# Patient Record
Sex: FEMALE | Race: WHITE | Employment: OTHER | URBAN - METROPOLITAN AREA
[De-identification: names, ages, dates, MRNs, and addresses within clinical notes are randomized per-mention and may not be internally consistent; named-entity substitution may affect disease eponyms.]

---

## 2017-05-25 PROBLEM — F12.90 MARIJUANA USE: Status: ACTIVE | Noted: 2017-05-25

## 2017-08-15 ENCOUNTER — HOSPITAL ENCOUNTER (OUTPATIENT)
Dept: SURGERY | Age: 57
Discharge: HOME | End: 2017-08-15
Attending: INTERNAL MEDICINE | Admitting: INTERNAL MEDICINE

## 2017-08-15 VITALS
HEIGHT: 63 IN | TEMPERATURE: 98 F | SYSTOLIC BLOOD PRESSURE: 107 MMHG | RESPIRATION RATE: 16 BRPM | OXYGEN SATURATION: 94 % | DIASTOLIC BLOOD PRESSURE: 81 MMHG | HEART RATE: 82 BPM | WEIGHT: 127 LBS | BODY MASS INDEX: 22.5 KG/M2

## 2017-08-15 DIAGNOSIS — R10.31 RIGHT LOWER QUADRANT PAIN: ICD-10-CM

## 2017-08-15 RX ORDER — NICOTINE 14MG/24HR
PATCH, TRANSDERMAL 24 HOURS TRANSDERMAL DAILY
COMMUNITY
End: 2018-02-14

## 2017-08-15 RX ORDER — LIDOCAINE HYDROCHLORIDE 10 MG/ML
1 INJECTION, SOLUTION EPIDURAL; INFILTRATION; INTRACAUDAL; PERINEURAL ONCE
Status: DISCONTINUED | OUTPATIENT
Start: 2017-08-15 | End: 2017-08-16 | Stop reason: HOSPADM

## 2017-08-15 RX ORDER — DICYCLOMINE HCL 20 MG
20 TABLET ORAL 4 TIMES DAILY
COMMUNITY
End: 2018-04-02 | Stop reason: ALTCHOICE

## 2017-08-15 RX ORDER — SODIUM CHLORIDE, SODIUM LACTATE, POTASSIUM CHLORIDE, CALCIUM CHLORIDE 600; 310; 30; 20 MG/100ML; MG/100ML; MG/100ML; MG/100ML
1000 INJECTION, SOLUTION INTRAVENOUS ONCE
Status: COMPLETED | OUTPATIENT
Start: 2017-08-15 | End: 2017-08-15

## 2017-08-15 RX ORDER — DIPHENHYDRAMINE HCL 50 MG
50 CAPSULE ORAL EVERY 6 HOURS PRN
COMMUNITY
End: 2018-05-14 | Stop reason: ALTCHOICE

## 2017-08-15 RX ORDER — ACETAMINOPHEN 500 MG
1000 TABLET ORAL EVERY 6 HOURS PRN
COMMUNITY
End: 2018-04-18 | Stop reason: ALTCHOICE

## 2017-08-15 RX ADMIN — SODIUM CHLORIDE, SODIUM LACTATE, POTASSIUM CHLORIDE, CALCIUM CHLORIDE 1000 ML: 600; 310; 30; 20 INJECTION, SOLUTION INTRAVENOUS at 10:40

## 2017-08-15 ASSESSMENT — PAIN - FUNCTIONAL ASSESSMENT: PAIN_FUNCTIONAL_ASSESSMENT: 0-10

## 2017-09-11 ENCOUNTER — HOSPITAL ENCOUNTER (OUTPATIENT)
Dept: OTHER | Age: 57
Discharge: HOME | End: 2017-09-11
Attending: NURSE PRACTITIONER | Admitting: NURSE PRACTITIONER

## 2017-09-11 DIAGNOSIS — M79.601 PAIN, ARM, RIGHT: ICD-10-CM

## 2017-09-12 PROBLEM — D72.829 LEUKOCYTOSIS: Status: ACTIVE | Noted: 2017-09-12

## 2017-09-12 PROBLEM — E83.52 HYPERCALCEMIA: Status: ACTIVE | Noted: 2017-09-12

## 2017-10-01 PROBLEM — R11.2 INTRACTABLE NAUSEA AND VOMITING: Status: ACTIVE | Noted: 2017-10-01

## 2017-11-17 PROBLEM — R65.10 SIRS (SYSTEMIC INFLAMMATORY RESPONSE SYNDROME) (HCC): Status: ACTIVE | Noted: 2017-11-17

## 2017-11-19 PROBLEM — F12.90 CANNABINOID HYPEREMESIS SYNDROME: Status: ACTIVE | Noted: 2017-11-19

## 2017-11-19 PROBLEM — R11.2 CANNABINOID HYPEREMESIS SYNDROME: Status: ACTIVE | Noted: 2017-11-19

## 2017-12-11 PROBLEM — R11.15 CYCLICAL VOMITING, INTRACTABLE: Status: ACTIVE | Noted: 2017-12-11

## 2018-01-17 PROBLEM — R00.0 TACHYCARDIA: Status: ACTIVE | Noted: 2018-01-17

## 2018-01-17 PROBLEM — I25.10 CAD (CORONARY ARTERY DISEASE): Status: ACTIVE | Noted: 2018-01-17

## 2018-01-17 PROBLEM — I50.9 CHF (CONGESTIVE HEART FAILURE) (HCC): Status: ACTIVE | Noted: 2018-01-17

## 2018-01-17 PROBLEM — E87.6 HYPOKALEMIA: Status: ACTIVE | Noted: 2018-01-17

## 2018-01-17 PROBLEM — R10.9 ABDOMINAL PAIN: Status: ACTIVE | Noted: 2018-01-17

## 2018-01-17 PROBLEM — F19.10 POLYSUBSTANCE ABUSE (HCC): Status: ACTIVE | Noted: 2018-01-17

## 2018-01-17 PROBLEM — R11.15 CYCLICAL VOMITING WITH NAUSEA: Status: ACTIVE | Noted: 2018-01-17

## 2018-02-14 PROBLEM — D72.829 LEUKOCYTOSIS: Status: RESOLVED | Noted: 2017-09-12 | Resolved: 2018-02-14

## 2018-02-14 PROBLEM — E87.6 HYPOKALEMIA: Status: RESOLVED | Noted: 2018-01-17 | Resolved: 2018-02-14

## 2018-02-14 PROBLEM — F19.10 POLYSUBSTANCE ABUSE (HCC): Chronic | Status: ACTIVE | Noted: 2018-01-17

## 2018-02-14 PROBLEM — R00.0 TACHYCARDIA: Status: RESOLVED | Noted: 2018-01-17 | Resolved: 2018-02-14

## 2018-02-14 PROBLEM — E44.0 MODERATE MALNUTRITION (HCC): Chronic | Status: ACTIVE | Noted: 2018-02-14

## 2018-02-14 PROBLEM — R11.2 INTRACTABLE NAUSEA AND VOMITING: Status: RESOLVED | Noted: 2017-10-01 | Resolved: 2018-02-14

## 2018-02-14 PROBLEM — R11.15 CYCLICAL VOMITING WITH NAUSEA: Chronic | Status: ACTIVE | Noted: 2018-01-17

## 2018-02-14 PROBLEM — R10.9 ABDOMINAL PAIN: Status: RESOLVED | Noted: 2018-01-17 | Resolved: 2018-02-14

## 2018-02-14 PROBLEM — R11.15 CYCLICAL VOMITING, INTRACTABLE: Status: RESOLVED | Noted: 2017-12-11 | Resolved: 2018-02-14

## 2018-02-14 PROBLEM — E83.52 HYPERCALCEMIA: Status: RESOLVED | Noted: 2017-09-12 | Resolved: 2018-02-14

## 2018-02-14 PROBLEM — I50.32 CHRONIC DIASTOLIC CHF (CONGESTIVE HEART FAILURE) (HCC): Chronic | Status: ACTIVE | Noted: 2018-01-17

## 2018-02-14 PROBLEM — R94.31 QT PROLONGATION: Status: ACTIVE | Noted: 2018-02-14

## 2018-02-14 PROBLEM — I25.10 CAD (CORONARY ARTERY DISEASE): Chronic | Status: ACTIVE | Noted: 2018-01-17

## 2018-02-14 PROBLEM — F12.90 CANNABINOID HYPEREMESIS SYNDROME: Chronic | Status: ACTIVE | Noted: 2017-11-19

## 2018-02-14 PROBLEM — F12.90 MARIJUANA USE: Chronic | Status: ACTIVE | Noted: 2017-05-25

## 2018-02-14 PROBLEM — R11.2 CANNABINOID HYPEREMESIS SYNDROME: Chronic | Status: ACTIVE | Noted: 2017-11-19

## 2018-04-18 PROBLEM — R07.9 CHEST PAIN: Status: ACTIVE | Noted: 2018-04-18

## 2018-04-20 PROBLEM — J96.00 ACUTE RESPIRATORY FAILURE (HCC): Status: ACTIVE | Noted: 2018-04-20

## 2018-04-25 ENCOUNTER — TELEPHONE (OUTPATIENT)
Dept: PULMONOLOGY | Age: 58
End: 2018-04-25

## 2018-05-07 ENCOUNTER — OFFICE VISIT (OUTPATIENT)
Dept: PULMONOLOGY | Age: 58
End: 2018-05-07

## 2018-05-07 VITALS
SYSTOLIC BLOOD PRESSURE: 76 MMHG | HEART RATE: 85 BPM | DIASTOLIC BLOOD PRESSURE: 48 MMHG | BODY MASS INDEX: 21.62 KG/M2 | HEIGHT: 63 IN | WEIGHT: 122 LBS | OXYGEN SATURATION: 97 % | RESPIRATION RATE: 16 BRPM | TEMPERATURE: 98.3 F

## 2018-05-07 DIAGNOSIS — F17.200 CURRENT SMOKER: ICD-10-CM

## 2018-05-07 DIAGNOSIS — J30.9 CHRONIC ALLERGIC RHINITIS, UNSPECIFIED SEASONALITY, UNSPECIFIED TRIGGER: ICD-10-CM

## 2018-05-07 DIAGNOSIS — F12.90 MARIJUANA USE: Chronic | ICD-10-CM

## 2018-05-07 DIAGNOSIS — J42 CHRONIC BRONCHITIS, UNSPECIFIED CHRONIC BRONCHITIS TYPE (HCC): Primary | Chronic | ICD-10-CM

## 2018-05-07 DIAGNOSIS — Z23 NEED FOR PNEUMOCOCCAL VACCINATION: ICD-10-CM

## 2018-05-07 PROCEDURE — G0009 ADMIN PNEUMOCOCCAL VACCINE: HCPCS | Performed by: INTERNAL MEDICINE

## 2018-05-07 PROCEDURE — 99204 OFFICE O/P NEW MOD 45 MIN: CPT | Performed by: INTERNAL MEDICINE

## 2018-05-07 PROCEDURE — 90670 PCV13 VACCINE IM: CPT | Performed by: INTERNAL MEDICINE

## 2018-05-07 RX ORDER — FLUTICASONE PROPIONATE 50 MCG
2 SPRAY, SUSPENSION (ML) NASAL DAILY
Qty: 1 BOTTLE | Refills: 5 | Status: SHIPPED | OUTPATIENT
Start: 2018-05-07 | End: 2018-07-18 | Stop reason: ALTCHOICE

## 2018-05-14 PROBLEM — J44.1 COPD EXACERBATION (HCC): Status: ACTIVE | Noted: 2018-05-14

## 2018-05-14 PROBLEM — J98.4 PNEUMONITIS: Status: ACTIVE | Noted: 2018-05-14

## 2018-05-14 PROBLEM — J18.9 PNEUMONITIS: Status: ACTIVE | Noted: 2018-05-14

## 2018-05-14 PROBLEM — I95.9 HYPOTENSION: Status: ACTIVE | Noted: 2018-05-14

## 2018-05-14 PROBLEM — Z85.118 H/O: LUNG CANCER: Status: ACTIVE | Noted: 2018-05-14

## 2018-07-18 ENCOUNTER — APPOINTMENT (OUTPATIENT)
Dept: GENERAL RADIOLOGY | Age: 58
End: 2018-07-18
Payer: COMMERCIAL

## 2018-07-18 ENCOUNTER — HOSPITAL ENCOUNTER (EMERGENCY)
Age: 58
Discharge: HOME | End: 2018-07-18
Attending: EMERGENCY MEDICINE
Payer: COMMERCIAL

## 2018-07-18 VITALS
DIASTOLIC BLOOD PRESSURE: 84 MMHG | RESPIRATION RATE: 16 BRPM | WEIGHT: 115 LBS | HEART RATE: 100 BPM | HEIGHT: 63 IN | SYSTOLIC BLOOD PRESSURE: 131 MMHG | TEMPERATURE: 97.7 F | BODY MASS INDEX: 20.38 KG/M2 | OXYGEN SATURATION: 95 %

## 2018-07-18 DIAGNOSIS — R11.2 INTRACTABLE VOMITING WITH NAUSEA, UNSPECIFIED VOMITING TYPE: Primary | ICD-10-CM

## 2018-07-18 LAB
A/G RATIO: 1.4 (ref 1.1–2.2)
ALBUMIN SERPL-MCNC: 5 G/DL (ref 3.4–5)
ALP BLD-CCNC: 99 U/L (ref 40–129)
ALT SERPL-CCNC: 13 U/L (ref 10–40)
ANION GAP SERPL CALCULATED.3IONS-SCNC: 16 MMOL/L (ref 3–16)
AST SERPL-CCNC: 18 U/L (ref 15–37)
BASOPHILS ABSOLUTE: 0.1 K/UL (ref 0–0.2)
BASOPHILS RELATIVE PERCENT: 0.7 %
BILIRUB SERPL-MCNC: 0.3 MG/DL (ref 0–1)
BUN BLDV-MCNC: 15 MG/DL (ref 7–20)
CALCIUM SERPL-MCNC: 10.9 MG/DL (ref 8.3–10.6)
CHLORIDE BLD-SCNC: 101 MMOL/L (ref 99–110)
CO2: 23 MMOL/L (ref 21–32)
CREAT SERPL-MCNC: 0.9 MG/DL (ref 0.6–1.1)
EOSINOPHILS ABSOLUTE: 0 K/UL (ref 0–0.6)
EOSINOPHILS RELATIVE PERCENT: 0.2 %
GFR AFRICAN AMERICAN: >60
GFR NON-AFRICAN AMERICAN: >60
GLOBULIN: 3.7 G/DL
GLUCOSE BLD-MCNC: 151 MG/DL (ref 70–99)
HCT VFR BLD CALC: 50.7 % (ref 36–48)
HEMOGLOBIN: 17.1 G/DL (ref 12–16)
LIPASE: 87 U/L (ref 13–60)
LYMPHOCYTES ABSOLUTE: 1.9 K/UL (ref 1–5.1)
LYMPHOCYTES RELATIVE PERCENT: 19.6 %
MCH RBC QN AUTO: 30.1 PG (ref 26–34)
MCHC RBC AUTO-ENTMCNC: 33.7 G/DL (ref 31–36)
MCV RBC AUTO: 89.4 FL (ref 80–100)
MONOCYTES ABSOLUTE: 0.3 K/UL (ref 0–1.3)
MONOCYTES RELATIVE PERCENT: 3.1 %
NEUTROPHILS ABSOLUTE: 7.4 K/UL (ref 1.7–7.7)
NEUTROPHILS RELATIVE PERCENT: 76.4 %
PDW BLD-RTO: 16.4 % (ref 12.4–15.4)
PLATELET # BLD: 289 K/UL (ref 135–450)
PMV BLD AUTO: 9.3 FL (ref 5–10.5)
POTASSIUM SERPL-SCNC: 3.9 MMOL/L (ref 3.5–5.1)
RBC # BLD: 5.67 M/UL (ref 4–5.2)
SODIUM BLD-SCNC: 140 MMOL/L (ref 136–145)
TOTAL PROTEIN: 8.7 G/DL (ref 6.4–8.2)
WBC # BLD: 9.7 K/UL (ref 4–11)

## 2018-07-18 PROCEDURE — 96361 HYDRATE IV INFUSION ADD-ON: CPT

## 2018-07-18 PROCEDURE — 96372 THER/PROPH/DIAG INJ SC/IM: CPT

## 2018-07-18 PROCEDURE — 80053 COMPREHEN METABOLIC PANEL: CPT

## 2018-07-18 PROCEDURE — 96375 TX/PRO/DX INJ NEW DRUG ADDON: CPT

## 2018-07-18 PROCEDURE — 99284 EMERGENCY DEPT VISIT MOD MDM: CPT

## 2018-07-18 PROCEDURE — 83690 ASSAY OF LIPASE: CPT

## 2018-07-18 PROCEDURE — 6360000002 HC RX W HCPCS: Performed by: EMERGENCY MEDICINE

## 2018-07-18 PROCEDURE — 96374 THER/PROPH/DIAG INJ IV PUSH: CPT

## 2018-07-18 PROCEDURE — 2580000003 HC RX 258: Performed by: EMERGENCY MEDICINE

## 2018-07-18 PROCEDURE — 85025 COMPLETE CBC W/AUTO DIFF WBC: CPT

## 2018-07-18 PROCEDURE — 93005 ELECTROCARDIOGRAM TRACING: CPT | Performed by: EMERGENCY MEDICINE

## 2018-07-18 RX ORDER — DIPHENHYDRAMINE HYDROCHLORIDE 50 MG/ML
25 INJECTION INTRAMUSCULAR; INTRAVENOUS ONCE
Status: COMPLETED | OUTPATIENT
Start: 2018-07-18 | End: 2018-07-18

## 2018-07-18 RX ORDER — HALOPERIDOL 5 MG/ML
5 INJECTION INTRAMUSCULAR ONCE
Status: COMPLETED | OUTPATIENT
Start: 2018-07-18 | End: 2018-07-18

## 2018-07-18 RX ORDER — ONDANSETRON 2 MG/ML
4 INJECTION INTRAMUSCULAR; INTRAVENOUS ONCE
Status: COMPLETED | OUTPATIENT
Start: 2018-07-18 | End: 2018-07-18

## 2018-07-18 RX ORDER — MORPHINE SULFATE 4 MG/ML
4 INJECTION, SOLUTION INTRAMUSCULAR; INTRAVENOUS ONCE
Status: COMPLETED | OUTPATIENT
Start: 2018-07-18 | End: 2018-07-18

## 2018-07-18 RX ORDER — 0.9 % SODIUM CHLORIDE 0.9 %
1000 INTRAVENOUS SOLUTION INTRAVENOUS ONCE
Status: COMPLETED | OUTPATIENT
Start: 2018-07-18 | End: 2018-07-18

## 2018-07-18 RX ORDER — METOCLOPRAMIDE 10 MG/1
10 TABLET ORAL EVERY 8 HOURS PRN
Qty: 30 TABLET | Refills: 0 | Status: SHIPPED | OUTPATIENT
Start: 2018-07-18 | End: 2020-07-08

## 2018-07-18 RX ADMIN — SODIUM CHLORIDE 1000 ML: 9 INJECTION, SOLUTION INTRAVENOUS at 11:47

## 2018-07-18 RX ADMIN — MORPHINE SULFATE 4 MG: 4 INJECTION, SOLUTION INTRAMUSCULAR; INTRAVENOUS at 11:48

## 2018-07-18 RX ADMIN — ONDANSETRON 4 MG: 2 INJECTION INTRAMUSCULAR; INTRAVENOUS at 11:48

## 2018-07-18 RX ADMIN — DIPHENHYDRAMINE HYDROCHLORIDE 25 MG: 50 INJECTION, SOLUTION INTRAMUSCULAR; INTRAVENOUS at 11:47

## 2018-07-18 RX ADMIN — HALOPERIDOL LACTATE 5 MG: 5 INJECTION, SOLUTION INTRAMUSCULAR at 11:48

## 2018-07-18 ASSESSMENT — PAIN DESCRIPTION - LOCATION: LOCATION: ABDOMEN

## 2018-07-18 ASSESSMENT — PAIN DESCRIPTION - PAIN TYPE: TYPE: ACUTE PAIN;CHRONIC PAIN

## 2018-07-18 ASSESSMENT — PAIN SCALES - GENERAL
PAINLEVEL_OUTOF10: 7
PAINLEVEL_OUTOF10: 9

## 2018-07-18 NOTE — ED PROVIDER NOTES
CHIEF COMPLAINT  Abdominal Pain (c/o mid/left abd pain and n/v. pt states \"it's pain first and then vomiting. they keep telling me it's from my cannabis use but it's not. \" ) and Emesis      HISTORY OF PRESENT ILLNESS  Sudhakar Gregorio is a 61 y.o. female presents to the ED with vomiting. The patient has a history of cyclic vomiting, states she started vomiting again yesterday and associated with left upper quadrant pain. The patient has had no fevers, no diarrhea, no constipation, no melena. She has had no cough or chest pain. She states that she doesn't believe it's related to the marijuana although she still is continuing to use routinely. She has had a cholecystectomy remotely. No other complaints, modifying factors or associated symptoms. I have reviewed the following from the nursing documentation.     Past Medical History:   Diagnosis Date    Anxiety     Asthma     Bipolar affective ([address]     Cancer ([address] 1984    Uterine    Cancer of lung ([address] 2014    CHF (congestive heart failure) ([address]     COPD (chronic obstructive pulmonary disease) (HCC)     Cyclic vomiting syndrome     with known cannabis abuse    Cysts of both kidneys     Depression     Fatty liver 09/06/12    by CT at [facility]    Gastritis 06/29/12    EGD at [facility] MI (myocardial infarction) 2010    Panic attacks     Pneumonia     Pre-diabetes 04/13/13    A1c5.8%    Tricuspid regurgitation     06/26/2012 JUANJO at [facility] Structurally Normal Tricuspid Valve     Past Surgical History:   Procedure Laterality Date    CHOLECYSTECTOMY  2009    COLONOSCOPY  2001    COLONOSCOPY  2013    tubular adenoma    COLONOSCOPY  08/15/2017    ENDOSCOPY, COLON, DIAGNOSTIC      HERNIA REPAIR      HYSTERECTOMY  1985    With Bladder Mesh    [facility]CONT[facility]GERY Left     2009 in [facility]    LUNG CANCER SURGERY      cancerous nodule removed left side    TONSILLECTOMY      UPPER GASTROINTESTINAL ENDOSCOPY  2013    gastritis    UPPER GASTROINTESTINAL ENDOSCOPY Anxiety        Allergies   Allergen Reactions    Bactrim [Sulfamethoxazole-Trimethoprim]      N/V    Codeine Itching and Nausea And Vomiting     GI upset & itching       REVIEW OF SYSTEMS  10 systems reviewed, pertinent positives per HPI otherwise noted to be negative. PHYSICAL EXAM  /85   Pulse 93   Temp 97.7 °F (36.5 °C) (Oral)   Resp 16   Ht 5' 3\" (1.6 m)   Wt 115 lb (52.2 kg)   SpO2 98%   BMI 20.37 kg/m²   GENERAL APPEARANCE: Awake and alert. Cooperative. Moderate distress initially  HEAD: Normocephalic. Atraumatic. EYES: PERRL. EOM's grossly intact. ENT: Mucous membranes are dry. NECK: Supple. HEART: RRR. No murmurs  LUNGS: Respirations unlabored. Lungs are clear bilaterally. ABDOMEN: Soft. Non-distended. Tender left upper quadrant. No guarding or rebound. Normal bowel sounds. EXTREMITIES: No peripheral edema. Moves all extremities equally. All extremities neurovascularly intact. SKIN: Warm and dry. No acute rashes. NEUROLOGICAL: Alert and oriented. No gross facial drooping. PSYCHIATRIC: Normal mood and affect. LABS  I have reviewed all labs for this visit.    Results for orders placed or performed during the hospital encounter of 07/18/18   CBC Auto Differential   Result Value Ref Range    WBC 9.7 4.0 - 11.0 K/uL    RBC 5.67 (H) 4.00 - 5.20 M/uL    Hemoglobin 17.1 (H) 12.0 - 16.0 g/dL    Hematocrit 50.7 (H) 36.0 - 48.0 %    MCV 89.4 80.0 - 100.0 fL    MCH 30.1 26.0 - 34.0 pg    MCHC 33.7 31.0 - 36.0 g/dL    RDW 16.4 (H) 12.4 - 15.4 %    Platelets 621 331 - 711 K/uL    MPV 9.3 5.0 - 10.5 fL    Neutrophils % 76.4 %    Lymphocytes % 19.6 %    Monocytes % 3.1 %    Eosinophils % 0.2 %    Basophils % 0.7 %    Neutrophils # 7.4 1.7 - 7.7 K/uL    Lymphocytes # 1.9 1.0 - 5.1 K/uL    Monocytes # 0.3 0.0 - 1.3 K/uL    Eosinophils # 0.0 0.0 - 0.6 K/uL    Basophils # 0.1 0.0 - 0.2 K/uL   Comprehensive Metabolic Panel   Result Value Ref Range    Sodium 140 136 - 145 mmol/L    Potassium 3.9 3.5 - 5.1 mmol/L    Chloride 101 99 - 110 mmol/L    CO2 23 21 - 32 mmol/L    Anion Gap 16 3 - 16    Glucose 151 (H) 70 - 99 mg/dL    BUN 15 7 - 20 mg/dL    CREATININE 0.9 0.6 - 1.1 mg/dL    GFR Non-African American >60 >60    GFR African American >60 >60    Calcium 10.9 (H) 8.3 - 10.6 mg/dL    Total Protein 8.7 (H) 6.4 - 8.2 g/dL    Alb 5.0 3.4 - 5.0 g/dL    Albumin/Globulin Ratio 1.4 1.1 - 2.2    Total Bilirubin 0.3 0.0 - 1.0 mg/dL    Alkaline Phosphatase 99 40 - 129 U/L    ALT 13 10 - 40 U/L    AST 18 15 - 37 U/L    Globulin 3.7 g/dL   Lipase   Result Value Ref Range    Lipase 87.0 (H) 13.0 - 60.0 U/L       EKG  The Ekg interpreted by me shows  normal sinus rhythm with a rate of 75  Axis is   Normal  QTc is  469  Intervals and Durations are unremarkable. ST Segments: normal  No significant change from prior EKG dated May 14, 2018        RADIOLOGY  No results found. PROCEDURES    ED COURSE/MDM  Patient seen and evaluated. Old records reviewed. Labs and imaging reviewed and results discussed with patient. I considered ACS, EKG was normal, obstruction initially, the patient had belly series ordered but the patient is refusing stating that this is the chronic vomiting that she has. She was given Reglan, Pepcid, Benadryl and IV fluids along with pain medication and is feeling better and has stopped vomiting. She does not want the x-rays and at this time will be discharged home on Reglan and asked to follow up again with her primary care provider. Patient was given scripts for the following medications. I counseled patient how to take these medications. Current Discharge Medication List          CLINICAL IMPRESSION  1. Intractable vomiting with nausea, unspecified vomiting type        Blood pressure 108/85, pulse 93, temperature 97.7 °F (36.5 °C), temperature source Oral, resp. rate 16, height 5' 3\" (1.6 m), weight 115 lb (52.2 kg), SpO2 98 %, not currently breastfeeding.     DISPOSITION  [facility]

## 2018-07-18 NOTE — ED NOTES
Bed: 20  Expected date:   Expected time:   Means of arrival:   Comments:  56yo abdominal pain      Gaurang Bray RN  07/18/18 4166

## 2018-07-18 NOTE — ED NOTES
--Patient provided with discharge instructions and prescription. --Instructions, prescription, and follow-up appointments reviewed with patient. No further questions or needs at this time. --Vital signs and patient stable upon discharge. --Patient escorted to lobby via wheelchair. [employer] voucher provided to kody Dyer RN  07/18/18 8646

## 2018-07-19 LAB
EKG ATRIAL RATE: 75 BPM
EKG DIAGNOSIS: NORMAL
EKG P AXIS: 70 DEGREES
EKG P-R INTERVAL: 130 MS
EKG Q-T INTERVAL: 420 MS
EKG QRS DURATION: 76 MS
EKG QTC CALCULATION (BAZETT): 469 MS
EKG R AXIS: 80 DEGREES
EKG T AXIS: 67 DEGREES
EKG VENTRICULAR RATE: 75 BPM

## 2018-07-19 PROCEDURE — 93010 ELECTROCARDIOGRAM REPORT: CPT | Performed by: INTERNAL MEDICINE

## 2018-07-20 ENCOUNTER — HOSPITAL ENCOUNTER (EMERGENCY)
Age: 58
Discharge: HOME | End: 2018-07-20
Attending: EMERGENCY MEDICINE
Payer: COMMERCIAL

## 2018-07-20 ENCOUNTER — APPOINTMENT (OUTPATIENT)
Dept: ULTRASOUND IMAGING | Age: 58
End: 2018-07-20
Payer: COMMERCIAL

## 2018-07-20 ENCOUNTER — APPOINTMENT (OUTPATIENT)
Dept: GENERAL RADIOLOGY | Age: 58
End: 2018-07-20
Payer: COMMERCIAL

## 2018-07-20 VITALS
SYSTOLIC BLOOD PRESSURE: 123 MMHG | RESPIRATION RATE: 16 BRPM | HEART RATE: 101 BPM | OXYGEN SATURATION: 96 % | DIASTOLIC BLOOD PRESSURE: 76 MMHG | TEMPERATURE: 97.2 F

## 2018-07-20 DIAGNOSIS — E86.0 DEHYDRATION: ICD-10-CM

## 2018-07-20 DIAGNOSIS — R11.11 NON-INTRACTABLE VOMITING WITHOUT NAUSEA, UNSPECIFIED VOMITING TYPE: ICD-10-CM

## 2018-07-20 DIAGNOSIS — R10.12 LEFT UPPER QUADRANT PAIN: ICD-10-CM

## 2018-07-20 DIAGNOSIS — R31.29 MICROSCOPIC HEMATURIA: ICD-10-CM

## 2018-07-20 DIAGNOSIS — N17.9 ACUTE KIDNEY INJURY (HCC): Primary | ICD-10-CM

## 2018-07-20 LAB
A/G RATIO: 1.2 (ref 1.1–2.2)
ALBUMIN SERPL-MCNC: 4.7 G/DL (ref 3.4–5)
ALP BLD-CCNC: 97 U/L (ref 40–129)
ALT SERPL-CCNC: 12 U/L (ref 10–40)
ANION GAP SERPL CALCULATED.3IONS-SCNC: 11 MMOL/L (ref 3–16)
ANION GAP SERPL CALCULATED.3IONS-SCNC: 15 MMOL/L (ref 3–16)
ANION GAP SERPL CALCULATED.3IONS-SCNC: 20 MMOL/L (ref 3–16)
AST SERPL-CCNC: 18 U/L (ref 15–37)
BACTERIA: ABNORMAL /HPF
BASOPHILS ABSOLUTE: 0.1 K/UL (ref 0–0.2)
BASOPHILS RELATIVE PERCENT: 0.7 %
BILIRUB SERPL-MCNC: 0.4 MG/DL (ref 0–1)
BILIRUBIN URINE: ABNORMAL
BLOOD, URINE: ABNORMAL
BUN BLDV-MCNC: 30 MG/DL (ref 7–20)
BUN BLDV-MCNC: 37 MG/DL (ref 7–20)
BUN BLDV-MCNC: 40 MG/DL (ref 7–20)
CALCIUM SERPL-MCNC: 10.4 MG/DL (ref 8.3–10.6)
CALCIUM SERPL-MCNC: 9.2 MG/DL (ref 8.3–10.6)
CALCIUM SERPL-MCNC: 9.7 MG/DL (ref 8.3–10.6)
CASTS: ABNORMAL /LPF
CHLORIDE BLD-SCNC: 90 MMOL/L (ref 99–110)
CHLORIDE BLD-SCNC: 93 MMOL/L (ref 99–110)
CHLORIDE BLD-SCNC: 95 MMOL/L (ref 99–110)
CLARITY: CLEAR
CO2: 22 MMOL/L (ref 21–32)
CO2: 26 MMOL/L (ref 21–32)
CO2: 30 MMOL/L (ref 21–32)
COLOR: YELLOW
CREAT SERPL-MCNC: 1.2 MG/DL (ref 0.6–1.1)
CREAT SERPL-MCNC: 1.5 MG/DL (ref 0.6–1.1)
CREAT SERPL-MCNC: 1.6 MG/DL (ref 0.6–1.1)
EKG ATRIAL RATE: 123 BPM
EKG DIAGNOSIS: NORMAL
EKG P AXIS: 78 DEGREES
EKG P-R INTERVAL: 130 MS
EKG Q-T INTERVAL: 340 MS
EKG QRS DURATION: 74 MS
EKG QTC CALCULATION (BAZETT): 486 MS
EKG R AXIS: 82 DEGREES
EKG T AXIS: 68 DEGREES
EKG VENTRICULAR RATE: 123 BPM
EOSINOPHILS ABSOLUTE: 0 K/UL (ref 0–0.6)
EOSINOPHILS RELATIVE PERCENT: 0 %
EPITHELIAL CELLS, UA: ABNORMAL /HPF
GFR AFRICAN AMERICAN: 40
GFR AFRICAN AMERICAN: 43
GFR AFRICAN AMERICAN: 56
GFR NON-AFRICAN AMERICAN: 33
GFR NON-AFRICAN AMERICAN: 36
GFR NON-AFRICAN AMERICAN: 46
GLOBULIN: 3.9 G/DL
GLUCOSE BLD-MCNC: 101 MG/DL (ref 70–99)
GLUCOSE BLD-MCNC: 141 MG/DL (ref 70–99)
GLUCOSE BLD-MCNC: 89 MG/DL (ref 70–99)
GLUCOSE URINE: NEGATIVE MG/DL
HCT VFR BLD CALC: 51.2 % (ref 36–48)
HEMOGLOBIN: 17.3 G/DL (ref 12–16)
KETONES, URINE: ABNORMAL MG/DL
LACTIC ACID: 1.3 MMOL/L (ref 0.4–2)
LEUKOCYTE ESTERASE, URINE: NEGATIVE
LIPASE: 17 U/L (ref 13–60)
LYMPHOCYTES ABSOLUTE: 2.6 K/UL (ref 1–5.1)
LYMPHOCYTES RELATIVE PERCENT: 16.4 %
MAGNESIUM: 2.1 MG/DL (ref 1.8–2.4)
MCH RBC QN AUTO: 29.8 PG (ref 26–34)
MCHC RBC AUTO-ENTMCNC: 33.9 G/DL (ref 31–36)
MCV RBC AUTO: 87.9 FL (ref 80–100)
MICROSCOPIC EXAMINATION: YES
MONOCYTES ABSOLUTE: 0.9 K/UL (ref 0–1.3)
MONOCYTES RELATIVE PERCENT: 5.5 %
NEUTROPHILS ABSOLUTE: 12.5 K/UL (ref 1.7–7.7)
NEUTROPHILS RELATIVE PERCENT: 77.4 %
NITRITE, URINE: NEGATIVE
PDW BLD-RTO: 16.5 % (ref 12.4–15.4)
PH UA: 6
PLATELET # BLD: 342 K/UL (ref 135–450)
PMV BLD AUTO: 10.1 FL (ref 5–10.5)
POTASSIUM SERPL-SCNC: 3.7 MMOL/L (ref 3.5–5.1)
POTASSIUM SERPL-SCNC: 3.7 MMOL/L (ref 3.5–5.1)
POTASSIUM SERPL-SCNC: 3.8 MMOL/L (ref 3.5–5.1)
PROTEIN UA: >=300 MG/DL
RBC # BLD: 5.82 M/UL (ref 4–5.2)
RBC UA: ABNORMAL /HPF (ref 0–2)
REASON FOR REJECTION: NORMAL
REJECTED TEST: NORMAL
SODIUM BLD-SCNC: 132 MMOL/L (ref 136–145)
SODIUM BLD-SCNC: 134 MMOL/L (ref 136–145)
SODIUM BLD-SCNC: 136 MMOL/L (ref 136–145)
SPECIFIC GRAVITY UA: >=1.03
TOTAL PROTEIN: 8.6 G/DL (ref 6.4–8.2)
TROPONIN: <0.01 NG/ML
URINE TYPE: ABNORMAL
UROBILINOGEN, URINE: 0.2 E.U./DL
WBC # BLD: 16.1 K/UL (ref 4–11)
WBC UA: ABNORMAL /HPF (ref 0–5)

## 2018-07-20 PROCEDURE — 83690 ASSAY OF LIPASE: CPT

## 2018-07-20 PROCEDURE — 93010 ELECTROCARDIOGRAM REPORT: CPT | Performed by: INTERNAL MEDICINE

## 2018-07-20 PROCEDURE — 6360000002 HC RX W HCPCS: Performed by: EMERGENCY MEDICINE

## 2018-07-20 PROCEDURE — 84484 ASSAY OF TROPONIN QUANT: CPT

## 2018-07-20 PROCEDURE — 6370000000 HC RX 637 (ALT 250 FOR IP): Performed by: EMERGENCY MEDICINE

## 2018-07-20 PROCEDURE — 96372 THER/PROPH/DIAG INJ SC/IM: CPT

## 2018-07-20 PROCEDURE — 96375 TX/PRO/DX INJ NEW DRUG ADDON: CPT

## 2018-07-20 PROCEDURE — 2580000003 HC RX 258: Performed by: EMERGENCY MEDICINE

## 2018-07-20 PROCEDURE — 76770 US EXAM ABDO BACK WALL COMP: CPT

## 2018-07-20 PROCEDURE — 96361 HYDRATE IV INFUSION ADD-ON: CPT

## 2018-07-20 PROCEDURE — 93005 ELECTROCARDIOGRAM TRACING: CPT | Performed by: EMERGENCY MEDICINE

## 2018-07-20 PROCEDURE — 2500000003 HC RX 250 WO HCPCS: Performed by: EMERGENCY MEDICINE

## 2018-07-20 PROCEDURE — 99285 EMERGENCY DEPT VISIT HI MDM: CPT

## 2018-07-20 PROCEDURE — 81001 URINALYSIS AUTO W/SCOPE: CPT

## 2018-07-20 PROCEDURE — 83735 ASSAY OF MAGNESIUM: CPT

## 2018-07-20 PROCEDURE — 96374 THER/PROPH/DIAG INJ IV PUSH: CPT

## 2018-07-20 PROCEDURE — 80053 COMPREHEN METABOLIC PANEL: CPT

## 2018-07-20 PROCEDURE — S0028 INJECTION, FAMOTIDINE, 20 MG: HCPCS | Performed by: EMERGENCY MEDICINE

## 2018-07-20 PROCEDURE — 96376 TX/PRO/DX INJ SAME DRUG ADON: CPT

## 2018-07-20 PROCEDURE — 36415 COLL VENOUS BLD VENIPUNCTURE: CPT

## 2018-07-20 PROCEDURE — 85025 COMPLETE CBC W/AUTO DIFF WBC: CPT

## 2018-07-20 PROCEDURE — 74022 RADEX COMPL AQT ABD SERIES: CPT

## 2018-07-20 PROCEDURE — 83605 ASSAY OF LACTIC ACID: CPT

## 2018-07-20 RX ORDER — FAMOTIDINE 20 MG/1
20 TABLET, FILM COATED ORAL 2 TIMES DAILY
Qty: 14 TABLET | Refills: 0 | Status: ON HOLD | OUTPATIENT
Start: 2018-07-20 | End: 2019-01-27 | Stop reason: HOSPADM

## 2018-07-20 RX ORDER — SODIUM CHLORIDE, SODIUM LACTATE, POTASSIUM CHLORIDE, AND CALCIUM CHLORIDE .6; .31; .03; .02 G/100ML; G/100ML; G/100ML; G/100ML
1000 INJECTION, SOLUTION INTRAVENOUS ONCE
Status: COMPLETED | OUTPATIENT
Start: 2018-07-20 | End: 2018-07-20

## 2018-07-20 RX ORDER — HYDROCODONE BITARTRATE AND ACETAMINOPHEN 7.5; 325 MG/1; MG/1
1 TABLET ORAL ONCE
Status: COMPLETED | OUTPATIENT
Start: 2018-07-20 | End: 2018-07-20

## 2018-07-20 RX ORDER — ONDANSETRON 2 MG/ML
4 INJECTION INTRAMUSCULAR; INTRAVENOUS ONCE
Status: COMPLETED | OUTPATIENT
Start: 2018-07-20 | End: 2018-07-20

## 2018-07-20 RX ORDER — 0.9 % SODIUM CHLORIDE 0.9 %
1000 INTRAVENOUS SOLUTION INTRAVENOUS ONCE
Status: COMPLETED | OUTPATIENT
Start: 2018-07-20 | End: 2018-07-20

## 2018-07-20 RX ORDER — HALOPERIDOL 5 MG/ML
2 INJECTION INTRAMUSCULAR ONCE
Status: COMPLETED | OUTPATIENT
Start: 2018-07-20 | End: 2018-07-20

## 2018-07-20 RX ORDER — MORPHINE SULFATE 2 MG/ML
2 INJECTION, SOLUTION INTRAMUSCULAR; INTRAVENOUS ONCE
Status: COMPLETED | OUTPATIENT
Start: 2018-07-20 | End: 2018-07-20

## 2018-07-20 RX ADMIN — MORPHINE SULFATE 2 MG: 2 INJECTION, SOLUTION INTRAMUSCULAR; INTRAVENOUS at 07:55

## 2018-07-20 RX ADMIN — FAMOTIDINE 20 MG: 10 INJECTION, SOLUTION INTRAVENOUS at 07:55

## 2018-07-20 RX ADMIN — HYDROCODONE BITARTRATE AND ACETAMINOPHEN 1 TABLET: 7.5; 325 TABLET ORAL at 13:31

## 2018-07-20 RX ADMIN — ONDANSETRON 4 MG: 2 INJECTION INTRAMUSCULAR; INTRAVENOUS at 13:31

## 2018-07-20 RX ADMIN — ONDANSETRON 4 MG: 2 INJECTION INTRAMUSCULAR; INTRAVENOUS at 07:55

## 2018-07-20 RX ADMIN — SODIUM CHLORIDE 1000 ML: 9 INJECTION, SOLUTION INTRAVENOUS at 13:31

## 2018-07-20 RX ADMIN — SODIUM CHLORIDE, POTASSIUM CHLORIDE, SODIUM LACTATE AND CALCIUM CHLORIDE 1000 ML: 600; 310; 30; 20 INJECTION, SOLUTION INTRAVENOUS at 07:54

## 2018-07-20 RX ADMIN — HALOPERIDOL LACTATE 2 MG: 5 INJECTION, SOLUTION INTRAMUSCULAR at 07:55

## 2018-07-20 RX ADMIN — SODIUM CHLORIDE, POTASSIUM CHLORIDE, SODIUM LACTATE AND CALCIUM CHLORIDE 1000 ML: 600; 310; 30; 20 INJECTION, SOLUTION INTRAVENOUS at 10:56

## 2018-07-20 ASSESSMENT — PAIN DESCRIPTION - LOCATION
LOCATION: FLANK
LOCATION: ABDOMEN
LOCATION: ABDOMEN

## 2018-07-20 ASSESSMENT — ENCOUNTER SYMPTOMS
BLOOD IN STOOL: 0
SHORTNESS OF BREATH: 0
VOMITING: 1
COLOR CHANGE: 0
DIARRHEA: 0
BACK PAIN: 1
CONSTIPATION: 0
ABDOMINAL PAIN: 1
ANAL BLEEDING: 0
NAUSEA: 1

## 2018-07-20 ASSESSMENT — PAIN SCALES - GENERAL
PAINLEVEL_OUTOF10: 3
PAINLEVEL_OUTOF10: 6
PAINLEVEL_OUTOF10: 6
PAINLEVEL_OUTOF10: 9

## 2018-07-20 ASSESSMENT — PAIN DESCRIPTION - PAIN TYPE
TYPE: ACUTE PAIN
TYPE: ACUTE PAIN

## 2018-07-20 ASSESSMENT — PAIN DESCRIPTION - ORIENTATION: ORIENTATION: LEFT

## 2018-07-20 NOTE — ED NOTES
Pt remedicated per order. Pt in bed resting. 3rd Liter of fluids infusing. Pt denies further needs at this time.       Sri Chaves RN  07/20/18 7771

## 2018-07-20 NOTE — ED PROVIDER NOTES
[facility]  ED  eMERGENCY dEPARTMENT eNCOUnter        Pt Name: Scotty Mcrae  MRN: [id number]  [place] 1960  Date of evaluation: 7/20/2018  Provider: Kita Mcmahon MD  PCP: MACEY [address] [facility]       Chief Complaint   Patient presents with    Emesis     seen two days ago for same. has been taking reglan, zofran, phenergan [place]       HISTORY OF PRESENT ILLNESS   (Location/Symptom, Timing/Onset, Context/Setting, Quality, Duration, Modifying Factors, Severity)  Note limiting factors. Scotty Mcrae is a 61 y.o. female with a history of intractable vomiting presenting today with upper abdominal pain that radiates to her back with multiple episodes of vomiting for the last 2 days. She was seen in the emergency department 2 days ago for similar symptoms and states she did not feel much better at time of discharge. This is a chronic problem. She does smoke marijuana daily. She denies any chest pain or shortness of breath. No lower abdominal pain. No urinary symptoms. No fever. She is attempting to take Reglan, Zofran, Phenergan at home but she is still severely nauseated and nothing is helping with her symptoms. No blood in the vomit. She had a normal bowel movement yesterday. REVIEW OF SYSTEMS    (2-9 systems for level 4, 10 or more for level 5)     Review of Systems   Constitutional: Positive for fatigue. Negative for chills, diaphoresis and fever. HENT: Negative for congestion. Respiratory: Negative for shortness of breath. Cardiovascular: Negative for chest pain. Gastrointestinal: Positive for abdominal pain, nausea and vomiting. Negative for anal bleeding, blood in stool, constipation and diarrhea. Genitourinary: Positive for decreased urine volume. Negative for dysuria and flank pain. Musculoskeletal: Positive for back pain. Negative for neck pain. Skin: Negative for color change.    Neurological: Positive for weakness appearance. She does not have a sickly appearance. She does not appear ill. She appears distressed (mild). HENT:   Head: Normocephalic and atraumatic. Nose: Nose normal.   Mouth/Throat: Mucous membranes are dry. No oropharyngeal exudate. Eyes: Right eye exhibits no discharge. Left eye exhibits no discharge. Neck: Normal range of motion and full passive range of motion without pain. Neck supple. No neck rigidity. No tracheal deviation, no edema, no erythema and normal range of motion present. Cardiovascular: Regular rhythm and intact distal pulses. Tachycardia present. Pulmonary/Chest: Effort normal and breath sounds normal. No accessory muscle usage or stridor. No respiratory distress. She has no decreased breath sounds. She has no wheezes. She has no rhonchi. She has no rales. She exhibits no tenderness. Abdominal: Soft. Bowel sounds are normal. She exhibits no distension. There is tenderness (mild) in the epigastric area and left upper quadrant. There is no rigidity, no rebound, no guarding, no CVA tenderness, no tenderness at McBurney's point and negative Sin's sign. Musculoskeletal: She exhibits no edema, tenderness or deformity. Neurological: She is alert and oriented to person, place, and time. She has normal strength. She displays no atrophy and no tremor. No sensory deficit. She exhibits normal muscle tone. She displays no seizure activity. GCS eye subscore is 4. GCS verbal subscore is 5. GCS motor subscore is 6. Skin: Skin is warm, dry and intact. No rash noted. She is not diaphoretic. No erythema. No pallor. Psychiatric: She has a normal mood and affect. Nursing note and vitals reviewed.           DIAGNOSTIC RESULTS   LABS:    Labs Reviewed   CBC WITH AUTO DIFFERENTIAL - Abnormal; Notable for the following:        Result Value    WBC 16.1 (*)     RBC 5.82 (*)     Hemoglobin 17.3 (*)     Hematocrit 51.2 (*)     RDW 16.5 (*)     Neutrophils # 12.5 (*)     All other components limits    Narrative:     Performed at:  [address], [facility]   Phone [phone]   LACTIC ACID, PLASMA    Narrative:     Performed at:  [address], [facility]   Phone [phone]   TROPONIN    Narrative:     Performed at:  [address], [address] [facility]   Phone [phone]   LIPASE    Narrative:     Performed at:  [address], [facility]   Phone [phone]    Narrative:     Telly Ross tel. [phone],  Rejected Test Name/Called to: Mirella Ewing rn, 07/20/2018 07:48, by JUAN PABLO  Performed at:  [address], [facility]   Phone [phone]   MAGNESIUM    Narrative:     Performed at:  [address], [place] [facility]   Phone [phone]   LACTIC ACID, PLASMA       All other labs were within normal range or not returned as of this dictation. EKG: All EKG's are interpreted by the Emergency Department Physician who either signs or Co-signs this chart in the absence of a cardiologist.    The Ekg interpreted by me shows  sinus tachycardia, [id number]   Axis is   Normal  QTc is  within an acceptable range  Intervals and Durations are unremarkable.       ST Segments: no acute change and nonspecific changes  No significant change from prior EKG dated - 7/18/18  No STEMI           RADIOLOGY:   Non-plain film images such as CT, Ultrasound and MRI are read by the radiologist. Plain radiographic images are visualized and preliminarily interpreted by the  ED Provider with the below findings:        Interpretation per the Radiologist below, if available at the time of this note:    US RENAL COMPLETE   Final Result   There are 2 simple appearing cysts states her abdominal pain is worse and normal, we will also obtain an acute abdominal series to assess for possible obstruction but again she is having normal bowel movements and this is less likely. Otherwise, I will assess electrolytes and hydrate her accordingly. Following 3 L of hydration and repeat BMP, she had significant improvement with her acute kidney injury. She was tolerating p.o. No sign of kidney stone causing obstruction on renal ultrasound. No sign of obstruction of the bowel on x-ray. I reevaluated her following pain medication and antiemetics and she was feeling much better and appeared very comfortable in the room. At this time, she does feel comfortable trying to go home and I do believe this is appropriate at this time. She understands if she has any worsening abdominal pain, vomiting, fever, development of chest pain or shortness of breath, then return to the emergency department, but otherwise follow-up with her primary doctor in the next 2-4 days for repeat evaluation and with GI in regards to her chronic vomiting and upper abdominal pain that is chronic. She was well-appearing and in no acute distress at time of discharge. I did consider acute coronary syndrome, gastritis, appendicitis, diverticulitis, bowel obstruction, gastroparesis, amongst other pathology. She is aware of the need to follow-up with repeat lab work and urinalysis related to hematuria and acute kidney injury that is improving,b but return to the emergency department for any worsening symptoms. I also believe this could be related to marijuana use and I did discuss with her that she should try to stop. The patient tolerated their visit well. The patient and / or the family were informed of the results of any tests, a time was given to answer questions. FINAL IMPRESSION      1. Acute kidney injury ([address]    2. Dehydration    3. Non-intractable vomiting without nausea, unspecified vomiting type    4. Left upper quadrant pain    5.  Microscopic hematuria          DISPOSITION/PLAN   DISPOSITION  - discharged in improved, stable condition      PATIENT REFERRED TO:  [facility]  ED  [address] [facility] [address]  Go to   If symptoms worsen    Dali Collins MD  [address] [place] [address]  [phone]    Schedule an appointment as soon as possible for a visit in 3 days  For further evaluation related to your chronic vomiting and abdominal pain    MACEY Easley - CNP    Schedule an appointment as soon as possible for a visit in 3 days  For further evaluation related to your vomiting and to have your urine and kidney values rechecked by labs      DISCHARGE MEDICATIONS:  Discharge Medication List as of 7/20/2018  1:53 PM      START taking these medications    Details   famotidine (PEPCID) 20 MG tablet Take 1 tablet by mouth 2 times daily for 7 days, Disp-14 tablet, R-0Print             DISCONTINUED MEDICATIONS:  Discharge Medication List as of 7/20/2018  1:53 PM                 (Please note that portions of this note were completed with a voice recognition program.  Efforts were made to edit the dictations but occasionally words are mis-transcribed.)    Tammy Driscoll MD (electronically signed)            Tammy Driscoll MD  07/20/18 0510 Coleman Rivera MD  07/20/18 0222

## 2018-07-20 NOTE — ED NOTES
IV placed per Dr. Triana Delay via ultrasound. LR infusing with gravity. Pt in bed resting. Pt denies further needs at this time.       Calvin Amaro RN  07/20/18 0057

## 2018-08-15 ENCOUNTER — APPOINTMENT (OUTPATIENT)
Dept: GENERAL RADIOLOGY | Age: 58
End: 2018-08-15
Payer: COMMERCIAL

## 2018-08-15 ENCOUNTER — HOSPITAL ENCOUNTER (EMERGENCY)
Age: 58
Discharge: HOME | End: 2018-08-15
Attending: EMERGENCY MEDICINE
Payer: COMMERCIAL

## 2018-08-15 VITALS
WEIGHT: 115 LBS | HEART RATE: 88 BPM | SYSTOLIC BLOOD PRESSURE: 128 MMHG | DIASTOLIC BLOOD PRESSURE: 74 MMHG | BODY MASS INDEX: 20.37 KG/M2 | RESPIRATION RATE: 14 BRPM | OXYGEN SATURATION: 98 % | TEMPERATURE: 98 F

## 2018-08-15 DIAGNOSIS — R11.2 NAUSEA VOMITING AND DIARRHEA: Primary | ICD-10-CM

## 2018-08-15 DIAGNOSIS — R19.7 NAUSEA VOMITING AND DIARRHEA: Primary | ICD-10-CM

## 2018-08-15 DIAGNOSIS — E86.0 DEHYDRATION: ICD-10-CM

## 2018-08-15 DIAGNOSIS — R10.13 ABDOMINAL PAIN, EPIGASTRIC: ICD-10-CM

## 2018-08-15 LAB
A/G RATIO: 1.5 (ref 1.1–2.2)
ALBUMIN SERPL-MCNC: 5.2 G/DL (ref 3.4–5)
ALP BLD-CCNC: 98 U/L (ref 40–129)
ALT SERPL-CCNC: 13 U/L (ref 10–40)
ANION GAP SERPL CALCULATED.3IONS-SCNC: 15 MMOL/L (ref 3–16)
AST SERPL-CCNC: 18 U/L (ref 15–37)
BACTERIA: ABNORMAL /HPF
BASOPHILS ABSOLUTE: 0.1 K/UL (ref 0–0.2)
BASOPHILS RELATIVE PERCENT: 0.6 %
BILIRUB SERPL-MCNC: <0.2 MG/DL (ref 0–1)
BILIRUBIN URINE: ABNORMAL
BLOOD, URINE: ABNORMAL
BUN BLDV-MCNC: 13 MG/DL (ref 7–20)
CALCIUM SERPL-MCNC: 10.3 MG/DL (ref 8.3–10.6)
CHLORIDE BLD-SCNC: 101 MMOL/L (ref 99–110)
CLARITY: CLEAR
CO2: 26 MMOL/L (ref 21–32)
COLOR: YELLOW
CREAT SERPL-MCNC: 0.7 MG/DL (ref 0.6–1.1)
EOSINOPHILS ABSOLUTE: 0 K/UL (ref 0–0.6)
EOSINOPHILS RELATIVE PERCENT: 0.1 %
EPITHELIAL CELLS, UA: ABNORMAL /HPF
GFR AFRICAN AMERICAN: >60
GFR NON-AFRICAN AMERICAN: >60
GLOBULIN: 3.5 G/DL
GLUCOSE BLD-MCNC: 120 MG/DL (ref 70–99)
GLUCOSE URINE: NEGATIVE MG/DL
HCT VFR BLD CALC: 48.8 % (ref 36–48)
HEMOGLOBIN: 15.9 G/DL (ref 12–16)
KETONES, URINE: 40 MG/DL
LACTIC ACID: 1.8 MMOL/L (ref 0.4–2)
LEUKOCYTE ESTERASE, URINE: NEGATIVE
LIPASE: 85 U/L (ref 13–60)
LYMPHOCYTES ABSOLUTE: 2.3 K/UL (ref 1–5.1)
LYMPHOCYTES RELATIVE PERCENT: 20 %
MAGNESIUM: 1.9 MG/DL (ref 1.8–2.4)
MCH RBC QN AUTO: 29.5 PG (ref 26–34)
MCHC RBC AUTO-ENTMCNC: 32.6 G/DL (ref 31–36)
MCV RBC AUTO: 90.5 FL (ref 80–100)
MICROSCOPIC EXAMINATION: YES
MONOCYTES ABSOLUTE: 0.3 K/UL (ref 0–1.3)
MONOCYTES RELATIVE PERCENT: 3.1 %
MUCUS: ABNORMAL /LPF
NEUTROPHILS ABSOLUTE: 8.7 K/UL (ref 1.7–7.7)
NEUTROPHILS RELATIVE PERCENT: 76.2 %
NITRITE, URINE: NEGATIVE
PDW BLD-RTO: 17.4 % (ref 12.4–15.4)
PH UA: 5.5
PLATELET # BLD: 317 K/UL (ref 135–450)
PMV BLD AUTO: 9.4 FL (ref 5–10.5)
POTASSIUM SERPL-SCNC: 3.9 MMOL/L (ref 3.5–5.1)
PROTEIN UA: 100 MG/DL
RBC # BLD: 5.39 M/UL (ref 4–5.2)
RBC UA: ABNORMAL /HPF (ref 0–2)
SODIUM BLD-SCNC: 142 MMOL/L (ref 136–145)
SPECIFIC GRAVITY UA: >=1.03
TOTAL PROTEIN: 8.7 G/DL (ref 6.4–8.2)
TROPONIN: <0.01 NG/ML
URINE TYPE: ABNORMAL
UROBILINOGEN, URINE: 0.2 E.U./DL
WBC # BLD: 11.4 K/UL (ref 4–11)
WBC UA: ABNORMAL /HPF (ref 0–5)

## 2018-08-15 PROCEDURE — 84484 ASSAY OF TROPONIN QUANT: CPT

## 2018-08-15 PROCEDURE — 81001 URINALYSIS AUTO W/SCOPE: CPT

## 2018-08-15 PROCEDURE — 83735 ASSAY OF MAGNESIUM: CPT

## 2018-08-15 PROCEDURE — 2580000003 HC RX 258: Performed by: EMERGENCY MEDICINE

## 2018-08-15 PROCEDURE — 96372 THER/PROPH/DIAG INJ SC/IM: CPT

## 2018-08-15 PROCEDURE — 99284 EMERGENCY DEPT VISIT MOD MDM: CPT

## 2018-08-15 PROCEDURE — 74022 RADEX COMPL AQT ABD SERIES: CPT

## 2018-08-15 PROCEDURE — 6370000000 HC RX 637 (ALT 250 FOR IP): Performed by: EMERGENCY MEDICINE

## 2018-08-15 PROCEDURE — 80053 COMPREHEN METABOLIC PANEL: CPT

## 2018-08-15 PROCEDURE — 96374 THER/PROPH/DIAG INJ IV PUSH: CPT

## 2018-08-15 PROCEDURE — 6360000002 HC RX W HCPCS: Performed by: EMERGENCY MEDICINE

## 2018-08-15 PROCEDURE — 96376 TX/PRO/DX INJ SAME DRUG ADON: CPT

## 2018-08-15 PROCEDURE — 85025 COMPLETE CBC W/AUTO DIFF WBC: CPT

## 2018-08-15 PROCEDURE — 83605 ASSAY OF LACTIC ACID: CPT

## 2018-08-15 PROCEDURE — 93010 ELECTROCARDIOGRAM REPORT: CPT | Performed by: INTERNAL MEDICINE

## 2018-08-15 PROCEDURE — 83690 ASSAY OF LIPASE: CPT

## 2018-08-15 PROCEDURE — 93005 ELECTROCARDIOGRAM TRACING: CPT | Performed by: EMERGENCY MEDICINE

## 2018-08-15 PROCEDURE — 96361 HYDRATE IV INFUSION ADD-ON: CPT

## 2018-08-15 RX ORDER — DICYCLOMINE HCL 20 MG
20 TABLET ORAL ONCE
Status: COMPLETED | OUTPATIENT
Start: 2018-08-15 | End: 2018-08-15

## 2018-08-15 RX ORDER — DEXTROSE AND SODIUM CHLORIDE 5; .9 G/100ML; G/100ML
1000 INJECTION, SOLUTION INTRAVENOUS ONCE
Status: COMPLETED | OUTPATIENT
Start: 2018-08-15 | End: 2018-08-15

## 2018-08-15 RX ORDER — PROMETHAZINE HYDROCHLORIDE 25 MG/ML
12.5 INJECTION, SOLUTION INTRAMUSCULAR; INTRAVENOUS ONCE
Status: COMPLETED | OUTPATIENT
Start: 2018-08-15 | End: 2018-08-15

## 2018-08-15 RX ORDER — HALOPERIDOL 5 MG/ML
2 INJECTION INTRAMUSCULAR ONCE
Status: COMPLETED | OUTPATIENT
Start: 2018-08-15 | End: 2018-08-15

## 2018-08-15 RX ADMIN — DICYCLOMINE HYDROCHLORIDE 20 MG: 20 TABLET ORAL at 10:09

## 2018-08-15 RX ADMIN — PROMETHAZINE HYDROCHLORIDE 12.5 MG: 25 INJECTION INTRAMUSCULAR; INTRAVENOUS at 08:32

## 2018-08-15 RX ADMIN — DEXTROSE AND SODIUM CHLORIDE 1000 ML: 5; 900 INJECTION, SOLUTION INTRAVENOUS at 07:41

## 2018-08-15 RX ADMIN — HALOPERIDOL LACTATE 2 MG: 5 INJECTION, SOLUTION INTRAMUSCULAR at 07:41

## 2018-08-15 RX ADMIN — HYDROMORPHONE HYDROCHLORIDE 0.5 MG: 1 INJECTION, SOLUTION INTRAMUSCULAR; INTRAVENOUS; SUBCUTANEOUS at 08:32

## 2018-08-15 RX ADMIN — HYDROMORPHONE HYDROCHLORIDE 0.5 MG: 1 INJECTION, SOLUTION INTRAMUSCULAR; INTRAVENOUS; SUBCUTANEOUS at 07:41

## 2018-08-15 ASSESSMENT — PAIN SCALES - GENERAL
PAINLEVEL_OUTOF10: 8
PAINLEVEL_OUTOF10: 6
PAINLEVEL_OUTOF10: 8

## 2018-08-15 ASSESSMENT — ENCOUNTER SYMPTOMS
SHORTNESS OF BREATH: 1
NAUSEA: 1
COLOR CHANGE: 0
BACK PAIN: 0
DIARRHEA: 1
ABDOMINAL PAIN: 1
VOMITING: 1

## 2018-08-15 ASSESSMENT — PAIN DESCRIPTION - LOCATION
LOCATION: ABDOMEN
LOCATION: ABDOMEN

## 2018-08-15 NOTE — ED PROVIDER NOTES
Medical History:   Diagnosis Date    Anxiety     Asthma     Bipolar [facility] ([facility]     Cancer ([facility] 1984    Uterine    Cancer of lung ([facility] 2014    CHF (congestive heart failure) ([facility]     COPD (chronic obstructive pulmonary disease) (HCC)     Cyclic vomiting syndrome     with known cannabis abuse    Cysts of both kidneys     Depression     Fatty liver 09/06/12    [facility]    Gastritis 06/29/12    EGD at [place] [id number] MI (myocardial infarction) ([facility] 2010    Panic attacks     Pneumonia     Pre-diabetes 04/13/13    A1c5.8%    Tricuspid regurgitation     06/26/2012 JUANJO at [facility] Structurally Normal Tricuspid Valve         SURGICAL HISTORY       Past Surgical History:   Procedure Laterality Date    CHOLECYSTECTOMY  2009    COLONOSCOPY  2001    COLONOSCOPY  2013    tubular adenoma    COLONOSCOPY  08/15/2017    ENDOSCOPY, COLON, DIAGNOSTIC      HERNIA REPAIR      HYSTERECTOMY  1985    With Bladder Mesh    INCONTINENCE SURGERY Left     2009 in [facility]      cancerous nodule removed left side    TONSILLECTOMY      UPPER GASTROINTESTINAL ENDOSCOPY  2013    gastritis    UPPER GASTROINTESTINAL ENDOSCOPY  10/03/2017    bx distal esophagus          CURRENT MEDICATIONS       Discharge Medication List as of 8/15/2018 10:05 AM      CONTINUE these medications which have NOT CHANGED    Details   famotidine (PEPCID) 20 MG tablet Take 1 tablet by mouth 2 times daily for 7 days, Disp-14 tablet, R-0Print      metoclopramide (REGLAN) 10 MG tablet Take 1 tablet by mouth every 8 hours as needed (vomiting), Disp-30 tablet, R-0Print      umeclidinium-vilanterol (ANORO ELLIPTA) 62.5-25 MCG/INH AEPB inhaler Inhale 1 puff into the lungs daily, Disp-60 each, R-5Normal      nicotine (NICODERM CQ) 14 MG/24HR Place 1 patch onto the skin daily, Disp-30 patch, R-3Normal      albuterol sulfate HFA (PROAIR HFA) 108 (90 Base) MCG/ACT inhaler Inhale 2 puffs into the lungs every 6 hours as needed for Wheezing, Disp-1 cooperative. Non-toxic appearance. She does not have a sickly appearance. She does not appear ill. She appears distressed (mild). Occasional dry heaving while I am in the room initially    HENT:   Head: Normocephalic and atraumatic. Mouth/Throat: Mucous membranes are dry. Neck: Neck supple. No tracheal deviation present. Cardiovascular: Regular rhythm and intact distal pulses. Tachycardia present. Pulmonary/Chest: Effort normal and breath sounds normal. No stridor. No respiratory distress. She has no wheezes. She has no rales. She exhibits no tenderness. Abdominal: Soft. Bowel sounds are normal. There is tenderness (mild) in the epigastric area. There is no rigidity, no rebound, no guarding, no CVA tenderness, no tenderness at McBurney's point and negative Sin's sign. Musculoskeletal: She exhibits no edema or deformity. Neurological: She is alert and oriented to person, place, and time. Skin: Skin is warm and dry. She is not diaphoretic. Psychiatric: Her mood appears anxious. Nursing note and vitals reviewed.           DIAGNOSTIC RESULTS   LABS:    Labs Reviewed   CBC WITH AUTO DIFFERENTIAL - Abnormal; Notable for the following:        Result Value    WBC 11.4 (*)     RBC 5.39 (*)     Hematocrit 48.8 (*)     RDW 17.4 (*)     Neutrophils # 8.7 (*)     All other components within normal limits    Narrative:     Performed at:  [place] [address] [facility]   Phone [phone]   COMPREHENSIVE METABOLIC PANEL - Abnormal; Notable for the following:     Glucose 120 (*)     Total Protein 8.7 (*)     Alb 5.2 (*)     All other components within normal limits    Narrative:     Performed at:  [facility]) [place] [address] [employer]   Phone [phone]   LIPASE - Abnormal; Notable for the following:     Lipase 85.0 (*)     All other components within normal limits    Narrative:     Performed below findings:        Interpretation per the Radiologist below, if available at the time of this note:    XR Acute Abd Series Chest 1 VW   Preliminary Result   1. COPD, without acute cardiopulmonary disease. 2. Nonobstructive bowel gas pattern, without evidence of free air. PROCEDURES   Unless otherwise noted below, none       PROCEDURE:   Peripheral venous access using ultrasound guidance    I personally placed an 18-gauge long angiocatheter under ultrasound guidance into the right brachial vein on first attempt with good return of blood and it flushed without difficulty. Patient tolerated the procedure well. This was obtained for IV access due to poor IV access elsewhere when nurses tried. Procedures    CRITICAL CARE TIME   N/A    CONSULTS:  None    EMERGENCY DEPARTMENT COURSE and DIFFERENTIAL DIAGNOSIS/MDM:   Vitals:    Vitals:    08/15/18 0650 08/15/18 0817 08/15/18 0909 08/15/18 1009   BP: (!) 136/94 132/78  128/74   Pulse: 105 94 90 88   Resp: 14 14 14 14   Temp: 98.2 °F (36.8 °C) 98 °F (36.7 °C)     SpO2: 98% 99% 96% 98%   Weight:           Patient was given the following medications:  Medications   dextrose 5 % and 0.9 % sodium chloride infusion (0 mLs Intravenous Stopped 8/15/18 0833)   haloperidol lactate (HALDOL) injection 2 mg (2 mg Intramuscular Given 8/15/18 0741)   HYDROmorphone (DILAUDID) injection 0.5 mg (0.5 mg Intravenous Given 8/15/18 0741)   promethazine (PHENERGAN) injection 12.5 mg (12.5 mg Intramuscular Given 8/15/18 0832)   HYDROmorphone (DILAUDID) injection 0.5 mg (0.5 mg Intravenous Given 8/15/18 0832)   dicyclomine (BENTYL) tablet 20 mg (20 mg Oral Given 8/15/18 1009)     Patient was evaluated for worsening abdominal pain and vomiting similar to prior cyclical vomiting episodes but not improving with home medications. She had some abdominal tenderness but similar to prior events although slightly worse.   Since it was upper abdominal discomfort and no distention,

## 2018-08-16 LAB
EKG ATRIAL RATE: 81 BPM
EKG DIAGNOSIS: NORMAL
EKG P AXIS: 88 DEGREES
EKG P-R INTERVAL: 120 MS
EKG Q-T INTERVAL: 376 MS
EKG QRS DURATION: 72 MS
EKG QTC CALCULATION (BAZETT): 436 MS
EKG R AXIS: 87 DEGREES
EKG T AXIS: 79 DEGREES
EKG VENTRICULAR RATE: 81 BPM

## 2018-10-04 ENCOUNTER — CARE COORDINATION (OUTPATIENT)
Dept: INTERNAL MEDICINE CLINIC | Age: 58
End: 2018-10-04

## 2019-01-25 ENCOUNTER — APPOINTMENT (OUTPATIENT)
Dept: CT IMAGING | Age: 59
DRG: 872 | End: 2019-01-25
Payer: COMMERCIAL

## 2019-01-25 ENCOUNTER — HOSPITAL ENCOUNTER (INPATIENT)
Age: 59
LOS: 2 days | Discharge: HOME HEALTH | DRG: 872 | End: 2019-01-27
Attending: EMERGENCY MEDICINE | Admitting: INTERNAL MEDICINE
Payer: COMMERCIAL

## 2019-01-25 DIAGNOSIS — R65.20 SEVERE SEPSIS (HCC): Primary | ICD-10-CM

## 2019-01-25 DIAGNOSIS — A41.9 SEVERE SEPSIS (HCC): Primary | ICD-10-CM

## 2019-01-25 PROBLEM — N30.01 ACUTE CYSTITIS WITH HEMATURIA: Status: ACTIVE | Noted: 2019-01-25

## 2019-01-25 LAB
A/G RATIO: 1.3 (ref 1.1–2.2)
ALBUMIN SERPL-MCNC: 5.8 G/DL (ref 3.4–5)
ALP BLD-CCNC: 149 U/L (ref 40–129)
ALT SERPL-CCNC: 21 U/L (ref 10–40)
AMORPHOUS: ABNORMAL /HPF
AMPHETAMINE SCREEN, URINE: ABNORMAL
ANION GAP SERPL CALCULATED.3IONS-SCNC: 33 MMOL/L (ref 3–16)
AST SERPL-CCNC: 30 U/L (ref 15–37)
BACTERIA: ABNORMAL /HPF
BARBITURATE SCREEN URINE: ABNORMAL
BASOPHILS ABSOLUTE: 0.1 K/UL (ref 0–0.2)
BASOPHILS RELATIVE PERCENT: 0.4 %
BENZODIAZEPINE SCREEN, URINE: POSITIVE
BILIRUB SERPL-MCNC: 0.5 MG/DL (ref 0–1)
BILIRUBIN URINE: ABNORMAL
BLOOD, URINE: ABNORMAL
BUN BLDV-MCNC: 51 MG/DL (ref 7–20)
CALCIUM SERPL-MCNC: 11.9 MG/DL (ref 8.3–10.6)
CANNABINOID SCREEN URINE: POSITIVE
CASTS: ABNORMAL /LPF
CHLORIDE BLD-SCNC: 72 MMOL/L (ref 99–110)
CLARITY: CLEAR
CO2: 31 MMOL/L (ref 21–32)
COCAINE METABOLITE SCREEN URINE: ABNORMAL
COLOR: YELLOW
CREAT SERPL-MCNC: 4.6 MG/DL (ref 0.6–1.1)
EOSINOPHILS ABSOLUTE: 0 K/UL (ref 0–0.6)
EOSINOPHILS RELATIVE PERCENT: 0 %
EPITHELIAL CELLS, UA: ABNORMAL /HPF
GFR AFRICAN AMERICAN: 12
GFR NON-AFRICAN AMERICAN: 10
GLOBULIN: 4.4 G/DL
GLUCOSE BLD-MCNC: 151 MG/DL (ref 70–99)
GLUCOSE URINE: NEGATIVE MG/DL
HCT VFR BLD CALC: 59.5 % (ref 36–48)
HEMOGLOBIN: 19.9 G/DL (ref 12–16)
KETONES, URINE: ABNORMAL MG/DL
LACTIC ACID: 1.9 MMOL/L (ref 0.4–2)
LACTIC ACID: 6.1 MMOL/L (ref 0.4–2)
LEUKOCYTE ESTERASE, URINE: NEGATIVE
LIPASE: 14 U/L (ref 13–60)
LYMPHOCYTES ABSOLUTE: 2.5 K/UL (ref 1–5.1)
LYMPHOCYTES RELATIVE PERCENT: 10.5 %
Lab: ABNORMAL
MAGNESIUM: 2.5 MG/DL (ref 1.8–2.4)
MCH RBC QN AUTO: 31 PG (ref 26–34)
MCHC RBC AUTO-ENTMCNC: 33.4 G/DL (ref 31–36)
MCV RBC AUTO: 92.6 FL (ref 80–100)
METHADONE SCREEN, URINE: ABNORMAL
MICROSCOPIC EXAMINATION: YES
MONOCYTES ABSOLUTE: 1.2 K/UL (ref 0–1.3)
MONOCYTES RELATIVE PERCENT: 5.1 %
NEUTROPHILS ABSOLUTE: 20 K/UL (ref 1.7–7.7)
NEUTROPHILS RELATIVE PERCENT: 84 %
NITRITE, URINE: NEGATIVE
OPIATE SCREEN URINE: ABNORMAL
OXYCODONE URINE: ABNORMAL
PDW BLD-RTO: 14.6 % (ref 12.4–15.4)
PH UA: 6
PH UA: 6
PHENCYCLIDINE SCREEN URINE: ABNORMAL
PLATELET # BLD: 311 K/UL (ref 135–450)
PLATELET SLIDE REVIEW: ABNORMAL
PMV BLD AUTO: 10.6 FL (ref 5–10.5)
POTASSIUM SERPL-SCNC: 4.1 MMOL/L (ref 3.5–5.1)
PROPOXYPHENE SCREEN: ABNORMAL
PROTEIN UA: 100 MG/DL
RBC # BLD: 6.42 M/UL (ref 4–5.2)
RBC UA: ABNORMAL /HPF (ref 0–2)
SODIUM BLD-SCNC: 136 MMOL/L (ref 136–145)
SPECIFIC GRAVITY UA: 1.02
TOTAL PROTEIN: 10.2 G/DL (ref 6.4–8.2)
URINE TYPE: ABNORMAL
UROBILINOGEN, URINE: 0.2 E.U./DL
WBC # BLD: 23.8 K/UL (ref 4–11)
WBC UA: ABNORMAL /HPF (ref 0–5)

## 2019-01-25 PROCEDURE — 6360000002 HC RX W HCPCS

## 2019-01-25 PROCEDURE — 80307 DRUG TEST PRSMV CHEM ANLYZR: CPT

## 2019-01-25 PROCEDURE — 87040 BLOOD CULTURE FOR BACTERIA: CPT

## 2019-01-25 PROCEDURE — 1200000000 HC SEMI PRIVATE

## 2019-01-25 PROCEDURE — 83690 ASSAY OF LIPASE: CPT

## 2019-01-25 PROCEDURE — 96361 HYDRATE IV INFUSION ADD-ON: CPT

## 2019-01-25 PROCEDURE — 96375 TX/PRO/DX INJ NEW DRUG ADDON: CPT

## 2019-01-25 PROCEDURE — 36415 COLL VENOUS BLD VENIPUNCTURE: CPT

## 2019-01-25 PROCEDURE — 74176 CT ABD & PELVIS W/O CONTRAST: CPT

## 2019-01-25 PROCEDURE — 85025 COMPLETE CBC W/AUTO DIFF WBC: CPT

## 2019-01-25 PROCEDURE — 6360000002 HC RX W HCPCS: Performed by: NURSE PRACTITIONER

## 2019-01-25 PROCEDURE — 83605 ASSAY OF LACTIC ACID: CPT

## 2019-01-25 PROCEDURE — 2580000003 HC RX 258: Performed by: EMERGENCY MEDICINE

## 2019-01-25 PROCEDURE — 96365 THER/PROPH/DIAG IV INF INIT: CPT

## 2019-01-25 PROCEDURE — 99285 EMERGENCY DEPT VISIT HI MDM: CPT

## 2019-01-25 PROCEDURE — 96372 THER/PROPH/DIAG INJ SC/IM: CPT

## 2019-01-25 PROCEDURE — 6360000002 HC RX W HCPCS: Performed by: EMERGENCY MEDICINE

## 2019-01-25 PROCEDURE — 93010 ELECTROCARDIOGRAM REPORT: CPT | Performed by: INTERNAL MEDICINE

## 2019-01-25 PROCEDURE — 6370000000 HC RX 637 (ALT 250 FOR IP): Performed by: NURSE PRACTITIONER

## 2019-01-25 PROCEDURE — 2580000003 HC RX 258: Performed by: NURSE PRACTITIONER

## 2019-01-25 PROCEDURE — 93005 ELECTROCARDIOGRAM TRACING: CPT | Performed by: NURSE PRACTITIONER

## 2019-01-25 PROCEDURE — 81001 URINALYSIS AUTO W/SCOPE: CPT

## 2019-01-25 PROCEDURE — 83735 ASSAY OF MAGNESIUM: CPT

## 2019-01-25 PROCEDURE — 80053 COMPREHEN METABOLIC PANEL: CPT

## 2019-01-25 RX ORDER — SODIUM CHLORIDE 0.9 % (FLUSH) 0.9 %
10 SYRINGE (ML) INJECTION EVERY 12 HOURS SCHEDULED
Status: DISCONTINUED | OUTPATIENT
Start: 2019-01-25 | End: 2019-01-27 | Stop reason: HOSPADM

## 2019-01-25 RX ORDER — SODIUM CHLORIDE 0.9 % (FLUSH) 0.9 %
10 SYRINGE (ML) INJECTION PRN
Status: DISCONTINUED | OUTPATIENT
Start: 2019-01-25 | End: 2019-01-27 | Stop reason: HOSPADM

## 2019-01-25 RX ORDER — PROMETHAZINE HYDROCHLORIDE 25 MG/ML
INJECTION, SOLUTION INTRAMUSCULAR; INTRAVENOUS
Status: COMPLETED
Start: 2019-01-25 | End: 2019-01-25

## 2019-01-25 RX ORDER — PROMETHAZINE HYDROCHLORIDE 25 MG/ML
25 INJECTION, SOLUTION INTRAMUSCULAR; INTRAVENOUS ONCE
Status: COMPLETED | OUTPATIENT
Start: 2019-01-25 | End: 2019-01-25

## 2019-01-25 RX ORDER — SODIUM CHLORIDE 9 MG/ML
INJECTION, SOLUTION INTRAVENOUS CONTINUOUS
Status: DISCONTINUED | OUTPATIENT
Start: 2019-01-25 | End: 2019-01-27

## 2019-01-25 RX ORDER — NICOTINE 21 MG/24HR
1 PATCH, TRANSDERMAL 24 HOURS TRANSDERMAL DAILY
Status: DISCONTINUED | OUTPATIENT
Start: 2019-01-26 | End: 2019-01-27 | Stop reason: HOSPADM

## 2019-01-25 RX ORDER — LIDOCAINE HYDROCHLORIDE 10 MG/ML
5 INJECTION, SOLUTION INFILTRATION; PERINEURAL ONCE
Status: DISCONTINUED | OUTPATIENT
Start: 2019-01-25 | End: 2019-01-27 | Stop reason: HOSPADM

## 2019-01-25 RX ORDER — GUAIFENESIN 600 MG/1
600 TABLET, EXTENDED RELEASE ORAL 2 TIMES DAILY
Status: DISCONTINUED | OUTPATIENT
Start: 2019-01-25 | End: 2019-01-27 | Stop reason: HOSPADM

## 2019-01-25 RX ORDER — ONDANSETRON 2 MG/ML
4 INJECTION INTRAMUSCULAR; INTRAVENOUS EVERY 6 HOURS PRN
Status: DISCONTINUED | OUTPATIENT
Start: 2019-01-25 | End: 2019-01-26

## 2019-01-25 RX ORDER — HEPARIN SODIUM 5000 [USP'U]/ML
5000 INJECTION, SOLUTION INTRAVENOUS; SUBCUTANEOUS EVERY 8 HOURS SCHEDULED
Status: DISCONTINUED | OUTPATIENT
Start: 2019-01-26 | End: 2019-01-27 | Stop reason: HOSPADM

## 2019-01-25 RX ORDER — DIAZEPAM 5 MG/1
5 TABLET ORAL EVERY 8 HOURS PRN
Status: DISCONTINUED | OUTPATIENT
Start: 2019-01-25 | End: 2019-01-26

## 2019-01-25 RX ORDER — ONDANSETRON 2 MG/ML
4 INJECTION INTRAMUSCULAR; INTRAVENOUS ONCE
Status: COMPLETED | OUTPATIENT
Start: 2019-01-25 | End: 2019-01-25

## 2019-01-25 RX ORDER — ALBUTEROL SULFATE 90 UG/1
2 AEROSOL, METERED RESPIRATORY (INHALATION) EVERY 6 HOURS PRN
Status: DISCONTINUED | OUTPATIENT
Start: 2019-01-25 | End: 2019-01-27 | Stop reason: HOSPADM

## 2019-01-25 RX ORDER — 0.9 % SODIUM CHLORIDE 0.9 %
1000 INTRAVENOUS SOLUTION INTRAVENOUS ONCE
Status: COMPLETED | OUTPATIENT
Start: 2019-01-25 | End: 2019-01-25

## 2019-01-25 RX ORDER — 0.9 % SODIUM CHLORIDE 0.9 %
30 INTRAVENOUS SOLUTION INTRAVENOUS ONCE
Status: COMPLETED | OUTPATIENT
Start: 2019-01-25 | End: 2019-01-25

## 2019-01-25 RX ORDER — IPRATROPIUM BROMIDE AND ALBUTEROL SULFATE 2.5; .5 MG/3ML; MG/3ML
3 SOLUTION RESPIRATORY (INHALATION) EVERY 6 HOURS PRN
Status: DISCONTINUED | OUTPATIENT
Start: 2019-01-25 | End: 2019-01-27 | Stop reason: HOSPADM

## 2019-01-25 RX ORDER — METOCLOPRAMIDE 10 MG/1
10 TABLET ORAL EVERY 8 HOURS PRN
Status: DISCONTINUED | OUTPATIENT
Start: 2019-01-25 | End: 2019-01-27 | Stop reason: HOSPADM

## 2019-01-25 RX ADMIN — PROCHLORPERAZINE EDISYLATE 10 MG: 5 INJECTION INTRAMUSCULAR; INTRAVENOUS at 21:59

## 2019-01-25 RX ADMIN — PROMETHAZINE HYDROCHLORIDE 25 MG: 25 INJECTION, SOLUTION INTRAMUSCULAR; INTRAVENOUS at 19:11

## 2019-01-25 RX ADMIN — ONDANSETRON 4 MG: 2 INJECTION INTRAMUSCULAR; INTRAVENOUS at 17:33

## 2019-01-25 RX ADMIN — SODIUM CHLORIDE, PRESERVATIVE FREE 10 ML: 5 INJECTION INTRAVENOUS at 23:22

## 2019-01-25 RX ADMIN — SODIUM CHLORIDE: 9 INJECTION, SOLUTION INTRAVENOUS at 23:22

## 2019-01-25 RX ADMIN — CEFEPIME HYDROCHLORIDE 1 G: 1 INJECTION, POWDER, FOR SOLUTION INTRAMUSCULAR; INTRAVENOUS at 23:22

## 2019-01-25 RX ADMIN — PROMETHAZINE HYDROCHLORIDE 25 MG: 25 INJECTION INTRAMUSCULAR; INTRAVENOUS at 19:11

## 2019-01-25 RX ADMIN — CEFTRIAXONE SODIUM 1 G: 1 INJECTION, POWDER, FOR SOLUTION INTRAMUSCULAR; INTRAVENOUS at 21:00

## 2019-01-25 RX ADMIN — SODIUM CHLORIDE 1000 ML: 9 INJECTION, SOLUTION INTRAVENOUS at 17:33

## 2019-01-25 RX ADMIN — SODIUM CHLORIDE 1482 ML: 9 INJECTION, SOLUTION INTRAVENOUS at 18:10

## 2019-01-25 ASSESSMENT — PAIN DESCRIPTION - ORIENTATION: ORIENTATION: LEFT;UPPER

## 2019-01-25 ASSESSMENT — PAIN SCALES - GENERAL
PAINLEVEL_OUTOF10: 9
PAINLEVEL_OUTOF10: 9

## 2019-01-25 ASSESSMENT — PAIN DESCRIPTION - PAIN TYPE: TYPE: ACUTE PAIN

## 2019-01-25 ASSESSMENT — PAIN DESCRIPTION - LOCATION
LOCATION: ABDOMEN
LOCATION: ABDOMEN

## 2019-01-26 PROBLEM — E44.0 MODERATE MALNUTRITION (HCC): Chronic | Status: ACTIVE | Noted: 2019-01-26

## 2019-01-26 LAB
ANION GAP SERPL CALCULATED.3IONS-SCNC: 16 MMOL/L (ref 3–16)
BUN BLDV-MCNC: 46 MG/DL (ref 7–20)
CALCIUM SERPL-MCNC: 8 MG/DL (ref 8.3–10.6)
CHLORIDE BLD-SCNC: 101 MMOL/L (ref 99–110)
CO2: 23 MMOL/L (ref 21–32)
CREAT SERPL-MCNC: 2.2 MG/DL (ref 0.6–1.1)
GFR AFRICAN AMERICAN: 28
GFR NON-AFRICAN AMERICAN: 23
GLUCOSE BLD-MCNC: 111 MG/DL (ref 70–99)
HCT VFR BLD CALC: 45 % (ref 36–48)
HEMOGLOBIN: 14.8 G/DL (ref 12–16)
LACTIC ACID, SEPSIS: 0.8 MMOL/L (ref 0.4–1.9)
LACTIC ACID, SEPSIS: 1.4 MMOL/L (ref 0.4–1.9)
MAGNESIUM: 1.7 MG/DL (ref 1.8–2.4)
MCH RBC QN AUTO: 31.1 PG (ref 26–34)
MCHC RBC AUTO-ENTMCNC: 32.8 G/DL (ref 31–36)
MCV RBC AUTO: 94.8 FL (ref 80–100)
PDW BLD-RTO: 14.7 % (ref 12.4–15.4)
PLATELET # BLD: 158 K/UL (ref 135–450)
PMV BLD AUTO: 9.9 FL (ref 5–10.5)
POTASSIUM REFLEX MAGNESIUM: 3.1 MMOL/L (ref 3.5–5.1)
RBC # BLD: 4.75 M/UL (ref 4–5.2)
SODIUM BLD-SCNC: 140 MMOL/L (ref 136–145)
WBC # BLD: 17.9 K/UL (ref 4–11)

## 2019-01-26 PROCEDURE — 83605 ASSAY OF LACTIC ACID: CPT

## 2019-01-26 PROCEDURE — 6360000002 HC RX W HCPCS: Performed by: INTERNAL MEDICINE

## 2019-01-26 PROCEDURE — 97530 THERAPEUTIC ACTIVITIES: CPT

## 2019-01-26 PROCEDURE — 6370000000 HC RX 637 (ALT 250 FOR IP): Performed by: NURSE PRACTITIONER

## 2019-01-26 PROCEDURE — 6360000002 HC RX W HCPCS: Performed by: NURSE PRACTITIONER

## 2019-01-26 PROCEDURE — 85027 COMPLETE CBC AUTOMATED: CPT

## 2019-01-26 PROCEDURE — 80048 BASIC METABOLIC PNL TOTAL CA: CPT

## 2019-01-26 PROCEDURE — 83735 ASSAY OF MAGNESIUM: CPT

## 2019-01-26 PROCEDURE — C9113 INJ PANTOPRAZOLE SODIUM, VIA: HCPCS | Performed by: NURSE PRACTITIONER

## 2019-01-26 PROCEDURE — 97166 OT EVAL MOD COMPLEX 45 MIN: CPT

## 2019-01-26 PROCEDURE — 1200000000 HC SEMI PRIVATE

## 2019-01-26 PROCEDURE — 2700000000 HC OXYGEN THERAPY PER DAY

## 2019-01-26 PROCEDURE — 2580000003 HC RX 258: Performed by: NURSE PRACTITIONER

## 2019-01-26 PROCEDURE — 94761 N-INVAS EAR/PLS OXIMETRY MLT: CPT

## 2019-01-26 PROCEDURE — 36415 COLL VENOUS BLD VENIPUNCTURE: CPT

## 2019-01-26 RX ORDER — POTASSIUM CHLORIDE 7.45 MG/ML
10 INJECTION INTRAVENOUS
Status: DISPENSED | OUTPATIENT
Start: 2019-01-26 | End: 2019-01-26

## 2019-01-26 RX ORDER — PANTOPRAZOLE SODIUM 40 MG/10ML
40 INJECTION, POWDER, LYOPHILIZED, FOR SOLUTION INTRAVENOUS
Status: DISCONTINUED | OUTPATIENT
Start: 2019-01-26 | End: 2019-01-27

## 2019-01-26 RX ORDER — SUCRALFATE 1 G/1
1 TABLET ORAL EVERY 6 HOURS SCHEDULED
Status: DISCONTINUED | OUTPATIENT
Start: 2019-01-26 | End: 2019-01-27 | Stop reason: HOSPADM

## 2019-01-26 RX ORDER — DIAZEPAM 2 MG/1
2 TABLET ORAL EVERY 8 HOURS PRN
Status: DISCONTINUED | OUTPATIENT
Start: 2019-01-26 | End: 2019-01-27 | Stop reason: HOSPADM

## 2019-01-26 RX ADMIN — SODIUM CHLORIDE: 9 INJECTION, SOLUTION INTRAVENOUS at 10:11

## 2019-01-26 RX ADMIN — POTASSIUM CHLORIDE 10 MEQ: 7.46 INJECTION, SOLUTION INTRAVENOUS at 16:04

## 2019-01-26 RX ADMIN — PROCHLORPERAZINE EDISYLATE 10 MG: 5 INJECTION INTRAMUSCULAR; INTRAVENOUS at 06:24

## 2019-01-26 RX ADMIN — PROCHLORPERAZINE EDISYLATE 10 MG: 5 INJECTION INTRAMUSCULAR; INTRAVENOUS at 17:33

## 2019-01-26 RX ADMIN — GUAIFENESIN 600 MG: 600 TABLET, EXTENDED RELEASE ORAL at 10:11

## 2019-01-26 RX ADMIN — DIAZEPAM 2 MG: 2 TABLET ORAL at 10:11

## 2019-01-26 RX ADMIN — PANTOPRAZOLE SODIUM 40 MG: 40 INJECTION, POWDER, FOR SOLUTION INTRAVENOUS at 06:25

## 2019-01-26 RX ADMIN — GUAIFENESIN 600 MG: 600 TABLET, EXTENDED RELEASE ORAL at 20:45

## 2019-01-26 RX ADMIN — PROCHLORPERAZINE EDISYLATE 10 MG: 5 INJECTION INTRAMUSCULAR; INTRAVENOUS at 12:47

## 2019-01-26 RX ADMIN — POTASSIUM CHLORIDE 10 MEQ: 7.46 INJECTION, SOLUTION INTRAVENOUS at 18:58

## 2019-01-26 ASSESSMENT — PAIN DESCRIPTION - PAIN TYPE
TYPE: ACUTE PAIN

## 2019-01-26 ASSESSMENT — PAIN SCALES - GENERAL
PAINLEVEL_OUTOF10: 0
PAINLEVEL_OUTOF10: 4
PAINLEVEL_OUTOF10: 4
PAINLEVEL_OUTOF10: 8

## 2019-01-26 ASSESSMENT — PAIN DESCRIPTION - LOCATION
LOCATION: ABDOMEN
LOCATION: BACK
LOCATION: ABDOMEN
LOCATION: ABDOMEN

## 2019-01-26 ASSESSMENT — PAIN SCALES - WONG BAKER: WONGBAKER_NUMERICALRESPONSE: 2

## 2019-01-27 VITALS
SYSTOLIC BLOOD PRESSURE: 115 MMHG | RESPIRATION RATE: 16 BRPM | HEART RATE: 78 BPM | HEIGHT: 63 IN | TEMPERATURE: 97.8 F | DIASTOLIC BLOOD PRESSURE: 75 MMHG | BODY MASS INDEX: 17.96 KG/M2 | WEIGHT: 101.4 LBS | OXYGEN SATURATION: 92 %

## 2019-01-27 LAB
ALBUMIN SERPL-MCNC: 3.6 G/DL (ref 3.4–5)
ALP BLD-CCNC: 76 U/L (ref 40–129)
ALT SERPL-CCNC: 21 U/L (ref 10–40)
ANION GAP SERPL CALCULATED.3IONS-SCNC: 11 MMOL/L (ref 3–16)
AST SERPL-CCNC: 41 U/L (ref 15–37)
BILIRUB SERPL-MCNC: 0.5 MG/DL (ref 0–1)
BILIRUBIN DIRECT: <0.2 MG/DL (ref 0–0.3)
BILIRUBIN, INDIRECT: ABNORMAL MG/DL (ref 0–1)
BUN BLDV-MCNC: 23 MG/DL (ref 7–20)
CALCIUM SERPL-MCNC: 8.4 MG/DL (ref 8.3–10.6)
CHLORIDE BLD-SCNC: 102 MMOL/L (ref 99–110)
CO2: 24 MMOL/L (ref 21–32)
CREAT SERPL-MCNC: 0.8 MG/DL (ref 0.6–1.1)
GFR AFRICAN AMERICAN: >60
GFR NON-AFRICAN AMERICAN: >60
GLUCOSE BLD-MCNC: 87 MG/DL (ref 70–99)
MAGNESIUM: 1.9 MG/DL (ref 1.8–2.4)
PHOSPHORUS: 1.8 MG/DL (ref 2.5–4.9)
POTASSIUM SERPL-SCNC: 4.5 MMOL/L (ref 3.5–5.1)
SODIUM BLD-SCNC: 137 MMOL/L (ref 136–145)
TOTAL PROTEIN: 6.3 G/DL (ref 6.4–8.2)

## 2019-01-27 PROCEDURE — 80076 HEPATIC FUNCTION PANEL: CPT

## 2019-01-27 PROCEDURE — 97162 PT EVAL MOD COMPLEX 30 MIN: CPT

## 2019-01-27 PROCEDURE — 97116 GAIT TRAINING THERAPY: CPT

## 2019-01-27 PROCEDURE — 84100 ASSAY OF PHOSPHORUS: CPT

## 2019-01-27 PROCEDURE — 80048 BASIC METABOLIC PNL TOTAL CA: CPT

## 2019-01-27 PROCEDURE — 6370000000 HC RX 637 (ALT 250 FOR IP): Performed by: NURSE PRACTITIONER

## 2019-01-27 PROCEDURE — 83735 ASSAY OF MAGNESIUM: CPT

## 2019-01-27 PROCEDURE — 2580000003 HC RX 258: Performed by: NURSE PRACTITIONER

## 2019-01-27 PROCEDURE — 36415 COLL VENOUS BLD VENIPUNCTURE: CPT

## 2019-01-27 PROCEDURE — 6360000002 HC RX W HCPCS: Performed by: NURSE PRACTITIONER

## 2019-01-27 RX ORDER — PANTOPRAZOLE SODIUM 40 MG/1
40 TABLET, DELAYED RELEASE ORAL DAILY
Qty: 30 TABLET | Refills: 1 | Status: ON HOLD | OUTPATIENT
Start: 2019-01-27 | End: 2020-12-27 | Stop reason: SDUPTHER

## 2019-01-27 RX ORDER — SUCRALFATE 1 G/1
1 TABLET ORAL 4 TIMES DAILY
Qty: 120 TABLET | Refills: 0 | Status: ON HOLD | OUTPATIENT
Start: 2019-01-27 | End: 2020-09-09 | Stop reason: ALTCHOICE

## 2019-01-27 RX ORDER — SODIUM CHLORIDE 0.9 % (FLUSH) 0.9 %
10 SYRINGE (ML) INJECTION PRN
Status: DISCONTINUED | OUTPATIENT
Start: 2019-01-27 | End: 2019-01-27 | Stop reason: HOSPADM

## 2019-01-27 RX ORDER — SODIUM CHLORIDE 0.9 % (FLUSH) 0.9 %
10 SYRINGE (ML) INJECTION EVERY 12 HOURS SCHEDULED
Status: DISCONTINUED | OUTPATIENT
Start: 2019-01-27 | End: 2019-01-27 | Stop reason: HOSPADM

## 2019-01-27 RX ORDER — LIDOCAINE HYDROCHLORIDE 10 MG/ML
5 INJECTION, SOLUTION INFILTRATION; PERINEURAL ONCE
Status: DISCONTINUED | OUTPATIENT
Start: 2019-01-27 | End: 2019-01-27 | Stop reason: HOSPADM

## 2019-01-27 RX ORDER — CEFUROXIME AXETIL 500 MG/1
500 TABLET ORAL 2 TIMES DAILY
Qty: 10 TABLET | Refills: 0 | Status: SHIPPED | OUTPATIENT
Start: 2019-01-27 | End: 2019-02-01

## 2019-01-27 RX ADMIN — GUAIFENESIN 600 MG: 600 TABLET, EXTENDED RELEASE ORAL at 08:30

## 2019-01-27 RX ADMIN — CEFEPIME HYDROCHLORIDE 1 G: 1 INJECTION, POWDER, FOR SOLUTION INTRAMUSCULAR; INTRAVENOUS at 00:07

## 2019-01-27 ASSESSMENT — PAIN SCALES - GENERAL
PAINLEVEL_OUTOF10: 0
PAINLEVEL_OUTOF10: 0

## 2019-01-29 LAB
EKG ATRIAL RATE: 125 BPM
EKG DIAGNOSIS: NORMAL
EKG P AXIS: 73 DEGREES
EKG P-R INTERVAL: 124 MS
EKG Q-T INTERVAL: 350 MS
EKG QRS DURATION: 78 MS
EKG QTC CALCULATION (BAZETT): 505 MS
EKG R AXIS: 89 DEGREES
EKG T AXIS: 71 DEGREES
EKG VENTRICULAR RATE: 125 BPM

## 2019-01-30 LAB
BLOOD CULTURE, ROUTINE: NORMAL
CULTURE, BLOOD 2: NORMAL

## 2019-02-26 ENCOUNTER — HOSPITAL ENCOUNTER (OUTPATIENT)
Age: 59
Setting detail: OBSERVATION
Discharge: HOME | End: 2019-02-28
Attending: EMERGENCY MEDICINE | Admitting: INTERNAL MEDICINE
Payer: COMMERCIAL

## 2019-02-26 ENCOUNTER — APPOINTMENT (OUTPATIENT)
Dept: GENERAL RADIOLOGY | Age: 59
End: 2019-02-26
Payer: COMMERCIAL

## 2019-02-26 DIAGNOSIS — R11.2 INTRACTABLE VOMITING WITH NAUSEA, UNSPECIFIED VOMITING TYPE: ICD-10-CM

## 2019-02-26 DIAGNOSIS — E83.51 HYPOCALCEMIA: ICD-10-CM

## 2019-02-26 DIAGNOSIS — D72.829 LEUKOCYTOSIS, UNSPECIFIED TYPE: ICD-10-CM

## 2019-02-26 DIAGNOSIS — R10.9 INTRACTABLE ABDOMINAL PAIN: Primary | ICD-10-CM

## 2019-02-26 LAB
A/G RATIO: 1.1 (ref 1.1–2.2)
ALBUMIN SERPL-MCNC: 3.1 G/DL (ref 3.4–5)
ALP BLD-CCNC: 62 U/L (ref 40–129)
ALT SERPL-CCNC: 14 U/L (ref 10–40)
ANION GAP SERPL CALCULATED.3IONS-SCNC: 16 MMOL/L (ref 3–16)
AST SERPL-CCNC: 23 U/L (ref 15–37)
BACTERIA: ABNORMAL /HPF
BASOPHILS ABSOLUTE: 0.1 K/UL (ref 0–0.2)
BASOPHILS RELATIVE PERCENT: 0.4 %
BILIRUB SERPL-MCNC: 0.3 MG/DL (ref 0–1)
BILIRUBIN URINE: ABNORMAL
BLOOD, URINE: ABNORMAL
BUN BLDV-MCNC: 22 MG/DL (ref 7–20)
CALCIUM SERPL-MCNC: 6.9 MG/DL (ref 8.3–10.6)
CHLORIDE BLD-SCNC: 102 MMOL/L (ref 99–110)
CLARITY: CLEAR
CO2: 23 MMOL/L (ref 21–32)
COLOR: YELLOW
CREAT SERPL-MCNC: 0.7 MG/DL (ref 0.6–1.1)
EOSINOPHILS ABSOLUTE: 0 K/UL (ref 0–0.6)
EOSINOPHILS RELATIVE PERCENT: 0 %
GFR AFRICAN AMERICAN: >60
GFR NON-AFRICAN AMERICAN: >60
GLOBULIN: 2.7 G/DL
GLUCOSE BLD-MCNC: 87 MG/DL (ref 70–99)
GLUCOSE URINE: NEGATIVE MG/DL
HCT VFR BLD CALC: 52.2 % (ref 36–48)
HEMOGLOBIN: 17.5 G/DL (ref 12–16)
KETONES, URINE: 40 MG/DL
LACTIC ACID: 1.7 MMOL/L (ref 0.4–2)
LEUKOCYTE ESTERASE, URINE: NEGATIVE
LIPASE: 35 U/L (ref 13–60)
LYMPHOCYTES ABSOLUTE: 1.5 K/UL (ref 1–5.1)
LYMPHOCYTES RELATIVE PERCENT: 7.4 %
MCH RBC QN AUTO: 30.7 PG (ref 26–34)
MCHC RBC AUTO-ENTMCNC: 33.5 G/DL (ref 31–36)
MCV RBC AUTO: 91.6 FL (ref 80–100)
MICROSCOPIC EXAMINATION: YES
MONOCYTES ABSOLUTE: 0.8 K/UL (ref 0–1.3)
MONOCYTES RELATIVE PERCENT: 4.1 %
MUCUS: ABNORMAL /LPF
NEUTROPHILS ABSOLUTE: 17.7 K/UL (ref 1.7–7.7)
NEUTROPHILS RELATIVE PERCENT: 88.1 %
NITRITE, URINE: NEGATIVE
PDW BLD-RTO: 14.1 % (ref 12.4–15.4)
PH UA: 7.5
PLATELET # BLD: 283 K/UL (ref 135–450)
PMV BLD AUTO: 10.3 FL (ref 5–10.5)
POTASSIUM SERPL-SCNC: 3.6 MMOL/L (ref 3.5–5.1)
PROTEIN UA: 100 MG/DL
RBC # BLD: 5.69 M/UL (ref 4–5.2)
RBC UA: ABNORMAL /HPF (ref 0–2)
REASON FOR REJECTION: NORMAL
REJECTED TEST: NORMAL
SODIUM BLD-SCNC: 141 MMOL/L (ref 136–145)
SPECIFIC GRAVITY UA: 1.02
TOTAL PROTEIN: 5.8 G/DL (ref 6.4–8.2)
URINE REFLEX TO CULTURE: YES
URINE TYPE: ABNORMAL
UROBILINOGEN, URINE: 0.2 E.U./DL
WBC # BLD: 20 K/UL (ref 4–11)
WBC UA: ABNORMAL /HPF (ref 0–5)

## 2019-02-26 PROCEDURE — 99285 EMERGENCY DEPT VISIT HI MDM: CPT

## 2019-02-26 PROCEDURE — 96375 TX/PRO/DX INJ NEW DRUG ADDON: CPT

## 2019-02-26 PROCEDURE — 83690 ASSAY OF LIPASE: CPT

## 2019-02-26 PROCEDURE — 83605 ASSAY OF LACTIC ACID: CPT

## 2019-02-26 PROCEDURE — 87086 URINE CULTURE/COLONY COUNT: CPT

## 2019-02-26 PROCEDURE — G0378 HOSPITAL OBSERVATION PER HR: HCPCS

## 2019-02-26 PROCEDURE — 36415 COLL VENOUS BLD VENIPUNCTURE: CPT

## 2019-02-26 PROCEDURE — 93005 ELECTROCARDIOGRAM TRACING: CPT | Performed by: NURSE PRACTITIONER

## 2019-02-26 PROCEDURE — 96372 THER/PROPH/DIAG INJ SC/IM: CPT

## 2019-02-26 PROCEDURE — 6360000002 HC RX W HCPCS: Performed by: NURSE PRACTITIONER

## 2019-02-26 PROCEDURE — 2580000003 HC RX 258: Performed by: NURSE PRACTITIONER

## 2019-02-26 PROCEDURE — 51701 INSERT BLADDER CATHETER: CPT

## 2019-02-26 PROCEDURE — 80053 COMPREHEN METABOLIC PANEL: CPT

## 2019-02-26 PROCEDURE — 85025 COMPLETE CBC W/AUTO DIFF WBC: CPT

## 2019-02-26 PROCEDURE — 96361 HYDRATE IV INFUSION ADD-ON: CPT

## 2019-02-26 PROCEDURE — 96374 THER/PROPH/DIAG INJ IV PUSH: CPT

## 2019-02-26 PROCEDURE — 74018 RADEX ABDOMEN 1 VIEW: CPT

## 2019-02-26 PROCEDURE — 6370000000 HC RX 637 (ALT 250 FOR IP): Performed by: NURSE PRACTITIONER

## 2019-02-26 PROCEDURE — 81001 URINALYSIS AUTO W/SCOPE: CPT

## 2019-02-26 RX ORDER — SODIUM CHLORIDE 9 MG/ML
INJECTION, SOLUTION INTRAVENOUS CONTINUOUS
Status: DISCONTINUED | OUTPATIENT
Start: 2019-02-27 | End: 2019-02-27

## 2019-02-26 RX ORDER — DIAZEPAM 5 MG/1
5 TABLET ORAL EVERY 8 HOURS PRN
Status: DISCONTINUED | OUTPATIENT
Start: 2019-02-26 | End: 2019-02-28 | Stop reason: HOSPADM

## 2019-02-26 RX ORDER — METOCLOPRAMIDE HYDROCHLORIDE 5 MG/ML
10 INJECTION INTRAMUSCULAR; INTRAVENOUS EVERY 6 HOURS
Status: DISCONTINUED | OUTPATIENT
Start: 2019-02-27 | End: 2019-02-28 | Stop reason: HOSPADM

## 2019-02-26 RX ORDER — SODIUM CHLORIDE 0.9 % (FLUSH) 0.9 %
10 SYRINGE (ML) INJECTION EVERY 12 HOURS SCHEDULED
Status: DISCONTINUED | OUTPATIENT
Start: 2019-02-27 | End: 2019-02-28 | Stop reason: HOSPADM

## 2019-02-26 RX ORDER — SUCRALFATE 1 G/1
1 TABLET ORAL 4 TIMES DAILY
Status: DISCONTINUED | OUTPATIENT
Start: 2019-02-27 | End: 2019-02-28 | Stop reason: HOSPADM

## 2019-02-26 RX ORDER — GUAIFENESIN 600 MG/1
600 TABLET, EXTENDED RELEASE ORAL 2 TIMES DAILY
Status: DISCONTINUED | OUTPATIENT
Start: 2019-02-27 | End: 2019-02-28 | Stop reason: HOSPADM

## 2019-02-26 RX ORDER — PANTOPRAZOLE SODIUM 40 MG/1
40 TABLET, DELAYED RELEASE ORAL DAILY
Status: DISCONTINUED | OUTPATIENT
Start: 2019-02-27 | End: 2019-02-28 | Stop reason: HOSPADM

## 2019-02-26 RX ORDER — IPRATROPIUM BROMIDE AND ALBUTEROL SULFATE 2.5; .5 MG/3ML; MG/3ML
3 SOLUTION RESPIRATORY (INHALATION) EVERY 6 HOURS PRN
Status: DISCONTINUED | OUTPATIENT
Start: 2019-02-26 | End: 2019-02-28 | Stop reason: HOSPADM

## 2019-02-26 RX ORDER — ALBUTEROL SULFATE 90 UG/1
2 AEROSOL, METERED RESPIRATORY (INHALATION) EVERY 6 HOURS PRN
Status: DISCONTINUED | OUTPATIENT
Start: 2019-02-26 | End: 2019-02-28 | Stop reason: HOSPADM

## 2019-02-26 RX ORDER — ONDANSETRON 2 MG/ML
4 INJECTION INTRAMUSCULAR; INTRAVENOUS ONCE
Status: COMPLETED | OUTPATIENT
Start: 2019-02-26 | End: 2019-02-26

## 2019-02-26 RX ORDER — NICOTINE 21 MG/24HR
1 PATCH, TRANSDERMAL 24 HOURS TRANSDERMAL DAILY
Status: DISCONTINUED | OUTPATIENT
Start: 2019-02-27 | End: 2019-02-28 | Stop reason: HOSPADM

## 2019-02-26 RX ORDER — ONDANSETRON 2 MG/ML
4 INJECTION INTRAMUSCULAR; INTRAVENOUS EVERY 6 HOURS PRN
Status: DISCONTINUED | OUTPATIENT
Start: 2019-02-26 | End: 2019-02-28 | Stop reason: HOSPADM

## 2019-02-26 RX ORDER — HALOPERIDOL 5 MG/ML
2 INJECTION INTRAMUSCULAR ONCE
Status: COMPLETED | OUTPATIENT
Start: 2019-02-26 | End: 2019-02-26

## 2019-02-26 RX ORDER — 0.9 % SODIUM CHLORIDE 0.9 %
1000 INTRAVENOUS SOLUTION INTRAVENOUS ONCE
Status: COMPLETED | OUTPATIENT
Start: 2019-02-26 | End: 2019-02-26

## 2019-02-26 RX ORDER — SODIUM CHLORIDE 0.9 % (FLUSH) 0.9 %
10 SYRINGE (ML) INJECTION PRN
Status: DISCONTINUED | OUTPATIENT
Start: 2019-02-26 | End: 2019-02-28 | Stop reason: HOSPADM

## 2019-02-26 RX ORDER — KETOROLAC TROMETHAMINE 30 MG/ML
30 INJECTION, SOLUTION INTRAMUSCULAR; INTRAVENOUS ONCE
Status: COMPLETED | OUTPATIENT
Start: 2019-02-26 | End: 2019-02-26

## 2019-02-26 RX ADMIN — HALOPERIDOL LACTATE 2 MG: 5 INJECTION INTRAMUSCULAR at 18:38

## 2019-02-26 RX ADMIN — KETOROLAC TROMETHAMINE 30 MG: 30 INJECTION, SOLUTION INTRAMUSCULAR; INTRAVENOUS at 18:39

## 2019-02-26 RX ADMIN — SODIUM CHLORIDE 1000 ML: 9 INJECTION, SOLUTION INTRAVENOUS at 18:38

## 2019-02-26 RX ADMIN — ONDANSETRON 4 MG: 2 INJECTION INTRAMUSCULAR; INTRAVENOUS at 18:38

## 2019-02-26 ASSESSMENT — ENCOUNTER SYMPTOMS
COUGH: 0
BACK PAIN: 0
NAUSEA: 1
COLOR CHANGE: 0
ABDOMINAL PAIN: 1
VOMITING: 1
WHEEZING: 0
DIARRHEA: 0
SHORTNESS OF BREATH: 0

## 2019-02-26 ASSESSMENT — PAIN SCALES - GENERAL
PAINLEVEL_OUTOF10: 8
PAINLEVEL_OUTOF10: 6

## 2019-02-26 ASSESSMENT — PAIN DESCRIPTION - PAIN TYPE
TYPE: ACUTE PAIN
TYPE: ACUTE PAIN

## 2019-02-26 ASSESSMENT — PAIN DESCRIPTION - FREQUENCY
FREQUENCY: CONTINUOUS
FREQUENCY: CONTINUOUS

## 2019-02-26 ASSESSMENT — PAIN DESCRIPTION - DESCRIPTORS
DESCRIPTORS: ACHING
DESCRIPTORS: ACHING

## 2019-02-26 ASSESSMENT — PAIN DESCRIPTION - LOCATION
LOCATION: ABDOMEN
LOCATION: ABDOMEN

## 2019-02-26 ASSESSMENT — PAIN DESCRIPTION - PROGRESSION: CLINICAL_PROGRESSION: NOT CHANGED

## 2019-02-27 PROBLEM — E44.0 MODERATE MALNUTRITION (HCC): Chronic | Status: ACTIVE | Noted: 2019-02-27

## 2019-02-27 LAB
ANION GAP SERPL CALCULATED.3IONS-SCNC: 16 MMOL/L (ref 3–16)
BASOPHILS ABSOLUTE: 0.1 K/UL (ref 0–0.2)
BASOPHILS RELATIVE PERCENT: 0.6 %
BUN BLDV-MCNC: 31 MG/DL (ref 7–20)
CALCIUM SERPL-MCNC: 8.6 MG/DL (ref 8.3–10.6)
CHLORIDE BLD-SCNC: 100 MMOL/L (ref 99–110)
CO2: 26 MMOL/L (ref 21–32)
CREAT SERPL-MCNC: 0.8 MG/DL (ref 0.6–1.1)
EKG ATRIAL RATE: 110 BPM
EKG DIAGNOSIS: NORMAL
EKG P AXIS: 80 DEGREES
EKG P-R INTERVAL: 116 MS
EKG Q-T INTERVAL: 384 MS
EKG QRS DURATION: 78 MS
EKG QTC CALCULATION (BAZETT): 519 MS
EKG R AXIS: 85 DEGREES
EKG T AXIS: 79 DEGREES
EKG VENTRICULAR RATE: 110 BPM
EOSINOPHILS ABSOLUTE: 0 K/UL (ref 0–0.6)
EOSINOPHILS RELATIVE PERCENT: 0.2 %
GFR AFRICAN AMERICAN: >60
GFR NON-AFRICAN AMERICAN: >60
GLUCOSE BLD-MCNC: 86 MG/DL (ref 70–99)
HCT VFR BLD CALC: 44.2 % (ref 36–48)
HEMOGLOBIN: 14.4 G/DL (ref 12–16)
LYMPHOCYTES ABSOLUTE: 2.4 K/UL (ref 1–5.1)
LYMPHOCYTES RELATIVE PERCENT: 19.8 %
MAGNESIUM: 1.6 MG/DL (ref 1.8–2.4)
MCH RBC QN AUTO: 30.6 PG (ref 26–34)
MCHC RBC AUTO-ENTMCNC: 32.5 G/DL (ref 31–36)
MCV RBC AUTO: 94.2 FL (ref 80–100)
MONOCYTES ABSOLUTE: 0.7 K/UL (ref 0–1.3)
MONOCYTES RELATIVE PERCENT: 5.5 %
NEUTROPHILS ABSOLUTE: 8.8 K/UL (ref 1.7–7.7)
NEUTROPHILS RELATIVE PERCENT: 73.9 %
PDW BLD-RTO: 14.2 % (ref 12.4–15.4)
PLATELET # BLD: 202 K/UL (ref 135–450)
PMV BLD AUTO: 10 FL (ref 5–10.5)
POTASSIUM REFLEX MAGNESIUM: 3.2 MMOL/L (ref 3.5–5.1)
RBC # BLD: 4.69 M/UL (ref 4–5.2)
SODIUM BLD-SCNC: 142 MMOL/L (ref 136–145)
WBC # BLD: 11.9 K/UL (ref 4–11)

## 2019-02-27 PROCEDURE — 2580000003 HC RX 258: Performed by: NURSE PRACTITIONER

## 2019-02-27 PROCEDURE — 6360000002 HC RX W HCPCS: Performed by: NURSE PRACTITIONER

## 2019-02-27 PROCEDURE — G0378 HOSPITAL OBSERVATION PER HR: HCPCS

## 2019-02-27 PROCEDURE — 85025 COMPLETE CBC W/AUTO DIFF WBC: CPT

## 2019-02-27 PROCEDURE — 96372 THER/PROPH/DIAG INJ SC/IM: CPT

## 2019-02-27 PROCEDURE — 80048 BASIC METABOLIC PNL TOTAL CA: CPT

## 2019-02-27 PROCEDURE — 83735 ASSAY OF MAGNESIUM: CPT

## 2019-02-27 PROCEDURE — 93010 ELECTROCARDIOGRAM REPORT: CPT | Performed by: INTERNAL MEDICINE

## 2019-02-27 PROCEDURE — 99406 BEHAV CHNG SMOKING 3-10 MIN: CPT

## 2019-02-27 PROCEDURE — 96376 TX/PRO/DX INJ SAME DRUG ADON: CPT

## 2019-02-27 PROCEDURE — 36415 COLL VENOUS BLD VENIPUNCTURE: CPT

## 2019-02-27 PROCEDURE — 6370000000 HC RX 637 (ALT 250 FOR IP): Performed by: NURSE PRACTITIONER

## 2019-02-27 PROCEDURE — 6370000000 HC RX 637 (ALT 250 FOR IP): Performed by: INTERNAL MEDICINE

## 2019-02-27 PROCEDURE — 96375 TX/PRO/DX INJ NEW DRUG ADDON: CPT

## 2019-02-27 PROCEDURE — 96361 HYDRATE IV INFUSION ADD-ON: CPT

## 2019-02-27 RX ORDER — POTASSIUM CHLORIDE 20 MEQ/1
40 TABLET, EXTENDED RELEASE ORAL ONCE
Status: COMPLETED | OUTPATIENT
Start: 2019-02-27 | End: 2019-02-27

## 2019-02-27 RX ADMIN — SODIUM CHLORIDE, PRESERVATIVE FREE 10 ML: 5 INJECTION INTRAVENOUS at 20:02

## 2019-02-27 RX ADMIN — SUCRALFATE 1 G: 1 TABLET ORAL at 20:01

## 2019-02-27 RX ADMIN — SODIUM CHLORIDE: 9 INJECTION, SOLUTION INTRAVENOUS at 00:21

## 2019-02-27 RX ADMIN — PANTOPRAZOLE SODIUM 40 MG: 40 TABLET, DELAYED RELEASE ORAL at 09:01

## 2019-02-27 RX ADMIN — METOCLOPRAMIDE 10 MG: 5 INJECTION, SOLUTION INTRAMUSCULAR; INTRAVENOUS at 17:20

## 2019-02-27 RX ADMIN — SODIUM CHLORIDE, PRESERVATIVE FREE 10 ML: 5 INJECTION INTRAVENOUS at 00:21

## 2019-02-27 RX ADMIN — METOCLOPRAMIDE 10 MG: 5 INJECTION, SOLUTION INTRAMUSCULAR; INTRAVENOUS at 00:21

## 2019-02-27 RX ADMIN — METOCLOPRAMIDE 10 MG: 5 INJECTION, SOLUTION INTRAMUSCULAR; INTRAVENOUS at 13:06

## 2019-02-27 RX ADMIN — POTASSIUM CHLORIDE 40 MEQ: 20 TABLET, EXTENDED RELEASE ORAL at 13:08

## 2019-02-27 RX ADMIN — METOCLOPRAMIDE 10 MG: 5 INJECTION, SOLUTION INTRAMUSCULAR; INTRAVENOUS at 05:43

## 2019-02-27 RX ADMIN — ENOXAPARIN SODIUM 30 MG: 30 INJECTION SUBCUTANEOUS at 09:01

## 2019-02-27 RX ADMIN — SODIUM CHLORIDE, PRESERVATIVE FREE 10 ML: 5 INJECTION INTRAVENOUS at 09:01

## 2019-02-27 RX ADMIN — GUAIFENESIN 600 MG: 600 TABLET, EXTENDED RELEASE ORAL at 20:01

## 2019-02-27 RX ADMIN — SUCRALFATE 1 G: 1 TABLET ORAL at 09:07

## 2019-02-27 RX ADMIN — SUCRALFATE 1 G: 1 TABLET ORAL at 16:52

## 2019-02-27 RX ADMIN — SUCRALFATE 1 G: 1 TABLET ORAL at 13:08

## 2019-02-27 RX ADMIN — GUAIFENESIN 600 MG: 600 TABLET, EXTENDED RELEASE ORAL at 09:01

## 2019-02-27 ASSESSMENT — PAIN SCALES - GENERAL
PAINLEVEL_OUTOF10: 0

## 2019-02-28 VITALS
RESPIRATION RATE: 16 BRPM | TEMPERATURE: 98.2 F | WEIGHT: 100.2 LBS | DIASTOLIC BLOOD PRESSURE: 73 MMHG | HEART RATE: 84 BPM | BODY MASS INDEX: 17.75 KG/M2 | SYSTOLIC BLOOD PRESSURE: 113 MMHG | OXYGEN SATURATION: 95 % | HEIGHT: 63 IN

## 2019-02-28 LAB
ANION GAP SERPL CALCULATED.3IONS-SCNC: 11 MMOL/L (ref 3–16)
BUN BLDV-MCNC: 18 MG/DL (ref 7–20)
CALCIUM SERPL-MCNC: 8.9 MG/DL (ref 8.3–10.6)
CHLORIDE BLD-SCNC: 103 MMOL/L (ref 99–110)
CO2: 22 MMOL/L (ref 21–32)
CREAT SERPL-MCNC: <0.5 MG/DL (ref 0.6–1.1)
GFR AFRICAN AMERICAN: >60
GFR NON-AFRICAN AMERICAN: >60
GLUCOSE BLD-MCNC: 91 MG/DL (ref 70–99)
POTASSIUM SERPL-SCNC: 4.1 MMOL/L (ref 3.5–5.1)
SODIUM BLD-SCNC: 136 MMOL/L (ref 136–145)
URINE CULTURE, ROUTINE: NORMAL

## 2019-02-28 PROCEDURE — 6360000002 HC RX W HCPCS: Performed by: INTERNAL MEDICINE

## 2019-02-28 PROCEDURE — 6360000002 HC RX W HCPCS: Performed by: NURSE PRACTITIONER

## 2019-02-28 PROCEDURE — 96376 TX/PRO/DX INJ SAME DRUG ADON: CPT

## 2019-02-28 PROCEDURE — G0378 HOSPITAL OBSERVATION PER HR: HCPCS

## 2019-02-28 PROCEDURE — 96365 THER/PROPH/DIAG IV INF INIT: CPT

## 2019-02-28 PROCEDURE — 2580000003 HC RX 258: Performed by: NURSE PRACTITIONER

## 2019-02-28 PROCEDURE — 2580000003 HC RX 258

## 2019-02-28 PROCEDURE — 80048 BASIC METABOLIC PNL TOTAL CA: CPT

## 2019-02-28 PROCEDURE — 96372 THER/PROPH/DIAG INJ SC/IM: CPT

## 2019-02-28 PROCEDURE — 36415 COLL VENOUS BLD VENIPUNCTURE: CPT

## 2019-02-28 PROCEDURE — 6370000000 HC RX 637 (ALT 250 FOR IP): Performed by: NURSE PRACTITIONER

## 2019-02-28 RX ORDER — SODIUM CHLORIDE 9 MG/ML
INJECTION, SOLUTION INTRAVENOUS
Status: COMPLETED
Start: 2019-02-28 | End: 2019-02-28

## 2019-02-28 RX ORDER — MAGNESIUM SULFATE 1 G/100ML
1 INJECTION INTRAVENOUS ONCE
Status: COMPLETED | OUTPATIENT
Start: 2019-02-28 | End: 2019-02-28

## 2019-02-28 RX ADMIN — PANTOPRAZOLE SODIUM 40 MG: 40 TABLET, DELAYED RELEASE ORAL at 09:18

## 2019-02-28 RX ADMIN — SODIUM CHLORIDE: 900 INJECTION, SOLUTION INTRAVENOUS at 09:45

## 2019-02-28 RX ADMIN — METOCLOPRAMIDE 10 MG: 5 INJECTION, SOLUTION INTRAMUSCULAR; INTRAVENOUS at 06:05

## 2019-02-28 RX ADMIN — MAGNESIUM SULFATE HEPTAHYDRATE 1 G: 1 INJECTION, SOLUTION INTRAVENOUS at 09:22

## 2019-02-28 RX ADMIN — METOCLOPRAMIDE 10 MG: 5 INJECTION, SOLUTION INTRAMUSCULAR; INTRAVENOUS at 12:46

## 2019-02-28 RX ADMIN — METOCLOPRAMIDE 10 MG: 5 INJECTION, SOLUTION INTRAMUSCULAR; INTRAVENOUS at 00:29

## 2019-02-28 RX ADMIN — SUCRALFATE 1 G: 1 TABLET ORAL at 12:46

## 2019-02-28 RX ADMIN — SODIUM CHLORIDE, PRESERVATIVE FREE 10 ML: 5 INJECTION INTRAVENOUS at 09:19

## 2019-02-28 RX ADMIN — SUCRALFATE 1 G: 1 TABLET ORAL at 09:18

## 2019-02-28 RX ADMIN — GUAIFENESIN 600 MG: 600 TABLET, EXTENDED RELEASE ORAL at 09:18

## 2019-02-28 RX ADMIN — ENOXAPARIN SODIUM 30 MG: 30 INJECTION SUBCUTANEOUS at 09:18

## 2019-02-28 ASSESSMENT — PAIN SCALES - GENERAL
PAINLEVEL_OUTOF10: 0
PAINLEVEL_OUTOF10: 0

## 2019-04-09 ENCOUNTER — HOSPITAL ENCOUNTER (INPATIENT)
Age: 59
LOS: 3 days | Discharge: HOME | DRG: 683 | End: 2019-04-12
Attending: EMERGENCY MEDICINE | Admitting: HOSPITALIST
Payer: COMMERCIAL

## 2019-04-09 ENCOUNTER — APPOINTMENT (OUTPATIENT)
Dept: CT IMAGING | Age: 59
DRG: 683 | End: 2019-04-09
Payer: COMMERCIAL

## 2019-04-09 ENCOUNTER — APPOINTMENT (OUTPATIENT)
Dept: GENERAL RADIOLOGY | Age: 59
DRG: 683 | End: 2019-04-09
Payer: COMMERCIAL

## 2019-04-09 DIAGNOSIS — R11.2 NAUSEA VOMITING AND DIARRHEA: Primary | ICD-10-CM

## 2019-04-09 DIAGNOSIS — N17.9 ACUTE KIDNEY INJURY (HCC): ICD-10-CM

## 2019-04-09 DIAGNOSIS — E86.0 DEHYDRATION: ICD-10-CM

## 2019-04-09 DIAGNOSIS — R19.7 NAUSEA VOMITING AND DIARRHEA: Primary | ICD-10-CM

## 2019-04-09 PROBLEM — R65.10 SIRS (SYSTEMIC INFLAMMATORY RESPONSE SYNDROME) (HCC): Status: ACTIVE | Noted: 2019-04-09

## 2019-04-09 PROBLEM — E83.52 HYPERCALCEMIA: Status: ACTIVE | Noted: 2019-04-09

## 2019-04-09 PROBLEM — E87.20 LACTIC ACID ACIDOSIS: Status: ACTIVE | Noted: 2019-04-09

## 2019-04-09 LAB
A/G RATIO: 1.4 (ref 1.1–2.2)
ALBUMIN SERPL-MCNC: 5.2 G/DL (ref 3.4–5)
ALP BLD-CCNC: 111 U/L (ref 40–129)
ALT SERPL-CCNC: 14 U/L (ref 10–40)
AMPHETAMINE SCREEN, URINE: ABNORMAL
ANION GAP SERPL CALCULATED.3IONS-SCNC: 22 MMOL/L (ref 3–16)
AST SERPL-CCNC: 17 U/L (ref 15–37)
BACTERIA: ABNORMAL /HPF
BARBITURATE SCREEN URINE: ABNORMAL
BENZODIAZEPINE SCREEN, URINE: POSITIVE
BILIRUB SERPL-MCNC: 0.3 MG/DL (ref 0–1)
BILIRUBIN URINE: ABNORMAL
BLOOD, URINE: ABNORMAL
BUN BLDV-MCNC: 25 MG/DL (ref 7–20)
CALCIUM SERPL-MCNC: 11.4 MG/DL (ref 8.3–10.6)
CANNABINOID SCREEN URINE: POSITIVE
CASTS 2: ABNORMAL /LPF
CASTS: ABNORMAL /LPF
CHLORIDE BLD-SCNC: 94 MMOL/L (ref 99–110)
CLARITY: CLEAR
CO2: 23 MMOL/L (ref 21–32)
COCAINE METABOLITE SCREEN URINE: ABNORMAL
COLOR: ABNORMAL
CREAT SERPL-MCNC: 1.5 MG/DL (ref 0.6–1.1)
EKG ATRIAL RATE: 106 BPM
EKG DIAGNOSIS: NORMAL
EKG P AXIS: 79 DEGREES
EKG P-R INTERVAL: 118 MS
EKG Q-T INTERVAL: 352 MS
EKG QRS DURATION: 76 MS
EKG QTC CALCULATION (BAZETT): 467 MS
EKG R AXIS: 90 DEGREES
EKG T AXIS: 80 DEGREES
EKG VENTRICULAR RATE: 106 BPM
GFR AFRICAN AMERICAN: 43
GFR NON-AFRICAN AMERICAN: 35
GLOBULIN: 3.8 G/DL
GLUCOSE BLD-MCNC: 165 MG/DL (ref 70–99)
GLUCOSE URINE: NEGATIVE MG/DL
HCT VFR BLD CALC: 50.4 % (ref 36–48)
HEMOGLOBIN: 17.3 G/DL (ref 12–16)
KETONES, URINE: 15 MG/DL
LACTIC ACID: 0.6 MMOL/L (ref 0.4–2)
LACTIC ACID: 2.5 MMOL/L (ref 0.4–2)
LEUKOCYTE ESTERASE, URINE: NEGATIVE
Lab: ABNORMAL
MAGNESIUM: 1.8 MG/DL (ref 1.8–2.4)
MCH RBC QN AUTO: 31.5 PG (ref 26–34)
MCHC RBC AUTO-ENTMCNC: 34.4 G/DL (ref 31–36)
MCV RBC AUTO: 91.5 FL (ref 80–100)
METHADONE SCREEN, URINE: ABNORMAL
MICROSCOPIC EXAMINATION: YES
MUCUS: ABNORMAL /LPF
NITRITE, URINE: NEGATIVE
OPIATE SCREEN URINE: ABNORMAL
OXYCODONE URINE: ABNORMAL
PDW BLD-RTO: 14.7 % (ref 12.4–15.4)
PH UA: 6
PH UA: 6 (ref 5–8)
PHENCYCLIDINE SCREEN URINE: ABNORMAL
PLATELET # BLD: 348 K/UL (ref 135–450)
PMV BLD AUTO: 9.6 FL (ref 5–10.5)
POTASSIUM SERPL-SCNC: 4 MMOL/L (ref 3.5–5.1)
PROPOXYPHENE SCREEN: ABNORMAL
PROTEIN UA: >=300 MG/DL
RBC # BLD: 5.5 M/UL (ref 4–5.2)
RBC UA: ABNORMAL /HPF (ref 0–2)
REASON FOR REJECTION: NORMAL
REJECTED TEST: NORMAL
SODIUM BLD-SCNC: 139 MMOL/L (ref 136–145)
SPECIFIC GRAVITY UA: >=1.03 (ref 1–1.03)
TOTAL PROTEIN: 9 G/DL (ref 6.4–8.2)
URINE REFLEX TO CULTURE: ABNORMAL
URINE TYPE: ABNORMAL
UROBILINOGEN, URINE: 0.2 E.U./DL
WBC # BLD: 16 K/UL (ref 4–11)
WBC UA: ABNORMAL /HPF (ref 0–5)

## 2019-04-09 PROCEDURE — 6360000002 HC RX W HCPCS: Performed by: EMERGENCY MEDICINE

## 2019-04-09 PROCEDURE — 99285 EMERGENCY DEPT VISIT HI MDM: CPT

## 2019-04-09 PROCEDURE — G0378 HOSPITAL OBSERVATION PER HR: HCPCS

## 2019-04-09 PROCEDURE — 85027 COMPLETE CBC AUTOMATED: CPT

## 2019-04-09 PROCEDURE — 96375 TX/PRO/DX INJ NEW DRUG ADDON: CPT

## 2019-04-09 PROCEDURE — 1200000000 HC SEMI PRIVATE

## 2019-04-09 PROCEDURE — 6360000002 HC RX W HCPCS: Performed by: NURSE PRACTITIONER

## 2019-04-09 PROCEDURE — 6360000002 HC RX W HCPCS

## 2019-04-09 PROCEDURE — 96376 TX/PRO/DX INJ SAME DRUG ADON: CPT

## 2019-04-09 PROCEDURE — 96361 HYDRATE IV INFUSION ADD-ON: CPT

## 2019-04-09 PROCEDURE — 93010 ELECTROCARDIOGRAM REPORT: CPT | Performed by: INTERNAL MEDICINE

## 2019-04-09 PROCEDURE — 6360000002 HC RX W HCPCS: Performed by: HOSPITALIST

## 2019-04-09 PROCEDURE — 83735 ASSAY OF MAGNESIUM: CPT

## 2019-04-09 PROCEDURE — 2580000003 HC RX 258: Performed by: HOSPITALIST

## 2019-04-09 PROCEDURE — 80307 DRUG TEST PRSMV CHEM ANLYZR: CPT

## 2019-04-09 PROCEDURE — 51702 INSERT TEMP BLADDER CATH: CPT

## 2019-04-09 PROCEDURE — 96374 THER/PROPH/DIAG INJ IV PUSH: CPT

## 2019-04-09 PROCEDURE — 6370000000 HC RX 637 (ALT 250 FOR IP): Performed by: HOSPITALIST

## 2019-04-09 PROCEDURE — 6370000000 HC RX 637 (ALT 250 FOR IP): Performed by: EMERGENCY MEDICINE

## 2019-04-09 PROCEDURE — 71045 X-RAY EXAM CHEST 1 VIEW: CPT

## 2019-04-09 PROCEDURE — 81001 URINALYSIS AUTO W/SCOPE: CPT

## 2019-04-09 PROCEDURE — 94150 VITAL CAPACITY TEST: CPT

## 2019-04-09 PROCEDURE — 2580000003 HC RX 258: Performed by: EMERGENCY MEDICINE

## 2019-04-09 PROCEDURE — 80053 COMPREHEN METABOLIC PANEL: CPT

## 2019-04-09 PROCEDURE — 94640 AIRWAY INHALATION TREATMENT: CPT

## 2019-04-09 PROCEDURE — 4500000025 HC ED LEVEL 5 PROCEDURE

## 2019-04-09 PROCEDURE — 83605 ASSAY OF LACTIC ACID: CPT

## 2019-04-09 PROCEDURE — 93005 ELECTROCARDIOGRAM TRACING: CPT | Performed by: EMERGENCY MEDICINE

## 2019-04-09 PROCEDURE — 36415 COLL VENOUS BLD VENIPUNCTURE: CPT

## 2019-04-09 PROCEDURE — 74176 CT ABD & PELVIS W/O CONTRAST: CPT

## 2019-04-09 RX ORDER — NICOTINE 21 MG/24HR
1 PATCH, TRANSDERMAL 24 HOURS TRANSDERMAL DAILY
Status: DISCONTINUED | OUTPATIENT
Start: 2019-04-09 | End: 2019-04-12 | Stop reason: HOSPADM

## 2019-04-09 RX ORDER — ONDANSETRON 2 MG/ML
4 INJECTION INTRAMUSCULAR; INTRAVENOUS EVERY 6 HOURS PRN
Status: DISCONTINUED | OUTPATIENT
Start: 2019-04-09 | End: 2019-04-12 | Stop reason: HOSPADM

## 2019-04-09 RX ORDER — ONDANSETRON 2 MG/ML
INJECTION INTRAMUSCULAR; INTRAVENOUS
Status: COMPLETED
Start: 2019-04-09 | End: 2019-04-09

## 2019-04-09 RX ORDER — DIAZEPAM 5 MG/1
5 TABLET ORAL EVERY 8 HOURS PRN
Status: DISCONTINUED | OUTPATIENT
Start: 2019-04-09 | End: 2019-04-12 | Stop reason: HOSPADM

## 2019-04-09 RX ORDER — SODIUM CHLORIDE 0.9 % (FLUSH) 0.9 %
10 SYRINGE (ML) INJECTION EVERY 12 HOURS SCHEDULED
Status: DISCONTINUED | OUTPATIENT
Start: 2019-04-09 | End: 2019-04-12 | Stop reason: HOSPADM

## 2019-04-09 RX ORDER — 0.9 % SODIUM CHLORIDE 0.9 %
1000 INTRAVENOUS SOLUTION INTRAVENOUS ONCE
Status: COMPLETED | OUTPATIENT
Start: 2019-04-09 | End: 2019-04-09

## 2019-04-09 RX ORDER — IPRATROPIUM BROMIDE AND ALBUTEROL SULFATE 2.5; .5 MG/3ML; MG/3ML
3 SOLUTION RESPIRATORY (INHALATION) EVERY 6 HOURS PRN
Status: DISCONTINUED | OUTPATIENT
Start: 2019-04-09 | End: 2019-04-12 | Stop reason: HOSPADM

## 2019-04-09 RX ORDER — ONDANSETRON 2 MG/ML
4 INJECTION INTRAMUSCULAR; INTRAVENOUS ONCE
Status: COMPLETED | OUTPATIENT
Start: 2019-04-09 | End: 2019-04-09

## 2019-04-09 RX ORDER — GUAIFENESIN 600 MG/1
600 TABLET, EXTENDED RELEASE ORAL 2 TIMES DAILY
Status: DISCONTINUED | OUTPATIENT
Start: 2019-04-09 | End: 2019-04-12 | Stop reason: HOSPADM

## 2019-04-09 RX ORDER — MORPHINE SULFATE 2 MG/ML
1 INJECTION, SOLUTION INTRAMUSCULAR; INTRAVENOUS ONCE
Status: COMPLETED | OUTPATIENT
Start: 2019-04-09 | End: 2019-04-09

## 2019-04-09 RX ORDER — SUCRALFATE 1 G/1
1 TABLET ORAL 4 TIMES DAILY
Status: DISCONTINUED | OUTPATIENT
Start: 2019-04-09 | End: 2019-04-12 | Stop reason: HOSPADM

## 2019-04-09 RX ORDER — ALBUTEROL SULFATE 90 UG/1
2 AEROSOL, METERED RESPIRATORY (INHALATION) EVERY 6 HOURS PRN
Status: DISCONTINUED | OUTPATIENT
Start: 2019-04-09 | End: 2019-04-12 | Stop reason: HOSPADM

## 2019-04-09 RX ORDER — SODIUM CHLORIDE 0.9 % (FLUSH) 0.9 %
10 SYRINGE (ML) INJECTION PRN
Status: DISCONTINUED | OUTPATIENT
Start: 2019-04-09 | End: 2019-04-12 | Stop reason: HOSPADM

## 2019-04-09 RX ORDER — SODIUM CHLORIDE, SODIUM LACTATE, POTASSIUM CHLORIDE, CALCIUM CHLORIDE 600; 310; 30; 20 MG/100ML; MG/100ML; MG/100ML; MG/100ML
INJECTION, SOLUTION INTRAVENOUS CONTINUOUS
Status: DISCONTINUED | OUTPATIENT
Start: 2019-04-09 | End: 2019-04-11

## 2019-04-09 RX ORDER — ONDANSETRON 4 MG/1
4 TABLET, ORALLY DISINTEGRATING ORAL ONCE
Status: COMPLETED | OUTPATIENT
Start: 2019-04-09 | End: 2019-04-09

## 2019-04-09 RX ORDER — PANTOPRAZOLE SODIUM 40 MG/1
40 TABLET, DELAYED RELEASE ORAL DAILY
Status: DISCONTINUED | OUTPATIENT
Start: 2019-04-09 | End: 2019-04-12 | Stop reason: HOSPADM

## 2019-04-09 RX ORDER — METOCLOPRAMIDE HYDROCHLORIDE 5 MG/ML
10 INJECTION INTRAMUSCULAR; INTRAVENOUS ONCE
Status: COMPLETED | OUTPATIENT
Start: 2019-04-09 | End: 2019-04-09

## 2019-04-09 RX ORDER — METOCLOPRAMIDE HYDROCHLORIDE 5 MG/ML
5 INJECTION INTRAMUSCULAR; INTRAVENOUS EVERY 6 HOURS
Status: DISCONTINUED | OUTPATIENT
Start: 2019-04-09 | End: 2019-04-10

## 2019-04-09 RX ADMIN — SODIUM CHLORIDE 1000 ML: 9 INJECTION, SOLUTION INTRAVENOUS at 08:15

## 2019-04-09 RX ADMIN — METOCLOPRAMIDE 10 MG: 5 INJECTION, SOLUTION INTRAMUSCULAR; INTRAVENOUS at 09:48

## 2019-04-09 RX ADMIN — ONDANSETRON 4 MG: 2 INJECTION INTRAMUSCULAR; INTRAVENOUS at 07:37

## 2019-04-09 RX ADMIN — ONDANSETRON 4 MG: 2 INJECTION INTRAMUSCULAR; INTRAVENOUS at 18:17

## 2019-04-09 RX ADMIN — SUCRALFATE 1 G: 1 TABLET ORAL at 18:18

## 2019-04-09 RX ADMIN — SODIUM CHLORIDE 1000 ML: 9 INJECTION, SOLUTION INTRAVENOUS at 09:48

## 2019-04-09 RX ADMIN — SODIUM CHLORIDE, PRESERVATIVE FREE 10 ML: 5 INJECTION INTRAVENOUS at 18:18

## 2019-04-09 RX ADMIN — GUAIFENESIN 600 MG: 600 TABLET, EXTENDED RELEASE ORAL at 21:31

## 2019-04-09 RX ADMIN — HYDROMORPHONE HYDROCHLORIDE 0.5 MG: 1 INJECTION, SOLUTION INTRAMUSCULAR; INTRAVENOUS; SUBCUTANEOUS at 08:15

## 2019-04-09 RX ADMIN — ONDANSETRON 4 MG: 4 TABLET, ORALLY DISINTEGRATING ORAL at 08:24

## 2019-04-09 RX ADMIN — PANTOPRAZOLE SODIUM 40 MG: 40 TABLET, DELAYED RELEASE ORAL at 18:17

## 2019-04-09 RX ADMIN — SODIUM CHLORIDE, POTASSIUM CHLORIDE, SODIUM LACTATE AND CALCIUM CHLORIDE: 600; 310; 30; 20 INJECTION, SOLUTION INTRAVENOUS at 22:29

## 2019-04-09 RX ADMIN — METOCLOPRAMIDE HYDROCHLORIDE 5 MG: 5 INJECTION INTRAMUSCULAR; INTRAVENOUS at 18:17

## 2019-04-09 RX ADMIN — SODIUM CHLORIDE, POTASSIUM CHLORIDE, SODIUM LACTATE AND CALCIUM CHLORIDE: 600; 310; 30; 20 INJECTION, SOLUTION INTRAVENOUS at 14:48

## 2019-04-09 RX ADMIN — MORPHINE SULFATE 1 MG: 2 INJECTION, SOLUTION INTRAMUSCULAR; INTRAVENOUS at 22:27

## 2019-04-09 ASSESSMENT — PAIN SCALES - GENERAL
PAINLEVEL_OUTOF10: 7
PAINLEVEL_OUTOF10: 7
PAINLEVEL_OUTOF10: 6
PAINLEVEL_OUTOF10: 2
PAINLEVEL_OUTOF10: 10

## 2019-04-09 ASSESSMENT — PAIN DESCRIPTION - LOCATION: LOCATION: ABDOMEN

## 2019-04-09 ASSESSMENT — PAIN DESCRIPTION - PROGRESSION: CLINICAL_PROGRESSION: GRADUALLY IMPROVING

## 2019-04-09 NOTE — ED PROVIDER NOTES
[facility] Emergency Department    CHIEF COMPLAINT  Emesis (abd pain)      HISTORY OF PRESENT ILLNESS  Joceline Bourne is a 60 y.o. female  who presents to the ED with sudden onset nausea vomiting and diarrhea starting yesterday morning. Symptoms have progressively worsened and she is unable to tolerate anything by mouth. She is also having watery diarrhea. She has had symptoms similar to this in the past associated with cyclic vomiting syndrome. She is having abdominal cramping. Nothing seems to make the symptoms better or worse. No other complaints, modifying factors or associated symptoms. I have reviewed the following from the nursing documentation.     Past Medical History:   Diagnosis Date    Anxiety     Asthma     Bipolar affective ([address]     Cancer [facility] [address] 1984    Uterine    Cancer of lung ([facility] [address] 2014    CHF (congestive heart failure) (HCC)     COPD (chronic obstructive pulmonary disease) (HCC)     Cyclic vomiting syndrome     with known cannabis abuse    Cysts of both kidneys     Depression     Fatty liver 09/06/12    by CT at [facility]    Gastritis 06/29/12    EGD at [facility] MI (myocardial infarction) ([facility] [address] 2010    Panic attacks     Pneumonia     Pre-diabetes 04/13/13    A1c5.8%    Tricuspid regurgitation     06/26/2012 JUANJO at [facility] Structurally Normal Tricuspid Valve     Past Surgical History:   Procedure Laterality Date    CHOLECYSTECTOMY  2009    COLONOSCOPY  2001    COLONOSCOPY  2013    tubular adenoma    COLONOSCOPY  08/15/2017    ENDOSCOPY, COLON, DIAGNOSTIC      HERNIA REPAIR      HYSTERECTOMY  1985    With Bladder Mesh    [facility]CONTIN[facility] SURGERY Left     2009 [facility]    LUNG CANCER SURGERY      cancerous nodule removed left side    TONSILLECTOMY      UPPER GASTROINTESTINAL ENDOSCOPY  2013    gastritis    UPPER GASTROINTESTINAL ENDOSCOPY  10/03/2017    bx distal esophagus      Family History   Problem Relation Age of Onset    Heart Disease Father     Hypertension Father     High Blood Pressure Father     Heart Disease Mother     Diabetes Maternal Grandmother     Cancer Son         NHL    High Blood Pressure Son      Social History     Socioeconomic History    Marital status: Single     Spouse name: Not on file    Number of children: 2    Years of education: Not on file    Highest education level: Not on file   Occupational History    Occupation: unemployed   Social Needs    Financial resource strain: Not on file    Food insecurity:     Worry: Not on file     Inability: Not on file   [employer] needs:     Medical: Not on file     Non-medical: Not on file   Tobacco Use    Smoking status: Current Every Day Smoker     Packs/day: 0.50     Years: 45.00     Pack years: 22.50     Types: Cigarettes    Smokeless tobacco: Never Used   Substance and Sexual Activity    Alcohol use: No     Alcohol/week: 0.0 oz    Drug use: Yes     Types: Marijuana    Sexual activity: Never   Lifestyle    Physical activity:     Days per week: Not on file     Minutes per session: Not on file    Stress: Not on file   Relationships    Social connections:     Talks on phone: Not on file     Gets together: Not on file     Attends Bahai service: Not on file     Active member of club or organization: Not on file     Attends meetings of clubs or organizations: Not on file     Relationship status: Not on file    Intimate partner violence:     Fear of current or ex partner: Not on file     Emotionally abused: Not on file     Physically abused: Not on file     Forced sexual activity: Not on file   Other Topics Concern    Not on file   Social History Narrative    Not on file     No current facility-administered medications for this encounter.       Current Outpatient Medications   Medication Sig Dispense Refill    sucralfate (CARAFATE) 1 GM tablet Take 1 tablet by mouth 4 times daily 120 tablet 0    pantoprazole (PROTONIX) 40 MG tablet Take 1 tablet by mouth daily 30 tablet 1    metoclopramide (REGLAN) 10 MG tablet Take 1 tablet by mouth every 8 hours as needed (vomiting) 30 tablet 0    umeclidinium-vilanterol (ANORO ELLIPTA) 62.5-25 MCG/INH AEPB inhaler Inhale 1 puff into the lungs daily 60 each 5    nicotine (NICODERM CQ) 14 MG/24HR Place 1 patch onto the skin daily 30 patch 3    albuterol sulfate HFA (PROAIR HFA) 108 (90 Base) MCG/ACT inhaler Inhale 2 puffs into the lungs every 6 hours as needed for Wheezing 1 Inhaler 3    guaiFENesin (MUCINEX) 600 MG extended release tablet Take 1 tablet by mouth 2 times daily 60 tablet 0    ipratropium-albuterol (DUONEB) 0.5-2.5 (3) MG/3ML SOLN nebulizer solution Inhale 3 mLs into the lungs every 6 hours as needed for Shortness of Breath 360 mL 0    ondansetron (ZOFRAN ODT) 4 MG disintegrating tablet Take 1 tablet by mouth every 8 hours as needed for Nausea 20 tablet 0    diazepam (VALIUM) 5 MG tablet Take 5 mg by mouth every 8 hours as needed for Anxiety        Allergies   Allergen Reactions    Bactrim [Sulfamethoxazole-Trimethoprim]      N/V    Codeine Itching and Nausea And Vomiting     GI upset & itching       REVIEW OF SYSTEMS  10 systems reviewed, pertinent positives per HPI otherwise noted to be negative. PHYSICAL EXAM  /78   Pulse 98   Temp 97.5 °F (36.4 °C) (Oral)   Resp 17   Ht 5' 3\" (1.6 m)   Wt 108 lb (49 kg)   SpO2 92%   Breastfeeding? No   BMI 19.13 kg/m²   GENERAL APPEARANCE: Awake and alert. Cooperative  HEAD: Normocephalic. Atraumatic. EYES: PERRL. EOM's grossly intact. ENT: Mucous membranes are dry  NECK: Supple. Non-tender  HEART: Tachycardia  LUNGS: Respirations unlabored. CTAB. Good air exchange  ABDOMEN: Soft. Non-distended. Mild diffuse abdominal tenderness to palpation. No masses. No organomegaly. No guarding or rebound. BACK:  No midline Tenderness. EXTREMITIES: No peripheral edema. Moves all extremities equally. All extremities neurovascularly intact. SKIN: Warm and dry. No acute rashes. NEUROLOGICAL: Alert and oriented. CN's 2-12 intact. No gross facial drooping. Strength 5/5, sensation intact. 2 plus DTR's in lower extremity bilaterally. PSYCHIATRIC: Normal mood and affect. LABS  I have reviewed all labs for this visit.    Results for orders placed or performed during the hospital encounter of 04/09/19   CBC   Result Value Ref Range    WBC 16.0 (H) 4.0 - 11.0 K/uL    RBC 5.50 (H) 4.00 - 5.20 M/uL    Hemoglobin 17.3 (H) 12.0 - 16.0 g/dL    Hematocrit 50.4 (H) 36.0 - 48.0 %    MCV 91.5 80.0 - 100.0 fL    MCH 31.5 26.0 - 34.0 pg    MCHC 34.4 31.0 - 36.0 g/dL    RDW 14.7 12.4 - 15.4 %    Platelets 390 366 - 760 K/uL    MPV 9.6 5.0 - 10.5 fL   Urine, Reflex to Culture   Result Value Ref Range    Color, UA DARK YELLOW (A) Straw/Yellow    Clarity, UA Clear Clear    Glucose, Ur Negative Negative mg/dL    Bilirubin Urine SMALL (A) Negative    Ketones, Urine 15 (A) Negative mg/dL    Specific Gravity, UA >=1.030 1.005 - 1.030    Blood, Urine TRACE-LYSED (A) Negative    pH, UA 6.0 5.0 - 8.0    Protein, UA >=300 (A) Negative mg/dL    Urobilinogen, Urine 0.2 <2.0 E.U./dL    Nitrite, Urine Negative Negative    Leukocyte Esterase, Urine Negative Negative    Microscopic Examination YES     Urine Reflex to Culture Not Indicated     Urine Type Not Specified    SPECIMEN REJECTION   Result Value Ref Range    Rejected Test cmp     Reason for Rejection see below    Comprehensive Metabolic Panel   Result Value Ref Range    Sodium 139 136 - 145 mmol/L    Potassium 4.0 3.5 - 5.1 mmol/L    Chloride 94 (L) 99 - 110 mmol/L    CO2 23 21 - 32 mmol/L    Anion Gap 22 (H) 3 - 16    Glucose 165 (H) 70 - 99 mg/dL    BUN 25 (H) 7 - 20 mg/dL    CREATININE 1.5 (H) 0.6 - 1.1 mg/dL    GFR Non-African American 35 (A) >60    GFR  43 (A) >60    Calcium 11.4 (H) 8.3 - 10.6 mg/dL    Total Protein 9.0 (H) 6.4 - 8.2 g/dL    Alb 5.2 (H) 3.4 - 5.0 g/dL    Albumin/Globulin Ratio 1.4 1.1 - 2.2    Total Bilirubin 0.3 0.0 - 1.0 mg/dL    Alkaline Phosphatase 111 40 - 129 U/L    ALT 14 10 - 40 U/L    AST 17 15 - 37 U/L    Globulin 3.8 g/dL   Lactic Acid, Plasma   Result Value Ref Range    Lactic Acid 2.5 (H) 0.4 - 2.0 mmol/L   Urine Drug Screen   Result Value Ref Range    Amphetamine Screen, Urine Neg Negative <1000ng/mL    Barbiturate Screen, Ur Neg Negative <200 ng/mL    Benzodiazepine Screen, Urine POSITIVE (A) Negative <200 ng/mL    Cannabinoid Scrn, Ur POSITIVE (A) Negative <50 ng/mL    Cocaine Metabolite Screen, Urine Neg Negative <300 ng/mL    Opiate Scrn, Ur Neg Negative <300 ng/mL    PCP Screen, Urine Neg Negative <25 ng/mL    Methadone Screen, Urine Neg Negative <300 ng/mL    Propoxyphene Scrn, Ur Neg Negative <300 ng/mL    pH, UA 6.0     Drug Screen Comment: see below     Oxycodone Urine Neg Negative <100 ng/ml   Magnesium   Result Value Ref Range    Magnesium 1.80 1.80 - 2.40 mg/dL   Microscopic Urinalysis   Result Value Ref Range    Casts 20-40 Hyaline (A) /LPF    CASTS 2 1-3 Waxy (A) /LPF    Mucus, UA 2+ (A) /LPF    WBC, UA 3-5 0 - 5 /HPF    RBC, UA 3-5 (A) 0 - 2 /HPF    Bacteria, UA 2+ (A) /HPF   EKG 12 Lead   Result Value Ref Range    Ventricular Rate 106 BPM    Atrial Rate 106 BPM    P-R Interval 118 ms    QRS Duration 76 ms    Q-T Interval 352 ms    QTc Calculation (Bazett) 467 ms    P Axis 79 degrees    R Axis 90 degrees    T Axis 80 degrees    Diagnosis       Sinus tachycardiaRightward axisBorderline ECGWhen compared with ECG of 26-FEB-2019 23:04,No significant change was found       The Ekg interpreted by me shows  sinus tachycardia, kfdx=079 with a rate of 106  Axis is   Right axis deviation  QTc is  normal  Intervals and Durations are unremarkable.       ST Segments: no acute change      RADIOLOGY    Ct Abdomen Pelvis Wo Contrast    Result Date: 4/9/2019  EXAMINATION: CT OF THE ABDOMEN AND PELVIS WITHOUT CONTRAST 4/9/2019 8:18 am TECHNIQUE: CT of the abdomen and pelvis was performed without the administration of intravenous contrast. Multiplanar reformatted images are provided for review. Dose modulation, iterative reconstruction, and/or weight based adjustment of the mA/kV was utilized to reduce the radiation dose to as low as reasonably achievable. COMPARISON: 01/25/2019 HISTORY: ORDERING SYSTEM PROVIDED HISTORY: ABDOMINAL PAIN TECHNOLOGIST PROVIDED HISTORY: Ordering Physician Provided Reason for Exam: vomiting back pain Acuity: Acute Type of Exam: Initial FINDINGS: Lower Chest: Inferior lung bases are clear. The heart size is normal. Organs: The liver is normal in appearance without worrisome focal lesion. The gallbladder is surgically absent. Small stable cyst again seen laterally off the lower pole of the left kidney. Kidneys and other abdominal organs otherwise appear normal. GI/Bowel: Moderate to large amount of ingested food and liquid in the stomach. Normal appendix containing some high-density material.  Normal terminal ileum. No bowel obstruction. Normal amount of stool. No evident diverticular disease. Pelvis: Uterus is surgically absent. Urinary bladder appears normal.  No free fluid or adenopathy. Peritoneum/Retroperitoneum: No mass, adenopathy, or ascites. Normal size of the aorta. Bones/Soft Tissues: No acute fracture or soft tissue abnormality. No destructive bony lesion. No acute intra-abdominal abnormality identified. Normal appendix. No bowel obstruction. No findings suggestive enteritis or colitis. Moderate amount of liquid in the stomach. Clinical gastritis could be present and inapparent radiographically. Xr Chest Portable    Result Date: 4/9/2019  EXAMINATION: SINGLE XRAY VIEW OF THE CHEST 4/9/2019 9:20 am COMPARISON: 05/14/2018 HISTORY: ORDERING SYSTEM PROVIDED HISTORY: pain TECHNOLOGIST PROVIDED HISTORY: Reason for exam:->pain Ordering Physician Provided Reason for Exam: pain Acuity: Acute Type of Exam: Initial FINDINGS: Status post left upper lobe wedge resection.   There is condition.          Emerson Hernández, DO  04/09/19 1347 [facility], DO  04/09/19 1123

## 2019-04-09 NOTE — H&P
Hospital Medicine History & Physical      PCP: No primary care provider on file. Date of Admission: 4/9/2019    Date of Service: Pt seen/examined on 4/9 and Admitted to Inpatient with expected LOS greater than two midnights due to medical therapy    Chief Complaint:  N/V, loose stools    History Of Present Illness:    60 y.o. female who presented to [facility] with one day hx of severe N/V, inability to eat, loose stools. Hx of similar in past, felt related to cyclic vomiting syndrome. On chronic Reglan. Feels very tired, worn out. Denies abd pain currently. Denies recent sick contacts. Workup in ED revealed signs severe vol depletion. Unable to obtain IV req femoral CVC placement.     Past Medical History:          Diagnosis Date    Anxiety     Asthma     Bipolar affective ([address]     Cancer ([facility] [address] 1984    Uterine    Cancer of lung ([facility] [address] 2014    CHF (congestive heart failure) ([address]     COPD (chronic obstructive pulmonary disease) (HCC)     Cyclic vomiting syndrome     with known cannabis abuse    Cysts of both kidneys     Depression     Fatty liver 09/06/12    by [facility] at [facility]    Gastritis 06/29/12    EGD at [place] [id number] MI (myocardial infarction) ([facility] [address] 2010    Panic attacks     Pneumonia     Pre-diabetes 04/13/13    A1c5.8%    Tricuspid regurgitation     06/26/2012 JUANJO at [facility] Structurally Normal Tricuspid Valve       Past Surgical History:          Procedure Laterality Date    CHOLECYSTECTOMY  2009    COLONOSCOPY  2001    COLONOSCOPY  2013    tubular adenoma    COLONOSCOPY  08/15/2017    ENDOSCOPY, COLON, DIAGNOSTIC      HERNIA REPAIR      HYSTERECTOMY  1985    With Bladder Mesh    INCONTINENCE SURGERY Left     2009 in [facility]      cancerous nodule removed left side    TONSILLECTOMY      UPPER GASTROINTESTINAL ENDOSCOPY  2013    gastritis    UPPER GASTROINTESTINAL ENDOSCOPY  10/03/2017    bx distal esophagus        Medications Prior to Admission:      Prior to Admission medications    Medication Sig Start Date End Date Taking? Authorizing Provider   sucralfate (CARAFATE) 1 GM tablet Take 1 tablet by mouth 4 times daily 1/27/19   Nicky Patel MD   pantoprazole (PROTONIX) 40 MG tablet Take 1 tablet by mouth daily 1/27/19   Nicky Patel MD   metoclopramide (REGLAN) 10 MG tablet Take 1 tablet by mouth every 8 hours as needed (vomiting) 7/18/18   Alcon Reyna MD   umeclidinium-vilanterol [facility] ELLIPTA) 62.5-25 MCG/INH AEPB inhaler Inhale 1 puff into the lungs daily 5/22/18   Travis Gipson MD   nicotine (NICODERM CQ) 14 MG/24HR Place 1 patch onto the skin daily 5/18/18   Kaveh Stock MD   albuterol sulfate HFA (PROAIR HFA) 108 (90 Base) MCG/ACT inhaler Inhale 2 puffs into the lungs every 6 hours as needed for Wheezing 4/21/18   MACEY Parkinson CNP   guaiFENesin (MUCINEX) 600 MG extended release tablet Take 1 tablet by mouth 2 times daily 4/21/18   MACEY Parkinson CNP   ipratropium-albuterol (DUONEB) 0.5-2.5 (3) MG/3ML SOLN nebulizer solution Inhale 3 mLs into the lungs every 6 hours as needed for Shortness of Breath 4/21/18   MACEY Parkinson CNP   ondansetron (ZOFRAN ODT) 4 MG disintegrating tablet Take 1 tablet by mouth every 8 hours as needed for Nausea 4/2/18   Norris Alvarez DO   diazepam (VALIUM) 5 MG tablet Take 5 mg by mouth every 8 hours as needed for Anxiety     Historical Provider, MD       Allergies:  Bactrim [sulfamethoxazole-trimethoprim] and Codeine    Social History:      The patient currently lives at home    TOBACCO:   reports that she has been smoking cigarettes. She has a 22.50 pack-year smoking history. She has never used smokeless tobacco.  ETOH:   reports that she does not drink alcohol.     Family History:      Reviewed in detail and Positive as follows:        Problem Relation Age of Onset    Heart Disease Father     Hypertension Father     High Blood Pressure Father     Heart Disease Mother 2+ 04/09/2019    RBCUA 3-5 04/09/2019    BLOODU TRACE-LYSED 04/09/2019    SPECGRAV >=1.030 04/09/2019    GLUCOSEU Negative 04/09/2019       Radiology:     XR CHEST PORTABLE   Final Result   1. No acute abnormality. CT ABDOMEN PELVIS WO CONTRAST   Final Result   No acute intra-abdominal abnormality identified. Normal appendix. No bowel   obstruction. No findings suggestive enteritis or colitis. Moderate amount   of liquid in the stomach. Clinical gastritis could be present and inapparent   radiographically. ASSESSMENT:    Active Hospital Problems    Diagnosis     Intractable vomiting with nausea [R11.2]     Hypercalcemia [E83.52]     Lactic acid acidosis [E87.2]     SIRS [facility]ic inflammatory response syndrome) ([facility]) [R65.10]     Hypotension [I95.9]     HANK (acute kidney injury) ([facility] [N17.9]      PLAN:    Obtain to med surg unit  AM labs    Check C diff toxin  CT A/P with liquid in stomach, no other acute process    Add sched IV Reglan, cont prn Zofran. S/P 2 L NS bolus, cont MIVF with LR at 125 ml/hr    May need GI eval    DVT Prophylaxis: Lovenox  Diet: DIET CLEAR LIQUID;  Code Status: Prior    PT/OT Eval Status: Hold for now    [address] [place] [address] in 2-3 days    Discussed with pt, RN       Pratik Bowens MD    Thank you No primary care provider on file. for the opportunity to be involved in this patient's care. If you have any questions or concerns please feel free to contact me at [phone].

## 2019-04-09 NOTE — PROGRESS NOTES
Here from ER. Bedside report received in ER. Oriented pt. to call light and bed alarm placed for safety. Vitals stable. Able to walk from stretcher to bed with standby assist. Call light and bedside table Iin place.

## 2019-04-09 NOTE — ED NOTES
Bed: 17  Expected date:   Expected time:   Means of arrival:   Comments:  armin Mckeon RN  04/09/19 [id number]

## 2019-04-09 NOTE — ED NOTES
Dr. India Rico at bedside placing a central line.      Emma Gouge, [place]  04/09/19 [id number]

## 2019-04-09 NOTE — ED NOTES
IV fluid not infusing due to problems with the IV hurting the patient.      [facility]  04/09/19 5857

## 2019-04-10 LAB
ANION GAP SERPL CALCULATED.3IONS-SCNC: 13 MMOL/L (ref 3–16)
BASOPHILS ABSOLUTE: 0.2 K/UL (ref 0–0.2)
BASOPHILS RELATIVE PERCENT: 1.4 %
BUN BLDV-MCNC: 12 MG/DL (ref 7–20)
C DIFFICILE TOXIN, EIA: NORMAL
CALCIUM SERPL-MCNC: 9.3 MG/DL (ref 8.3–10.6)
CHLORIDE BLD-SCNC: 102 MMOL/L (ref 99–110)
CLOSTRIDIUM DIFFICILE DNA AMPLIFICATION: NORMAL
CO2: 26 MMOL/L (ref 21–32)
CREAT SERPL-MCNC: 0.7 MG/DL (ref 0.6–1.1)
EOSINOPHILS ABSOLUTE: 0 K/UL (ref 0–0.6)
EOSINOPHILS RELATIVE PERCENT: 0.1 %
GFR AFRICAN AMERICAN: >60
GFR NON-AFRICAN AMERICAN: >60
GLUCOSE BLD-MCNC: 98 MG/DL (ref 70–99)
HCT VFR BLD CALC: 38.7 % (ref 36–48)
HEMOGLOBIN: 13.2 G/DL (ref 12–16)
LYMPHOCYTES ABSOLUTE: 3.4 K/UL (ref 1–5.1)
LYMPHOCYTES RELATIVE PERCENT: 25.8 %
MAGNESIUM: 1.5 MG/DL (ref 1.8–2.4)
MCH RBC QN AUTO: 31.6 PG (ref 26–34)
MCHC RBC AUTO-ENTMCNC: 34.1 G/DL (ref 31–36)
MCV RBC AUTO: 92.9 FL (ref 80–100)
MONOCYTES ABSOLUTE: 0.9 K/UL (ref 0–1.3)
MONOCYTES RELATIVE PERCENT: 6.6 %
NEUTROPHILS ABSOLUTE: 8.7 K/UL (ref 1.7–7.7)
NEUTROPHILS RELATIVE PERCENT: 66.1 %
PDW BLD-RTO: 15 % (ref 12.4–15.4)
PLATELET # BLD: 216 K/UL (ref 135–450)
PMV BLD AUTO: 9.3 FL (ref 5–10.5)
POTASSIUM REFLEX MAGNESIUM: 3.5 MMOL/L (ref 3.5–5.1)
RBC # BLD: 4.17 M/UL (ref 4–5.2)
SODIUM BLD-SCNC: 141 MMOL/L (ref 136–145)
WBC # BLD: 13.1 K/UL (ref 4–11)

## 2019-04-10 PROCEDURE — 96375 TX/PRO/DX INJ NEW DRUG ADDON: CPT

## 2019-04-10 PROCEDURE — 83735 ASSAY OF MAGNESIUM: CPT

## 2019-04-10 PROCEDURE — 96365 THER/PROPH/DIAG IV INF INIT: CPT

## 2019-04-10 PROCEDURE — 87449 NOS EACH ORGANISM AG IA: CPT

## 2019-04-10 PROCEDURE — 1200000000 HC SEMI PRIVATE

## 2019-04-10 PROCEDURE — 80048 BASIC METABOLIC PNL TOTAL CA: CPT

## 2019-04-10 PROCEDURE — 96361 HYDRATE IV INFUSION ADD-ON: CPT

## 2019-04-10 PROCEDURE — 96366 THER/PROPH/DIAG IV INF ADDON: CPT

## 2019-04-10 PROCEDURE — 6370000000 HC RX 637 (ALT 250 FOR IP): Performed by: HOSPITALIST

## 2019-04-10 PROCEDURE — 36592 COLLECT BLOOD FROM PICC: CPT

## 2019-04-10 PROCEDURE — 2580000003 HC RX 258: Performed by: HOSPITALIST

## 2019-04-10 PROCEDURE — 96367 TX/PROPH/DG ADDL SEQ IV INF: CPT

## 2019-04-10 PROCEDURE — 85025 COMPLETE CBC W/AUTO DIFF WBC: CPT

## 2019-04-10 PROCEDURE — 96376 TX/PRO/DX INJ SAME DRUG ADON: CPT

## 2019-04-10 PROCEDURE — 87324 CLOSTRIDIUM AG IA: CPT

## 2019-04-10 PROCEDURE — 2500000003 HC RX 250 WO HCPCS: Performed by: HOSPITALIST

## 2019-04-10 PROCEDURE — G0378 HOSPITAL OBSERVATION PER HR: HCPCS

## 2019-04-10 PROCEDURE — 6360000002 HC RX W HCPCS: Performed by: HOSPITALIST

## 2019-04-10 PROCEDURE — 2580000003 HC RX 258

## 2019-04-10 PROCEDURE — 94640 AIRWAY INHALATION TREATMENT: CPT

## 2019-04-10 PROCEDURE — 87493 C DIFF AMPLIFIED PROBE: CPT

## 2019-04-10 RX ORDER — POTASSIUM CHLORIDE 29.8 MG/ML
20 INJECTION INTRAVENOUS ONCE
Status: COMPLETED | OUTPATIENT
Start: 2019-04-10 | End: 2019-04-10

## 2019-04-10 RX ORDER — PROMETHAZINE HYDROCHLORIDE 25 MG/ML
25 INJECTION, SOLUTION INTRAMUSCULAR; INTRAVENOUS EVERY 4 HOURS PRN
Status: DISCONTINUED | OUTPATIENT
Start: 2019-04-10 | End: 2019-04-12 | Stop reason: HOSPADM

## 2019-04-10 RX ORDER — MAGNESIUM SULFATE IN WATER 40 MG/ML
2 INJECTION, SOLUTION INTRAVENOUS ONCE
Status: COMPLETED | OUTPATIENT
Start: 2019-04-10 | End: 2019-04-10

## 2019-04-10 RX ORDER — METOCLOPRAMIDE HYDROCHLORIDE 5 MG/ML
10 INJECTION INTRAMUSCULAR; INTRAVENOUS EVERY 6 HOURS
Status: DISCONTINUED | OUTPATIENT
Start: 2019-04-10 | End: 2019-04-12 | Stop reason: HOSPADM

## 2019-04-10 RX ORDER — SODIUM CHLORIDE 9 MG/ML
INJECTION, SOLUTION INTRAVENOUS
Status: COMPLETED
Start: 2019-04-10 | End: 2019-04-10

## 2019-04-10 RX ADMIN — METOCLOPRAMIDE HYDROCHLORIDE 5 MG: 5 INJECTION INTRAMUSCULAR; INTRAVENOUS at 11:24

## 2019-04-10 RX ADMIN — POTASSIUM CHLORIDE 20 MEQ: 29.8 INJECTION, SOLUTION INTRAVENOUS at 12:18

## 2019-04-10 RX ADMIN — GLYCOPYRROLATE AND FORMOTEROL FUMARATE 2 PUFF: 9; 4.8 AEROSOL, METERED RESPIRATORY (INHALATION) at 07:41

## 2019-04-10 RX ADMIN — ONDANSETRON 4 MG: 2 INJECTION INTRAMUSCULAR; INTRAVENOUS at 08:47

## 2019-04-10 RX ADMIN — ONDANSETRON 4 MG: 2 INJECTION INTRAMUSCULAR; INTRAVENOUS at 15:36

## 2019-04-10 RX ADMIN — Medication 10 ML: at 20:25

## 2019-04-10 RX ADMIN — SODIUM CHLORIDE, POTASSIUM CHLORIDE, SODIUM LACTATE AND CALCIUM CHLORIDE: 600; 310; 30; 20 INJECTION, SOLUTION INTRAVENOUS at 20:10

## 2019-04-10 RX ADMIN — PROMETHAZINE HYDROCHLORIDE 25 MG: 25 INJECTION INTRAMUSCULAR; INTRAVENOUS at 12:17

## 2019-04-10 RX ADMIN — MAGNESIUM SULFATE HEPTAHYDRATE 2 G: 40 INJECTION, SOLUTION INTRAVENOUS at 10:32

## 2019-04-10 RX ADMIN — SODIUM CHLORIDE, POTASSIUM CHLORIDE, SODIUM LACTATE AND CALCIUM CHLORIDE: 600; 310; 30; 20 INJECTION, SOLUTION INTRAVENOUS at 06:19

## 2019-04-10 RX ADMIN — METOCLOPRAMIDE 10 MG: 5 INJECTION, SOLUTION INTRAMUSCULAR; INTRAVENOUS at 23:21

## 2019-04-10 RX ADMIN — GLYCOPYRROLATE AND FORMOTEROL FUMARATE 2 PUFF: 9; 4.8 AEROSOL, METERED RESPIRATORY (INHALATION) at 19:21

## 2019-04-10 RX ADMIN — METRONIDAZOLE 500 MG: 500 INJECTION, SOLUTION INTRAVENOUS at 20:10

## 2019-04-10 RX ADMIN — SODIUM CHLORIDE: 900 INJECTION, SOLUTION INTRAVENOUS at 20:25

## 2019-04-10 RX ADMIN — METOCLOPRAMIDE 10 MG: 5 INJECTION, SOLUTION INTRAMUSCULAR; INTRAVENOUS at 17:28

## 2019-04-10 RX ADMIN — METRONIDAZOLE 500 MG: 500 INJECTION, SOLUTION INTRAVENOUS at 12:17

## 2019-04-10 RX ADMIN — Medication 10 ML: at 08:51

## 2019-04-10 RX ADMIN — PROMETHAZINE HYDROCHLORIDE 25 MG: 25 INJECTION INTRAMUSCULAR; INTRAVENOUS at 18:51

## 2019-04-10 RX ADMIN — METOCLOPRAMIDE HYDROCHLORIDE 5 MG: 5 INJECTION INTRAMUSCULAR; INTRAVENOUS at 06:19

## 2019-04-10 RX ADMIN — METOCLOPRAMIDE HYDROCHLORIDE 5 MG: 5 INJECTION INTRAMUSCULAR; INTRAVENOUS at 00:29

## 2019-04-10 NOTE — PROGRESS NOTES
Shift assessment completed. Pt A&O, VSS. Pt reporting nausea, PRN Zofran given per MAR. Pt dry heaving, offered crackers and a lemon lime soda. Bed alarm active for safety. Denies any needs at this time. Bed locked and in lowest position. Call light within reach. Will continue to monitor.

## 2019-04-10 NOTE — PROGRESS NOTES
Pt is alert and oriented. VSS. RA. Pt c/o 7/10 abdominal pain. Pt given one time scheduled does of IV pain medication. Pt denies nausea at this time. Femoral line intact, CDI. Mcgowan cath draining clear yellow urine. Shift assessment completed and documented. Call light within reach. Bed side table within reach. Wheels locked. Bed in lowest position. Bed check in place. Pt instructed to call out for assistance. Pt expressesed understanding & calls out appropritately. All care per orders. Will continue to monitor.  Electronically signed by Hailey Ramirez RN on 4/10/2019 at 12:57 AM

## 2019-04-10 NOTE — PROGRESS NOTES
Perfect serve sent to cross cover NP \"Pt is complaining of 7/10 abdominal pain. Pt does not have any pain medication ordered. Is it possible to get pain medication ordered for her? Thanks! \"     9692: New order placed for one time does of 1mg IV morphine.

## 2019-04-10 NOTE — PLAN OF CARE
Problem: Falls - Risk of:  Goal: Will remain free from falls  Description  Will remain free from falls  Outcome: Ongoing  Note:   Pt remains free from falls. Non skid socks on. Bed locked and in lowest position with alarm active for safety. Call light and bedside table within reach. Will continue to monitor.

## 2019-04-10 NOTE — CARE COORDINATION
CASE MANAGEMENT INITIAL ASSESSMENT      Reviewed chart and met with patient today, re: potential needs at discharge - triggered by multiple admissions    Explained Case Management role/services. Family present: no  Primary contact information: daughter, Fredy [address] date/status: inpatient 4/9/19  Diagnosis: intractable n/v    Insurance: [facility] [employer] required for SNF - Y        3 night stay required - N    Living arrangements, Adls, care needs, prior to admission: lives alone. Does not drive d/t not having a vehicle. indep in adls. Has \"MD to you\" for PCP (visits every 6-8 weeks per pt report)    Transportation: family    Durable Medical Equipment at home: cane prn. Previously documented that pt used oxygen. Pt denies using oxygen at this time. Services in the home and/or outpatient, prior to admission: MD to you for PCP/home visits    PT/OT recs: not ordered    Hospital Exemption Notification (HEN): not started, as pt is likely to discharge home    Barriers to discharge: none identified    Plan/comments: pt intends to discharge home without needs from case mgmt. Please consult case mgmt if needs arise. ** of note: provider documented \"no PCP\", as that is what is listed in patient's header in chart. Pt DOES have PCP - could not specify name of physician. States \"different ones every 6-8 weeks\". Per registration staff, Karlee Tom, there is no way to list a visiting physician company in pt header.      Alejandro Rivers RN DCP

## 2019-04-10 NOTE — CARE COORDINATION
Chart reviewed by  for potential needs at discharge - triggered by frequent admissions and pt not having a PCP. Multiple admissions for cyclic vomiting syndrome.

## 2019-04-10 NOTE — PLAN OF CARE
Pt will remain free from falls. Pt is a medium fall risk. Call light within reach. Bed side table within reach. Wheels locked. Bed in lowest position. Bed check in place. Pt instructed to call out for assistance. Pt expressesed understanding & calls out appropritately. All care per orders. Will continue to monitor.

## 2019-04-10 NOTE — PROGRESS NOTES
Hospitalist Progress Note      PCP: No primary care provider on file. Date of Admission: 4/9/2019    Subjective: Ongoing nausea, some abd pain. Appetite poor. Less diarrhea. Medications:  Reviewed    Infusion Medications    lactated ringers 125 mL/hr at 04/10/19 1901     Scheduled Medications    [START ON 4/11/2019] enoxaparin  40 mg Subcutaneous Daily    metroNIDAZOLE  500 mg Intravenous Q8H    metoclopramide  10 mg Intravenous Q6H    guaiFENesin  600 mg Oral BID    nicotine  1 patch Transdermal Daily    pantoprazole  40 mg Oral Daily    sucralfate  1 g Oral 4x Daily    glycopyrrolate-formoterol  2 puff Inhalation BID    sodium chloride flush  10 mL Intravenous 2 times per day     PRN Meds: promethazine, albuterol sulfate HFA, diazepam, ipratropium-albuterol, sodium chloride flush, magnesium hydroxide, ondansetron      Intake/Output Summary (Last 24 hours) at 4/10/2019 1325  Last data filed at 4/10/2019 0625  Gross per 24 hour   Intake 2118 ml   Output 1200 ml   Net 918 ml       Physical Exam Performed:    BP (!) 157/90   Pulse 101   Temp 98.2 °F (36.8 °C) (Oral)   Resp 18   Ht 5' 3\" (1.6 m)   Wt 108 lb (49 kg)   SpO2 95%   Breastfeeding? No   BMI 19.13 kg/m²     General appearance: No apparent distress, appears stated age and cooperative. HEENT: Pupils equal, round, and reactive to light. Conjunctivae/corneas clear. Neck: Supple, with full range of motion. No jugular venous distention. Trachea midline. Respiratory:  Normal respiratory effort. Clear to auscultation, bilaterally without Rales/Wheezes/Rhonchi. Cardiovascular: Regular rate and rhythm with normal S1/S2 without murmurs, rubs or gallops. Abdomen: Soft, non-tender, non-distended with normal bowel sounds. Musculoskeletal: No clubbing, cyanosis or edema bilaterally. Full range of motion without deformity. Skin: Skin color, texture, turgor normal.  No rashes or lesions.   Neurologic:  Neurovascularly intact without any ml/hr    Adv diet as tolerated    Ok to keep femoral CVC for today, plan to dc and place PIV in am    DVT Prophylaxis: Lovenox  Diet: DIET CLEAR LIQUID;  Code Status: Full Code    PT/OT Eval Status: Hold for now    [address] [place] [address] in 2-3 days    Discussed with pt, RN    J Carlos Edmonds MD

## 2019-04-11 LAB
ANION GAP SERPL CALCULATED.3IONS-SCNC: 13 MMOL/L (ref 3–16)
BUN BLDV-MCNC: 7 MG/DL (ref 7–20)
CALCIUM SERPL-MCNC: 9.4 MG/DL (ref 8.3–10.6)
CHLORIDE BLD-SCNC: 98 MMOL/L (ref 99–110)
CO2: 27 MMOL/L (ref 21–32)
CREAT SERPL-MCNC: 0.7 MG/DL (ref 0.6–1.1)
GFR AFRICAN AMERICAN: >60
GFR NON-AFRICAN AMERICAN: >60
GLUCOSE BLD-MCNC: 119 MG/DL (ref 70–99)
MAGNESIUM: 1.7 MG/DL (ref 1.8–2.4)
POTASSIUM SERPL-SCNC: 3.7 MMOL/L (ref 3.5–5.1)
SODIUM BLD-SCNC: 138 MMOL/L (ref 136–145)

## 2019-04-11 PROCEDURE — 96376 TX/PRO/DX INJ SAME DRUG ADON: CPT

## 2019-04-11 PROCEDURE — G0378 HOSPITAL OBSERVATION PER HR: HCPCS

## 2019-04-11 PROCEDURE — 80048 BASIC METABOLIC PNL TOTAL CA: CPT

## 2019-04-11 PROCEDURE — 96366 THER/PROPH/DIAG IV INF ADDON: CPT

## 2019-04-11 PROCEDURE — 1200000000 HC SEMI PRIVATE

## 2019-04-11 PROCEDURE — 36592 COLLECT BLOOD FROM PICC: CPT

## 2019-04-11 PROCEDURE — 2580000003 HC RX 258: Performed by: HOSPITALIST

## 2019-04-11 PROCEDURE — 6370000000 HC RX 637 (ALT 250 FOR IP): Performed by: INTERNAL MEDICINE

## 2019-04-11 PROCEDURE — 6370000000 HC RX 637 (ALT 250 FOR IP): Performed by: HOSPITALIST

## 2019-04-11 PROCEDURE — 96361 HYDRATE IV INFUSION ADD-ON: CPT

## 2019-04-11 PROCEDURE — 2500000003 HC RX 250 WO HCPCS: Performed by: HOSPITALIST

## 2019-04-11 PROCEDURE — 94640 AIRWAY INHALATION TREATMENT: CPT

## 2019-04-11 PROCEDURE — 6360000002 HC RX W HCPCS: Performed by: HOSPITALIST

## 2019-04-11 PROCEDURE — 83735 ASSAY OF MAGNESIUM: CPT

## 2019-04-11 RX ORDER — CYCLOBENZAPRINE HCL 10 MG
10 TABLET ORAL 3 TIMES DAILY PRN
Status: DISCONTINUED | OUTPATIENT
Start: 2019-04-11 | End: 2019-04-12 | Stop reason: HOSPADM

## 2019-04-11 RX ORDER — ACETAMINOPHEN 325 MG/1
650 TABLET ORAL EVERY 6 HOURS PRN
Status: DISCONTINUED | OUTPATIENT
Start: 2019-04-11 | End: 2019-04-12 | Stop reason: HOSPADM

## 2019-04-11 RX ORDER — MAGNESIUM SULFATE IN WATER 40 MG/ML
2 INJECTION, SOLUTION INTRAVENOUS ONCE
Status: COMPLETED | OUTPATIENT
Start: 2019-04-11 | End: 2019-04-11

## 2019-04-11 RX ADMIN — Medication 10 ML: at 20:05

## 2019-04-11 RX ADMIN — MAGNESIUM SULFATE HEPTAHYDRATE 2 G: 40 INJECTION, SOLUTION INTRAVENOUS at 09:34

## 2019-04-11 RX ADMIN — METOCLOPRAMIDE 10 MG: 5 INJECTION, SOLUTION INTRAMUSCULAR; INTRAVENOUS at 12:20

## 2019-04-11 RX ADMIN — GLYCOPYRROLATE AND FORMOTEROL FUMARATE 2 PUFF: 9; 4.8 AEROSOL, METERED RESPIRATORY (INHALATION) at 08:27

## 2019-04-11 RX ADMIN — METRONIDAZOLE 500 MG: 500 INJECTION, SOLUTION INTRAVENOUS at 04:25

## 2019-04-11 RX ADMIN — GLYCOPYRROLATE AND FORMOTEROL FUMARATE 2 PUFF: 9; 4.8 AEROSOL, METERED RESPIRATORY (INHALATION) at 19:33

## 2019-04-11 RX ADMIN — ACETAMINOPHEN 650 MG: 325 TABLET ORAL at 05:13

## 2019-04-11 RX ADMIN — GUAIFENESIN 600 MG: 600 TABLET, EXTENDED RELEASE ORAL at 20:03

## 2019-04-11 RX ADMIN — METOCLOPRAMIDE 10 MG: 5 INJECTION, SOLUTION INTRAMUSCULAR; INTRAVENOUS at 06:18

## 2019-04-11 RX ADMIN — METOCLOPRAMIDE 10 MG: 5 INJECTION, SOLUTION INTRAMUSCULAR; INTRAVENOUS at 23:03

## 2019-04-11 RX ADMIN — PROMETHAZINE HYDROCHLORIDE 25 MG: 25 INJECTION INTRAMUSCULAR; INTRAVENOUS at 04:25

## 2019-04-11 RX ADMIN — METOCLOPRAMIDE 10 MG: 5 INJECTION, SOLUTION INTRAMUSCULAR; INTRAVENOUS at 17:59

## 2019-04-11 ASSESSMENT — PAIN SCALES - GENERAL: PAINLEVEL_OUTOF10: 8

## 2019-04-11 NOTE — PLAN OF CARE
Problem: Falls - Risk of:  Goal: Will remain free from falls  Description  Will remain free from falls  4/11/2019 1010 by Kelly Ruiz RN  Outcome: Ongoing  Note:   Pt remains free from falls. Non skid socks on. Bed locked and in lowest position. Bed alarm active for safety. Call light and bedside table within reach. Will continue to monitor.

## 2019-04-11 NOTE — PROGRESS NOTES
Hospitalist Progress Note      PCP: No primary care provider on file. Date of Admission: 4/9/2019    Subjective: Tolerating liquids better. + nausea though better than prior days. Some low back pains. Medications:  Reviewed    Infusion Medications     Scheduled Medications    magnesium sulfate  2 g Intravenous Once    enoxaparin  40 mg Subcutaneous Daily    metoclopramide  10 mg Intravenous Q6H    guaiFENesin  600 mg Oral BID    nicotine  1 patch Transdermal Daily    pantoprazole  40 mg Oral Daily    sucralfate  1 g Oral 4x Daily    glycopyrrolate-formoterol  2 puff Inhalation BID    sodium chloride flush  10 mL Intravenous 2 times per day     PRN Meds: acetaminophen, promethazine, albuterol sulfate HFA, diazepam, ipratropium-albuterol, sodium chloride flush, magnesium hydroxide, ondansetron      Intake/Output Summary (Last 24 hours) at 4/11/2019 1039  Last data filed at 4/11/2019 0418  Gross per 24 hour   Intake 1847 ml   Output 2550 ml   Net -703 ml       Physical Exam Performed:    /68   Pulse 96   Temp 98 °F (36.7 °C) (Oral)   Resp 18   Ht 5' 3\" (1.6 m)   Wt 108 lb (49 kg)   SpO2 92%   Breastfeeding? No   BMI 19.13 kg/m²     General appearance: No apparent distress, appears stated age and cooperative. HEENT: Pupils equal, round, and reactive to light. Conjunctivae/corneas clear. Neck: Supple, with full range of motion. No jugular venous distention. Trachea midline. Respiratory:  Normal respiratory effort. Clear to auscultation, bilaterally without Rales/Wheezes/Rhonchi. Cardiovascular: Regular rate and rhythm with normal S1/S2 without murmurs, rubs or gallops. Abdomen: Soft, non-tender, non-distended with normal bowel sounds. Musculoskeletal: No clubbing, cyanosis or edema bilaterally. Full range of motion without deformity. Skin: Skin color, texture, turgor normal.  No rashes or lesions.   Neurologic:  Neurovascularly intact without any focal sensory/motor deficits. Psychiatric: Alert and oriented, thought content appropriate, normal insight  Capillary Refill: Brisk,< 3 seconds   Peripheral Pulses: +2 palpable, equal bilaterally       Labs:   Recent Labs     04/09/19  0748 04/10/19  0625   WBC 16.0* 13.1*   HGB 17.3* 13.2   HCT 50.4* 38.7    216     Recent Labs     04/09/19  0828 04/10/19  0625 04/11/19  0624    141 138   K 4.0 3.5 3.7   CL 94* 102 98*   CO2 23 26 27   BUN 25* 12 7   CREATININE 1.5* 0.7 0.7   CALCIUM 11.4* 9.3 9.4     Recent Labs     04/09/19  0828   AST 17   ALT 14   BILITOT 0.3   ALKPHOS 111     No results for input(s): INR in the last 72 hours. No results for input(s): Sonia Jubilee in the last 72 hours. Urinalysis:      Lab Results   Component Value Date    NITRU Negative 04/09/2019    WBCUA 3-5 04/09/2019    BACTERIA 2+ 04/09/2019    RBCUA 3-5 04/09/2019    BLOODU TRACE-LYSED 04/09/2019    SPECGRAV >=1.030 04/09/2019    GLUCOSEU Negative 04/09/2019       Radiology:  XR CHEST PORTABLE   Final Result   1. No acute abnormality. CT ABDOMEN PELVIS WO CONTRAST   Final Result   No acute intra-abdominal abnormality identified. Normal appendix. No bowel   obstruction. No findings suggestive enteritis or colitis. Moderate amount   of liquid in the stomach. Clinical gastritis could be present and inapparent   radiographically. Assessment/Plan:    Active Hospital Problems    Diagnosis     Intractable vomiting with nausea [R11.2]     Hypercalcemia [E83.52]     Lactic acid acidosis [E87.2]     SIRS (systemic inflammatory response syndrome) (HCC) [R65.10], signs of organ dysfunction though not related to SIRS     Hypotension [I95.9]     HANK (acute kidney injury) ([facility] [address] [N17.9]      Cx improving slowly. Hx of cyclic vomiting, pt notes increasing stress prior to this admit. CT A/P neg for acute process, though noted liquid in stomach.     C diff toxin indeterm, PCR returned neg, dc IV Flagyl     Replete Mg++    Cont IV Reglan 10 mg q6 hrs sched, along with prn IV Phenergan    DC MIVF today    Adv diet as tolerated, see how she does with gen diet today    Ok to keep femoral CVC for today. If pt needs hospitalization longer than today, will see about PICC line. Keep ang with the fem line.   Suspect dc home tomorrow and can dc both fem CVC and ang at that time    DVT Prophylaxis: Lovenox  Diet: DIET GENERAL;  Code Status: Full Code    PT/OT Eval Status: No need    Dispo - Home in 1-2 days    Discussed with pt, RN    Krish Velasquez MD

## 2019-04-11 NOTE — PROGRESS NOTES
Mcgowan catheter removed per MD order. Hat placed in toilet for recording. Will continue to monitor.

## 2019-04-11 NOTE — PROGRESS NOTES
Pt is alert and oriented. VSS. RA. Pt denies pain and discomfort at this time. Pt c/o nausea at this time. BS active x4. Shift assessment completed and documented. Call light within reach. Bed side table within reach. Wheels locked. Bed in lowest position. Bed check in place. Pt instructed to call out for assistance. Pt expressesed understanding & calls out appropritately. All care per orders. Will continue to monitor.  Electronically signed by Юлия Han RN on 4/10/2019 at 10:31 PM

## 2019-04-11 NOTE — PROGRESS NOTES
Perfect serve sent to cross cover MD \" Pt is complaining of a headache. Pt does not have any Tylenol ordered. Is it possible to get an order for Tylenol?  Thanks!\"     6797: New order placed for \"650 mg PRN every six hours\"

## 2019-04-12 VITALS
BODY MASS INDEX: 19.14 KG/M2 | OXYGEN SATURATION: 96 % | WEIGHT: 108 LBS | TEMPERATURE: 97.4 F | SYSTOLIC BLOOD PRESSURE: 97 MMHG | RESPIRATION RATE: 16 BRPM | HEART RATE: 93 BPM | HEIGHT: 63 IN | DIASTOLIC BLOOD PRESSURE: 60 MMHG

## 2019-04-12 PROCEDURE — 6370000000 HC RX 637 (ALT 250 FOR IP): Performed by: INTERNAL MEDICINE

## 2019-04-12 PROCEDURE — 6370000000 HC RX 637 (ALT 250 FOR IP): Performed by: HOSPITALIST

## 2019-04-12 PROCEDURE — 96376 TX/PRO/DX INJ SAME DRUG ADON: CPT

## 2019-04-12 PROCEDURE — 2580000003 HC RX 258: Performed by: HOSPITALIST

## 2019-04-12 PROCEDURE — 6360000002 HC RX W HCPCS: Performed by: HOSPITALIST

## 2019-04-12 PROCEDURE — G0378 HOSPITAL OBSERVATION PER HR: HCPCS

## 2019-04-12 RX ADMIN — Medication 10 ML: at 09:08

## 2019-04-12 RX ADMIN — METOCLOPRAMIDE 10 MG: 5 INJECTION, SOLUTION INTRAMUSCULAR; INTRAVENOUS at 06:22

## 2019-04-12 RX ADMIN — GLYCOPYRROLATE AND FORMOTEROL FUMARATE 2 PUFF: 9; 4.8 AEROSOL, METERED RESPIRATORY (INHALATION) at 08:01

## 2019-04-12 RX ADMIN — PANTOPRAZOLE SODIUM 40 MG: 40 TABLET, DELAYED RELEASE ORAL at 09:07

## 2019-04-12 RX ADMIN — METOCLOPRAMIDE 10 MG: 5 INJECTION, SOLUTION INTRAMUSCULAR; INTRAVENOUS at 14:01

## 2019-04-12 RX ADMIN — ACETAMINOPHEN 650 MG: 325 TABLET ORAL at 01:01

## 2019-04-12 RX ADMIN — GUAIFENESIN 600 MG: 600 TABLET, EXTENDED RELEASE ORAL at 09:07

## 2019-04-12 ASSESSMENT — PAIN DESCRIPTION - PROGRESSION
CLINICAL_PROGRESSION: GRADUALLY IMPROVING

## 2019-04-12 ASSESSMENT — PAIN SCALES - GENERAL
PAINLEVEL_OUTOF10: 7
PAINLEVEL_OUTOF10: 0

## 2019-04-12 NOTE — PROGRESS NOTES
Shift assessment completed. Pt A&O, VSS. Denies any needs at this time. Pt denies nausea. She states she is just [employer]. \" Pt's appetite is good. Bed locked and in lowest position, call light within reach, side rails up 2/4. Will continue to monitor.

## 2019-04-12 NOTE — DISCHARGE SUMMARY
Hospital Discharge Summary    Patient's PCP: No primary care provider on file. Admit Date: 4/9/2019   Discharge Date: 4/12/2019    Admitting Physician: Dr. Rohan Armas MD  Discharge Physician: Dr. Mone Urbina   Consults: none    HPI: 61 yo F admitted with intractable N/V, severe vol depletion. Brief hospital course:     Admitted with above, hx of similar. Improved slowly with IVF, sched IV Reglan, antiemetics. Hx of cyclic vomiting, pt notes increasing stress prior to this admit. CT A/P neg for acute process, though noted liquid in stomach. Plan to dc to home today on usual regimen. Discharge Diagnoses:   Patient Active Problem List   Diagnosis Code    COPD (chronic obstructive pulmonary disease) ([facility] [address] J44.9    Anxiety & depression F41.9    Intractable Nausea and vomiting R11.2    AGMA (anion gap metabolic acidosis) T91.5    HANK (acute kidney injury) ([facility] [address] N17.9    Leukocytosis D72.829    Erythrocytosis D75.1    Abdominal pain R10.9    Marijuana use F12.90    SIRS (systemic inflammatory response syndrome) (HCC) R65.10    Cannabinoid hyperemesis syndrome (HCC) F12.988    Chronic diastolic CHF (congestive heart failure) (HCC) I50.32    CAD (coronary artery disease) I25.10    Polysubstance abuse (HCC) R85.44    Cyclical vomiting with nausea G43. A0    Hx of uterine cancer C80.1    Bipolar affective (HCC) F31.9    Fatty liver K76.0    QTc-prolongation R94.31    Moderate malnutrition (HCC) E44.0    Chest pain R07.9    Acute respiratory failure (HCC) J96.00    Current smoker F17.200    Chronic allergic rhinitis J30.9    COPD exacerbation (HCC) J44.1    Pneumonitis J18.9    Hypotension I95.9    H/O: lung cancer Z85. 118    Sepsis ([facility] [address] A41.9    Acute cystitis with hematuria N30.01    Intractable vomiting with nausea R11.2    Hypercalcemia E83.52    Lactic acid acidosis E87.2    SIRS (systemic inflammatory response syndrome) (HCC) R65.10       Physical Exam: BP (!) 79/49   Pulse 82   Temp 97.8 °F (36.6 °C) (Oral)   Resp 16   Ht 5' 3\" (1.6 m)   Wt 108 lb (49 kg)   SpO2 94%   Breastfeeding? No   BMI 19.13 kg/m²     No results for input(s): POCGLU in the last 72 hours. General appearance: No apparent distress, appears stated age and cooperative. HEENT: Pupils equal, round, and reactive to light. Conjunctivae/corneas clear. Neck: Supple, with full range of motion. No jugular venous distention. Trachea midline. Respiratory:  Normal respiratory effort. Clear to auscultation, bilaterally without Rales/Wheezes/Rhonchi. Cardiovascular: Regular rate and rhythm with normal S1/S2 without murmurs, rubs or gallops. Abdomen: Soft, non-tender, non-distended with normal bowel sounds. Musculoskeletal: No clubbing, cyanosis or edema bilaterally. Full range of motion without deformity. Skin: Skin color, texture, turgor normal.  No rashes or lesions. Neurologic:  Neurovascularly intact without any focal sensory/motor deficits. Psychiatric: Alert and oriented, thought content appropriate, normal insight  Capillary Refill: Brisk,< 3 seconds   Peripheral Pulses: +2 palpable, equal bilaterally     LABS:  Recent Labs     04/10/19  0625   WBC 13.1*   HGB 13.2   HCT 38.7                                                                     Recent Labs     04/10/19  0625 04/11/19  0624    138   K 3.5 3.7    98*   CO2 26 27   BUN 12 7   CREATININE 0.7 0.7   GLUCOSE 98 119*     No results for input(s): INR in the last 72 hours.     Significant Diagnostic Studies: as above    Treatments: IV hydration    Discharge Medications:   Carlos Eduardo Arthur   Home Medication Instructions POO:[id number]    Printed on:04/12/19 1342   Medication Information                      albuterol sulfate HFA (PROAIR HFA) 108 (90 Base) MCG/ACT inhaler  Inhale 2 puffs into the lungs every 6 hours as needed for Wheezing             diazepam (VALIUM) 5 MG tablet  Take 5 mg by mouth every 8 hours as needed for Anxiety              guaiFENesin (MUCINEX) 600 MG extended release tablet  Take 1 tablet by mouth 2 times daily             ipratropium-albuterol (DUONEB) 0.5-2.5 (3) MG/3ML SOLN nebulizer solution  Inhale 3 mLs into the lungs every 6 hours as needed for Shortness of Breath             metoclopramide (REGLAN) 10 MG tablet  Take 1 tablet by mouth every 8 hours as needed (vomiting)             nicotine (NICODERM CQ) 14 MG/24HR  Place 1 patch onto the skin daily             ondansetron (ZOFRAN ODT) 4 MG disintegrating tablet  Take 1 tablet by mouth every 8 hours as needed for Nausea             pantoprazole (PROTONIX) 40 MG tablet  Take 1 tablet by mouth daily             sucralfate (CARAFATE) 1 GM tablet  Take 1 tablet by mouth 4 times daily             umeclidinium-vilanterol (ANORO ELLIPTA) 62.5-25 MCG/INH AEPB inhaler  Inhale 1 puff into the lungs daily                Activity: activity as tolerated  Diet: regular diet  Wound Care: none needed    Disposition: home  Discharged Condition: Stable  Follow Up: Primary Care Physician in one month    Total time spent on discharge, finalizing medications, referrals and arranging follow up was greater than 30 minutes. Thank you Dr. Thompson primary care provider on file. for the opportunity to be involved in this patients care. If you have any questions or concerns please feel free to contact me at [phone].      Dr Francisco Alvarez

## 2019-04-12 NOTE — PROGRESS NOTES
Nutrition Assessment    Type and Reason for Visit: Initial, Positive Nutrition Screen(MST 3, wt loss, poor po intake )    Nutrition Recommendations:   1. Continue general diet   2. Add ensure clear- apple flavor per pt preference   3. Monitor nutrition adequacy, pertinent labs, bowel habits, wt changes, and clinical progress    Nutrition Assessment: Nutritionally compromised AEB N/V on admission. Hx of cyclic vomitting syndrome. Eating well today at lunch meal, but otherwise intake varies based on whether pt feeling nauseated. She tolerated ensure clear ONS. Willing to have ordered. States RDs in the past have tried to get supplements delivered, but insurance has denied. Encouraged high protein, low fat foods. Malnutrition Assessment:  · Malnutrition Status: Meets the criteria for moderate malnutrition  · Context: Acute illness or injury  · Findings of the 6 clinical characteristics of malnutrition (Minimum of 2 out of 6 clinical characteristics is required to make the diagnosis of moderate or severe Protein Calorie Malnutrition based on AND/ASPEN Guidelines):  1. Energy Intake-Less than or equal to 75% of estimated energy requirement, Greater than or equal to 1 month    2. Weight Loss-5% loss or greater, in 1 month  3. Fat Loss-Mild subcutaneous fat loss, Triceps  4. Muscle Loss-Mild muscle mass loss, Temples (temporalis muscle)  5. Fluid Accumulation-Unable to assess,    6.  Strength-Not measured    Nutrition Risk Level:  Moderate    Nutrient Needs:  · Estimated Daily Total Kcal: 8040-2991  · Estimated Daily Protein (g): 49-59  · Estimated Daily Total Fluid (ml/day): 1 mL/kcal     Nutrition Diagnosis:   · Problem: Inadequate oral intake  · Etiology: related to Insufficient energy/nutrient consumption     Signs and symptoms:  as evidenced by Nausea, Vomiting    Objective Information:  · Nutrition-Focused Physical Findings: See malnutrition assessment   · Wound Type: None  · Current Nutrition Therapies:  · Oral Diet Orders: General   · Oral Diet intake: %(PO improving today )  · Oral Nutrition Supplement (ONS) Orders: None  · Anthropometric Measures:  · Ht: 5' 3\" (160 cm)   · Current Body Wt: 108 lb (49 kg)  · Usual Body Wt: 115 lb (52.2 kg)  · % Weight Change:   -7 lbs/ 6% wt loss x past month per pt report  · Ideal Body Wt: 115 lb (52.2 kg)  · BMI Classification: BMI 18.5 - 24.9 Normal Weight    Nutrition Interventions:   Continue current diet, Start ONS  Continued Inpatient Monitoring    Nutrition Evaluation:   · Evaluation: Goals set   · Goals: Tolerate diet and consume greater than 50% of meals and ONS this admission    · Monitoring: Nutrition Progression, Meal Intake, Supplement Intake, Diet Tolerance, Weight, Pertinent Labs      Electronically signed by Darrell Bowers.  Jennifer Ramírez RD, LD on 4/12/19 at 1:39 PM    Contact Number: [phone]

## 2019-04-12 NOTE — PLAN OF CARE
Pt ambulates without difficulty. Room is free from clutter. Bed locked in lowest position. Nonskid socks on. Call light within reach.

## 2020-01-31 ENCOUNTER — HOSPITAL ENCOUNTER (EMERGENCY)
Age: 60
Discharge: HOME | End: 2020-01-31
Attending: EMERGENCY MEDICINE
Payer: COMMERCIAL

## 2020-01-31 VITALS
TEMPERATURE: 96.9 F | OXYGEN SATURATION: 100 % | HEART RATE: 88 BPM | WEIGHT: 108 LBS | RESPIRATION RATE: 20 BRPM | DIASTOLIC BLOOD PRESSURE: 78 MMHG | BODY MASS INDEX: 19.13 KG/M2 | SYSTOLIC BLOOD PRESSURE: 120 MMHG

## 2020-01-31 LAB
A/G RATIO: 1.4 (ref 1.1–2.2)
ALBUMIN SERPL-MCNC: 5 G/DL (ref 3.4–5)
ALP BLD-CCNC: 114 U/L (ref 40–129)
ALT SERPL-CCNC: 17 U/L (ref 10–40)
ANION GAP SERPL CALCULATED.3IONS-SCNC: 16 MMOL/L (ref 3–16)
AST SERPL-CCNC: 21 U/L (ref 15–37)
BASOPHILS ABSOLUTE: 0 K/UL (ref 0–0.2)
BASOPHILS RELATIVE PERCENT: 0.4 %
BILIRUB SERPL-MCNC: <0.2 MG/DL (ref 0–1)
BUN BLDV-MCNC: 17 MG/DL (ref 7–20)
CALCIUM SERPL-MCNC: 10.7 MG/DL (ref 8.3–10.6)
CHLORIDE BLD-SCNC: 96 MMOL/L (ref 99–110)
CO2: 26 MMOL/L (ref 21–32)
CREAT SERPL-MCNC: 0.9 MG/DL (ref 0.6–1.2)
EOSINOPHILS ABSOLUTE: 0 K/UL (ref 0–0.6)
EOSINOPHILS RELATIVE PERCENT: 0.2 %
GFR AFRICAN AMERICAN: >60
GFR NON-AFRICAN AMERICAN: >60
GLOBULIN: 3.6 G/DL
GLUCOSE BLD-MCNC: 179 MG/DL (ref 70–99)
HCT VFR BLD CALC: 49.6 % (ref 36–48)
HEMOGLOBIN: 16.3 G/DL (ref 12–16)
LIPASE: 135 U/L (ref 13–60)
LYMPHOCYTES ABSOLUTE: 2 K/UL (ref 1–5.1)
LYMPHOCYTES RELATIVE PERCENT: 17.5 %
MCH RBC QN AUTO: 31.3 PG (ref 26–34)
MCHC RBC AUTO-ENTMCNC: 32.9 G/DL (ref 31–36)
MCV RBC AUTO: 95.1 FL (ref 80–100)
MONOCYTES ABSOLUTE: 0.2 K/UL (ref 0–1.3)
MONOCYTES RELATIVE PERCENT: 2.1 %
NEUTROPHILS ABSOLUTE: 9 K/UL (ref 1.7–7.7)
NEUTROPHILS RELATIVE PERCENT: 79.8 %
PDW BLD-RTO: 14.5 % (ref 12.4–15.4)
PLATELET # BLD: 297 K/UL (ref 135–450)
PMV BLD AUTO: 9.6 FL (ref 5–10.5)
POTASSIUM REFLEX MAGNESIUM: 4.5 MMOL/L (ref 3.5–5.1)
RBC # BLD: 5.22 M/UL (ref 4–5.2)
SODIUM BLD-SCNC: 138 MMOL/L (ref 136–145)
TOTAL PROTEIN: 8.6 G/DL (ref 6.4–8.2)
WBC # BLD: 11.3 K/UL (ref 4–11)

## 2020-01-31 PROCEDURE — 96375 TX/PRO/DX INJ NEW DRUG ADDON: CPT

## 2020-01-31 PROCEDURE — 2500000003 HC RX 250 WO HCPCS: Performed by: EMERGENCY MEDICINE

## 2020-01-31 PROCEDURE — 96374 THER/PROPH/DIAG INJ IV PUSH: CPT

## 2020-01-31 PROCEDURE — 93005 ELECTROCARDIOGRAM TRACING: CPT | Performed by: EMERGENCY MEDICINE

## 2020-01-31 PROCEDURE — 2580000003 HC RX 258: Performed by: EMERGENCY MEDICINE

## 2020-01-31 PROCEDURE — 80053 COMPREHEN METABOLIC PANEL: CPT

## 2020-01-31 PROCEDURE — 85025 COMPLETE CBC W/AUTO DIFF WBC: CPT

## 2020-01-31 PROCEDURE — 83690 ASSAY OF LIPASE: CPT

## 2020-01-31 PROCEDURE — 96372 THER/PROPH/DIAG INJ SC/IM: CPT

## 2020-01-31 PROCEDURE — 99284 EMERGENCY DEPT VISIT MOD MDM: CPT

## 2020-01-31 PROCEDURE — 6360000002 HC RX W HCPCS: Performed by: EMERGENCY MEDICINE

## 2020-01-31 RX ORDER — 0.9 % SODIUM CHLORIDE 0.9 %
1000 INTRAVENOUS SOLUTION INTRAVENOUS ONCE
Status: COMPLETED | OUTPATIENT
Start: 2020-01-31 | End: 2020-01-31

## 2020-01-31 RX ORDER — LORAZEPAM 2 MG/ML
1 INJECTION INTRAMUSCULAR ONCE
Status: COMPLETED | OUTPATIENT
Start: 2020-01-31 | End: 2020-01-31

## 2020-01-31 RX ORDER — HALOPERIDOL 5 MG/ML
5 INJECTION INTRAMUSCULAR ONCE
Status: COMPLETED | OUTPATIENT
Start: 2020-01-31 | End: 2020-01-31

## 2020-01-31 RX ADMIN — HALOPERIDOL LACTATE 5 MG: 5 INJECTION INTRAMUSCULAR at 20:10

## 2020-01-31 RX ADMIN — LORAZEPAM 1 MG: 2 INJECTION, SOLUTION INTRAMUSCULAR; INTRAVENOUS at 21:18

## 2020-01-31 RX ADMIN — SODIUM CHLORIDE 1000 ML: 9 INJECTION, SOLUTION INTRAVENOUS at 19:49

## 2020-01-31 RX ADMIN — FAMOTIDINE 20 MG: 10 INJECTION, SOLUTION INTRAVENOUS at 21:18

## 2020-01-31 ASSESSMENT — PAIN DESCRIPTION - LOCATION: LOCATION: ABDOMEN

## 2020-01-31 ASSESSMENT — PAIN SCALES - GENERAL: PAINLEVEL_OUTOF10: 8

## 2020-01-31 ASSESSMENT — PAIN DESCRIPTION - PAIN TYPE: TYPE: ACUTE PAIN

## 2020-02-01 LAB
EKG ATRIAL RATE: 65 BPM
EKG DIAGNOSIS: NORMAL
EKG P AXIS: 82 DEGREES
EKG P-R INTERVAL: 124 MS
EKG Q-T INTERVAL: 462 MS
EKG QRS DURATION: 74 MS
EKG QTC CALCULATION (BAZETT): 480 MS
EKG R AXIS: 88 DEGREES
EKG T AXIS: 79 DEGREES
EKG VENTRICULAR RATE: 65 BPM

## 2020-02-01 PROCEDURE — 93010 ELECTROCARDIOGRAM REPORT: CPT | Performed by: INTERNAL MEDICINE

## 2020-02-01 NOTE — ED PROVIDER NOTES
[address] [facility] [place]  ED  EMERGENCY DEPARTMENT ENCOUNTER      Pt Name: Joao Conley  MRN: [id number]  Armssusangfmadeleine 1960  Date of evaluation: 1/31/2020  Provider: Taz Guerrero MD    CHIEF COMPLAINT       Chief Complaint   Patient presents with    Nausea     throwing up since 0500, with abd pain, 4 mg zofran IM    Abdominal Pain         HISTORY OF PRESENT ILLNESS   (Location/Symptom, Timing/Onset, Context/Setting, Quality, Duration, Modifying Factors, Severity)  Note limiting factors. Joao Conley is a 60 y.o. female with past medical history of COPD, CHF, bipolar disorder, polysubstance abuse and history of cannabinoid hyperemesis syndrome with cyclic vomiting requiring multiple admissions here today for nausea and vomiting. Patient states for the last 24 to 48 hours she has had generalized cramping abdominal pain associated with nausea and emesis. States she cannot keep anything down. Has tried using oral Zofran, Phenergan, and even Phenergan suppositories but states this is not helping. Pain is moderate to severe. No alleviating factors. Notes similar symptoms in the past.  States she continues to smoke marijuana but has not had an episode of cyclic vomiting in almost a year    HPI    Nursing Notes were reviewed. REVIEW OF SYSTEMS    (2-9 systems for level 4, 10 or more for level 5)     Review of Systems    Please see HPI for pertinent positive and negative review of system findings. A full 10 system ROS was performed and otherwise negative.         PAST MEDICAL HISTORY     Past Medical History:   Diagnosis Date    Anxiety     Asthma     Bipolar affective ([address]     Cancer ([address] 1984    Uterine    Cancer of lung ([address] 2014    CHF (congestive heart failure) ([address]     COPD (chronic obstructive pulmonary disease) (HCC)     Cyclic vomiting syndrome     with known cannabis abuse    Cysts of both kidneys     Depression     Fatty liver 09/06/12    by [facility] at [facility]    Gastritis 06/29/12    EGD

## 2020-02-01 NOTE — ED NOTES
Pt has not had any emesis since she has been here; she states that she wants to be admitted because there is not anyone at her house.       Shari Hu RN  01/31/20 1290

## 2020-02-28 ENCOUNTER — APPOINTMENT (OUTPATIENT)
Dept: GENERAL RADIOLOGY | Age: 60
DRG: 683 | End: 2020-02-28
Payer: COMMERCIAL

## 2020-02-28 ENCOUNTER — HOSPITAL ENCOUNTER (INPATIENT)
Age: 60
LOS: 1 days | Discharge: HOME | DRG: 683 | End: 2020-02-29
Attending: EMERGENCY MEDICINE | Admitting: INTERNAL MEDICINE
Payer: COMMERCIAL

## 2020-02-28 LAB
A/G RATIO: 1.3 (ref 1.1–2.2)
ALBUMIN SERPL-MCNC: 5.5 G/DL (ref 3.4–5)
ALP BLD-CCNC: 131 U/L (ref 40–129)
ALT SERPL-CCNC: 23 U/L (ref 10–40)
ANION GAP SERPL CALCULATED.3IONS-SCNC: 26 MMOL/L (ref 3–16)
AST SERPL-CCNC: 23 U/L (ref 15–37)
BASOPHILS ABSOLUTE: 0.1 K/UL (ref 0–0.2)
BASOPHILS RELATIVE PERCENT: 0.7 %
BILIRUB SERPL-MCNC: 0.4 MG/DL (ref 0–1)
BUN BLDV-MCNC: 32 MG/DL (ref 7–20)
CALCIUM SERPL-MCNC: 11.9 MG/DL (ref 8.3–10.6)
CHLORIDE BLD-SCNC: 89 MMOL/L (ref 99–110)
CO2: 21 MMOL/L (ref 21–32)
CREAT SERPL-MCNC: 2.4 MG/DL (ref 0.6–1.2)
EOSINOPHILS ABSOLUTE: 0 K/UL (ref 0–0.6)
EOSINOPHILS RELATIVE PERCENT: 0.1 %
GFR AFRICAN AMERICAN: 25
GFR NON-AFRICAN AMERICAN: 21
GLOBULIN: 4.4 G/DL
GLUCOSE BLD-MCNC: 196 MG/DL (ref 70–99)
HCT VFR BLD CALC: 56.4 % (ref 36–48)
HEMOGLOBIN: 18.8 G/DL (ref 12–16)
LACTIC ACID: 5.3 MMOL/L (ref 0.4–2)
LIPASE: 16 U/L (ref 13–60)
LYMPHOCYTES ABSOLUTE: 1.6 K/UL (ref 1–5.1)
LYMPHOCYTES RELATIVE PERCENT: 9.3 %
MAGNESIUM: 2 MG/DL (ref 1.8–2.4)
MCH RBC QN AUTO: 31.1 PG (ref 26–34)
MCHC RBC AUTO-ENTMCNC: 33.3 G/DL (ref 31–36)
MCV RBC AUTO: 93.5 FL (ref 80–100)
MONOCYTES ABSOLUTE: 0.4 K/UL (ref 0–1.3)
MONOCYTES RELATIVE PERCENT: 2.1 %
NEUTROPHILS ABSOLUTE: 15.5 K/UL (ref 1.7–7.7)
NEUTROPHILS RELATIVE PERCENT: 87.8 %
PDW BLD-RTO: 14.7 % (ref 12.4–15.4)
PLATELET # BLD: 406 K/UL (ref 135–450)
PMV BLD AUTO: 10.2 FL (ref 5–10.5)
POTASSIUM SERPL-SCNC: 4.2 MMOL/L (ref 3.5–5.1)
RBC # BLD: 6.04 M/UL (ref 4–5.2)
SODIUM BLD-SCNC: 136 MMOL/L (ref 136–145)
TOTAL PROTEIN: 9.9 G/DL (ref 6.4–8.2)
TROPONIN: <0.01 NG/ML
WBC # BLD: 17.7 K/UL (ref 4–11)

## 2020-02-28 PROCEDURE — 83735 ASSAY OF MAGNESIUM: CPT

## 2020-02-28 PROCEDURE — 96372 THER/PROPH/DIAG INJ SC/IM: CPT

## 2020-02-28 PROCEDURE — 36415 COLL VENOUS BLD VENIPUNCTURE: CPT

## 2020-02-28 PROCEDURE — 85025 COMPLETE CBC W/AUTO DIFF WBC: CPT

## 2020-02-28 PROCEDURE — G0480 DRUG TEST DEF 1-7 CLASSES: HCPCS

## 2020-02-28 PROCEDURE — 83690 ASSAY OF LIPASE: CPT

## 2020-02-28 PROCEDURE — 74018 RADEX ABDOMEN 1 VIEW: CPT

## 2020-02-28 PROCEDURE — 96374 THER/PROPH/DIAG INJ IV PUSH: CPT

## 2020-02-28 PROCEDURE — 96361 HYDRATE IV INFUSION ADD-ON: CPT

## 2020-02-28 PROCEDURE — 99291 CRITICAL CARE FIRST HOUR: CPT

## 2020-02-28 PROCEDURE — 84484 ASSAY OF TROPONIN QUANT: CPT

## 2020-02-28 PROCEDURE — 80053 COMPREHEN METABOLIC PANEL: CPT

## 2020-02-28 PROCEDURE — 6360000002 HC RX W HCPCS: Performed by: EMERGENCY MEDICINE

## 2020-02-28 PROCEDURE — 2580000003 HC RX 258: Performed by: EMERGENCY MEDICINE

## 2020-02-28 PROCEDURE — 71045 X-RAY EXAM CHEST 1 VIEW: CPT

## 2020-02-28 PROCEDURE — 96375 TX/PRO/DX INJ NEW DRUG ADDON: CPT

## 2020-02-28 PROCEDURE — 93005 ELECTROCARDIOGRAM TRACING: CPT | Performed by: EMERGENCY MEDICINE

## 2020-02-28 PROCEDURE — 2500000003 HC RX 250 WO HCPCS: Performed by: EMERGENCY MEDICINE

## 2020-02-28 PROCEDURE — 83605 ASSAY OF LACTIC ACID: CPT

## 2020-02-28 RX ORDER — SODIUM CHLORIDE, SODIUM LACTATE, POTASSIUM CHLORIDE, AND CALCIUM CHLORIDE .6; .31; .03; .02 G/100ML; G/100ML; G/100ML; G/100ML
1000 INJECTION, SOLUTION INTRAVENOUS ONCE
Status: COMPLETED | OUTPATIENT
Start: 2020-02-28 | End: 2020-02-29

## 2020-02-28 RX ORDER — MORPHINE SULFATE 2 MG/ML
2 INJECTION, SOLUTION INTRAMUSCULAR; INTRAVENOUS ONCE
Status: COMPLETED | OUTPATIENT
Start: 2020-02-28 | End: 2020-02-28

## 2020-02-28 RX ORDER — PROMETHAZINE HYDROCHLORIDE 25 MG/ML
12.5 INJECTION, SOLUTION INTRAMUSCULAR; INTRAVENOUS ONCE
Status: COMPLETED | OUTPATIENT
Start: 2020-02-28 | End: 2020-02-28

## 2020-02-28 RX ADMIN — PROMETHAZINE HYDROCHLORIDE 12.5 MG: 25 INJECTION INTRAMUSCULAR; INTRAVENOUS at 23:29

## 2020-02-28 RX ADMIN — SODIUM CHLORIDE, POTASSIUM CHLORIDE, SODIUM LACTATE AND CALCIUM CHLORIDE 1000 ML: 600; 310; 30; 20 INJECTION, SOLUTION INTRAVENOUS at 23:29

## 2020-02-28 RX ADMIN — FAMOTIDINE 20 MG: 10 INJECTION, SOLUTION INTRAVENOUS at 23:30

## 2020-02-28 RX ADMIN — MORPHINE SULFATE 2 MG: 2 INJECTION, SOLUTION INTRAMUSCULAR; INTRAVENOUS at 23:30

## 2020-02-28 ASSESSMENT — PAIN DESCRIPTION - ORIENTATION: ORIENTATION: UPPER

## 2020-02-28 ASSESSMENT — PAIN SCALES - GENERAL
PAINLEVEL_OUTOF10: 8
PAINLEVEL_OUTOF10: 8

## 2020-02-28 ASSESSMENT — PAIN DESCRIPTION - LOCATION: LOCATION: ABDOMEN

## 2020-02-28 ASSESSMENT — PAIN DESCRIPTION - PAIN TYPE: TYPE: ACUTE PAIN

## 2020-02-29 VITALS
HEART RATE: 105 BPM | HEIGHT: 63 IN | RESPIRATION RATE: 15 BRPM | DIASTOLIC BLOOD PRESSURE: 66 MMHG | TEMPERATURE: 98.1 F | WEIGHT: 107 LBS | OXYGEN SATURATION: 92 % | SYSTOLIC BLOOD PRESSURE: 108 MMHG | BODY MASS INDEX: 18.96 KG/M2

## 2020-02-29 LAB
AMPHETAMINE SCREEN, URINE: ABNORMAL
ANION GAP SERPL CALCULATED.3IONS-SCNC: 14 MMOL/L (ref 3–16)
BARBITURATE SCREEN URINE: ABNORMAL
BENZODIAZEPINE SCREEN, URINE: POSITIVE
BILIRUBIN URINE: ABNORMAL
BLOOD, URINE: ABNORMAL
BUN BLDV-MCNC: 28 MG/DL (ref 7–20)
CALCIUM SERPL-MCNC: 9.9 MG/DL (ref 8.3–10.6)
CANNABINOID SCREEN URINE: POSITIVE
CHLORIDE BLD-SCNC: 96 MMOL/L (ref 99–110)
CLARITY: CLEAR
CO2: 27 MMOL/L (ref 21–32)
COCAINE METABOLITE SCREEN URINE: ABNORMAL
COLOR: YELLOW
CREAT SERPL-MCNC: 1.2 MG/DL (ref 0.6–1.2)
EKG ATRIAL RATE: 136 BPM
EKG DIAGNOSIS: NORMAL
EKG P AXIS: 78 DEGREES
EKG P-R INTERVAL: 124 MS
EKG Q-T INTERVAL: 304 MS
EKG QRS DURATION: 72 MS
EKG QTC CALCULATION (BAZETT): 457 MS
EKG R AXIS: 86 DEGREES
EKG T AXIS: 77 DEGREES
EKG VENTRICULAR RATE: 136 BPM
ETHANOL: NORMAL MG/DL (ref 0–0.08)
GFR AFRICAN AMERICAN: 55
GFR NON-AFRICAN AMERICAN: 46
GLUCOSE BLD-MCNC: 94 MG/DL (ref 70–99)
GLUCOSE URINE: NEGATIVE MG/DL
KETONES, URINE: ABNORMAL MG/DL
LACTIC ACID: 1.1 MMOL/L (ref 0.4–2)
LACTIC ACID: 2.2 MMOL/L (ref 0.4–2)
LEUKOCYTE ESTERASE, URINE: NEGATIVE
Lab: ABNORMAL
METHADONE SCREEN, URINE: ABNORMAL
MICROSCOPIC EXAMINATION: YES
NITRITE, URINE: NEGATIVE
OPIATE SCREEN URINE: POSITIVE
OXYCODONE URINE: ABNORMAL
PH UA: 6
PH UA: 6 (ref 5–8)
PHENCYCLIDINE SCREEN URINE: ABNORMAL
POTASSIUM SERPL-SCNC: 4.1 MMOL/L (ref 3.5–5.1)
PRO-BNP: 1723 PG/ML (ref 0–124)
PROPOXYPHENE SCREEN: ABNORMAL
PROTEIN UA: 30 MG/DL
RBC UA: NORMAL /HPF (ref 0–4)
SODIUM BLD-SCNC: 137 MMOL/L (ref 136–145)
SPECIFIC GRAVITY UA: 1.01 (ref 1–1.03)
URINE TYPE: ABNORMAL
UROBILINOGEN, URINE: 0.2 E.U./DL
WBC UA: NORMAL /HPF (ref 0–5)

## 2020-02-29 PROCEDURE — 83605 ASSAY OF LACTIC ACID: CPT

## 2020-02-29 PROCEDURE — 36415 COLL VENOUS BLD VENIPUNCTURE: CPT

## 2020-02-29 PROCEDURE — 2580000003 HC RX 258: Performed by: INTERNAL MEDICINE

## 2020-02-29 PROCEDURE — 6370000000 HC RX 637 (ALT 250 FOR IP): Performed by: INTERNAL MEDICINE

## 2020-02-29 PROCEDURE — 1200000000 HC SEMI PRIVATE

## 2020-02-29 PROCEDURE — G0378 HOSPITAL OBSERVATION PER HR: HCPCS

## 2020-02-29 PROCEDURE — 2580000003 HC RX 258: Performed by: EMERGENCY MEDICINE

## 2020-02-29 PROCEDURE — 83880 ASSAY OF NATRIURETIC PEPTIDE: CPT

## 2020-02-29 PROCEDURE — 80307 DRUG TEST PRSMV CHEM ANLYZR: CPT

## 2020-02-29 PROCEDURE — 6360000002 HC RX W HCPCS: Performed by: INTERNAL MEDICINE

## 2020-02-29 PROCEDURE — 81001 URINALYSIS AUTO W/SCOPE: CPT

## 2020-02-29 PROCEDURE — 96361 HYDRATE IV INFUSION ADD-ON: CPT

## 2020-02-29 PROCEDURE — 93010 ELECTROCARDIOGRAM REPORT: CPT | Performed by: INTERNAL MEDICINE

## 2020-02-29 PROCEDURE — 94640 AIRWAY INHALATION TREATMENT: CPT

## 2020-02-29 PROCEDURE — 96376 TX/PRO/DX INJ SAME DRUG ADON: CPT

## 2020-02-29 PROCEDURE — 80048 BASIC METABOLIC PNL TOTAL CA: CPT

## 2020-02-29 RX ORDER — SODIUM CHLORIDE 0.9 % (FLUSH) 0.9 %
10 SYRINGE (ML) INJECTION EVERY 12 HOURS SCHEDULED
Status: DISCONTINUED | OUTPATIENT
Start: 2020-02-29 | End: 2020-02-29 | Stop reason: HOSPADM

## 2020-02-29 RX ORDER — MAGNESIUM SULFATE IN WATER 40 MG/ML
2 INJECTION, SOLUTION INTRAVENOUS PRN
Status: DISCONTINUED | OUTPATIENT
Start: 2020-02-29 | End: 2020-02-29 | Stop reason: HOSPADM

## 2020-02-29 RX ORDER — ACETAMINOPHEN 325 MG/1
650 TABLET ORAL EVERY 6 HOURS PRN
Status: DISCONTINUED | OUTPATIENT
Start: 2020-02-29 | End: 2020-02-29 | Stop reason: HOSPADM

## 2020-02-29 RX ORDER — ONDANSETRON 2 MG/ML
4 INJECTION INTRAMUSCULAR; INTRAVENOUS EVERY 6 HOURS PRN
Status: DISCONTINUED | OUTPATIENT
Start: 2020-02-29 | End: 2020-02-29 | Stop reason: HOSPADM

## 2020-02-29 RX ORDER — POTASSIUM CHLORIDE 7.45 MG/ML
10 INJECTION INTRAVENOUS PRN
Status: DISCONTINUED | OUTPATIENT
Start: 2020-02-29 | End: 2020-02-29 | Stop reason: HOSPADM

## 2020-02-29 RX ORDER — POTASSIUM CHLORIDE 20 MEQ/1
40 TABLET, EXTENDED RELEASE ORAL PRN
Status: DISCONTINUED | OUTPATIENT
Start: 2020-02-29 | End: 2020-02-29 | Stop reason: HOSPADM

## 2020-02-29 RX ORDER — PROMETHAZINE HYDROCHLORIDE 25 MG/1
12.5 TABLET ORAL EVERY 6 HOURS PRN
Status: DISCONTINUED | OUTPATIENT
Start: 2020-02-29 | End: 2020-02-29 | Stop reason: HOSPADM

## 2020-02-29 RX ORDER — DIAZEPAM 5 MG/1
5 TABLET ORAL EVERY 8 HOURS PRN
Status: DISCONTINUED | OUTPATIENT
Start: 2020-02-29 | End: 2020-02-29 | Stop reason: HOSPADM

## 2020-02-29 RX ORDER — METOCLOPRAMIDE 10 MG/1
10 TABLET ORAL EVERY 8 HOURS PRN
Status: DISCONTINUED | OUTPATIENT
Start: 2020-02-29 | End: 2020-02-29 | Stop reason: HOSPADM

## 2020-02-29 RX ORDER — POLYETHYLENE GLYCOL 3350 17 G/17G
17 POWDER, FOR SOLUTION ORAL DAILY PRN
Status: DISCONTINUED | OUTPATIENT
Start: 2020-02-29 | End: 2020-02-29 | Stop reason: HOSPADM

## 2020-02-29 RX ORDER — MORPHINE SULFATE 2 MG/ML
2 INJECTION, SOLUTION INTRAMUSCULAR; INTRAVENOUS ONCE
Status: COMPLETED | OUTPATIENT
Start: 2020-02-29 | End: 2020-02-29

## 2020-02-29 RX ORDER — MORPHINE SULFATE 2 MG/ML
INJECTION, SOLUTION INTRAMUSCULAR; INTRAVENOUS
Status: DISCONTINUED
Start: 2020-02-29 | End: 2020-02-29 | Stop reason: HOSPADM

## 2020-02-29 RX ORDER — PANTOPRAZOLE SODIUM 40 MG/1
40 TABLET, DELAYED RELEASE ORAL DAILY
Status: DISCONTINUED | OUTPATIENT
Start: 2020-02-29 | End: 2020-02-29 | Stop reason: HOSPADM

## 2020-02-29 RX ORDER — ACETAMINOPHEN 650 MG/1
650 SUPPOSITORY RECTAL EVERY 6 HOURS PRN
Status: DISCONTINUED | OUTPATIENT
Start: 2020-02-29 | End: 2020-02-29 | Stop reason: HOSPADM

## 2020-02-29 RX ORDER — ALBUTEROL SULFATE 90 UG/1
2 AEROSOL, METERED RESPIRATORY (INHALATION) EVERY 6 HOURS PRN
Status: DISCONTINUED | OUTPATIENT
Start: 2020-02-29 | End: 2020-02-29 | Stop reason: HOSPADM

## 2020-02-29 RX ORDER — NICOTINE 21 MG/24HR
1 PATCH, TRANSDERMAL 24 HOURS TRANSDERMAL DAILY
Status: DISCONTINUED | OUTPATIENT
Start: 2020-02-29 | End: 2020-02-29 | Stop reason: HOSPADM

## 2020-02-29 RX ORDER — SODIUM CHLORIDE, SODIUM LACTATE, POTASSIUM CHLORIDE, AND CALCIUM CHLORIDE .6; .31; .03; .02 G/100ML; G/100ML; G/100ML; G/100ML
1000 INJECTION, SOLUTION INTRAVENOUS ONCE
Status: COMPLETED | OUTPATIENT
Start: 2020-02-29 | End: 2020-02-29

## 2020-02-29 RX ORDER — SODIUM CHLORIDE 0.9 % (FLUSH) 0.9 %
10 SYRINGE (ML) INJECTION PRN
Status: DISCONTINUED | OUTPATIENT
Start: 2020-02-29 | End: 2020-02-29 | Stop reason: HOSPADM

## 2020-02-29 RX ORDER — IPRATROPIUM BROMIDE AND ALBUTEROL SULFATE 2.5; .5 MG/3ML; MG/3ML
3 SOLUTION RESPIRATORY (INHALATION) EVERY 6 HOURS PRN
Status: DISCONTINUED | OUTPATIENT
Start: 2020-02-29 | End: 2020-02-29 | Stop reason: HOSPADM

## 2020-02-29 RX ORDER — SODIUM CHLORIDE 9 MG/ML
INJECTION, SOLUTION INTRAVENOUS CONTINUOUS
Status: DISCONTINUED | OUTPATIENT
Start: 2020-02-29 | End: 2020-02-29 | Stop reason: HOSPADM

## 2020-02-29 RX ORDER — SUCRALFATE 1 G/1
1 TABLET ORAL 4 TIMES DAILY
Status: DISCONTINUED | OUTPATIENT
Start: 2020-02-29 | End: 2020-02-29 | Stop reason: HOSPADM

## 2020-02-29 RX ADMIN — MORPHINE SULFATE 2 MG: 2 INJECTION, SOLUTION INTRAMUSCULAR; INTRAVENOUS at 05:22

## 2020-02-29 RX ADMIN — GLYCOPYRROLATE AND FORMOTEROL FUMARATE 2 PUFF: 9; 4.8 AEROSOL, METERED RESPIRATORY (INHALATION) at 12:14

## 2020-02-29 RX ADMIN — PANTOPRAZOLE SODIUM 40 MG: 40 TABLET, DELAYED RELEASE ORAL at 09:37

## 2020-02-29 RX ADMIN — Medication 10 ML: at 06:23

## 2020-02-29 RX ADMIN — SODIUM CHLORIDE: 9 INJECTION, SOLUTION INTRAVENOUS at 06:23

## 2020-02-29 RX ADMIN — SODIUM CHLORIDE, POTASSIUM CHLORIDE, SODIUM LACTATE AND CALCIUM CHLORIDE 1000 ML: 600; 310; 30; 20 INJECTION, SOLUTION INTRAVENOUS at 00:23

## 2020-02-29 ASSESSMENT — PAIN SCALES - GENERAL
PAINLEVEL_OUTOF10: 7
PAINLEVEL_OUTOF10: 8
PAINLEVEL_OUTOF10: 7

## 2020-02-29 ASSESSMENT — ENCOUNTER SYMPTOMS
ANAL BLEEDING: 0
DIARRHEA: 1
SORE THROAT: 1
BACK PAIN: 0
BLOOD IN STOOL: 0
SHORTNESS OF BREATH: 0
ABDOMINAL PAIN: 1
NAUSEA: 1
VOMITING: 1

## 2020-02-29 ASSESSMENT — PAIN DESCRIPTION - LOCATION: LOCATION: ABDOMEN

## 2020-02-29 ASSESSMENT — PAIN DESCRIPTION - PAIN TYPE: TYPE: ACUTE PAIN

## 2020-02-29 NOTE — DISCHARGE SUMMARY
Hospital Medicine Discharge Summary    Patient ID: Lola Salinas      Patient's PCP: Clara Syed RN    Admit Date: 2/28/2020     Discharge Date: 2/29/2020      Admitting Physician: Harrison Moore MD     Discharge Physician: Janis Salinas MD     Discharge Diagnoses: Active Hospital Problems    Diagnosis    Intractable Nausea and vomiting [R11.2]     Priority: High    Lactic acid acidosis [E87.2]    Bipolar affective ([facility] [F53.0]    Cyclical vomiting with nausea [R11.15]    Marijuana use [F12.90]    Leukocytosis [D72.829]    HANK (acute kidney injury) ([facility] [N17.9]    Anxiety & depression [F41.9]    COPD (chronic obstructive pulmonary disease) ([facility] [J44.9]       The patient was seen and examined on day of discharge and this discharge summary is in conjunction with any daily progress note from day of discharge. HPI :   60 y.o. female who presented to [facility] with nausea and vomiting  Patient presents to the ED with complaints of intractable nausea and vomiting x2 days, getting worse today. Brought into the ED via EMS. Also reports vague abdominal pain that is generalized. Similar episode back in April of last year. She continues to smoke marijuana and smokes cigarettes. Denies subjective fevers chills or rigors, denies diarrhea. Denies any chest pain or shortness of breath. Denies dysuria, urgency or frequency. She has required 3 admissions for similar complaints in 2019. Hospital Course:     Patient was admitted for intractable nausea vomiting which was likely due to cyclic vomiting syndrome due to marijuana use. UDS + cannbis, opiate and benzo. Improved with supportive care and was tolerating diet. Patient advised to quit marijuana use. She had  mild HANK and lactic acidosis on labs which is likely due to volume depletion which resolved with volume expansion with IVF. Small bilateral pleural effusions on admission CXR.  Pt  was asymptomatic with no shortness of breath cough or symptoms suggestive of pneumonia or pulm edema. Pro - BNP was mildly elevated. Patient remained stable and asymptomatic on room air. Patient reported that she has a visiting physician group that comes to her home and was advised that she should follow-up with them and will need an outpatient echo which she stated her physician will order. She was discharged home in stable condition. Physical Exam Performed:     /66   Pulse 105   Temp 98.1 °F (36.7 °C) (Oral)   Resp 15   Ht 5' 3\" (1.6 m)   Wt 107 lb (48.5 kg)   SpO2 92%   BMI 18.95 kg/m²       General appearance:  No apparent distress, appears stated age and cooperative. HEENT:  Normal cephalic, atraumatic without obvious deformity. Pupils equal, round, and reactive to light. Extra ocular muscles intact. Conjunctivae/corneas clear. Neck: Supple, with full range of motion. No jugular venous distention. Trachea midline. Respiratory:  Normal respiratory effort. Clear to auscultation, bilaterally without Rales/Wheezes/Rhonchi. Cardiovascular:  Regular rate and rhythm with normal S1/S2 without murmurs, rubs or gallops. Abdomen: Soft, non-tender, non-distended with normal bowel sounds. Musculoskeletal:  No clubbing, cyanosis or edema bilaterally. Skin: Skin color, texture, turgor normal.  No rashes or lesions. Neurologic:  Neurovascularly intact without any focal sensory/motor deficits. Cranial nerves: II-XII intact, grossly non-focal.  Psychiatric:  Alert and oriented, thought content appropriate, normal insight  Capillary Refill: Brisk,< 3 seconds   Peripheral Pulses: +2 palpable, equal bilaterally       Labs:  For convenience and continuity at follow-up the following most recent labs are provided:      CBC:    Lab Results   Component Value Date    WBC 17.7 02/28/2020    HGB 18.8 02/28/2020    HCT 56.4 02/28/2020     02/28/2020       Renal:    Lab Results   Component Value Date     02/29/2020    K 4.1 02/29/2020    K 4.5 01/31/2020    CL 96 02/29/2020    CO2 27 02/29/2020    BUN 28 02/29/2020    CREATININE 1.2 02/29/2020    CALCIUM 9.9 02/29/2020    PHOS 1.8 01/27/2019         Significant Diagnostic Studies    Radiology:   XR CHEST PORTABLE   Final Result   Bilateral effusions with associated airspace disease right greater than left. XR ABDOMEN (KUB) (SINGLE AP VIEW)   Preliminary Result   No acute intra-abdominal process.                 Consults:     IP CONSULT TO HOSPITALIST    Disposition: Home    Condition at Discharge: Stable    Discharge Instructions/Follow-up:      -  F/u with visiting physician for outpatient echo     Code Status:  Full Code     Activity: activity as tolerated    Diet: regular diet      Discharge Medications:     Discharge Medication List as of 2/29/2020  1:51 PM           Details   sucralfate (CARAFATE) 1 GM tablet Take 1 tablet by mouth 4 times daily, Disp-120 tablet, R-0Normal      pantoprazole (PROTONIX) 40 MG tablet Take 1 tablet by mouth daily, Disp-30 tablet, R-1Normal      metoclopramide (REGLAN) 10 MG tablet Take 1 tablet by mouth every 8 hours as needed (vomiting), Disp-30 tablet, R-0Print      umeclidinium-vilanterol (ANORO ELLIPTA) 62.5-25 MCG/INH AEPB inhaler Inhale 1 puff into the lungs daily, Disp-60 each, R-5Normal      nicotine (NICODERM CQ) 14 MG/24HR Place 1 patch onto the skin daily, Disp-30 patch, R-3Normal      albuterol sulfate HFA (PROAIR HFA) 108 (90 Base) MCG/ACT inhaler Inhale 2 puffs into the lungs every 6 hours as needed for Wheezing, Disp-1 Inhaler, R-3Normal      guaiFENesin (MUCINEX) 600 MG extended release tablet Take 1 tablet by mouth 2 times daily, Disp-60 tablet, R-0Normal      ipratropium-albuterol (DUONEB) 0.5-2.5 (3) MG/3ML SOLN nebulizer solution Inhale 3 mLs into the lungs every 6 hours as needed for Shortness of Breath, Disp-360 mL, R-0Normal      ondansetron (ZOFRAN ODT) 4 MG disintegrating tablet Take 1 tablet by mouth every 8 hours as

## 2020-02-29 NOTE — ED NOTES
Fluid bolus complete. Second lactic acid drawn and sent to lab.       Girma Carney RN  02/29/20 [id number]

## 2020-02-29 NOTE — ED NOTES
IV fluids continue to infuse. Pt in bed resting. No emesis noted at this time. Pt denies further needs at this time. Pt aware that staff needs a urine sample.       Julianna Nicole RN  02/29/20 2018

## 2020-02-29 NOTE — H&P
Hospital Medicine History & Physical      PCP: Orly Cortez RN    Date of Admission: 2/28/2020    Date of Service: Pt seen/examined on 2/29/2020 and Admitted to Inpatient with expected LOS greater than two midnights due to medical therapy. .    Chief Complaint: Nausea and vomiting      History Of Present Illness:     60 y.o. female who presented to [facility] with nausea and vomiting  Patient presents to the ED with complaints of intractable nausea and vomiting x2 days, getting worse today. Brought into the ED via EMS. Also reports vague abdominal pain that is generalized. Similar episode back in April of last year. She continues to smoke marijuana and smokes cigarettes. Denies subjective fevers chills or rigors, denies diarrhea. Denies any chest pain or shortness of breath. Denies dysuria, urgency or frequency. She has required 3 admissions for similar complaints in 2019.     Past Medical History:          Diagnosis Date    Anxiety     Asthma     Bipolar affective ([address]     Cancer ([address] 1984    Uterine    Cancer of lung ([address] 2014    CHF (congestive heart failure) ([address]     COPD (chronic obstructive pulmonary disease) (HCC)     Cyclic vomiting syndrome     with known cannabis abuse    Cysts of both kidneys     Depression     Fatty liver 09/06/12    by [facility] at [facility]    Gastritis 06/29/12    EGD at [place] [address] MI (myocardial infarction) ([address] 2010    Panic attacks     Pneumonia     Pre-diabetes 04/13/13    A1c5.8%    Tricuspid regurgitation     06/26/2012 JUANJO at [facility] Structurally Normal Tricuspid Valve       Past Surgical History:          Procedure Laterality Date    CHOLECYSTECTOMY  2009    COLONOSCOPY  2001    COLONOSCOPY  2013    tubular adenoma    COLONOSCOPY  08/15/2017    ENDOSCOPY, COLON, DIAGNOSTIC      HERNIA REPAIR      HYSTERECTOMY  1985    With Bladder Mesh    INCONTINENCE SURGERY Left     2009 [facility]    LUNG CANCER SURGERY      cancerous nodule removed left side    Status: Full code  PT/OT Eval Status: Ambulatory    Dispo -IP stay       Shikha Julien MD    Thank you Kenisha Chaudhari RN for the opportunity to be involved in this patient's care. If you have any questions or concerns please feel free to contact me at [phone].

## 2020-02-29 NOTE — ED NOTES
Patient states the morphine wore off and is requesting pain medication. MD paged for pain medication patient states tylenol will not help.       Lyndle Siemens, RN  02/29/20 8277

## 2020-02-29 NOTE — ED PROVIDER NOTES
[address] [place] [address]        Pt Name: Veronica Lebron  MRN: [id number]  [place] 1960  Date of evaluation: 2/28/2020  Provider: Montana Wasserman MD  PCP: Carly Eng RN      CHIEF COMPLAINT       Chief Complaint   Patient presents with    Emesis     Hx of cyclical vomiting. Pt began with N/V yesterday, worse today. HISTORY OFPRESENT ILLNESS   (Location/Symptom, Timing/Onset, Context/Setting, Quality, Duration, Modifying Factors,Severity)  Note limiting factors. Veronica Lebron is a 60 y.o. female presenting today due to concern for persistent vomiting since yesterday along with diarrhea and upper abdominal pain. No blood in the stool or vomit. She does state that they have abdominal pain in the upper abdomen does radiate into her chest.  She denies any shortness of breath. No fever. She has a moderate headache similar to prior headaches. Since nothing was helping at home with the vomiting, she came to the emergency department for further evaluation. She denies any syncope or falls although she does feel very lightheaded. No numbness or weakness on the right versus the left but she does have generalized weakness. No urinary complaints. REVIEW OF SYSTEMS    (2-9 systems for level 4, 10 or more for level 5)     Review of Systems   Constitutional: Positive for fatigue. Negative for chills and fever. HENT: Positive for sore throat. Negative for congestion. Respiratory: Negative for shortness of breath. Cardiovascular: Positive for chest pain. Gastrointestinal: Positive for abdominal pain, diarrhea, nausea and vomiting. Negative for anal bleeding and blood in stool. Genitourinary: Negative for flank pain. Musculoskeletal: Positive for gait problem. Negative for back pain and neck pain. Skin: Negative for wound. Neurological: Positive for weakness (generalized), light-headedness and headaches (moderate, not worst of life).  Negative for syncope. Psychiatric/Behavioral: Negative for confusion. Positives and Pertinent negatives as per HPI.       PASTMEDICAL HISTORY     Past Medical History:   Diagnosis Date    Anxiety     Asthma     [facility]     Cancer ([facility] 1984    Uterine    Cancer of lung ([facility] 2014    CHF (congestive heart failure) (HCC)     COPD (chronic obstructive pulmonary disease) (HCC)     Cyclic vomiting syndrome     with known cannabis abuse    Cysts of both kidneys     Depression     [facility] liver 09/06/12    by [facility]    Gastritis 06/29/12    EGD at [place] [address] MI (myocardial infarction) ([facility] 2010    Panic attacks     Pneumonia     Pre-diabetes 04/13/13    A1c5.8%    Tricuspid regurgitation     06/26/2012 [facility]ANJO at [facility] Structurally Normal Tricuspid Valve         SURGICAL HISTORY       Past Surgical History:   Procedure Laterality Date    CHOLECYSTECTOMY  2009    COLONOSCOPY  2001    COLONOSCOPY  2013    tubular adenoma    COLONOSCOPY  08/15/2017    ENDOSCOPY, COLON, DIAGNOSTIC      HERNIA REPAIR      HYSTERECTOMY  1985    With Bladder Mesh    INCONTINENCE SURGERY Left     2009 [facility]      cancerous nodule removed left side    TONSILLECTOMY      UPPER GASTROINTESTINAL ENDOSCOPY  2013    gastritis    UPPER GASTROINTESTINAL ENDOSCOPY  10/03/2017    bx distal esophagus          CURRENT MEDICATIONS       Discharge Medication List as of 2/29/2020  1:51 PM      CONTINUE these medications which have NOT CHANGED    Details   sucralfate (CARAFATE) 1 GM tablet Take 1 tablet by mouth 4 times daily, Disp-120 tablet, R-0Normal      pantoprazole (PROTONIX) 40 MG tablet Take 1 tablet by mouth daily, Disp-30 tablet, R-1Normal      metoclopramide (REGLAN) 10 MG tablet Take 1 tablet by mouth every 8 hours as needed (vomiting), Disp-30 tablet, R-0Print      umeclidinium-vilanterol (ANORO ELLIPTA) 62.5-25 MCG/INH AEPB inhaler Inhale 1 puff into the lungs daily, Disp-60 each, R-5Normal nicotine (NICODERM CQ) 14 MG/24HR Place 1 patch onto the skin daily, Disp-30 patch, R-3Normal      albuterol sulfate HFA (PROAIR HFA) 108 (90 Base) MCG/ACT inhaler Inhale 2 puffs into the lungs every 6 hours as needed for Wheezing, Disp-1 Inhaler, R-3Normal      guaiFENesin (MUCINEX) 600 MG extended release tablet Take 1 tablet by mouth 2 times daily, Disp-60 tablet, R-0Normal      ipratropium-albuterol (DUONEB) 0.5-2.5 (3) MG/3ML SOLN nebulizer solution Inhale 3 mLs into the lungs every 6 hours as needed for Shortness of Breath, Disp-360 mL, R-0Normal      ondansetron (ZOFRAN ODT) 4 MG disintegrating tablet Take 1 tablet by mouth every 8 hours as needed for Nausea, Disp-20 tablet, R-0Print      diazepam (VALIUM) 5 MG tablet Take 5 mg by mouth every 8 hours as needed for Anxiety              ALLERGIES     Bactrim [sulfamethoxazole-trimethoprim] and Codeine    FAMILY HISTORY       Family History   Problem Relation Age of Onset    Heart Disease Father     Hypertension Father     High Blood Pressure Father     Heart Disease Mother     Diabetes Maternal Grandmother     Cancer Son         NHL    High Blood Pressure Son           SOCIAL HISTORY       Social History     Socioeconomic History    Marital status: Single     Spouse name: None    Number of children: 2    Years of education: None    Highest education level: None   Occupational History    Occupation: unemployed   Social Needs    Financial resource strain: None    Food insecurity:     Worry: None     Inability: None    Transportation needs:     Medical: None     Non-medical: None   Tobacco Use    Smoking status: Current Every Day Smoker     Packs/day: 0.25     Years: 45.00     Pack years: 11.25     Types: Cigarettes    Smokeless tobacco: Never Used   Substance and Sexual Activity    Alcohol use: No     Alcohol/week: 0.0 standard drinks    Drug use: Yes     Types: Marijuana     Comment: last used 2/21/2020    Sexual activity: Never   Lifestyle  Physical activity:     Days per week: None     Minutes per session: None    Stress: None   Relationships    Social connections:     Talks on phone: None     Gets together: None     Attends Baptism service: None     Active member of club or organization: None     Attends meetings of clubs or organizations: None     Relationship status: None    Intimate partner violence:     Fear of current or ex partner: None     Emotionally abused: None     Physically abused: None     Forced sexual activity: None   Other Topics Concern    None   Social History Narrative    None       SCREENINGS    Gonzalo Coma Scale  Eye Opening: Spontaneous  Best Verbal Response: Oriented  Best Motor Response: Obeys commands  Gonzalo Coma Scale Score: 15           PHYSICAL EXAM    (up to 7 for level 4, 8 or more for level 5)     ED Triage Vitals   BP Temp Temp src Pulse Resp SpO2 Height Weight   -- -- -- -- -- -- -- --       Physical Exam  Vitals signs and nursing note reviewed. Constitutional:       General: She is in acute distress (severe). Appearance: She is well-developed. She is ill-appearing. She is not toxic-appearing or diaphoretic. HENT:      Head: Normocephalic and atraumatic. Right Ear: External ear normal.      Left Ear: External ear normal.      Nose: Nose normal.      Mouth/Throat:      Mouth: Mucous membranes are dry. Pharynx: Posterior oropharyngeal erythema present. No oropharyngeal exudate. Eyes:      General:         Right eye: No discharge. Left eye: No discharge. Pupils: Pupils are equal, round, and reactive to light. Neck:      Musculoskeletal: Full passive range of motion without pain, normal range of motion and neck supple. Normal range of motion. No edema, erythema or neck rigidity. Trachea: No tracheal deviation. Cardiovascular:      Rate and Rhythm: Regular rhythm. Tachycardia present. Pulses: Normal pulses.    Pulmonary:      Effort: Pulmonary effort is normal. No accessory muscle usage or respiratory distress. Breath sounds: Normal breath sounds. No stridor. No decreased breath sounds, wheezing, rhonchi or rales. Chest:      Chest wall: No tenderness. Abdominal:      General: Abdomen is flat. Bowel sounds are normal. There is no distension. Palpations: Abdomen is soft. Abdomen is not rigid. Tenderness: There is abdominal tenderness (moderate) in the epigastric area. There is no guarding or rebound. Negative signs include Sin's sign and McBurney's sign. Musculoskeletal: Normal range of motion. General: No swelling, tenderness, deformity or signs of injury. Skin:     General: Skin is warm and dry. Coloration: Skin is not pale. Findings: No erythema or rash. Neurological:      General: No focal deficit present. Mental Status: She is alert and oriented to person, place, and time. Mental status is at baseline. GCS: GCS eye subscore is 4. GCS verbal subscore is 5. GCS motor subscore is 6. Sensory: No sensory deficit. Motor: No weakness, tremor, atrophy, abnormal muscle tone or seizure activity. Psychiatric:         Mood and Affect: Mood is anxious. Behavior: Behavior is cooperative.              DIAGNOSTIC RESULTS   :    Labs Reviewed   CBC WITH AUTO DIFFERENTIAL - Abnormal; Notable for the following components:       Result Value    WBC 17.7 (*)     RBC 6.04 (*)     Hemoglobin 18.8 (*)     Hematocrit 56.4 (*)     Neutrophils Absolute 15.5 (*)     All other components within normal limits    Narrative:     Performed at:  [address]   Phone [phone]   COMPREHENSIVE METABOLIC PANEL - Abnormal; Notable for the following components:    Chloride 89 (*)     Anion Gap 26 (*)     Glucose 196 (*)     BUN 32 (*)     CREATININE 2.4 (*)     GFR Non- 21 (*)     GFR  25 (*)     Calcium 11.9 (*)     Total radiologist. Angle Rowan images are visualized and preliminarily interpreted by the  ED Provider with the belowfindings:        Interpretation per the Radiologist below, if available at the time of this note:    XR CHEST PORTABLE   Final Result   Bilateral effusions with associated airspace disease right greater than left. XR ABDOMEN (KUB) (SINGLE AP VIEW)   Preliminary Result   No acute intra-abdominal process. PROCEDURES   Unless otherwise noted below, none     Procedures    CRITICAL CARE TIME   Time: at least 40 minutes   Includes examinations, lab interpretation, charting, IV hydration due to concern for severe dehydration and lactic acidosis with acute kidney injury    Excludes separate billable procedures. Patient at risk for serious decompensation if not treated for this life-threatening condition.           CONSULTS:  IP CONSULT TO HOSPITALIST    EMERGENCY DEPARTMENT COURSE and DIFFERENTIAL DIAGNOSIS/MDM:   Vitals:    Vitals:    02/29/20 0934 02/29/20 0944 02/29/20 1336 02/29/20 1338   BP: 108/72  108/66    Pulse:  104 105    Resp: 18  15    Temp: 98.3 °F (36.8 °C)  98.1 °F (36.7 °C)    TempSrc: Oral  Oral    SpO2:  91% 93% 92%   Weight:       Height:           Patient was given the following medications:  Medications   albuterol sulfate  (90 Base) MCG/ACT inhaler 2 puff (has no administration in time range)   diazePAM (VALIUM) tablet 5 mg (has no administration in time range)   ipratropium-albuterol (DUONEB) nebulizer solution 3 mL (has no administration in time range)   metoclopramide (REGLAN) tablet 10 mg (has no administration in time range)   nicotine (NICODERM CQ) 14 MG/24HR 1 patch (1 patch Transdermal Not Given 2/29/20 0937)   pantoprazole (PROTONIX) tablet 40 mg (40 mg Oral Given 2/29/20 0937)   sucralfate (CARAFATE) tablet 1 g (1 g Oral Not Given 2/29/20 0937)   glycopyrrolate-formoterol (BEVESPI) 9-4.8 MCG/ACT inhaler 2 puff (2 puffs Inhalation Given 2/29/20 fluids. She had significant lactic acidosis but at this time I do not believe it is related to severe sepsis but more likely from severe dehydration and no concern for infection at this time. Abdominal exam other than some upper abdominal discomfort was overall benign. No reported blood in the vomit. She will need further evaluation in the hospital with IV hydration. At this time, I do not feel that a CT of the abdomen and pelvis is necessary although if pain persist, she may need a CT in the hospital.  Story not suggestive of bowel obstruction. She was stable for the floor with telemetry and lactic acid did improved after initial IV fluids were given. Story is not suggestive of ischemic bowel and she denied any blood in the stool. The patient tolerated their visit well. The patient and / or the family were informed of the results of any tests, a time was given to answer questions. FINAL IMPRESSION      1. Acute kidney injury ([address]    2. Lactic acidosis    3. Nausea vomiting and diarrhea    4. Chest discomfort    5.  Dehydration          DISPOSITION/PLAN   DISPOSITION Admitted 02/29/2020 12:48:47 AM      PATIENT REFERRED TO:  Dolly Chawla RN            DISCHARGEMEDICATIONS:  Discharge Medication List as of 2/29/2020  1:51 PM          DISCONTINUED MEDICATIONS:  Discharge Medication List as of 2/29/2020  1:51 PM                 (Please note that portions of this note were completed with a voicerecognition program.  Efforts were made to edit the dictations but occasionally words are mis-transcribed.)    Valerie Soriano MD (electronically signed)            Valerie Soriano MD  02/29/20 0675

## 2020-06-06 ENCOUNTER — APPOINTMENT (OUTPATIENT)
Dept: GENERAL RADIOLOGY | Age: 60
DRG: 682 | End: 2020-06-06
Payer: COMMERCIAL

## 2020-06-06 ENCOUNTER — HOSPITAL ENCOUNTER (INPATIENT)
Age: 60
LOS: 3 days | Discharge: HOME HEALTH | DRG: 682 | End: 2020-06-09
Attending: EMERGENCY MEDICINE | Admitting: PEDIATRICS
Payer: COMMERCIAL

## 2020-06-06 LAB
A/G RATIO: 1.2 (ref 1.1–2.2)
ALBUMIN SERPL-MCNC: 5 G/DL (ref 3.4–5)
ALP BLD-CCNC: 121 U/L (ref 40–129)
ALT SERPL-CCNC: 16 U/L (ref 10–40)
ANION GAP SERPL CALCULATED.3IONS-SCNC: 19 MMOL/L (ref 3–16)
AST SERPL-CCNC: 25 U/L (ref 15–37)
BASOPHILS ABSOLUTE: 0.1 K/UL (ref 0–0.2)
BASOPHILS RELATIVE PERCENT: 0.5 %
BILIRUB SERPL-MCNC: 1 MG/DL (ref 0–1)
BUN BLDV-MCNC: 105 MG/DL (ref 7–20)
CALCIUM SERPL-MCNC: 10.7 MG/DL (ref 8.3–10.6)
CHLORIDE BLD-SCNC: 83 MMOL/L (ref 99–110)
CO2: 27 MMOL/L (ref 21–32)
CREAT SERPL-MCNC: 3.2 MG/DL (ref 0.6–1.2)
EOSINOPHILS ABSOLUTE: 0 K/UL (ref 0–0.6)
EOSINOPHILS RELATIVE PERCENT: 0.1 %
GFR AFRICAN AMERICAN: 18
GFR NON-AFRICAN AMERICAN: 15
GLOBULIN: 4.1 G/DL
GLUCOSE BLD-MCNC: 120 MG/DL (ref 70–99)
HCT VFR BLD CALC: 60 % (ref 36–48)
HEMATOLOGY PATH CONSULT: YES
HEMOGLOBIN: 20.4 G/DL (ref 12–16)
LACTIC ACID, SEPSIS: 2.1 MMOL/L (ref 0.4–1.9)
LIPASE: 23 U/L (ref 13–60)
LYMPHOCYTES ABSOLUTE: 2.9 K/UL (ref 1–5.1)
LYMPHOCYTES RELATIVE PERCENT: 25.5 %
MCH RBC QN AUTO: 31.6 PG (ref 26–34)
MCHC RBC AUTO-ENTMCNC: 34 G/DL (ref 31–36)
MCV RBC AUTO: 92.9 FL (ref 80–100)
MONOCYTES ABSOLUTE: 0.8 K/UL (ref 0–1.3)
MONOCYTES RELATIVE PERCENT: 6.9 %
NEUTROPHILS ABSOLUTE: 7.7 K/UL (ref 1.7–7.7)
NEUTROPHILS RELATIVE PERCENT: 67 %
PDW BLD-RTO: 14 % (ref 12.4–15.4)
PLATELET # BLD: 281 K/UL (ref 135–450)
PMV BLD AUTO: 10 FL (ref 5–10.5)
POTASSIUM SERPL-SCNC: 3.5 MMOL/L (ref 3.5–5.1)
RBC # BLD: 6.46 M/UL (ref 4–5.2)
SLIDE REVIEW: ABNORMAL
SODIUM BLD-SCNC: 129 MMOL/L (ref 136–145)
TOTAL PROTEIN: 9.1 G/DL (ref 6.4–8.2)
WBC # BLD: 11.5 K/UL (ref 4–11)

## 2020-06-06 PROCEDURE — 2580000003 HC RX 258: Performed by: EMERGENCY MEDICINE

## 2020-06-06 PROCEDURE — 71045 X-RAY EXAM CHEST 1 VIEW: CPT

## 2020-06-06 PROCEDURE — 83690 ASSAY OF LIPASE: CPT

## 2020-06-06 PROCEDURE — C9113 INJ PANTOPRAZOLE SODIUM, VIA: HCPCS | Performed by: EMERGENCY MEDICINE

## 2020-06-06 PROCEDURE — 2500000003 HC RX 250 WO HCPCS: Performed by: EMERGENCY MEDICINE

## 2020-06-06 PROCEDURE — 93005 ELECTROCARDIOGRAM TRACING: CPT | Performed by: EMERGENCY MEDICINE

## 2020-06-06 PROCEDURE — C9113 INJ PANTOPRAZOLE SODIUM, VIA: HCPCS

## 2020-06-06 PROCEDURE — 83605 ASSAY OF LACTIC ACID: CPT

## 2020-06-06 PROCEDURE — 6360000002 HC RX W HCPCS

## 2020-06-06 PROCEDURE — 99285 EMERGENCY DEPT VISIT HI MDM: CPT

## 2020-06-06 PROCEDURE — 96375 TX/PRO/DX INJ NEW DRUG ADDON: CPT

## 2020-06-06 PROCEDURE — 96374 THER/PROPH/DIAG INJ IV PUSH: CPT

## 2020-06-06 PROCEDURE — 81001 URINALYSIS AUTO W/SCOPE: CPT

## 2020-06-06 PROCEDURE — 1200000000 HC SEMI PRIVATE

## 2020-06-06 PROCEDURE — 6360000002 HC RX W HCPCS: Performed by: EMERGENCY MEDICINE

## 2020-06-06 PROCEDURE — 85025 COMPLETE CBC W/AUTO DIFF WBC: CPT

## 2020-06-06 PROCEDURE — 80053 COMPREHEN METABOLIC PANEL: CPT

## 2020-06-06 PROCEDURE — 80307 DRUG TEST PRSMV CHEM ANLYZR: CPT

## 2020-06-06 RX ORDER — SODIUM CHLORIDE, SODIUM LACTATE, POTASSIUM CHLORIDE, CALCIUM CHLORIDE 600; 310; 30; 20 MG/100ML; MG/100ML; MG/100ML; MG/100ML
1000 INJECTION, SOLUTION INTRAVENOUS CONTINUOUS
Status: DISCONTINUED | OUTPATIENT
Start: 2020-06-06 | End: 2020-06-06

## 2020-06-06 RX ORDER — PANTOPRAZOLE SODIUM 40 MG/10ML
40 INJECTION, POWDER, LYOPHILIZED, FOR SOLUTION INTRAVENOUS ONCE
Status: DISCONTINUED | OUTPATIENT
Start: 2020-06-06 | End: 2020-06-06

## 2020-06-06 RX ORDER — PANTOPRAZOLE SODIUM 40 MG/10ML
INJECTION, POWDER, LYOPHILIZED, FOR SOLUTION INTRAVENOUS
Status: COMPLETED
Start: 2020-06-06 | End: 2020-06-06

## 2020-06-06 RX ORDER — 0.9 % SODIUM CHLORIDE 0.9 %
1000 INTRAVENOUS SOLUTION INTRAVENOUS ONCE
Status: COMPLETED | OUTPATIENT
Start: 2020-06-06 | End: 2020-06-07

## 2020-06-06 RX ORDER — PROMETHAZINE HYDROCHLORIDE 25 MG/ML
25 INJECTION, SOLUTION INTRAMUSCULAR; INTRAVENOUS ONCE
Status: COMPLETED | OUTPATIENT
Start: 2020-06-06 | End: 2020-06-06

## 2020-06-06 RX ORDER — SODIUM CHLORIDE, SODIUM LACTATE, POTASSIUM CHLORIDE, CALCIUM CHLORIDE 600; 310; 30; 20 MG/100ML; MG/100ML; MG/100ML; MG/100ML
1000 INJECTION, SOLUTION INTRAVENOUS CONTINUOUS
Status: DISCONTINUED | OUTPATIENT
Start: 2020-06-06 | End: 2020-06-08

## 2020-06-06 RX ORDER — 0.9 % SODIUM CHLORIDE 0.9 %
1000 INTRAVENOUS SOLUTION INTRAVENOUS ONCE
Status: DISCONTINUED | OUTPATIENT
Start: 2020-06-06 | End: 2020-06-06

## 2020-06-06 RX ADMIN — SODIUM CHLORIDE 1000 ML: 9 INJECTION, SOLUTION INTRAVENOUS at 21:42

## 2020-06-06 RX ADMIN — PANTOPRAZOLE SODIUM 40 MG: 40 INJECTION, POWDER, FOR SOLUTION INTRAVENOUS at 23:21

## 2020-06-06 RX ADMIN — FAMOTIDINE 20 MG: 10 INJECTION, SOLUTION INTRAVENOUS at 21:42

## 2020-06-06 RX ADMIN — PROMETHAZINE HYDROCHLORIDE 25 MG: 25 INJECTION INTRAMUSCULAR; INTRAVENOUS at 21:42

## 2020-06-06 RX ADMIN — CEFTRIAXONE SODIUM 1 G: 1 INJECTION, POWDER, FOR SOLUTION INTRAMUSCULAR; INTRAVENOUS at 23:27

## 2020-06-06 RX ADMIN — PANTOPRAZOLE SODIUM 40 MG: 40 INJECTION, POWDER, FOR SOLUTION INTRAVENOUS at 23:18

## 2020-06-06 RX ADMIN — SODIUM CHLORIDE, POTASSIUM CHLORIDE, SODIUM LACTATE AND CALCIUM CHLORIDE 1000 ML: 600; 310; 30; 20 INJECTION, SOLUTION INTRAVENOUS at 23:25

## 2020-06-06 ASSESSMENT — PAIN DESCRIPTION - ORIENTATION: ORIENTATION: MID

## 2020-06-06 ASSESSMENT — PAIN DESCRIPTION - LOCATION: LOCATION: ABDOMEN

## 2020-06-06 ASSESSMENT — PAIN DESCRIPTION - FREQUENCY: FREQUENCY: CONTINUOUS

## 2020-06-06 ASSESSMENT — PAIN DESCRIPTION - PAIN TYPE: TYPE: ACUTE PAIN

## 2020-06-06 ASSESSMENT — PAIN DESCRIPTION - ONSET: ONSET: ON-GOING

## 2020-06-06 ASSESSMENT — PAIN SCALES - GENERAL: PAINLEVEL_OUTOF10: 8

## 2020-06-06 ASSESSMENT — PAIN DESCRIPTION - DESCRIPTORS: DESCRIPTORS: CONSTANT

## 2020-06-06 ASSESSMENT — PAIN DESCRIPTION - PROGRESSION: CLINICAL_PROGRESSION: GRADUALLY WORSENING

## 2020-06-07 ENCOUNTER — APPOINTMENT (OUTPATIENT)
Dept: INTERVENTIONAL RADIOLOGY/VASCULAR | Age: 60
DRG: 682 | End: 2020-06-07
Payer: COMMERCIAL

## 2020-06-07 LAB
ABO/RH: NORMAL
AMPHETAMINE SCREEN, URINE: ABNORMAL
ANION GAP SERPL CALCULATED.3IONS-SCNC: 10 MMOL/L (ref 3–16)
ANION GAP SERPL CALCULATED.3IONS-SCNC: 18 MMOL/L (ref 3–16)
ANTIBODY SCREEN: NORMAL
BACTERIA: ABNORMAL /HPF
BARBITURATE SCREEN URINE: ABNORMAL
BENZODIAZEPINE SCREEN, URINE: POSITIVE
BILIRUBIN URINE: NEGATIVE
BLOOD, URINE: ABNORMAL
BUN BLDV-MCNC: 62 MG/DL (ref 7–20)
BUN BLDV-MCNC: 86 MG/DL (ref 7–20)
CALCIUM SERPL-MCNC: 8.9 MG/DL (ref 8.3–10.6)
CALCIUM SERPL-MCNC: 9.6 MG/DL (ref 8.3–10.6)
CANNABINOID SCREEN URINE: POSITIVE
CHLORIDE BLD-SCNC: 93 MMOL/L (ref 99–110)
CHLORIDE BLD-SCNC: 96 MMOL/L (ref 99–110)
CLARITY: CLEAR
CO2: 21 MMOL/L (ref 21–32)
CO2: 28 MMOL/L (ref 21–32)
COCAINE METABOLITE SCREEN URINE: ABNORMAL
COLOR: YELLOW
CREAT SERPL-MCNC: 1.6 MG/DL (ref 0.6–1.2)
CREAT SERPL-MCNC: 2.3 MG/DL (ref 0.6–1.2)
EKG ATRIAL RATE: 107 BPM
EKG DIAGNOSIS: NORMAL
EKG P AXIS: 72 DEGREES
EKG P-R INTERVAL: 128 MS
EKG Q-T INTERVAL: 386 MS
EKG QRS DURATION: 76 MS
EKG QTC CALCULATION (BAZETT): 515 MS
EKG R AXIS: 84 DEGREES
EKG T AXIS: 79 DEGREES
EKG VENTRICULAR RATE: 107 BPM
EPITHELIAL CELLS, UA: ABNORMAL /HPF (ref 0–5)
GFR AFRICAN AMERICAN: 26
GFR AFRICAN AMERICAN: 40
GFR NON-AFRICAN AMERICAN: 22
GFR NON-AFRICAN AMERICAN: 33
GLUCOSE BLD-MCNC: 147 MG/DL (ref 70–99)
GLUCOSE BLD-MCNC: 80 MG/DL (ref 70–99)
GLUCOSE URINE: NEGATIVE MG/DL
HCT VFR BLD CALC: 55.6 % (ref 36–48)
HEMOGLOBIN: 18.3 G/DL (ref 12–16)
HYALINE CASTS: >40 /LPF (ref 0–2)
KETONES, URINE: NEGATIVE MG/DL
LACTIC ACID, SEPSIS: 1.9 MMOL/L (ref 0.4–1.9)
LACTIC ACID, SEPSIS: 2.4 MMOL/L (ref 0.4–1.9)
LEUKOCYTE ESTERASE, URINE: NEGATIVE
Lab: ABNORMAL
MCH RBC QN AUTO: 31 PG (ref 26–34)
MCHC RBC AUTO-ENTMCNC: 32.8 G/DL (ref 31–36)
MCV RBC AUTO: 94.3 FL (ref 80–100)
METHADONE SCREEN, URINE: ABNORMAL
MICROSCOPIC EXAMINATION: YES
NITRITE, URINE: NEGATIVE
OPIATE SCREEN URINE: ABNORMAL
OXYCODONE URINE: ABNORMAL
PDW BLD-RTO: 14.1 % (ref 12.4–15.4)
PH UA: 5.5
PH UA: 5.5 (ref 5–8)
PHENCYCLIDINE SCREEN URINE: ABNORMAL
PLATELET # BLD: 200 K/UL (ref 135–450)
PMV BLD AUTO: 10.6 FL (ref 5–10.5)
POTASSIUM REFLEX MAGNESIUM: 3.8 MMOL/L (ref 3.5–5.1)
POTASSIUM REFLEX MAGNESIUM: 4.2 MMOL/L (ref 3.5–5.1)
PROPOXYPHENE SCREEN: ABNORMAL
PROTEIN UA: ABNORMAL MG/DL
RBC # BLD: 5.89 M/UL (ref 4–5.2)
RBC UA: ABNORMAL /HPF (ref 0–4)
SODIUM BLD-SCNC: 132 MMOL/L (ref 136–145)
SODIUM BLD-SCNC: 134 MMOL/L (ref 136–145)
SPECIFIC GRAVITY UA: 1.02 (ref 1–1.03)
URINE REFLEX TO CULTURE: ABNORMAL
URINE TYPE: ABNORMAL
UROBILINOGEN, URINE: 0.2 E.U./DL
WBC # BLD: 10.4 K/UL (ref 4–11)
WBC UA: ABNORMAL /HPF (ref 0–5)

## 2020-06-07 PROCEDURE — 51702 INSERT TEMP BLADDER CATH: CPT

## 2020-06-07 PROCEDURE — 6360000002 HC RX W HCPCS: Performed by: PEDIATRICS

## 2020-06-07 PROCEDURE — 86850 RBC ANTIBODY SCREEN: CPT

## 2020-06-07 PROCEDURE — 93010 ELECTROCARDIOGRAM REPORT: CPT | Performed by: INTERNAL MEDICINE

## 2020-06-07 PROCEDURE — 36573 INSJ PICC RS&I 5 YR+: CPT

## 2020-06-07 PROCEDURE — 2580000003 HC RX 258: Performed by: PEDIATRICS

## 2020-06-07 PROCEDURE — 6370000000 HC RX 637 (ALT 250 FOR IP): Performed by: PEDIATRICS

## 2020-06-07 PROCEDURE — 85027 COMPLETE CBC AUTOMATED: CPT

## 2020-06-07 PROCEDURE — 83605 ASSAY OF LACTIC ACID: CPT

## 2020-06-07 PROCEDURE — 1200000000 HC SEMI PRIVATE

## 2020-06-07 PROCEDURE — 86900 BLOOD TYPING SEROLOGIC ABO: CPT

## 2020-06-07 PROCEDURE — 94640 AIRWAY INHALATION TREATMENT: CPT

## 2020-06-07 PROCEDURE — 86901 BLOOD TYPING SEROLOGIC RH(D): CPT

## 2020-06-07 PROCEDURE — 36415 COLL VENOUS BLD VENIPUNCTURE: CPT

## 2020-06-07 PROCEDURE — 6370000000 HC RX 637 (ALT 250 FOR IP): Performed by: INTERNAL MEDICINE

## 2020-06-07 PROCEDURE — 02HV33Z INSERTION OF INFUSION DEVICE INTO SUPERIOR VENA CAVA, PERCUTANEOUS APPROACH: ICD-10-PCS | Performed by: INTERNAL MEDICINE

## 2020-06-07 PROCEDURE — 80048 BASIC METABOLIC PNL TOTAL CA: CPT

## 2020-06-07 PROCEDURE — 2700000000 HC OXYGEN THERAPY PER DAY

## 2020-06-07 PROCEDURE — 2500000003 HC RX 250 WO HCPCS: Performed by: INTERNAL MEDICINE

## 2020-06-07 PROCEDURE — 94761 N-INVAS EAR/PLS OXIMETRY MLT: CPT

## 2020-06-07 PROCEDURE — C1751 CATH, INF, PER/CENT/MIDLINE: HCPCS

## 2020-06-07 RX ORDER — SODIUM CHLORIDE 0.9 % (FLUSH) 0.9 %
10 SYRINGE (ML) INJECTION EVERY 12 HOURS SCHEDULED
Status: DISCONTINUED | OUTPATIENT
Start: 2020-06-07 | End: 2020-06-09 | Stop reason: HOSPADM

## 2020-06-07 RX ORDER — SODIUM CHLORIDE 0.9 % (FLUSH) 0.9 %
10 SYRINGE (ML) INJECTION EVERY 12 HOURS SCHEDULED
Status: DISCONTINUED | OUTPATIENT
Start: 2020-06-07 | End: 2020-06-07 | Stop reason: SDUPTHER

## 2020-06-07 RX ORDER — SODIUM CHLORIDE, SODIUM LACTATE, POTASSIUM CHLORIDE, CALCIUM CHLORIDE 600; 310; 30; 20 MG/100ML; MG/100ML; MG/100ML; MG/100ML
INJECTION, SOLUTION INTRAVENOUS CONTINUOUS
Status: DISCONTINUED | OUTPATIENT
Start: 2020-06-07 | End: 2020-06-08

## 2020-06-07 RX ORDER — HEPARIN SODIUM 5000 [USP'U]/ML
5000 INJECTION, SOLUTION INTRAVENOUS; SUBCUTANEOUS EVERY 8 HOURS SCHEDULED
Status: DISCONTINUED | OUTPATIENT
Start: 2020-06-07 | End: 2020-06-09 | Stop reason: HOSPADM

## 2020-06-07 RX ORDER — SENNA AND DOCUSATE SODIUM 50; 8.6 MG/1; MG/1
1 TABLET, FILM COATED ORAL 2 TIMES DAILY
Status: DISCONTINUED | OUTPATIENT
Start: 2020-06-07 | End: 2020-06-09 | Stop reason: HOSPADM

## 2020-06-07 RX ORDER — SODIUM CHLORIDE 0.9 % (FLUSH) 0.9 %
10 SYRINGE (ML) INJECTION PRN
Status: DISCONTINUED | OUTPATIENT
Start: 2020-06-07 | End: 2020-06-07 | Stop reason: SDUPTHER

## 2020-06-07 RX ORDER — SUCRALFATE 1 G/1
1 TABLET ORAL 4 TIMES DAILY
Status: DISCONTINUED | OUTPATIENT
Start: 2020-06-07 | End: 2020-06-09 | Stop reason: HOSPADM

## 2020-06-07 RX ORDER — ASPIRIN 81 MG/1
81 TABLET, CHEWABLE ORAL DAILY
Status: DISCONTINUED | OUTPATIENT
Start: 2020-06-07 | End: 2020-06-09 | Stop reason: HOSPADM

## 2020-06-07 RX ORDER — LIDOCAINE HYDROCHLORIDE 10 MG/ML
5 INJECTION, SOLUTION INFILTRATION; PERINEURAL ONCE
Status: COMPLETED | OUTPATIENT
Start: 2020-06-07 | End: 2020-06-07

## 2020-06-07 RX ORDER — ALBUTEROL SULFATE 90 UG/1
2 AEROSOL, METERED RESPIRATORY (INHALATION) EVERY 6 HOURS PRN
Status: DISCONTINUED | OUTPATIENT
Start: 2020-06-07 | End: 2020-06-09 | Stop reason: HOSPADM

## 2020-06-07 RX ORDER — GUAIFENESIN 600 MG/1
600 TABLET, EXTENDED RELEASE ORAL 2 TIMES DAILY
Status: DISCONTINUED | OUTPATIENT
Start: 2020-06-07 | End: 2020-06-09 | Stop reason: HOSPADM

## 2020-06-07 RX ORDER — PANTOPRAZOLE SODIUM 40 MG/1
40 TABLET, DELAYED RELEASE ORAL DAILY
Status: DISCONTINUED | OUTPATIENT
Start: 2020-06-07 | End: 2020-06-09 | Stop reason: HOSPADM

## 2020-06-07 RX ORDER — ONDANSETRON 4 MG/1
4 TABLET, ORALLY DISINTEGRATING ORAL EVERY 8 HOURS PRN
Status: DISCONTINUED | OUTPATIENT
Start: 2020-06-07 | End: 2020-06-09 | Stop reason: HOSPADM

## 2020-06-07 RX ORDER — METOCLOPRAMIDE 10 MG/1
10 TABLET ORAL EVERY 8 HOURS PRN
Status: DISCONTINUED | OUTPATIENT
Start: 2020-06-07 | End: 2020-06-09 | Stop reason: HOSPADM

## 2020-06-07 RX ORDER — POLYETHYLENE GLYCOL 3350 17 G/17G
17 POWDER, FOR SOLUTION ORAL DAILY
Status: DISCONTINUED | OUTPATIENT
Start: 2020-06-07 | End: 2020-06-09 | Stop reason: HOSPADM

## 2020-06-07 RX ORDER — SODIUM CHLORIDE 0.9 % (FLUSH) 0.9 %
10 SYRINGE (ML) INJECTION PRN
Status: DISCONTINUED | OUTPATIENT
Start: 2020-06-07 | End: 2020-06-09 | Stop reason: HOSPADM

## 2020-06-07 RX ORDER — DIAZEPAM 5 MG/1
5 TABLET ORAL EVERY 8 HOURS PRN
Status: DISCONTINUED | OUTPATIENT
Start: 2020-06-07 | End: 2020-06-09 | Stop reason: HOSPADM

## 2020-06-07 RX ADMIN — SENNOSIDES AND DOCUSATE SODIUM 1 TABLET: 8.6; 5 TABLET ORAL at 13:21

## 2020-06-07 RX ADMIN — ASPIRIN 81 MG 81 MG: 81 TABLET ORAL at 02:11

## 2020-06-07 RX ADMIN — PANTOPRAZOLE SODIUM 40 MG: 40 TABLET, DELAYED RELEASE ORAL at 09:30

## 2020-06-07 RX ADMIN — HEPARIN SODIUM 5000 UNITS: 5000 INJECTION INTRAVENOUS; SUBCUTANEOUS at 06:36

## 2020-06-07 RX ADMIN — ASPIRIN 81 MG 81 MG: 81 TABLET ORAL at 13:21

## 2020-06-07 RX ADMIN — SODIUM CHLORIDE, POTASSIUM CHLORIDE, SODIUM LACTATE AND CALCIUM CHLORIDE: 600; 310; 30; 20 INJECTION, SOLUTION INTRAVENOUS at 09:30

## 2020-06-07 RX ADMIN — HEPARIN SODIUM 5000 UNITS: 5000 INJECTION INTRAVENOUS; SUBCUTANEOUS at 13:14

## 2020-06-07 RX ADMIN — Medication 2 PUFF: at 08:09

## 2020-06-07 RX ADMIN — GUAIFENESIN 600 MG: 600 TABLET, EXTENDED RELEASE ORAL at 09:30

## 2020-06-07 RX ADMIN — Medication 10 ML: at 21:54

## 2020-06-07 RX ADMIN — SENNOSIDES AND DOCUSATE SODIUM 1 TABLET: 8.6; 5 TABLET ORAL at 21:45

## 2020-06-07 RX ADMIN — SUCRALFATE 1 G: 1 TABLET ORAL at 21:45

## 2020-06-07 RX ADMIN — GLYCOPYRROLATE AND FORMOTEROL FUMARATE 2 PUFF: 9; 4.8 AEROSOL, METERED RESPIRATORY (INHALATION) at 08:09

## 2020-06-07 RX ADMIN — POLYETHYLENE GLYCOL 3350 17 G: 17 POWDER, FOR SOLUTION ORAL at 15:55

## 2020-06-07 RX ADMIN — GUAIFENESIN 600 MG: 600 TABLET, EXTENDED RELEASE ORAL at 21:45

## 2020-06-07 RX ADMIN — SUCRALFATE 1 G: 1 TABLET ORAL at 13:14

## 2020-06-07 RX ADMIN — SUCRALFATE 1 G: 1 TABLET ORAL at 09:30

## 2020-06-07 RX ADMIN — SODIUM CHLORIDE, POTASSIUM CHLORIDE, SODIUM LACTATE AND CALCIUM CHLORIDE: 600; 310; 30; 20 INJECTION, SOLUTION INTRAVENOUS at 02:15

## 2020-06-07 RX ADMIN — LIDOCAINE HYDROCHLORIDE 2 ML: 10 INJECTION, SOLUTION EPIDURAL; INFILTRATION; INTRACAUDAL; PERINEURAL at 15:23

## 2020-06-07 RX ADMIN — HEPARIN SODIUM 5000 UNITS: 5000 INJECTION INTRAVENOUS; SUBCUTANEOUS at 21:45

## 2020-06-07 RX ADMIN — GLYCOPYRROLATE AND FORMOTEROL FUMARATE 2 PUFF: 9; 4.8 AEROSOL, METERED RESPIRATORY (INHALATION) at 19:48

## 2020-06-07 NOTE — PROGRESS NOTES
be administered with a bronchodilator. 2. Discontinue if patient experiences worsened bronchospasm, or secretions have lessened to the point that the patient is able to clear them with a cough. Anti-inflammatory and Combination Medications:    1. If the patient lacks prior history of lung disease, is not using inhaled anti-inflammatory medication at home, and lacks wheezing by examination or by history for at least 24 hours, contact physician for possible discontinuation.

## 2020-06-07 NOTE — PROGRESS NOTES
Pt transported to 344 from ED. VSS, room air. Pt lethargic, responsive to voice. Mcgowan catheter in place. Admission and assessment to follow.

## 2020-06-07 NOTE — PROGRESS NOTES
Pt with eyes closed, awakens easily, oriented x4. VSS. Assessment complete as charted. Lungs diminished, occasional congested cough. Repositioned for comfort. Call light in reach. Denies needs.

## 2020-06-07 NOTE — ED NOTES
[email or web address] 54 yr old female with Hx significant for COPD on home O2 3L, ETOH abuse, breast Ca, remote brain aneurysm s/p clip who p/w increase nonexertional SOB x 3 days. Pt  also states that the home nurse was worry for DVT in RLE. Pt has been hv Misty Koo MD: 54 yr old female with Hx significant for COPD on home O2 3L, ETOH abuse, breast Ca, remote brain aneurysm s/p clip, DVT  who p/w increase nonexertional SOB x 3 days. Pt  also states that the home nurse was worry for DVT in RLE x 2 days. Pt  also states that the LLE has started swelling as well.  Pt's  states that he sees bright red blood in her stool in the toilet bowl x 2 days. Pt has been having prod cough with white sputum x 3 days. no fever, cp, abd, dysuria. Pt also drink EtOH 5 drinks/day. Last drink was today 3 hrs ago

## 2020-06-07 NOTE — CARE COORDINATION
CASE MANAGEMENT INITIAL ASSESSMENT      Reviewed chart and completed assessment with: patient   Explained Case Management role/services. Primary contact information: Cuauhtemoc Rose date/status: 6/6/20  Diagnosis: HANK  Is this a Readmission?: y    Insurance: [employer]   Precert required for SNF - Y        3 night stay required - N    Living arrangements, Adls, care needs, prior to admission: lives in apartment few step entry    Transportation: d    [address] [employer] [place] at home: Walker__Cane_x_RTS__ BSC__Shower Chair__  02__ HHN__ CPAP__  BiPap__  Hospital Bed__ W/C___ Other__________    Services in the home and/or outpatient, prior to admission: none current, active with [facility] in the past.     Dialysis Facility (if applicable) none  · Name:  · Address:  · Dialysis Schedule:  · Phone:  · Fax:    PT/OT recs: none    Hospital Exemption Notification (HEN): needed for SNF    Barriers to discharge: none    Plan/comments: Patient plans on returning home at discharge. Reported IPTA and has support of kids. Was active with [facility] ok to resume IF needed.  Polina Rodriguez RN      ECOC on chart for MD signature

## 2020-06-07 NOTE — DISCHARGE INSTR - COC
Continuity of Care Form    Patient Name: Hervey Cabot   :  1960  MRN:  [id number]    [facility] date:  2020  Discharge date:  2020    Code Status Order: Full Code   Advance Directives:   Advance Care Flowsheet Documentation     Date/Time Healthcare Directive Type of Healthcare Directive Copy in [address] Agent's Name Healthcare Agent's Phone Number    [phone]  No, patient does not have an advance directive for healthcare treatment -- -- -- -- --          Admitting Physician:  Gia Peck MD  PCP: Fanny Rodriguez RN    Discharging Nurse: Angelic Goyal Unit/Room#: 2595/6855-08  Discharging Unit Phone Number: [phone]    Emergency Contact:   Extended Emergency Contact Information  Primary Emergency Contact: [address] [place] [address] Phone: [phone]  Relation: Child  Secondary Emergency Contact: [facility]  Address: [address] [facility], [address] Phone: [phone]  Mobile Phone: [phone]  Relation: Child    Past Surgical History:  Past Surgical History:   Procedure Laterality Date    CHOLECYSTECTOMY  2009    COLONOSCOPY      COLONOSCOPY      tubular adenoma    COLONOSCOPY  08/15/2017    ENDOSCOPY, COLON, DIAGNOSTIC      HERNIA REPAIR      HYSTERECTOMY  1985    With Bladder Mesh    INCONTINENCE SURGERY Left      in [facility]      cancerous nodule removed left side    TONSILLECTOMY      UPPER GASTROINTESTINAL ENDOSCOPY      gastritis    UPPER GASTROINTESTINAL ENDOSCOPY  10/03/2017    bx distal esophagus        Immunization History:   Immunization History   Administered Date(s) Administered    Influenza Vaccine, unspecified formulation 2012, 2012, 10/06/2014    Influenza Virus Vaccine 2013    Influenza, MDCK Quadv, IM, PF (Flucelvax 4 yrs and older) 2017    Pneumococcal Conjugate 13-valent concentrate and follow conversation    IV Access:  - None    Nursing Mobility/ADLs:  Walking   Independent  Transfer  Independent  Bathing  Independent  Dressing  Independent  [address]  Independent  Med Delivery   whole    Wound Care Documentation and Therapy:        Elimination:  Continence:   · Bowel: Yes  · Bladder: Yes  Urinary Catheter: Removal Date 06/08/2020   Colostomy/Ileostomy/Ileal Conduit: No       Date of Last BM: 06/09/2020    Intake/Output Summary (Last 24 hours) at 6/7/2020 1115  Last data filed at 6/7/2020 0551  Gross per 24 hour   Intake 60 ml   Output 550 ml   Net -490 ml     I/O last 3 completed shifts: In: 61 [P.O.:60]  Out: 550 [Urine:550]    Safety Concerns: At Risk for Falls    Impairments/Disabilities:      None    Nutrition Therapy:  Current Nutrition Therapy:   - Oral Diet:  Renal    Routes of Feeding: Oral  Liquids: Thin Liquids  Daily Fluid Restriction: no  Last Modified Barium Swallow with Video (Video Swallowing Test): not done    Treatments at the Time of Hospital Discharge:   Respiratory Treatments: ***  Oxygen Therapy:  is not on home oxygen therapy.   Ventilator:    - No ventilator support    Rehab Therapies: Physical Therapy, Occupational Therapy and Nurse  Weight Bearing Status/Restrictions: No weight bearing restirctions  Other Medical Equipment (for information only, NOT a DME order):  cane  Other Treatments: HOME HEALTH CARE: LEVEL [address] [place] [address]  to establish plan of care for patient over 60 day period   Nursing  Initial home SN evaluation visit to occur within 24-48 hours for:  1)  medication management  2)  VS and clinical assessment  3)  S&S chronic disease exacerbation education + when to contact MD/NP  4)  care coordination  Medication Reconciliation during 1st SN visit  PT/OT/Speech   Evaluations in home within 24-48 hours of discharge to include DME and home safety   Frontload therapy 5 days, then 3x a

## 2020-06-07 NOTE — ED PROVIDER NOTES
ALLERGIES     Bactrim [sulfamethoxazole-trimethoprim] and Codeine    FAMILY HISTORY       Family History   Problem Relation Age of Onset    Heart Disease Father     Hypertension Father     High Blood Pressure Father     Heart Disease Mother     Diabetes Maternal Grandmother     Cancer Son         NHL    High Blood Pressure Son           SOCIAL HISTORY       Social History     Socioeconomic History    Marital status: Single     Spouse name: None    Number of children: 2    Years of education: None    Highest education level: None   Occupational History    Occupation: unemployed   Social Needs    Financial resource strain: None    Food insecurity     Worry: None     Inability: None    Transportation needs     Medical: None     Non-medical: None   Tobacco Use    Smoking status: Current Every Day Smoker     Packs/day: 0.25     Years: 45.00     Pack years: 11.25     Types: Cigarettes    Smokeless tobacco: Never Used   Substance and Sexual Activity    Alcohol use: No     Alcohol/week: 0.0 standard drinks    Drug use: Yes     Types: Marijuana     Comment: depends on how she is feeling     Sexual activity: Never   Lifestyle    Physical activity     Days per week: None     Minutes per session: None    Stress: None   Relationships    Social connections     Talks on phone: None     Gets together: None     Attends Moravian service: None     Active member of club or organization: None     Attends meetings of clubs or organizations: None     Relationship status: None    Intimate partner violence     Fear of current or ex partner: None     Emotionally abused: None     Physically abused: None     Forced sexual activity: None   Other Topics Concern    None   Social History Narrative    None       SCREENINGS               PHYSICAL EXAM    (up to 7 for level 4, 8 or more for level 5)     ED Triage Vitals [06/06/20 2044]   BP Temp Temp Source Pulse Resp SpO2 Height Weight   95/72 97.5 °F (36.4 °C) Oral 108 16 95 % 5' 3\" (1.6 m) 108 lb (49 kg)       Physical Exam    General appearance:  Cooperative. No acute distress. Skin:  Warm. Dry. Eye:  Extraocular movements intact. Ears, nose, mouth and throat:  Oral mucosa moist,  Neck:  Trachea midline. Heart: Tachycardic but regular  Perfusion:  intact  Respiratory:  Lungs clear to auscultation bilaterally. Respirations nonlabored. Abdominal:   Non distended. Nontender  Neurological:  Alert and oriented x 3. Moves all extremities spontaneously  Musculoskeletal:   Normal ROM, no deformities          Psychiatric:  Normal mood      DIAGNOSTIC RESULTS       Labs Reviewed   CBC WITH AUTO DIFFERENTIAL   COMPREHENSIVE METABOLIC PANEL   URINE RT REFLEX TO CULTURE   LIPASE   LACTATE, SEPSIS   LACTATE, SEPSIS   URINE DRUG SCREEN       Interpretation per the Radiologist below, if obtained/available at the time of this note:    XR CHEST PORTABLE   Final Result   No acute process. Findings suggesting COPD. All other labs/imaging were within normal range or not returned as of this dictation. EMERGENCY DEPARTMENT COURSE and DIFFERENTIAL DIAGNOSIS/MDM:   Vitals:    Vitals:    06/06/20 2044   BP: 95/72   Pulse: 108   Resp: 16   Temp: 97.5 °F (36.4 °C)   TempSrc: Oral   SpO2: 95%   Weight: 108 lb (49 kg)   Height: 5' 3\" (1.6 m)       EKG: Sinus tachycardia rate of 107 bpm.  No ST elevation. Compared to prior from 2/28/2020 no significant change other than a decrease in the prior rate    Patient presents emergency department today complaining of abdominal discomfort with nausea and vomiting as well as a cough. Chest x-ray clear. Slightly tachycardic with slight low blood pressure. History of cyclic vomiting syndrome. Abdomen overall soft with minimal tenderness. Laboratory studies are pending at this time.   She was given nausea medication pain medication and laboratory studies are all pending which will be sent out to the oncoming physician for review who ultimately disposition the patient. She is otherwise resting comfortably at present    MDM    CONSULTS     None    Critical Care:   None    REASSESSMENT          PROCEDURE     Unless otherwise noted below, none     Procedures      FINAL IMPRESSION      1. Abdominal pain, unspecified abdominal location    2. Nausea and vomiting, intractability of vomiting not specified, unspecified vomiting type              PATIENT REFERRED TO:  No follow-up provider specified. DISCHARGE MEDICATIONS:  New Prescriptions    No medications on file     Controlled Substances Monitoring:     RX Monitoring 11/18/2017   Attestation The Prescription Monitoring Report for this patient was reviewed today.        (Please note that portions of this note were completed with a voice recognition program.  Efforts were made to edit the dictations but occasionally words are mis-transcribed.)    Jose Holbrook MD (electronically signed)  Attending Emergency Physician            Jennifer Yee MD  06/06/20 7333

## 2020-06-07 NOTE — H&P
Hospital Medicine History & Physical      PCP: Olivia Kimball RN    Date of Admission: 2020    Date of Service: Pt seen/examined on 20 and Admitted to Inpatient with expected LOS greater than two midnights due to medical therapy. Chief Complaint:  Abdominal pain     History Of Present Illness:     60 y.o. female who presented to [facility] with abdominal pain, worse in the RUQ, that has been progressively worsening over the past 5 days. She characterizes it as a squeezing or cramping sensation that comes and goes. She has not had a bowel movement since Monday (today is ). Workup in the ED showed HANK (cr of 3.2, previously 1.2) with BUN of 105. She is being admitted for IVF. She presently knows she is at [facility] but her response time in conversation is slowed. She reports using marijuana (urine positive) - but reports last use was about a week ago. She denies alcohol or ibuprofen/naproxen use. Her presenting Hgb is 20.4.      She reports having anxiety managed with diazepam at home - 5 mg po TID.       ROS:   \"goo in my throat\"   \"dysuria x 3 days   + headache   Vision ok   Hearing ok   No chest pain   No dyspnea   + ongoing nausea   + constipation   + dysuria   She reports her urine looks red   She denies weight loss   Skin - no rash   Endo: no DM   mskel- denies pain other than abdominal     FamHx:   - Mom  -  from heart disease at age 61  - Dad -  from heart disease at age 47    SocHx:   - lives at home by herself   - has 3 children  -   - retired, previously worked \"all over the [employer]  - was a window washer for 5 years      Past Medical History:          Diagnosis Date    Anxiety     Asthma     Bipolar affective ([address]     Cancer ([address]     Uterine    Cancer of lung ([address]     CHF (congestive heart failure) ([address]     COPD (chronic obstructive pulmonary disease) ([address]     Cyclic vomiting syndrome     with known cannabis abuse    smoking, possible hematuria, elevated hemoglobin - could be evidence of renal malignancy - consider imaging if does not improve  - aspirin 81 mg po daily (at least until Hgb improves)     Abdominal pain, in context of Constipation and hypotension / borderline BP:   - likely with dehydration   - docusate-senna 1 tab po BID   - glycerin suppository as once prn   - monitor - consider CT scan vs ultrasound if she does not improve, or even GI consult for endoscopy if any evidence of gastritis / GI bleed     Metabolic encephalopathy:   - with azotemia   - continue to follow   - telemetry     Marijuana use:   - encouraged to stop smoking but quite sleepy so discussion was quite brief (it is 2:05 AM)   - patient declined nicotine patch     Anxiety:   - diazepam 5 mg po q8 hours prn      Probable COPD:   - not confirmed with PFT, but x-ray consistent, pt with prolonged hx of smoking   - guaifenesin 600 mg po BID   - albuterol q6 hours prn   - continue ellipta 1 puff inhaled daily     Nausea:   - reglan 10 mg po q8 hours prn - 2nd line   - ondansetron 4 mg po q8 hours prn - 1st line     GERD:   - pantoprazole 40 mg po daily   - continue sucralfate 1 gm po 4x / day     DVT Prophylaxis: enoxaparin   Diet: DIET RENAL;  Code Status: Full Code - FULL CODE     Alternative decision maker: Myrtle Card (son)     PT/OT Eval Status: not consulted     Manny Morales - pending improvement        Tarun Hogan MD    Thank you Isidra Nation RN for the opportunity to be involved in this patient's care.

## 2020-06-07 NOTE — PROGRESS NOTES
Physical Therapy  Orders received and chart reviewed. Attempted to see pt at this time for PT evaluation, pt currently off floor for PICC placement. Will re-attempt as schedule permits. Thanks.   Stacey López PT, DPT

## 2020-06-08 LAB
ANION GAP SERPL CALCULATED.3IONS-SCNC: 6 MMOL/L (ref 3–16)
ANION GAP SERPL CALCULATED.3IONS-SCNC: 6 MMOL/L (ref 3–16)
BUN BLDV-MCNC: 22 MG/DL (ref 7–20)
BUN BLDV-MCNC: 36 MG/DL (ref 7–20)
CALCIUM SERPL-MCNC: 8.5 MG/DL (ref 8.3–10.6)
CALCIUM SERPL-MCNC: 8.5 MG/DL (ref 8.3–10.6)
CHLORIDE BLD-SCNC: 101 MMOL/L (ref 99–110)
CHLORIDE BLD-SCNC: 101 MMOL/L (ref 99–110)
CO2: 30 MMOL/L (ref 21–32)
CO2: 32 MMOL/L (ref 21–32)
CREAT SERPL-MCNC: 1 MG/DL (ref 0.6–1.2)
CREAT SERPL-MCNC: 1.1 MG/DL (ref 0.6–1.2)
GFR AFRICAN AMERICAN: >60
GFR AFRICAN AMERICAN: >60
GFR NON-AFRICAN AMERICAN: 51
GFR NON-AFRICAN AMERICAN: 56
GLUCOSE BLD-MCNC: 92 MG/DL (ref 70–99)
GLUCOSE BLD-MCNC: 95 MG/DL (ref 70–99)
HCT VFR BLD CALC: 40.7 % (ref 36–48)
HEMATOLOGY PATH CONSULT: NORMAL
HEMOGLOBIN: 13.5 G/DL (ref 12–16)
MAGNESIUM: 2.1 MG/DL (ref 1.8–2.4)
MCH RBC QN AUTO: 31 PG (ref 26–34)
MCHC RBC AUTO-ENTMCNC: 33 G/DL (ref 31–36)
MCV RBC AUTO: 93.7 FL (ref 80–100)
PDW BLD-RTO: 14.1 % (ref 12.4–15.4)
PLATELET # BLD: 160 K/UL (ref 135–450)
PMV BLD AUTO: 10.6 FL (ref 5–10.5)
POTASSIUM REFLEX MAGNESIUM: 3.3 MMOL/L (ref 3.5–5.1)
POTASSIUM REFLEX MAGNESIUM: 3.6 MMOL/L (ref 3.5–5.1)
RBC # BLD: 4.34 M/UL (ref 4–5.2)
SODIUM BLD-SCNC: 137 MMOL/L (ref 136–145)
SODIUM BLD-SCNC: 139 MMOL/L (ref 136–145)
WBC # BLD: 9.8 K/UL (ref 4–11)

## 2020-06-08 PROCEDURE — 97166 OT EVAL MOD COMPLEX 45 MIN: CPT

## 2020-06-08 PROCEDURE — 97530 THERAPEUTIC ACTIVITIES: CPT

## 2020-06-08 PROCEDURE — 1200000000 HC SEMI PRIVATE

## 2020-06-08 PROCEDURE — 6370000000 HC RX 637 (ALT 250 FOR IP): Performed by: PEDIATRICS

## 2020-06-08 PROCEDURE — 80048 BASIC METABOLIC PNL TOTAL CA: CPT

## 2020-06-08 PROCEDURE — 6360000002 HC RX W HCPCS: Performed by: PEDIATRICS

## 2020-06-08 PROCEDURE — 94640 AIRWAY INHALATION TREATMENT: CPT

## 2020-06-08 PROCEDURE — 6370000000 HC RX 637 (ALT 250 FOR IP): Performed by: INTERNAL MEDICINE

## 2020-06-08 PROCEDURE — 97535 SELF CARE MNGMENT TRAINING: CPT

## 2020-06-08 PROCEDURE — 83735 ASSAY OF MAGNESIUM: CPT

## 2020-06-08 PROCEDURE — 2580000003 HC RX 258: Performed by: PEDIATRICS

## 2020-06-08 PROCEDURE — 2700000000 HC OXYGEN THERAPY PER DAY

## 2020-06-08 PROCEDURE — 85027 COMPLETE CBC AUTOMATED: CPT

## 2020-06-08 PROCEDURE — 36592 COLLECT BLOOD FROM PICC: CPT

## 2020-06-08 PROCEDURE — 94761 N-INVAS EAR/PLS OXIMETRY MLT: CPT

## 2020-06-08 PROCEDURE — 97162 PT EVAL MOD COMPLEX 30 MIN: CPT

## 2020-06-08 RX ORDER — POTASSIUM CHLORIDE 20 MEQ/1
40 TABLET, EXTENDED RELEASE ORAL ONCE
Status: COMPLETED | OUTPATIENT
Start: 2020-06-08 | End: 2020-06-08

## 2020-06-08 RX ORDER — MIDODRINE HYDROCHLORIDE 5 MG/1
5 TABLET ORAL
Status: DISCONTINUED | OUTPATIENT
Start: 2020-06-08 | End: 2020-06-09 | Stop reason: HOSPADM

## 2020-06-08 RX ADMIN — HEPARIN SODIUM 5000 UNITS: 5000 INJECTION INTRAVENOUS; SUBCUTANEOUS at 12:47

## 2020-06-08 RX ADMIN — SUCRALFATE 1 G: 1 TABLET ORAL at 21:37

## 2020-06-08 RX ADMIN — SENNOSIDES AND DOCUSATE SODIUM 1 TABLET: 8.6; 5 TABLET ORAL at 21:36

## 2020-06-08 RX ADMIN — SUCRALFATE 1 G: 1 TABLET ORAL at 12:47

## 2020-06-08 RX ADMIN — HEPARIN SODIUM 5000 UNITS: 5000 INJECTION INTRAVENOUS; SUBCUTANEOUS at 05:56

## 2020-06-08 RX ADMIN — GLYCOPYRROLATE AND FORMOTEROL FUMARATE 2 PUFF: 9; 4.8 AEROSOL, METERED RESPIRATORY (INHALATION) at 20:33

## 2020-06-08 RX ADMIN — SUCRALFATE 1 G: 1 TABLET ORAL at 16:02

## 2020-06-08 RX ADMIN — GLYCOPYRROLATE AND FORMOTEROL FUMARATE 2 PUFF: 9; 4.8 AEROSOL, METERED RESPIRATORY (INHALATION) at 08:04

## 2020-06-08 RX ADMIN — MIDODRINE HYDROCHLORIDE 5 MG: 5 TABLET ORAL at 16:02

## 2020-06-08 RX ADMIN — SUCRALFATE 1 G: 1 TABLET ORAL at 08:45

## 2020-06-08 RX ADMIN — Medication 10 ML: at 21:36

## 2020-06-08 RX ADMIN — GUAIFENESIN 600 MG: 600 TABLET, EXTENDED RELEASE ORAL at 21:37

## 2020-06-08 RX ADMIN — POTASSIUM CHLORIDE 40 MEQ: 1500 TABLET, EXTENDED RELEASE ORAL at 16:02

## 2020-06-08 RX ADMIN — PANTOPRAZOLE SODIUM 40 MG: 40 TABLET, DELAYED RELEASE ORAL at 08:45

## 2020-06-08 RX ADMIN — SENNOSIDES AND DOCUSATE SODIUM 1 TABLET: 8.6; 5 TABLET ORAL at 08:45

## 2020-06-08 RX ADMIN — MIDODRINE HYDROCHLORIDE 5 MG: 5 TABLET ORAL at 12:47

## 2020-06-08 RX ADMIN — ASPIRIN 81 MG 81 MG: 81 TABLET ORAL at 08:45

## 2020-06-08 RX ADMIN — GUAIFENESIN 600 MG: 600 TABLET, EXTENDED RELEASE ORAL at 08:45

## 2020-06-08 RX ADMIN — POLYETHYLENE GLYCOL 3350 17 G: 17 POWDER, FOR SOLUTION ORAL at 08:45

## 2020-06-08 RX ADMIN — HEPARIN SODIUM 5000 UNITS: 5000 INJECTION INTRAVENOUS; SUBCUTANEOUS at 21:36

## 2020-06-08 ASSESSMENT — PAIN DESCRIPTION - DESCRIPTORS: DESCRIPTORS: DISCOMFORT

## 2020-06-08 ASSESSMENT — PAIN DESCRIPTION - LOCATION
LOCATION: BACK;ABDOMEN
LOCATION: BACK;ABDOMEN

## 2020-06-08 ASSESSMENT — PAIN SCALES - GENERAL
PAINLEVEL_OUTOF10: 0
PAINLEVEL_OUTOF10: 6
PAINLEVEL_OUTOF10: 6

## 2020-06-08 ASSESSMENT — PAIN DESCRIPTION - PAIN TYPE
TYPE: ACUTE PAIN;CHRONIC PAIN
TYPE: ACUTE PAIN;CHRONIC PAIN

## 2020-06-08 NOTE — PROGRESS NOTES
Cancer ([facility] Cancer of lung ([facility] CHF (congestive heart failure) ([facility] [address] [facility] COPD (chronic obstructive pulmonary disease) ([address] Cyclic vomiting syndrome, Cysts of both kidneys, Depression, Fatty liver, Gastritis, MI (myocardial infarction) ([address] Panic attacks, Pneumonia, Pre-diabetes, and Tricuspid regurgitation. has a past surgical history that includes Cholecystectomy (2009); Hysterectomy (1985); hernia repair; Colonoscopy (2001); Colonoscopy (2013); Upper gastrointestinal endoscopy (2013); Tonsillectomy; Incontinence surgery (Left); Colonoscopy (08/15/2017); Upper gastrointestinal endoscopy (10/03/2017); Endoscopy, colon, diagnostic; and Lung cancer surgery. Restrictions  Restrictions/Precautions  Restrictions/Precautions: Up as Tolerated, Fall Risk  Position Activity Restriction  Other position/activity restrictions: IV lines, telemetry, [place]  Vision/Hearing  Vision: Impaired  Vision Exceptions: Wears glasses for reading  Hearing: Within functional limits     Subjective  General  Chart Reviewed: Yes  Patient assessed for rehabilitation services?: Yes  Response To Previous Treatment: Not applicable  Family / Caregiver Present: No  Referring Practitioner: Sarah Kimble MD  Referral Date : 06/08/20  Diagnosis: HANK, metabolic encephalopathy  General Comment  Comments: RN cleared pt for session  Subjective  Subjective: Pt resting in bed on approach, agreeable to PT evaluation with some encouragement for OOB activity  Pain Screening  Patient Currently in Pain: Yes  Pain Assessment  Pain Assessment: 0-10  Pain Level: 6  Pain Type: Acute pain;Chronic pain  Pain Location: Back; Abdomen  Non-Pharmaceutical Pain Intervention(s): Ambulation/Increased Activity;Repositioned; Therapeutic presence  Vital Signs  Patient Currently in Pain: Yes  Pre Treatment Pain Screening  Intervention List: Patient able to continue with treatment    Orientation  Orientation  Overall Orientation Status: Within Functional guard assistance;Minimal assistance  Quality of Gait: B decreased heel strike, increased lateral sway B. Pt moderately unsteady with multiple LOB with CGA to Kirk to correct. Gait Deviations: Slow Yina; Increased ANTHONY; Decreased step length;Decreased step height;Deviated path;Staggers  Distance: 40'  Stairs/Curb  Stairs?: No        Balance  Posture: Fair  Sitting - Static: Good  Sitting - Dynamic: Good  Standing - Static: Fair  Standing - Dynamic: Fair;-  Comments: S for unsupported seated balance, CGA to Kirk for static to dynamic standing balance without AD        Plan   Plan  Times per week: 3-5x/wk  Times per day: Daily  Specific instructions for Next Treatment: Progress mobility as tolerated  Current Treatment Recommendations: Strengthening, Gait Training, Patient/Caregiver Education & Training, Stair training, Balance Training, Neuromuscular Re-education, Functional Mobility Training, Endurance Training, Home Exercise Program, Transfer Training, Safety Education & Training  Safety Devices  Type of devices: All fall risk precautions in place, Left in chair, Call light within reach, Chair alarm in place, Nurse notified, Gait belt, Patient at risk for falls  Restraints  Initially in place: No      AM-PAC Score  AM-PAC Inpatient Mobility Raw Score : 20 (06/08/20 1414)  AM-PAC Inpatient T-Scale Score : 47.67 (06/08/20 1414)  Mobility Inpatient CMS 0-100% Score: 35.83 (06/08/20 1414)  Mobility Inpatient CMS G-Code Modifier : Lanette Inch (06/08/20 1414)          Goals  Short term goals  Time Frame for Short term goals: 1 week 6/15/20 (unless otherwise specified)  Short term goal 1: Pt will complete supine to/from sit with I  Short term goal 2: Pt will complete sit to/from stand with LRAD with MI  Short term goal 3: Pt will ambulate >80' with LRAD with MI without LOB  Short term goal 4: Pt will ascend/descend 12 steps with UHR with S without LOB  Short term goal 5: 6/11/20:  Pt will participate in 12-15 reps BLE exercises to

## 2020-06-08 NOTE — PLAN OF CARE
Problem: Infection:  Goal: Will remain free from infection  Description: Will remain free from infection  6/8/2020 0547 by Brian Pruitt RN  Outcome: Ongoing  6/7/2020 1609 by Cyndi Alexander RN  Outcome: Ongoing     Problem: Safety:  Goal: Free from accidental physical injury  Description: Free from accidental physical injury  6/8/2020 0547 by Brian Pruitt RN  Outcome: Ongoing  6/7/2020 1609 by Cyndi Alexander RN  Outcome: Ongoing  Goal: Free from intentional harm  Description: Free from intentional harm  6/8/2020 0547 by Brian Pruitt RN  Outcome: Ongoing  6/7/2020 1609 by Cyndi Alexander RN  Outcome: Ongoing     Problem: Daily Care:  Goal: Daily care needs are met  Description: Daily care needs are met  6/8/2020 0547 by Brian Pruitt RN  Outcome: Ongoing  6/7/2020 1609 by Cyndi Alexander RN  Outcome: Ongoing     Problem: Falls - Risk of:  Goal: Will remain free from falls  Description: Will remain free from falls  6/8/2020 0547 by Brian Pruitt RN  Outcome: Ongoing  6/7/2020 1609 by Cyndi Alexander RN  Outcome: Ongoing  Note: Instructed pt to use call light as needed with ambulation. Bed locked and in lowest position. Bed check in use. Non-skid socks in place. Call light in reach.    Goal: Absence of physical injury  Description: Absence of physical injury  6/8/2020 0547 by Brian Puritt RN  Outcome: Ongoing  6/7/2020 1609 by Cyndi Alexander RN  Outcome: Ongoing

## 2020-06-08 NOTE — PROGRESS NOTES
Pt is a/o x4 (intermittent lethargy). VSS. Assessment as charted. - Pt is on tele monitoring and has been running SR.  - Pt states he is constipated- scheduled colace was given per MD order but pt refused PRN suppository- present bowel sounds in all quadrants. - Pt has f/c in place w/ adequate yellow/clear urine output. Pt is currently resting in her bed that is locked and in its lowest position w/ her call light within reach, non-skid socks, and bed alarm on. Pt denies any other needs at this time. Will continue to monitor.

## 2020-06-08 NOTE — PROGRESS NOTES
Perfect Serve sent to MD: Pt's BP is now 76/51, asymptomatic, do you want to restart her fluids? Thanks! Awaiting response or orders. Addendum: Order for meclizine placed, given to pt per MD order. Will continue to monitor BP.

## 2020-06-08 NOTE — PROGRESS NOTES
Occupational Therapy   Occupational Therapy Initial Assessment/Treatment  Date: 2020   Patient Name: Wilner Chadwick  MRN: [id number]     : 1960    Date of Service: 2020    Discharge Recommendations:  24 hour supervision or assist, Home with Home health OT       Assessment   Performance deficits / Impairments: Decreased functional mobility ; Decreased ADL status; Decreased safe awareness;Decreased balance;Decreased endurance;Decreased cognition  After evaluation, pt found to be presenting with the above mentioned occupational performance deficits which are affecting participation in daily living skills. Pt would benefit from continued skilled occupational therapy to address ADLs, functional mobility, and safety while in acute care. Prognosis: Good  Decision Making: Medium Complexity  OT Education: OT Role;Plan of Care;Transfer Training;Orientation;Precautions  REQUIRES OT FOLLOW UP: Yes  Activity Tolerance  Activity Tolerance: Patient Tolerated treatment well  Safety Devices  Safety Devices in place: Yes  Type of devices: Nurse notified;Call light within reach; Left in chair;Gait belt; Chair alarm in place           Patient Diagnosis(es): The primary encounter diagnosis was HANK (acute kidney injury) ([address] Diagnoses of Abdominal pain, unspecified abdominal location, Nausea and vomiting, intractability of vomiting not specified, unspecified vomiting type, Gross hematuria, and Dehydration were also pertinent to this visit. has a past medical history of Anxiety, Asthma, Bipolar affective ([address] Cancer ([address] Cancer of lung ([address] CHF (congestive heart failure) ([address] COPD (chronic obstructive pulmonary disease) ([address] Cyclic vomiting syndrome, Cysts of both kidneys, Depression, Fatty liver, Gastritis, MI (myocardial infarction) ([address] Panic attacks, Pneumonia, Pre-diabetes, and Tricuspid regurgitation. has a past surgical history that includes Cholecystectomy ();  Hysterectomy (); hernia repair; Colonoscopy (2001); Colonoscopy (2013); Upper gastrointestinal endoscopy (2013); Tonsillectomy; Incontinence surgery (Left); Colonoscopy (08/15/2017); Upper gastrointestinal endoscopy (10/03/2017); Endoscopy, colon, diagnostic; and Lung cancer surgery. Restrictions  Restrictions/Precautions  Restrictions/Precautions: Up as Tolerated, Fall Risk  Position Activity Restriction  Other position/activity restrictions: IV lines, telemetry, [place]    Subjective   General  Chart Reviewed: Yes  Patient assessed for rehabilitation services?: Yes  Family / Caregiver Present: No  Referring Practitioner: Dakota Cash MD  Diagnosis: HANK  Subjective  Subjective: Pt resting in bed listening to music upon entry. Pain Screening  Patient Currently in Pain: Yes  Pain Assessment  Pain Assessment: 0-10  Pain Level: 6  Pain Type: Acute pain;Chronic pain  Pain Location: Back; Abdomen  Non-Pharmaceutical Pain Intervention(s): Ambulation/Increased Activity;Repositioned; Therapeutic presence  Vital Signs  Patient Currently in Pain: Yes  Pre Treatment Pain Screening  Intervention List: Patient able to continue with treatment  Social/Functional History  Social/Functional History  Lives With: Alone  Type of Home: Apartment  Home Layout: One level  Home Access: Stairs to enter with rails  Entrance Stairs - Number of Steps: 12 steps to enter; pt lives on 2nd floor   Entrance Stairs - Rails: Both  Bathroom Shower/Tub: Tub/Shower unit  Bathroom Toilet: Standard  Home Equipment: Cane  ADL Assistance: Independent  Homemaking Assistance: Independent  Homemaking Responsibilities: Yes  Meal Prep Responsibility: Primary  Laundry Responsibility: Primary  Cleaning Responsibility: Primary  Ambulation Assistance: Independent(with cane)  Transfer Assistance: Independent  Active : No  Mode of Transportation: Friends  Occupation: On disability       Objective   Vision: Impaired  Vision Exceptions: Wears glasses for reading  Hearing: Within

## 2020-06-09 VITALS
HEART RATE: 64 BPM | OXYGEN SATURATION: 97 % | HEIGHT: 63 IN | DIASTOLIC BLOOD PRESSURE: 57 MMHG | WEIGHT: 104.3 LBS | TEMPERATURE: 97.6 F | SYSTOLIC BLOOD PRESSURE: 88 MMHG | BODY MASS INDEX: 18.48 KG/M2 | RESPIRATION RATE: 16 BRPM

## 2020-06-09 LAB
ANION GAP SERPL CALCULATED.3IONS-SCNC: 4 MMOL/L (ref 3–16)
BUN BLDV-MCNC: 19 MG/DL (ref 7–20)
CALCIUM SERPL-MCNC: 8.5 MG/DL (ref 8.3–10.6)
CHLORIDE BLD-SCNC: 104 MMOL/L (ref 99–110)
CO2: 31 MMOL/L (ref 21–32)
CREAT SERPL-MCNC: 1 MG/DL (ref 0.6–1.2)
GFR AFRICAN AMERICAN: >60
GFR NON-AFRICAN AMERICAN: 56
GLUCOSE BLD-MCNC: 90 MG/DL (ref 70–99)
POTASSIUM REFLEX MAGNESIUM: 4.2 MMOL/L (ref 3.5–5.1)
SODIUM BLD-SCNC: 139 MMOL/L (ref 136–145)

## 2020-06-09 PROCEDURE — 94640 AIRWAY INHALATION TREATMENT: CPT

## 2020-06-09 PROCEDURE — 6370000000 HC RX 637 (ALT 250 FOR IP): Performed by: INTERNAL MEDICINE

## 2020-06-09 PROCEDURE — 6360000002 HC RX W HCPCS: Performed by: PEDIATRICS

## 2020-06-09 PROCEDURE — 97116 GAIT TRAINING THERAPY: CPT

## 2020-06-09 PROCEDURE — 6370000000 HC RX 637 (ALT 250 FOR IP): Performed by: PEDIATRICS

## 2020-06-09 PROCEDURE — 97110 THERAPEUTIC EXERCISES: CPT

## 2020-06-09 PROCEDURE — 80048 BASIC METABOLIC PNL TOTAL CA: CPT

## 2020-06-09 RX ORDER — MIDODRINE HYDROCHLORIDE 5 MG/1
5 TABLET ORAL
Qty: 45 TABLET | Refills: 0 | Status: SHIPPED | OUTPATIENT
Start: 2020-06-09

## 2020-06-09 RX ADMIN — MIDODRINE HYDROCHLORIDE 5 MG: 5 TABLET ORAL at 09:49

## 2020-06-09 RX ADMIN — SUCRALFATE 1 G: 1 TABLET ORAL at 09:50

## 2020-06-09 RX ADMIN — PANTOPRAZOLE SODIUM 40 MG: 40 TABLET, DELAYED RELEASE ORAL at 09:50

## 2020-06-09 RX ADMIN — SUCRALFATE 1 G: 1 TABLET ORAL at 11:57

## 2020-06-09 RX ADMIN — GLYCOPYRROLATE AND FORMOTEROL FUMARATE 2 PUFF: 9; 4.8 AEROSOL, METERED RESPIRATORY (INHALATION) at 08:23

## 2020-06-09 RX ADMIN — MIDODRINE HYDROCHLORIDE 5 MG: 5 TABLET ORAL at 11:57

## 2020-06-09 RX ADMIN — ONDANSETRON 4 MG: 4 TABLET, ORALLY DISINTEGRATING ORAL at 07:29

## 2020-06-09 RX ADMIN — HEPARIN SODIUM 5000 UNITS: 5000 INJECTION INTRAVENOUS; SUBCUTANEOUS at 06:07

## 2020-06-09 RX ADMIN — GUAIFENESIN 600 MG: 600 TABLET, EXTENDED RELEASE ORAL at 09:50

## 2020-06-09 ASSESSMENT — PAIN SCALES - GENERAL: PAINLEVEL_OUTOF10: 0

## 2020-06-09 NOTE — PROGRESS NOTES
Physical Therapy  Facility/Department: [facility] C3 TELE/MED SURG/ONC  Daily Treatment Note  NAME: Antonia Schlatter  : 1960  MRN: [id number]    Date of Service: 2020    Discharge Recommendations:  24 hour supervision or assist, Home with Home health PT   PT Equipment Recommendations  Equipment Needed: No    Assessment   Body structures, Functions, Activity limitations: Decreased functional mobility ; Decreased strength;Decreased balance; Increased pain  Assessment: Today pt able to perform 15 reps LE Ex, indep bed mob, indep sit to and from stand, amb 150 ft no device supervised, and negotiate 12 steps Supervised. AM PAC . Possible discharge home today. Pt will benefit from skilled PT to address current deficits  Treatment Diagnosis: Impaired balance and gait  Specific instructions for Next Treatment: Progress mobility as tolerated  Prognosis: Good  PT Education: Goals; Injury Prevention;PT Role;General Safety;Plan of Care;Gait Training;Home Exercise Program;Disease Specific Education;Precautions; Functional Mobility Training;Transfer Training  Patient Education: Pt verbalized understanding  Barriers to Learning: pt voices understanding  REQUIRES PT FOLLOW UP: Yes  Activity Tolerance  Activity Tolerance: Patient Tolerated treatment well  Activity Tolerance: /70 post amb and stair negotiation     Patient Diagnosis(es): The primary encounter diagnosis was HANK (acute kidney injury) ([facility] Diagnoses of Abdominal pain, unspecified abdominal location, Nausea and vomiting, intractability of vomiting not specified, unspecified vomiting type, Gross hematuria, and Dehydration were also pertinent to this visit.      has a past medical history of Anxiety, Asthma, Bipolar affective ([address] Cancer ([address] Cancer of lung ([address] CHF (congestive heart failure) ([address] COPD (chronic obstructive pulmonary disease) ([address] Cyclic vomiting syndrome, Cysts of both kidneys, Depression, Fatty liver, Gastritis, MI (myocardial to/from stand with LRAD with MI- MET 6/9 indep without device  Short term goal 3: Pt will ambulate >80' with LRAD with MI without LOB- Supervised 150 ft no device 6/9  Short term goal 4: Pt will ascend/descend 12 steps with UHR with S without LOB- MET 6/9  Short term goal 5: 6/11/20: Pt will participate in 12-15 reps BLE exercises to increase strength and increase I with functional mobility- MET 6/9  Patient Goals   Patient goals : \"to walk household distances (150 ft) and be able to do the stairs without help\"- MET    Plan    Plan  Times per week: 3-5x/wk  Times per day: Daily  Specific instructions for Next Treatment: Progress mobility as tolerated  Current Treatment Recommendations: Strengthening, Gait Training, Patient/Caregiver Education & Training, Stair training, Balance Training, Neuromuscular Re-education, Functional Mobility Training, Endurance Training, Home Exercise Program, Transfer Training, Safety Education & Training  Safety Devices  Type of devices: All fall risk precautions in place, Left in bed, Bed alarm in place, Call light within reach, Nurse notified, Gait belt, Patient at risk for falls  Restraints  Initially in place: No     Therapy Time   Individual Concurrent Group Co-treatment   Time In 1025         Time Out 1055         Minutes 30              If pt discharges prior to next PT session this note will serve as discharge summary.   Ludy Moreira, PT

## 2020-06-09 NOTE — DISCHARGE SUMMARY
noted on drug screen  - counseling given    Physical Exam Performed:     BP (!) 88/57   Pulse 64   Temp 97.6 °F (36.4 °C) (Oral)   Resp 16   Ht 5' 3\" (1.6 m)   Wt 104 lb 4.8 oz (47.3 kg)   SpO2 97%   BMI 18.48 kg/m²       General appearance: No apparent distress, appears stated age and cooperative. HEENT: Pupils equal, round, and reactive to light. Conjunctivae/corneas clear. Neck: Supple, with full range of motion. No jugular venous distention. Trachea midline. Respiratory:  Normal respiratory effort. Clear to auscultation, bilaterally without Rales/Wheezes/Rhonchi. Cardiovascular: Regular rate and rhythm with normal S1/S2 without murmurs, rubs or gallops. Abdomen: Soft, non-tender, non-distended with normal bowel sounds. Musculoskeletal: No clubbing, cyanosis or edema bilaterally. Full range of motion without deformity. Skin: Skin color, texture, turgor normal.  No rashes or lesions. Neurologic:  Neurovascularly intact without any focal sensory/motor deficits. Cranial nerves: II-XII intact, grossly non-focal.  Psychiatric: Alert and oriented, thought content appropriate, normal insight  Capillary Refill: Brisk,< 3 seconds   Peripheral Pulses: +2 palpable, equal bilaterally     Labs: For convenience and continuity at follow-up the following most recent labs are provided:      CBC:    Lab Results   Component Value Date    WBC 9.8 06/08/2020    HGB 13.5 06/08/2020    HCT 40.7 06/08/2020     06/08/2020       Renal:    Lab Results   Component Value Date     06/09/2020    K 4.2 06/09/2020     06/09/2020    CO2 31 06/09/2020    BUN 19 06/09/2020    CREATININE 1.0 06/09/2020    CALCIUM 8.5 06/09/2020    PHOS 1.8 01/27/2019         Significant Diagnostic Studies    Radiology:   IR PICC WO SQ PORT/PUMP > 5 YEARS   Final Result   Successful placement of PICC line. XR CHEST PORTABLE   Final Result   No acute process. Findings suggesting COPD.                 Consults:     CARLY

## 2020-06-09 NOTE — CARE COORDINATION
[facility]    Referral received from  to follow for home care services. I will follow for needs, and speak with patient to verify demos.     Vale Figueroa RN, BSN CTN  [facility] [phone]

## 2020-07-08 ENCOUNTER — APPOINTMENT (OUTPATIENT)
Dept: CT IMAGING | Age: 60
End: 2020-07-08
Payer: COMMERCIAL

## 2020-07-08 ENCOUNTER — HOSPITAL ENCOUNTER (EMERGENCY)
Age: 60
Discharge: HOME | End: 2020-07-08
Attending: EMERGENCY MEDICINE
Payer: COMMERCIAL

## 2020-07-08 VITALS
HEART RATE: 104 BPM | OXYGEN SATURATION: 96 % | RESPIRATION RATE: 20 BRPM | DIASTOLIC BLOOD PRESSURE: 87 MMHG | WEIGHT: 108 LBS | BODY MASS INDEX: 19.14 KG/M2 | SYSTOLIC BLOOD PRESSURE: 159 MMHG | TEMPERATURE: 98 F | HEIGHT: 63 IN

## 2020-07-08 LAB
A/G RATIO: 1.5 (ref 1.1–2.2)
ALBUMIN SERPL-MCNC: 5.5 G/DL (ref 3.4–5)
ALP BLD-CCNC: 123 U/L (ref 40–129)
ALT SERPL-CCNC: 13 U/L (ref 10–40)
ANION GAP SERPL CALCULATED.3IONS-SCNC: 19 MMOL/L (ref 3–16)
AST SERPL-CCNC: 22 U/L (ref 15–37)
BASOPHILS ABSOLUTE: 0.1 K/UL (ref 0–0.2)
BASOPHILS RELATIVE PERCENT: 0.5 %
BILIRUB SERPL-MCNC: 0.3 MG/DL (ref 0–1)
BUN BLDV-MCNC: 18 MG/DL (ref 7–20)
CALCIUM SERPL-MCNC: 11.5 MG/DL (ref 8.3–10.6)
CHLORIDE BLD-SCNC: 94 MMOL/L (ref 99–110)
CO2: 27 MMOL/L (ref 21–32)
CREAT SERPL-MCNC: 0.9 MG/DL (ref 0.6–1.2)
EOSINOPHILS ABSOLUTE: 0 K/UL (ref 0–0.6)
EOSINOPHILS RELATIVE PERCENT: 0 %
GFR AFRICAN AMERICAN: >60
GFR NON-AFRICAN AMERICAN: >60
GLOBULIN: 3.7 G/DL
GLUCOSE BLD-MCNC: 148 MG/DL (ref 70–99)
HCT VFR BLD CALC: 50.9 % (ref 36–48)
HEMOGLOBIN: 17.1 G/DL (ref 12–16)
LACTIC ACID: 1.8 MMOL/L (ref 0.4–2)
LIPASE: 15 U/L (ref 13–60)
LYMPHOCYTES ABSOLUTE: 1.5 K/UL (ref 1–5.1)
LYMPHOCYTES RELATIVE PERCENT: 9.9 %
MAGNESIUM: 1.9 MG/DL (ref 1.8–2.4)
MCH RBC QN AUTO: 31.3 PG (ref 26–34)
MCHC RBC AUTO-ENTMCNC: 33.7 G/DL (ref 31–36)
MCV RBC AUTO: 93.1 FL (ref 80–100)
MONOCYTES ABSOLUTE: 0.2 K/UL (ref 0–1.3)
MONOCYTES RELATIVE PERCENT: 1.6 %
NEUTROPHILS ABSOLUTE: 13.1 K/UL (ref 1.7–7.7)
NEUTROPHILS RELATIVE PERCENT: 88 %
PDW BLD-RTO: 15 % (ref 12.4–15.4)
PHOSPHORUS: 2 MG/DL (ref 2.5–4.9)
PLATELET # BLD: 355 K/UL (ref 135–450)
PMV BLD AUTO: 10.1 FL (ref 5–10.5)
POTASSIUM REFLEX MAGNESIUM: 4.5 MMOL/L (ref 3.5–5.1)
RBC # BLD: 5.47 M/UL (ref 4–5.2)
SODIUM BLD-SCNC: 140 MMOL/L (ref 136–145)
TOTAL PROTEIN: 9.2 G/DL (ref 6.4–8.2)
WBC # BLD: 14.9 K/UL (ref 4–11)

## 2020-07-08 PROCEDURE — 2580000003 HC RX 258: Performed by: EMERGENCY MEDICINE

## 2020-07-08 PROCEDURE — 83690 ASSAY OF LIPASE: CPT

## 2020-07-08 PROCEDURE — 74177 CT ABD & PELVIS W/CONTRAST: CPT

## 2020-07-08 PROCEDURE — 85025 COMPLETE CBC W/AUTO DIFF WBC: CPT

## 2020-07-08 PROCEDURE — 80053 COMPREHEN METABOLIC PANEL: CPT

## 2020-07-08 PROCEDURE — 83605 ASSAY OF LACTIC ACID: CPT

## 2020-07-08 PROCEDURE — 6360000004 HC RX CONTRAST MEDICATION: Performed by: EMERGENCY MEDICINE

## 2020-07-08 PROCEDURE — 96374 THER/PROPH/DIAG INJ IV PUSH: CPT

## 2020-07-08 PROCEDURE — 96375 TX/PRO/DX INJ NEW DRUG ADDON: CPT

## 2020-07-08 PROCEDURE — 83735 ASSAY OF MAGNESIUM: CPT

## 2020-07-08 PROCEDURE — 84100 ASSAY OF PHOSPHORUS: CPT

## 2020-07-08 PROCEDURE — 6360000002 HC RX W HCPCS: Performed by: EMERGENCY MEDICINE

## 2020-07-08 PROCEDURE — 99284 EMERGENCY DEPT VISIT MOD MDM: CPT

## 2020-07-08 RX ORDER — KETOROLAC TROMETHAMINE 30 MG/ML
30 INJECTION, SOLUTION INTRAMUSCULAR; INTRAVENOUS ONCE
Status: COMPLETED | OUTPATIENT
Start: 2020-07-08 | End: 2020-07-08

## 2020-07-08 RX ORDER — 0.9 % SODIUM CHLORIDE 0.9 %
1000 INTRAVENOUS SOLUTION INTRAVENOUS ONCE
Status: COMPLETED | OUTPATIENT
Start: 2020-07-08 | End: 2020-07-08

## 2020-07-08 RX ORDER — METOCLOPRAMIDE HYDROCHLORIDE 5 MG/ML
10 INJECTION INTRAMUSCULAR; INTRAVENOUS ONCE
Status: COMPLETED | OUTPATIENT
Start: 2020-07-08 | End: 2020-07-08

## 2020-07-08 RX ORDER — ONDANSETRON 2 MG/ML
4 INJECTION INTRAMUSCULAR; INTRAVENOUS ONCE
Status: COMPLETED | OUTPATIENT
Start: 2020-07-08 | End: 2020-07-08

## 2020-07-08 RX ORDER — METOCLOPRAMIDE 10 MG/1
10 TABLET ORAL 3 TIMES DAILY PRN
Qty: 20 TABLET | Refills: 0 | Status: ON HOLD | OUTPATIENT
Start: 2020-07-08 | End: 2020-09-09 | Stop reason: ALTCHOICE

## 2020-07-08 RX ORDER — DIPHENHYDRAMINE HYDROCHLORIDE 50 MG/ML
12.5 INJECTION INTRAMUSCULAR; INTRAVENOUS ONCE
Status: COMPLETED | OUTPATIENT
Start: 2020-07-08 | End: 2020-07-08

## 2020-07-08 RX ADMIN — ONDANSETRON 4 MG: 2 INJECTION INTRAMUSCULAR; INTRAVENOUS at 04:07

## 2020-07-08 RX ADMIN — SODIUM CHLORIDE 1000 ML: 9 INJECTION, SOLUTION INTRAVENOUS at 04:07

## 2020-07-08 RX ADMIN — DIPHENHYDRAMINE HYDROCHLORIDE 12.5 MG: 50 INJECTION, SOLUTION INTRAMUSCULAR; INTRAVENOUS at 04:45

## 2020-07-08 RX ADMIN — METOCLOPRAMIDE 10 MG: 5 INJECTION, SOLUTION INTRAMUSCULAR; INTRAVENOUS at 04:45

## 2020-07-08 RX ADMIN — IOPAMIDOL 75 ML: 755 INJECTION, SOLUTION INTRAVENOUS at 05:02

## 2020-07-08 RX ADMIN — KETOROLAC TROMETHAMINE 30 MG: 30 INJECTION, SOLUTION INTRAMUSCULAR at 04:45

## 2020-07-08 ASSESSMENT — PAIN DESCRIPTION - ORIENTATION
ORIENTATION: LEFT
ORIENTATION: LEFT

## 2020-07-08 ASSESSMENT — PAIN DESCRIPTION - PAIN TYPE
TYPE: ACUTE PAIN
TYPE: ACUTE PAIN

## 2020-07-08 ASSESSMENT — PAIN DESCRIPTION - LOCATION
LOCATION: ABDOMEN
LOCATION: ABDOMEN

## 2020-07-08 ASSESSMENT — PAIN SCALES - GENERAL
PAINLEVEL_OUTOF10: 3
PAINLEVEL_OUTOF10: 10

## 2020-07-08 NOTE — ED NOTES

## 2020-07-08 NOTE — ED NOTES
MD explained discharge information patient verbalized understanding. Pt requesting cab voucher, however was told we no longer give cab vouchers. Pt argued she was given one two weeks ago and her \"insurance\" pays for it. It was offered to contact her insurance company to set up a cab, and patient argued stating, \"Just give me a cab voucher and they submit it to my insurance company. \" Again patient was told we no longer give cab vouchers and patient responded, \"Fine I'll call my neighbor. \"       Eve Ahn RN  07/08/20 5037

## 2020-07-08 NOTE — ED NOTES
RN in to assess patient. Before starting triage, pt stated \"no I haven't been smoking weed in over a week\". When asked what prompted pt to offer that information, stated \"they always tell me that's why I keep vomiting\". Pt dry-heaving with small amounts of bile-like vomit during triage.      Ghada Garrett RN  07/08/20 7981

## 2020-07-09 ENCOUNTER — CARE COORDINATION (OUTPATIENT)
Dept: CASE MANAGEMENT | Age: 60
End: 2020-07-09

## 2020-07-09 NOTE — CARE COORDINATION
Spoke briefly with patient and introduced role of ACM; patient declines to verify  and patient abruptly ended call  No further outreach scheduled with this ACM; episode of care resolved

## 2020-09-09 ENCOUNTER — APPOINTMENT (OUTPATIENT)
Dept: GENERAL RADIOLOGY | Age: 60
End: 2020-09-09
Payer: COMMERCIAL

## 2020-09-09 ENCOUNTER — APPOINTMENT (OUTPATIENT)
Dept: CT IMAGING | Age: 60
End: 2020-09-09
Payer: COMMERCIAL

## 2020-09-09 ENCOUNTER — HOSPITAL ENCOUNTER (OUTPATIENT)
Age: 60
Setting detail: OBSERVATION
Discharge: HOME | End: 2020-09-10
Attending: EMERGENCY MEDICINE | Admitting: INTERNAL MEDICINE
Payer: COMMERCIAL

## 2020-09-09 PROBLEM — N17.9 ACUTE KIDNEY INJURY (HCC): Status: ACTIVE | Noted: 2020-09-09

## 2020-09-09 LAB
A/G RATIO: 1.8 (ref 1.1–2.2)
ALBUMIN SERPL-MCNC: 5.2 G/DL (ref 3.4–5)
ALP BLD-CCNC: 111 U/L (ref 40–129)
ALT SERPL-CCNC: 20 U/L (ref 10–40)
AMPHETAMINE SCREEN, URINE: ABNORMAL
ANION GAP SERPL CALCULATED.3IONS-SCNC: 23 MMOL/L (ref 3–16)
AST SERPL-CCNC: 30 U/L (ref 15–37)
BACTERIA: ABNORMAL /HPF
BARBITURATE SCREEN URINE: ABNORMAL
BASOPHILS ABSOLUTE: 0.1 K/UL (ref 0–0.2)
BASOPHILS RELATIVE PERCENT: 0.4 %
BENZODIAZEPINE SCREEN, URINE: POSITIVE
BILIRUB SERPL-MCNC: 0.5 MG/DL (ref 0–1)
BILIRUBIN URINE: ABNORMAL
BLOOD, URINE: ABNORMAL
BUN BLDV-MCNC: 34 MG/DL (ref 7–20)
CALCIUM SERPL-MCNC: 11.2 MG/DL (ref 8.3–10.6)
CANNABINOID SCREEN URINE: POSITIVE
CHLORIDE BLD-SCNC: 91 MMOL/L (ref 99–110)
CLARITY: ABNORMAL
CO2: 22 MMOL/L (ref 21–32)
COCAINE METABOLITE SCREEN URINE: ABNORMAL
COLOR: YELLOW
CREAT SERPL-MCNC: 1.6 MG/DL (ref 0.6–1.2)
EOSINOPHILS ABSOLUTE: 0 K/UL (ref 0–0.6)
EOSINOPHILS RELATIVE PERCENT: 0 %
EPITHELIAL CELLS, UA: ABNORMAL /HPF (ref 0–5)
GFR AFRICAN AMERICAN: 40
GFR NON-AFRICAN AMERICAN: 33
GLOBULIN: 2.9 G/DL
GLUCOSE BLD-MCNC: 136 MG/DL (ref 70–99)
GLUCOSE URINE: NEGATIVE MG/DL
HCT VFR BLD CALC: 49.1 % (ref 36–48)
HEMOGLOBIN: 16.6 G/DL (ref 12–16)
HYALINE CASTS: ABNORMAL /LPF (ref 0–2)
KETONES, URINE: 15 MG/DL
LACTIC ACID: 2.2 MMOL/L (ref 0.4–2)
LEUKOCYTE ESTERASE, URINE: ABNORMAL
LIPASE: 14 U/L (ref 13–60)
LYMPHOCYTES ABSOLUTE: 2.4 K/UL (ref 1–5.1)
LYMPHOCYTES RELATIVE PERCENT: 14 %
Lab: ABNORMAL
MCH RBC QN AUTO: 31.3 PG (ref 26–34)
MCHC RBC AUTO-ENTMCNC: 33.8 G/DL (ref 31–36)
MCV RBC AUTO: 92.6 FL (ref 80–100)
METHADONE SCREEN, URINE: ABNORMAL
MICROSCOPIC EXAMINATION: YES
MONOCYTES ABSOLUTE: 0.7 K/UL (ref 0–1.3)
MONOCYTES RELATIVE PERCENT: 4.2 %
MUCUS: ABNORMAL /LPF
NEUTROPHILS ABSOLUTE: 13.9 K/UL (ref 1.7–7.7)
NEUTROPHILS RELATIVE PERCENT: 81.4 %
NITRITE, URINE: NEGATIVE
OPIATE SCREEN URINE: POSITIVE
OXYCODONE URINE: ABNORMAL
PDW BLD-RTO: 14.1 % (ref 12.4–15.4)
PH UA: 5.5
PH UA: 5.5 (ref 5–8)
PHENCYCLIDINE SCREEN URINE: ABNORMAL
PLATELET # BLD: 274 K/UL (ref 135–450)
PMV BLD AUTO: 10.6 FL (ref 5–10.5)
POTASSIUM REFLEX MAGNESIUM: 4.9 MMOL/L (ref 3.5–5.1)
PROPOXYPHENE SCREEN: ABNORMAL
PROTEIN UA: >=300 MG/DL
RBC # BLD: 5.3 M/UL (ref 4–5.2)
RBC UA: ABNORMAL /HPF (ref 0–4)
SODIUM BLD-SCNC: 136 MMOL/L (ref 136–145)
SPECIFIC GRAVITY UA: >=1.03 (ref 1–1.03)
TOTAL PROTEIN: 8.1 G/DL (ref 6.4–8.2)
TROPONIN: <0.01 NG/ML
URINE REFLEX TO CULTURE: ABNORMAL
URINE TYPE: ABNORMAL
UROBILINOGEN, URINE: 0.2 E.U./DL
WBC # BLD: 17.1 K/UL (ref 4–11)
WBC UA: ABNORMAL /HPF (ref 0–5)

## 2020-09-09 PROCEDURE — 6370000000 HC RX 637 (ALT 250 FOR IP): Performed by: INTERNAL MEDICINE

## 2020-09-09 PROCEDURE — 99285 EMERGENCY DEPT VISIT HI MDM: CPT

## 2020-09-09 PROCEDURE — 81001 URINALYSIS AUTO W/SCOPE: CPT

## 2020-09-09 PROCEDURE — G0378 HOSPITAL OBSERVATION PER HR: HCPCS

## 2020-09-09 PROCEDURE — 94640 AIRWAY INHALATION TREATMENT: CPT

## 2020-09-09 PROCEDURE — 94761 N-INVAS EAR/PLS OXIMETRY MLT: CPT

## 2020-09-09 PROCEDURE — 2580000003 HC RX 258: Performed by: EMERGENCY MEDICINE

## 2020-09-09 PROCEDURE — 96361 HYDRATE IV INFUSION ADD-ON: CPT

## 2020-09-09 PROCEDURE — 83605 ASSAY OF LACTIC ACID: CPT

## 2020-09-09 PROCEDURE — 6370000000 HC RX 637 (ALT 250 FOR IP): Performed by: EMERGENCY MEDICINE

## 2020-09-09 PROCEDURE — 71045 X-RAY EXAM CHEST 1 VIEW: CPT

## 2020-09-09 PROCEDURE — 84484 ASSAY OF TROPONIN QUANT: CPT

## 2020-09-09 PROCEDURE — 74176 CT ABD & PELVIS W/O CONTRAST: CPT

## 2020-09-09 PROCEDURE — 96374 THER/PROPH/DIAG INJ IV PUSH: CPT

## 2020-09-09 PROCEDURE — 83690 ASSAY OF LIPASE: CPT

## 2020-09-09 PROCEDURE — 80053 COMPREHEN METABOLIC PANEL: CPT

## 2020-09-09 PROCEDURE — 96372 THER/PROPH/DIAG INJ SC/IM: CPT

## 2020-09-09 PROCEDURE — 2580000003 HC RX 258: Performed by: INTERNAL MEDICINE

## 2020-09-09 PROCEDURE — 80307 DRUG TEST PRSMV CHEM ANLYZR: CPT

## 2020-09-09 PROCEDURE — 96375 TX/PRO/DX INJ NEW DRUG ADDON: CPT

## 2020-09-09 PROCEDURE — C9113 INJ PANTOPRAZOLE SODIUM, VIA: HCPCS | Performed by: EMERGENCY MEDICINE

## 2020-09-09 PROCEDURE — 93005 ELECTROCARDIOGRAM TRACING: CPT | Performed by: EMERGENCY MEDICINE

## 2020-09-09 PROCEDURE — 6360000002 HC RX W HCPCS: Performed by: EMERGENCY MEDICINE

## 2020-09-09 PROCEDURE — 85025 COMPLETE CBC W/AUTO DIFF WBC: CPT

## 2020-09-09 RX ORDER — ACETAMINOPHEN 650 MG/1
650 SUPPOSITORY RECTAL EVERY 6 HOURS PRN
Status: DISCONTINUED | OUTPATIENT
Start: 2020-09-09 | End: 2020-09-10 | Stop reason: HOSPADM

## 2020-09-09 RX ORDER — IPRATROPIUM BROMIDE AND ALBUTEROL SULFATE 2.5; .5 MG/3ML; MG/3ML
1 SOLUTION RESPIRATORY (INHALATION)
Status: DISCONTINUED | OUTPATIENT
Start: 2020-09-10 | End: 2020-09-10

## 2020-09-09 RX ORDER — SODIUM CHLORIDE 0.9 % (FLUSH) 0.9 %
10 SYRINGE (ML) INJECTION EVERY 12 HOURS SCHEDULED
Status: DISCONTINUED | OUTPATIENT
Start: 2020-09-09 | End: 2020-09-10 | Stop reason: HOSPADM

## 2020-09-09 RX ORDER — ONDANSETRON 2 MG/ML
4 INJECTION INTRAMUSCULAR; INTRAVENOUS EVERY 6 HOURS PRN
Status: DISCONTINUED | OUTPATIENT
Start: 2020-09-09 | End: 2020-09-10 | Stop reason: HOSPADM

## 2020-09-09 RX ORDER — MORPHINE SULFATE 4 MG/ML
4 INJECTION, SOLUTION INTRAMUSCULAR; INTRAVENOUS ONCE
Status: COMPLETED | OUTPATIENT
Start: 2020-09-09 | End: 2020-09-09

## 2020-09-09 RX ORDER — METHYLPREDNISOLONE SODIUM SUCCINATE 125 MG/2ML
80 INJECTION, POWDER, LYOPHILIZED, FOR SOLUTION INTRAMUSCULAR; INTRAVENOUS DAILY
Status: DISCONTINUED | OUTPATIENT
Start: 2020-09-09 | End: 2020-09-10 | Stop reason: HOSPADM

## 2020-09-09 RX ORDER — GUAIFENESIN 600 MG/1
600 TABLET, EXTENDED RELEASE ORAL 2 TIMES DAILY
Status: DISCONTINUED | OUTPATIENT
Start: 2020-09-09 | End: 2020-09-10 | Stop reason: HOSPADM

## 2020-09-09 RX ORDER — SODIUM CHLORIDE 0.9 % (FLUSH) 0.9 %
10 SYRINGE (ML) INJECTION PRN
Status: DISCONTINUED | OUTPATIENT
Start: 2020-09-09 | End: 2020-09-10 | Stop reason: HOSPADM

## 2020-09-09 RX ORDER — OMEGA-3S/DHA/EPA/FISH OIL/D3 300MG-1000
400 CAPSULE ORAL DAILY
COMMUNITY

## 2020-09-09 RX ORDER — PANTOPRAZOLE SODIUM 40 MG/10ML
40 INJECTION, POWDER, LYOPHILIZED, FOR SOLUTION INTRAVENOUS ONCE
Status: COMPLETED | OUTPATIENT
Start: 2020-09-09 | End: 2020-09-09

## 2020-09-09 RX ORDER — PANTOPRAZOLE SODIUM 40 MG/1
40 TABLET, DELAYED RELEASE ORAL DAILY
Status: DISCONTINUED | OUTPATIENT
Start: 2020-09-10 | End: 2020-09-10 | Stop reason: HOSPADM

## 2020-09-09 RX ORDER — NICOTINE 21 MG/24HR
1 PATCH, TRANSDERMAL 24 HOURS TRANSDERMAL DAILY
Status: DISCONTINUED | OUTPATIENT
Start: 2020-09-10 | End: 2020-09-10 | Stop reason: HOSPADM

## 2020-09-09 RX ORDER — SODIUM CHLORIDE 9 MG/ML
INJECTION, SOLUTION INTRAVENOUS CONTINUOUS
Status: DISCONTINUED | OUTPATIENT
Start: 2020-09-09 | End: 2020-09-10 | Stop reason: HOSPADM

## 2020-09-09 RX ORDER — CYCLOBENZAPRINE HCL 10 MG
10 TABLET ORAL 3 TIMES DAILY PRN
COMMUNITY

## 2020-09-09 RX ORDER — 0.9 % SODIUM CHLORIDE 0.9 %
1000 INTRAVENOUS SOLUTION INTRAVENOUS ONCE
Status: COMPLETED | OUTPATIENT
Start: 2020-09-09 | End: 2020-09-09

## 2020-09-09 RX ORDER — IPRATROPIUM BROMIDE AND ALBUTEROL SULFATE 2.5; .5 MG/3ML; MG/3ML
1 SOLUTION RESPIRATORY (INHALATION) ONCE
Status: COMPLETED | OUTPATIENT
Start: 2020-09-09 | End: 2020-09-09

## 2020-09-09 RX ORDER — DIAZEPAM 5 MG/1
5 TABLET ORAL EVERY 8 HOURS PRN
Status: DISCONTINUED | OUTPATIENT
Start: 2020-09-09 | End: 2020-09-10 | Stop reason: HOSPADM

## 2020-09-09 RX ORDER — METOCLOPRAMIDE 10 MG/1
10 TABLET ORAL 3 TIMES DAILY PRN
Status: DISCONTINUED | OUTPATIENT
Start: 2020-09-09 | End: 2020-09-10 | Stop reason: HOSPADM

## 2020-09-09 RX ORDER — PROMETHAZINE HYDROCHLORIDE 25 MG/1
12.5 TABLET ORAL EVERY 6 HOURS PRN
Status: DISCONTINUED | OUTPATIENT
Start: 2020-09-09 | End: 2020-09-10 | Stop reason: HOSPADM

## 2020-09-09 RX ORDER — POLYETHYLENE GLYCOL 3350 17 G/17G
17 POWDER, FOR SOLUTION ORAL DAILY PRN
Status: DISCONTINUED | OUTPATIENT
Start: 2020-09-09 | End: 2020-09-10 | Stop reason: HOSPADM

## 2020-09-09 RX ORDER — ACETAMINOPHEN 325 MG/1
650 TABLET ORAL EVERY 6 HOURS PRN
Status: DISCONTINUED | OUTPATIENT
Start: 2020-09-09 | End: 2020-09-10 | Stop reason: HOSPADM

## 2020-09-09 RX ORDER — PROMETHAZINE HYDROCHLORIDE 25 MG/ML
12.5 INJECTION, SOLUTION INTRAMUSCULAR; INTRAVENOUS ONCE
Status: COMPLETED | OUTPATIENT
Start: 2020-09-09 | End: 2020-09-09

## 2020-09-09 RX ORDER — SUCRALFATE 1 G/1
1 TABLET ORAL 4 TIMES DAILY
Status: DISCONTINUED | OUTPATIENT
Start: 2020-09-09 | End: 2020-09-10 | Stop reason: HOSPADM

## 2020-09-09 RX ADMIN — IPRATROPIUM BROMIDE AND ALBUTEROL SULFATE 1 AMPULE: .5; 3 SOLUTION RESPIRATORY (INHALATION) at 20:03

## 2020-09-09 RX ADMIN — SODIUM CHLORIDE: 9 INJECTION, SOLUTION INTRAVENOUS at 22:00

## 2020-09-09 RX ADMIN — SODIUM CHLORIDE 1000 ML: 9 INJECTION, SOLUTION INTRAVENOUS at 20:03

## 2020-09-09 RX ADMIN — PROMETHAZINE HYDROCHLORIDE 12.5 MG: 25 INJECTION INTRAMUSCULAR; INTRAVENOUS at 17:18

## 2020-09-09 RX ADMIN — IPRATROPIUM BROMIDE AND ALBUTEROL SULFATE 1 AMPULE: .5; 3 SOLUTION RESPIRATORY (INHALATION) at 23:06

## 2020-09-09 RX ADMIN — LIDOCAINE HYDROCHLORIDE: 20 SOLUTION ORAL; TOPICAL at 20:03

## 2020-09-09 RX ADMIN — MORPHINE SULFATE 4 MG: 4 INJECTION INTRAVENOUS at 17:19

## 2020-09-09 RX ADMIN — PANTOPRAZOLE SODIUM 40 MG: 40 INJECTION, POWDER, FOR SOLUTION INTRAVENOUS at 20:03

## 2020-09-09 RX ADMIN — SUCRALFATE 1 G: 1 TABLET ORAL at 22:02

## 2020-09-09 RX ADMIN — GUAIFENESIN 600 MG: 600 TABLET, EXTENDED RELEASE ORAL at 22:02

## 2020-09-09 RX ADMIN — SODIUM CHLORIDE 1000 ML: 9 INJECTION, SOLUTION INTRAVENOUS at 17:29

## 2020-09-09 RX ADMIN — METHYLPREDNISOLONE SODIUM SUCCINATE 80 MG: 125 INJECTION, POWDER, FOR SOLUTION INTRAMUSCULAR; INTRAVENOUS at 20:03

## 2020-09-09 ASSESSMENT — PAIN DESCRIPTION - ORIENTATION: ORIENTATION: ANTERIOR;MID

## 2020-09-09 ASSESSMENT — PAIN DESCRIPTION - FREQUENCY: FREQUENCY: CONTINUOUS

## 2020-09-09 ASSESSMENT — PAIN DESCRIPTION - LOCATION: LOCATION: ABDOMEN

## 2020-09-09 ASSESSMENT — PAIN DESCRIPTION - PROGRESSION: CLINICAL_PROGRESSION: GRADUALLY WORSENING

## 2020-09-09 ASSESSMENT — PAIN SCALES - GENERAL
PAINLEVEL_OUTOF10: 8
PAINLEVEL_OUTOF10: 9

## 2020-09-09 ASSESSMENT — PAIN - FUNCTIONAL ASSESSMENT: PAIN_FUNCTIONAL_ASSESSMENT: PREVENTS OR INTERFERES SOME ACTIVE ACTIVITIES AND ADLS

## 2020-09-09 ASSESSMENT — PAIN DESCRIPTION - PAIN TYPE: TYPE: ACUTE PAIN

## 2020-09-09 ASSESSMENT — PAIN DESCRIPTION - DESCRIPTORS: DESCRIPTORS: ACHING;BURNING;CONSTANT;SHARP

## 2020-09-10 VITALS
BODY MASS INDEX: 18.61 KG/M2 | SYSTOLIC BLOOD PRESSURE: 88 MMHG | HEIGHT: 63 IN | DIASTOLIC BLOOD PRESSURE: 60 MMHG | WEIGHT: 105 LBS | OXYGEN SATURATION: 98 % | HEART RATE: 69 BPM | RESPIRATION RATE: 16 BRPM | TEMPERATURE: 97.1 F

## 2020-09-10 LAB
ANION GAP SERPL CALCULATED.3IONS-SCNC: 15 MMOL/L (ref 3–16)
BASOPHILS ABSOLUTE: 0 K/UL (ref 0–0.2)
BASOPHILS RELATIVE PERCENT: 0.2 %
BUN BLDV-MCNC: 23 MG/DL (ref 7–20)
CALCIUM SERPL-MCNC: 8.8 MG/DL (ref 8.3–10.6)
CHLORIDE BLD-SCNC: 104 MMOL/L (ref 99–110)
CO2: 19 MMOL/L (ref 21–32)
CREAT SERPL-MCNC: 1 MG/DL (ref 0.6–1.2)
EKG ATRIAL RATE: 128 BPM
EKG DIAGNOSIS: NORMAL
EKG P AXIS: 80 DEGREES
EKG P-R INTERVAL: 152 MS
EKG Q-T INTERVAL: 388 MS
EKG QRS DURATION: 72 MS
EKG QTC CALCULATION (BAZETT): 566 MS
EKG R AXIS: 84 DEGREES
EKG T AXIS: 67 DEGREES
EKG VENTRICULAR RATE: 128 BPM
EOSINOPHILS ABSOLUTE: 0 K/UL (ref 0–0.6)
EOSINOPHILS RELATIVE PERCENT: 0 %
GFR AFRICAN AMERICAN: >60
GFR NON-AFRICAN AMERICAN: 56
GLUCOSE BLD-MCNC: 169 MG/DL (ref 70–99)
HCT VFR BLD CALC: 39.4 % (ref 36–48)
HEMOGLOBIN: 13.1 G/DL (ref 12–16)
LYMPHOCYTES ABSOLUTE: 1.3 K/UL (ref 1–5.1)
LYMPHOCYTES RELATIVE PERCENT: 12.2 %
MCH RBC QN AUTO: 31.8 PG (ref 26–34)
MCHC RBC AUTO-ENTMCNC: 33.2 G/DL (ref 31–36)
MCV RBC AUTO: 95.5 FL (ref 80–100)
MONOCYTES ABSOLUTE: 0.3 K/UL (ref 0–1.3)
MONOCYTES RELATIVE PERCENT: 2.5 %
NEUTROPHILS ABSOLUTE: 8.9 K/UL (ref 1.7–7.7)
NEUTROPHILS RELATIVE PERCENT: 85.1 %
PDW BLD-RTO: 15 % (ref 12.4–15.4)
PLATELET # BLD: 167 K/UL (ref 135–450)
PMV BLD AUTO: 10.5 FL (ref 5–10.5)
POTASSIUM REFLEX MAGNESIUM: 4.2 MMOL/L (ref 3.5–5.1)
RBC # BLD: 4.13 M/UL (ref 4–5.2)
SODIUM BLD-SCNC: 138 MMOL/L (ref 136–145)
WBC # BLD: 10.4 K/UL (ref 4–11)

## 2020-09-10 PROCEDURE — 80048 BASIC METABOLIC PNL TOTAL CA: CPT

## 2020-09-10 PROCEDURE — 99238 HOSP IP/OBS DSCHRG MGMT 30/<: CPT | Performed by: INTERNAL MEDICINE

## 2020-09-10 PROCEDURE — 94640 AIRWAY INHALATION TREATMENT: CPT

## 2020-09-10 PROCEDURE — G0378 HOSPITAL OBSERVATION PER HR: HCPCS

## 2020-09-10 PROCEDURE — 6360000002 HC RX W HCPCS: Performed by: EMERGENCY MEDICINE

## 2020-09-10 PROCEDURE — 96376 TX/PRO/DX INJ SAME DRUG ADON: CPT

## 2020-09-10 PROCEDURE — 93010 ELECTROCARDIOGRAM REPORT: CPT | Performed by: INTERNAL MEDICINE

## 2020-09-10 PROCEDURE — 85025 COMPLETE CBC W/AUTO DIFF WBC: CPT

## 2020-09-10 PROCEDURE — 2700000000 HC OXYGEN THERAPY PER DAY

## 2020-09-10 PROCEDURE — 36415 COLL VENOUS BLD VENIPUNCTURE: CPT

## 2020-09-10 PROCEDURE — 6370000000 HC RX 637 (ALT 250 FOR IP): Performed by: INTERNAL MEDICINE

## 2020-09-10 PROCEDURE — 6360000002 HC RX W HCPCS: Performed by: INTERNAL MEDICINE

## 2020-09-10 PROCEDURE — 96372 THER/PROPH/DIAG INJ SC/IM: CPT

## 2020-09-10 PROCEDURE — 94761 N-INVAS EAR/PLS OXIMETRY MLT: CPT

## 2020-09-10 RX ORDER — FLUTICASONE PROPIONATE 50 MCG
1 SPRAY, SUSPENSION (ML) NASAL DAILY
COMMUNITY

## 2020-09-10 RX ORDER — MIDODRINE HYDROCHLORIDE 5 MG/1
5 TABLET ORAL ONCE
Status: COMPLETED | OUTPATIENT
Start: 2020-09-10 | End: 2020-09-10

## 2020-09-10 RX ORDER — IPRATROPIUM BROMIDE AND ALBUTEROL SULFATE 2.5; .5 MG/3ML; MG/3ML
1 SOLUTION RESPIRATORY (INHALATION) 2 TIMES DAILY
Status: DISCONTINUED | OUTPATIENT
Start: 2020-09-10 | End: 2020-09-10 | Stop reason: HOSPADM

## 2020-09-10 RX ORDER — DIPHENHYDRAMINE HCL 25 MG
50 CAPSULE ORAL EVERY 6 HOURS PRN
COMMUNITY

## 2020-09-10 RX ADMIN — PANTOPRAZOLE SODIUM 40 MG: 40 TABLET, DELAYED RELEASE ORAL at 08:15

## 2020-09-10 RX ADMIN — METHYLPREDNISOLONE SODIUM SUCCINATE 80 MG: 125 INJECTION, POWDER, FOR SOLUTION INTRAMUSCULAR; INTRAVENOUS at 08:15

## 2020-09-10 RX ADMIN — ENOXAPARIN SODIUM 30 MG: 30 INJECTION SUBCUTANEOUS at 08:15

## 2020-09-10 RX ADMIN — GUAIFENESIN 600 MG: 600 TABLET, EXTENDED RELEASE ORAL at 08:15

## 2020-09-10 RX ADMIN — MIDODRINE HYDROCHLORIDE 5 MG: 5 TABLET ORAL at 10:49

## 2020-09-10 RX ADMIN — IPRATROPIUM BROMIDE AND ALBUTEROL SULFATE 1 AMPULE: .5; 3 SOLUTION RESPIRATORY (INHALATION) at 08:16

## 2020-09-10 ASSESSMENT — PAIN SCALES - GENERAL: PAINLEVEL_OUTOF10: 0

## 2020-09-10 NOTE — DISCHARGE INSTR - COC
Continuity of Care Form    Patient Name: Dillan Gamboa   :  1960  MRN:  [id number]    Admit date:  2020  Discharge date:  9/10/2020    Code Status Order: Full Code   Advance Directives:   [facility] Documentation     Date/Time Healthcare Directive Type of Healthcare Directive Copy in [facility] [address] Agent's Name Healthcare Agent's Phone Number    20 [phone]  No, patient does not have an advance directive for healthcare treatment -- -- -- -- --          Admitting Physician:  Mary Farrar MD  PCP: Mallory Waterman RN    Discharging Nurse:  [address] Unit/Room#: 0229/0229-02  Discharging Unit Phone Number: [phone]    Emergency Contact:   Extended Emergency Contact Information  Primary Emergency Contact: [address] [place] [address] Phone: [phone]  Relation: Child  Secondary Emergency Contact: [facility]  Address: [address] Phone: [phone]  Mobile Phone: [phone]  Relation: Child    Past Surgical History:  Past Surgical History:   Procedure Laterality Date    CHOLECYSTECTOMY      COLONOSCOPY      COLONOSCOPY      tubular adenoma    COLONOSCOPY  08/15/2017    ENDOSCOPY, COLON, DIAGNOSTIC      HERNIA REPAIR      HYSTERECTOMY  1985    With Bladder Mesh    INCONTINENCE SURGERY Left      in [facility]      cancerous nodule removed left side    OVARY REMOVAL  2007    bilat    TONSILLECTOMY      UPPER GASTROINTESTINAL ENDOSCOPY      gastritis    UPPER GASTROINTESTINAL ENDOSCOPY  10/03/2017    bx distal esophagus        Immunization History:   Immunization History   Administered Date(s) Administered    Influenza Vaccine, unspecified formulation 2012, 2012, 10/06/2014    Influenza Virus Vaccine 2013    Influenza, MDCK Quadv, IM, PF (Flucelvax 4 yrs and older) 2017  Pneumococcal Conjugate 13-valent ([id number]) 05/07/2018    Pneumococcal Conjugate 7-valent (Prevnar7) 10/08/2012    Pneumococcal Polysaccharide ([id number]) 10/08/2012       Active Problems:  Patient Active Problem List   Diagnosis Code    COPD (chronic obstructive pulmonary disease) ([facility] J44.9    Anxiety & depression F41.9    Intractable Nausea and vomiting R11.2    Dehydration/volume depletion E86.0    AGMA (anion gap metabolic acidosis) V88.7    HANK (acute kidney injury) ([facility] N17.9    Leukocytosis D72.829    Erythrocytosis D75.1    Abdominal pain R10.9    Marijuana use F12.90    SIRS (systemic inflammatory response syndrome) ([facility]) R65.10    Cannabinoid hyperemesis syndrome ([facility]) F12.988    Chronic diastolic CHF (congestive heart failure) ([facility]) I50.32    CAD (coronary artery disease) I25.10    Polysubstance abuse ([facility]) K62.17    Cyclical vomiting with nausea R11.15    Hx of uterine cancer C80.1    Bipolar affective ([facility]) F31.9    Fatty liver K76.0    QTc-prolongation R94.31    Moderate malnutrition ([facility]) E44.0    Chest pain R07.9    Acute respiratory failure ([facility]) J96.00    Current smoker F17.200    Chronic allergic rhinitis J30.9    COPD exacerbation ([facility]) J44.1    Pneumonitis J18.9    Hypotension I95.9    H/O: lung cancer Z85. 118    Sepsis ([facility]) A41.9    Acute cystitis with hematuria N30.01    Intractable vomiting with nausea R11.2    Hypercalcemia E83.52    Lactic acid acidosis E87.2    SIRS (systemic inflammatory response syndrome) ([facility]) R65.10    Acute kidney injury ([facility] N17.9       Isolation/Infection:   Isolation          No Isolation        Patient Infection Status     None to display          Nurse Assessment:  Last Vital Signs: BP 88/60   Pulse 69   Temp 97.1 °F (36.2 °C) (Oral)   Resp 16   Ht 5' 3\" (1.6 m)   Wt 105 lb (47.6 kg)   SpO2 98%   BMI 18.60 kg/m²     Last documented pain score (0-10 scale): Pain Level: 0  Last Weight:   Wt Readings from Last

## 2020-09-10 NOTE — PLAN OF CARE
Problem: Safety:  Goal: Free from accidental physical injury  Description: Free from accidental physical injury  9/10/2020 1023 by Crystal Musa RN  Outcome: Ongoing  9/9/2020 2230 by Gaetano Chun RN  Outcome: Ongoing     Problem: Daily Care:  Goal: Daily care needs are met  Description: Daily care needs are met  9/10/2020 1023 by Crystal Musa RN  Outcome: Ongoing  9/9/2020 2230 by Gaetano Chun RN  Outcome: Ongoing     Problem: Nausea/Vomiting:  Goal: Absence of nausea/vomiting  Description: Absence of nausea/vomiting  9/10/2020 1023 by Crystal Musa RN  Outcome: Ongoing  9/9/2020 2230 by Gaetano Chun RN  Outcome: Ongoing     Problem: Nausea/Vomiting:  Goal: Able to eat  Description: Able to eat  9/10/2020 1023 by Crystal Musa RN  Outcome: Ongoing  9/9/2020 2230 by Gaetano Chun RN  Outcome: Ongoing

## 2020-09-10 NOTE — H&P
Hospital Medicine History & Physical      PCP: Josh Moon RN    Date of Admission: 9/9/2020    Date of Service: Pt seen/examined on 9/9/2020    Chief Complaint: Intractable nausea vomiting and abdominal pain    History Of Present Illness:    60 y.o. female who presented to the hospital with a chief complaint of intractable nausea vomiting and midepigastric abdominal pain. She has a history of gastritis and endorses excessive smoking and cannabinoid use. The patient describes the pain as a sharp mid epigastric pain, rates it a 7 out of 10. She says it is intermittent and comes and goes. Today she is not been able to keep anything down. Attempts to treat her symptoms in emergency department were unsuccessful. She was not able to tolerate p.o. She will be admitted for further evaluation and treatment.     Past Medical History:          Diagnosis Date    Anxiety     Asthma     Cancer [facility]) 1984    Uterine    Cancer of lung ([facility] [address] 2014    CHF (congestive heart failure) (HCC)     COPD (chronic obstructive pulmonary disease) (HCC)     Cyclic vomiting syndrome     with known cannabis abuse    Cysts of both kidneys     Depression     Fatty liver 09/06/12    by CT at [facility]    Gastritis 06/29/12    EGD at [facility] MI (myocardial infarction) ([facility] [address] 2010    Panic attacks     Pneumonia     Pre-diabetes 04/13/13    A1c5.8%    Tricuspid regurgitation     06/26/2012 JUANJO at [facility] Structurally Normal Tricuspid Valve       Past Surgical History:          Procedure Laterality Date    CHOLECYSTECTOMY  2009    COLONOSCOPY  2001    COLONOSCOPY  2013    tubular adenoma    COLONOSCOPY  08/15/2017    ENDOSCOPY, COLON, DIAGNOSTIC      HERNIA REPAIR      HYSTERECTOMY  1985    With Bladder Mesh    INCONTINENCE SURGERY Left     2009 in [address] LUNG CANCER SURGERY      cancerous nodule removed left side    OVARY REMOVAL  2007    bilat    TONSILLECTOMY      UPPER GASTROINTESTINAL ENDOSCOPY  2013    gastritis    UPPER GASTROINTESTINAL ENDOSCOPY  10/03/2017    bx distal esophagus        Medications Prior to Admission:      Prior to Admission medications    Medication Sig Start Date End Date Taking? Authorizing Provider   vitamin D3 (CHOLECALCIFEROL) 10 MCG (400 UNIT) TABS tablet Take 400 Units by mouth daily   Yes Historical Provider, MD   cyclobenzaprine (FLEXERIL) 10 MG tablet Take 10 mg by mouth 3 times daily as needed for Muscle spasms   Yes Historical Provider, MD   midodrine (PROAMATINE) 5 MG tablet Take 1 tablet by mouth 3 times daily (with meals) 6/9/20  Yes Polo Jauregui MD   pantoprazole (PROTONIX) 40 MG tablet Take 1 tablet by mouth daily 1/27/19  Yes Ibrahima Gerber MD   albuterol sulfate HFA (PROAIR HFA) 108 (90 Base) MCG/ACT inhaler Inhale 2 puffs into the lungs every 6 hours as needed for Wheezing 4/21/18  Yes MACEY Curtis CNP   ipratropium-albuterol (DUONEB) 0.5-2.5 (3) MG/3ML SOLN nebulizer solution Inhale 3 mLs into the lungs every 6 hours as needed for Shortness of Breath 4/21/18  Yes MACEY Curtis CNP   ondansetron (ZOFRAN ODT) 4 MG disintegrating tablet Take 1 tablet by mouth every 8 hours as needed for Nausea 4/2/18  Yes Ashia Medley DO   diazepam (VALIUM) 5 MG tablet Take 5 mg by mouth every 8 hours as needed for Anxiety    Yes Historical Provider, MD   umeclidinium-vilanterol (ANORO ELLIPTA) 62.5-25 MCG/INH AEPB inhaler Inhale 1 puff into the lungs daily 5/22/18   Marilyn Gupta MD   guaiFENesin (MUCINEX) 600 MG extended release tablet Take 1 tablet by mouth 2 times daily 4/21/18   MACEY Curtis CNP       Allergies:  Bactrim [sulfamethoxazole-trimethoprim] and Codeine    Social History:      TOBACCO:   reports that she has been smoking cigarettes. She has a 22.50 pack-year smoking history. She has never used smokeless tobacco.  ETOH:   reports no history of alcohol use.       Family History:          Problem Relation Age of Onset    Heart Disease Father    Jami Hypertension Father     High Blood Pressure Father     Heart Disease Mother     Diabetes Maternal Grandmother     Cancer Son         NHL    High Blood Pressure Son        REVIEW OF SYSTEMS:   Constitutional:  Negative for fever,chills or night sweats  ENT:  Negative for rhinorrhea, epistaxis, hoarseness, sore throat. Respiratory: Negative for SOB or wheezing   Cardiovascular:   Negative for  chest pain, palpitations   Gastrointestinal:   Positive for abdominal pain, nausea and vomiting  Genitourinary:  Negative for polyuria, dysuria   Hematologic/Lymphatic:  Negative for  bleeding tendency, easy bruising  Musculoskeletal:  Negative for myalgias and arthralgias  Neurologic:  Negative for  confusion,dysarthria. Skin:  Negative for itching,rash  Psychiatric:  Negative for depression,anxiety, agitation. Endocrine:  Negative for polydipsia,polyuria,heat /cold intolerance. PHYSICAL EXAM:    /75   Pulse 100   Temp 99.4 °F (37.4 °C) (Oral)   Resp 16   Ht 5' 3\" (1.6 m)   Wt 105 lb (47.6 kg)   SpO2 96%   BMI 18.60 kg/m²     General appearance:  No apparent distress, appears stated age and cooperative. HEENT:  Normal cephalic, atraumatic without obvious deformity. Pupils equal, round, and reactive to light. Extra ocular muscles intact. Conjunctivae/corneas clear. Neck: Supple, with full range of motion. No jugular venous distention. Trachea midline. Respiratory: Expiratory next Tory wheezing appreciated in all lung fields  Cardiovascular:  Regular rate and rhythm with normal S1/S2 without murmurs, rubs or gallops. Abdomen: Pain with palpation over midepigastric area  Musculoskeletal:  No clubbing, cyanosis or edema bilaterally. Full range of motion without deformity. Skin: Skin color, texture, turgor normal.  No rashes or lesions. Neurologic:  Neurovascularly intact without any focal sensory/motor deficits.  Cranial nerves: II-XII intact, grossly non-focal.  Psychiatric:  Alert and oriented, thought content appropriate, normal insight  Capillary Refill: Brisk,< 3 seconds   Peripheral Pulses: +2 palpable, equal bilaterally       Labs:   Recent Labs     09/09/20  1645   WBC 17.1*   HGB 16.6*   HCT 49.1*        Recent Labs     09/09/20  1645      K 4.9   CL 91*   CO2 22   BUN 34*   CREATININE 1.6*   CALCIUM 11.2*     Recent Labs     09/09/20  1645   AST 30   ALT 20   BILITOT 0.5   ALKPHOS 111     No results for input(s): INR in the last 72 hours. Recent Labs     09/09/20  1645   TROPONINI <0.01       Urinalysis:      Lab Results   Component Value Date    NITRU Negative 09/09/2020    WBCUA 3-5 09/09/2020    BACTERIA 3+ 09/09/2020    RBCUA 3-4 09/09/2020    BLOODU SMALL 09/09/2020    SPECGRAV >=1.030 09/09/2020    GLUCOSEU Negative 09/09/2020       Radiology:   CT ABDOMEN PELVIS WO CONTRAST Additional Contrast? None   Preliminary Result   1. Mild gastric distension with mild gastric wall thickening given the degree   of distention. Findings may be related to an underlying gastritis. 2. No other acute findings within the abdomen or pelvis. 3. Nonobstructive right nephrolithiasis. 4. Bilateral AVN of the hips, right greater than left. XR CHEST PORTABLE   Final Result   Small bilateral No acute infiltrate pleural effusions or. Evidence of overt   failure. COPD. ASSESSMENT:  Acute kidney injury  Intractable nausea vomiting  Abdominal pain  Cyclical vomiting  COPD, not in exacerbation  History of gastritis  Tobacco abuse  Marijuana abuse    PLAN:  The patient probably has cyclical vomiting syndrome from excessive marijuana abuse. She also has a history of gastritis which could also be contributing to her symptoms.   Her HANK is likely prerenal secondary to vomiting and decreased p.o. intake.  -Start IV fluids, hold nephrotoxic medications  -Pain control, start Carafate Protonix   -Antiemetics and supportive care  -Advance diet as tolerated, reevaluate in the a.m.  -DuoNeb therapy given a dose of steroids in the emergency department secondary to wheezing. DVT Prophylaxis: Lovenox  Diet: No diet orders on file  Code Status: Full Code    Dispo -admit for observation      Thank you for the opportunity to be involved in this patient's care.       (Please note that portions of this note were completed with a voice recognition program. Efforts were made to edit the dictations but occasionally words are mis-transcribed.)

## 2020-09-10 NOTE — PROGRESS NOTES
Admission assessment completed, see flow sheet. Pt is alert and oriented. Respirations are even & easy at rest, expiratory wheezes noted. Started full liquid diet as pt has not had any vomiting for several hours. Discussed with pt to start slowly. No complaints voiced. Pt denies needs at this time. SR up x 2, and bed in low position. Call light is within reach. Pt declines full skin assessment.
Message sent to MD regarding pt states she is on Flonase and Benadryl at home in the mornings.
Patient alert and oriented. Denies any needs at this time. Side rails up x 2, bed in lowest position, wheels locked, bed alarm on, call light and bed side table in reach. Patient refused Nicotine patch and sucralfate medication this morning despite education. Patient eating breakfast this morning and tolerating well. No vomiting noted at this time. Blood pressure 88/60, pulse 69, temperature 97.1 °F (36.2 °C), temperature source Oral, resp. rate 16, height 5' 3\" (1.6 m), weight 105 lb (47.6 kg), SpO2 98 %, not currently breastfeeding.       Electronically signed by David Reed RN on 9/10/2020 at 8:23 AM
Reviewed paper work with patient for discharge. Patient voices understanding on medications. IV removed at this time. Writer taking patient down stairs in wheelchair to meet Alberto Dorado for  at this time.
Mucolytic Agent:    1. Must always be administered with a bronchodilator. 2. Discontinue if patient experiences worsened bronchospasm, or secretions have lessened to the point that the patient is able to clear them with a cough. Anti-inflammatory and Combination Medications:    1. If the patient lacks prior history of lung disease, is not using inhaled anti-inflammatory medication at home, and lacks wheezing by examination or by history for at least 24 hours, contact physician for possible discontinuation.

## 2020-09-10 NOTE — ED PROVIDER NOTES
[address] [facility] [address] [place] MEDICAL-SURGICAL      CHIEF COMPLAINT  Emesis (since Friday)       HISTORY OF PRESENT ILLNESS  Jay Cortez is a 60 y.o. female with a past medical history of COPD, CHF, coronary artery disease who presents to the ED complaining of vomiting. The patient states that she has been vomiting since Friday. States she has not been able to eat or drink. She describes left upper quadrant abdominal pain. States the pain started before the vomiting. Denies hematemesis. Denies hematochezia or melena. She states she has had this problem for quite some time, and no one is able to diagnose this. She states she uses marijuana. She denies fever or chills. She denies chest pain. She does describe a cough, and some worsening shortness of breath than usual.  She denies diarrhea. States she is passing gas. She has had a cholecystectomy. No other complaints, modifying factors or associated symptoms. I have reviewed the following from the nursing documentation.     Past Medical History:   Diagnosis Date    Anxiety     Asthma     Cancer [facility]) 1984    Uterine    Cancer of lung ([facility] [address] 2014    CHF (congestive heart failure) (HCC)     COPD (chronic obstructive pulmonary disease) (HCC)     Cyclic vomiting syndrome     with known cannabis abuse    Cysts of both kidneys     Depression     Fatty liver 09/06/12    by CT [facility]    Gastritis 06/29/12    EGD at [place] [id number] MI (myocardial infarction) ([facility] [address] 2010    Panic attacks     Pneumonia     Pre-diabetes 04/13/13    A1c5.8%    Tricuspid regurgitation     06/26/2012 JUANJO at [facility] Structurally Normal Tricuspid Valve     Past Surgical History:   Procedure Laterality Date    CHOLECYSTECTOMY  2009    COLONOSCOPY  2001    COLONOSCOPY  2013    tubular adenoma    COLONOSCOPY  08/15/2017    ENDOSCOPY, COLON, DIAGNOSTIC      HERNIA REPAIR      HYSTERECTOMY  1985    With Bladder Mesh    INCONTINENCE SURGERY Left     2009 in [facility] N/V    Codeine Itching and Nausea And Vomiting     GI upset & itching       REVIEW OF SYSTEMS  10 systems reviewed, pertinent positives per HPI otherwise noted to be negative. PHYSICAL EXAM  /75   Pulse 100   Temp 99.4 °F (37.4 °C) (Oral)   Resp 16   Ht 5' 3\" (1.6 m)   Wt 105 lb (47.6 kg)   SpO2 96%   BMI 18.60 kg/m²    Physical exam:  General appearance: awake and cooperative. No distress. Ill appearing. Skin: Warm and dry. No rashes or lesions. HENT: Normocephalic. Atraumatic. Mucus membranes are dry. Neck: supple  Eyes: BRITTANY. EOM intact. Heart: Tachycardic rate. Regular rhythm. No murmurs. Lungs: Respirations unlabored. CTAB. Breath sounds diminished diffusely. Good air exchange  Abdomen: Tenderness palpation left upper quadrant. Soft. Non distended. No peritoneal signs. Musculoskeletal: No extremity edema. Compartments soft. No deformity. No tenderness in the extremities. All extremities neurovascularly intact. Radial, Dp, and PT pulses +2/4 bilaterally  Neurological: Alert and oriented. No focal deficits. No aphasia or dysarthria. Psychiatric: Normal mood and affect. LABS  I have reviewed all labs for this visit.    Results for orders placed or performed during the hospital encounter of 09/09/20   CBC Auto Differential   Result Value Ref Range    WBC 17.1 (H) 4.0 - 11.0 K/uL    RBC 5.30 (H) 4.00 - 5.20 M/uL    Hemoglobin 16.6 (H) 12.0 - 16.0 g/dL    Hematocrit 49.1 (H) 36.0 - 48.0 %    MCV 92.6 80.0 - 100.0 fL    MCH 31.3 26.0 - 34.0 pg    MCHC 33.8 31.0 - 36.0 g/dL    RDW 14.1 12.4 - 15.4 %    Platelets 421 663 - 553 K/uL    MPV 10.6 (H) 5.0 - 10.5 fL    Neutrophils % 81.4 %    Lymphocytes % 14.0 %    Monocytes % 4.2 %    Eosinophils % 0.0 %    Basophils % 0.4 %    Neutrophils Absolute 13.9 (H) 1.7 - 7.7 K/uL    Lymphocytes Absolute 2.4 1.0 - 5.1 K/uL    Monocytes Absolute 0.7 0.0 - 1.3 K/uL    Eosinophils Absolute 0.0 0.0 - 0.6 K/uL    Basophils Absolute 0.1 0.0 - 0.2 K/uL   Comprehensive Metabolic Panel w/ Reflex to MG   Result Value Ref Range    Sodium 136 136 - 145 mmol/L    Potassium reflex Magnesium 4.9 3.5 - 5.1 mmol/L    Chloride 91 (L) 99 - 110 mmol/L    CO2 22 21 - 32 mmol/L    Anion Gap 23 (H) 3 - 16    Glucose 136 (H) 70 - 99 mg/dL    BUN 34 (H) 7 - 20 mg/dL    CREATININE 1.6 (H) 0.6 - 1.2 mg/dL    GFR Non- 33 (A) >60    GFR  40 (A) >60    Calcium 11.2 (H) 8.3 - 10.6 mg/dL    Total Protein 8.1 6.4 - 8.2 g/dL    Alb 5.2 (H) 3.4 - 5.0 g/dL    Albumin/Globulin Ratio 1.8 1.1 - 2.2    Total Bilirubin 0.5 0.0 - 1.0 mg/dL    Alkaline Phosphatase 111 40 - 129 U/L    ALT 20 10 - 40 U/L    AST 30 15 - 37 U/L    Globulin 2.9 g/dL   Lipase   Result Value Ref Range    Lipase 14.0 13.0 - 60.0 U/L   Urinalysis Reflex to Culture    Specimen: Urine, clean catch   Result Value Ref Range    Color, UA Yellow Straw/Yellow    Clarity, UA SL CLOUDY (A) Clear    Glucose, Ur Negative Negative mg/dL    Bilirubin Urine MODERATE (A) Negative    Ketones, Urine 15 (A) Negative mg/dL    Specific Gravity, UA >=1.030 1.005 - 1.030    Blood, Urine SMALL (A) Negative    pH, UA 5.5 5.0 - 8.0    Protein, UA >=300 (A) Negative mg/dL    Urobilinogen, Urine 0.2 <2.0 E.U./dL    Nitrite, Urine Negative Negative    Leukocyte Esterase, Urine TRACE (A) Negative    Microscopic Examination YES     Urine Type NotGiven     Urine Reflex to Culture Not Indicated    Lactic Acid, Plasma   Result Value Ref Range    Lactic Acid 2.2 (H) 0.4 - 2.0 mmol/L   Troponin   Result Value Ref Range    Troponin <0.01 <0.01 ng/mL   Drug screen multi urine   Result Value Ref Range    Amphetamine Screen, Urine Neg Negative <1000ng/mL    Barbiturate Screen, Ur Neg Negative <200 ng/mL    Benzodiazepine Screen, Urine POSITIVE (A) Negative <200 ng/mL    Cannabinoid Scrn, Ur POSITIVE (A) Negative <50 ng/mL    Cocaine Metabolite Screen, Urine Neg Negative <300 ng/mL    Opiate Scrn, Ur POSITIVE (A) Negative <300 ng/mL    PCP Screen, Urine Neg Negative <25 ng/mL    Methadone Screen, Urine Neg Negative <300 ng/mL    Propoxyphene Scrn, Ur Neg Negative <300 ng/mL    Oxycodone Urine Neg Negative <100 ng/ml    pH, UA 5.5     Drug Screen Comment: see below    Microscopic Urinalysis   Result Value Ref Range    Hyaline Casts, UA 6-10 (A) 0 - 2 /LPF    Mucus, UA 2+ (A) None Seen /LPF    WBC, UA 3-5 0 - 5 /HPF    RBC, UA 3-4 0 - 4 /HPF    Epithelial Cells, UA 21-50 (A) 0 - 5 /HPF    Bacteria, UA 3+ (A) None Seen /HPF   EKG 12 Lead   Result Value Ref Range    Ventricular Rate 128 BPM    Atrial Rate 128 BPM    P-R Interval 152 ms    QRS Duration 72 ms    Q-T Interval 388 ms    QTc Calculation (Bazett) 566 ms    P Axis 80 degrees    R Axis 84 degrees    T Axis 67 degrees    Diagnosis       Sinus tachycardiaOtherwise normal ECGWhen compared with ECG of 06-JUN-2020 20:52,ST now depressed in Inferior leads       ECG  The Ekg interpreted by me shows  sinus tachycardia, nktt=250   Axis is   Normal  QTc is  566  Intervals and Durations are unremarkable. ST Segments: normal  No significant change from prior EKG dated 6/6/20      RADIOLOGY  Ct Abdomen Pelvis Wo Contrast Additional Contrast? None    Result Date: 9/9/2020  EXAMINATION: CT OF THE ABDOMEN AND PELVIS WITHOUT CONTRAST 9/9/2020 7:05 pm TECHNIQUE: CT of the abdomen and pelvis was performed without the administration of intravenous contrast. Multiplanar reformatted images are provided for review. Dose modulation, iterative reconstruction, and/or weight based adjustment of the mA/kV was utilized to reduce the radiation dose to as low as reasonably achievable. COMPARISON: 07/08/2020. HISTORY: ORDERING SYSTEM PROVIDED HISTORY: luq abd pain TECHNOLOGIST PROVIDED HISTORY: Reason for exam:->luq abd pain Additional Contrast?->None Reason for Exam: luq abd pain, Emesis (since Friday) Acuity: Acute Type of Exam: Initial FINDINGS: Lower Chest: No acute infiltrate at the lung bases.  Organs: Previous cholecystectomy. Prominence of the common bile duct, likely physiologic in the post cholecystectomy state. The unenhanced liver, spleen, pancreas and adrenal glands are unremarkable. No evidence of obstructive uropathy. Punctate right renal calculus. Bilateral exophytic renal lesions consistent with simple cysts. GI/Bowel: No pericolonic inflammatory changes. The appendix is unremarkable. No small bowel distension. The stomach is mildly distended with mild gastric wall thickening given the degree of distention. Findings are similar on the previous study. Pelvis: No pelvic mass or free pelvic fluid. The uterus is absent. Mild distention of the urinary bladder. Peritoneum/Retroperitoneum: The abdominal aorta is normal in caliber with mild-to-moderate atherosclerotic plaque. No retroperitoneal adenopathy. No upper abdominal ascites. Bones/Soft Tissues: No acute osseous or soft tissue abnormality. Degenerative disc disease at L2-3. There are changes of AVN involving the hips bilaterally with subchondral fracture of the right femoral head. 1. Mild gastric distension with mild gastric wall thickening given the degree of distention. Findings may be related to an underlying gastritis. 2. No other acute findings within the abdomen or pelvis. 3. Nonobstructive right nephrolithiasis. 4. Bilateral AVN of the hips, right greater than left. Xr Chest Portable    Result Date: 9/9/2020  EXAMINATION: ONE XRAY VIEW OF THE CHEST 9/9/2020 5:06 pm COMPARISON: 06/06/2020. HISTORY: ORDERING SYSTEM PROVIDED HISTORY: sob TECHNOLOGIST PROVIDED HISTORY: Reason for exam:->sob Reason for Exam: Emesis (since Friday), SOB FINDINGS: The cardiomediastinal silhouette is unremarkable. Aortic vascular calcification. Small bilateral pleural effusions. No acute infiltrate or evidence of overt failure. Emphysematous changes are noted with flattening of the hemidiaphragms.      Small bilateral No acute infiltrate pleural effusions or. Evidence of overt failure. COPD. ED COURSE/MDM  Patient seen and evaluated. Old records reviewed. Labs and imaging reviewed and results discussed with patient. Patient is a 50-year-old presenting with vomiting. She is initially tachycardic to 138. She does have a temperature of 100.6. She initially does meet SIRS criteria. Lactate is mildly elevated 2.2. Patient is given 2 L of fluid. It appears that she has cyclical vomiting syndrome from chronic marijuana use. However due to her tachycardia and fever, abdominal CT is ordered. This is negative for an acute process except some gastric distention. No acute infection noted. Patient was treated for her symptoms here. Her heart rate improved to 110. Her UA is clear. Chest x-ray is negative, I do not suspect pneumonia. Patient more likely has a COPD exacerbation and was treated for this. I did not order antibiotics because there is no source of infection to treat. The patient became defensive when I suggested her symptoms were from chronic marijuana use. She has an HANK and will require admission for fluid resuscitation. I spoke with Dr. Seretha Schirmer who accepts the patient.     During the patient's ED course, the patient was given:  Medications   methylPREDNISolone sodium (SOLU-MEDROL) injection 80 mg (80 mg Intravenous Given 9/9/20 2003)   diazePAM (VALIUM) tablet 5 mg (has no administration in time range)   guaiFENesin (MUCINEX) extended release tablet 600 mg (600 mg Oral Given 9/9/20 2202)   metoclopramide (REGLAN) tablet 10 mg (has no administration in time range)   nicotine (NICODERM CQ) 14 MG/24HR 1 patch (has no administration in time range)   pantoprazole (PROTONIX) tablet 40 mg (has no administration in time range)   sucralfate (CARAFATE) tablet 1 g (1 g Oral Given 9/9/20 2202)   sodium chloride flush 0.9 % injection 10 mL (10 mLs Intravenous Not Given 9/9/20 2220)   sodium chloride flush 0.9 % injection 10 mL (has no were made by me to ensure accuracy, however some errors may be present due to limitations of this technology and occasionally words are not transcribed correctly.        Agnes Lyon  09/10/20 [id number]

## 2020-09-10 NOTE — DISCHARGE SUMMARY
Name:  Sierra Delaney  Room:  0229/0229-02  MRN:    [id number]    Discharge Summary      This discharge summary is in conjunction with a complete physical exam done on the day of discharge. Discharging Physician: Dr. Mayur Mora: 9/9/2020  Discharge:   9/10/2020     HPI taken from admission H&P:      60 y.o. female who presented to the hospital with a chief complaint of intractable nausea vomiting and midepigastric abdominal pain. She has a history of gastritis and endorses excessive smoking and cannabinoid use. The patient describes the pain as a sharp mid epigastric pain, rates it a 7 out of 10. She says it is intermittent and comes and goes. Today she is not been able to keep anything down. Attempts to treat her symptoms in emergency department were unsuccessful. She was not able to tolerate p.o. She will be admitted for further evaluation and treatment. Diagnoses this Admission and Hospital Course     Acute kidney injury  - 2/2 fluid losses- crt baseline after IVF     Intractable nausea vomiting  Abdominal pain  Cyclical vomiting  - resolved, recommended THC cessation    COPD, not in exacerbation  - cont home inhalers    History of gastritis  - PPI    Tobacco abuse  Marijuana abuse  - recommended cessation     Procedures (Please Review Full Report for Details)  None     Consults    None       Physical Exam at Discharge:    BP 88/60   Pulse 69   Temp 97.1 °F (36.2 °C) (Oral)   Resp 16   Ht 5' 3\" (1.6 m)   Wt 105 lb (47.6 kg)   SpO2 98%   BMI 18.60 kg/m²       General appearance:  No apparent distress, appears stated age and cooperative. HEENT:  Normal cephalic, atraumatic without obvious deformity. Pupils equal, round, and reactive to light. Extra ocular muscles intact. Conjunctivae/corneas clear. Neck: Supple, with full range of motion. No jugular venous distention. Trachea midline.   Respiratory: Expiratory next Tory wheezing appreciated in all lung fields  Cardiovascular: Regular rate and rhythm with normal S1/S2 without murmurs, rubs or gallops. Abdomen: Pain with palpation over midepigastric area  Musculoskeletal:  No clubbing, cyanosis or edema bilaterally. Full range of motion without deformity. Skin: Skin color, texture, turgor normal.  No rashes or lesions. Neurologic:  Neurovascularly intact without any focal sensory/motor deficits. Cranial nerves: II-XII intact, grossly non-focal.  Psychiatric:  Alert and oriented, thought content appropriate, normal insight  Capillary Refill: Brisk,< 3 seconds   Peripheral Pulses: +2 palpable, equal bilaterally       CBC:   Recent Labs     09/09/20  1645 09/10/20  0606   WBC 17.1* 10.4   HGB 16.6* 13.1   HCT 49.1* 39.4   MCV 92.6 95.5    167     BMP:   Recent Labs     09/09/20  1645 09/10/20  0606    138   K 4.9 4.2   CL 91* 104   CO2 22 19*   BUN 34* 23*   CREATININE 1.6* 1.0     LIVER PROFILE:   Recent Labs     09/09/20  1645   AST 30   ALT 20   LIPASE 14.0   BILITOT 0.5   ALKPHOS 111     PT/INR: No results for input(s): PROTIME, INR in the last 72 hours. APTT: No results for input(s): APTT in the last 72 hours. UA:  Recent Labs     09/09/20  1935   COLORU Yellow   PHUR 5.5  5.5   WBCUA 3-5   RBCUA 3-4   MUCUS 2+*   BACTERIA 3+*   CLARITYU SL CLOUDY*   SPECGRAV >=1.030   LEUKOCYTESUR TRACE*   UROBILINOGEN 0.2   BILIRUBINUR MODERATE*   BLOODU SMALL*   GLUCOSEU Negative       RADIOLOGY  CT ABDOMEN PELVIS WO CONTRAST Additional Contrast? None   Final Result   1. Mild gastric distension with mild gastric wall thickening given the degree   of distention. Findings may be related to an underlying gastritis. 2. No other acute findings within the abdomen or pelvis. 3. Nonobstructive right nephrolithiasis. 4. Bilateral AVN of the hips, right greater than left. XR CHEST PORTABLE   Final Result   Small bilateral No acute infiltrate pleural effusions or. Evidence of overt   failure. COPD.                Discharge Medications     Medication List      CONTINUE taking these medications    albuterol sulfate  (90 Base) MCG/ACT inhaler  Commonly known as:  ProAir HFA  Inhale 2 puffs into the lungs every 6 hours as needed for Wheezing     cyclobenzaprine 10 MG tablet  Commonly known as:  FLEXERIL     diazePAM 5 MG tablet  Commonly known as:  VALIUM     diphenhydrAMINE 25 MG capsule  Commonly known as:  BENADRYL     fluticasone 50 MCG/ACT nasal spray  Commonly known as:  FLONASE     guaiFENesin 600 MG extended release tablet  Commonly known as:  MUCINEX  Take 1 tablet by mouth 2 times daily     ipratropium-albuterol 0.5-2.5 (3) MG/3ML Soln nebulizer solution  Commonly known as:  DUONEB  Inhale 3 mLs into the lungs every 6 hours as needed for Shortness of Breath     midodrine 5 MG tablet  Commonly known as:  PROAMATINE  Take 1 tablet by mouth 3 times daily (with meals)     ondansetron 4 MG disintegrating tablet  Commonly known as:  Zofran ODT  Take 1 tablet by mouth every 8 hours as needed for Nausea     pantoprazole 40 MG tablet  Commonly known as:  PROTONIX  Take 1 tablet by mouth daily     umeclidinium-vilanterol 62.5-25 MCG/INH Aepb inhaler  Commonly known as: Anoro Ellipta  Inhale 1 puff into the lungs daily     vitamin D3 10 MCG (400 UNIT) Tabs tablet  Commonly known as:  CHOLECALCIFEROL        STOP taking these medications    metoclopramide 10 MG tablet  Commonly known as:  Reglan     nicotine 14 MG/24HR  Commonly known as:  NICODERM CQ     sucralfate 1 GM tablet  Commonly known as:  [address]              Discharged in stable condition to Home     Follow Up:   Follow up with PCP in 1 week        Husam Jennings 9/10/2020 12:44 PM

## 2020-09-10 NOTE — CARE COORDINATION
Case Management Assessment  Initial Evaluation      Patient Name: Jay Cortez  YOB: 1960  Diagnosis: Acute kidney injury [facility]) [N17.9]  Date / Time: 9/9/2020  4:08 PM    Admission status/Date: OBS  Chart Reviewed: Yes      Patient Interviewed: Yes   Family Interviewed:  No      Hospitalization in the last 30 days:  No    Bowen García Maker:Yes    Met with: pt  Interview conducted  (bedside/phone): bedside    Current PCP:  Karla Flores RN    Financial  Commercial  Precert required for SNF : Y, N          3 night stay required - Y, [employer]  Support Systems/Care Needs: Family Members  Transportation: family    Meal Preparation: self    Housing  Living Arrangements: home alone  Steps: 10  Intent for return to present living arrangements: going home with sister  Identified Issues: [address] [place] [address] with [address] : has visiting NP from [address] [place] [address] Agency:(Services)  Type of [address] None  Passport/Waiver : No  :                      Phone Number:    Passport/Waiver Services: [address]   DME Provider: n/a  Equipment:   Walker___Cane_X__RTS___ BSC___Shower Chair___Hospital Bed___W/C____Other_walking stick______  02 at ____Liter(s)---wears(frequency)_______ HHN ___ CPAP___ BiPap___   N/A____      Home O2 Use :  No    If No for home O2---if presently on O2 during hospitalization:  No  if yes CM to follow for potential DC O2 need  Informed of need for care provider to bring portable home O2 tank on day of discharge for nursing to connect prior to leaving:   Not Indicated  Verbalized agreement/Understanding:   Not Indicated    Community Service Affiliation  Dialysis:  No    · Agency:  · Location:  · Dialysis Schedule:  · Phone:   · Fax:     Other Community Services: (ex:PT/OT,Mental Health,Wound Clinic, [address] [place] [address])    DISCHARGE PLAN: Explained Case Management role/services. Reviewed chart and met with pt. Pt reports to be IPTA and lives at home alone. Pt states she has a visiting nurse practitioner with [address] Pt plans to go home with sister at discharge. Denies needs. DISCHARGE ORDER  Date/Time 9/10/2020 10:41 AM  Completed by: Jayesh Mckeon, Case Management    Patient Name: Janki Moreno      : 1960  Admitting Diagnosis: Acute kidney injury ([address] [N17.9]      Admit order Date and Status:OBS  (verify MD's last order for status of admission)      Noted discharge order. If applicable PT/OT recommendation at Discharge: n/a  DME recommendation by PT/OT:n/a  Confirmed discharge plan  (yes): Yes  with whom_with pt______________  If pt confirmed DC plan does family need to be contacted by CM No   Discharge Plan: pt cont to plan to go home at discharge for a few days with her sister and resume care with visiting NP. Pt states that she is IPTA. Denies need for hhc or other services. Reviewed chart. Role of discharge planner explained and patient verbalized understanding. Discharge order is noted. Has Home O2 in place on admit:  No  Informed of need to bring portable home O2 tank on day of discharge for nursing to connect prior to leaving:   Not Indicated  Verbalized agreement/Understanding:   Not Indicated  Pt is being d/c'd to home today. Pt's O2 sats are 98% on roomair. Discharge timeout done with [facility] RN. All discharge needs and concerns addressed.

## 2020-09-10 NOTE — PLAN OF CARE
Problem: Infection:  Goal: Will remain free from infection  Description: Will remain free from infection  Outcome: Ongoing     Problem: Safety:  Goal: Free from accidental physical injury  Description: Free from accidental physical injury  Outcome: Ongoing  Goal: Free from intentional harm  Description: Free from intentional harm  Outcome: Ongoing     Problem: Daily Care:  Goal: Daily care needs are met  Description: Daily care needs are met  Outcome: Ongoing     Problem: Pain:  Goal: Patient's pain/discomfort is manageable  Description: Patient's pain/discomfort is manageable  Outcome: Ongoing     Problem: Skin Integrity:  Goal: Skin integrity will stabilize  Description: Skin integrity will stabilize  Outcome: Ongoing     Problem: Discharge Planning:  Goal: Patients continuum of care needs are met  Description: Patients continuum of care needs are met  Outcome: Ongoing     Problem: Nausea/Vomiting:  Goal: Absence of nausea/vomiting  Description: Absence of nausea/vomiting  Outcome: Ongoing  Goal: Able to drink  Description: Able to drink  Outcome: Ongoing  Goal: Able to eat  Description: Able to eat  Outcome: Ongoing  Goal: Ability to achieve adequate nutritional intake will improve  Description: Ability to achieve adequate nutritional intake will improve  Outcome: Ongoing

## 2020-10-16 ENCOUNTER — HOSPITAL ENCOUNTER (INPATIENT)
Age: 60
LOS: 1 days | Discharge: HOME | DRG: 392 | End: 2020-10-17
Attending: EMERGENCY MEDICINE | Admitting: HOSPITALIST
Payer: COMMERCIAL

## 2020-10-16 ENCOUNTER — APPOINTMENT (OUTPATIENT)
Dept: GENERAL RADIOLOGY | Age: 60
DRG: 392 | End: 2020-10-16
Payer: COMMERCIAL

## 2020-10-16 LAB
A/G RATIO: 1.4 (ref 1.1–2.2)
ALBUMIN SERPL-MCNC: 4.9 G/DL (ref 3.4–5)
ALP BLD-CCNC: 105 U/L (ref 40–129)
ALT SERPL-CCNC: 14 U/L (ref 10–40)
AMPHETAMINE SCREEN, URINE: ABNORMAL
ANION GAP SERPL CALCULATED.3IONS-SCNC: 20 MMOL/L (ref 3–16)
AST SERPL-CCNC: 20 U/L (ref 15–37)
BACTERIA: ABNORMAL /HPF
BARBITURATE SCREEN URINE: ABNORMAL
BASOPHILS ABSOLUTE: 0.1 K/UL (ref 0–0.2)
BASOPHILS RELATIVE PERCENT: 0.6 %
BENZODIAZEPINE SCREEN, URINE: POSITIVE
BILIRUB SERPL-MCNC: 0.5 MG/DL (ref 0–1)
BILIRUBIN URINE: ABNORMAL
BLOOD, URINE: ABNORMAL
BUN BLDV-MCNC: 62 MG/DL (ref 7–20)
CALCIUM SERPL-MCNC: 10.1 MG/DL (ref 8.3–10.6)
CANNABINOID SCREEN URINE: POSITIVE
CHLORIDE BLD-SCNC: 87 MMOL/L (ref 99–110)
CLARITY: CLEAR
CO2: 27 MMOL/L (ref 21–32)
COCAINE METABOLITE SCREEN URINE: ABNORMAL
COLOR: YELLOW
CREAT SERPL-MCNC: 2.9 MG/DL (ref 0.6–1.2)
CREATININE URINE: 146.9 MG/DL (ref 28–259)
EOSINOPHILS ABSOLUTE: 0 K/UL (ref 0–0.6)
EOSINOPHILS RELATIVE PERCENT: 0 %
GFR AFRICAN AMERICAN: 20
GFR NON-AFRICAN AMERICAN: 16
GLOBULIN: 3.4 G/DL
GLUCOSE BLD-MCNC: 123 MG/DL (ref 70–99)
GLUCOSE URINE: NEGATIVE MG/DL
HCT VFR BLD CALC: 54.7 % (ref 36–48)
HEMOGLOBIN: 18.4 G/DL (ref 12–16)
KETONES, URINE: ABNORMAL MG/DL
LEUKOCYTE ESTERASE, URINE: NEGATIVE
LIPASE: 14 U/L (ref 13–60)
LYMPHOCYTES ABSOLUTE: 2.3 K/UL (ref 1–5.1)
LYMPHOCYTES RELATIVE PERCENT: 13.9 %
Lab: ABNORMAL
MAGNESIUM: 2.2 MG/DL (ref 1.8–2.4)
MCH RBC QN AUTO: 30.9 PG (ref 26–34)
MCHC RBC AUTO-ENTMCNC: 33.7 G/DL (ref 31–36)
MCV RBC AUTO: 91.7 FL (ref 80–100)
METHADONE SCREEN, URINE: ABNORMAL
MICROSCOPIC EXAMINATION: YES
MONOCYTES ABSOLUTE: 1.1 K/UL (ref 0–1.3)
MONOCYTES RELATIVE PERCENT: 6.7 %
NEUTROPHILS ABSOLUTE: 13 K/UL (ref 1.7–7.7)
NEUTROPHILS RELATIVE PERCENT: 78.8 %
NITRITE, URINE: NEGATIVE
OPIATE SCREEN URINE: ABNORMAL
OXYCODONE URINE: ABNORMAL
PDW BLD-RTO: 14.9 % (ref 12.4–15.4)
PH UA: 6
PH UA: 6 (ref 5–8)
PHENCYCLIDINE SCREEN URINE: ABNORMAL
PLATELET # BLD: 279 K/UL (ref 135–450)
PMV BLD AUTO: 10 FL (ref 5–10.5)
POTASSIUM REFLEX MAGNESIUM: 4.3 MMOL/L (ref 3.5–5.1)
PROPOXYPHENE SCREEN: ABNORMAL
PROTEIN UA: 100 MG/DL
RBC # BLD: 5.97 M/UL (ref 4–5.2)
RBC UA: ABNORMAL /HPF (ref 0–4)
SODIUM BLD-SCNC: 134 MMOL/L (ref 136–145)
SODIUM URINE: 52 MMOL/L
SPECIFIC GRAVITY UA: 1.02 (ref 1–1.03)
TOTAL PROTEIN: 8.3 G/DL (ref 6.4–8.2)
UREA NITROGEN, UR: 602 MG/DL (ref 800–1666)
URINE REFLEX TO CULTURE: ABNORMAL
URINE TYPE: ABNORMAL
UROBILINOGEN, URINE: 0.2 E.U./DL
WBC # BLD: 16.4 K/UL (ref 4–11)
WBC UA: ABNORMAL /HPF (ref 0–5)

## 2020-10-16 PROCEDURE — 71045 X-RAY EXAM CHEST 1 VIEW: CPT

## 2020-10-16 PROCEDURE — 84540 ASSAY OF URINE/UREA-N: CPT

## 2020-10-16 PROCEDURE — 96375 TX/PRO/DX INJ NEW DRUG ADDON: CPT

## 2020-10-16 PROCEDURE — 84300 ASSAY OF URINE SODIUM: CPT

## 2020-10-16 PROCEDURE — 6360000002 HC RX W HCPCS: Performed by: HOSPITALIST

## 2020-10-16 PROCEDURE — 74018 RADEX ABDOMEN 1 VIEW: CPT

## 2020-10-16 PROCEDURE — 81001 URINALYSIS AUTO W/SCOPE: CPT

## 2020-10-16 PROCEDURE — 6370000000 HC RX 637 (ALT 250 FOR IP): Performed by: INTERNAL MEDICINE

## 2020-10-16 PROCEDURE — 94640 AIRWAY INHALATION TREATMENT: CPT

## 2020-10-16 PROCEDURE — 83735 ASSAY OF MAGNESIUM: CPT

## 2020-10-16 PROCEDURE — 2580000003 HC RX 258: Performed by: EMERGENCY MEDICINE

## 2020-10-16 PROCEDURE — 80307 DRUG TEST PRSMV CHEM ANLYZR: CPT

## 2020-10-16 PROCEDURE — 6360000002 HC RX W HCPCS: Performed by: EMERGENCY MEDICINE

## 2020-10-16 PROCEDURE — C9113 INJ PANTOPRAZOLE SODIUM, VIA: HCPCS | Performed by: EMERGENCY MEDICINE

## 2020-10-16 PROCEDURE — 82570 ASSAY OF URINE CREATININE: CPT

## 2020-10-16 PROCEDURE — C9113 INJ PANTOPRAZOLE SODIUM, VIA: HCPCS | Performed by: INTERNAL MEDICINE

## 2020-10-16 PROCEDURE — 83935 ASSAY OF URINE OSMOLALITY: CPT

## 2020-10-16 PROCEDURE — 02HV33Z INSERTION OF INFUSION DEVICE INTO SUPERIOR VENA CAVA, PERCUTANEOUS APPROACH: ICD-10-PCS | Performed by: EMERGENCY MEDICINE

## 2020-10-16 PROCEDURE — 2580000003 HC RX 258: Performed by: HOSPITALIST

## 2020-10-16 PROCEDURE — 83690 ASSAY OF LIPASE: CPT

## 2020-10-16 PROCEDURE — 96374 THER/PROPH/DIAG INJ IV PUSH: CPT

## 2020-10-16 PROCEDURE — 99285 EMERGENCY DEPT VISIT HI MDM: CPT

## 2020-10-16 PROCEDURE — 85025 COMPLETE CBC W/AUTO DIFF WBC: CPT

## 2020-10-16 PROCEDURE — 1200000000 HC SEMI PRIVATE

## 2020-10-16 PROCEDURE — 80053 COMPREHEN METABOLIC PANEL: CPT

## 2020-10-16 PROCEDURE — 6360000002 HC RX W HCPCS: Performed by: INTERNAL MEDICINE

## 2020-10-16 PROCEDURE — 2580000003 HC RX 258: Performed by: INTERNAL MEDICINE

## 2020-10-16 RX ORDER — ONDANSETRON 2 MG/ML
4 INJECTION INTRAMUSCULAR; INTRAVENOUS ONCE
Status: COMPLETED | OUTPATIENT
Start: 2020-10-16 | End: 2020-10-16

## 2020-10-16 RX ORDER — ONDANSETRON 2 MG/ML
4 INJECTION INTRAMUSCULAR; INTRAVENOUS EVERY 4 HOURS PRN
Status: DISCONTINUED | OUTPATIENT
Start: 2020-10-16 | End: 2020-10-16

## 2020-10-16 RX ORDER — SODIUM CHLORIDE, SODIUM LACTATE, POTASSIUM CHLORIDE, AND CALCIUM CHLORIDE .6; .31; .03; .02 G/100ML; G/100ML; G/100ML; G/100ML
1000 INJECTION, SOLUTION INTRAVENOUS ONCE
Status: COMPLETED | OUTPATIENT
Start: 2020-10-16 | End: 2020-10-16

## 2020-10-16 RX ORDER — ALBUTEROL SULFATE 2.5 MG/3ML
2.5 SOLUTION RESPIRATORY (INHALATION) EVERY 4 HOURS PRN
Status: DISCONTINUED | OUTPATIENT
Start: 2020-10-16 | End: 2020-10-17 | Stop reason: HOSPADM

## 2020-10-16 RX ORDER — ACETAMINOPHEN 650 MG/1
650 SUPPOSITORY RECTAL EVERY 6 HOURS PRN
Status: DISCONTINUED | OUTPATIENT
Start: 2020-10-16 | End: 2020-10-17 | Stop reason: HOSPADM

## 2020-10-16 RX ORDER — PANTOPRAZOLE SODIUM 40 MG/10ML
40 INJECTION, POWDER, LYOPHILIZED, FOR SOLUTION INTRAVENOUS DAILY
Status: DISCONTINUED | OUTPATIENT
Start: 2020-10-16 | End: 2020-10-17 | Stop reason: HOSPADM

## 2020-10-16 RX ORDER — HEPARIN SODIUM 5000 [USP'U]/ML
5000 INJECTION, SOLUTION INTRAVENOUS; SUBCUTANEOUS EVERY 8 HOURS SCHEDULED
Status: DISCONTINUED | OUTPATIENT
Start: 2020-10-16 | End: 2020-10-17 | Stop reason: HOSPADM

## 2020-10-16 RX ORDER — PROMETHAZINE HYDROCHLORIDE 25 MG/ML
25 INJECTION, SOLUTION INTRAMUSCULAR; INTRAVENOUS ONCE
Status: COMPLETED | OUTPATIENT
Start: 2020-10-16 | End: 2020-10-16

## 2020-10-16 RX ORDER — SODIUM CHLORIDE 9 MG/ML
INJECTION, SOLUTION INTRAVENOUS CONTINUOUS
Status: DISCONTINUED | OUTPATIENT
Start: 2020-10-16 | End: 2020-10-17 | Stop reason: HOSPADM

## 2020-10-16 RX ORDER — PANTOPRAZOLE SODIUM 40 MG/10ML
40 INJECTION, POWDER, LYOPHILIZED, FOR SOLUTION INTRAVENOUS ONCE
Status: COMPLETED | OUTPATIENT
Start: 2020-10-16 | End: 2020-10-16

## 2020-10-16 RX ORDER — PROMETHAZINE HYDROCHLORIDE 6.25 MG/5ML
12.5 SYRUP ORAL EVERY 4 HOURS PRN
Status: DISCONTINUED | OUTPATIENT
Start: 2020-10-16 | End: 2020-10-16 | Stop reason: SDUPTHER

## 2020-10-16 RX ORDER — ONDANSETRON 4 MG/1
4 TABLET, ORALLY DISINTEGRATING ORAL EVERY 4 HOURS PRN
Status: DISCONTINUED | OUTPATIENT
Start: 2020-10-16 | End: 2020-10-16

## 2020-10-16 RX ORDER — PROMETHAZINE HYDROCHLORIDE 25 MG/1
12.5 TABLET ORAL EVERY 4 HOURS PRN
Status: DISCONTINUED | OUTPATIENT
Start: 2020-10-16 | End: 2020-10-16

## 2020-10-16 RX ORDER — SODIUM CHLORIDE 0.9 % (FLUSH) 0.9 %
10 SYRINGE (ML) INJECTION PRN
Status: DISCONTINUED | OUTPATIENT
Start: 2020-10-16 | End: 2020-10-17 | Stop reason: HOSPADM

## 2020-10-16 RX ORDER — PROMETHAZINE HYDROCHLORIDE 25 MG/ML
12.5 INJECTION, SOLUTION INTRAMUSCULAR; INTRAVENOUS EVERY 4 HOURS PRN
Status: DISCONTINUED | OUTPATIENT
Start: 2020-10-16 | End: 2020-10-16

## 2020-10-16 RX ORDER — PROCHLORPERAZINE EDISYLATE 5 MG/ML
10 INJECTION INTRAMUSCULAR; INTRAVENOUS EVERY 6 HOURS PRN
Status: DISCONTINUED | OUTPATIENT
Start: 2020-10-16 | End: 2020-10-17 | Stop reason: HOSPADM

## 2020-10-16 RX ORDER — ACETAMINOPHEN 325 MG/1
650 TABLET ORAL EVERY 6 HOURS PRN
Status: DISCONTINUED | OUTPATIENT
Start: 2020-10-16 | End: 2020-10-17 | Stop reason: HOSPADM

## 2020-10-16 RX ADMIN — SODIUM CHLORIDE: 9 INJECTION, SOLUTION INTRAVENOUS at 11:10

## 2020-10-16 RX ADMIN — PROMETHAZINE HYDROCHLORIDE 25 MG: 25 INJECTION INTRAMUSCULAR; INTRAVENOUS at 01:04

## 2020-10-16 RX ADMIN — ONDANSETRON 4 MG: 2 INJECTION INTRAMUSCULAR; INTRAVENOUS at 05:05

## 2020-10-16 RX ADMIN — SODIUM CHLORIDE: 9 INJECTION, SOLUTION INTRAVENOUS at 19:51

## 2020-10-16 RX ADMIN — HEPARIN SODIUM 5000 UNITS: 5000 INJECTION INTRAVENOUS; SUBCUTANEOUS at 06:53

## 2020-10-16 RX ADMIN — SODIUM CHLORIDE, POTASSIUM CHLORIDE, SODIUM LACTATE AND CALCIUM CHLORIDE 1000 ML: 600; 310; 30; 20 INJECTION, SOLUTION INTRAVENOUS at 02:28

## 2020-10-16 RX ADMIN — SODIUM CHLORIDE, POTASSIUM CHLORIDE, SODIUM LACTATE AND CALCIUM CHLORIDE 1000 ML: 600; 310; 30; 20 INJECTION, SOLUTION INTRAVENOUS at 06:49

## 2020-10-16 RX ADMIN — PROCHLORPERAZINE EDISYLATE 10 MG: 5 INJECTION INTRAMUSCULAR; INTRAVENOUS at 14:47

## 2020-10-16 RX ADMIN — PANTOPRAZOLE SODIUM 40 MG: 40 INJECTION, POWDER, FOR SOLUTION INTRAVENOUS at 07:57

## 2020-10-16 RX ADMIN — GLYCOPYRROLATE AND FORMOTEROL FUMARATE 2 PUFF: 9; 4.8 AEROSOL, METERED RESPIRATORY (INHALATION) at 07:58

## 2020-10-16 RX ADMIN — SODIUM CHLORIDE, POTASSIUM CHLORIDE, SODIUM LACTATE AND CALCIUM CHLORIDE 1000 ML: 600; 310; 30; 20 INJECTION, SOLUTION INTRAVENOUS at 05:05

## 2020-10-16 RX ADMIN — GLYCOPYRROLATE AND FORMOTEROL FUMARATE 2 PUFF: 9; 4.8 AEROSOL, METERED RESPIRATORY (INHALATION) at 20:34

## 2020-10-16 RX ADMIN — PANTOPRAZOLE SODIUM 40 MG: 40 INJECTION, POWDER, FOR SOLUTION INTRAVENOUS at 02:28

## 2020-10-16 RX ADMIN — HEPARIN SODIUM 5000 UNITS: 5000 INJECTION INTRAVENOUS; SUBCUTANEOUS at 14:47

## 2020-10-16 ASSESSMENT — PAIN SCALES - GENERAL
PAINLEVEL_OUTOF10: 7
PAINLEVEL_OUTOF10: 6
PAINLEVEL_OUTOF10: 6

## 2020-10-16 ASSESSMENT — PAIN DESCRIPTION - DESCRIPTORS: DESCRIPTORS: ACHING;SQUEEZING

## 2020-10-16 ASSESSMENT — PAIN DESCRIPTION - PROGRESSION: CLINICAL_PROGRESSION: GRADUALLY WORSENING

## 2020-10-16 ASSESSMENT — PAIN DESCRIPTION - LOCATION
LOCATION: ABDOMEN

## 2020-10-16 ASSESSMENT — PAIN DESCRIPTION - ONSET: ONSET: PROGRESSIVE

## 2020-10-16 ASSESSMENT — PAIN DESCRIPTION - PAIN TYPE
TYPE: ACUTE PAIN

## 2020-10-16 ASSESSMENT — PAIN DESCRIPTION - FREQUENCY: FREQUENCY: CONTINUOUS

## 2020-10-16 NOTE — ED PROVIDER NOTES
CHIEF COMPLAINT  Abdominal Pain (patient reports whole abdominal pain for 3 days, not eating. Patient reports that vomiting when eats )      HISTORY OF PRESENT ILLNESS  Bola Caicedo is a 63 y.o. female who presents to the ED with generalized abdominal pain for 3 days and also difficulty keeping down p.o. She has been seen here multiple times in the past for this this may be an element of marijuana use although she says she has not used in a week. Vomiting when she eats and usually vomiting when she drinks water but sometimes can keep it down. Patient does have a history of colon cancer in the past.  Also CHF and COPD and asthma and fatty liver. She states nothing makes any the symptoms better. She has tried her Protonix and Zofran for the symptoms and it has not helped. Denies any headache or dizziness or any chest pain or shortness of breath or any back pain or any urinary or bowel issues. No other complaints, modifying factors or associated symptoms. I have reviewed the following from the nursing documentation.     Past Medical History:   Diagnosis Date    Anxiety     Asthma     Cancer [facility]) 1984    Uterine    Cancer of lung ([facility] [address] 2014    CHF (congestive heart failure) (HCC)     COPD (chronic obstructive pulmonary disease) (HCC)     Cyclic vomiting syndrome     with known cannabis abuse    Cysts of both kidneys     Depression     Fatty liver 09/06/12    by [facility] at [facility]    Gastritis 06/29/12    EGD at [place] [id number] MI (myocardial infarction) ([facility] [address] 2010    Panic attacks     Pneumonia     Pre-diabetes 04/13/13    A1c5.8%    Tricuspid regurgitation     06/26/2012 JUANJO at [facility] Structurally Normal Tricuspid Valve     Past Surgical History:   Procedure Laterality Date    CHOLECYSTECTOMY  2009    COLONOSCOPY  2001    COLONOSCOPY  2013    tubular adenoma    COLONOSCOPY  08/15/2017    ENDOSCOPY, COLON, DIAGNOSTIC      HERNIA REPAIR      HYSTERECTOMY  1985    With Bladder Mesh    INCONTINENCE SURGERY Left file     Forced sexual activity: Not on file   Other Topics Concern    Not on file   Social History Narrative    Not on file     No current facility-administered medications for this encounter. Current Outpatient Medications   Medication Sig Dispense Refill    diphenhydrAMINE (BENADRYL) 25 MG capsule Take 50 mg by mouth every 6 hours as needed for Itching      fluticasone (FLONASE) 50 MCG/ACT nasal spray 1 spray by Each Nostril route daily      vitamin D3 (CHOLECALCIFEROL) 10 MCG (400 UNIT) TABS tablet Take 400 Units by mouth daily      cyclobenzaprine (FLEXERIL) 10 MG tablet Take 10 mg by mouth 3 times daily as needed for Muscle spasms      midodrine (PROAMATINE) 5 MG tablet Take 1 tablet by mouth 3 times daily (with meals) 45 tablet 0    pantoprazole (PROTONIX) 40 MG tablet Take 1 tablet by mouth daily 30 tablet 1    umeclidinium-vilanterol (ANORO ELLIPTA) 62.5-25 MCG/INH AEPB inhaler Inhale 1 puff into the lungs daily 60 each 5    albuterol sulfate HFA (PROAIR HFA) 108 (90 Base) MCG/ACT inhaler Inhale 2 puffs into the lungs every 6 hours as needed for Wheezing 1 Inhaler 3    guaiFENesin (MUCINEX) 600 MG extended release tablet Take 1 tablet by mouth 2 times daily 60 tablet 0    ipratropium-albuterol (DUONEB) 0.5-2.5 (3) MG/3ML SOLN nebulizer solution Inhale 3 mLs into the lungs every 6 hours as needed for Shortness of Breath 360 mL 0    ondansetron (ZOFRAN ODT) 4 MG disintegrating tablet Take 1 tablet by mouth every 8 hours as needed for Nausea 20 tablet 0    diazepam (VALIUM) 5 MG tablet Take 5 mg by mouth every 8 hours as needed for Anxiety        Allergies   Allergen Reactions    Bactrim [Sulfamethoxazole-Trimethoprim]      N/V    Codeine Itching and Nausea And Vomiting     GI upset & itching       REVIEW OF SYSTEMS  10 systems reviewed, pertinent positives per HPI otherwise noted to be negative.     PHYSICAL EXAM  BP (!) 133/107   Pulse 122   Temp 97.6 °F (36.4 °C) (Oral)   Resp 18 Ht 5' 3\" (1.6 m)   Wt 110 lb (49.9 kg)   SpO2 97%   BMI 19.49 kg/m²   GENERAL APPEARANCE: Awake and alert. Cooperative. No acute distress. HEAD: Normocephalic. Atraumatic. EYES: PERRL. EOM's grossly intact. ENT: Mucous membranes are moist.   NECK: Supple. HEART: RRR. CHEST/LUNGS: Chest atraumatic, nontender, respirations unlabored. CTAB. Good air exchange. Speaking comfortably in full sentences. BACK: No midline spinal tenderness or step-off. ABDOMEN: Soft. Non-distended. Generalized tenderness. . No guarding or rebound. Normal bowel sounds. EXTREMITIES: No peripheral edema. Moves all extremities equally. All extremities neurovascularly intact. RECTAL/: Deferred  SKIN: Warm and dry. No acute rashes. NEUROLOGICAL: Alert and oriented. CN 2-12 intact, No gross facial drooping. Strength 5/5, sensation intact. Normal coordination. Gait normal.   PSYCHIATRIC: Normal mood and affect. LABS  I have reviewed all labs for this visit.    Results for orders placed or performed during the hospital encounter of 10/16/20   CBC Auto Differential   Result Value Ref Range    WBC 16.4 (H) 4.0 - 11.0 K/uL    RBC 5.97 (H) 4.00 - 5.20 M/uL    Hemoglobin 18.4 (H) 12.0 - 16.0 g/dL    Hematocrit 54.7 (H) 36.0 - 48.0 %    MCV 91.7 80.0 - 100.0 fL    MCH 30.9 26.0 - 34.0 pg    MCHC 33.7 31.0 - 36.0 g/dL    RDW 14.9 12.4 - 15.4 %    Platelets 701 354 - 147 K/uL    MPV 10.0 5.0 - 10.5 fL    Neutrophils % 78.8 %    Lymphocytes % 13.9 %    Monocytes % 6.7 %    Eosinophils % 0.0 %    Basophils % 0.6 %    Neutrophils Absolute 13.0 (H) 1.7 - 7.7 K/uL    Lymphocytes Absolute 2.3 1.0 - 5.1 K/uL    Monocytes Absolute 1.1 0.0 - 1.3 K/uL    Eosinophils Absolute 0.0 0.0 - 0.6 K/uL    Basophils Absolute 0.1 0.0 - 0.2 K/uL   Comprehensive Metabolic Panel w/ Reflex to MG   Result Value Ref Range    Sodium 134 (L) 136 - 145 mmol/L    Potassium reflex Magnesium 4.3 3.5 - 5.1 mmol/L    Chloride 87 (L) 99 - 110 mmol/L    CO2 27 21 - 32 radiologic plain film image(s). ALL OTHER NON-PLAIN FILM IMAGES SUCH AS CT, ULTRASOUND AND MRI HAVE BEEN READ BY THE RADIOLOGIST. XR CHEST PORTABLE   Final Result   No discernible pneumothorax. XR ABDOMEN (KUB) (SINGLE AP VIEW)   Final Result   Nonobstructive bowel gas pattern. PROCEDURE: Central line placement, LEFT IJ Vein  INDICATION: Need for fluids and medications, dehydration w/ very difficult PIV access  : Yoly Grissom, DO  Ultrasound Used:YES      PROCEDURE SUMMARY:   My hands were washed immediately prior to the procedure. I wore a surgical cap, mask with protective eyewear, sterile gown and sterile gloves throughout the procedure. The LEFT IJ region was prepped using chlorhexidine scrub and draped in sterile fashion using a three quarter sheet drape and sterile towels. . Anesthesia was achieved using 1% lidocaine, 5ml. Using US locating the Left IJ vein the introducer needle was inserted. Venous blood was withdrawn. The syringe was removed and a guidewire was advanced into the introducer needle. A small incision was made at the skin surface with a scalpel and the introducer needle was exchanged for a dilator over the guidewire. After appropriate dilation was obtained, the dilator was exchanged over the wire for a 20fr central venous catheter. The wire was removed and the catheter was sutured in place with nylon sutures. A Tegaderm dressing was placed over the catheter at the insertion site. The patient tolerated the procedure without any hemodynamic compromise. At time of procedure completion, all ports aspirated and flushed properly. Blood loss <5cc. **NOTE--THE ABOVE WAS PERFORMED AFTER x2 UNSUCCESSFUL PLACEMENT, USING SAME TECHNIQUE AND PREP AS ABOVE, IN THE RIGHT IJ VEIN.  IT WAS THEN DEEMED TO ATTEMPT THE LEFT IJ AS DESCRIBED ABOVE**        CRITICAL CARE TIME ATTESTATION (45 minutes):  Due to the high probability of imminent or life-threatening deterioration this patient required my direct attention, interventions and personal management. I personally provided 45 minutes of critical care time exclusive of time spent on separate billable procedures. Time includes review and interpretation of laboratory data, review and interpretation of radiology results, discussions with consultants as applicable while monitoring for potential decompensation. Interventions were otherwise performed as documented above. ED COURSE/MDM  Patient seen and evaluated. Patient is unable to keep down p.o. here even after p.o. challenge of water and crackers. We have tried multiple antiemetics for her as well and we have also given her fluids. I did have to place a central line as above as she is a very difficult stick and placement with an EJ we were able to get some blood but on 2 attempts of placing the EJ of the vein ended up blowing. LEFT IJ central line was placed as above. We will admit the patient here for fluids as well as further evaluation and management. Of note she also has HANK which is likely related to volume depletion. I did mention to the patient that it would be prudent for her to discuss with her physician or during this inpatient visit about her getting a port placed considering her frequent visits and difficult PIV access. She did verbalize understanding about this. All diagnostic tests reviewed and results discussed with patient. Plan of care discussed with patient. Patient in agreement with plan. CLINICAL IMPRESSION  1. Intractable vomiting with nausea, unspecified vomiting type    2. HANK (acute kidney injury) ([facility] [address]    3. Dehydration        Blood pressure (!) 91/54, pulse 95, temperature 97.7 °F (36.5 °C), temperature source Oral, resp. rate 14, height 5' 3\" (1.6 m), weight 110 lb (49.9 kg), SpO2 92 %, not currently breastfeeding. DISPOSITION  Cecilia Sosa was admitted in stable condition.      This chart was generated in part by using [employer] system and may contain errors related to that system including errors in grammar, punctuation, and spelling, as well as words and phrases that may be inappropriate. When dictating, effort is made to correct spelling/grammar errors. If there are any questions or concerns please feel free to contact the dictating provider for clarification.      Oval Enter, DO  ATTENDING, EMERGENCY MEDICINE        Alexander Anand DO  10/19/20 1542

## 2020-10-16 NOTE — ED NOTES
Bedside report given to Griselda Canton RN from C-5. [facility] confirmed tele box [phone].        Omer Lo RN  10/16/20 5423

## 2020-10-16 NOTE — PROGRESS NOTES
Collected pt urine. Pt stated \" I dont mean to sound mean but I NEED something for pain and tylenol just wont work for me. I think they gave me morphine this morning and it worked\" Informed pt that she has received no narcotics since admission and no narcotics were noted in the ER. asked pt where her pain is located and she motioned to her entire upper abdomen. Asked pt if anything helped or hurt her. She stated that a warm bath helped. Offered pt heat packs to place on abdomen. Pt appears comfortable at this time.

## 2020-10-16 NOTE — ED NOTES

## 2020-10-16 NOTE — ED NOTES
Bed: 12  Expected date: 10/16/20  Expected time:   Means of arrival:   Comments:  alfonso Rachel RN  10/16/20 [id number]

## 2020-10-16 NOTE — ED NOTES
Patient resting in bed at this time. Complaining of stomach pain asking for morphine at this time. MD aware patient given Protonix to help coat her stomach.       Hali Hatfield RN  10/16/20 9142

## 2020-10-16 NOTE — H&P
Hospital Medicine History & Physical      PCP: Colin Black RN    Date of Admission: 10/16/2020    Date of Service: Pt seen/examined on 10/16/20 and Admitted to Inpatient with expected LOS greater than two midnights due to medical therapy. Chief Complaint: Abdominal pain, nausea and vomiting      History Of Present Illness:      63 y.o. female with history of COPD, chronic THC abuse with documented history of cannabinoid hyperemesis syndrome presented emergency room with intractable nausea and vomiting for the past 2 to 3 days. . Vomiting was nonbilious, and pain was made in the upper abdomen. . No associated diarrhea, fevers or chills. . Pain was moderately severe with no aggravating relieving factors, it was sharp. .  Patient reports that she has been hospitalized several times in the past with similar symptoms. . Her last EGD was in 2017 at another facility. . She continues to marijuana frequently. .    Past Medical History:          Diagnosis Date    Anxiety     Asthma     Cancer ([address] 1984    Uterine    Cancer of lung ([facility] 2014    CHF (congestive heart failure) (HCC)     COPD (chronic obstructive pulmonary disease) (HCC)     Cyclic vomiting syndrome     with known cannabis abuse    Cysts of both kidneys     Depression     Fatty liver 09/06/12    by CT at [facility]    Gastritis 06/29/12    EGD at [facility] [address] MI (myocardial infarction) ([facility] 2010    Panic attacks     Pneumonia     Pre-diabetes 04/13/13    A1c5.8%    Tricuspid regurgitation     06/26/2012 JUANJO at [facility] Structurally Normal Tricuspid Valve       Past Surgical History:          Procedure Laterality Date    CHOLECYSTECTOMY  2009    COLONOSCOPY  2001    COLONOSCOPY  2013    tubular adenoma    COLONOSCOPY  08/15/2017    ENDOSCOPY, COLON, DIAGNOSTIC      HERNIA REPAIR      HYSTERECTOMY  1985    With Bladder Mesh    INCONTINENCE SURGERY Left     2009 in [place]    LUNG CANCER SURGERY      cancerous nodule removed left side    OVARY REMOVAL  2007 [employer]    Social History:      The patient currently lives     TOBACCO:   reports that she has been smoking cigarettes. She has a 22.50 pack-year smoking history. She has never used smokeless tobacco.  ETOH:   reports no history of alcohol use. Family History:      Reviewed in detail and negative for DM, CAD, Cancer, CVA. Positive as follows:        Problem Relation Age of Onset    Heart Disease Father     Hypertension Father     High Blood Pressure Father     Heart Disease Mother     Diabetes Maternal Grandmother     Cancer Son         NHL    High Blood Pressure Son        REVIEW OF SYSTEMS:   Pertinent positives as noted in the HPI. All other systems reviewed and negative. PHYSICAL EXAM:    BP (!) 151/92   Pulse 107   Temp 97.6 °F (36.4 °C) (Oral)   Resp 16   Ht 5' 3\" (1.6 m)   Wt 110 lb (49.9 kg)   SpO2 93%   BMI 19.49 kg/m²     General appearance:  No apparent distress, appears stated age and cooperative. HEENT:  Normal cephalic, atraumatic without obvious deformity. Pupils equal, round, and reactive to light. Extra ocular muscles intact. Conjunctivae/corneas clear. Neck: Supple, with full range of motion. No jugular venous distention. Trachea midline. Respiratory:  Normal respiratory effort. Clear to auscultation, bilaterally without Rales/Wheezes/Rhonchi. Cardiovascular:  Regular rate and rhythm with normal S1/S2 without murmurs, rubs or gallops. Abdomen: Soft, non-tender, non-distended with normal bowel sounds. Musculoskeletal:  No clubbing, cyanosis or edema bilaterally. Full range of motion without deformity. Skin: Skin color, texture, turgor normal.  No rashes or lesions. Neurologic:  Neurovascularly intact without any focal sensory/motor deficits.  Cranial nerves: II-XII intact, grossly non-focal.  Psychiatric:  Alert and oriented, thought content appropriate, normal insight  Capillary Refill: Brisk,< 3 seconds   Peripheral Pulses: +2 palpable, equal bilaterally       CXR: I have reviewed the CXR with the following interpretation:   EKG:  I have reviewed the EKG with the following interpretation:     Labs:     Recent Labs     10/16/20  0219   WBC 16.4*   HGB 18.4*   HCT 54.7*        Recent Labs     10/16/20  0219   *   K 4.3   CL 87*   CO2 27   BUN 62*   CREATININE 2.9*   CALCIUM 10.1     Recent Labs     10/16/20  0219   AST 20   ALT 14   BILITOT 0.5   ALKPHOS 105     No results for input(s): INR in the last 72 hours. No results for input(s): [place] Dec in the last 72 hours. Urinalysis:      Lab Results   Component Value Date    NITRU Negative 10/16/2020    WBCUA None seen 10/16/2020    BACTERIA 1+ 10/16/2020    RBCUA 0-2 10/16/2020    BLOODU SMALL 10/16/2020    SPECGRAV 1.025 10/16/2020    GLUCOSEU Negative 10/16/2020         ASSESSMENT:    -Intractable nausea, vomiting with abdominal pain. . Recurrent episode. . Most likely dt cannabinoid hyperemesis syndrome. .. This has been confirmed on prior admissions and consultation with GI. Pritesh Dominique Last EGD was in 2018. Pritesh Dominique Continues to use marijuana. . KUB and serum lipase are normal    -Chronic THC dependence    -Acute kidney injury. . Prerenal in the setting of nausea and vomiting with decreased oral intake    -Dehydration    -Reactive leukocytosis    -COPD-stable -on inhaled steroids and bronchodilators      PLAN:    Antiemetics  IV fluids  Monitor renal function avoid nephrotoxic medications  Continue PPI  Resume chronic medications  Advised patient to refrain from using THC    DVT Prophylaxis: Subcu heparin  Diet: Diet NPO Effective Now Exceptions are: Sips with Meds, Ice Chips  Code Status: Full Code         Cindy Welch MD    Thank you Roxana Medina RN for the opportunity to be involved in this patient's care. If you have any questions or concerns please feel free to contact me at [phone].

## 2020-10-16 NOTE — PROGRESS NOTES
ENDOSCOPY, COLON, DIAGNOSTIC      HERNIA REPAIR      HYSTERECTOMY  1985    With Bladder Mesh    INCONTINENCE SURGERY Left     2009 in [facility]      cancerous nodule removed left side    OVARY REMOVAL  2007    bilat    TONSILLECTOMY      UPPER GASTROINTESTINAL ENDOSCOPY  2013    gastritis    UPPER GASTROINTESTINAL ENDOSCOPY  10/03/2017    bx distal esophagus        Level of Consciousness: Alert, Oriented, and Cooperative = 0    Level of Activity: Walking with assistance = 1    Respiratory Pattern: Regular Pattern; RR 8-20 = 0    Breath Sounds: Diminshed bilaterally and/or crackles = 2    Sputum   ,  ,    Cough: Strong, spontaneous, non-productive = 0    Vital Signs   BP (!) 151/92   Pulse 107   Temp 97.6 °F (36.4 °C) (Oral)   Resp 16   Ht 5' 3\" (1.6 m)   Wt 110 lb (49.9 kg)   SpO2 93%   BMI 19.49 kg/m²   SPO2 (COPD values may differ): Greater than or equal to 92% on room air = 0    Peak Flow (asthma only): not applicable = 0    RSI: 0-4 = See once and convert to home regimen or discontinue        Plan       Goals: medication delivery, mobilize retained secretions, volume expansion and improve oxygenation    Patient/caregiver was educated on the proper method of use for Respiratory Care Devices:  Yes      Level of patient/caregiver understanding able to:   ? Verbalize understanding   ? Demonstrate understanding       ? Teach back        ? Needs reinforcement       ? No available caregiver               ? Other:     Response to education:  Good     Is patient being placed on Home Treatment Regimen? Yes     Does the patient have everything they need prior to discharge? NA     Comments: meds and chart reviewed    Plan of Care: Patient is on home treatment regimen. No further assessment needed unless patient condition changes. Electronically signed by Leann Villanueva RCP on 10/16/2020 at 8:28 AM    Respiratory Protocol Guidelines     1.  Assessment and treatment by 72  Hrs. Patients Ordered on a Mucolytic Agent:    1. Must always be administered with a bronchodilator. 2. Discontinue if patient experiences worsened bronchospasm, or secretions have lessened to the point that the patient is able to clear them with a cough. Anti-inflammatory and Combination Medications:    1. If the patient lacks prior history of lung disease, is not using inhaled anti-inflammatory medication at home, and lacks wheezing by examination or by history for at least 24 hours, contact physician for possible discontinuation.

## 2020-10-16 NOTE — PROGRESS NOTES
RESPIRATORY THERAPY ASSESSMENT    Name:  Erica Young  Medical Record Number:  [id number]  Age: 60 y.o. Gender: female  : 1960  Today's Date:  2019  Room:  [address] [place], [place] [address]-    Assessment     Is the patient being admitted for a COPD or Asthma exacerbation? No   (If yes the patient will be seen every 4 hours for the first 24 hours and then reassessed)    Patient Admission Diagnosis      Allergies  Allergies   Allergen Reactions    Bactrim [Sulfamethoxazole-Trimethoprim]      N/V    Codeine Itching and Nausea And Vomiting     GI upset & itching       Minimum Predicted Vital Capacity:     780          Actual Vital Capacity:      900              Pulmonary History:COPD, Lung CA, current smoker, CHF  Home Oxygen Therapy:  room air  Home Respiratory Therapy:Albuterol mdi prn  Current Respiratory Therapy:  Bevespi BID, Duoneb Q6 prn, Albuterol mdi prn  Treatment Type: MDI, IS  Medications: Formoterol Fumarate/Glycopyrrolate    Respiratory Severity Index(RSI)   Patients with orders for inhalation medications, oxygen, or any therapeutic treatment modality will be placed on Respiratory Protocol. They will be assessed with the first treatment and at least every 72 hours thereafter. The following severity scale will be used to determine frequency of treatment intervention.     Smoking History: Pulmonary Disease or Smoking History, Greater than 15 pack year = 2    Social History  Social History     Tobacco Use    Smoking status: Current Every Day Smoker     Packs/day: 0.50     Years: 45.00     Pack years: 22.50     Types: Cigarettes    Smokeless tobacco: Never Used   Substance Use Topics    Alcohol use: No     Alcohol/week: 0.0 oz    Drug use: Yes     Types: Marijuana       Recent Surgical History: None = 0  Past Surgical History  Past Surgical History:   Procedure Laterality Date    CHOLECYSTECTOMY      COLONOSCOPY      COLONOSCOPY      tubular adenoma    COLONOSCOPY  08/15/2017    ENDOSCOPY, regular COLON, DIAGNOSTIC      HERNIA REPAIR      HYSTERECTOMY  1985    With Bladder Mesh    INCONTINENCE SURGERY Left     2009 in [facility]      cancerous nodule removed left side    TONSILLECTOMY      UPPER GASTROINTESTINAL ENDOSCOPY  2013    gastritis    UPPER GASTROINTESTINAL ENDOSCOPY  10/03/2017    bx distal esophagus        Level of Consciousness: Alert, Oriented, and Cooperative = 0    Level of Activity: Walking unassisted = 0    Respiratory Pattern: Regular Pattern; RR 8-20 = 0    Breath Sounds: Diminshed bilaterally and/or crackles = 2    Sputum   ,  , Sputum How Obtained: Cough on request  Cough: Strong, spontaneous, non-productive = 0    Vital Signs   /81   Pulse 96   Temp 98.7 °F (37.1 °C) (Oral)   Resp 14   Ht 5' 3\" (1.6 m)   Wt 108 lb (49 kg)   SpO2 97%   Breastfeeding? No   BMI 19.13 kg/m²   SPO2 (COPD values may differ): Greater than or equal to 92% on room air = 0    Peak Flow (asthma only): not applicable = 0    RSI: 0-4 = See once and convert to home regimen or discontinue        Plan       Goals: mobilize retained secretions and improve oxygenation    Patient/caregiver was educated on the proper method of use for Respiratory Care Devices:  Yes      Level of patient/caregiver understanding able to:   ? Verbalize understanding   ? Demonstrate understanding       ? Teach back        ? Needs reinforcement       ? No available caregiver               ? Other:     Response to education:  Very Good     Is patient being placed on Home Treatment Regimen? Yes     Does the patient have everything they need prior to discharge? NA     Comments: patient assessed, medications/chart reviewed    Plan of Care: Bevespi BID, Albuterol mdi prn, Duoneb Q6 prn    Electronically signed by James Montanez RCP on 4/9/2019 at 9:32 PM    Respiratory Protocol Guidelines     1.  Assessment and treatment by Respiratory Therapy will be initiated for medication and therapeutic interventions upon initiation of aerosolized medication. 2. Physician will be contacted for respiratory rate (RR) greater than 35 breaths per minute. Therapy will be held for heart rate (HR) greater than 140 beats per minute, pending direction from physician. 3. Bronchodilators will be administered via Metered Dose Inhaler (MDI) with spacer when the following criteria are met:  a. Alert and cooperative     b. HR < 140 bpm  c. RR < 30 bpm                d. Can demonstrate a 2-3 second inspiratory hold  4. Bronchodilators will be administered via Hand Held Nebulizer MATEUSZ [facility]) to patients when ANY of the following criteria are met  a. Incognizant or uncooperative          b. Patients treated with HHN at Home        c. Unable to demonstrate proper use of MDI with spacer     d. RR > 30 bpm   5. Bronchodilators will be delivered via Metered Dose Inhaler (MDI), HHN, Aerogen to intubated patients on mechanical ventilation. 6. Inhalation medication orders will be delivered and/or substituted as outlined below. Aerosolized Medications Ordering and Administration Guidelines:    1. All Medications will be ordered by a physician, and their frequency and/or modality will be adjusted as defined by the patients Respiratory Severity Index (RSI) score. 2. If the patient does not have documented COPD, consider discontinuing anticholinergics when RSI is less than 9.  3. If the bronchospasm worsens (increased RSI), then the bronchodilator frequency can be increased to a maximum of every 4 hours. If greater than every 4 hours is required, the physician will be contacted. 4. If the bronchospasm improves, the frequency of the bronchodilator can be decreased, based on the patient's RSI, but not less than home treatment regimen frequency. 5. Bronchodilator(s) will be discontinued if patient has a RSI less than 9 and has received no scheduled or as needed treatment for 72  Hrs. Patients Ordered on a Mucolytic Agent:    1.  Must always be administered with a bronchodilator. 2. Discontinue if patient experiences worsened bronchospasm, or secretions have lessened to the point that the patient is able to clear them with a cough. Anti-inflammatory and Combination Medications:    1. If the patient lacks prior history of lung disease, is not using inhaled anti-inflammatory medication at home, and lacks wheezing by examination or by history for at least 24 hours, contact physician for possible discontinuation. [phone]

## 2020-10-17 VITALS
HEART RATE: 95 BPM | BODY MASS INDEX: 19.49 KG/M2 | SYSTOLIC BLOOD PRESSURE: 91 MMHG | DIASTOLIC BLOOD PRESSURE: 54 MMHG | RESPIRATION RATE: 14 BRPM | WEIGHT: 110 LBS | TEMPERATURE: 97.7 F | HEIGHT: 63 IN | OXYGEN SATURATION: 92 %

## 2020-10-17 LAB
ANION GAP SERPL CALCULATED.3IONS-SCNC: 7 MMOL/L (ref 3–16)
BASOPHILS ABSOLUTE: 0 K/UL (ref 0–0.2)
BASOPHILS RELATIVE PERCENT: 0.6 %
BUN BLDV-MCNC: 25 MG/DL (ref 7–20)
CALCIUM SERPL-MCNC: 7.8 MG/DL (ref 8.3–10.6)
CHLORIDE BLD-SCNC: 106 MMOL/L (ref 99–110)
CO2: 26 MMOL/L (ref 21–32)
CREAT SERPL-MCNC: 0.8 MG/DL (ref 0.6–1.2)
EOSINOPHILS ABSOLUTE: 0 K/UL (ref 0–0.6)
EOSINOPHILS RELATIVE PERCENT: 0.4 %
GFR AFRICAN AMERICAN: >60
GFR NON-AFRICAN AMERICAN: >60
GLUCOSE BLD-MCNC: 84 MG/DL (ref 70–99)
HCT VFR BLD CALC: 37.8 % (ref 36–48)
HEMOGLOBIN: 12.5 G/DL (ref 12–16)
LYMPHOCYTES ABSOLUTE: 2.5 K/UL (ref 1–5.1)
LYMPHOCYTES RELATIVE PERCENT: 35.9 %
MAGNESIUM: 1.8 MG/DL (ref 1.8–2.4)
MAGNESIUM: 1.8 MG/DL (ref 1.8–2.4)
MCH RBC QN AUTO: 31.2 PG (ref 26–34)
MCHC RBC AUTO-ENTMCNC: 33.1 G/DL (ref 31–36)
MCV RBC AUTO: 94.3 FL (ref 80–100)
MONOCYTES ABSOLUTE: 0.6 K/UL (ref 0–1.3)
MONOCYTES RELATIVE PERCENT: 8.7 %
NEUTROPHILS ABSOLUTE: 3.8 K/UL (ref 1.7–7.7)
NEUTROPHILS RELATIVE PERCENT: 54.4 %
OSMOLALITY URINE: 468 MOSM/KG (ref 390–1070)
PDW BLD-RTO: 15.3 % (ref 12.4–15.4)
PLATELET # BLD: 131 K/UL (ref 135–450)
PLATELET SLIDE REVIEW: ABNORMAL
PMV BLD AUTO: 9.4 FL (ref 5–10.5)
POTASSIUM SERPL-SCNC: 3.5 MMOL/L (ref 3.5–5.1)
RBC # BLD: 4 M/UL (ref 4–5.2)
REASON FOR REJECTION: NORMAL
REASON FOR REJECTION: NORMAL
REJECTED TEST: NORMAL
REJECTED TEST: NORMAL
SLIDE REVIEW: ABNORMAL
SODIUM BLD-SCNC: 139 MMOL/L (ref 136–145)
WBC # BLD: 7 K/UL (ref 4–11)

## 2020-10-17 PROCEDURE — 2580000003 HC RX 258: Performed by: NURSE PRACTITIONER

## 2020-10-17 PROCEDURE — 6360000002 HC RX W HCPCS: Performed by: INTERNAL MEDICINE

## 2020-10-17 PROCEDURE — C9113 INJ PANTOPRAZOLE SODIUM, VIA: HCPCS | Performed by: INTERNAL MEDICINE

## 2020-10-17 PROCEDURE — 83735 ASSAY OF MAGNESIUM: CPT

## 2020-10-17 PROCEDURE — 6370000000 HC RX 637 (ALT 250 FOR IP): Performed by: INTERNAL MEDICINE

## 2020-10-17 PROCEDURE — 80048 BASIC METABOLIC PNL TOTAL CA: CPT

## 2020-10-17 PROCEDURE — 85025 COMPLETE CBC W/AUTO DIFF WBC: CPT

## 2020-10-17 PROCEDURE — 36415 COLL VENOUS BLD VENIPUNCTURE: CPT

## 2020-10-17 PROCEDURE — 94640 AIRWAY INHALATION TREATMENT: CPT

## 2020-10-17 PROCEDURE — 2580000003 HC RX 258: Performed by: HOSPITALIST

## 2020-10-17 RX ORDER — 0.9 % SODIUM CHLORIDE 0.9 %
500 INTRAVENOUS SOLUTION INTRAVENOUS ONCE
Status: COMPLETED | OUTPATIENT
Start: 2020-10-17 | End: 2020-10-17

## 2020-10-17 RX ORDER — MIDODRINE HYDROCHLORIDE 5 MG/1
5 TABLET ORAL
Status: DISCONTINUED | OUTPATIENT
Start: 2020-10-17 | End: 2020-10-17 | Stop reason: HOSPADM

## 2020-10-17 RX ADMIN — SODIUM CHLORIDE: 9 INJECTION, SOLUTION INTRAVENOUS at 09:31

## 2020-10-17 RX ADMIN — HEPARIN SODIUM 5000 UNITS: 5000 INJECTION INTRAVENOUS; SUBCUTANEOUS at 06:37

## 2020-10-17 RX ADMIN — SODIUM CHLORIDE 500 ML: 9 INJECTION, SOLUTION INTRAVENOUS at 01:52

## 2020-10-17 RX ADMIN — MIDODRINE HYDROCHLORIDE 5 MG: 5 TABLET ORAL at 08:56

## 2020-10-17 RX ADMIN — MIDODRINE HYDROCHLORIDE 5 MG: 5 TABLET ORAL at 13:11

## 2020-10-17 RX ADMIN — PANTOPRAZOLE SODIUM 40 MG: 40 INJECTION, POWDER, FOR SOLUTION INTRAVENOUS at 08:57

## 2020-10-17 RX ADMIN — GLYCOPYRROLATE AND FORMOTEROL FUMARATE 2 PUFF: 9; 4.8 AEROSOL, METERED RESPIRATORY (INHALATION) at 08:02

## 2020-10-17 RX ADMIN — SODIUM CHLORIDE: 9 INJECTION, SOLUTION INTRAVENOUS at 04:20

## 2020-10-17 RX ADMIN — SODIUM CHLORIDE 500 ML: 9 INJECTION, SOLUTION INTRAVENOUS at 04:36

## 2020-10-17 ASSESSMENT — PAIN SCALES - GENERAL
PAINLEVEL_OUTOF10: 0
PAINLEVEL_OUTOF10: 0

## 2020-10-17 NOTE — DISCHARGE SUMMARY
inhaled steroids and bronchodilators             Physical Exam: BP 92/63   Pulse 70   Temp 97.9 °F (36.6 °C) (Oral)   Resp 14   Ht 5' 3\" (1.6 m)   Wt 110 lb (49.9 kg)   SpO2 91%   BMI 19.49 kg/m²   Gen/overall appearance: Not in acute distress. Alert. Head: Normocephalic, atraumatic  Eyes: EOMI, good acuity  ENT:- Oral mucosa moist  Neck: No JVD, thyromegaly  CVS: Nml S1S2, no MRG, RRR  Pulm: Clear bilaterally. No crackles/wheezes  Gastrointestinal: Soft, NT/ND, +BS  Musculoskeletal: No edema. Warm  Neuro: No focal deficit. Moves extremity spontaneously. Psychiatry: Appropriate affect. Not agitated. Skin: Warm, dry with normal turgor. No rash        Significant Diagnostic Studies:    KUB   Results: wnl        Treatments: As above. Discharge Medications:     Medication List      CONTINUE taking these medications    albuterol sulfate  (90 Base) MCG/ACT inhaler  Commonly known as:  ProAir HFA  Inhale 2 puffs into the lungs every 6 hours as needed for Wheezing     cyclobenzaprine 10 MG tablet  Commonly known as:  FLEXERIL     diazePAM 5 MG tablet  Commonly known as:  VALIUM     diphenhydrAMINE 25 MG capsule  Commonly known as:  BENADRYL     fluticasone 50 MCG/ACT nasal spray  Commonly known as:  FLONASE     guaiFENesin 600 MG extended release tablet  Commonly known as:  MUCINEX  Take 1 tablet by mouth 2 times daily     ipratropium-albuterol 0.5-2.5 (3) MG/3ML Soln nebulizer solution  Commonly known as:  DUONEB  Inhale 3 mLs into the lungs every 6 hours as needed for Shortness of Breath     midodrine 5 MG tablet  Commonly known as:  PROAMATINE  Take 1 tablet by mouth 3 times daily (with meals)     ondansetron 4 MG disintegrating tablet  Commonly known as:  Zofran ODT  Take 1 tablet by mouth every 8 hours as needed for Nausea     pantoprazole 40 MG tablet  Commonly known as:  PROTONIX  Take 1 tablet by mouth daily     umeclidinium-vilanterol 62.5-25 MCG/INH Aepb inhaler  Commonly known as:   Anoro Ellipta  Inhale 1 puff into the lungs daily     vitamin D3 10 MCG (400 UNIT) Tabs tablet  Commonly known as:  CHOLECALCIFEROL            Activity: activity as tolerated  Diet: DIET GENERAL;      Disposition: home  Discharged Condition: Stable  Follow Up:   No follow-up provider specified. Code status:  Full Code         Total time spent on discharge, finalizing medications, referrals and arranging outpatient follow up was more than 45 minutes      Thank you Dr. Franki Lane RN for the opportunity to be involved in this patients care.

## 2020-10-17 NOTE — PLAN OF CARE
Problem: Falls - Risk of:  Goal: Will remain free from falls  Description: Will remain free from falls  Outcome: Ongoing  Note: Bed in lowest position, wheels locked, 2/4 side rails up, nonskid footwear on. Bed/ chair check alarm in place, call light within reach. Pt instructed to call out when needing assistance. Pt stated understanding. Nurse will continue to monitor. Goal: Absence of physical injury  Description: Absence of physical injury  Outcome: Ongoing     Problem: Pain:  Goal: Control of acute pain  Description: Control of acute pain  Outcome: Ongoing  Note: Pt scoring pain on 0-10 scale. Pain medications given per MAR. Pt instructed to call out when pain level increasing. Call light within reach. Nurse will continue to reassess and monitor.

## 2020-10-17 NOTE — PROGRESS NOTES
Writer reviewed AVS and discharge summary with the patient. All questions answered. Writer will continue to monitor patient until escorted to taxi for transportation home.

## 2020-11-26 ENCOUNTER — APPOINTMENT (OUTPATIENT)
Dept: CT IMAGING | Age: 60
End: 2020-11-26
Payer: COMMERCIAL

## 2020-11-26 ENCOUNTER — HOSPITAL ENCOUNTER (EMERGENCY)
Age: 60
Discharge: HOME | End: 2020-11-26
Attending: EMERGENCY MEDICINE
Payer: COMMERCIAL

## 2020-11-26 VITALS
RESPIRATION RATE: 26 BRPM | BODY MASS INDEX: 19.49 KG/M2 | DIASTOLIC BLOOD PRESSURE: 92 MMHG | SYSTOLIC BLOOD PRESSURE: 167 MMHG | WEIGHT: 110 LBS | OXYGEN SATURATION: 96 % | HEART RATE: 99 BPM | TEMPERATURE: 98.6 F

## 2020-11-26 LAB
A/G RATIO: 1.2 (ref 1.1–2.2)
ALBUMIN SERPL-MCNC: 5.3 G/DL (ref 3.4–5)
ALP BLD-CCNC: 118 U/L (ref 40–129)
ALT SERPL-CCNC: 22 U/L (ref 10–40)
AMPHETAMINE SCREEN, URINE: ABNORMAL
ANION GAP SERPL CALCULATED.3IONS-SCNC: 13 MMOL/L (ref 3–16)
AST SERPL-CCNC: 36 U/L (ref 15–37)
BACTERIA: ABNORMAL /HPF
BARBITURATE SCREEN URINE: ABNORMAL
BASOPHILS ABSOLUTE: 0.1 K/UL (ref 0–0.2)
BASOPHILS RELATIVE PERCENT: 0.5 %
BENZODIAZEPINE SCREEN, URINE: POSITIVE
BILIRUB SERPL-MCNC: 0.3 MG/DL (ref 0–1)
BILIRUBIN URINE: ABNORMAL
BLOOD, URINE: ABNORMAL
BUN BLDV-MCNC: 17 MG/DL (ref 7–20)
CALCIUM SERPL-MCNC: 11.9 MG/DL (ref 8.3–10.6)
CANNABINOID SCREEN URINE: POSITIVE
CHLORIDE BLD-SCNC: 95 MMOL/L (ref 99–110)
CLARITY: CLEAR
CO2: 29 MMOL/L (ref 21–32)
COCAINE METABOLITE SCREEN URINE: ABNORMAL
COLOR: YELLOW
CREAT SERPL-MCNC: 1.2 MG/DL (ref 0.6–1.2)
EOSINOPHILS ABSOLUTE: 0 K/UL (ref 0–0.6)
EOSINOPHILS RELATIVE PERCENT: 0.1 %
EPITHELIAL CELLS, UA: ABNORMAL /HPF (ref 0–5)
GFR AFRICAN AMERICAN: 55
GFR NON-AFRICAN AMERICAN: 46
GLOBULIN: 4.3 G/DL
GLUCOSE BLD-MCNC: 174 MG/DL (ref 70–99)
GLUCOSE URINE: NEGATIVE MG/DL
HCT VFR BLD CALC: 54.1 % (ref 36–48)
HEMOGLOBIN: 17.8 G/DL (ref 12–16)
HYALINE CASTS: ABNORMAL /LPF (ref 0–2)
KETONES, URINE: ABNORMAL MG/DL
LEUKOCYTE ESTERASE, URINE: ABNORMAL
LIPASE: 20 U/L (ref 13–60)
LYMPHOCYTES ABSOLUTE: 1.7 K/UL (ref 1–5.1)
LYMPHOCYTES RELATIVE PERCENT: 11.8 %
Lab: ABNORMAL
MCH RBC QN AUTO: 30.9 PG (ref 26–34)
MCHC RBC AUTO-ENTMCNC: 32.9 G/DL (ref 31–36)
MCV RBC AUTO: 94.1 FL (ref 80–100)
METHADONE SCREEN, URINE: ABNORMAL
MICROSCOPIC EXAMINATION: YES
MONOCYTES ABSOLUTE: 0.4 K/UL (ref 0–1.3)
MONOCYTES RELATIVE PERCENT: 3 %
NEUTROPHILS ABSOLUTE: 12 K/UL (ref 1.7–7.7)
NEUTROPHILS RELATIVE PERCENT: 84.6 %
NITRITE, URINE: NEGATIVE
OPIATE SCREEN URINE: POSITIVE
OXYCODONE URINE: ABNORMAL
PDW BLD-RTO: 15.7 % (ref 12.4–15.4)
PH UA: 6
PH UA: 6 (ref 5–8)
PHENCYCLIDINE SCREEN URINE: ABNORMAL
PLATELET # BLD: 328 K/UL (ref 135–450)
PMV BLD AUTO: 10.5 FL (ref 5–10.5)
POTASSIUM REFLEX MAGNESIUM: 5.2 MMOL/L (ref 3.5–5.1)
PROPOXYPHENE SCREEN: ABNORMAL
PROTEIN UA: 100 MG/DL
RBC # BLD: 5.75 M/UL (ref 4–5.2)
RBC UA: ABNORMAL /HPF (ref 0–4)
SODIUM BLD-SCNC: 137 MMOL/L (ref 136–145)
SPECIFIC GRAVITY UA: 1.02 (ref 1–1.03)
TOTAL PROTEIN: 9.6 G/DL (ref 6.4–8.2)
TROPONIN: <0.01 NG/ML
URINE TYPE: ABNORMAL
UROBILINOGEN, URINE: 0.2 E.U./DL
WBC # BLD: 14.2 K/UL (ref 4–11)
WBC UA: ABNORMAL /HPF (ref 0–5)

## 2020-11-26 PROCEDURE — 96375 TX/PRO/DX INJ NEW DRUG ADDON: CPT

## 2020-11-26 PROCEDURE — 99285 EMERGENCY DEPT VISIT HI MDM: CPT

## 2020-11-26 PROCEDURE — 83690 ASSAY OF LIPASE: CPT

## 2020-11-26 PROCEDURE — 81001 URINALYSIS AUTO W/SCOPE: CPT

## 2020-11-26 PROCEDURE — 93005 ELECTROCARDIOGRAM TRACING: CPT | Performed by: PHYSICIAN ASSISTANT

## 2020-11-26 PROCEDURE — 80307 DRUG TEST PRSMV CHEM ANLYZR: CPT

## 2020-11-26 PROCEDURE — 84484 ASSAY OF TROPONIN QUANT: CPT

## 2020-11-26 PROCEDURE — 71260 CT THORAX DX C+: CPT

## 2020-11-26 PROCEDURE — 87086 URINE CULTURE/COLONY COUNT: CPT

## 2020-11-26 PROCEDURE — 2500000003 HC RX 250 WO HCPCS: Performed by: PHYSICIAN ASSISTANT

## 2020-11-26 PROCEDURE — 80053 COMPREHEN METABOLIC PANEL: CPT

## 2020-11-26 PROCEDURE — 96374 THER/PROPH/DIAG INJ IV PUSH: CPT

## 2020-11-26 PROCEDURE — 6360000004 HC RX CONTRAST MEDICATION: Performed by: PHYSICIAN ASSISTANT

## 2020-11-26 PROCEDURE — 96372 THER/PROPH/DIAG INJ SC/IM: CPT

## 2020-11-26 PROCEDURE — 6360000002 HC RX W HCPCS: Performed by: PHYSICIAN ASSISTANT

## 2020-11-26 PROCEDURE — 2580000003 HC RX 258: Performed by: PHYSICIAN ASSISTANT

## 2020-11-26 PROCEDURE — 85025 COMPLETE CBC W/AUTO DIFF WBC: CPT

## 2020-11-26 RX ORDER — 0.9 % SODIUM CHLORIDE 0.9 %
1000 INTRAVENOUS SOLUTION INTRAVENOUS ONCE
Status: COMPLETED | OUTPATIENT
Start: 2020-11-26 | End: 2020-11-26

## 2020-11-26 RX ORDER — METOCLOPRAMIDE HYDROCHLORIDE 5 MG/ML
10 INJECTION INTRAMUSCULAR; INTRAVENOUS ONCE
Status: COMPLETED | OUTPATIENT
Start: 2020-11-26 | End: 2020-11-26

## 2020-11-26 RX ORDER — PROMETHAZINE HYDROCHLORIDE 25 MG/ML
25 INJECTION, SOLUTION INTRAMUSCULAR; INTRAVENOUS ONCE
Status: COMPLETED | OUTPATIENT
Start: 2020-11-26 | End: 2020-11-26

## 2020-11-26 RX ORDER — PROMETHAZINE HYDROCHLORIDE 25 MG/1
25 SUPPOSITORY RECTAL EVERY 6 HOURS PRN
Qty: 6 SUPPOSITORY | Refills: 0 | Status: SHIPPED | OUTPATIENT
Start: 2020-11-26

## 2020-11-26 RX ORDER — METOCLOPRAMIDE 10 MG/1
10 TABLET ORAL 4 TIMES DAILY PRN
Qty: 20 TABLET | Refills: 3 | Status: ON HOLD | OUTPATIENT
Start: 2020-11-26 | End: 2020-12-27 | Stop reason: HOSPADM

## 2020-11-26 RX ORDER — DIPHENHYDRAMINE HYDROCHLORIDE 50 MG/ML
25 INJECTION INTRAMUSCULAR; INTRAVENOUS ONCE
Status: COMPLETED | OUTPATIENT
Start: 2020-11-26 | End: 2020-11-26

## 2020-11-26 RX ORDER — DROPERIDOL 2.5 MG/ML
0.62 INJECTION, SOLUTION INTRAMUSCULAR; INTRAVENOUS ONCE
Status: COMPLETED | OUTPATIENT
Start: 2020-11-26 | End: 2020-11-26

## 2020-11-26 RX ADMIN — METOCLOPRAMIDE 10 MG: 5 INJECTION, SOLUTION INTRAMUSCULAR; INTRAVENOUS at 14:35

## 2020-11-26 RX ADMIN — DIPHENHYDRAMINE HYDROCHLORIDE 25 MG: 50 INJECTION, SOLUTION INTRAMUSCULAR; INTRAVENOUS at 14:35

## 2020-11-26 RX ADMIN — IOPAMIDOL 75 ML: 755 INJECTION, SOLUTION INTRAVENOUS at 17:30

## 2020-11-26 RX ADMIN — FAMOTIDINE 20 MG: 10 INJECTION, SOLUTION INTRAVENOUS at 15:13

## 2020-11-26 RX ADMIN — SODIUM CHLORIDE 1000 ML: 9 INJECTION, SOLUTION INTRAVENOUS at 17:43

## 2020-11-26 RX ADMIN — DROPERIDOL 0.62 MG: 2.5 INJECTION, SOLUTION INTRAMUSCULAR; INTRAVENOUS at 15:13

## 2020-11-26 RX ADMIN — PROMETHAZINE HYDROCHLORIDE 25 MG: 25 INJECTION INTRAMUSCULAR; INTRAVENOUS at 17:44

## 2020-11-26 RX ADMIN — SODIUM CHLORIDE 1000 ML: 9 INJECTION, SOLUTION INTRAVENOUS at 14:34

## 2020-11-26 ASSESSMENT — ENCOUNTER SYMPTOMS
BACK PAIN: 0
VOMITING: 1
DIARRHEA: 0
COLOR CHANGE: 0
COUGH: 1
EYES NEGATIVE: 1
NAUSEA: 1
SHORTNESS OF BREATH: 0
ABDOMINAL PAIN: 1

## 2020-11-26 NOTE — ED PROVIDER NOTES
[facility]  ED  EMERGENCY DEPARTMENT ENCOUNTER        Pt Name: Cassia Lopez  MRN: [id number]  [place] 1960  Date of evaluation: 11/26/2020  Provider: Pankaj Loving PA-C  PCP: Wendie Church RN  ED Attending: Idris Garcia MD      This patient was seen by the attending provider     History provided by the patient    CHIEF COMPLAINT:     Chief Complaint   Patient presents with    Abdominal Pain     \"extensive\" abdominal Hx.  N&V alos states her kidneys hurt Sx started 18 hours ago zofran in squad       HISTORY OF PRESENT ILLNESS:      Cassia Lopez is a 63 y.o. female who arrives to the ED by [facility]. Patient reports 18hours persistent nausea and vomiting. Patient has a past medical history that includes but is not limited to cyclical vomiting syndrome (with known cannabis abuse), COPD, CAD status post MI, CHF, asthma, anxiety, depression. Patient states she takes PPIs at home and additionally has Zofran 8 mg tablets for her symptoms but has continued with persistent nausea and vomiting. She describes generalized abdominal pain. She denies diarrhea. She denies fevers or chills. There are no identifiable exacerbating or alleviating factors to symptoms. Nursing Notes were reviewed     REVIEW OF SYSTEMS:     Review of Systems   Constitutional: Positive for appetite change. Negative for chills and fever. HENT: Negative. Eyes: Negative. Respiratory: Positive for cough. Negative for shortness of breath. Cardiovascular: Negative for chest pain. Gastrointestinal: Positive for abdominal pain, nausea and vomiting. Negative for diarrhea. Genitourinary: Negative for dysuria. Musculoskeletal: Negative for back pain and neck pain. Skin: Negative for color change. Neurological: Negative for dizziness, weakness and headaches. All other systems reviewed and are negative. Except as noted above in the ROS, all other systems were reviewed and negative.          PAST MEDICAL HISTORY:     Past Medical History:   Diagnosis Date    Anxiety     Asthma     Cancer ([facility] [address] 1984    Uterine    Cancer of lung ([facility] [address] 2014    CHF (congestive heart failure) (HCC)     COPD (chronic obstructive pulmonary disease) (HCC)     Cyclic vomiting syndrome     with known cannabis abuse    Cysts of both kidneys     Depression     Fatty liver 09/06/12    by CT at [facility]    Gastritis 06/29/12    EGD at [place] [id number] MI (myocardial infarction) ([facility] [address] 2010    Panic attacks     Pneumonia     Pre-diabetes 04/13/13    A1c5.8%    Tricuspid regurgitation     06/26/2012 JUANJO at [facility] Structurally Normal Tricuspid Valve         SURGICAL HISTORY:      Past Surgical History:   Procedure Laterality Date    CHOLECYSTECTOMY  2009    COLONOSCOPY  2001    COLONOSCOPY  2013    tubular adenoma    COLONOSCOPY  08/15/2017    ENDOSCOPY, COLON, DIAGNOSTIC      HERNIA REPAIR      HYSTERECTOMY  1985    With Bladder Mesh    INCONTINENCE SURGERY Left     2009 in [facility]      cancerous nodule removed left side    OVARY REMOVAL  2007    bilat    TONSILLECTOMY      UPPER GASTROINTESTINAL ENDOSCOPY  2013    gastritis    UPPER GASTROINTESTINAL ENDOSCOPY  10/03/2017    bx distal esophagus          CURRENT MEDICATIONS:       Previous Medications    ALBUTEROL SULFATE HFA (PROAIR HFA) 108 (90 BASE) MCG/ACT INHALER    Inhale 2 puffs into the lungs every 6 hours as needed for Wheezing    CYCLOBENZAPRINE (FLEXERIL) 10 MG TABLET    Take 10 mg by mouth 3 times daily as needed for Muscle spasms    DIAZEPAM (VALIUM) 5 MG TABLET    Take 5 mg by mouth every 8 hours as needed for Anxiety     DIPHENHYDRAMINE (BENADRYL) 25 MG CAPSULE    Take 50 mg by mouth every 6 hours as needed for Itching    FLUTICASONE (FLONASE) 50 MCG/ACT NASAL SPRAY    1 spray by Each Nostril route daily    GUAIFENESIN (MUCINEX) 600 MG EXTENDED RELEASE TABLET    Take 1 tablet by mouth 2 times daily    IPRATROPIUM-ALBUTEROL (DUONEB) 0.5-2.5 (3) MG/3ML SOLN NEBULIZER SOLUTION    Inhale 3 mLs into the lungs every 6 hours as needed for Shortness of Breath    MIDODRINE (PROAMATINE) 5 MG TABLET    Take 1 tablet by mouth 3 times daily (with meals)    ONDANSETRON (ZOFRAN ODT) 4 MG DISINTEGRATING TABLET    Take 1 tablet by mouth every 8 hours as needed for Nausea    PANTOPRAZOLE (PROTONIX) 40 MG TABLET    Take 1 tablet by mouth daily    UMECLIDINIUM-VILANTEROL (ANORO ELLIPTA) 62.5-25 MCG/INH AEPB INHALER    Inhale 1 puff into the lungs daily    VITAMIN D3 (CHOLECALCIFEROL) 10 MCG (400 UNIT) TABS TABLET    Take 400 Units by mouth daily         ALLERGIES:    Bactrim [sulfamethoxazole-trimethoprim] and Codeine    FAMILY HISTORY:       Family History   Problem Relation Age of Onset    Heart Disease Father     Hypertension Father     High Blood Pressure Father     Heart Disease Mother     Diabetes Maternal Grandmother     Cancer Son         NHL    High Blood Pressure Son           SOCIAL HISTORY:       Social History     Socioeconomic History    Marital status:       Spouse name: Not on file    Number of children: 3    Years of education: Not on file    Highest education level: Not on file   Occupational History    Occupation: unemployed   Social Needs    Financial resource strain: Not on file    Food insecurity     Worry: Not on file     Inability: Not on file   [employer] needs     Medical: Not on file     Non-medical: Not on file   Tobacco Use    Smoking status: Current Every Day Smoker     Packs/day: 0.50     Years: 45.00     Pack years: 22.50     Types: Cigarettes    Smokeless tobacco: Never Used   Substance and Sexual Activity    Alcohol use: No     Alcohol/week: 0.0 standard drinks     Comment: occasional    Drug use: Yes     Types: Marijuana     Comment: depends on how she is feeling     Sexual activity: Not Currently   Lifestyle    Physical activity     Days per week: Not on file     Minutes per session: Not on file    Stress: Not on file   Relationships    Social connections     Talks on phone: Not on file     Gets together: Not on file     Attends Sabianism service: Not on file     Active member of club or organization: Not on file     Attends meetings of clubs or organizations: Not on file     Relationship status: Not on file    Intimate partner violence     Fear of current or ex partner: Not on file     Emotionally abused: Not on file     Physically abused: Not on file     Forced sexual activity: Not on file   Other Topics Concern    Not on file   Social History Narrative    Not on file       SCREENINGS:             PHYSICAL EXAM:       ED Triage Vitals [11/26/20 1359]   BP Temp Temp src Pulse Resp SpO2 Height Weight   (!) 157/140 -- -- 108 20 97 % -- 110 lb (49.9 kg)       Physical Exam    CONSTITUTIONAL: Awake and alert. Cooperative. Well-developed. Well-nourished. Non-toxic. No acute distress. HENT: Normocephalic. Atraumatic. External ears normal, without discharge. No nasal discharge. Oropharynx clear. Mucous membranes moist.  EYES: Conjunctiva non-injected. No scleral icterus. PERRL. EOM's grossly intact. NECK: Supple. Normal ROM. CARDIOVASCULAR: RRR. No Murmer. Intact distal pulses. PULMONARY/CHEST WALL: Effort normal. No tachypnea. Lungs clear to ausculation. ABDOMEN: Normal BS. Soft. Nondistended. No tenderness to palpate. No guarding. /ANORECTAL: Not assessed  MUSKULOSKELETAL: Normal ROM. No acute deformities. No edema. No tenderness to palpate. SKIN: Warm and dry. No rash. NEUROLOGICAL: Alert and oriented x 3. GCS 15. CN II-XII grossly intact. Strength is 5/5 in all extremities and sensation is intact. Normal gait.    PSYCHIATRIC: Normal affect        DIAGNOSTICRESULTS:     LABS:    Results for orders placed or performed during the hospital encounter of 11/26/20   CBC Auto Differential   Result Value Ref Range    WBC 14.2 (H) 4.0 - 11.0 K/uL    RBC 5.75 (H) 4.00 - 5.20 M/uL    Hemoglobin 17.8 (H) 12.0 - 16.0 g/dL Hematocrit 54.1 (H) 36.0 - 48.0 %    MCV 94.1 80.0 - 100.0 fL    MCH 30.9 26.0 - 34.0 pg    MCHC 32.9 31.0 - 36.0 g/dL    RDW 15.7 (H) 12.4 - 15.4 %    Platelets 267 381 - 261 K/uL    MPV 10.5 5.0 - 10.5 fL    Neutrophils % 84.6 %    Lymphocytes % 11.8 %    Monocytes % 3.0 %    Eosinophils % 0.1 %    Basophils % 0.5 %    Neutrophils Absolute 12.0 (H) 1.7 - 7.7 K/uL    Lymphocytes Absolute 1.7 1.0 - 5.1 K/uL    Monocytes Absolute 0.4 0.0 - 1.3 K/uL    Eosinophils Absolute 0.0 0.0 - 0.6 K/uL    Basophils Absolute 0.1 0.0 - 0.2 K/uL   Comprehensive Metabolic Panel w/ Reflex to MG   Result Value Ref Range    Sodium 137 136 - 145 mmol/L    Potassium reflex Magnesium 5.2 (H) 3.5 - 5.1 mmol/L    Chloride 95 (L) 99 - 110 mmol/L    CO2 29 21 - 32 mmol/L    Anion Gap 13 3 - 16    Glucose 174 (H) 70 - 99 mg/dL    BUN 17 7 - 20 mg/dL    CREATININE 1.2 0.6 - 1.2 mg/dL    GFR Non- 46 (A) >60    GFR  55 (A) >60    Calcium 11.9 (H) 8.3 - 10.6 mg/dL    Total Protein 9.6 (H) 6.4 - 8.2 g/dL    Alb 5.3 (H) 3.4 - 5.0 g/dL    Albumin/Globulin Ratio 1.2 1.1 - 2.2    Total Bilirubin 0.3 0.0 - 1.0 mg/dL    Alkaline Phosphatase 118 40 - 129 U/L    ALT 22 10 - 40 U/L    AST 36 15 - 37 U/L    Globulin 4.3 g/dL   Lipase   Result Value Ref Range    Lipase 20.0 13.0 - 60.0 U/L   Urinalysis, reflex to microscopic   Result Value Ref Range    Color, UA Yellow Straw/Yellow    Clarity, UA Clear Clear    Glucose, Ur Negative Negative mg/dL    Bilirubin Urine MODERATE (A) Negative    Ketones, Urine TRACE (A) Negative mg/dL    Specific Gravity, UA 1.025 1.005 - 1.030    Blood, Urine MODERATE (A) Negative    pH, UA 6.0 5.0 - 8.0    Protein,  (A) Negative mg/dL    Urobilinogen, Urine 0.2 <2.0 E.U./dL    Nitrite, Urine Negative Negative    Leukocyte Esterase, Urine TRACE (A) Negative    Microscopic Examination YES     Urine Type NotGiven    Urine Drug Screen   Result Value Ref Range    Amphetamine Screen, Urine Neg Negative <1000ng/mL    Barbiturate Screen, Ur Neg Negative <200 ng/mL    Benzodiazepine Screen, Urine POSITIVE (A) Negative <200 ng/mL    Cannabinoid Scrn, Ur POSITIVE (A) Negative <50 ng/mL    Cocaine Metabolite Screen, Urine Neg Negative <300 ng/mL    Opiate Scrn, Ur POSITIVE (A) Negative <300 ng/mL    PCP Screen, Urine Neg Negative <25 ng/mL    Methadone Screen, Urine Neg Negative <300 ng/mL    Propoxyphene Scrn, Ur Neg Negative <300 ng/mL    Oxycodone Urine Neg Negative <100 ng/ml    pH, UA 6.0     Drug Screen Comment: see below    Troponin   Result Value Ref Range    Troponin <0.01 <0.01 ng/mL   Microscopic Urinalysis   Result Value Ref Range    Hyaline Casts, UA 3-5 (A) 0 - 2 /LPF    WBC, UA 6-9 (A) 0 - 5 /HPF    RBC, UA 5-10 (A) 0 - 4 /HPF    Epithelial Cells, UA 2-5 0 - 5 /HPF    Bacteria, UA 2+ (A) None Seen /HPF   EKG 12 Lead   Result Value Ref Range    Ventricular Rate 91 BPM    Atrial Rate 91 BPM    P-R Interval 122 ms    QRS Duration 70 ms    Q-T Interval 366 ms    QTc Calculation (Bazett) 450 ms    P Axis 73 degrees    R Axis 91 degrees    T Axis 74 degrees    Diagnosis       Normal sinus rhythmBiatrial enlargementRightward axisST elevation, consider early repolarization, pericarditis, or injuryAbnormal ECGWhen compared with ECG of 09-SEP-2020 17:19,ST elevation has replaced ST depression in Inferior leads         RADIOLOGY:  All x-ray studies areviewed/reviewed by me. Formal interpretations per the radiologist are as follows:      Ct Chest Abdomen Pelvis W Contrast    Result Date: 11/26/2020  EXAMINATION: CT OF THE CHEST, ABDOMEN, AND PELVIS WITH CONTRAST 11/26/2020 2:11 pm TECHNIQUE: CT of the chest, abdomen and pelvis was performed with the administration of intravenous contrast. Multiplanar reformatted images are provided for review. Dose modulation, iterative reconstruction, and/or weight based adjustment of the mA/kV was utilized to reduce the radiation dose to as low as reasonably achievable. stool ball in the rectosigmoid spanning 5.6 cm transverse by 9 cm craniocaudal.  No bowel obstruction. The appendix is visualized and is unremarkable. No evidence of acute appendicitis. Pelvis: Status post hysterectomy. Bilateral adnexal regions grossly unremarkable, as is the urinary bladder. Peritoneum/Retroperitoneum: No free fluid, free air, or lymphadenopathy. Bones/Soft Tissues: Bilateral femoral head avascular necrosis with right greater than left subchondral fracture/bone collapse. Dextroconvex lumbar spinal curvature with asymmetric degenerative change at the compressive side of the curve. Soft tissue irregularity overlying the sacrum which may relate to skin fold or decubitus ulcer. Fluid distended stomach with mild wall thickening and mucosal hyperenhancement. Query any signs or symptoms of gastritis. Alternatively, a degree of partial gastric outlet obstruction is possible. Moderate amount of inspissated stool with a 5.6 x 9 cm stool ball in the rectosigmoid colon. Correlate for constipation or fecal impaction. No bowel obstruction. Avascular necrosis of the femoral heads with right-greater-than-left subchondral fracture/bone collapse. No acute findings in the chest on a background of stable sequelae from emphysema and bronchial wall thickening. Postprocedural changes from prior wedge resection of the left lung with a stable left lateral pulmonary herniation between the 8th and 9th ribs. EKG: The Ekg interpreted by me in the absence of a cardiologist shows. normal sinus rhythm with a rate of 91      See also interpretation by Dmitriy Veloz MD.      PROCEDURES:     PROCEDURE: Fecal disimpaction  Tootie Fonseca or their surrogate had an opportunity to ask questions, and the risks, benefits, and alternatives were discussed.   Patient laid on her right side with her knees pulled to her chest.  Using lube I was able to perform digital rectal exam feeling hard stool in the rectal vault.  I was able to digitally disimpact the equivalent of a moderate sized bowel movement. Patient felt like she had to further move her bowels beyond this and asked to sit on the bedside commode. She did so and further moved her bowels and passed a good amount of stool. CRITICAL CARE TIME:       None      CONSULTS:  None      EMERGENCY DEPARTMENT COURSE and DIFFERENTIAL DIAGNOSIS/MDM:   Vitals:    Vitals:    11/26/20 1603 11/26/20 1739 11/26/20 1841 11/26/20 1856   BP: (!) 159/97 (!) 144/84 (!) 167/92    Pulse: 95 94 86 99   Resp: 25 20 21 26   Temp:  98.6 °F (37 °C)     TempSrc:  Oral     SpO2: 93% 97% 96% 96%   Weight:           Patient was given the following medications:  Medications   0.9 % sodium chloride bolus (0 mLs Intravenous Stopped 11/26/20 1545)   metoclopramide (REGLAN) injection 10 mg (10 mg Intravenous Given 11/26/20 1435)   diphenhydrAMINE (BENADRYL) injection 25 mg (25 mg Intravenous Given 11/26/20 1435)   droperidol (INAPSINE) injection 0.625 mg (0.625 mg Intravenous Given 11/26/20 1513)   famotidine (PEPCID) injection 20 mg (20 mg Intravenous Given 11/26/20 1513)   iopamidol (ISOVUE-370) 76 % injection 75 mL (75 mLs Intravenous Given 11/26/20 1730)   promethazine (PHENERGAN) injection 25 mg (25 mg Intramuscular Given 11/26/20 1744)   0.9 % sodium chloride bolus (0 mLs Intravenous Stopped 11/26/20 1843)         Patient was evaluated by both myself and Dmitriy Veloz MD.   Old records were reviewed. Patient arrives by EMS describing 18 hours of nausea and vomiting with generalized abdominal pain. She has a history of cyclical vomiting syndrome. She has documented cannabis abuse. She has an emesis bag with watery, light yellow emesis on arrival.  She was given Zofran in route to the hospital by EMS. On arrival an IV is established, labs are ordered along with urine. She is given an initial 1 L bolus of normal saline with Reglan 10 mg IV and Benadryl 25 mg IV.   CBC reveals white

## 2020-11-26 NOTE — ED PROVIDER NOTES
I independently performed a history and physical on [facility]. All diagnostic, treatment, and disposition decisions were made by myself in conjunction with the advanced practice provider. Briefly, this is a 63 y.o. female here for nausea, vomiting, abdominal pain that is generalized. Has had this multiple times in the past.  Symptoms are moderate to severe intensity. Cannot tolerate oral intake. Worse when she tries to eat or drink. Has been ongoing for 18 hours also has occasional cough. No chest pain or shortness of breath. On exam,   General: Patient is in no acute distress  Skin: No cyanosis  HEENT: Moist mucous membranes  Heart: Regular rate, regular rhythm  Lung: No respiratory distress  Abdomen: Soft, generalized tenderness without rebound or guarding  Neuro: Moving all extremities, no facial droop, no slurred speech, answers questions appropriately        EKG  The Ekg interpreted by me in the absence of a cardiologist shows. Normal sinus rhythm  No acute ST changes or T wave abnormalities compared to sept 2020    Screenings            MDM  Patient is a 56-year-old woman with history of cyclical vomiting syndrome and marijuana use who presents with nausea, vomiting, generalized abdominal pain. Given multiple doses of antiemetics. May be due to her cyclical vomiting syndrome. CT shows possible gastric outlet obstruction, however patient is tolerating oral intake now. Feels better. Did show stool fecal ball which was disimpacted. Now moving her bowels as well. Patient wished to be discharged at this time. Given her pain improved, she is tolerating oral intake, and having bowel movements, I do not believe that she has an obstruction. Since she has history of multiple episodes of cyclical vomiting syndrome and has the ability to return to the emergency room, feel that she is safe for discharge at this time.   All questions answered and return precautions given    Patient Referrals:  No

## 2020-11-26 NOTE — ED NOTES
Bed: 12  Expected date:   Expected time:   Means of arrival:   Comments:  [facility] [address], RN  11/26/20 6945

## 2020-11-26 NOTE — ED NOTES
Patient identified as a positive fall risk on the ED triage fall screening. Patient placed in fall precautions which includes:  yellow fall risk bracelet on wrist, yellow socks on feet, \"Be Safe\" sign placed on patient's door, and bed alarm placed under patient/alarm turned on. Patient instructed on importance of not getting out of bed or ambulating without assistance for safety.           Hermann Chauhan, [address] [place] [address]  11/26/20 9415

## 2020-11-27 LAB
EKG ATRIAL RATE: 91 BPM
EKG DIAGNOSIS: NORMAL
EKG P AXIS: 73 DEGREES
EKG P-R INTERVAL: 122 MS
EKG Q-T INTERVAL: 366 MS
EKG QRS DURATION: 70 MS
EKG QTC CALCULATION (BAZETT): 450 MS
EKG R AXIS: 91 DEGREES
EKG T AXIS: 74 DEGREES
EKG VENTRICULAR RATE: 91 BPM
URINE CULTURE, ROUTINE: NORMAL

## 2020-11-27 PROCEDURE — 93010 ELECTROCARDIOGRAM REPORT: CPT | Performed by: INTERNAL MEDICINE

## 2020-11-27 NOTE — ED NOTES
Patient is tolerating water that was provided by Malaika Case. Over 30 minutes ago. Patient has not vomited during ED visit. Spoke with patient about transportation home. Patient immediately grabbed emesis bag and started belching into emesis bag.       [facility], [address] [place] [address]  11/26/20 8459

## 2020-11-27 NOTE — ED NOTES
Provided warm blanket and pillow to patient. Patient resting in bed at this time. No further needs and call light in reach.      [facility]  11/26/20 9715

## 2020-11-27 NOTE — ED NOTES
--Patient provided with discharge instructions. --Instructions, and follow-up appointments reviewed with patient/family. No further questions or needs at this time. --Vital signs and patient stable upon discharge.   --Patient to lobby via wheelchair         Yimi Wade RN  11/26/20 9804

## 2020-12-24 ENCOUNTER — HOSPITAL ENCOUNTER (OUTPATIENT)
Age: 60
Setting detail: OBSERVATION
Discharge: HOME | End: 2020-12-28
Attending: EMERGENCY MEDICINE | Admitting: INTERNAL MEDICINE
Payer: COMMERCIAL

## 2020-12-24 LAB
A/G RATIO: 1.2 (ref 1.1–2.2)
ALBUMIN SERPL-MCNC: 4.9 G/DL (ref 3.4–5)
ALP BLD-CCNC: 127 U/L (ref 40–129)
ALT SERPL-CCNC: 15 U/L (ref 10–40)
ANION GAP SERPL CALCULATED.3IONS-SCNC: 14 MMOL/L (ref 3–16)
AST SERPL-CCNC: 36 U/L (ref 15–37)
BASOPHILS ABSOLUTE: 0.1 K/UL (ref 0–0.2)
BASOPHILS RELATIVE PERCENT: 0.7 %
BILIRUB SERPL-MCNC: 0.3 MG/DL (ref 0–1)
BUN BLDV-MCNC: 17 MG/DL (ref 7–20)
CALCIUM SERPL-MCNC: 11.2 MG/DL (ref 8.3–10.6)
CHLORIDE BLD-SCNC: 95 MMOL/L (ref 99–110)
CO2: 28 MMOL/L (ref 21–32)
CREAT SERPL-MCNC: 0.7 MG/DL (ref 0.6–1.2)
EOSINOPHILS ABSOLUTE: 0 K/UL (ref 0–0.6)
EOSINOPHILS RELATIVE PERCENT: 0 %
GFR AFRICAN AMERICAN: >60
GFR NON-AFRICAN AMERICAN: >60
GLOBULIN: 4.1 G/DL
GLUCOSE BLD-MCNC: 173 MG/DL (ref 70–99)
HCT VFR BLD CALC: 52.2 % (ref 36–48)
HEMOGLOBIN: 17.2 G/DL (ref 12–16)
LIPASE: 19 U/L (ref 13–60)
LYMPHOCYTES ABSOLUTE: 1.9 K/UL (ref 1–5.1)
LYMPHOCYTES RELATIVE PERCENT: 14.7 %
MCH RBC QN AUTO: 31.1 PG (ref 26–34)
MCHC RBC AUTO-ENTMCNC: 32.9 G/DL (ref 31–36)
MCV RBC AUTO: 94.5 FL (ref 80–100)
MONOCYTES ABSOLUTE: 0.5 K/UL (ref 0–1.3)
MONOCYTES RELATIVE PERCENT: 3.6 %
NEUTROPHILS ABSOLUTE: 10.6 K/UL (ref 1.7–7.7)
NEUTROPHILS RELATIVE PERCENT: 81 %
PDW BLD-RTO: 15.2 % (ref 12.4–15.4)
PLATELET # BLD: 269 K/UL (ref 135–450)
PMV BLD AUTO: 10 FL (ref 5–10.5)
POTASSIUM SERPL-SCNC: 5.5 MMOL/L (ref 3.5–5.1)
RBC # BLD: 5.52 M/UL (ref 4–5.2)
REASON FOR REJECTION: NORMAL
REJECTED TEST: NORMAL
SODIUM BLD-SCNC: 137 MMOL/L (ref 136–145)
TOTAL PROTEIN: 9 G/DL (ref 6.4–8.2)
TROPONIN: <0.01 NG/ML
WBC # BLD: 13.1 K/UL (ref 4–11)

## 2020-12-24 PROCEDURE — 83690 ASSAY OF LIPASE: CPT

## 2020-12-24 PROCEDURE — 83036 HEMOGLOBIN GLYCOSYLATED A1C: CPT

## 2020-12-24 PROCEDURE — 80053 COMPREHEN METABOLIC PANEL: CPT

## 2020-12-24 PROCEDURE — 85652 RBC SED RATE AUTOMATED: CPT

## 2020-12-24 PROCEDURE — 96374 THER/PROPH/DIAG INJ IV PUSH: CPT

## 2020-12-24 PROCEDURE — 84484 ASSAY OF TROPONIN QUANT: CPT

## 2020-12-24 PROCEDURE — 2580000003 HC RX 258: Performed by: EMERGENCY MEDICINE

## 2020-12-24 PROCEDURE — 6360000002 HC RX W HCPCS: Performed by: EMERGENCY MEDICINE

## 2020-12-24 PROCEDURE — 93005 ELECTROCARDIOGRAM TRACING: CPT | Performed by: EMERGENCY MEDICINE

## 2020-12-24 PROCEDURE — 96361 HYDRATE IV INFUSION ADD-ON: CPT

## 2020-12-24 PROCEDURE — 96375 TX/PRO/DX INJ NEW DRUG ADDON: CPT

## 2020-12-24 PROCEDURE — 99284 EMERGENCY DEPT VISIT MOD MDM: CPT

## 2020-12-24 PROCEDURE — 96372 THER/PROPH/DIAG INJ SC/IM: CPT

## 2020-12-24 PROCEDURE — 36415 COLL VENOUS BLD VENIPUNCTURE: CPT

## 2020-12-24 PROCEDURE — 85025 COMPLETE CBC W/AUTO DIFF WBC: CPT

## 2020-12-24 RX ORDER — 0.9 % SODIUM CHLORIDE 0.9 %
1000 INTRAVENOUS SOLUTION INTRAVENOUS ONCE
Status: COMPLETED | OUTPATIENT
Start: 2020-12-24 | End: 2020-12-25

## 2020-12-24 RX ORDER — HALOPERIDOL 5 MG/ML
5 INJECTION INTRAMUSCULAR ONCE
Status: COMPLETED | OUTPATIENT
Start: 2020-12-24 | End: 2020-12-24

## 2020-12-24 RX ADMIN — HALOPERIDOL LACTATE 5 MG: 5 INJECTION, SOLUTION INTRAMUSCULAR at 22:43

## 2020-12-24 RX ADMIN — SODIUM CHLORIDE 1000 ML: 9 INJECTION, SOLUTION INTRAVENOUS at 22:47

## 2020-12-24 ASSESSMENT — PAIN SCALES - GENERAL: PAINLEVEL_OUTOF10: 10

## 2020-12-25 ENCOUNTER — APPOINTMENT (OUTPATIENT)
Dept: CT IMAGING | Age: 60
End: 2020-12-25
Payer: COMMERCIAL

## 2020-12-25 ENCOUNTER — APPOINTMENT (OUTPATIENT)
Dept: GENERAL RADIOLOGY | Age: 60
End: 2020-12-25
Payer: COMMERCIAL

## 2020-12-25 PROBLEM — K31.1 GASTRIC OUTLET OBSTRUCTION: Status: ACTIVE | Noted: 2020-12-25

## 2020-12-25 LAB
AMPHETAMINE SCREEN, URINE: ABNORMAL
BARBITURATE SCREEN URINE: ABNORMAL
BENZODIAZEPINE SCREEN, URINE: POSITIVE
BILIRUBIN URINE: ABNORMAL
BLOOD, URINE: ABNORMAL
CANNABINOID SCREEN URINE: POSITIVE
CLARITY: CLEAR
COCAINE METABOLITE SCREEN URINE: ABNORMAL
COLOR: YELLOW
EKG ATRIAL RATE: 108 BPM
EKG DIAGNOSIS: NORMAL
EKG P AXIS: 79 DEGREES
EKG P-R INTERVAL: 114 MS
EKG Q-T INTERVAL: 328 MS
EKG QRS DURATION: 70 MS
EKG QTC CALCULATION (BAZETT): 439 MS
EKG R AXIS: 90 DEGREES
EKG T AXIS: 77 DEGREES
EKG VENTRICULAR RATE: 108 BPM
EPITHELIAL CELLS, UA: ABNORMAL /HPF (ref 0–5)
GLUCOSE URINE: NEGATIVE MG/DL
HYALINE CASTS: ABNORMAL /LPF (ref 0–2)
KETONES, URINE: 40 MG/DL
LEUKOCYTE ESTERASE, URINE: NEGATIVE
Lab: ABNORMAL
METHADONE SCREEN, URINE: ABNORMAL
MICROSCOPIC EXAMINATION: YES
MUCUS: ABNORMAL /LPF
NITRITE, URINE: NEGATIVE
OPIATE SCREEN URINE: ABNORMAL
OXYCODONE URINE: POSITIVE
PH UA: 6
PH UA: 6.5 (ref 5–8)
PHENCYCLIDINE SCREEN URINE: ABNORMAL
POTASSIUM SERPL-SCNC: 3.9 MMOL/L (ref 3.5–5.1)
PROPOXYPHENE SCREEN: ABNORMAL
PROTEIN UA: >=300 MG/DL
RBC UA: ABNORMAL /HPF (ref 0–4)
SARS-COV-2, NAAT: NOT DETECTED
SEDIMENTATION RATE, ERYTHROCYTE: 23 MM/HR (ref 0–30)
SPECIFIC GRAVITY UA: >=1.03 (ref 1–1.03)
URINE REFLEX TO CULTURE: ABNORMAL
URINE TYPE: ABNORMAL
UROBILINOGEN, URINE: 0.2 E.U./DL
WBC UA: ABNORMAL /HPF (ref 0–5)

## 2020-12-25 PROCEDURE — 93010 ELECTROCARDIOGRAM REPORT: CPT | Performed by: INTERNAL MEDICINE

## 2020-12-25 PROCEDURE — 74018 RADEX ABDOMEN 1 VIEW: CPT

## 2020-12-25 PROCEDURE — 6360000002 HC RX W HCPCS: Performed by: EMERGENCY MEDICINE

## 2020-12-25 PROCEDURE — 2700000000 HC OXYGEN THERAPY PER DAY

## 2020-12-25 PROCEDURE — U0003 INFECTIOUS AGENT DETECTION BY NUCLEIC ACID (DNA OR RNA); SEVERE ACUTE RESPIRATORY SYNDROME CORONAVIRUS 2 (SARS-COV-2) (CORONAVIRUS DISEASE [COVID-19]), AMPLIFIED PROBE TECHNIQUE, MAKING USE OF HIGH THROUGHPUT TECHNOLOGIES AS DESCRIBED BY CMS-2020-01-R: HCPCS

## 2020-12-25 PROCEDURE — 6360000002 HC RX W HCPCS: Performed by: INTERNAL MEDICINE

## 2020-12-25 PROCEDURE — C9113 INJ PANTOPRAZOLE SODIUM, VIA: HCPCS | Performed by: INTERNAL MEDICINE

## 2020-12-25 PROCEDURE — 96375 TX/PRO/DX INJ NEW DRUG ADDON: CPT

## 2020-12-25 PROCEDURE — G0378 HOSPITAL OBSERVATION PER HR: HCPCS

## 2020-12-25 PROCEDURE — 74176 CT ABD & PELVIS W/O CONTRAST: CPT

## 2020-12-25 PROCEDURE — 80307 DRUG TEST PRSMV CHEM ANLYZR: CPT

## 2020-12-25 PROCEDURE — 2580000003 HC RX 258: Performed by: INTERNAL MEDICINE

## 2020-12-25 PROCEDURE — 6370000000 HC RX 637 (ALT 250 FOR IP): Performed by: INTERNAL MEDICINE

## 2020-12-25 PROCEDURE — 2580000003 HC RX 258

## 2020-12-25 PROCEDURE — 96376 TX/PRO/DX INJ SAME DRUG ADON: CPT

## 2020-12-25 PROCEDURE — U0002 COVID-19 LAB TEST NON-CDC: HCPCS

## 2020-12-25 PROCEDURE — 96361 HYDRATE IV INFUSION ADD-ON: CPT

## 2020-12-25 PROCEDURE — 6370000000 HC RX 637 (ALT 250 FOR IP): Performed by: EMERGENCY MEDICINE

## 2020-12-25 PROCEDURE — 94761 N-INVAS EAR/PLS OXIMETRY MLT: CPT

## 2020-12-25 PROCEDURE — 81001 URINALYSIS AUTO W/SCOPE: CPT

## 2020-12-25 PROCEDURE — 84132 ASSAY OF SERUM POTASSIUM: CPT

## 2020-12-25 RX ORDER — ONDANSETRON 2 MG/ML
4 INJECTION INTRAMUSCULAR; INTRAVENOUS EVERY 6 HOURS PRN
Status: DISCONTINUED | OUTPATIENT
Start: 2020-12-25 | End: 2020-12-28 | Stop reason: HOSPADM

## 2020-12-25 RX ORDER — SUCRALFATE 1 G/1
1 TABLET ORAL EVERY 6 HOURS SCHEDULED
Status: DISCONTINUED | OUTPATIENT
Start: 2020-12-25 | End: 2020-12-28 | Stop reason: HOSPADM

## 2020-12-25 RX ORDER — SODIUM CHLORIDE 0.9 % (FLUSH) 0.9 %
10 SYRINGE (ML) INJECTION EVERY 12 HOURS SCHEDULED
Status: DISCONTINUED | OUTPATIENT
Start: 2020-12-25 | End: 2020-12-28 | Stop reason: HOSPADM

## 2020-12-25 RX ORDER — MAGNESIUM SULFATE IN WATER 40 MG/ML
2 INJECTION, SOLUTION INTRAVENOUS PRN
Status: DISCONTINUED | OUTPATIENT
Start: 2020-12-25 | End: 2020-12-28 | Stop reason: HOSPADM

## 2020-12-25 RX ORDER — DICYCLOMINE HYDROCHLORIDE 10 MG/1
10 CAPSULE ORAL ONCE
Status: COMPLETED | OUTPATIENT
Start: 2020-12-25 | End: 2020-12-25

## 2020-12-25 RX ORDER — SODIUM POLYSTYRENE SULFONATE 15 G/60ML
15 SUSPENSION ORAL; RECTAL ONCE
Status: DISCONTINUED | OUTPATIENT
Start: 2020-12-25 | End: 2020-12-25

## 2020-12-25 RX ORDER — FAMOTIDINE 20 MG/1
20 TABLET, FILM COATED ORAL DAILY PRN
Status: DISCONTINUED | OUTPATIENT
Start: 2020-12-25 | End: 2020-12-28 | Stop reason: HOSPADM

## 2020-12-25 RX ORDER — SODIUM CHLORIDE 9 MG/ML
1000 INJECTION, SOLUTION INTRAVENOUS ONCE
Status: COMPLETED | OUTPATIENT
Start: 2020-12-25 | End: 2020-12-25

## 2020-12-25 RX ORDER — SODIUM CHLORIDE 9 MG/ML
INJECTION, SOLUTION INTRAVENOUS
Status: COMPLETED
Start: 2020-12-25 | End: 2020-12-25

## 2020-12-25 RX ORDER — PROMETHAZINE HYDROCHLORIDE 25 MG/1
12.5 TABLET ORAL EVERY 6 HOURS PRN
Status: DISCONTINUED | OUTPATIENT
Start: 2020-12-25 | End: 2020-12-28 | Stop reason: HOSPADM

## 2020-12-25 RX ORDER — NALOXONE HYDROCHLORIDE 0.4 MG/ML
INJECTION, SOLUTION INTRAMUSCULAR; INTRAVENOUS; SUBCUTANEOUS
Status: DISCONTINUED
Start: 2020-12-25 | End: 2020-12-25 | Stop reason: WASHOUT

## 2020-12-25 RX ORDER — NALOXONE HYDROCHLORIDE 0.4 MG/ML
0.4 INJECTION, SOLUTION INTRAMUSCULAR; INTRAVENOUS; SUBCUTANEOUS PRN
Status: DISCONTINUED | OUTPATIENT
Start: 2020-12-25 | End: 2020-12-28 | Stop reason: HOSPADM

## 2020-12-25 RX ORDER — PROMETHAZINE HYDROCHLORIDE 25 MG/ML
12.5 INJECTION, SOLUTION INTRAMUSCULAR; INTRAVENOUS ONCE
Status: COMPLETED | OUTPATIENT
Start: 2020-12-25 | End: 2020-12-25

## 2020-12-25 RX ORDER — ACETAMINOPHEN 650 MG/1
650 SUPPOSITORY RECTAL EVERY 6 HOURS PRN
Status: DISCONTINUED | OUTPATIENT
Start: 2020-12-25 | End: 2020-12-28 | Stop reason: HOSPADM

## 2020-12-25 RX ORDER — ACETAMINOPHEN 325 MG/1
650 TABLET ORAL EVERY 6 HOURS PRN
Status: DISCONTINUED | OUTPATIENT
Start: 2020-12-25 | End: 2020-12-28 | Stop reason: HOSPADM

## 2020-12-25 RX ORDER — POTASSIUM CHLORIDE 7.45 MG/ML
10 INJECTION INTRAVENOUS PRN
Status: DISCONTINUED | OUTPATIENT
Start: 2020-12-25 | End: 2020-12-28 | Stop reason: HOSPADM

## 2020-12-25 RX ORDER — PANTOPRAZOLE SODIUM 40 MG/10ML
40 INJECTION, POWDER, LYOPHILIZED, FOR SOLUTION INTRAVENOUS 2 TIMES DAILY
Status: DISCONTINUED | OUTPATIENT
Start: 2020-12-25 | End: 2020-12-28 | Stop reason: HOSPADM

## 2020-12-25 RX ORDER — SODIUM CHLORIDE 0.9 % (FLUSH) 0.9 %
10 SYRINGE (ML) INJECTION PRN
Status: DISCONTINUED | OUTPATIENT
Start: 2020-12-25 | End: 2020-12-28 | Stop reason: HOSPADM

## 2020-12-25 RX ORDER — KETOROLAC TROMETHAMINE 30 MG/ML
30 INJECTION, SOLUTION INTRAMUSCULAR; INTRAVENOUS ONCE
Status: COMPLETED | OUTPATIENT
Start: 2020-12-25 | End: 2020-12-25

## 2020-12-25 RX ADMIN — SODIUM CHLORIDE 50 ML: 900 INJECTION INTRAVENOUS at 00:28

## 2020-12-25 RX ADMIN — SODIUM CHLORIDE 1000 ML: 9 INJECTION, SOLUTION INTRAVENOUS at 07:19

## 2020-12-25 RX ADMIN — PANTOPRAZOLE SODIUM 40 MG: 40 INJECTION, POWDER, FOR SOLUTION INTRAVENOUS at 21:51

## 2020-12-25 RX ADMIN — SUCRALFATE 1 G: 1 TABLET ORAL at 14:31

## 2020-12-25 RX ADMIN — KETOROLAC TROMETHAMINE 30 MG: 30 INJECTION, SOLUTION INTRAMUSCULAR at 01:06

## 2020-12-25 RX ADMIN — SODIUM CHLORIDE, PRESERVATIVE FREE 10 ML: 5 INJECTION INTRAVENOUS at 21:51

## 2020-12-25 RX ADMIN — SUCRALFATE 1 G: 1 TABLET ORAL at 22:46

## 2020-12-25 RX ADMIN — SUCRALFATE 1 G: 1 TABLET ORAL at 17:27

## 2020-12-25 RX ADMIN — PANTOPRAZOLE SODIUM 40 MG: 40 INJECTION, POWDER, FOR SOLUTION INTRAVENOUS at 14:31

## 2020-12-25 RX ADMIN — DICYCLOMINE HYDROCHLORIDE 10 MG: 10 CAPSULE ORAL at 01:06

## 2020-12-25 RX ADMIN — PROMETHAZINE HYDROCHLORIDE 12.5 MG: 25 INJECTION INTRAMUSCULAR; INTRAVENOUS at 00:28

## 2020-12-25 ASSESSMENT — PAIN SCALES - GENERAL
PAINLEVEL_OUTOF10: 10
PAINLEVEL_OUTOF10: 8

## 2020-12-25 NOTE — PROGRESS NOTES
Seen by my associate earlier this AM.  Assessment and plan reviewed. Patient pulled out her NGT. GI consult pending. COVID-PCR pending.

## 2020-12-25 NOTE — ED NOTES

## 2020-12-25 NOTE — ED NOTES
Bed: 07  Expected date:   Expected time:   Means of arrival:   Comments:  [address] [facility], [facility]  12/24/20 2618

## 2020-12-25 NOTE — ED NOTES
PIV by ED MD infiltrated.  Verbal order for foot PIV okay per [facility] MD.     Jone Trammell, RN  12/25/20 1110

## 2020-12-25 NOTE — H&P
Hospital Medicine History & Physical      PCP: Zaria Ochoa RN    Date of Admission: 12/24/2020    Date of Service: Pt seen/examined on 12/25/2020  P    Pt seen/examined face to face on and admitted as observation with expected LOS less than two midnights but that can change depending on respose to medical therapy and clinical progress. Chief Complaint:    Chief Complaint   Patient presents with    Abdominal Pain     x 3 days with nausea and emesis. History Of Present Illness:      63 y.o. female who presented to [facility] with past medical history of COPD, depression, anxietyPresented to the ED for nausea, vomiting and diarrhea. Patient reported has been going on for 3 days with no known alleviating or exacerbating factor. Patient reported that she continues to smoke marijuana however is very adamant and clear that she is not abusing any she is just using it minimally. Patient reported that she keeps reporting that this is due to marijuana. Patient denied having any stools, reported diarrhea also started 3 days ago reported less than 5 times frequency per day continues to tolerate oral intake however today was not able to eat or drink anything and continues to vomit. In the ED was noted to have severe gastric distention thus NG tube was placed.       Past Medical History:          Diagnosis Date    Anxiety     Asthma     Cancer [facility]) 1984    Uterine    Cancer of lung ([facility] [address] 2014    CHF (congestive heart failure) (HCC)     COPD (chronic obstructive pulmonary disease) (HCC)     Cyclic vomiting syndrome     with known cannabis abuse    Cysts of both kidneys     Depression     Fatty liver 09/06/12    by CT at [facility]    Gastritis 06/29/12    EGD at [facility] MI (myocardial infarction) ([facility] [address] 2010    Panic attacks     Pneumonia     Pre-diabetes 04/13/13    A1c5.8%    Tricuspid regurgitation     06/26/2012 JUANJO at [facility] Structurally Normal Tricuspid Valve Past Surgical History:          Procedure Laterality Date    CHOLECYSTECTOMY  2009    COLONOSCOPY  2001    COLONOSCOPY  2013    tubular adenoma    COLONOSCOPY  08/15/2017    ENDOSCOPY, COLON, DIAGNOSTIC      HERNIA REPAIR      HYSTERECTOMY  1985    With Bladder Mesh    INCONTINENCE SURGERY Left     2009 in [facility]      cancerous nodule removed left side    OVARY REMOVAL  2007    bilat    TONSILLECTOMY      UPPER GASTROINTESTINAL ENDOSCOPY  2013    gastritis    UPPER GASTROINTESTINAL ENDOSCOPY  10/03/2017    bx distal esophagus        Medications Prior to Admission:      Prior to Admission medications    Medication Sig Start Date End Date Taking?  Authorizing Provider   metoclopramide (REGLAN) 10 MG tablet Take 1 tablet by mouth 4 times daily as needed (nausea, vomiting) 11/26/20 12/16/20  NOLVIA Littlejohn   promethazine (PROMETHEGAN) 25 MG suppository Place 1 suppository rectally every 6 hours as needed for Nausea 11/26/20   NOLVIA Littlejohn   diphenhydrAMINE (BENADRYL) 25 MG capsule Take 50 mg by mouth every 6 hours as needed for Itching    Historical Provider, MD   fluticasone (FLONASE) 50 MCG/ACT nasal spray 1 spray by Each Nostril route daily    Historical Provider, MD   vitamin D3 (CHOLECALCIFEROL) 10 MCG (400 UNIT) TABS tablet Take 400 Units by mouth daily    Historical Provider, MD   cyclobenzaprine (FLEXERIL) 10 MG tablet Take 10 mg by mouth 3 times daily as needed for Muscle spasms    Historical Provider, MD   midodrine (PROAMATINE) 5 MG tablet Take 1 tablet by mouth 3 times daily (with meals) 6/9/20   Art Melchor MD   pantoprazole (PROTONIX) 40 MG tablet Take 1 tablet by mouth daily 1/27/19   Rylie Pollard MD   umeclidinium-vilanterol [facility] ELLIPTA) 62.5-25 MCG/INH AEPB inhaler Inhale 1 puff into the lungs daily 5/22/18   Jet Nelson MD albuterol sulfate HFA (PROAIR HFA) 108 (90 Base) MCG/ACT inhaler Inhale 2 puffs into the lungs every 6 hours as needed for Wheezing 4/21/18   MACEY Fernando CNP   guaiFENesin (MUCINEX) 600 MG extended release tablet Take 1 tablet by mouth 2 times daily 4/21/18   MACEY Fernando CNP   ipratropium-albuterol (DUONEB) 0.5-2.5 (3) MG/3ML SOLN nebulizer solution Inhale 3 mLs into the lungs every 6 hours as needed for Shortness of Breath 4/21/18   MACEY Fernando CNP   ondansetron (ZOFRAN ODT) 4 MG disintegrating tablet Take 1 tablet by mouth every 8 hours as needed for Nausea 4/2/18   Abbey Litten, DO   diazepam (VALIUM) 5 MG tablet Take 5 mg by mouth every 8 hours as needed for Anxiety     Historical Provider, MD       Allergies:  Bactrim [sulfamethoxazole-trimethoprim], Bupropion, Sulfamethoxazole, Trimethoprim, and Codeine    Social History:          TOBACCO:   reports that she has been smoking cigarettes. She has a 22.50 pack-year smoking history. She has never used smokeless tobacco.  ETOH:   reports no history of alcohol use.   E-Cigarettes/Vaping Use     Questions Responses    E-Cigarette/Vaping Use Never User    Start Date     Passive Exposure     Quit Date     Counseling Given     Comments             Family History:      Reviewed in detail         Problem Relation Age of Onset    Heart Disease Father     Hypertension Father     High Blood Pressure Father     Heart Disease Mother     Diabetes Maternal Grandmother     Cancer Son         NHL    High Blood Pressure Son        REVIEW OF SYSTEMS:     Constitutional:  No Fever, No Chills, No Night Sweats  ENT/Mouth:  No Nasal Congestion,  No Hoarseness, No new mouth lesion  Eyes:  No Eye Pain, No Redness, No Discharge  Cardiovascular:  No Chest Pain, No Orthopnea, No Palpitations  Respiratory:  No Cough, No Sputum, No Dyspnea  Gastrointestinal:+ Vomiting, + Diarrhea, + abdominal pain Genitourinary: No Urinary Frequency, No Hematuria, No Urinary pain  Musculoskeletal:  No worsening Arthralgias, No worsening Myalgias  Skin:  No new Skin Lesions, No new skin rash  Neuro:  No new weakness, No new numbness. Psych:  No suicial ideation, No Violence ideation    PHYSICAL EXAM PERFORMED:    BP (!) 127/96   Pulse 105   Temp 99 °F (37.2 °C) (Oral)   Resp 20   Ht 5' 3\" (1.6 m)   Wt 90 lb 6.2 oz (41 kg)   SpO2 92%   BMI 16.01 kg/m²     General appearance:  mild acute distress, appears older than stated age, frail malnourished  HEENT:   atraumatic, sclera anicteric, Conjunctivae pale. Neck: Supple,Trachea midline, no goiter  Respiratory:minimal accessory muscle usage, Normal respiratory effort. Clear to auscultation, bilaterally without wheezing  Cardiovascular:  Regular rate and rhythm, capillary refill 2 seconds  Abdomen: Soft, minimal epigastric tenderness, non-distended with normal bowel sounds. Negative Sin sign, negative Rovsing sign negative McBurney tenderness  Musculoskeletal:  No clubbing, cyanosis. trace edema LE bilaterally. Skin: turgor normal.  No new rashes or lesions. Neurologic: Alert and oriented x4, no new focal sensory/motor deficits. Labs:     Recent Labs     12/24/20 2153   WBC 13.1*   HGB 17.2*   HCT 52.2*        Recent Labs     12/24/20 2153 12/25/20  0008     --    K 5.5* 3.9   CL 95*  --    CO2 28  --    BUN 17  --    CREATININE 0.7  --    CALCIUM 11.2*  --      Recent Labs     12/24/20 2153   AST 36   ALT 15   BILITOT 0.3   ALKPHOS 127     No results for input(s): INR in the last 72 hours.   Recent Labs     12/24/20 2153   TROPONINI <0.01       Urinalysis:      Lab Results   Component Value Date    NITRU Negative 12/25/2020    WBCUA 3-5 12/25/2020    BACTERIA 2+ 11/26/2020    RBCUA 5-10 12/25/2020    BLOODU SMALL 12/25/2020    SPECGRAV >=1.030 12/25/2020    GLUCOSEU Negative 12/25/2020       Radiology: CXR: I have reviewed the CXR with the following interpretation:     EKG:  I have reviewed the EKG with the following interpretation:       XR ABDOMEN (KUB) (SINGLE AP VIEW)   Final Result   Enteric tube remains within the esophagus. The tube should be advanced 14 cm. CT ABDOMEN PELVIS WO CONTRAST Additional Contrast? None   Final Result   Right renal calculus nonobstructive. No hydronephrosis or obstructive   uropathy. Moderate to severe gastric distention with prominent air-fluid level. Similar finding noted on prior exam.      No findings suggest bowel obstruction. unremarkable appearance appendix. Sequelae of AVN involving both hips greater than left. XR ABDOMEN (KUB) (SINGLE AP VIEW)    (Results Pending)       ASSESSMENT AND PLAN:    Active Hospital Problems    Diagnosis Date Noted    Gastric outlet obstruction [K31.1] 12/25/2020     1. Gastric outlet obstruction:  NG placed, will await imaging for confirmation and will start suction  GI consulted    2. Cyclical vomiting syndrome:  GI consulted    Hyperkalemia:  Kayexalate rectal    Hypercalcemia:  Noted also A/G ratio abnormal   Thus SPEP/ light chain ordered    Hyperglycemia:  A1c     Mal[place]:  Dietition    Right nephrolithiasis:  2mm nonobstructive- passable size  Continue to monitor    Diet: NPO except meds ordered    DVT Prophylaxis: lovenox    Dispo:   Expected LOS <than two [facility], DO

## 2020-12-25 NOTE — PROGRESS NOTES
Chart reviewed and patient has been previously seen by Meghana Thompson, Dr. Antonieta Masterson and Dr. Lilian Alex in 2019.   I have recommended the GI consult be made to [facility]

## 2020-12-25 NOTE — CONSULTS
With Bladder Mesh    INCONTINENCE SURGERY Left     2009 in [facility]      cancerous nodule removed left side    OVARY REMOVAL  2007    bilat    TONSILLECTOMY      UPPER GASTROINTESTINAL ENDOSCOPY  2013    gastritis    UPPER GASTROINTESTINAL ENDOSCOPY  10/03/2017    bx distal esophagus        Social:   Social History     Tobacco Use    Smoking status: Current Every Day Smoker     Packs/day: 0.50     Years: 45.00     Pack years: 22.50     Types: Cigarettes    Smokeless tobacco: Never Used   Substance Use Topics    Alcohol use: No     Alcohol/week: 0.0 standard drinks     Comment: occasional     Family:   Family History   Problem Relation Age of Onset    Heart Disease Father     Hypertension Father     High Blood Pressure Father     Heart Disease Mother     Diabetes Maternal Grandmother     Cancer Son         NHL    High Blood Pressure Son      No current facility-administered medications on file prior to encounter.       Current Outpatient Medications on File Prior to Encounter   Medication Sig Dispense Refill    metoclopramide (REGLAN) 10 MG tablet Take 1 tablet by mouth 4 times daily as needed (nausea, vomiting) 20 tablet 3    promethazine (PROMETHEGAN) 25 MG suppository Place 1 suppository rectally every 6 hours as needed for Nausea 6 suppository 0    diphenhydrAMINE (BENADRYL) 25 MG capsule Take 50 mg by mouth every 6 hours as needed for Itching      fluticasone (FLONASE) 50 MCG/ACT nasal spray 1 spray by Each Nostril route daily      vitamin D3 (CHOLECALCIFEROL) 10 MCG (400 UNIT) TABS tablet Take 400 Units by mouth daily      cyclobenzaprine (FLEXERIL) 10 MG tablet Take 10 mg by mouth 3 times daily as needed for Muscle spasms      midodrine (PROAMATINE) 5 MG tablet Take 1 tablet by mouth 3 times daily (with meals) 45 tablet 0    pantoprazole (PROTONIX) 40 MG tablet Take 1 tablet by mouth daily 30 tablet 1  umeclidinium-vilanterol (ANORO ELLIPTA) 62.5-25 MCG/INH AEPB inhaler Inhale 1 puff into the lungs daily 60 each 5    albuterol sulfate HFA (PROAIR HFA) 108 (90 Base) MCG/ACT inhaler Inhale 2 puffs into the lungs every 6 hours as needed for Wheezing 1 Inhaler 3    guaiFENesin (MUCINEX) 600 MG extended release tablet Take 1 tablet by mouth 2 times daily 60 tablet 0    ipratropium-albuterol (DUONEB) 0.5-2.5 (3) MG/3ML SOLN nebulizer solution Inhale 3 mLs into the lungs every 6 hours as needed for Shortness of Breath 360 mL 0    ondansetron (ZOFRAN ODT) 4 MG disintegrating tablet Take 1 tablet by mouth every 8 hours as needed for Nausea 20 tablet 0    diazepam (VALIUM) 5 MG tablet Take 5 mg by mouth every 8 hours as needed for Anxiety         Infusions:   PRN Medications: sodium chloride flush, potassium chloride, magnesium sulfate, promethazine **OR** ondansetron, famotidine, acetaminophen **OR** acetaminophen  Allergies:    Allergies   Allergen Reactions    Bactrim [Sulfamethoxazole-Trimethoprim]      N/V    Bupropion      Other reaction(s): Headaches    Sulfamethoxazole      Other reaction(s): GI problems    Trimethoprim      Other reaction(s): GI problems    Codeine Itching and Nausea And Vomiting     GI upset & itching  Other reaction(s): GI problems       ROS: Constitutional: negative for chills, fevers and sweats  Eyes: negative for cataracts, icterus and redness  Ears, nose, mouth, throat, and face: negative for epistaxis, hearing loss and sore throat  Respiratory: negative for cough, hemoptysis and sputum  Cardiovascular: negative for chest pain, dyspnea and lower extremity edema  Gastrointestinal: as per HPI  Genitourinary:negative for dysuria, frequency and hematuria  Neurological: negative for coordination problems, dizziness and gait problems  Behavioral/Psych: negative for anxiety, depression and mood swings    Physical Exam BP (!) 150/82   Pulse 124   Temp 98.3 °F (36.8 °C) (Oral)   Resp 20   Ht 5' 3\" (1.6 m)   Wt 90 lb 6.2 oz (41 kg)   SpO2 91%   BMI 16.01 kg/m²     Due to the current efforts to prevent transmission of COVID-19 and also the need to preserve PPE for other caregivers, a face-to-face encounter with the patient was not performed. That being said, all relevant records and diagnostic tests were reviewed, including laboratory results and imaging. Please reference any relevant documentation elsewhere. Care will be coordinated with the primary service. Lab and Imaging Review   Labs:  CBC:   Recent Labs     12/24/20 2153   WBC 13.1*   HGB 17.2*   HCT 52.2*   MCV 94.5        BMP:   Recent Labs     12/24/20 2153 12/25/20  0008     --    K 5.5* 3.9   CL 95*  --    CO2 28  --    BUN 17  --    CREATININE 0.7  --      LIVER PROFILE:   Recent Labs     12/24/20 2153   AST 36   ALT 15   LIPASE 19.0   PROT 9.0*   BILITOT 0.3   ALKPHOS 127     CT abd/pelvis:  Right renal calculus nonobstructive.  No hydronephrosis or obstructive   uropathy.       Moderate to severe gastric distention with prominent air-fluid level.       Similar finding noted on prior exam.       No findings suggest bowel obstruction.  unremarkable appearance appendix.       Sequelae of AVN involving both hips greater than left. Assessment:     63year old female with history of HTN, COPD, lung cancer s/p surgical resection, asthma, CHF, uterine cancer s/p hysterectomy, and hyperemesis cannabis admitted with cyclic vomiting syndrome    Plan:   1. Continue supportive care  2. Monitor and replenish electrolytes  3. Start PPI bid  4. Start carafate QID  5.  marijuana abstinence  6. Start clear liquid diet  7. Check urine drug screen  8. Ambulate TID  9.  Will follow      Zaira Shane MD  12:01 PM 12/25/2020

## 2020-12-25 NOTE — ED NOTES
[facility] @ [id number]   Re: INTRACTABLE N/V/ AB PAIN  Dr. Geovany Dumont @ [address] [place] [address]  12/25/20 [id number]

## 2020-12-25 NOTE — ED NOTES
MD at bedside with [address] for PIV access     Mary Ann Harmon, RN  12/25/20 0002

## 2020-12-25 NOTE — ED PROVIDER NOTES
[address] [address] - MED SURG/ORTHO  eMERGENCY dEPARTMENTeNCOUnter      Pt Name: Ta Villanueva  MRN: [id number]  Armstrongfmadeleine 1960  Date of evaluation: 12/24/2020  Provider: Arely Riley MD    CHIEF COMPLAINT       Chief Complaint   Patient presents with    Abdominal Pain     x 3 days with nausea and emesis. HISTORY OF PRESENT ILLNESS   (Location/Symptom, Timing/Onset,Context/Setting, Quality, Duration, Modifying Factors, Severity)  Note limiting factors. Ta Villanueva is a 63 y.o. female who presents to the emergency department for abdominal pain, nausea, vomiting and diarrhea. The patient states that she has this recurrently and they have been unable to tell her what is causing her symptoms. No chest pain no shortness of breath no fevers or chills she reports that she does have dysuria. The pain is generalized. No radiation. Symptoms have been going on for approximately 3 days. She has not been able to tolerate any p.o. intake. She was given Phenergan suppositories but she states with the diarrhea and has not been able to stay in. She denies hematemesis or blood in her stools. Nursing notes were reviewed. REVIEW OF SYSTEMS    (2-9 systems for level 4, 10 or more for level 5)     Review of Systems    Positive and pertinent negative as per HPI. Except as noted above in the ROS, all other systems were reviewed and were negative.     PAST MEDICAL HISTORY     Past Medical History:   Diagnosis Date    Anxiety     Asthma     Cancer [facility]) 1984    Uterine    [facility] ([facility] [address] 2014    CHF (congestive heart failure) ([facility] [address] [facility]     COPD (chronic obstructive pulmonary disease) (HCC)     Cyclic vomiting syndrome     with known cannabis abuse    Cysts of both kidneys     Depression     Fatty liver 09/06/12    by CT at [facility]    Gastritis 06/29/12    EGD at [facility] MI (myocardial infarction) ([facility] [address] 2010    Panic attacks     Pneumonia     Pre-diabetes 04/13/13    A1c5.8%    Tricuspid regurgitation 06/26/2012 [facility]JO at [facility] Structurally Normal Tricuspid Valve         SURGICALHISTORY       Past Surgical History:   Procedure Laterality Date    CHOLECYSTECTOMY  2009    COLONOSCOPY  2001    COLONOSCOPY  2013    tubular adenoma    COLONOSCOPY  08/15/2017    ENDOSCOPY, COLON, DIAGNOSTIC      HERNIA REPAIR      HYSTERECTOMY  1985    With Bladder Mesh    INCONTINENCE SURGERY Left     2009 in [facility]      cancerous nodule removed left side    OVARY REMOVAL  2007    bilat    TONSILLECTOMY      UPPER GASTROINTESTINAL ENDOSCOPY  2013    gastritis    UPPER GASTROINTESTINAL ENDOSCOPY  10/03/2017    bx distal esophagus          CURRENT MEDICATIONS       Current Discharge Medication List      CONTINUE these medications which have NOT CHANGED    Details   metoclopramide (REGLAN) 10 MG tablet Take 1 tablet by mouth 4 times daily as needed (nausea, vomiting)  Qty: 20 tablet, Refills: 3      promethazine (PROMETHEGAN) 25 MG suppository Place 1 suppository rectally every 6 hours as needed for Nausea  Qty: 6 suppository, Refills: 0      diphenhydrAMINE (BENADRYL) 25 MG capsule Take 50 mg by mouth every 6 hours as needed for Itching      fluticasone (FLONASE) 50 MCG/ACT nasal spray 1 spray by Each Nostril route daily      vitamin D3 (CHOLECALCIFEROL) 10 MCG (400 UNIT) TABS tablet Take 400 Units by mouth daily      cyclobenzaprine (FLEXERIL) 10 MG tablet Take 10 mg by mouth 3 times daily as needed for Muscle spasms      midodrine (PROAMATINE) 5 MG tablet Take 1 tablet by mouth 3 times daily (with meals)  Qty: 45 tablet, Refills: 0      pantoprazole (PROTONIX) 40 MG tablet Take 1 tablet by mouth daily  Qty: 30 tablet, Refills: 1      umeclidinium-vilanterol (ANORO ELLIPTA) 62.5-25 MCG/INH AEPB inhaler Inhale 1 puff into the lungs daily  Qty: 60 each, Refills: 5      albuterol sulfate HFA (PROAIR HFA) 108 (90 Base) MCG/ACT inhaler Inhale 2 puffs into the lungs every 6 hours as needed for Wheezing Qty: 1 Inhaler, Refills: 3      guaiFENesin (MUCINEX) 600 MG extended release tablet Take 1 tablet by mouth 2 times daily  Qty: 60 tablet, Refills: 0      ipratropium-albuterol (DUONEB) 0.5-2.5 (3) MG/3ML SOLN nebulizer solution Inhale 3 mLs into the lungs every 6 hours as needed for Shortness of Breath  Qty: 360 mL, Refills: 0      ondansetron (ZOFRAN ODT) 4 MG disintegrating tablet Take 1 tablet by mouth every 8 hours as needed for Nausea  Qty: 20 tablet, Refills: 0      diazepam (VALIUM) 5 MG tablet Take 5 mg by mouth every 8 hours as needed for Anxiety              ALLERGIES     Bactrim [sulfamethoxazole-trimethoprim], Bupropion, Sulfamethoxazole, Trimethoprim, and Codeine    FAMILY HISTORY       Family History   Problem Relation Age of Onset    Heart Disease Father     Hypertension Father     High Blood Pressure Father     Heart Disease Mother     Diabetes Maternal Grandmother     Cancer Son         NHL    High Blood Pressure Son           SOCIAL HISTORY       Social History     Socioeconomic History    Marital status:       Spouse name: None    Number of children: 3    Years of education: None    Highest education level: None   Occupational History    Occupation: unemployed   Social Needs    Financial resource strain: None    Food insecurity     Worry: None     Inability: None    Transportation needs     Medical: None     Non-medical: None   Tobacco Use    Smoking status: Current Every Day Smoker     Packs/day: 0.50     Years: 45.00     Pack years: 22.50     Types: Cigarettes    Smokeless tobacco: Never Used   Substance and Sexual Activity    Alcohol use: No     Alcohol/week: 0.0 standard drinks     Comment: occasional    Drug use: Yes     Types: Marijuana     Comment: depends on how she is feeling     Sexual activity: Not Currently   Lifestyle    Physical activity     Days per week: None     Minutes per session: None    Stress: None   Relationships    Social connections Talks on phone: None     Gets together: None     Attends Druze service: None     Active member of club or organization: None     Attends meetings of clubs or organizations: None     Relationship status: None    Intimate partner violence     Fear of current or ex partner: None     Emotionally abused: None     Physically abused: None     Forced sexual activity: None   Other Topics Concern    None   Social History Narrative    None       SCREENINGS             PHYSICAL EXAM    (up to 7 for level 4, 8 or more for level 5)     ED Triage Vitals [12/24/20 2155]   BP Temp Temp Source Pulse Resp SpO2 Height Weight   (!) 141/99 98 °F (36.7 °C) Oral 115 18 91 % 5' 3\" (1.6 m) 105 lb (47.6 kg)       Physical Exam  Vitals signs and nursing note reviewed. Constitutional:       Appearance: Normal appearance. Comments: Chronically ill-appearing adult female who appears uncomfortable, she is actively retching   HENT:      Head: Normocephalic and atraumatic. Right Ear: External ear normal.      Left Ear: External ear normal.      Nose: Nose normal.   Eyes:      General: No scleral icterus. Right eye: No discharge. Left eye: No discharge. Conjunctiva/sclera: Conjunctivae normal.   Neck:      Musculoskeletal: Neck supple. Cardiovascular:      Rate and Rhythm: Regular rhythm. Tachycardia present. Heart sounds: Normal heart sounds. Pulmonary:      Effort: Pulmonary effort is normal. No respiratory distress. Breath sounds: Normal breath sounds. No wheezing or rales. Abdominal:      General: Abdomen is flat. Bowel sounds are increased. Palpations: Abdomen is soft. There is no mass. Tenderness: There is generalized abdominal tenderness. There is no guarding or rebound. Hernia: No hernia is present. Comments: Lipoma on the abdominal wall and left lower abdomen   Skin:     Coloration: Skin is not pale. Neurological:      Mental Status: She is alert. Psychiatric:         Mood and Affect: Mood normal.         Behavior: Behavior normal.           DIAGNOSTIC RESULTS     EKG: All EKG's are interpreted by the Emergency Department Physician who either signs or Co-signs this chart in the absence of a cardiologist.    12 lead EKG shows sinus tachycardia at a rate of 108 bpm, AK interval QRS QTC normal.  Right axis deviation. No acute ischemic changes. No significant changes when compared to previous EKG November 26, 2020. RADIOLOGY:   Non-plain film images such as CT, Ultrasound and MRI are read by the radiologist. Plain radiographic images are visualized and preliminarily interpreted by the emergency physician with the below findings:    Interpretation per the Radiologist below, if available at the time of this note:    XR ABDOMEN (KUB) (SINGLE AP VIEW)   Final Result   Enteric tube remains within the esophagus. The tube should be advanced 14 cm. CT ABDOMEN PELVIS WO CONTRAST Additional Contrast? None   Final Result   Right renal calculus nonobstructive. No hydronephrosis or obstructive   uropathy. Moderate to severe gastric distention with prominent air-fluid level. Similar finding noted on prior exam.      No findings suggest bowel obstruction. unremarkable appearance appendix. Sequelae of AVN involving both hips greater than left.          XR ABDOMEN (KUB) (SINGLE AP VIEW)    (Results Pending)         ED BEDSIDE ULTRASOUND:   Performed by ED Physician - none    LABS:  Labs Reviewed   CBC WITH AUTO DIFFERENTIAL - Abnormal; Notable for the following components:       Result Value    WBC 13.1 (*)     RBC 5.52 (*)     Hemoglobin 17.2 (*)     Hematocrit 52.2 (*)     Neutrophils Absolute 10.6 (*)     All other components within normal limits    Narrative:     Performed at:  [address]   Phone [phone] COMPREHENSIVE METABOLIC PANEL - Abnormal; Notable for the following components:    Potassium 5.5 (*)     Chloride 95 (*)     Glucose 173 (*)     Calcium 11.2 (*)     Total Protein 9.0 (*)     All other components within normal limits    Narrative:     Performed at:  [address], [facility] [employer]   Phone [phone]   URINE RT REFLEX TO CULTURE - Abnormal; Notable for the following components:    Bilirubin Urine MODERATE (*)     Ketones, Urine 40 (*)     Blood, Urine SMALL (*)     Protein, UA >=300 (*)     All other components within normal limits    Narrative:     Performed at:  [place] [address] [employer]   Phone [phone]   MICROSCOPIC URINALYSIS - Abnormal; Notable for the following components:    Hyaline Casts, UA 6-10 (*)     Mucus, UA Rare (*)     RBC, UA 5-10 (*)     All other components within normal limits    Narrative:     Performed at:  [address], [facility] [employer]   Phone [phone]   LIPASE    Narrative:     Performed at:  [address], [facility] [employer]   Phone [phone]   TROPONIN    Narrative:     Performed at:  [address], [facility] [employer]   Phone [phone]    Narrative:     Yessenia 50,  Rejected Test Name/Called to: Germán Miller RN, 12/24/2020 23:27, by Katerina Ross CANCELLED 12/24/2020 23:26, Rejected: Hemolyzed.   Performed at:  [address], [facility] [employer]   Phone [phone]   POTASSIUM    Narrative:     Performed at:  [address], [facility] [employer]   Phone [phone]   SEDIMENTATION RATE    Narrative:     Performed at: [facility]) [address],  [facility], [address]   Phone [phone]    Narrative:     Performed at:  [facility]) [facility]  [address] [facility] [address],  [facility], [address]   Phone [phone]       All other labs were within normal range or not returned as of this dictation. EMERGENCY DEPARTMENT COURSE and DIFFERENTIAL DIAGNOSIS/MDM:   Vitals:    Vitals:    12/25/20 0416 12/25/20 0445 12/25/20 0500 12/25/20 0506   BP: (!) 159/93 (!) 127/96     Pulse: 122 105     Resp: 19 20     Temp:  99 °F (37.2 °C)     TempSrc:  Oral     SpO2: 91% (!) 89% 92%    Weight:    90 lb 6.2 oz (41 kg)   Height:           Adult female who comes in of abdominal pain vomiting and diarrhea. The patient's chart is reviewed and she has cyclic vomiting associated with THC. Patient states that she did smoke marijuana however it was approximately 4 days ago. Laboratory studies are ordered. The patient has had multiple imaging studies of her abdomen and I will not order any at this moment unless there are significant abnormalities in her lab work with the patient does not improve. Given the cyclic vomiting I start the patient with Haldol as an antiemetic. She is given IV fluids. Patient is reassessed she continues to vomit. She is given Phenergan and Toradol. Again the patient continues to dry heave. She still complains of pain. Patient is given additional IV fluids. Patient is given Bentyl. Patient is reassessed and she is still uncomfortable. CT scan is ordered. This shows gastric distention with air-fluid levels. The patient had a CT scan in November that showed similar findings. I am concerned for gastric outlet obstruction. An NG tube was placed the patient is uncomfortable. I informed her that she will need to be admitted to the hospital for further care to which she agrees. The hospitalist on duty has been notified. He has agreed to admit this patient to his service. CRITICAL CARE TIME   Total Critical Care time was 45 minutes, excluding separately reportable procedures. There was a high probability of clinically significant/life threatening deterioration in the patient's condition which required my urgent intervention. CONSULTS:  IP CONSULT TO HOSPITALIST  IP CONSULT TO GI    PROCEDURES:  Unless otherwise noted above, none     Procedures    FINAL IMPRESSION      1. Gastric outlet obstruction    2. Intractable nausea and vomiting    3. Intractable abdominal pain          DISPOSITION/PLAN   DISPOSITION Admitted 12/25/2020 03:07:30 AM      PATIENT REFERREDTO:  No follow-up provider specified.     DISCHARGEMEDICATIONS:  Current Discharge Medication List             (Please note that portions of this note were completed with a voice recognition program.  Efforts were made to edit the dictations but occasionally words are mis-transcribed.)    Jo Ann Collins MD (electronically signed)  Attending Emergency Physician        Jo Ann Collins MD  12/25/20 3720

## 2020-12-25 NOTE — PROGRESS NOTES
4 Eyes Skin Assessment     The patient is being assess for   Admission    I agree that 2 RN's have performed a thorough Head to Toe Skin Assessment on the patient. ALL assessment sites listed below have been assessed. Areas assessed by both nurses:   [x]   Head, Face, and Ears   [x]   Shoulders, Back, and Chest, Abdomen  [x]   Arms, Elbows, and Hands   [x]   Coccyx, Sacrum, and Ischium  [x]   Legs, Feet, and Heels            **SHARE this note so that the co-signing nurse is able to place an eSignature**    Co-signer eSignature: {Esignature:[id number]}    Does the Patient have Skin Breakdown?   No          Darien Prevention initiated:  No   Wound Care Orders initiated:  No      WOC nurse consulted for Pressure Injury (Stage 3,4, Unstageable, DTI, NWPT, Complex wounds)and New or Established Ostomies:  No      Primary Nurse eSignature: Electronically signed by Don Rubio RN on 12/25/20 at 5:24 AM EST

## 2020-12-25 NOTE — PROGRESS NOTES
On entering room noted ng was out of pt's nose. States she doesn't know what happened . Pt  Refused ng to be put back in.  Also refused the enema ordered hospitalists was informed

## 2020-12-26 LAB
A/G RATIO: 1.4 (ref 1.1–2.2)
ALBUMIN SERPL-MCNC: 4.1 G/DL (ref 3.4–5)
ALP BLD-CCNC: 98 U/L (ref 40–129)
ALT SERPL-CCNC: 16 U/L (ref 10–40)
ANION GAP SERPL CALCULATED.3IONS-SCNC: 11 MMOL/L (ref 3–16)
AST SERPL-CCNC: 26 U/L (ref 15–37)
BASOPHILS ABSOLUTE: 0.1 K/UL (ref 0–0.2)
BASOPHILS RELATIVE PERCENT: 1.3 %
BILIRUB SERPL-MCNC: 0.3 MG/DL (ref 0–1)
BUN BLDV-MCNC: 14 MG/DL (ref 7–20)
CALCIUM SERPL-MCNC: 9.7 MG/DL (ref 8.3–10.6)
CHLORIDE BLD-SCNC: 100 MMOL/L (ref 99–110)
CO2: 26 MMOL/L (ref 21–32)
CREAT SERPL-MCNC: <0.5 MG/DL (ref 0.6–1.2)
EOSINOPHILS ABSOLUTE: 0 K/UL (ref 0–0.6)
EOSINOPHILS RELATIVE PERCENT: 0.3 %
ESTIMATED AVERAGE GLUCOSE: 119.8 MG/DL
GFR AFRICAN AMERICAN: >60
GFR NON-AFRICAN AMERICAN: >60
GLOBULIN: 2.9 G/DL
GLUCOSE BLD-MCNC: 105 MG/DL (ref 70–99)
HBA1C MFR BLD: 5.8 %
HCT VFR BLD CALC: 46 % (ref 36–48)
HEMOGLOBIN: 15.1 G/DL (ref 12–16)
LACTIC ACID: 0.8 MMOL/L (ref 0.4–2)
LYMPHOCYTES ABSOLUTE: 1.9 K/UL (ref 1–5.1)
LYMPHOCYTES RELATIVE PERCENT: 17 %
MAGNESIUM: 1.6 MG/DL (ref 1.8–2.4)
MCH RBC QN AUTO: 30.7 PG (ref 26–34)
MCHC RBC AUTO-ENTMCNC: 32.8 G/DL (ref 31–36)
MCV RBC AUTO: 93.6 FL (ref 80–100)
MONOCYTES ABSOLUTE: 0.4 K/UL (ref 0–1.3)
MONOCYTES RELATIVE PERCENT: 3.4 %
NEUTROPHILS ABSOLUTE: 8.8 K/UL (ref 1.7–7.7)
NEUTROPHILS RELATIVE PERCENT: 78 %
PDW BLD-RTO: 15.2 % (ref 12.4–15.4)
PLATELET # BLD: 229 K/UL (ref 135–450)
PMV BLD AUTO: 10.3 FL (ref 5–10.5)
POTASSIUM REFLEX MAGNESIUM: 3.5 MMOL/L (ref 3.5–5.1)
RBC # BLD: 4.91 M/UL (ref 4–5.2)
SARS-COV-2: NOT DETECTED
SODIUM BLD-SCNC: 137 MMOL/L (ref 136–145)
TOTAL PROTEIN: 7 G/DL (ref 6.4–8.2)
WBC # BLD: 11.3 K/UL (ref 4–11)

## 2020-12-26 PROCEDURE — 85025 COMPLETE CBC W/AUTO DIFF WBC: CPT

## 2020-12-26 PROCEDURE — 96365 THER/PROPH/DIAG IV INF INIT: CPT

## 2020-12-26 PROCEDURE — 96375 TX/PRO/DX INJ NEW DRUG ADDON: CPT

## 2020-12-26 PROCEDURE — 96372 THER/PROPH/DIAG INJ SC/IM: CPT

## 2020-12-26 PROCEDURE — 96366 THER/PROPH/DIAG IV INF ADDON: CPT

## 2020-12-26 PROCEDURE — 2580000003 HC RX 258: Performed by: INTERNAL MEDICINE

## 2020-12-26 PROCEDURE — 36415 COLL VENOUS BLD VENIPUNCTURE: CPT

## 2020-12-26 PROCEDURE — 84155 ASSAY OF PROTEIN SERUM: CPT

## 2020-12-26 PROCEDURE — G0378 HOSPITAL OBSERVATION PER HR: HCPCS

## 2020-12-26 PROCEDURE — 6360000002 HC RX W HCPCS: Performed by: INTERNAL MEDICINE

## 2020-12-26 PROCEDURE — 84165 PROTEIN E-PHORESIS SERUM: CPT

## 2020-12-26 PROCEDURE — C9113 INJ PANTOPRAZOLE SODIUM, VIA: HCPCS | Performed by: INTERNAL MEDICINE

## 2020-12-26 PROCEDURE — 83883 ASSAY NEPHELOMETRY NOT SPEC: CPT

## 2020-12-26 PROCEDURE — 6360000002 HC RX W HCPCS: Performed by: NURSE PRACTITIONER

## 2020-12-26 PROCEDURE — 80053 COMPREHEN METABOLIC PANEL: CPT

## 2020-12-26 PROCEDURE — 83605 ASSAY OF LACTIC ACID: CPT

## 2020-12-26 PROCEDURE — 94761 N-INVAS EAR/PLS OXIMETRY MLT: CPT

## 2020-12-26 PROCEDURE — 2700000000 HC OXYGEN THERAPY PER DAY

## 2020-12-26 PROCEDURE — 6370000000 HC RX 637 (ALT 250 FOR IP): Performed by: INTERNAL MEDICINE

## 2020-12-26 PROCEDURE — 83735 ASSAY OF MAGNESIUM: CPT

## 2020-12-26 PROCEDURE — 96376 TX/PRO/DX INJ SAME DRUG ADON: CPT

## 2020-12-26 RX ORDER — METOCLOPRAMIDE HYDROCHLORIDE 5 MG/ML
10 INJECTION INTRAMUSCULAR; INTRAVENOUS EVERY 6 HOURS
Status: DISCONTINUED | OUTPATIENT
Start: 2020-12-26 | End: 2020-12-28 | Stop reason: HOSPADM

## 2020-12-26 RX ORDER — MAGNESIUM SULFATE IN WATER 40 MG/ML
2 INJECTION, SOLUTION INTRAVENOUS ONCE
Status: COMPLETED | OUTPATIENT
Start: 2020-12-26 | End: 2020-12-26

## 2020-12-26 RX ORDER — PROMETHAZINE HYDROCHLORIDE 25 MG/ML
12.5 INJECTION, SOLUTION INTRAMUSCULAR; INTRAVENOUS EVERY 6 HOURS PRN
Status: DISCONTINUED | OUTPATIENT
Start: 2020-12-26 | End: 2020-12-28 | Stop reason: HOSPADM

## 2020-12-26 RX ADMIN — PROMETHAZINE HYDROCHLORIDE 12.5 MG: 25 INJECTION INTRAMUSCULAR; INTRAVENOUS at 05:56

## 2020-12-26 RX ADMIN — METOCLOPRAMIDE HYDROCHLORIDE 10 MG: 5 INJECTION INTRAMUSCULAR; INTRAVENOUS at 20:50

## 2020-12-26 RX ADMIN — PANTOPRAZOLE SODIUM 40 MG: 40 INJECTION, POWDER, FOR SOLUTION INTRAVENOUS at 08:20

## 2020-12-26 RX ADMIN — SODIUM CHLORIDE, PRESERVATIVE FREE 10 ML: 5 INJECTION INTRAVENOUS at 20:56

## 2020-12-26 RX ADMIN — PANTOPRAZOLE SODIUM 40 MG: 40 INJECTION, POWDER, FOR SOLUTION INTRAVENOUS at 20:49

## 2020-12-26 RX ADMIN — SUCRALFATE 1 G: 1 TABLET ORAL at 18:38

## 2020-12-26 RX ADMIN — SUCRALFATE 1 G: 1 TABLET ORAL at 11:19

## 2020-12-26 RX ADMIN — ONDANSETRON 4 MG: 2 INJECTION INTRAMUSCULAR; INTRAVENOUS at 23:38

## 2020-12-26 RX ADMIN — SODIUM CHLORIDE, PRESERVATIVE FREE 10 ML: 5 INJECTION INTRAVENOUS at 08:21

## 2020-12-26 RX ADMIN — ONDANSETRON 4 MG: 2 INJECTION INTRAMUSCULAR; INTRAVENOUS at 08:20

## 2020-12-26 RX ADMIN — ONDANSETRON 4 MG: 2 INJECTION INTRAMUSCULAR; INTRAVENOUS at 03:41

## 2020-12-26 RX ADMIN — SUCRALFATE 1 G: 1 TABLET ORAL at 05:27

## 2020-12-26 RX ADMIN — MAGNESIUM SULFATE IN WATER 2 G: 40 INJECTION, SOLUTION INTRAVENOUS at 11:19

## 2020-12-26 RX ADMIN — PROMETHAZINE HYDROCHLORIDE 12.5 MG: 25 TABLET ORAL at 05:27

## 2020-12-26 RX ADMIN — ONDANSETRON 4 MG: 2 INJECTION INTRAMUSCULAR; INTRAVENOUS at 14:08

## 2020-12-26 ASSESSMENT — PAIN DESCRIPTION - FREQUENCY
FREQUENCY: CONTINUOUS

## 2020-12-26 ASSESSMENT — PAIN SCALES - GENERAL
PAINLEVEL_OUTOF10: 7
PAINLEVEL_OUTOF10: 8
PAINLEVEL_OUTOF10: 9
PAINLEVEL_OUTOF10: 9
PAINLEVEL_OUTOF10: 8

## 2020-12-26 ASSESSMENT — PAIN DESCRIPTION - PAIN TYPE
TYPE: ACUTE PAIN

## 2020-12-26 ASSESSMENT — PAIN DESCRIPTION - DESCRIPTORS
DESCRIPTORS: ACHING

## 2020-12-26 ASSESSMENT — PAIN DESCRIPTION - LOCATION
LOCATION: ABDOMEN

## 2020-12-26 NOTE — PROGRESS NOTES
Hospital Medicine Progress Notes    PCP: Letty Pierre RN    Date of Admission: 12/24/2020    Chief Complaint:    Chief Complaint   Patient presents with    Abdominal Pain     x 3 days with nausea and emesis. History Of Present Illness:      63 y.o. female who presented to [facility] with past medical history of COPD, depression, anxietyPresented to the ED for nausea, vomiting and diarrhea. Patient reported has been going on for 3 days with no known alleviating or exacerbating factor. Patient reported that she continues to smoke marijuana however is very adamant and clear that she is not abusing any she is just using it minimally. Patient reported that she keeps reporting that this is due to marijuana. Patient denied having any stools, reported diarrhea also started 3 days ago reported less than 5 times frequency per day continues to tolerate oral intake however today was not able to eat or drink anything and continues to vomit. In the ED was noted to have severe gastric distention thus NG tube was placed. Subjective:  C/o abd pain. Discussed urine tox-states she takes \"friends\"  meds at different times. Discussed COVID pending and dc plans.        Past Medical History:          Diagnosis Date    Anxiety     Asthma     Cancer [facility]) 1984    Uterine    Cancer of lung ([facility] [address] 2014    CHF (congestive heart failure) (HCC)     COPD (chronic obstructive pulmonary disease) (HCC)     Cyclic vomiting syndrome     with known cannabis abuse    Cysts of both kidneys     Depression     Fatty liver 09/06/12    [facility]    Gastritis 06/29/12    EGD at [facility] MI (myocardial infarction) ([facility] [address] 2010    Panic attacks     Pneumonia     Pre-diabetes 04/13/13    A1c5.8%    Tricuspid regurgitation     06/26/2012 J[facility]ANJO at [facility] Structurally Normal Tricuspid Valve       Past Surgical History:          Procedure Laterality Date    CHOLECYSTECTOMY  2009    COLONOSCOPY  2001  COLONOSCOPY  2013    tubular adenoma    COLONOSCOPY  08/15/2017    ENDOSCOPY, COLON, DIAGNOSTIC      HERNIA REPAIR      HYSTERECTOMY  1985    With Bladder Mesh    INCONTINENCE SURGERY Left     [facility] in [facility]      cancerous nodule removed left side    OVARY REMOVAL  2007    bilat    TONSILLECTOMY      UPPER GASTROINTESTINAL ENDOSCOPY  2013    gastritis    UPPER GASTROINTESTINAL ENDOSCOPY  10/03/2017    bx distal esophagus        Medications Prior to Admission:      Prior to Admission medications    Medication Sig Start Date End Date Taking?  Authorizing Provider   metoclopramide (REGLAN) 10 MG tablet Take 1 tablet by mouth 4 times daily as needed (nausea, vomiting) 11/26/20 12/16/20  NOLVIA Fabian   promethazine (PROMETHEGAN) 25 MG suppository Place 1 suppository rectally every 6 hours as needed for Nausea 11/26/20   NOLVIA Fabian   diphenhydrAMINE (BENADRYL) 25 MG capsule Take 50 mg by mouth every 6 hours as needed for Itching    Historical Provider, MD   fluticasone (FLONASE) 50 MCG/ACT nasal spray 1 spray by Each Nostril route daily    Historical Provider, MD   vitamin D3 (CHOLECALCIFEROL) 10 MCG (400 UNIT) TABS tablet Take 400 Units by mouth daily    Historical Provider, MD   cyclobenzaprine (FLEXERIL) 10 MG tablet Take 10 mg by mouth 3 times daily as needed for Muscle spasms    Historical Provider, MD   midodrine (PROAMATINE) 5 MG tablet Take 1 tablet by mouth 3 times daily (with meals) 6/9/20   Bertin Neville MD   pantoprazole (PROTONIX) 40 MG tablet Take 1 tablet by mouth daily 1/27/19   Vera Albarran MD   umeclidinium-vilanterol [facility] ELLIPTA) 62.5-25 MCG/INH AEPB inhaler Inhale 1 puff into the lungs daily 5/22/18   Salina Loving MD   albuterol sulfate HFA (PROAIR HFA) 108 (90 Base) MCG/ACT inhaler Inhale 2 puffs into the lungs every 6 hours as needed for Wheezing 4/21/18   MACEY Simental - CNP guaiFENesin (MUCINEX) 600 MG extended release tablet Take 1 tablet by mouth 2 times daily 4/21/18   MACEY Barragan CNP   ipratropium-albuterol (DUONEB) 0.5-2.5 (3) MG/3ML SOLN nebulizer solution Inhale 3 mLs into the lungs every 6 hours as needed for Shortness of Breath 4/21/18   MACEY Barragan CNP   ondansetron (ZOFRAN ODT) 4 MG disintegrating tablet Take 1 tablet by mouth every 8 hours as needed for Nausea 4/2/18   Ramón Garcia,    diazepam (VALIUM) 5 MG tablet Take 5 mg by mouth every 8 hours as needed for Anxiety     Historical Provider, MD       Allergies:  Bactrim [sulfamethoxazole-trimethoprim], Bupropion, Sulfamethoxazole, Trimethoprim, and Codeine    Social History:          TOBACCO:   reports that she has been smoking cigarettes. She has a 22.50 pack-year smoking history. She has never used smokeless tobacco.  ETOH:   reports no history of alcohol use.   E-Cigarettes/Vaping Use     Questions Responses    E-Cigarette/Vaping Use Never User    Start Date     Passive Exposure     Quit Date     Counseling Given     Comments             Family History:      Reviewed in detail         Problem Relation Age of Onset    Heart Disease Father     Hypertension Father     High Blood Pressure Father     Heart Disease Mother     Diabetes Maternal Grandmother     Cancer Son         NHL    High Blood Pressure Son        REVIEW OF SYSTEMS:     Constitutional:  No Fever, No Chills, No Night Sweats  ENT/Mouth:  No Nasal Congestion,  No Hoarseness, No new mouth lesion  Eyes:  No Eye Pain, No Redness, No Discharge  Cardiovascular:  No Chest Pain, No Orthopnea, No Palpitations  Respiratory:  No Cough, No Sputum, No Dyspnea  Gastrointestinal:+ Vomiting, + Diarrhea, + abdominal pain  Genitourinary: No Urinary Frequency, No Hematuria, No Urinary pain  Musculoskeletal:  No worsening Arthralgias, No worsening Myalgias  Skin:  No new Skin Lesions, No new skin rash Endotracheal tube tip and proximal side-port reside in the mid to distal   thoracic esophagus. Repositioning is recommended. XR ABDOMEN (KUB) (SINGLE AP VIEW)   Final Result   Enteric tube remains within the esophagus. The tube should be advanced 14 cm. CT ABDOMEN PELVIS WO CONTRAST Additional Contrast? None   Final Result   Right renal calculus nonobstructive. No hydronephrosis or obstructive   uropathy. Moderate to severe gastric distention with prominent air-fluid level. Similar finding noted on prior exam.      No findings suggest bowel obstruction. unremarkable appearance appendix. Sequelae of AVN involving both hips greater than left. ASSESSMENT AND PLAN:    Active Hospital Problems    Diagnosis Date Noted    Gastric outlet obstruction [K31.1] 12/25/2020     hyperemesis cannabis admitted with cyclic vomiting syndrome  MGT d/c'd  GI consulted-following  COVID pending   UDS reviewed. ?  Underlying opiate withdrawal.     Hyperkalemia:resolving  Kayexalate rectal    Hypercalcemia:  Noted also A/G ratio abnormal   Thus SPEP/ light chain ordered    Hyperglycemia:  A1c 5.8    Malnutriton:  Dietition    Right nephrolithiasis:  2mm nonobstructive- passable size  Continue to monitor    Diet: Clears    DVT Prophylaxis: lovenox    Dispo: poss later today or tomorrow         Eliot Davis, APRN - CNP

## 2020-12-26 NOTE — PROGRESS NOTES
Vinny Astudillo NP was informed of pt very sedate not waking up , informed pt had wanted her purse and of her drug screen today results

## 2020-12-26 NOTE — PROGRESS NOTES
PROGRESS NOTE  S:60 yrs Patient  admitted on 12/24/2020 with Gastric outlet obstruction [K31.1] . Today she complains of Nausea and Abdominal Pain    Exam:   Vitals:    12/26/20 0800   BP: (!) 145/89   Pulse: 109   Resp: 20   Temp: 98.3 °F (36.8 °C)   SpO2: 93%     Exam:  Due to the current efforts to prevent transmission of COVID-19 and also the need to preserve PPE for other caregivers, a face-to-face encounter with the patient was not performed. That being said, all relevant records and diagnostic tests were reviewed, including laboratory results and imaging. Please reference any relevant documentation elsewhere. Care will be coordinated with the primary service. Medications: Reviewed    Labs:  CBC:   Recent Labs     12/24/20 2153 12/26/20  0727   WBC 13.1* 11.3*   HGB 17.2* 15.1   HCT 52.2* 46.0   MCV 94.5 93.6    229     BMP:   Recent Labs     12/24/20 2153 12/25/20  0008 12/26/20  0727     --  137   K 5.5* 3.9 3.5   CL 95*  --  100   CO2 28  --  26   BUN 17  --  14   CREATININE 0.7  --  <0.5*     LIVER PROFILE:   Recent Labs     12/24/20 2153 12/26/20  0727   AST 36 26   ALT 15 16   LIPASE 19.0  --    PROT 9.0* 7.0   BILITOT 0.3 0.3   ALKPHOS 80 80         Impression:63year old female with history of HTN, COPD, lung cancer s/p surgical resection, asthma, CHF, uterine cancer s/p hysterectomy, and hyperemesis cannabis admitted with cyclic vomiting syndrome       Recommendation:  1. Continue supportive care  2. Continue PPI BID  3. Continue carafate qid  4. Add reglan 10mg qid  5. Advance diet as tolerated  6.  Outpatient follow up upon discharge      Gricel Fulton MD  9:48 AM 12/26/2020

## 2020-12-26 NOTE — PROGRESS NOTES
Pt asked for her purse. asked pt if she had any meds in her purse, explained we did not want her taking any meds and to give her meds states she is not and has none here

## 2020-12-26 NOTE — CONSULTS
Comprehensive Nutrition Assessment    Type and Reason for Visit:  Initial, Consult    Nutrition Recommendations/Plan:   1. Continue clear liquid diet - advance as medically feasible   2. Add Ensure Clears TID   3. Encourage PO intakes   4. Monitor nutrition adequacy, pertinent labs, bowel habits, wt changes, and clinical progress    Nutrition Assessment:  Pt admitted with gastric outlet obstruction. Hx of lung and uterine cancer, COPD, gastritis, cyclical vomiting and drug use. +urine drug screen. Consulted for poor intake/appetite 5 or more days. Pt nutritionally compromised AEB poor appetite and weight loss. Noted NG previously placed, pt removed. C/o nausea and abdominal pain , no vomiting or diarrhea since admission. Pt reports no appetite today, favorable to adding ONS for supplemental nutrition. Pt concerned about weight loss, discussed importance of nutrition. Germain continue to monitor. Malnutrition Assessment:  Malnutrition Status: At risk for malnutrition (Comment)      Estimated Daily Nutrient Needs:  Energy (kcal):  0655-6143 kcal; Weight Used for Energy Requirements:  Ideal(52 kg)     Protein (g):  52-63 g; Weight Used for Protein Requirements:  Ideal(1.0-1.2 g/kg)        Fluid (ml/day):   ; Method Used for Fluid Requirements:  1 ml/kcal      Nutrition Related Findings:  Hypoactive BS      Wounds:  None       Current Nutrition Therapies:    DIET CLEAR LIQUID;     Anthropometric Measures:  · Height: 5' 3\" (160 cm)  · Current Body Weight: 90 lb 6.2 oz (41 kg)   · Usual Body Weight: 104 lb (47.2 kg)(standing scale 6/6/20)     · Ideal Body Weight: 115 lbs; % Ideal Body Weight 78.6 %   · BMI: 16  · BMI Categories: Underweight (BMI less than 18.5)       Nutrition Diagnosis:   · Inadequate oral intake related to inadequate protein-energy intake as evidenced by poor intake prior to admission, weight loss, nausea, diarrhea      Nutrition Interventions: Food and/or Nutrient Delivery:  Continue Current Diet, Start Oral Nutrition Supplement  Nutrition Education/Counseling:  No recommendation at this time   Coordination of Nutrition Care:  Continue to monitor while inpatient    Goals:  Pt will consume greater than 50% of meals and ONS this admission w/o GI distress       Nutrition Monitoring and Evaluation:   Food/Nutrient Intake Outcomes:  Food and Nutrient Intake, Supplement Intake, Diet Advancement/Tolerance  Physical Signs/Symptoms Outcomes:  GI Status, Nausea or Vomiting, Nutrition Focused Physical Findings, Weight     Discharge Planning:     Too soon to determine     Electronically signed by Yazmin Fischer MS, RD, LD on 12/26/20 at 11:18 AM EST    Contact: [phone]

## 2020-12-26 NOTE — PROGRESS NOTES
Assessment completed, see doc flow sheet, in bed resting, call light within reach, without distress, vital signs stable, denies pain or needs at present time, will continue to monitor and treat. Sebastian Mir

## 2020-12-26 NOTE — PROGRESS NOTES
Pt waking up  When went in room sweating gown changed states she was just very tired agreeable to put  her purse in closet.

## 2020-12-26 NOTE — PROGRESS NOTES
Dr Moraima Loja aware pt  States she may vomit the po phenergan and wants im dose now states orders are there to give

## 2020-12-26 NOTE — PROGRESS NOTES
pt nauseated  Zofran did not help states she will vomit the Phenergan tab perfect served to hospitalists

## 2020-12-27 LAB
A/G RATIO: 1.2 (ref 1.1–2.2)
ALBUMIN SERPL-MCNC: 3.9 G/DL (ref 3.4–5)
ALP BLD-CCNC: 93 U/L (ref 40–129)
ALT SERPL-CCNC: 17 U/L (ref 10–40)
ANION GAP SERPL CALCULATED.3IONS-SCNC: 12 MMOL/L (ref 3–16)
AST SERPL-CCNC: 32 U/L (ref 15–37)
BASOPHILS ABSOLUTE: 0 K/UL (ref 0–0.2)
BASOPHILS RELATIVE PERCENT: 0.2 %
BILIRUB SERPL-MCNC: 0.4 MG/DL (ref 0–1)
BUN BLDV-MCNC: 10 MG/DL (ref 7–20)
CALCIUM SERPL-MCNC: 9.5 MG/DL (ref 8.3–10.6)
CHLORIDE BLD-SCNC: 100 MMOL/L (ref 99–110)
CO2: 21 MMOL/L (ref 21–32)
CREAT SERPL-MCNC: <0.5 MG/DL (ref 0.6–1.2)
EOSINOPHILS ABSOLUTE: 0.1 K/UL (ref 0–0.6)
EOSINOPHILS RELATIVE PERCENT: 0.6 %
GFR AFRICAN AMERICAN: >60
GFR NON-AFRICAN AMERICAN: >60
GLOBULIN: 3.2 G/DL
GLUCOSE BLD-MCNC: 141 MG/DL (ref 70–99)
HCT VFR BLD CALC: 45.8 % (ref 36–48)
HEMOGLOBIN: 14.9 G/DL (ref 12–16)
LYMPHOCYTES ABSOLUTE: 2.3 K/UL (ref 1–5.1)
LYMPHOCYTES RELATIVE PERCENT: 25.6 %
MAGNESIUM: 2 MG/DL (ref 1.8–2.4)
MCH RBC QN AUTO: 30.6 PG (ref 26–34)
MCHC RBC AUTO-ENTMCNC: 32.4 G/DL (ref 31–36)
MCV RBC AUTO: 94.3 FL (ref 80–100)
MONOCYTES ABSOLUTE: 0.6 K/UL (ref 0–1.3)
MONOCYTES RELATIVE PERCENT: 6.4 %
NEUTROPHILS ABSOLUTE: 6.1 K/UL (ref 1.7–7.7)
NEUTROPHILS RELATIVE PERCENT: 67.2 %
PDW BLD-RTO: 15 % (ref 12.4–15.4)
PLATELET # BLD: 224 K/UL (ref 135–450)
PMV BLD AUTO: 10.4 FL (ref 5–10.5)
POTASSIUM REFLEX MAGNESIUM: 3.1 MMOL/L (ref 3.5–5.1)
POTASSIUM SERPL-SCNC: 3.3 MMOL/L (ref 3.5–5.1)
RBC # BLD: 4.86 M/UL (ref 4–5.2)
SODIUM BLD-SCNC: 133 MMOL/L (ref 136–145)
TOTAL PROTEIN: 7.1 G/DL (ref 6.4–8.2)
WBC # BLD: 9 K/UL (ref 4–11)

## 2020-12-27 PROCEDURE — 6360000002 HC RX W HCPCS: Performed by: INTERNAL MEDICINE

## 2020-12-27 PROCEDURE — G0378 HOSPITAL OBSERVATION PER HR: HCPCS

## 2020-12-27 PROCEDURE — 36415 COLL VENOUS BLD VENIPUNCTURE: CPT

## 2020-12-27 PROCEDURE — 80053 COMPREHEN METABOLIC PANEL: CPT

## 2020-12-27 PROCEDURE — 6370000000 HC RX 637 (ALT 250 FOR IP): Performed by: INTERNAL MEDICINE

## 2020-12-27 PROCEDURE — 83735 ASSAY OF MAGNESIUM: CPT

## 2020-12-27 PROCEDURE — 96372 THER/PROPH/DIAG INJ SC/IM: CPT

## 2020-12-27 PROCEDURE — 84132 ASSAY OF SERUM POTASSIUM: CPT

## 2020-12-27 PROCEDURE — 6370000000 HC RX 637 (ALT 250 FOR IP): Performed by: NURSE PRACTITIONER

## 2020-12-27 PROCEDURE — 85025 COMPLETE CBC W/AUTO DIFF WBC: CPT

## 2020-12-27 PROCEDURE — C9113 INJ PANTOPRAZOLE SODIUM, VIA: HCPCS | Performed by: INTERNAL MEDICINE

## 2020-12-27 PROCEDURE — 2580000003 HC RX 258: Performed by: INTERNAL MEDICINE

## 2020-12-27 PROCEDURE — 96376 TX/PRO/DX INJ SAME DRUG ADON: CPT

## 2020-12-27 RX ORDER — POTASSIUM CHLORIDE 20 MEQ/1
40 TABLET, EXTENDED RELEASE ORAL 2 TIMES DAILY
Status: COMPLETED | OUTPATIENT
Start: 2020-12-27 | End: 2020-12-27

## 2020-12-27 RX ORDER — SUCRALFATE 1 G/1
1 TABLET ORAL 4 TIMES DAILY
Qty: 120 TABLET | Refills: 3 | Status: SHIPPED | OUTPATIENT
Start: 2020-12-27 | End: 2020-12-28

## 2020-12-27 RX ORDER — METOCLOPRAMIDE HYDROCHLORIDE 5 MG/ML
10 INJECTION INTRAMUSCULAR; INTRAVENOUS
Qty: 240 ML | Refills: 0 | Status: SHIPPED | OUTPATIENT
Start: 2020-12-27 | End: 2020-12-28 | Stop reason: HOSPADM

## 2020-12-27 RX ORDER — PANTOPRAZOLE SODIUM 40 MG/1
40 TABLET, DELAYED RELEASE ORAL 2 TIMES DAILY
Qty: 60 TABLET | Refills: 1 | Status: SHIPPED | OUTPATIENT
Start: 2020-12-27 | End: 2020-12-28 | Stop reason: SDUPTHER

## 2020-12-27 RX ADMIN — POTASSIUM CHLORIDE 40 MEQ: 20 TABLET, EXTENDED RELEASE ORAL at 20:48

## 2020-12-27 RX ADMIN — POTASSIUM CHLORIDE 40 MEQ: 20 TABLET, EXTENDED RELEASE ORAL at 11:44

## 2020-12-27 RX ADMIN — PANTOPRAZOLE SODIUM 40 MG: 40 INJECTION, POWDER, FOR SOLUTION INTRAVENOUS at 20:48

## 2020-12-27 RX ADMIN — SODIUM CHLORIDE, PRESERVATIVE FREE 10 ML: 5 INJECTION INTRAVENOUS at 09:26

## 2020-12-27 RX ADMIN — SUCRALFATE 1 G: 1 TABLET ORAL at 23:48

## 2020-12-27 RX ADMIN — PANTOPRAZOLE SODIUM 40 MG: 40 INJECTION, POWDER, FOR SOLUTION INTRAVENOUS at 09:00

## 2020-12-27 RX ADMIN — SODIUM CHLORIDE, PRESERVATIVE FREE 10 ML: 5 INJECTION INTRAVENOUS at 20:48

## 2020-12-27 RX ADMIN — METOCLOPRAMIDE HYDROCHLORIDE 10 MG: 5 INJECTION INTRAMUSCULAR; INTRAVENOUS at 20:48

## 2020-12-27 RX ADMIN — METOCLOPRAMIDE HYDROCHLORIDE 10 MG: 5 INJECTION INTRAMUSCULAR; INTRAVENOUS at 15:12

## 2020-12-27 RX ADMIN — SUCRALFATE 1 G: 1 TABLET ORAL at 06:16

## 2020-12-27 RX ADMIN — ONDANSETRON 4 MG: 2 INJECTION INTRAMUSCULAR; INTRAVENOUS at 07:35

## 2020-12-27 RX ADMIN — METOCLOPRAMIDE HYDROCHLORIDE 10 MG: 5 INJECTION INTRAMUSCULAR; INTRAVENOUS at 06:16

## 2020-12-27 RX ADMIN — ONDANSETRON 4 MG: 2 INJECTION INTRAMUSCULAR; INTRAVENOUS at 13:08

## 2020-12-27 RX ADMIN — PROMETHAZINE HYDROCHLORIDE 12.5 MG: 25 INJECTION INTRAMUSCULAR; INTRAVENOUS at 23:48

## 2020-12-27 RX ADMIN — METOCLOPRAMIDE HYDROCHLORIDE 10 MG: 5 INJECTION INTRAMUSCULAR; INTRAVENOUS at 00:47

## 2020-12-27 RX ADMIN — SUCRALFATE 1 G: 1 TABLET ORAL at 00:47

## 2020-12-27 RX ADMIN — SUCRALFATE 1 G: 1 TABLET ORAL at 11:44

## 2020-12-27 RX ADMIN — SUCRALFATE 1 G: 1 TABLET ORAL at 18:03

## 2020-12-27 ASSESSMENT — PAIN DESCRIPTION - FREQUENCY
FREQUENCY: CONTINUOUS

## 2020-12-27 ASSESSMENT — PAIN DESCRIPTION - DESCRIPTORS
DESCRIPTORS: ACHING

## 2020-12-27 ASSESSMENT — PAIN SCALES - GENERAL
PAINLEVEL_OUTOF10: 8
PAINLEVEL_OUTOF10: 7
PAINLEVEL_OUTOF10: 7
PAINLEVEL_OUTOF10: 8
PAINLEVEL_OUTOF10: 0
PAINLEVEL_OUTOF10: 8

## 2020-12-27 ASSESSMENT — PAIN DESCRIPTION - PAIN TYPE
TYPE: ACUTE PAIN

## 2020-12-27 ASSESSMENT — PAIN DESCRIPTION - LOCATION
LOCATION: ABDOMEN

## 2020-12-27 NOTE — CARE COORDINATION
D/c order noted. Spoke with patient. Stated has no DCP needs except cab ride home. Reported her insurance company will cover it. She get 35 a year and has 25 left. Rn updated. D/c is pending potassium level.  Kyaw Cummings RN

## 2020-12-27 NOTE — PROGRESS NOTES
Hospital Medicine Progress Notes    PCP: Belén Chiu RN    Date of Admission: 12/24/2020    Chief Complaint:    Chief Complaint   Patient presents with    Abdominal Pain     x 3 days with nausea and emesis. History Of Present Illness:      63 y.o. female who presented to [facility] with past medical history of COPD, depression, anxietyPresented to the ED for nausea, vomiting and diarrhea. Patient reported has been going on for 3 days with no known alleviating or exacerbating factor. Patient reported that she continues to smoke marijuana however is very adamant and clear that she is not abusing any she is just using it minimally. Patient reported that she keeps reporting that this is due to marijuana. Patient denied having any stools, reported diarrhea also started 3 days ago reported less than 5 times frequency per day continues to tolerate oral intake however today was not able to eat or drink anything and continues to vomit. In the ED was noted to have severe gastric distention thus NG tube was placed.     Subjective:  K low this am, repleted  She feels better      Past Medical History:          Diagnosis Date    Anxiety     Asthma     Cancer ([address] 1984    Uterine    Cancer of lung ([address] 2014    CHF (congestive heart failure) ([address]     COPD (chronic obstructive pulmonary disease) (HCC)     Cyclic vomiting syndrome     with known cannabis abuse    Cysts of both kidneys     Depression     Fatty liver 09/06/12    by CT at [facility]    Gastritis 06/29/12    EGD at [place] [address] MI (myocardial infarction) ([address] 2010    Panic attacks     Pneumonia     Pre-diabetes 04/13/13    A1c5.8%    Tricuspid regurgitation     06/26/2012 [facility] at [facility] Structurally Normal Tricuspid Valve       Past Surgical History:          Procedure Laterality Date    CHOLECYSTECTOMY  2009    COLONOSCOPY  2001    COLONOSCOPY  2013    tubular adenoma    COLONOSCOPY  08/15/2017    ENDOSCOPY, COLON, DIAGNOSTIC [address]  Bl    With Bladder Mesh    INCONTINENCE SURGERY Left     2009 in [facility]      cancerous nodule removed left side    OVARY REMOVAL  2007    bilat    TONSILLECTOMY      UPPER GASTROINTESTINAL ENDOSCOPY  2013    gastritis    UPPER GASTROINTESTINAL ENDOSCOPY  10/03/2017    bx distal esophagus        Medications Prior to Admission:      Prior to Admission medications    Medication Sig Start Date End Date Taking?  Authorizing Provider   metoclopramide (REGLAN) 10 MG tablet Take 1 tablet by mouth 4 times daily as needed (nausea, vomiting) 11/26/20 12/16/20  NOLVIA Belle   promethazine (PROMETHEGAN) 25 MG suppository Place 1 suppository rectally every 6 hours as needed for Nausea 11/26/20   NOLVIA Belle   diphenhydrAMINE (BENADRYL) 25 MG capsule Take 50 mg by mouth every 6 hours as needed for Itching    Historical Provider, MD   fluticasone (FLONASE) 50 MCG/ACT nasal spray 1 spray by Each Nostril route daily    Historical Provider, MD   vitamin D3 (CHOLECALCIFEROL) 10 MCG (400 UNIT) TABS tablet Take 400 Units by mouth daily    Historical Provider, MD   cyclobenzaprine (FLEXERIL) 10 MG tablet Take 10 mg by mouth 3 times daily as needed for Muscle spasms    Historical Provider, MD   midodrine (PROAMATINE) 5 MG tablet Take 1 tablet by mouth 3 times daily (with meals) 6/9/20   Kike Meier MD   pantoprazole (PROTONIX) 40 MG tablet Take 1 tablet by mouth daily 1/27/19   Rober Lin MD   umeclidinium-vilanterol (ANORO ELLIPTA) 62.5-25 MCG/INH AEPB inhaler Inhale 1 puff into the lungs daily 5/22/18   Modesto Kaiser MD   albuterol sulfate HFA (PROAIR HFA) 108 (90 Base) MCG/ACT inhaler Inhale 2 puffs into the lungs every 6 hours as needed for Wheezing 4/21/18   MACEY Resendiz CNP   guaiFENesin (MUCINEX) 600 MG extended release tablet Take 1 tablet by mouth 2 times daily 4/21/18   MACEY Resendiz CNP ipratropium-albuterol (DUONEB) 0.5-2.5 (3) MG/3ML SOLN nebulizer solution Inhale 3 mLs into the lungs every 6 hours as needed for Shortness of Breath 4/21/18   MACEY Farias CNP   ondansetron (ZOFRAN ODT) 4 MG disintegrating tablet Take 1 tablet by mouth every 8 hours as needed for Nausea 4/2/18   Jef Olivares DO   diazepam (VALIUM) 5 MG tablet Take 5 mg by mouth every 8 hours as needed for Anxiety     Historical Provider, MD       Allergies:  Bactrim [sulfamethoxazole-trimethoprim], Bupropion, Sulfamethoxazole, Trimethoprim, and Codeine    Social History:          TOBACCO:   reports that she has been smoking cigarettes. She has a 22.50 pack-year smoking history. She has never used smokeless tobacco.  ETOH:   reports no history of alcohol use. E-Cigarettes/Vaping Use     Questions Responses    E-Cigarette/Vaping Use Never User    Start Date     Passive Exposure     Quit Date     Counseling Given     Comments             Family History:      Reviewed in detail         Problem Relation Age of Onset    Heart Disease Father     Hypertension Father     High Blood Pressure Father     Heart Disease Mother     Diabetes Maternal Grandmother     Cancer Son         NHL    High Blood Pressure Son        REVIEW OF SYSTEMS:     Constitutional:  No Fever, No Chills, No Night Sweats  ENT/Mouth:  No Nasal Congestion,  No Hoarseness, No new mouth lesion  Eyes:  No Eye Pain, No Redness, No Discharge  Cardiovascular:  No Chest Pain, No Orthopnea, No Palpitations  Respiratory:  No Cough, No Sputum, No Dyspnea  Gastrointestinal:+ Vomiting, + Diarrhea, + abdominal pain  Genitourinary: No Urinary Frequency, No Hematuria, No Urinary pain  Musculoskeletal:  No worsening Arthralgias, No worsening Myalgias  Skin:  No new Skin Lesions, No new skin rash  Neuro:  No new weakness, No new numbness.   Psych:  No suicial ideation, No Violence ideation    PHYSICAL EXAM PERFORMED: /78   Pulse 110   Temp 99 °F (37.2 °C) (Oral)   Resp 18   Ht 5' 3\" (1.6 m)   Wt 94 lb 5.7 oz (42.8 kg)   SpO2 94%   BMI 16.71 kg/m²     General appearance:  mild acute distress, appears older than stated age, frail malnourished  HEENT:   atraumatic, sclera anicteric, Conjunctivae pale. Neck: Supple,Trachea midline, no goiter  Respiratory:minimal accessory muscle usage, Normal respiratory effort. Clear to auscultation, bilaterally without wheezing  Cardiovascular:  Regular rate and rhythm, capillary refill 2 seconds  Abdomen: Soft, minimal epigastric tenderness, non-distended with normal bowel sounds. Negative Sin sign, negative Rovsing sign negative McBurney tenderness  Musculoskeletal:  No clubbing, cyanosis. trace edema LE bilaterally. Skin: turgor normal.  No new rashes or lesions. Neurologic: Alert and oriented x4, no new focal sensory/motor deficits. Labs:     Recent Labs     12/24/20 2153 12/26/20 0727 12/27/20  0708   WBC 13.1* 11.3* 9.0   HGB 17.2* 15.1 14.9   HCT 52.2* 46.0 45.8    229 224     Recent Labs     12/24/20 2153 12/25/20  0008 12/26/20  0727 12/27/20  0645     --  137 133*   K 5.5* 3.9 3.5 3.1*   CL 95*  --  100 100   CO2 28  --  26 21   BUN 17  --  14 10   CREATININE 0.7  --  <0.5* <0.5*   CALCIUM 11.2*  --  9.7 9.5     Recent Labs     12/24/20 2153 12/26/20  0727 12/27/20  0645   AST 36 26 32   ALT 15 16 17   BILITOT 0.3 0.3 0.4   ALKPHOS 127 98 93     No results for input(s): INR in the last 72 hours.   Recent Labs     12/24/20 2153   TROPONINI <0.01       Urinalysis:      Lab Results   Component Value Date    NITRU Negative 12/25/2020    WBCUA 3-5 12/25/2020    BACTERIA 2+ 11/26/2020    RBCUA 5-10 12/25/2020    BLOODU SMALL 12/25/2020    SPECGRAV >=1.030 12/25/2020    GLUCOSEU Negative 12/25/2020       Radiology:     CXR: I have reviewed the CXR with the following interpretation:     EKG:  I have reviewed the EKG with the following interpretation: XR ABDOMEN (KUB) (SINGLE AP VIEW)   Final Result   Endotracheal tube tip and proximal side-port reside in the mid to distal   thoracic esophagus. Repositioning is recommended. XR ABDOMEN (KUB) (SINGLE AP VIEW)   Final Result   Enteric tube remains within the esophagus. The tube should be advanced 14 cm. CT ABDOMEN PELVIS WO CONTRAST Additional Contrast? None   Final Result   Right renal calculus nonobstructive. No hydronephrosis or obstructive   uropathy. Moderate to severe gastric distention with prominent air-fluid level. Similar finding noted on prior exam.      No findings suggest bowel obstruction. unremarkable appearance appendix. Sequelae of AVN involving both hips greater than left. ASSESSMENT AND PLAN:    Active Hospital Problems    Diagnosis Date Noted    Gastric outlet obstruction [K31.1] 12/25/2020     hyperemesis cannabis admitted with cyclic vomiting syndrome  MGT d/c'd  GI consulted-following  COVID r/o  UDS reviewed. ?  Underlying opiate withdrawal.     Hyperkalemia:now with refractory hypokalemia  Replete  Repeat K at 1500    Hypercalcemia:  Noted also A/G ratio abnormal   Thus SPEP/ light chain ordered    Hyperglycemia:  A1c 5.8    Malnutriton:  Dietition    Right nephrolithiasis:  2mm nonobstructive- passable size  Continue to monitor    Diet: Clears    DVT Prophylaxis: lovenox    Dispo: poss later today or tomorrow         Severiano Speak, APRN - CNP

## 2020-12-27 NOTE — CARE COORDINATION
CASE MANAGEMENT INITIAL ASSESSMENT      Reviewed chart and completed assessment via telephone with: pt   Explained Case Management role/services. Primary contact information: paco Evans Maker [phone] :     Paco Evans Maker [phone]    Can this person be reached and be able to respond quickly, such as within a few minutes or hours? Yes  Who would be your back-up decision maker?   Name dtr Batsheva Rosenberg   Phone Number: [phone] date/status: 12/24 IP  Diagnosis: Gastric outlet obstruction  Is this a Readmission?:  No      Insurance:[facility]   Precert required for SNF: No       3 night stay required: No    Living arrangements, Adls, care needs, prior to admission: pt lives alone in apt with [address], pt traverses with some difficulty due to SOB,  IPTA and can drive    Transportation: insurance provided, friends/family, cab    Durable Medical Equipment at home:  Walker__Cane_x_RTS__ BSC__Shower Chair_x_  02__ HHN_x_ CPAP__  BiPap__  Hospital Bed__ W/C___ Other__________    Services in the home and/or outpatient, prior to admission: Pt has NP Trish Patel through  [facility] for primary care and privately established caretaker who does light housekeeping, cooking shopping, staying with pt at least 1x per week  Dialysis Facility (if applicable) N/A    PT/OT recs: none available    Hospital Exemption Notification (HEN): not initiated    Barriers to discharge: none Plan/comments: Pt thinks she may need oxygen at home and specifies that she would like a small portable tank if so since in past large oxygen tanks were very hard for her to manage. Informed pt that she would need to qualify for home o2 again and that a small portable may not be possible at d/c from hospital but need could be reviewed by [facility]. Used [facility] previously. Pt also has used [employer] in past for [address], would use again if necessary. Pt states she no longer has meals on wheels. DCP following for needs.      ECOC on chart for MD signature

## 2020-12-28 VITALS
HEART RATE: 89 BPM | DIASTOLIC BLOOD PRESSURE: 69 MMHG | RESPIRATION RATE: 16 BRPM | BODY MASS INDEX: 16.02 KG/M2 | TEMPERATURE: 98.4 F | SYSTOLIC BLOOD PRESSURE: 102 MMHG | OXYGEN SATURATION: 92 % | WEIGHT: 90.4 LBS | HEIGHT: 63 IN

## 2020-12-28 LAB
ANION GAP SERPL CALCULATED.3IONS-SCNC: 8 MMOL/L (ref 3–16)
BUN BLDV-MCNC: 7 MG/DL (ref 7–20)
CALCIUM SERPL-MCNC: 9.5 MG/DL (ref 8.3–10.6)
CHLORIDE BLD-SCNC: 102 MMOL/L (ref 99–110)
CO2: 23 MMOL/L (ref 21–32)
CREAT SERPL-MCNC: <0.5 MG/DL (ref 0.6–1.2)
GFR AFRICAN AMERICAN: >60
GFR NON-AFRICAN AMERICAN: >60
GLUCOSE BLD-MCNC: 113 MG/DL (ref 70–99)
POTASSIUM SERPL-SCNC: 3.9 MMOL/L (ref 3.5–5.1)
SODIUM BLD-SCNC: 133 MMOL/L (ref 136–145)

## 2020-12-28 PROCEDURE — 6360000002 HC RX W HCPCS: Performed by: INTERNAL MEDICINE

## 2020-12-28 PROCEDURE — 36415 COLL VENOUS BLD VENIPUNCTURE: CPT

## 2020-12-28 PROCEDURE — 80048 BASIC METABOLIC PNL TOTAL CA: CPT

## 2020-12-28 PROCEDURE — G0378 HOSPITAL OBSERVATION PER HR: HCPCS

## 2020-12-28 PROCEDURE — 2580000003 HC RX 258: Performed by: INTERNAL MEDICINE

## 2020-12-28 PROCEDURE — C9113 INJ PANTOPRAZOLE SODIUM, VIA: HCPCS | Performed by: INTERNAL MEDICINE

## 2020-12-28 PROCEDURE — 96376 TX/PRO/DX INJ SAME DRUG ADON: CPT

## 2020-12-28 PROCEDURE — 6370000000 HC RX 637 (ALT 250 FOR IP): Performed by: INTERNAL MEDICINE

## 2020-12-28 RX ORDER — SUCRALFATE 1 G/1
1 TABLET ORAL 4 TIMES DAILY
Qty: 120 TABLET | Refills: 3 | Status: SHIPPED | OUTPATIENT
Start: 2020-12-28

## 2020-12-28 RX ORDER — METOCLOPRAMIDE 10 MG/1
10 TABLET ORAL 4 TIMES DAILY
Qty: 30 TABLET | Refills: 3 | Status: SHIPPED | OUTPATIENT
Start: 2020-12-28

## 2020-12-28 RX ORDER — PANTOPRAZOLE SODIUM 40 MG/1
40 TABLET, DELAYED RELEASE ORAL 2 TIMES DAILY
Qty: 60 TABLET | Refills: 1 | Status: SHIPPED | OUTPATIENT
Start: 2020-12-28

## 2020-12-28 RX ADMIN — METOCLOPRAMIDE HYDROCHLORIDE 10 MG: 5 INJECTION INTRAMUSCULAR; INTRAVENOUS at 02:06

## 2020-12-28 RX ADMIN — SODIUM CHLORIDE, PRESERVATIVE FREE 10 ML: 5 INJECTION INTRAVENOUS at 07:48

## 2020-12-28 RX ADMIN — METOCLOPRAMIDE HYDROCHLORIDE 10 MG: 5 INJECTION INTRAMUSCULAR; INTRAVENOUS at 06:36

## 2020-12-28 RX ADMIN — SUCRALFATE 1 G: 1 TABLET ORAL at 06:35

## 2020-12-28 RX ADMIN — PANTOPRAZOLE SODIUM 40 MG: 40 INJECTION, POWDER, FOR SOLUTION INTRAVENOUS at 07:48

## 2020-12-28 RX ADMIN — ONDANSETRON 4 MG: 2 INJECTION INTRAMUSCULAR; INTRAVENOUS at 07:48

## 2020-12-28 ASSESSMENT — PAIN DESCRIPTION - DESCRIPTORS: DESCRIPTORS: ACHING

## 2020-12-28 ASSESSMENT — PAIN DESCRIPTION - PAIN TYPE: TYPE: ACUTE PAIN

## 2020-12-28 ASSESSMENT — PAIN DESCRIPTION - FREQUENCY: FREQUENCY: INTERMITTENT

## 2020-12-28 ASSESSMENT — PAIN DESCRIPTION - LOCATION: LOCATION: ABDOMEN

## 2020-12-28 ASSESSMENT — PAIN SCALES - GENERAL: PAINLEVEL_OUTOF10: 6

## 2020-12-28 NOTE — PROGRESS NOTES
Assessment completed, see doc flow sheet, in bed resting, call light within reach, without distress, vital signs stable, denies needs at present time, will continue to monitor and treat. Michele Tsai

## 2020-12-29 LAB
KAPPA, FREE LIGHT CHAINS, SERUM: 12.37 MG/L (ref 3.3–19.4)
KAPPA/LAMBDA RATIO: 1.12 (ref 0.26–1.65)
KAPPA/LAMBDA TEST COMMENT: NORMAL
LAMBDA, FREE LIGHT CHAINS, SERUM: 11.05 MG/L (ref 5.71–26.3)

## 2020-12-31 LAB
ALBUMIN SERPL-MCNC: 3.4 G/DL (ref 3.1–4.9)
ALPHA-1-GLOBULIN: 0.4 G/DL (ref 0.2–0.4)
ALPHA-2-GLOBULIN: 1 G/DL (ref 0.4–1.1)
BETA GLOBULIN: 1.3 G/DL (ref 0.9–1.6)
GAMMA GLOBULIN: 1 G/DL (ref 0.6–1.8)
SPE/IFE INTERPRETATION: NORMAL

## 2021-01-15 NOTE — DISCHARGE SUMMARY
Hospital Medicine Discharge Summary    Patient ID: Bola Caicedo      Patient's PCP: Chauncey Joshi RN    Admit Date: 12/24/2020     Discharge Date: 12/28/2020      Admitting Physician: Paddy Peck DO     Discharge Physician: Blanche Moreno APRN - CNP     Discharge Diagnoses: Active Hospital Problems    Diagnosis    Gastric outlet obstruction [K31.1]       The patient was seen and examined on day of discharge and this discharge summary is in conjunction with any daily progress note from day of discharge. Hospital Course:     63 y.o. female who presented to [facility] with past medical history of COPD, depression, anxietyPresented to the ED for nausea, vomiting and diarrhea. Patient reported has been going on for 3 days with no known alleviating or exacerbating factor. Patient reported that she continues to smoke marijuana however is very adamant and clear that she is not abusing any she is just using it minimally. Patient reported that she keeps reporting that this is due to marijuana. Patient denied having any stools, reported diarrhea also started 3 days ago reported less than 5 times frequency per day continues to tolerate oral intake however today was not able to eat or drink anything and continues to vomit. In the ED was noted to have severe gastric distention thus NG tube was placed. Hyperemesis cannabis admitted with cyclic vomiting syndrome  GI consulted. UDS reviewed. ? Underlying opiate withdrawal.   GI started Reglan, carafate, Protonix.     Hyperkalemia: resolved with IVF. Hypercalcemia: resolved with IVF.     Hyperglycemia:  A1c 5.8     Malnutriton:  Dietition     Right nephrolithiasis:  2mm nonobstructive- passable size  Continue to monitor      Physical Exam Performed:     /69   Pulse 89   Temp 98.4 °F (36.9 °C) (Oral)   Resp 16   Ht 5' 3\" (1.6 m)   Wt 90 lb 6.4 oz (41 kg)   SpO2 92%   BMI 16.01 kg/m²

## 2023-04-20 ENCOUNTER — OFFICE VISIT (OUTPATIENT)
Dept: PULMONOLOGY | Age: 63
End: 2023-04-20
Payer: COMMERCIAL

## 2023-04-20 VITALS
DIASTOLIC BLOOD PRESSURE: 63 MMHG | WEIGHT: 125 LBS | SYSTOLIC BLOOD PRESSURE: 98 MMHG | HEART RATE: 78 BPM | OXYGEN SATURATION: 94 % | HEIGHT: 63 IN | TEMPERATURE: 97.4 F | BODY MASS INDEX: 22.15 KG/M2 | RESPIRATION RATE: 16 BRPM

## 2023-04-20 DIAGNOSIS — J44.1 COPD EXACERBATION (HCC): Primary | ICD-10-CM

## 2023-04-20 DIAGNOSIS — J96.11 CHRONIC RESPIRATORY FAILURE WITH HYPOXIA (HCC): ICD-10-CM

## 2023-04-20 DIAGNOSIS — K21.9 GASTROESOPHAGEAL REFLUX DISEASE, UNSPECIFIED WHETHER ESOPHAGITIS PRESENT: ICD-10-CM

## 2023-04-20 DIAGNOSIS — Z87.891 FORMER SMOKER: ICD-10-CM

## 2023-04-20 DIAGNOSIS — R06.02 SOB (SHORTNESS OF BREATH): ICD-10-CM

## 2023-04-20 DIAGNOSIS — I51.89 GRADE I DIASTOLIC DYSFUNCTION: ICD-10-CM

## 2023-04-20 DIAGNOSIS — J43.2 CENTRILOBULAR EMPHYSEMA (HCC): ICD-10-CM

## 2023-04-20 DIAGNOSIS — Z85.118 H/O: LUNG CANCER: ICD-10-CM

## 2023-04-20 PROCEDURE — 94640 AIRWAY INHALATION TREATMENT: CPT | Performed by: INTERNAL MEDICINE

## 2023-04-20 PROCEDURE — 99204 OFFICE O/P NEW MOD 45 MIN: CPT | Performed by: INTERNAL MEDICINE

## 2023-04-20 RX ORDER — METHYLPREDNISOLONE 4 MG/1
TABLET ORAL
Qty: 1 KIT | Refills: 0 | Status: SHIPPED | OUTPATIENT
Start: 2023-04-20 | End: 2023-04-26

## 2023-04-20 RX ORDER — IPRATROPIUM BROMIDE AND ALBUTEROL SULFATE 2.5; .5 MG/3ML; MG/3ML
1 SOLUTION RESPIRATORY (INHALATION) ONCE
Status: COMPLETED | OUTPATIENT
Start: 2023-04-20 | End: 2023-04-20

## 2023-04-20 RX ORDER — METOPROLOL SUCCINATE 25 MG/1
TABLET, EXTENDED RELEASE ORAL
COMMUNITY
Start: 2023-03-09

## 2023-04-20 RX ORDER — DOXYCYCLINE HYCLATE 100 MG
100 TABLET ORAL 2 TIMES DAILY
Qty: 14 TABLET | Refills: 0 | Status: SHIPPED | OUTPATIENT
Start: 2023-04-20 | End: 2023-04-27

## 2023-04-20 RX ORDER — FLUTICASONE FUROATE, UMECLIDINIUM BROMIDE AND VILANTEROL TRIFENATATE 100; 62.5; 25 UG/1; UG/1; UG/1
POWDER RESPIRATORY (INHALATION)
COMMUNITY
Start: 2023-03-29

## 2023-04-20 RX ORDER — BUSPIRONE HYDROCHLORIDE 10 MG/1
TABLET ORAL
COMMUNITY
Start: 2023-03-28

## 2023-04-20 RX ORDER — DIAZEPAM 5 MG/1
TABLET ORAL
COMMUNITY
Start: 2023-03-28

## 2023-04-20 RX ORDER — TRIAMCINOLONE ACETONIDE 40 MG/ML
80 INJECTION, SUSPENSION INTRA-ARTICULAR; INTRAMUSCULAR ONCE
Status: COMPLETED | OUTPATIENT
Start: 2023-04-20 | End: 2023-04-20

## 2023-04-20 RX ADMIN — TRIAMCINOLONE ACETONIDE 80 MG: 40 INJECTION, SUSPENSION INTRA-ARTICULAR; INTRAMUSCULAR at 12:13

## 2023-04-20 RX ADMIN — IPRATROPIUM BROMIDE AND ALBUTEROL SULFATE 1 AMPULE: 2.5; .5 SOLUTION RESPIRATORY (INHALATION) at 12:09

## 2023-04-20 NOTE — PROGRESS NOTES
Chief Complaint/Referring Provider:  Patient is being seen at the request of Petersburg Cooler -CNP for a consultation for COPD     Presenting HPI: Patient is a 28-year-old female who has come to the office for a pulm evaluation for lung issues  Patient states that she has been having some increased shortness of breath for at least last 1-1/2 years time, patient has been on constant oxygen for the same time, patient states that she does have cough with mucoid expectoration without any epistaxis or hemoptysis, patient does have some rhinorrhea, patient also has some pleuritic chest pain and back pain especially when she takes a deep breath, patient does not have any sore throat or difficulty in swallowing, no coughing or choking while eating, no odynophagia or dysphagia per se, patient does not have any significant changes, patient does not have any heartburns or reflux symptoms, patient has alternating constipation and diarrhea, patient states that she has cats as pets, patient does not have any leg swelling, patient does not have any mold exposure, patient is a former smoker who quit about 3 years back, patient states that her exercise tolerance is limited to 40 feet also and she starts having shortness of breath and wheezing and also sometimes feels that she is going to lose control of her bowels and bladder, patient does not have any significant hematochezia or melena, no dysuria no hematuria, no small joint pains, patient does states that she has been told that she has congestive heart failure and patient states that metoprolol is a new medication for her, 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

## 2023-05-23 ENCOUNTER — OFFICE VISIT (OUTPATIENT)
Dept: PULMONOLOGY | Age: 63
End: 2023-05-23
Payer: COMMERCIAL

## 2023-05-23 VITALS
TEMPERATURE: 98 F | SYSTOLIC BLOOD PRESSURE: 125 MMHG | HEIGHT: 63 IN | WEIGHT: 127 LBS | DIASTOLIC BLOOD PRESSURE: 74 MMHG | RESPIRATION RATE: 16 BRPM | BODY MASS INDEX: 22.5 KG/M2 | OXYGEN SATURATION: 97 % | HEART RATE: 93 BPM

## 2023-05-23 DIAGNOSIS — J96.11 CHRONIC RESPIRATORY FAILURE WITH HYPOXIA (HCC): ICD-10-CM

## 2023-05-23 DIAGNOSIS — R07.81 PLEURITIC CHEST PAIN: ICD-10-CM

## 2023-05-23 DIAGNOSIS — J44.1 COPD EXACERBATION (HCC): Primary | ICD-10-CM

## 2023-05-23 DIAGNOSIS — J43.2 CENTRILOBULAR EMPHYSEMA (HCC): ICD-10-CM

## 2023-05-23 PROCEDURE — 99214 OFFICE O/P EST MOD 30 MIN: CPT | Performed by: INTERNAL MEDICINE

## 2023-05-23 RX ORDER — PREDNISONE 10 MG/1
TABLET ORAL
Qty: 30 TABLET | Refills: 0 | Status: SHIPPED | OUTPATIENT
Start: 2023-05-23

## 2023-05-23 RX ORDER — PREDNISONE 10 MG/1
30 TABLET ORAL DAILY
Qty: 9 TABLET | Refills: 0 | COMMUNITY
Start: 2023-05-26 | End: 2023-05-29

## 2023-05-23 RX ORDER — LIDOCAINE 50 MG/G
1 PATCH TOPICAL EVERY 12 HOURS
Qty: 30 PATCH | Refills: 0 | Status: SHIPPED | OUTPATIENT
Start: 2023-05-23

## 2023-05-23 NOTE — PROGRESS NOTES
MA Communication:   The following orders are received by verbal communication from Lobito Ramos MD    Orders include:    1 month follow up w/ NP Kristen Medrano

## 2023-05-23 NOTE — PROGRESS NOTES
Chief Complaint/Referring Provider:  Patient has come to the office for an acute visit because of shortness of breath and also pleuritic chest pain    Patient was evaluated a few weeks back and was found to have COPD exacerbation at that time patient was given Kenalog and patient was given Medrol Dosepak along with doxycycline and patient states that her symptoms became worse and patient was having some shortness of breath which was persisting along with that patient was complaining of significant right-sided pleuritic chest pain and patient was hospitalized at the [facility] and patient was told that she has pleurisy and fluid and patient was given Tylenol and Toradol for the pain, patient was having some expectoration which is nasty green in color, patient was feeling strangled, patient could not take a deep breath because of pain, patient states that she was discharged after few days of evaluation and the patient was still symptomatic at that time, patient is not feeling any worse or any better as compared to when she was in she went to the [facility], patient continues to have wheezing, no fever no chills, patient has off-and-on rhinorrhea, patient did not have any significant nausea but patient states that she has a lot of muscle pain, patient also states that she was given 2 types of nebulizer treatments but patient states one of them is not covered by insurance, patient does not have any confusion lethargy, no other pertinent review of system of concern     Previous  HPI: Patient is a 59-year-old female who has come to the office for a pulm evaluation for lung issues  Patient states that she has been having some increased shortness of breath for at least last 1-1/2 years time, patient has been on constant oxygen for the same time, patient states that she does have cough with mucoid expectoration without any epistaxis or hemoptysis, patient does have some rhinorrhea, patient also has some pleuritic

## 2023-05-23 NOTE — PATIENT INSTRUCTIONS
Remember to bring a list of pulmonary medications and any CPAP or BiPAP machines to your next appointment with the office. Please keep all of your future appointments scheduled by [address] [facility] office. Out of respect for other patients and providers, you may be asked to reschedule your appointment if you arrive later than your scheduled appointment time. Appointments cancelled less than 24hrs in advance will be considered a no show. Patients with three missed appointments within 1 year or four missed appointments within 2 years can be dismissed from the practice. Please be aware that our physicians are required to work in the Intensive Care Unit at [facility].  Your appointment may need to be rescheduled if they are designated to work during your appointment time. You may receive a survey regarding the care you received during your visit. Your input is valuable to us. We encourage you to complete and return your survey. We hope you will choose us in the future for your healthcare needs. Pt instructed of all future appointment dates & times, including radiology, labs, procedures & referrals. If procedures were scheduled preparation instructions provided. Instructions on future appointments with [facility] were given.

## 2023-05-24 ENCOUNTER — TELEPHONE (OUTPATIENT)
Dept: PULMONOLOGY | Age: 63
End: 2023-05-24

## 2023-05-25 ENCOUNTER — TELEPHONE (OUTPATIENT)
Dept: PULMONOLOGY | Age: 63
End: 2023-05-25

## 2023-05-25 NOTE — TELEPHONE ENCOUNTER
Reviewed Dr. Quintero Speaker. Lidocaine patch should be worn for 12 hours then off for 12 hours. Please let pharmacy know.

## 2023-05-25 NOTE — TELEPHONE ENCOUNTER
Pharmacy called and is asking for clarification on Lidocaine patch directions. It states 1 patch in AM and 1 in PM.  But also says 12 hours on and 12 hrs off. This contradicts itself and they need clearer directions. Please advise.

## 2023-05-26 NOTE — TELEPHONE ENCOUNTER
PA was denied under medicare part D, call pt and make her aware,pt stated she also have Medicare part B,call pharmacy and they were to run again prescription.

## 2023-07-14 ENCOUNTER — TELEPHONE (OUTPATIENT)
Dept: PULMONOLOGY | Age: 63
End: 2023-07-14

## 2023-07-14 DIAGNOSIS — J44.1 COPD EXACERBATION (HCC): ICD-10-CM

## 2023-07-14 DIAGNOSIS — R07.81 PLEURITIC CHEST PAIN: ICD-10-CM

## 2023-07-14 RX ORDER — PREDNISONE 10 MG/1
TABLET ORAL
Qty: 30 TABLET | Refills: 0 | Status: SHIPPED | OUTPATIENT
Start: 2023-07-14

## 2023-07-14 NOTE — TELEPHONE ENCOUNTER
Pharmacy called asking for clarificaition on the prednisone   Quantity 30  Directions: 4 tabs x 5 days; then 3 tabs x 5 days; then 2 tabs x 5 days; then 1 tab x 5 days      Following the directions the dispense quantity would need to be 50. Gave verbal to change dispense quantity to 50.

## 2023-07-14 NOTE — TELEPHONE ENCOUNTER
Chart reviewed. Steroids sent to her pharmacy. I would also like her to use Use albuterol 2 puffs four times a day x 2 days then 2 puffs four times a day as needed. Call with worsening symptoms such as increased shortness of breath, productive cough, wheezing or symptoms not responding to treatment plan.

## 2023-07-14 NOTE — TELEPHONE ENCOUNTER
Patient had to cancel her last appointment on 06/22/2023  Due to transportation   Patient is rescheduled with our office on 07/27/2023  stating her son will bring her in a Wheelchair for this appointment     Patient has having SOB/Chest wall pain for 3 days now  Some wheezing  O2 level is 92 while on the phone and patient wears 3 Liter of O2   Some coughing no phlegm /no  fever  Patient states she can not come in for an appointment today because she does not have a ride   Informed patient our office will be in touch after the NP reviews and instructs

## 2023-07-20 ENCOUNTER — APPOINTMENT (OUTPATIENT)
Dept: GENERAL RADIOLOGY | Age: 63
DRG: 190 | End: 2023-07-20
Payer: COMMERCIAL

## 2023-07-20 ENCOUNTER — HOSPITAL ENCOUNTER (INPATIENT)
Age: 63
LOS: 2 days | Discharge: HOME HEALTH | DRG: 190 | End: 2023-07-23
Attending: STUDENT IN AN ORGANIZED HEALTH CARE EDUCATION/TRAINING PROGRAM | Admitting: INTERNAL MEDICINE
Payer: COMMERCIAL

## 2023-07-20 ENCOUNTER — APPOINTMENT (OUTPATIENT)
Dept: CT IMAGING | Age: 63
DRG: 190 | End: 2023-07-20
Payer: COMMERCIAL

## 2023-07-20 DIAGNOSIS — R07.9 CHEST PAIN, UNSPECIFIED TYPE: ICD-10-CM

## 2023-07-20 DIAGNOSIS — J44.1 COPD EXACERBATION (HCC): Primary | ICD-10-CM

## 2023-07-20 DIAGNOSIS — M54.6 ACUTE MIDLINE THORACIC BACK PAIN: ICD-10-CM

## 2023-07-20 PROBLEM — J96.21 ACUTE ON CHRONIC RESPIRATORY FAILURE WITH HYPOXEMIA (HCC): Status: ACTIVE | Noted: 2023-07-20

## 2023-07-20 LAB
ALBUMIN SERPL-MCNC: 4 G/DL (ref 3.4–5)
ALBUMIN/GLOB SERPL: 1.3 {RATIO} (ref 1.1–2.2)
ALP SERPL-CCNC: 96 U/L (ref 40–129)
ALT SERPL-CCNC: 11 U/L (ref 10–40)
ANION GAP SERPL CALCULATED.3IONS-SCNC: 8 MMOL/L (ref 3–16)
AST SERPL-CCNC: 20 U/L (ref 15–37)
BASE EXCESS BLDV CALC-SCNC: 2 MMOL/L (ref -3–3)
BASE EXCESS BLDV CALC-SCNC: 3.1 MMOL/L (ref -3–3)
BASOPHILS # BLD: 0 K/UL (ref 0–0.2)
BASOPHILS NFR BLD: 0 %
BILIRUB SERPL-MCNC: <0.2 MG/DL (ref 0–1)
BILIRUB UR QL STRIP.AUTO: NEGATIVE
BUN SERPL-MCNC: 24 MG/DL (ref 7–20)
CALCIUM SERPL-MCNC: 9.6 MG/DL (ref 8.3–10.6)
CHLORIDE SERPL-SCNC: 102 MMOL/L (ref 99–110)
CLARITY UR: CLEAR
CO2 BLDV-SCNC: 29 MMOL/L
CO2 BLDV-SCNC: 33 MMOL/L
CO2 SERPL-SCNC: 30 MMOL/L (ref 21–32)
COHGB MFR BLDV: 2.8 % (ref 0–1.5)
COHGB MFR BLDV: 3.2 % (ref 0–1.5)
COLOR UR: YELLOW
CREAT SERPL-MCNC: 0.7 MG/DL (ref 0.6–1.2)
CRYSTALS URNS MICRO: ABNORMAL /HPF
DEPRECATED RDW RBC AUTO: 17.2 % (ref 12.4–15.4)
EKG ATRIAL RATE: 89 BPM
EKG DIAGNOSIS: NORMAL
EKG P AXIS: 56 DEGREES
EKG P-R INTERVAL: 118 MS
EKG Q-T INTERVAL: 372 MS
EKG QRS DURATION: 74 MS
EKG QTC CALCULATION (BAZETT): 452 MS
EKG R AXIS: 73 DEGREES
EKG T AXIS: 69 DEGREES
EKG VENTRICULAR RATE: 89 BPM
EOSINOPHIL # BLD: 0.2 K/UL (ref 0–0.6)
EOSINOPHIL NFR BLD: 2 %
EPI CELLS #/AREA URNS HPF: ABNORMAL /HPF (ref 0–5)
FLUAV RNA RESP QL NAA+PROBE: NOT DETECTED
FLUBV RNA RESP QL NAA+PROBE: NOT DETECTED
GFR SERPLBLD CREATININE-BSD FMLA CKD-EPI: >60 ML/MIN/{1.73_M2}
GLUCOSE SERPL-MCNC: 100 MG/DL (ref 70–99)
GLUCOSE UR STRIP.AUTO-MCNC: NEGATIVE MG/DL
HCO3 BLDV-SCNC: 27.8 MMOL/L (ref 23–29)
HCO3 BLDV-SCNC: 31.3 MMOL/L (ref 23–29)
HCT VFR BLD AUTO: 37.8 % (ref 36–48)
HGB BLD-MCNC: 12.4 G/DL (ref 12–16)
HGB UR QL STRIP.AUTO: NEGATIVE
KETONES UR STRIP.AUTO-MCNC: NEGATIVE MG/DL
LEUKOCYTE ESTERASE UR QL STRIP.AUTO: NEGATIVE
LYMPHOCYTES # BLD: 3.1 K/UL (ref 1–5.1)
LYMPHOCYTES NFR BLD: 33 %
MCH RBC QN AUTO: 31.4 PG (ref 26–34)
MCHC RBC AUTO-ENTMCNC: 32.8 G/DL (ref 31–36)
MCV RBC AUTO: 95.8 FL (ref 80–100)
METHGB MFR BLDV: 0 %
METHGB MFR BLDV: 0.1 %
MONOCYTES # BLD: 0.4 K/UL (ref 0–1.3)
MONOCYTES NFR BLD: 4 %
MYELOCYTES NFR BLD MANUAL: 2 %
NEUTROPHILS # BLD: 5.8 K/UL (ref 1.7–7.7)
NEUTROPHILS NFR BLD: 55 %
NEUTS BAND NFR BLD MANUAL: 4 % (ref 0–7)
NITRITE UR QL STRIP.AUTO: NEGATIVE
O2 THERAPY: ABNORMAL
O2 THERAPY: ABNORMAL
PCO2 BLDV: 48.7 MMHG (ref 40–50)
PCO2 BLDV: 65.3 MMHG (ref 40–50)
PH BLDV: 7.3 [PH] (ref 7.35–7.45)
PH BLDV: 7.38 [PH] (ref 7.35–7.45)
PH UR STRIP.AUTO: 6.5 [PH] (ref 5–8)
PLATELET # BLD AUTO: 240 K/UL (ref 135–450)
PLATELET BLD QL SMEAR: ADEQUATE
PMV BLD AUTO: 9.6 FL (ref 5–10.5)
PO2 BLDV: 38.8 MMHG (ref 25–40)
PO2 BLDV: 62.2 MMHG (ref 25–40)
POTASSIUM SERPL-SCNC: 4.8 MMOL/L (ref 3.5–5.1)
PROT SERPL-MCNC: 7.2 G/DL (ref 6.4–8.2)
PROT UR STRIP.AUTO-MCNC: 30 MG/DL
RBC # BLD AUTO: 3.94 M/UL (ref 4–5.2)
RBC #/AREA URNS HPF: ABNORMAL /HPF (ref 0–4)
SAO2 % BLDV: 72 %
SAO2 % BLDV: 93 %
SARS-COV-2 RNA RESP QL NAA+PROBE: NOT DETECTED
SLIDE REVIEW: ABNORMAL
SODIUM SERPL-SCNC: 140 MMOL/L (ref 136–145)
SP GR UR STRIP.AUTO: 1.02 (ref 1–1.03)
TARGETS BLD QL SMEAR: ABNORMAL
TROPONIN, HIGH SENSITIVITY: 20 NG/L (ref 0–14)
TROPONIN, HIGH SENSITIVITY: 21 NG/L (ref 0–14)
UA COMPLETE W REFLEX CULTURE PNL UR: ABNORMAL
UA DIPSTICK W REFLEX MICRO PNL UR: YES
URN SPEC COLLECT METH UR: ABNORMAL
UROBILINOGEN UR STRIP-ACNC: 0.2 E.U./DL
WBC # BLD AUTO: 9.5 K/UL (ref 4–11)
WBC #/AREA URNS HPF: ABNORMAL /HPF (ref 0–5)

## 2023-07-20 PROCEDURE — 94761 N-INVAS EAR/PLS OXIMETRY MLT: CPT

## 2023-07-20 PROCEDURE — 71045 X-RAY EXAM CHEST 1 VIEW: CPT

## 2023-07-20 PROCEDURE — G0378 HOSPITAL OBSERVATION PER HR: HCPCS

## 2023-07-20 PROCEDURE — 6370000000 HC RX 637 (ALT 250 FOR IP)

## 2023-07-20 PROCEDURE — 93005 ELECTROCARDIOGRAM TRACING: CPT | Performed by: STUDENT IN AN ORGANIZED HEALTH CARE EDUCATION/TRAINING PROGRAM

## 2023-07-20 PROCEDURE — 81001 URINALYSIS AUTO W/SCOPE: CPT

## 2023-07-20 PROCEDURE — 2580000003 HC RX 258: Performed by: STUDENT IN AN ORGANIZED HEALTH CARE EDUCATION/TRAINING PROGRAM

## 2023-07-20 PROCEDURE — 82803 BLOOD GASES ANY COMBINATION: CPT

## 2023-07-20 PROCEDURE — 96375 TX/PRO/DX INJ NEW DRUG ADDON: CPT

## 2023-07-20 PROCEDURE — 99285 EMERGENCY DEPT VISIT HI MDM: CPT

## 2023-07-20 PROCEDURE — 93010 ELECTROCARDIOGRAM REPORT: CPT | Performed by: INTERNAL MEDICINE

## 2023-07-20 PROCEDURE — 6360000002 HC RX W HCPCS

## 2023-07-20 PROCEDURE — 84484 ASSAY OF TROPONIN QUANT: CPT

## 2023-07-20 PROCEDURE — 80053 COMPREHEN METABOLIC PANEL: CPT

## 2023-07-20 PROCEDURE — 96376 TX/PRO/DX INJ SAME DRUG ADON: CPT

## 2023-07-20 PROCEDURE — 87636 SARSCOV2 & INF A&B AMP PRB: CPT

## 2023-07-20 PROCEDURE — 96372 THER/PROPH/DIAG INJ SC/IM: CPT

## 2023-07-20 PROCEDURE — 71260 CT THORAX DX C+: CPT

## 2023-07-20 PROCEDURE — 94640 AIRWAY INHALATION TREATMENT: CPT

## 2023-07-20 PROCEDURE — 2580000003 HC RX 258

## 2023-07-20 PROCEDURE — 6360000004 HC RX CONTRAST MEDICATION: Performed by: STUDENT IN AN ORGANIZED HEALTH CARE EDUCATION/TRAINING PROGRAM

## 2023-07-20 PROCEDURE — 74177 CT ABD & PELVIS W/CONTRAST: CPT

## 2023-07-20 PROCEDURE — 6370000000 HC RX 637 (ALT 250 FOR IP): Performed by: STUDENT IN AN ORGANIZED HEALTH CARE EDUCATION/TRAINING PROGRAM

## 2023-07-20 PROCEDURE — 85025 COMPLETE CBC W/AUTO DIFF WBC: CPT

## 2023-07-20 PROCEDURE — 2700000000 HC OXYGEN THERAPY PER DAY

## 2023-07-20 PROCEDURE — 6360000002 HC RX W HCPCS: Performed by: STUDENT IN AN ORGANIZED HEALTH CARE EDUCATION/TRAINING PROGRAM

## 2023-07-20 PROCEDURE — 96361 HYDRATE IV INFUSION ADD-ON: CPT

## 2023-07-20 PROCEDURE — 96374 THER/PROPH/DIAG INJ IV PUSH: CPT

## 2023-07-20 RX ORDER — ONDANSETRON 2 MG/ML
4 INJECTION INTRAMUSCULAR; INTRAVENOUS EVERY 6 HOURS PRN
Status: DISCONTINUED | OUTPATIENT
Start: 2023-07-20 | End: 2023-07-23 | Stop reason: HOSPADM

## 2023-07-20 RX ORDER — METHOCARBAMOL 500 MG/1
500 TABLET, FILM COATED ORAL 4 TIMES DAILY
Status: DISCONTINUED | OUTPATIENT
Start: 2023-07-20 | End: 2023-07-23 | Stop reason: HOSPADM

## 2023-07-20 RX ORDER — TRAMADOL HYDROCHLORIDE 50 MG/1
50 TABLET ORAL EVERY 6 HOURS PRN
Status: DISCONTINUED | OUTPATIENT
Start: 2023-07-20 | End: 2023-07-23 | Stop reason: HOSPADM

## 2023-07-20 RX ORDER — SODIUM CHLORIDE 9 MG/ML
INJECTION, SOLUTION INTRAVENOUS PRN
Status: DISCONTINUED | OUTPATIENT
Start: 2023-07-20 | End: 2023-07-23 | Stop reason: HOSPADM

## 2023-07-20 RX ORDER — HYDROMORPHONE HYDROCHLORIDE 1 MG/ML
0.5 INJECTION, SOLUTION INTRAMUSCULAR; INTRAVENOUS; SUBCUTANEOUS
Status: COMPLETED | OUTPATIENT
Start: 2023-07-20 | End: 2023-07-20

## 2023-07-20 RX ORDER — KETOROLAC TROMETHAMINE 30 MG/ML
15 INJECTION, SOLUTION INTRAMUSCULAR; INTRAVENOUS ONCE
Status: COMPLETED | OUTPATIENT
Start: 2023-07-20 | End: 2023-07-20

## 2023-07-20 RX ORDER — PANTOPRAZOLE SODIUM 40 MG/1
40 TABLET, DELAYED RELEASE ORAL 2 TIMES DAILY
Status: DISCONTINUED | OUTPATIENT
Start: 2023-07-20 | End: 2023-07-23 | Stop reason: HOSPADM

## 2023-07-20 RX ORDER — PREDNISONE 20 MG/1
40 TABLET ORAL DAILY
Status: DISCONTINUED | OUTPATIENT
Start: 2023-07-22 | End: 2023-07-23 | Stop reason: HOSPADM

## 2023-07-20 RX ORDER — METHYLPREDNISOLONE SODIUM SUCCINATE 40 MG/ML
40 INJECTION, POWDER, LYOPHILIZED, FOR SOLUTION INTRAMUSCULAR; INTRAVENOUS EVERY 6 HOURS
Status: COMPLETED | OUTPATIENT
Start: 2023-07-20 | End: 2023-07-22

## 2023-07-20 RX ORDER — METOPROLOL SUCCINATE 25 MG/1
25 TABLET, EXTENDED RELEASE ORAL DAILY
Status: DISCONTINUED | OUTPATIENT
Start: 2023-07-20 | End: 2023-07-23 | Stop reason: HOSPADM

## 2023-07-20 RX ORDER — SODIUM CHLORIDE 0.9 % (FLUSH) 0.9 %
5-40 SYRINGE (ML) INJECTION PRN
Status: DISCONTINUED | OUTPATIENT
Start: 2023-07-20 | End: 2023-07-23 | Stop reason: HOSPADM

## 2023-07-20 RX ORDER — ENOXAPARIN SODIUM 100 MG/ML
40 INJECTION SUBCUTANEOUS DAILY
Status: DISCONTINUED | OUTPATIENT
Start: 2023-07-20 | End: 2023-07-23 | Stop reason: HOSPADM

## 2023-07-20 RX ORDER — IPRATROPIUM BROMIDE AND ALBUTEROL SULFATE 2.5; .5 MG/3ML; MG/3ML
1 SOLUTION RESPIRATORY (INHALATION)
Status: DISCONTINUED | OUTPATIENT
Start: 2023-07-20 | End: 2023-07-23 | Stop reason: HOSPADM

## 2023-07-20 RX ORDER — ONDANSETRON 4 MG/1
4 TABLET, ORALLY DISINTEGRATING ORAL EVERY 8 HOURS PRN
Status: DISCONTINUED | OUTPATIENT
Start: 2023-07-20 | End: 2023-07-23 | Stop reason: HOSPADM

## 2023-07-20 RX ORDER — METHOCARBAMOL 500 MG/1
500 TABLET, FILM COATED ORAL 4 TIMES DAILY
COMMUNITY

## 2023-07-20 RX ORDER — IPRATROPIUM BROMIDE AND ALBUTEROL SULFATE 2.5; .5 MG/3ML; MG/3ML
3 SOLUTION RESPIRATORY (INHALATION) ONCE
Status: COMPLETED | OUTPATIENT
Start: 2023-07-20 | End: 2023-07-20

## 2023-07-20 RX ORDER — BUSPIRONE HYDROCHLORIDE 5 MG/1
10 TABLET ORAL 2 TIMES DAILY
Status: DISCONTINUED | OUTPATIENT
Start: 2023-07-20 | End: 2023-07-23 | Stop reason: HOSPADM

## 2023-07-20 RX ORDER — POLYETHYLENE GLYCOL 3350 17 G/17G
17 POWDER, FOR SOLUTION ORAL DAILY PRN
Status: DISCONTINUED | OUTPATIENT
Start: 2023-07-20 | End: 2023-07-23 | Stop reason: HOSPADM

## 2023-07-20 RX ORDER — TRAMADOL HYDROCHLORIDE 50 MG/1
100 TABLET ORAL EVERY 6 HOURS PRN
Status: DISCONTINUED | OUTPATIENT
Start: 2023-07-20 | End: 2023-07-23 | Stop reason: HOSPADM

## 2023-07-20 RX ORDER — AZITHROMYCIN 250 MG/1
500 TABLET, FILM COATED ORAL ONCE
Status: COMPLETED | OUTPATIENT
Start: 2023-07-20 | End: 2023-07-20

## 2023-07-20 RX ORDER — SODIUM CHLORIDE 0.9 % (FLUSH) 0.9 %
5-40 SYRINGE (ML) INJECTION EVERY 12 HOURS SCHEDULED
Status: DISCONTINUED | OUTPATIENT
Start: 2023-07-20 | End: 2023-07-23 | Stop reason: HOSPADM

## 2023-07-20 RX ORDER — AZITHROMYCIN 250 MG/1
500 TABLET, FILM COATED ORAL DAILY
Status: COMPLETED | OUTPATIENT
Start: 2023-07-21 | End: 2023-07-22

## 2023-07-20 RX ORDER — ACETAMINOPHEN 650 MG/1
650 SUPPOSITORY RECTAL EVERY 6 HOURS PRN
Status: DISCONTINUED | OUTPATIENT
Start: 2023-07-20 | End: 2023-07-23 | Stop reason: HOSPADM

## 2023-07-20 RX ORDER — SODIUM CHLORIDE, SODIUM LACTATE, POTASSIUM CHLORIDE, AND CALCIUM CHLORIDE .6; .31; .03; .02 G/100ML; G/100ML; G/100ML; G/100ML
1000 INJECTION, SOLUTION INTRAVENOUS ONCE
Status: COMPLETED | OUTPATIENT
Start: 2023-07-20 | End: 2023-07-20

## 2023-07-20 RX ORDER — ACETAMINOPHEN 325 MG/1
650 TABLET ORAL EVERY 6 HOURS PRN
Status: DISCONTINUED | OUTPATIENT
Start: 2023-07-20 | End: 2023-07-23 | Stop reason: HOSPADM

## 2023-07-20 RX ADMIN — PANTOPRAZOLE SODIUM 40 MG: 40 TABLET, DELAYED RELEASE ORAL at 21:31

## 2023-07-20 RX ADMIN — TRAMADOL HYDROCHLORIDE 100 MG: 50 TABLET ORAL at 09:57

## 2023-07-20 RX ADMIN — TRAMADOL HYDROCHLORIDE 100 MG: 50 TABLET ORAL at 15:32

## 2023-07-20 RX ADMIN — METOPROLOL SUCCINATE 25 MG: 50 TABLET, EXTENDED RELEASE ORAL at 11:18

## 2023-07-20 RX ADMIN — METHOCARBAMOL 500 MG: 500 TABLET ORAL at 17:00

## 2023-07-20 RX ADMIN — ENOXAPARIN SODIUM 40 MG: 100 INJECTION SUBCUTANEOUS at 09:57

## 2023-07-20 RX ADMIN — IPRATROPIUM BROMIDE AND ALBUTEROL SULFATE 1 DOSE: 2.5; .5 SOLUTION RESPIRATORY (INHALATION) at 20:05

## 2023-07-20 RX ADMIN — BUSPIRONE HYDROCHLORIDE 10 MG: 5 TABLET ORAL at 21:31

## 2023-07-20 RX ADMIN — HYDROMORPHONE HYDROCHLORIDE 0.5 MG: 1 INJECTION, SOLUTION INTRAMUSCULAR; INTRAVENOUS; SUBCUTANEOUS at 06:06

## 2023-07-20 RX ADMIN — AZITHROMYCIN MONOHYDRATE 500 MG: 250 TABLET ORAL at 08:33

## 2023-07-20 RX ADMIN — IPRATROPIUM BROMIDE AND ALBUTEROL SULFATE 1 DOSE: 2.5; .5 SOLUTION RESPIRATORY (INHALATION) at 16:15

## 2023-07-20 RX ADMIN — BUSPIRONE HYDROCHLORIDE 10 MG: 5 TABLET ORAL at 12:55

## 2023-07-20 RX ADMIN — METHYLPREDNISOLONE SODIUM SUCCINATE 40 MG: 40 INJECTION INTRAMUSCULAR; INTRAVENOUS at 15:32

## 2023-07-20 RX ADMIN — IPRATROPIUM BROMIDE AND ALBUTEROL SULFATE 3 DOSE: 2.5; .5 SOLUTION RESPIRATORY (INHALATION) at 08:53

## 2023-07-20 RX ADMIN — ONDANSETRON 4 MG: 2 INJECTION INTRAMUSCULAR; INTRAVENOUS at 09:59

## 2023-07-20 RX ADMIN — METHYLPREDNISOLONE SODIUM SUCCINATE 40 MG: 40 INJECTION INTRAMUSCULAR; INTRAVENOUS at 21:35

## 2023-07-20 RX ADMIN — KETOROLAC TROMETHAMINE 15 MG: 30 INJECTION, SOLUTION INTRAMUSCULAR; INTRAVENOUS at 06:06

## 2023-07-20 RX ADMIN — ONDANSETRON 4 MG: 4 TABLET, ORALLY DISINTEGRATING ORAL at 16:59

## 2023-07-20 RX ADMIN — IPRATROPIUM BROMIDE AND ALBUTEROL SULFATE 1 DOSE: 2.5; .5 SOLUTION RESPIRATORY (INHALATION) at 11:27

## 2023-07-20 RX ADMIN — TRAMADOL HYDROCHLORIDE 100 MG: 50 TABLET ORAL at 23:23

## 2023-07-20 RX ADMIN — METHOCARBAMOL 500 MG: 500 TABLET ORAL at 21:31

## 2023-07-20 RX ADMIN — PANTOPRAZOLE SODIUM 40 MG: 40 TABLET, DELAYED RELEASE ORAL at 12:55

## 2023-07-20 RX ADMIN — SODIUM CHLORIDE, PRESERVATIVE FREE 10 ML: 5 INJECTION INTRAVENOUS at 12:56

## 2023-07-20 RX ADMIN — IOPAMIDOL 75 ML: 755 INJECTION, SOLUTION INTRAVENOUS at 07:14

## 2023-07-20 RX ADMIN — SODIUM CHLORIDE, POTASSIUM CHLORIDE, SODIUM LACTATE AND CALCIUM CHLORIDE 1000 ML: 600; 310; 30; 20 INJECTION, SOLUTION INTRAVENOUS at 06:07

## 2023-07-20 RX ADMIN — METHOCARBAMOL 500 MG: 500 TABLET ORAL at 12:55

## 2023-07-20 RX ADMIN — METHYLPREDNISOLONE SODIUM SUCCINATE 40 MG: 40 INJECTION INTRAMUSCULAR; INTRAVENOUS at 09:57

## 2023-07-20 RX ADMIN — SODIUM CHLORIDE, PRESERVATIVE FREE 10 ML: 5 INJECTION INTRAVENOUS at 21:35

## 2023-07-20 ASSESSMENT — PAIN DESCRIPTION - DESCRIPTORS
DESCRIPTORS: ACHING;DISCOMFORT
DESCRIPTORS: DISCOMFORT
DESCRIPTORS: ACHING;DISCOMFORT
DESCRIPTORS: ACHING;DISCOMFORT

## 2023-07-20 ASSESSMENT — PAIN DESCRIPTION - ORIENTATION
ORIENTATION: MID
ORIENTATION: UPPER
ORIENTATION: UPPER
ORIENTATION: MID

## 2023-07-20 ASSESSMENT — PAIN DESCRIPTION - LOCATION
LOCATION: BACK
LOCATION: BACK;SHOULDER

## 2023-07-20 ASSESSMENT — PAIN SCALES - GENERAL
PAINLEVEL_OUTOF10: 7
PAINLEVEL_OUTOF10: 10
PAINLEVEL_OUTOF10: 7
PAINLEVEL_OUTOF10: 8
PAINLEVEL_OUTOF10: 7
PAINLEVEL_OUTOF10: 4
PAINLEVEL_OUTOF10: 8

## 2023-07-20 ASSESSMENT — PAIN DESCRIPTION - PAIN TYPE: TYPE: ACUTE PAIN

## 2023-07-20 ASSESSMENT — PAIN DESCRIPTION - FREQUENCY: FREQUENCY: CONTINUOUS

## 2023-07-20 ASSESSMENT — PAIN - FUNCTIONAL ASSESSMENT: PAIN_FUNCTIONAL_ASSESSMENT: 0-10

## 2023-07-20 NOTE — ED PROVIDER NOTES
[address] [place] [address]  ED      CHIEF COMPLAINT  Shortness of Breath (Per EMS pt c/o SOB o2 in the 80s, given 1 duoneb enroute. Pt on day 4 of steroids for copd exacerbation. Per pt she is on 3L NC at baseline. Pt states she felt like she had to use the bathroom, was SOB and lost control of her bladder. Pt states has been having back and shoulder pain for approx 5 days, called PCP for steroids. Pt states pain worse tonight.)       HISTORY OF PRESENT ILLNESS  Mariana Lopez is a 60 y.o. female  who presents to the ED complaining of shortness of breath and back pain. Patient states that she has been having the symptoms for the last 4 to 5 days. Started steroids 4 days ago by her pulmonologist.  Chronically on 3 L nasal cannula and has been using her inhalers as prescribed, but not improving. She states primarily her back pain that she indicates is between her shoulder blades has been severely worsening over the last few days. She also endorses in any frequency, but only dribbling when she goes to the bathroom. Denies any fevers, chills, nausea, vomiting. Does have some pain to the center of the chest that she describes as a tightness/pressure. Denies any leg pain or swelling. No other complaints, modifying factors or associated symptoms. I have reviewed the following from the nursing documentation.     Past Medical History:   Diagnosis Date    Anxiety     Asthma     Cancer ([facility]) 1984    Uterine    Cancer of lung ([facility]) 2014    CHF (congestive heart failure) (HCC)     COPD (chronic obstructive pulmonary disease) (HCC)     Cyclic vomiting syndrome     with known cannabis abuse    Cysts of both kidneys     Depression     Fatty liver 09/06/12    by CT at [facility]    Gastritis 06/29/12    EGD at [facility]    MI (myocardial infarction) ([facility]) 2010    Panic attacks     Pneumonia     Pre-diabetes 04/13/13    A1c5.8%    Tricuspid regurgitation     06/26/2012 JUANJO at [facility] Structurally Normal Tricuspid Valve     Past Surgical
APPEARANCE: Awake and alert. Cooperative. no distress. Chronically ill-appearing  HENT: Normocephalic. Atraumatic. HEART/CHEST: RRR. No murmurs. Chest wall is not tender to palpation. LUNGS: Respirations unlabored. Still has some diminished breath sounds bilaterally. speaking comfortably in full sentences. ABDOMEN: No tenderness. Soft. Non-distended. No masses. No organomegaly. No guarding or rebound. CONSULTS:   IP CONSULT TO HOSPITALIST    Discussed presentation and work-up with hospitalist team who accepted the patient for admission. SCREENINGS:       [place] Coma Scale  Eye Opening: Spontaneous  Best Verbal Response: Oriented  Best Motor Response: Obeys commands  [place] Coma Scale Score: 15                CIWA Assessment  BP: 128/75  Pulse: 81           Is this patient to be included in the SEP-1 Core Measure due to severe sepsis or septic shock? No   Exclusion criteria - the patient is NOT to be included for SEP-1 Core Measure due to: Infection is not suspected      TREATMENT:  During the patient's ED course, the patient was given:  Medications   ipratropium 0.5 mg-albuterol 2.5 mg (DUONEB) nebulizer solution 3 Dose (has no administration in time range)   azithromycin (ZITHROMAX) tablet 500 mg (has no administration in time range)   ketorolac (TORADOL) injection 15 mg (15 mg IntraVENous Given 7/20/23 0606)   HYDROmorphone HCl PF (DILAUDID) injection 0.5 mg (0.5 mg IntraVENous Given 7/20/23 0606)   lactated ringers bolus (0 mLs IntraVENous Stopped 7/20/23 0734)   iopamidol (ISOVUE-370) 76 % injection 75 mL (75 mLs IntraVENous Given 7/20/23 0714)        REASSESSMENT:  On reassessment, patient reports some improvement of symptoms but not resolution. Work-up consistent with COPD exacerbation. Do believe that patient has failed outpatient treatment at this time given she has been on steroids and breathing treatments at home. Patient to be admitted for further management.   Patient agreeable

## 2023-07-20 NOTE — CARE COORDINATION
[facility]    Previous patient [facility]  Discharged 3/16/22  Will follow for home care services    Dwain Buenrostro RN, BSN CTN  [facility] [phone]

## 2023-07-20 NOTE — ED NOTES
Med list completed with patient. All information is believed to be true and accurate.         Maryjo Park  07/20/23 3439

## 2023-07-20 NOTE — CARE COORDINATION
Case Management Assessment  Initial Evaluation    Date/Time of Evaluation: 7/20/2023 10:22 AM  Assessment Completed by: COLEEN Hodgson    If patient is discharged prior to next notation, then this note serves as note for discharge by case management. Patient Name: Nadya Mensah                   YOB: 1960  Diagnosis: Acute on chronic respiratory failure with hypoxemia [facility]) [J96.21]                   Date / Time: 7/20/2023  5:02 AM    Patient Admission Status: Observation   Readmission Risk (Low < 19, Mod (19-27), High > 27): No data recorded  Current PCP: MACEY Mares NP  PCP verified by CM? Yes    Chart Reviewed: Yes      History Provided by: Patient  Patient Orientation: (P) Alert and Oriented    Patient Cognition: (P) Alert    Hospitalization in the last 30 days (Readmission):  No    If yes, Readmission Assessment in CM Navigator will be completed.     Advance Directives:      Code Status: Prior   Patient's Primary Decision Maker is: Legal Next of Kin    Primary Decision MakerEerick Rod Lai Child - [phone]    Primary Decision Maker: Duong Ralphnt  Child - [phone]    Discharge Planning:    Patient lives with: (P) Alone Type of Home: (P) Apartment  Primary Care Giver: Self  Patient Support Systems include: Children, Family Members   Current Financial resources: (P) None  Current community resources: (P) ECF/Home Care  Current services prior to admission: (P) Durable Medical Equipment, Home Care, Oxygen Therapy ([facility])            Current DME: (P) Cane            Type of Home Care services:  (P) PT, OT, Nursing Services, Aide Services    ADLS  Prior functional level: Assistance with the following:, Bathing  Current functional level:      PT AM-PAC:   /24  OT AM-PAC:   /24    Family can provide assistance at DC: (P) Yes  Would you like Case Management to discuss the discharge plan with any other family members/significant others, and if so, who? (P) No  Plans to Return

## 2023-07-20 NOTE — H&P
V2.0  History and Physical      Name:  Ne Joiner /Age/Sex: 1960  (60 y.o. female)   MRN & CSN:  [id number] & [id number] Encounter Date/Time: 2023 1:43 PM EDT   Location:  [facility]8784-44 PCP: MACEY Avila NP       Hospital Day: 1    Assessment and Plan:   Ne Joiner is a 60 y.o. female with a pmh of severe COPD, congestive heart failure, anxiety/depression who presents with Acute on chronic respiratory failure with hypoxemia ([address]). Patient states that for the last 4 to 5 days she has been having increasing shortness of breath at home. Patient is on 3 L O2 at home baseline. Patient states that this morning around 4 AM she was having shortness of breath which was much worse than normal.  She did state that she was recently started on steroids by her pulmonologist however does not note any improvement of her symptoms with that. ED course: Upon presentation to the ED, patient was resumed on 3 L oxygen and was noted to have acidotic VBG. Over time VBG did note significant improvement to normal pH. EKG on presentation was negative, COVID and influenza test negative, CT PE negative however did show right basilar atelectasis versus pneumonia, CT abdomen and pelvis negative. Troponins were mildly elevated, however were downtrending. CBC/CMP without significant abnormalities noted. Patient was admitted to [facility] for observation due to exacerbation of COPD. Hospital Problems             Last Modified POA    * (Principal) Acute on chronic respiratory failure with hypoxemia ([address]) 2023 Yes       Plan:  Acute on chronic respiratory failure with hypoxia. Severe COPD per patient history. Continue home oxygen at 3 L. DuoNeb treatments every 4 hours while awake. Solu-Medrol/prednisone taper, with goal of long taper with discharge. 3-day course of antibiotics Zithromax. Continue to monitor oxygenation. COPD. Exacerbation. Continue plan as above. CHF.   Per

## 2023-07-20 NOTE — ACP (ADVANCE CARE PLANNING)
Advance Care Planning     General Advance Care Planning (ACP) Conversation    Date of Conversation: 7/20/2023  Conducted with: Patient with Decision Making Capacity    Healthcare Decision Maker:    Primary Decision Maker: Reg Ann - [phone]    Primary Decision Maker: Bonnie Bowles - Child - [phone]  Click here to complete Healthcare Decision Makers including selection of the Healthcare Decision Maker Relationship (ie \"Primary\"). Today we documented Decision Maker(s) consistent with Legal Next of Kin hierarchy.     Content/Action Overview:  Has NO ACP documents/care preferences - information provided, considering goals and options  Reviewed DNR/DNI and patient elects Full Code (Attempt Resuscitation)  benefit/burden of treatment options  Patient wants to be a full code, but also does not want prolonged invasive treatment if poor prognosis    Length of Voluntary ACP Conversation in minutes:  <16 minutes (Non-Billable)    COLEEN Trivedi

## 2023-07-20 NOTE — ED NOTES
Patient incontinent of urine. She was cleaned and a pure wick placed. Pt adjusted in bed for comfort. Warm blankets applied. Call light in reach.       Ashley Swanton, [place]  07/20/23 8881

## 2023-07-21 ENCOUNTER — APPOINTMENT (OUTPATIENT)
Dept: GENERAL RADIOLOGY | Age: 63
DRG: 190 | End: 2023-07-21
Payer: COMMERCIAL

## 2023-07-21 LAB
ANION GAP SERPL CALCULATED.3IONS-SCNC: 10 MMOL/L (ref 3–16)
BASOPHILS # BLD: 0 K/UL (ref 0–0.2)
BASOPHILS NFR BLD: 0 %
BUN SERPL-MCNC: 18 MG/DL (ref 7–20)
CALCIUM SERPL-MCNC: 9.3 MG/DL (ref 8.3–10.6)
CHLORIDE SERPL-SCNC: 101 MMOL/L (ref 99–110)
CO2 SERPL-SCNC: 28 MMOL/L (ref 21–32)
CREAT SERPL-MCNC: <0.5 MG/DL (ref 0.6–1.2)
DEPRECATED RDW RBC AUTO: 16.8 % (ref 12.4–15.4)
EOSINOPHIL # BLD: 0 K/UL (ref 0–0.6)
EOSINOPHIL NFR BLD: 0 %
GFR SERPLBLD CREATININE-BSD FMLA CKD-EPI: >60 ML/MIN/{1.73_M2}
GLUCOSE SERPL-MCNC: 154 MG/DL (ref 70–99)
HCT VFR BLD AUTO: 33.6 % (ref 36–48)
HGB BLD-MCNC: 11.1 G/DL (ref 12–16)
LYMPHOCYTES # BLD: 1.4 K/UL (ref 1–5.1)
LYMPHOCYTES NFR BLD: 13 %
MCH RBC QN AUTO: 31.1 PG (ref 26–34)
MCHC RBC AUTO-ENTMCNC: 33 G/DL (ref 31–36)
MCV RBC AUTO: 94.3 FL (ref 80–100)
MONOCYTES # BLD: 0.3 K/UL (ref 0–1.3)
MONOCYTES NFR BLD: 3 %
NEUTROPHILS # BLD: 8.9 K/UL (ref 1.7–7.7)
NEUTROPHILS NFR BLD: 83 %
NEUTS BAND NFR BLD MANUAL: 1 % (ref 0–7)
PLATELET # BLD AUTO: 261 K/UL (ref 135–450)
PLATELET BLD QL SMEAR: ADEQUATE
PMV BLD AUTO: 9 FL (ref 5–10.5)
POTASSIUM SERPL-SCNC: 4.5 MMOL/L (ref 3.5–5.1)
RBC # BLD AUTO: 3.56 M/UL (ref 4–5.2)
SLIDE REVIEW: ABNORMAL
SODIUM SERPL-SCNC: 139 MMOL/L (ref 136–145)
WBC # BLD AUTO: 10.6 K/UL (ref 4–11)

## 2023-07-21 PROCEDURE — 94761 N-INVAS EAR/PLS OXIMETRY MLT: CPT

## 2023-07-21 PROCEDURE — 94640 AIRWAY INHALATION TREATMENT: CPT

## 2023-07-21 PROCEDURE — 71045 X-RAY EXAM CHEST 1 VIEW: CPT

## 2023-07-21 PROCEDURE — 80048 BASIC METABOLIC PNL TOTAL CA: CPT

## 2023-07-21 PROCEDURE — 6370000000 HC RX 637 (ALT 250 FOR IP)

## 2023-07-21 PROCEDURE — 2580000003 HC RX 258

## 2023-07-21 PROCEDURE — 93005 ELECTROCARDIOGRAM TRACING: CPT

## 2023-07-21 PROCEDURE — 97535 SELF CARE MNGMENT TRAINING: CPT

## 2023-07-21 PROCEDURE — 96372 THER/PROPH/DIAG INJ SC/IM: CPT

## 2023-07-21 PROCEDURE — G0378 HOSPITAL OBSERVATION PER HR: HCPCS

## 2023-07-21 PROCEDURE — 85025 COMPLETE CBC W/AUTO DIFF WBC: CPT

## 2023-07-21 PROCEDURE — 2500000003 HC RX 250 WO HCPCS

## 2023-07-21 PROCEDURE — 36415 COLL VENOUS BLD VENIPUNCTURE: CPT

## 2023-07-21 PROCEDURE — 97162 PT EVAL MOD COMPLEX 30 MIN: CPT

## 2023-07-21 PROCEDURE — 2700000000 HC OXYGEN THERAPY PER DAY

## 2023-07-21 PROCEDURE — 97166 OT EVAL MOD COMPLEX 45 MIN: CPT

## 2023-07-21 PROCEDURE — 96376 TX/PRO/DX INJ SAME DRUG ADON: CPT

## 2023-07-21 PROCEDURE — 97530 THERAPEUTIC ACTIVITIES: CPT

## 2023-07-21 PROCEDURE — 6360000002 HC RX W HCPCS

## 2023-07-21 PROCEDURE — 1200000000 HC SEMI PRIVATE

## 2023-07-21 RX ORDER — WATER 10 ML/10ML
INJECTION INTRAMUSCULAR; INTRAVENOUS; SUBCUTANEOUS
Status: COMPLETED
Start: 2023-07-21 | End: 2023-07-21

## 2023-07-21 RX ORDER — HYDROMORPHONE HYDROCHLORIDE 1 MG/ML
0.5 INJECTION, SOLUTION INTRAMUSCULAR; INTRAVENOUS; SUBCUTANEOUS ONCE
Status: COMPLETED | OUTPATIENT
Start: 2023-07-21 | End: 2023-07-21

## 2023-07-21 RX ORDER — DIAZEPAM 5 MG/1
5 TABLET ORAL EVERY 12 HOURS PRN
Status: DISCONTINUED | OUTPATIENT
Start: 2023-07-21 | End: 2023-07-23 | Stop reason: HOSPADM

## 2023-07-21 RX ADMIN — PANTOPRAZOLE SODIUM 40 MG: 40 TABLET, DELAYED RELEASE ORAL at 09:53

## 2023-07-21 RX ADMIN — DIAZEPAM 5 MG: 5 TABLET ORAL at 13:42

## 2023-07-21 RX ADMIN — IPRATROPIUM BROMIDE AND ALBUTEROL SULFATE 1 DOSE: 2.5; .5 SOLUTION RESPIRATORY (INHALATION) at 19:50

## 2023-07-21 RX ADMIN — IPRATROPIUM BROMIDE AND ALBUTEROL SULFATE 1 DOSE: 2.5; .5 SOLUTION RESPIRATORY (INHALATION) at 07:35

## 2023-07-21 RX ADMIN — METOPROLOL SUCCINATE 25 MG: 50 TABLET, EXTENDED RELEASE ORAL at 09:53

## 2023-07-21 RX ADMIN — BUSPIRONE HYDROCHLORIDE 10 MG: 5 TABLET ORAL at 21:02

## 2023-07-21 RX ADMIN — METHYLPREDNISOLONE SODIUM SUCCINATE 40 MG: 40 INJECTION INTRAMUSCULAR; INTRAVENOUS at 15:25

## 2023-07-21 RX ADMIN — METHOCARBAMOL 500 MG: 500 TABLET ORAL at 09:53

## 2023-07-21 RX ADMIN — PANTOPRAZOLE SODIUM 40 MG: 40 TABLET, DELAYED RELEASE ORAL at 21:02

## 2023-07-21 RX ADMIN — IPRATROPIUM BROMIDE AND ALBUTEROL SULFATE 1 DOSE: 2.5; .5 SOLUTION RESPIRATORY (INHALATION) at 16:03

## 2023-07-21 RX ADMIN — METHOCARBAMOL 500 MG: 500 TABLET ORAL at 18:10

## 2023-07-21 RX ADMIN — HYDROMORPHONE HYDROCHLORIDE 0.5 MG: 1 INJECTION, SOLUTION INTRAMUSCULAR; INTRAVENOUS; SUBCUTANEOUS at 13:43

## 2023-07-21 RX ADMIN — SODIUM CHLORIDE, PRESERVATIVE FREE 10 ML: 5 INJECTION INTRAVENOUS at 21:04

## 2023-07-21 RX ADMIN — METHOCARBAMOL 500 MG: 500 TABLET ORAL at 21:02

## 2023-07-21 RX ADMIN — ONDANSETRON 4 MG: 4 TABLET, ORALLY DISINTEGRATING ORAL at 10:09

## 2023-07-21 RX ADMIN — METHOCARBAMOL 500 MG: 500 TABLET ORAL at 15:08

## 2023-07-21 RX ADMIN — IPRATROPIUM BROMIDE AND ALBUTEROL SULFATE 1 DOSE: 2.5; .5 SOLUTION RESPIRATORY (INHALATION) at 13:10

## 2023-07-21 RX ADMIN — AZITHROMYCIN MONOHYDRATE 500 MG: 250 TABLET ORAL at 09:53

## 2023-07-21 RX ADMIN — TRAMADOL HYDROCHLORIDE 100 MG: 50 TABLET ORAL at 21:02

## 2023-07-21 RX ADMIN — METHYLPREDNISOLONE SODIUM SUCCINATE 40 MG: 40 INJECTION INTRAMUSCULAR; INTRAVENOUS at 03:30

## 2023-07-21 RX ADMIN — SODIUM CHLORIDE, PRESERVATIVE FREE 10 ML: 5 INJECTION INTRAVENOUS at 10:09

## 2023-07-21 RX ADMIN — ENOXAPARIN SODIUM 40 MG: 100 INJECTION SUBCUTANEOUS at 09:53

## 2023-07-21 RX ADMIN — BUSPIRONE HYDROCHLORIDE 10 MG: 5 TABLET ORAL at 09:57

## 2023-07-21 RX ADMIN — TRAMADOL HYDROCHLORIDE 100 MG: 50 TABLET ORAL at 07:21

## 2023-07-21 RX ADMIN — METHYLPREDNISOLONE SODIUM SUCCINATE 40 MG: 40 INJECTION INTRAMUSCULAR; INTRAVENOUS at 09:54

## 2023-07-21 RX ADMIN — METHYLPREDNISOLONE SODIUM SUCCINATE 40 MG: 40 INJECTION INTRAMUSCULAR; INTRAVENOUS at 21:11

## 2023-07-21 RX ADMIN — ACETAMINOPHEN 650 MG: 325 TABLET ORAL at 03:29

## 2023-07-21 RX ADMIN — WATER 1 ML: 1 INJECTION INTRAMUSCULAR; INTRAVENOUS; SUBCUTANEOUS at 09:59

## 2023-07-21 ASSESSMENT — PAIN DESCRIPTION - DESCRIPTORS
DESCRIPTORS: ACHING
DESCRIPTORS: ACHING
DESCRIPTORS: SHARP
DESCRIPTORS: ACHING
DESCRIPTORS: SHARP
DESCRIPTORS: SHARP;ACHING

## 2023-07-21 ASSESSMENT — PAIN DESCRIPTION - LOCATION
LOCATION: BACK
LOCATION: BACK;ABDOMEN
LOCATION: HEAD
LOCATION: BACK
LOCATION: BACK
LOCATION: SHOULDER

## 2023-07-21 ASSESSMENT — PAIN DESCRIPTION - ORIENTATION
ORIENTATION: RIGHT;LEFT
ORIENTATION: LEFT;RIGHT
ORIENTATION: RIGHT;LEFT
ORIENTATION: POSTERIOR
ORIENTATION: RIGHT;LEFT

## 2023-07-21 ASSESSMENT — PAIN - FUNCTIONAL ASSESSMENT
PAIN_FUNCTIONAL_ASSESSMENT: ACTIVITIES ARE NOT PREVENTED
PAIN_FUNCTIONAL_ASSESSMENT: PREVENTS OR INTERFERES SOME ACTIVE ACTIVITIES AND ADLS

## 2023-07-21 ASSESSMENT — PAIN SCALES - GENERAL
PAINLEVEL_OUTOF10: 7
PAINLEVEL_OUTOF10: 6
PAINLEVEL_OUTOF10: 10
PAINLEVEL_OUTOF10: 8
PAINLEVEL_OUTOF10: 3
PAINLEVEL_OUTOF10: 9
PAINLEVEL_OUTOF10: 9

## 2023-07-21 NOTE — CARE COORDINATION
Chart reviewed, changed to IP today, IMM provided. Patient evaluated per therapy. Will give recs for TTB and RW, notes pending. Spoke with Lucas Lazcano with [facility] if patient d/c's over weekend has in closet to provide. [facility] following for [facility] needs. Active with [facility].  Dakota Dhillon RN

## 2023-07-22 LAB
ANION GAP SERPL CALCULATED.3IONS-SCNC: 12 MMOL/L (ref 3–16)
BASOPHILS # BLD: 0 K/UL (ref 0–0.2)
BASOPHILS NFR BLD: 0.2 %
BUN SERPL-MCNC: 23 MG/DL (ref 7–20)
CALCIUM SERPL-MCNC: 9.3 MG/DL (ref 8.3–10.6)
CHLORIDE SERPL-SCNC: 100 MMOL/L (ref 99–110)
CO2 SERPL-SCNC: 26 MMOL/L (ref 21–32)
CREAT SERPL-MCNC: <0.5 MG/DL (ref 0.6–1.2)
DEPRECATED RDW RBC AUTO: 17.1 % (ref 12.4–15.4)
EKG ATRIAL RATE: 81 BPM
EKG DIAGNOSIS: NORMAL
EKG P AXIS: 59 DEGREES
EKG P-R INTERVAL: 124 MS
EKG Q-T INTERVAL: 376 MS
EKG QRS DURATION: 78 MS
EKG QTC CALCULATION (BAZETT): 436 MS
EKG R AXIS: 66 DEGREES
EKG T AXIS: 66 DEGREES
EKG VENTRICULAR RATE: 81 BPM
EOSINOPHIL # BLD: 0 K/UL (ref 0–0.6)
EOSINOPHIL NFR BLD: 0 %
GFR SERPLBLD CREATININE-BSD FMLA CKD-EPI: >60 ML/MIN/{1.73_M2}
GLUCOSE SERPL-MCNC: 124 MG/DL (ref 70–99)
HCT VFR BLD AUTO: 35 % (ref 36–48)
HGB BLD-MCNC: 11.3 G/DL (ref 12–16)
LYMPHOCYTES # BLD: 1.2 K/UL (ref 1–5.1)
LYMPHOCYTES NFR BLD: 8.4 %
MCH RBC QN AUTO: 30.6 PG (ref 26–34)
MCHC RBC AUTO-ENTMCNC: 32.1 G/DL (ref 31–36)
MCV RBC AUTO: 95.3 FL (ref 80–100)
MONOCYTES # BLD: 0.4 K/UL (ref 0–1.3)
MONOCYTES NFR BLD: 2.6 %
NEUTROPHILS # BLD: 12.4 K/UL (ref 1.7–7.7)
NEUTROPHILS NFR BLD: 88.8 %
PLATELET # BLD AUTO: 267 K/UL (ref 135–450)
PMV BLD AUTO: 9 FL (ref 5–10.5)
POTASSIUM SERPL-SCNC: 5.3 MMOL/L (ref 3.5–5.1)
RBC # BLD AUTO: 3.68 M/UL (ref 4–5.2)
SODIUM SERPL-SCNC: 138 MMOL/L (ref 136–145)
WBC # BLD AUTO: 13.9 K/UL (ref 4–11)

## 2023-07-22 PROCEDURE — 1200000000 HC SEMI PRIVATE

## 2023-07-22 PROCEDURE — 6370000000 HC RX 637 (ALT 250 FOR IP)

## 2023-07-22 PROCEDURE — 80048 BASIC METABOLIC PNL TOTAL CA: CPT

## 2023-07-22 PROCEDURE — 85025 COMPLETE CBC W/AUTO DIFF WBC: CPT

## 2023-07-22 PROCEDURE — 2700000000 HC OXYGEN THERAPY PER DAY

## 2023-07-22 PROCEDURE — 36415 COLL VENOUS BLD VENIPUNCTURE: CPT

## 2023-07-22 PROCEDURE — G0378 HOSPITAL OBSERVATION PER HR: HCPCS

## 2023-07-22 PROCEDURE — 2580000003 HC RX 258

## 2023-07-22 PROCEDURE — 6360000002 HC RX W HCPCS

## 2023-07-22 PROCEDURE — 94761 N-INVAS EAR/PLS OXIMETRY MLT: CPT

## 2023-07-22 PROCEDURE — 96372 THER/PROPH/DIAG INJ SC/IM: CPT

## 2023-07-22 PROCEDURE — 94640 AIRWAY INHALATION TREATMENT: CPT

## 2023-07-22 PROCEDURE — 93010 ELECTROCARDIOGRAM REPORT: CPT | Performed by: INTERNAL MEDICINE

## 2023-07-22 PROCEDURE — 96365 THER/PROPH/DIAG IV INF INIT: CPT

## 2023-07-22 PROCEDURE — 96376 TX/PRO/DX INJ SAME DRUG ADON: CPT

## 2023-07-22 RX ADMIN — PANTOPRAZOLE SODIUM 40 MG: 40 TABLET, DELAYED RELEASE ORAL at 09:24

## 2023-07-22 RX ADMIN — METHOCARBAMOL 500 MG: 500 TABLET ORAL at 17:44

## 2023-07-22 RX ADMIN — DIAZEPAM 5 MG: 5 TABLET ORAL at 02:13

## 2023-07-22 RX ADMIN — IPRATROPIUM BROMIDE AND ALBUTEROL SULFATE 1 DOSE: 2.5; .5 SOLUTION RESPIRATORY (INHALATION) at 21:02

## 2023-07-22 RX ADMIN — ACETAMINOPHEN 650 MG: 325 TABLET ORAL at 09:29

## 2023-07-22 RX ADMIN — METOPROLOL SUCCINATE 25 MG: 50 TABLET, EXTENDED RELEASE ORAL at 09:24

## 2023-07-22 RX ADMIN — METHOCARBAMOL 500 MG: 500 TABLET ORAL at 09:24

## 2023-07-22 RX ADMIN — SODIUM CHLORIDE, PRESERVATIVE FREE 10 ML: 5 INJECTION INTRAVENOUS at 20:24

## 2023-07-22 RX ADMIN — ACETAMINOPHEN 650 MG: 325 TABLET ORAL at 02:15

## 2023-07-22 RX ADMIN — SODIUM CHLORIDE, PRESERVATIVE FREE 10 ML: 5 INJECTION INTRAVENOUS at 09:24

## 2023-07-22 RX ADMIN — DIAZEPAM 5 MG: 5 TABLET ORAL at 22:19

## 2023-07-22 RX ADMIN — AZITHROMYCIN MONOHYDRATE 500 MG: 250 TABLET ORAL at 09:24

## 2023-07-22 RX ADMIN — IPRATROPIUM BROMIDE AND ALBUTEROL SULFATE 1 DOSE: 2.5; .5 SOLUTION RESPIRATORY (INHALATION) at 08:52

## 2023-07-22 RX ADMIN — METHOCARBAMOL 500 MG: 500 TABLET ORAL at 20:23

## 2023-07-22 RX ADMIN — TRAMADOL HYDROCHLORIDE 100 MG: 50 TABLET ORAL at 17:43

## 2023-07-22 RX ADMIN — TRAMADOL HYDROCHLORIDE 100 MG: 50 TABLET ORAL at 11:32

## 2023-07-22 RX ADMIN — CEFTRIAXONE SODIUM 1000 MG: 1 INJECTION, POWDER, FOR SOLUTION INTRAMUSCULAR; INTRAVENOUS at 11:43

## 2023-07-22 RX ADMIN — IPRATROPIUM BROMIDE AND ALBUTEROL SULFATE 1 DOSE: 2.5; .5 SOLUTION RESPIRATORY (INHALATION) at 16:07

## 2023-07-22 RX ADMIN — BUSPIRONE HYDROCHLORIDE 10 MG: 5 TABLET ORAL at 09:24

## 2023-07-22 RX ADMIN — METHOCARBAMOL 500 MG: 500 TABLET ORAL at 13:30

## 2023-07-22 RX ADMIN — TRAMADOL HYDROCHLORIDE 100 MG: 50 TABLET ORAL at 05:53

## 2023-07-22 RX ADMIN — PREDNISONE 40 MG: 20 TABLET ORAL at 09:33

## 2023-07-22 RX ADMIN — BUSPIRONE HYDROCHLORIDE 10 MG: 5 TABLET ORAL at 20:23

## 2023-07-22 RX ADMIN — METHYLPREDNISOLONE SODIUM SUCCINATE 40 MG: 40 INJECTION INTRAMUSCULAR; INTRAVENOUS at 03:53

## 2023-07-22 RX ADMIN — PANTOPRAZOLE SODIUM 40 MG: 40 TABLET, DELAYED RELEASE ORAL at 20:23

## 2023-07-22 RX ADMIN — ENOXAPARIN SODIUM 40 MG: 100 INJECTION SUBCUTANEOUS at 09:23

## 2023-07-22 ASSESSMENT — PAIN SCALES - GENERAL
PAINLEVEL_OUTOF10: 8
PAINLEVEL_OUTOF10: 8
PAINLEVEL_OUTOF10: 7
PAINLEVEL_OUTOF10: 8
PAINLEVEL_OUTOF10: 0
PAINLEVEL_OUTOF10: 9
PAINLEVEL_OUTOF10: 9

## 2023-07-22 ASSESSMENT — PAIN DESCRIPTION - LOCATION
LOCATION: CHEST;BACK;RIB CAGE
LOCATION: BACK

## 2023-07-22 ASSESSMENT — PAIN DESCRIPTION - ORIENTATION
ORIENTATION: UPPER
ORIENTATION: LEFT;RIGHT
ORIENTATION: UPPER

## 2023-07-22 ASSESSMENT — PAIN DESCRIPTION - DESCRIPTORS
DESCRIPTORS: ACHING

## 2023-07-22 NOTE — PLAN OF CARE
Problem: Pain  Goal: Verbalizes/displays adequate comfort level or baseline comfort level  7/21/2023 2244 by Amelia Villafana  Outcome: Progressing  Flowsheets (Taken 7/21/2023 2244)  Verbalizes/displays adequate comfort level or baseline comfort level:   Encourage patient to monitor pain and request assistance   Assess pain using appropriate pain scale   Administer analgesics based on type and severity of pain and evaluate response   Consider cultural and social influences on pain and pain management   Implement non-pharmacological measures as appropriate and evaluate response   Notify Licensed Independent Practitioner if interventions unsuccessful or patient reports new pain  7/21/2023 2043 by Melissa Schreiber RN  Outcome: Progressing     Problem: Safety - Adult  Goal: Free from fall injury  7/21/2023 2244 by Amelia Villafana  Outcome: Progressing  Flowsheets (Taken 7/21/2023 2244)  Free From Fall Injury:   Based on caregiver fall risk screen, instruct family/caregiver to ask for assistance with transferring infant if caregiver noted to have fall risk factors   Instruct family/caregiver on patient safety  7/21/2023 2043 by Melissa Schreiber, RN  Outcome: Progressing

## 2023-07-22 NOTE — PLAN OF CARE
Problem: Pain  Goal: Verbalizes/displays adequate comfort level or baseline comfort level  Outcome: Progressing  Flowsheets (Taken 7/22/2023 1934)  Verbalizes/displays adequate comfort level or baseline comfort level:   Encourage patient to monitor pain and request assistance   Assess pain using appropriate pain scale   Administer analgesics based on type and severity of pain and evaluate response   Implement non-pharmacological measures as appropriate and evaluate response   Consider cultural and social influences on pain and pain management   Notify Licensed Independent Practitioner if interventions unsuccessful or patient reports new pain

## 2023-07-23 VITALS
HEART RATE: 77 BPM | BODY MASS INDEX: 22.15 KG/M2 | HEIGHT: 63 IN | WEIGHT: 125 LBS | RESPIRATION RATE: 16 BRPM | TEMPERATURE: 97.9 F | DIASTOLIC BLOOD PRESSURE: 80 MMHG | OXYGEN SATURATION: 94 % | SYSTOLIC BLOOD PRESSURE: 118 MMHG

## 2023-07-23 LAB
ANION GAP SERPL CALCULATED.3IONS-SCNC: 9 MMOL/L (ref 3–16)
ANISOCYTOSIS BLD QL SMEAR: ABNORMAL
BASOPHILS # BLD: 0 K/UL (ref 0–0.2)
BASOPHILS NFR BLD: 0 %
BUN SERPL-MCNC: 25 MG/DL (ref 7–20)
CALCIUM SERPL-MCNC: 9.4 MG/DL (ref 8.3–10.6)
CHLORIDE SERPL-SCNC: 99 MMOL/L (ref 99–110)
CO2 SERPL-SCNC: 28 MMOL/L (ref 21–32)
CREAT SERPL-MCNC: <0.5 MG/DL (ref 0.6–1.2)
DEPRECATED RDW RBC AUTO: 17.6 % (ref 12.4–15.4)
EOSINOPHIL # BLD: 0 K/UL (ref 0–0.6)
EOSINOPHIL NFR BLD: 0 %
GFR SERPLBLD CREATININE-BSD FMLA CKD-EPI: >60 ML/MIN/{1.73_M2}
GLUCOSE SERPL-MCNC: 80 MG/DL (ref 70–99)
HCT VFR BLD AUTO: 36.7 % (ref 36–48)
HGB BLD-MCNC: 11.6 G/DL (ref 12–16)
HYPOCHROMIA BLD QL SMEAR: ABNORMAL
LYMPHOCYTES # BLD: 3.4 K/UL (ref 1–5.1)
LYMPHOCYTES NFR BLD: 29 %
MACROCYTES BLD QL SMEAR: ABNORMAL
MCH RBC QN AUTO: 30.7 PG (ref 26–34)
MCHC RBC AUTO-ENTMCNC: 31.7 G/DL (ref 31–36)
MCV RBC AUTO: 96.9 FL (ref 80–100)
MICROCYTES BLD QL SMEAR: ABNORMAL
MONOCYTES # BLD: 0.4 K/UL (ref 0–1.3)
MONOCYTES NFR BLD: 3 %
MYELOCYTES NFR BLD MANUAL: 1 %
NEUTROPHILS # BLD: 8 K/UL (ref 1.7–7.7)
NEUTROPHILS NFR BLD: 62 %
NEUTS BAND NFR BLD MANUAL: 5 % (ref 0–7)
PLATELET # BLD AUTO: 213 K/UL (ref 135–450)
PLATELET BLD QL SMEAR: ADEQUATE
PMV BLD AUTO: 9.2 FL (ref 5–10.5)
POTASSIUM SERPL-SCNC: 4.5 MMOL/L (ref 3.5–5.1)
RBC # BLD AUTO: 3.79 M/UL (ref 4–5.2)
SLIDE REVIEW: ABNORMAL
SODIUM SERPL-SCNC: 136 MMOL/L (ref 136–145)
WBC # BLD AUTO: 11.8 K/UL (ref 4–11)

## 2023-07-23 PROCEDURE — 6360000002 HC RX W HCPCS

## 2023-07-23 PROCEDURE — 96366 THER/PROPH/DIAG IV INF ADDON: CPT

## 2023-07-23 PROCEDURE — 2580000003 HC RX 258

## 2023-07-23 PROCEDURE — 85025 COMPLETE CBC W/AUTO DIFF WBC: CPT

## 2023-07-23 PROCEDURE — 36415 COLL VENOUS BLD VENIPUNCTURE: CPT

## 2023-07-23 PROCEDURE — G0378 HOSPITAL OBSERVATION PER HR: HCPCS

## 2023-07-23 PROCEDURE — 6370000000 HC RX 637 (ALT 250 FOR IP)

## 2023-07-23 PROCEDURE — 94761 N-INVAS EAR/PLS OXIMETRY MLT: CPT

## 2023-07-23 PROCEDURE — 94640 AIRWAY INHALATION TREATMENT: CPT

## 2023-07-23 PROCEDURE — 80048 BASIC METABOLIC PNL TOTAL CA: CPT

## 2023-07-23 PROCEDURE — 96372 THER/PROPH/DIAG INJ SC/IM: CPT

## 2023-07-23 PROCEDURE — 2700000000 HC OXYGEN THERAPY PER DAY

## 2023-07-23 RX ORDER — PREDNISONE 10 MG/1
TABLET ORAL
Qty: 45 TABLET | Refills: 0 | Status: SHIPPED | OUTPATIENT
Start: 2023-07-23 | End: 2023-08-07

## 2023-07-23 RX ORDER — CEFUROXIME AXETIL 500 MG/1
500 TABLET ORAL 2 TIMES DAILY
Qty: 6 TABLET | Refills: 0 | Status: SHIPPED | OUTPATIENT
Start: 2023-07-24 | End: 2023-07-27

## 2023-07-23 RX ADMIN — IPRATROPIUM BROMIDE AND ALBUTEROL SULFATE 1 DOSE: 2.5; .5 SOLUTION RESPIRATORY (INHALATION) at 12:00

## 2023-07-23 RX ADMIN — IPRATROPIUM BROMIDE AND ALBUTEROL SULFATE 1 DOSE: 2.5; .5 SOLUTION RESPIRATORY (INHALATION) at 08:53

## 2023-07-23 RX ADMIN — ENOXAPARIN SODIUM 40 MG: 100 INJECTION SUBCUTANEOUS at 09:17

## 2023-07-23 RX ADMIN — PANTOPRAZOLE SODIUM 40 MG: 40 TABLET, DELAYED RELEASE ORAL at 09:17

## 2023-07-23 RX ADMIN — TRAMADOL HYDROCHLORIDE 100 MG: 50 TABLET ORAL at 04:17

## 2023-07-23 RX ADMIN — IPRATROPIUM BROMIDE AND ALBUTEROL SULFATE 1 DOSE: 2.5; .5 SOLUTION RESPIRATORY (INHALATION) at 16:03

## 2023-07-23 RX ADMIN — ACETAMINOPHEN 650 MG: 325 TABLET ORAL at 09:17

## 2023-07-23 RX ADMIN — PREDNISONE 40 MG: 20 TABLET ORAL at 09:17

## 2023-07-23 RX ADMIN — METHOCARBAMOL 500 MG: 500 TABLET ORAL at 13:49

## 2023-07-23 RX ADMIN — METOPROLOL SUCCINATE 25 MG: 50 TABLET, EXTENDED RELEASE ORAL at 09:17

## 2023-07-23 RX ADMIN — BUSPIRONE HYDROCHLORIDE 10 MG: 5 TABLET ORAL at 09:17

## 2023-07-23 RX ADMIN — CEFTRIAXONE SODIUM 1000 MG: 1 INJECTION, POWDER, FOR SOLUTION INTRAMUSCULAR; INTRAVENOUS at 09:20

## 2023-07-23 RX ADMIN — TRAMADOL HYDROCHLORIDE 100 MG: 50 TABLET ORAL at 13:48

## 2023-07-23 RX ADMIN — METHOCARBAMOL 500 MG: 500 TABLET ORAL at 09:17

## 2023-07-23 RX ADMIN — SODIUM CHLORIDE, PRESERVATIVE FREE 10 ML: 5 INJECTION INTRAVENOUS at 09:17

## 2023-07-23 ASSESSMENT — PAIN DESCRIPTION - LOCATION
LOCATION: BACK;CHEST
LOCATION: BACK

## 2023-07-23 ASSESSMENT — PAIN DESCRIPTION - DESCRIPTORS: DESCRIPTORS: ACHING

## 2023-07-23 ASSESSMENT — PAIN SCALES - GENERAL
PAINLEVEL_OUTOF10: 8
PAINLEVEL_OUTOF10: 7
PAINLEVEL_OUTOF10: 9

## 2023-07-23 ASSESSMENT — PAIN DESCRIPTION - ORIENTATION: ORIENTATION: UPPER

## 2023-07-23 NOTE — PLAN OF CARE
Problem: Pain  Goal: Verbalizes/displays adequate comfort level or baseline comfort level  7/22/2023 2354 by Bessie Parker RN  Outcome: Progressing  Flowsheets (Taken 7/22/2023 2354)  Verbalizes/displays adequate comfort level or baseline comfort level:   Encourage patient to monitor pain and request assistance   Assess pain using appropriate pain scale   Administer analgesics based on type and severity of pain and evaluate response   Implement non-pharmacological measures as appropriate and evaluate response     Problem: Safety - Adult  Goal: Free from fall injury  Outcome: Progressing  Flowsheets (Taken 7/22/2023 2354)  Free From Fall Injury: Instruct family/caregiver on patient safety

## 2023-07-23 NOTE — PROGRESS NOTES
.4 Eyes Skin Assessment     NAME:  Mini Mcfarlane  YOB: 1960  MEDICAL RECORD NUMBER:  [id number]    The patient is being assessed for  Shift Handoff    I agree that at least one RN has performed a thorough Head to Toe Skin Assessment on the patient. ALL assessment sites listed below have been assessed. Areas assessed by both nurses:Ivett HUGHES and [facility] LPN    Head, Face, Ears, Shoulders, Back, Chest, Arms, Elbows, Hands, Sacrum. Buttock, Coccyx, Ischium, Legs. Feet and Heels, and Under Medical Devices         Does the Patient have a Wound?  No noted wound(s)       [place]ari[place] Prevention initiated by RN: Yes  Wound Care Orders initiated by RN: No    Pressure Injury (Stage 3,4, Unstageable, DTI, NWPT, and Complex wounds) if present, place Wound referral order by RN under : No    New Ostomies, if present place, Ostomy referral order under : No     Nurse 1 eSignature: Electronically signed by Katherine Joy RN on 7/21/23 at 8:40 PM EDT    **SHARE this note so that the co-signing nurse can place an eSignature**    Nurse 2 eSignature: Electronically signed by Troy Winters LPN on 9/35/22 at 2:36 PM EDT
4 Eyes Skin Assessment     The patient is being assess for  Admission    I agree that 2 RN's have performed a thorough Head to Toe Skin Assessment on the patient. ALL assessment sites listed below have been assessed. Areas assessed by both nurses: robert and milad     [x]   Head, Face, and Ears   [x]   Shoulders, Back, and Chest  [x]   Arms, Elbows, and Hands   [x]   Coccyx, Sacrum, and IschIum  [x]   Legs, Feet, and Heels        Does the Patient have Skin Breakdown?   No         Darien Prevention initiated:  No   Wound Care Orders initiated:  No      C nurse consulted for Pressure Injury (Stage 3,4, Unstageable, DTI, NWPT, and Complex wounds), New and Established Ostomies:  No      Nurse 1 eSignature: Electronically signed by Carmine Ramos RN on 7/20/23 at 1:12 PM EDT    **SHARE this note so that the co-signing nurse is able to place an eSignature**    Nurse 2 eSignature: Electronically signed by Sarahi Flores RN on 7/20/23 at 1:40 PM EDT
CMU called pt had 12 beats of PAT and heart rate went up to 160.  Stat EKG ordered
Complained of headache ,Tylenol given .
Discharge: Pt discharged to home as per order. IV removed. Prescriptions & instructions reviewed. Pt verbalized understanding. Denied questions. Taken down to family member vehicle via w\c in stable & ambulatory condition.
Occupational Therapy    OT referral received and appreciated. Upon entering room, pt declined therapy evaluation stating she didn't feel like the evaluation was a priority at this time due her breathing and back pain. Pt requesting to rest              \" because I haven't slept all day and I can't do anything. \" OT to follow up next date as appropriate.         Electronically signed by Cuong Valle, GINAR/L on 7/20/2023 at 1:56 PM
Occupational/Physical Therapy    OT/PT orders received, chart reviewed, RN approving therapy evaluation. Upon arrival, pt stated she did not feel well and declined therapy this AM, requesting therapy return after lunch. Educated pt on role of OT/PT and benefits of therapy. Pt again declined and requested therapy return later. Will follow up as pt condition and therapy schedule permit. Thank you.        Danica Carmichael, OTR/L  Henok Ware PT/DPT
Physical Therapy  Facility/Department: [facility] C3 TELE/MED SURG/ONC  Physical Therapy Initial Assessment/Treatment     Name: Misty Reyna  : 1960  MRN: [id number]  Date of Service: 2023    Discharge Recommendations:  24 hour supervision or assist, Home with Home health PT   PT Equipment Recommendations  Equipment Needed: Yes  Mobility Devices: Rolinda Border: Rolling      Patient Diagnosis(es): The primary encounter diagnosis was COPD exacerbation ([address]). Diagnoses of Acute midline thoracic back pain and Chest pain, unspecified type were also pertinent to this visit. Past Medical History:  has a past medical history of Anxiety, Asthma, Cancer ([address]), Cancer of lung ([address]), CHF (congestive heart failure) ([address]), COPD (chronic obstructive pulmonary disease) ([address]), Cyclic vomiting syndrome, Cysts of both kidneys, Depression, Fatty liver, Gastritis, MI (myocardial infarction) ([address]), Panic attacks, Pneumonia, Pre-diabetes, and Tricuspid regurgitation. Past Surgical History:  has a past surgical history that includes Cholecystectomy (); Hysterectomy (); hernia repair; Colonoscopy (); Colonoscopy (); Upper gastrointestinal endoscopy (); Tonsillectomy; Incontinence surgery (Left); Colonoscopy (08/15/2017); Upper gastrointestinal endoscopy (10/03/2017); Endoscopy, colon, diagnostic; Lung cancer surgery; and Ovary removal (). Assessment   Body Structures, Functions, Activity Limitations Requiring Skilled Therapeutic Intervention: Decreased functional mobility ; Decreased endurance;Decreased balance;Decreased posture;Decreased strength  Assessment: Pt to [facility] with diagnosis of acute on chronic respiratory failure with hypoxemia. PTA pt lives alone in 2nd story apartment with [address]. Per pt she was independent with transfers and ambulating in home with no AD. Pt currently limited by anxitey and self limiting. Agreeable to eval with max encouragment and education.  Able to complete bed mobility
Pt a/o. VSS. Shift assessment updated and documented. Patient in bed alert and oriented ,Vitals checked and stable , complained on back pian and was given her dose  of tramadol. Has no other  needs at the moment , patient calls out appropriately.  Bed exit alarm on and call light within reach
Pt admitted via stretcher. VSS. Pt a/ox4. Pt reports pain 7/10 in upper back. Pt educated on prn pain meds, meds were not due at this time. Pt given scheduled robaxin and reported that it usually helps with pain. 4 eye completed. Kat Lent in place, pt reports with SOB it has been difficult to get to bathroom in time. Pt educated to call out if needing assistance, bed check in place. Standard safety precautions in place. Pt oriented to room and call light, call light and bedside table within reach. Pt provided with water. Pt shown how to order lunch. Pt denied any further needs.
Pt resting in bed. Gogo Almanza RN
Pt. Resting in bed. Alert/oriented. Vitals and assessment stable as charted. O2 90% on 3L nc at rest.  Pt states \"I feel like I'm not getting enough air\". Still with mild expiratory wheezing. C\O \"back pain\" 8/10; administered tylenol PO as per prn order. Call light in reach. Will continue to monitor.
Pt. Resting in bed. Alert/oriented. Vitals and assessment stable as charted. Remains on her baseline O2 3L and is 93% at rest.  Pt reports dyspnea at rest and worse with exertion. Very mild expiratory wheezing noted. Call light in reach. Will continue to monitor. Reviewed incentive spirometry with patient. Education on reason for use. Instructed patient how to use incentive spirometer. Pt demonstrated understanding of use. Encouraged frequent use of I\S while patient is awake.
Received page from ER regarding possible admit. Forwarded to Dr. Bertrand Yee, admitting provider.
Received patient on bed, alert and oriented x4. Vitals are stable. Denies pain nor dizziness. IS at bedside. Call bell within reach. Maintained a calm and comfortable environment. Shift assessment updated and documented.
Still complaining of backache , pain level rated at 8 , Tramadol given.
TTB and RW are in discharge planning office.
V2.0    [facility] Progress Note      Name:  Cara Mitchell /Age/Sex: 1960  (60 y.o. female)   MRN & CSN:  [id number] & [id number] Encounter Date/Time: 2023 2:06 PM EDT   Location:  [facility] PCP: MACEY Miranda NP     Attending:uRpert Dickerson, [address] [address] Day: 3    Assessment and Recommendations   Cara Mitchell is a 60 y.o. female with pmh of severe COPD, congestive heart failure, anxiety/depression who presents with Acute on chronic respiratory failure with hypoxemia ([address] Patient states for the last 4 to 5 days she has been having increasing shortness of breath at home. Patient is on 3 L O2 at home baseline. Patient states that this morning around 4 AM she was having shortness of breath which was much worse than normal.  She did state that she was recently started on steroids by her pulmonologist however does not note any improvement of her symptoms with that. ED course: Upon presentation to the ED, patient was resumed on 3 L oxygen and was noted to have acidotic VBG. Over time VBG did note significant improvement to normal pH. EKG on presentation was negative, COVID and influenza test negative, CT PE negative however did show right basilar atelectasis versus pneumonia, CT abdomen and pelvis negative. Troponins were mildly elevated, however were downtrending. CBC/CMP without significant abnormalities noted. Patient was admitted to [facility] for observation due to exacerbation of COPD. Plan:   Acute on chronic respiratory failure with hypoxia. Severe COPD per patient history. Continue home oxygen at 3 L. DuoNeb treatments every 4 hours while awake. Solu-Medrol/prednisone taper with goal of long taper on discharge. Today is day 3 of azithromycin abx. COPD. Acute exacerbation. Continue plan as above. Abnormal Chest xray. Patient with Chest xray performed on  showing new right basilar opacity concerning for infection vs atelectasis.  With patient
falls in the past year or any fall with injury in the past year?: No  Receives Help From: Friend(s)  ADL Assistance: Needs assistance (daughter in law assists with dressing and bathing less than 1x/weeks. Pt states she stays in the same clothes until she gets cleaned up)  Toileting: Independent  Homemaking Assistance: Needs assistance (Friend assists with cooking, cleaning, and laundry)  Ambulation Assistance: Independent (No AD)  Transfer Assistance: Independent  Active : No  Patient's  Info: Son drives  Occupation: On disability  Type of Occupation:   Leisure & Hobbies: listen to music, play games on laptop       Objective   Pulse: 85  Heart Rate Source: Monitor  BP: 104/68  BP Location: Right upper arm  BP Method: Automatic  Patient Position: Semi fowlers  MAP (Calculated): 80  Respirations: 20  SpO2: 91 %  O2 Device: None (Room air)  Comment: BP sitting EOB 91/62 Satnding /70, After short ambulation 127/74             Safety Devices  Type of Devices: All fall risk precautions in place; Bed alarm in place;Call light within reach;Gait belt;Patient at risk for falls; Left in bed;Nurse notified  Restraints  Restraints Initially in Place: No  Bed Mobility Training  Bed Mobility Training: Yes  Overall Level of Assistance: Stand-by assistance  Rolling: Stand-by assistance  Supine to Sit: Stand-by assistance  Sit to Supine: Stand-by assistance  Scooting: Stand-by assistance  Transfer Training  Transfer Training: Yes  Sit to Stand: Contact-guard assistance  Stand to Sit: Contact-guard assistance     AROM: Generally decreased, functional  Strength: Generally decreased, functional  Coordination: Generally decreased, functional  Tone: Normal  Sensation: Intact  ADL  Feeding: Independent  Grooming: Setup;Supervision  Grooming Skilled Clinical Factors: seated EOB to perform oral hygiene  LE Dressing: Supervision;Setup  LE Dressing Skilled Clinical Factors: seaetd EOB to don slide on
aggressive osseous lesion. Abdomen/Pelvis: Organs: The liver, pancreas, spleen, kidneys and adrenals are unremarkable. Postop changes of cholecystectomy are present. GI/Bowel: There is no bowel dilatation, wall thickening or obstruction. The appendix is normal. Pelvis: The uterus is surgically absent. The bladder is grossly unremarkable. Peritoneum/Retroperitoneum: There is no free air, free fluid or intraperitoneal inflammatory change. There is no adenopathy. Bones/Soft Tissues: There is no acute fracture or aggressive osseous lesion. 1. Negative for pulmonary embolus. 2. Advanced emphysema with mild right basilar segmental atelectasis versus pneumonia. 3. No acute abdominopelvic abnormality. XR CHEST PORTABLE    Result Date: 7/21/2023  EXAMINATION: ONE XRAY VIEW OF THE CHEST 7/20/2023 5:16 am COMPARISON: 10/16/2020, 09/09/2020 HISTORY: ORDERING SYSTEM PROVIDED HISTORY: sob TECHNOLOGIST PROVIDED HISTORY: Reason for exam:->sob Reason for Exam: sob FINDINGS: A single frontal view of the chest demonstrates no acute skeletal abnormality. The heart size and mediastinal contours are within normal limits. The pulmonary vascularity is at the upper limits of normal.  There is mild bibasilar atelectasis. The lungs are otherwise clear. There is no evidence of a pneumothorax or pleural effusion. Mild bibasilar atelectasis. CT CHEST PULMONARY EMBOLISM W CONTRAST    Result Date: 7/20/2023  EXAMINATION: CTA OF THE CHEST; CT OF THE ABDOMEN AND PELVIS WITH CONTRAST 7/20/2023 6:58 am TECHNIQUE: CTA of the chest was performed after the administration of intravenous contrast.  Multiplanar reformatted images are provided for review. MIP images are provided for review.  Automated exposure control, iterative reconstruction, and/or weight based adjustment of the mA/kV was utilized to reduce the radiation dose to as low as reasonably achievable.; CT of the abdomen and pelvis was performed with the administration of

## 2023-07-23 NOTE — DISCHARGE INSTR - COC
{Therapy; copd oxygen:93634}  Ventilator:    {MH CC Vent ICHZ:730110036}    Rehab Therapies: {THERAPEUTIC INTERVENTION:8565319640}  Weight Bearing Status/Restrictions: [address] CC Weight Bearin}  Other Medical Equipment (for information only, NOT a DME order):  {EQUIPMENT:100211982}  Other Treatments: ***    Patient's personal belongings (please select all that are sent with patient):  {CHP DME Belongings:525590068}    RN SIGNATURE:  {Esignature:060803423}    CASE MANAGEMENT/SOCIAL WORK SECTION    Inpatient Status Date:      Readmission Risk Assessment Score:  Readmission Risk              Risk of Unplanned Readmission:  23           Discharging to Facility/ Agency   Name: [address] [place] [address]   Address:  Phone: [phone]  Fax:    Dialysis Facility (if applicable)   Name:  Address:  Dialysis Schedule:  Phone:  Fax:    / signature: Electronically signed by Porter Kaur RN on 23 at 1:09 PM EDT    PHYSICIAN SECTION    Prognosis: Fair    Condition at Discharge: Stable    Rehab Potential (if transferring to Rehab): Fair    Recommended Labs or Other Treatments After Discharge: Continue monitoring COPD and oxygen status, completed abx course and steroid taper. Physician Certification: I certify the above information and transfer of Mini Mcfarlane  is necessary for the continuing treatment of the diagnosis listed and that she requires Home Care for greater 30 days.      Update Admission H&P: No change in H&P    PHYSICIAN SIGNATURE:  Electronically signed by Tamanna Medina DO on 23 at 12:30 PM EDT

## 2023-07-23 NOTE — DISCHARGE SUMMARY
V2.0  Discharge Summary    Name:  Bola Amado /Age/Sex: 1960 (60 y.o. female)   Admit Date: 2023  Discharge Date: 23    MRN & CSN:  [id number] & [id number] Encounter Date and Time 23 1:26 PM EDT    Attending:  Abby Orta MD Discharging Provider: Kimi Degroot DO       [facility] Course:     Brief HPI: Bola Amado is a 60 y.o. female who presented with Acute on chronic respiratory failure with hypoxemia ([address] Patient states for the last 4 to 5 days she has been having increasing shortness of breath at home. Patient is on 3 L O2 at home baseline. Patient states that this morning around 4 AM she was having shortness of breath which was much worse than normal.  She did state that she was recently started on steroids by her pulmonologist however does not note any improvement of her symptoms with that. ED course: Upon presentation to the ED, patient was resumed on 3 L oxygen and was noted to have acidotic VBG. Over time VBG did note significant improvement to normal pH. EKG on presentation was negative, COVID and influenza test negative, CT PE negative however did show right basilar atelectasis versus pneumonia, CT abdomen and pelvis negative. Troponins were mildly elevated, however were downtrending. CBC/CMP without significant abnormalities noted. Patient was admitted to [facility] for observation due to exacerbation of COPD. Brief Problem Based Course:   Acute on chronic respiratory failure with hypoxia. Stable. Severe COPD per patient history. Continue oxygen at 3 L. Continue DuoNebs at home. Long prednisone taper at discharge. Completed azithromycin antibiotic course during hospital stay. COPD. Continue plan as above. Abnormal chest x-ray. Chest x-ray on  showing new right basilar opacity concerns for pneumonia. White blood cell count increasing. Ceftriaxone initiated in the hospital.  WBC responding appropriately.   Patient

## 2023-07-23 NOTE — PLAN OF CARE
Problem: Pain  Goal: Verbalizes/displays adequate comfort level or baseline comfort level  7/23/2023 1154 by Carolina Childers, RN  Outcome: Progressing  Flowsheets (Taken 7/23/2023 1154)  Verbalizes/displays adequate comfort level or baseline comfort level:   Encourage patient to monitor pain and request assistance   Assess pain using appropriate pain scale   Administer analgesics based on type and severity of pain and evaluate response   Implement non-pharmacological measures as appropriate and evaluate response   Consider cultural and social influences on pain and pain management   Notify Licensed Independent Practitioner if interventions unsuccessful or patient reports new pain

## 2023-07-23 NOTE — CARE COORDINATION
CASE MANAGEMENT DISCHARGE SUMMARY      Discharge to: Home with [address]     Transportation:    Family/car: yes    DME: NAVI RW       Confirmed discharge plan with:     Patient: yes per RN     Family:  no per pt     RN, name: Clinton Crawford RN    Note: Discharging nurse to complete ALLAN, reconcile AVS, and place final copy with patient's discharge packet. RN to ensure that written prescriptions for  Level II medications are sent with patient to the facility as per protocol.

## 2023-07-23 NOTE — PLAN OF CARE
Problem: Pain  Goal: Verbalizes/displays adequate comfort level or baseline comfort level  7/23/2023 1549 by Annette Shen RN  Outcome: Adequate for Discharge     Problem: Safety - Adult  Goal: Free from fall injury  Outcome: Adequate for Discharge     Problem: Chronic Conditions and Co-morbidities  Goal: Patient's chronic conditions and co-morbidity symptoms are monitored and maintained or improved  Outcome: Adequate for Discharge

## 2023-07-27 NOTE — CARE COORDINATION
Nonadmit [facility]  Patient states she's [facility] w [facility]  Nash Stringer RN, BSN CTN  [facility] [phone]

## 2023-07-28 DIAGNOSIS — J44.1 COPD EXACERBATION (HCC): ICD-10-CM

## 2023-07-28 RX ORDER — DOXYCYCLINE HYCLATE 100 MG
TABLET ORAL
Qty: 14 TABLET | Refills: 0 | OUTPATIENT
Start: 2023-07-28

## 2023-08-11 ENCOUNTER — APPOINTMENT (OUTPATIENT)
Dept: GENERAL RADIOLOGY | Age: 63
DRG: 191 | End: 2023-08-11
Payer: COMMERCIAL

## 2023-08-11 ENCOUNTER — TELEPHONE (OUTPATIENT)
Dept: OTHER | Facility: CLINIC | Age: 63
End: 2023-08-11

## 2023-08-11 ENCOUNTER — HOSPITAL ENCOUNTER (INPATIENT)
Age: 63
LOS: 6 days | Discharge: HOME | DRG: 191 | End: 2023-08-17
Attending: STUDENT IN AN ORGANIZED HEALTH CARE EDUCATION/TRAINING PROGRAM | Admitting: INTERNAL MEDICINE
Payer: COMMERCIAL

## 2023-08-11 DIAGNOSIS — R79.89 ELEVATED TROPONIN: ICD-10-CM

## 2023-08-11 DIAGNOSIS — J44.1 COPD EXACERBATION (HCC): Primary | ICD-10-CM

## 2023-08-11 DIAGNOSIS — R77.8 ELEVATED TROPONIN: ICD-10-CM

## 2023-08-11 DIAGNOSIS — M25.511 ACUTE PAIN OF RIGHT SHOULDER: ICD-10-CM

## 2023-08-11 DIAGNOSIS — R09.02 HYPOXIA: ICD-10-CM

## 2023-08-11 LAB
ALBUMIN SERPL-MCNC: 3.6 G/DL (ref 3.4–5)
ALBUMIN/GLOB SERPL: 1.2 {RATIO} (ref 1.1–2.2)
ALP SERPL-CCNC: 65 U/L (ref 40–129)
ALT SERPL-CCNC: 27 U/L (ref 10–40)
ANION GAP SERPL CALCULATED.3IONS-SCNC: 11 MMOL/L (ref 3–16)
AST SERPL-CCNC: 28 U/L (ref 15–37)
BASE EXCESS BLDV CALC-SCNC: 6.3 MMOL/L (ref -3–3)
BASOPHILS # BLD: 0 K/UL (ref 0–0.2)
BASOPHILS NFR BLD: 0.4 %
BILIRUB SERPL-MCNC: <0.2 MG/DL (ref 0–1)
BUN SERPL-MCNC: 14 MG/DL (ref 7–20)
CALCIUM SERPL-MCNC: 9.6 MG/DL (ref 8.3–10.6)
CHLORIDE SERPL-SCNC: 98 MMOL/L (ref 99–110)
CO2 BLDV-SCNC: 32 MMOL/L
CO2 SERPL-SCNC: 26 MMOL/L (ref 21–32)
COHGB MFR BLDV: 3 % (ref 0–1.5)
CREAT SERPL-MCNC: <0.5 MG/DL (ref 0.6–1.2)
DEPRECATED RDW RBC AUTO: 19.4 % (ref 12.4–15.4)
EKG ATRIAL RATE: 79 BPM
EKG DIAGNOSIS: NORMAL
EKG P AXIS: 40 DEGREES
EKG P-R INTERVAL: 114 MS
EKG Q-T INTERVAL: 384 MS
EKG QRS DURATION: 74 MS
EKG QTC CALCULATION (BAZETT): 440 MS
EKG R AXIS: 66 DEGREES
EKG T AXIS: 61 DEGREES
EKG VENTRICULAR RATE: 79 BPM
EOSINOPHIL # BLD: 0 K/UL (ref 0–0.6)
EOSINOPHIL NFR BLD: 0.1 %
GFR SERPLBLD CREATININE-BSD FMLA CKD-EPI: >60 ML/MIN/{1.73_M2}
GLUCOSE SERPL-MCNC: 120 MG/DL (ref 70–99)
HCO3 BLDV-SCNC: 30.4 MMOL/L (ref 23–29)
HCT VFR BLD AUTO: 35.9 % (ref 36–48)
HGB BLD-MCNC: 11.8 G/DL (ref 12–16)
INR PPP: 0.94 (ref 0.84–1.16)
LACTATE BLDV-SCNC: 1.2 MMOL/L (ref 0.4–1.9)
LYMPHOCYTES # BLD: 1.2 K/UL (ref 1–5.1)
LYMPHOCYTES NFR BLD: 11 %
MCH RBC QN AUTO: 31.9 PG (ref 26–34)
MCHC RBC AUTO-ENTMCNC: 32.8 G/DL (ref 31–36)
MCV RBC AUTO: 97.4 FL (ref 80–100)
METHGB MFR BLDV: 0 %
MONOCYTES # BLD: 0.4 K/UL (ref 0–1.3)
MONOCYTES NFR BLD: 3.9 %
NEUTROPHILS # BLD: 9.1 K/UL (ref 1.7–7.7)
NEUTROPHILS NFR BLD: 84.6 %
NT-PROBNP SERPL-MCNC: 374 PG/ML (ref 0–124)
O2 THERAPY: ABNORMAL
PCO2 BLDV: 42.1 MMHG (ref 40–50)
PH BLDV: 7.48 [PH] (ref 7.35–7.45)
PLATELET # BLD AUTO: 197 K/UL (ref 135–450)
PMV BLD AUTO: 9 FL (ref 5–10.5)
PO2 BLDV: 56.8 MMHG (ref 25–40)
POTASSIUM SERPL-SCNC: 5.4 MMOL/L (ref 3.5–5.1)
PROT SERPL-MCNC: 6.5 G/DL (ref 6.4–8.2)
PROTHROMBIN TIME: 12.6 SEC (ref 11.5–14.8)
RBC # BLD AUTO: 3.69 M/UL (ref 4–5.2)
REASON FOR REJECTION: NORMAL
REJECTED TEST: NORMAL
SAO2 % BLDV: 93 %
SODIUM SERPL-SCNC: 135 MMOL/L (ref 136–145)
TROPONIN, HIGH SENSITIVITY: 19 NG/L (ref 0–14)
TROPONIN, HIGH SENSITIVITY: 23 NG/L (ref 0–14)
TROPONIN, HIGH SENSITIVITY: 27 NG/L (ref 0–14)
WBC # BLD AUTO: 10.7 K/UL (ref 4–11)

## 2023-08-11 PROCEDURE — 6370000000 HC RX 637 (ALT 250 FOR IP): Performed by: NURSE PRACTITIONER

## 2023-08-11 PROCEDURE — 82803 BLOOD GASES ANY COMBINATION: CPT

## 2023-08-11 PROCEDURE — 36569 INSJ PICC 5 YR+ W/O IMAGING: CPT

## 2023-08-11 PROCEDURE — 93005 ELECTROCARDIOGRAM TRACING: CPT

## 2023-08-11 PROCEDURE — 2580000003 HC RX 258: Performed by: NURSE PRACTITIONER

## 2023-08-11 PROCEDURE — 02HV33Z INSERTION OF INFUSION DEVICE INTO SUPERIOR VENA CAVA, PERCUTANEOUS APPROACH: ICD-10-PCS | Performed by: STUDENT IN AN ORGANIZED HEALTH CARE EDUCATION/TRAINING PROGRAM

## 2023-08-11 PROCEDURE — 83605 ASSAY OF LACTIC ACID: CPT

## 2023-08-11 PROCEDURE — 87324 CLOSTRIDIUM AG IA: CPT

## 2023-08-11 PROCEDURE — 93005 ELECTROCARDIOGRAM TRACING: CPT | Performed by: STUDENT IN AN ORGANIZED HEALTH CARE EDUCATION/TRAINING PROGRAM

## 2023-08-11 PROCEDURE — 80053 COMPREHEN METABOLIC PANEL: CPT

## 2023-08-11 PROCEDURE — 83880 ASSAY OF NATRIURETIC PEPTIDE: CPT

## 2023-08-11 PROCEDURE — 84484 ASSAY OF TROPONIN QUANT: CPT

## 2023-08-11 PROCEDURE — 6370000000 HC RX 637 (ALT 250 FOR IP): Performed by: STUDENT IN AN ORGANIZED HEALTH CARE EDUCATION/TRAINING PROGRAM

## 2023-08-11 PROCEDURE — 85025 COMPLETE CBC W/AUTO DIFF WBC: CPT

## 2023-08-11 PROCEDURE — 99285 EMERGENCY DEPT VISIT HI MDM: CPT

## 2023-08-11 PROCEDURE — 2700000000 HC OXYGEN THERAPY PER DAY

## 2023-08-11 PROCEDURE — 2580000003 HC RX 258: Performed by: STUDENT IN AN ORGANIZED HEALTH CARE EDUCATION/TRAINING PROGRAM

## 2023-08-11 PROCEDURE — 94761 N-INVAS EAR/PLS OXIMETRY MLT: CPT

## 2023-08-11 PROCEDURE — 6360000002 HC RX W HCPCS: Performed by: STUDENT IN AN ORGANIZED HEALTH CARE EDUCATION/TRAINING PROGRAM

## 2023-08-11 PROCEDURE — 6360000002 HC RX W HCPCS: Performed by: NURSE PRACTITIONER

## 2023-08-11 PROCEDURE — 85610 PROTHROMBIN TIME: CPT

## 2023-08-11 PROCEDURE — 96365 THER/PROPH/DIAG IV INF INIT: CPT

## 2023-08-11 PROCEDURE — 96374 THER/PROPH/DIAG INJ IV PUSH: CPT

## 2023-08-11 PROCEDURE — 71045 X-RAY EXAM CHEST 1 VIEW: CPT

## 2023-08-11 PROCEDURE — 96375 TX/PRO/DX INJ NEW DRUG ADDON: CPT

## 2023-08-11 PROCEDURE — 87449 NOS EACH ORGANISM AG IA: CPT

## 2023-08-11 PROCEDURE — C1751 CATH, INF, PER/CENT/MIDLINE: HCPCS

## 2023-08-11 PROCEDURE — 2060000000 HC ICU INTERMEDIATE R&B

## 2023-08-11 PROCEDURE — 93010 ELECTROCARDIOGRAM REPORT: CPT | Performed by: INTERNAL MEDICINE

## 2023-08-11 RX ORDER — GUAIFENESIN/DEXTROMETHORPHAN 100-10MG/5
5 SYRUP ORAL EVERY 4 HOURS PRN
Status: DISCONTINUED | OUTPATIENT
Start: 2023-08-11 | End: 2023-08-17 | Stop reason: HOSPADM

## 2023-08-11 RX ORDER — METOCLOPRAMIDE 10 MG/1
10 TABLET ORAL 3 TIMES DAILY PRN
Status: DISCONTINUED | OUTPATIENT
Start: 2023-08-11 | End: 2023-08-17 | Stop reason: HOSPADM

## 2023-08-11 RX ORDER — BUSPIRONE HYDROCHLORIDE 5 MG/1
10 TABLET ORAL 3 TIMES DAILY
Status: DISCONTINUED | OUTPATIENT
Start: 2023-08-11 | End: 2023-08-17 | Stop reason: HOSPADM

## 2023-08-11 RX ORDER — SODIUM CHLORIDE 9 MG/ML
INJECTION, SOLUTION INTRAVENOUS ONCE
Status: COMPLETED | OUTPATIENT
Start: 2023-08-11 | End: 2023-08-11

## 2023-08-11 RX ORDER — METHYLPREDNISOLONE SODIUM SUCCINATE 40 MG/ML
40 INJECTION, POWDER, LYOPHILIZED, FOR SOLUTION INTRAMUSCULAR; INTRAVENOUS EVERY 12 HOURS
Status: DISCONTINUED | OUTPATIENT
Start: 2023-08-12 | End: 2023-08-15

## 2023-08-11 RX ORDER — SODIUM CHLORIDE 0.9 % (FLUSH) 0.9 %
5-40 SYRINGE (ML) INJECTION EVERY 12 HOURS SCHEDULED
Status: DISCONTINUED | OUTPATIENT
Start: 2023-08-11 | End: 2023-08-17 | Stop reason: HOSPADM

## 2023-08-11 RX ORDER — SODIUM CHLORIDE 0.9 % (FLUSH) 0.9 %
5-40 SYRINGE (ML) INJECTION PRN
Status: DISCONTINUED | OUTPATIENT
Start: 2023-08-11 | End: 2023-08-17 | Stop reason: HOSPADM

## 2023-08-11 RX ORDER — IPRATROPIUM BROMIDE AND ALBUTEROL SULFATE 2.5; .5 MG/3ML; MG/3ML
2 SOLUTION RESPIRATORY (INHALATION) CONTINUOUS
Status: DISCONTINUED | OUTPATIENT
Start: 2023-08-11 | End: 2023-08-11

## 2023-08-11 RX ORDER — ONDANSETRON 2 MG/ML
4 INJECTION INTRAMUSCULAR; INTRAVENOUS EVERY 6 HOURS PRN
Status: DISCONTINUED | OUTPATIENT
Start: 2023-08-11 | End: 2023-08-17 | Stop reason: HOSPADM

## 2023-08-11 RX ORDER — METHOCARBAMOL 500 MG/1
500 TABLET, FILM COATED ORAL 4 TIMES DAILY
Status: DISCONTINUED | OUTPATIENT
Start: 2023-08-11 | End: 2023-08-17 | Stop reason: HOSPADM

## 2023-08-11 RX ORDER — OXYCODONE HYDROCHLORIDE AND ACETAMINOPHEN 5; 325 MG/1; MG/1
1 TABLET ORAL ONCE
Status: COMPLETED | OUTPATIENT
Start: 2023-08-11 | End: 2023-08-11

## 2023-08-11 RX ORDER — METHYLPREDNISOLONE SODIUM SUCCINATE 125 MG/2ML
125 INJECTION, POWDER, LYOPHILIZED, FOR SOLUTION INTRAMUSCULAR; INTRAVENOUS ONCE
Status: COMPLETED | OUTPATIENT
Start: 2023-08-11 | End: 2023-08-11

## 2023-08-11 RX ORDER — ONDANSETRON 4 MG/1
4 TABLET, ORALLY DISINTEGRATING ORAL EVERY 8 HOURS PRN
Status: DISCONTINUED | OUTPATIENT
Start: 2023-08-11 | End: 2023-08-17 | Stop reason: HOSPADM

## 2023-08-11 RX ORDER — SODIUM CHLORIDE 9 MG/ML
25 INJECTION, SOLUTION INTRAVENOUS PRN
Status: DISCONTINUED | OUTPATIENT
Start: 2023-08-11 | End: 2023-08-17 | Stop reason: HOSPADM

## 2023-08-11 RX ORDER — ACETAMINOPHEN 650 MG/1
650 SUPPOSITORY RECTAL EVERY 6 HOURS PRN
Status: DISCONTINUED | OUTPATIENT
Start: 2023-08-11 | End: 2023-08-17 | Stop reason: HOSPADM

## 2023-08-11 RX ORDER — METOPROLOL SUCCINATE 25 MG/1
25 TABLET, EXTENDED RELEASE ORAL DAILY
Status: DISCONTINUED | OUTPATIENT
Start: 2023-08-12 | End: 2023-08-17 | Stop reason: HOSPADM

## 2023-08-11 RX ORDER — PANTOPRAZOLE SODIUM 40 MG/1
40 TABLET, DELAYED RELEASE ORAL 2 TIMES DAILY
Status: DISCONTINUED | OUTPATIENT
Start: 2023-08-11 | End: 2023-08-17 | Stop reason: HOSPADM

## 2023-08-11 RX ORDER — POLYETHYLENE GLYCOL 3350 17 G/17G
17 POWDER, FOR SOLUTION ORAL DAILY PRN
Status: DISCONTINUED | OUTPATIENT
Start: 2023-08-11 | End: 2023-08-17 | Stop reason: HOSPADM

## 2023-08-11 RX ORDER — LIDOCAINE HYDROCHLORIDE 10 MG/ML
5 INJECTION, SOLUTION INFILTRATION; PERINEURAL ONCE
Status: DISCONTINUED | OUTPATIENT
Start: 2023-08-11 | End: 2023-08-17 | Stop reason: HOSPADM

## 2023-08-11 RX ORDER — ENOXAPARIN SODIUM 100 MG/ML
40 INJECTION SUBCUTANEOUS DAILY
Status: DISCONTINUED | OUTPATIENT
Start: 2023-08-12 | End: 2023-08-17 | Stop reason: HOSPADM

## 2023-08-11 RX ORDER — ACETAMINOPHEN 325 MG/1
650 TABLET ORAL EVERY 6 HOURS PRN
Status: DISCONTINUED | OUTPATIENT
Start: 2023-08-11 | End: 2023-08-17 | Stop reason: HOSPADM

## 2023-08-11 RX ORDER — IPRATROPIUM BROMIDE AND ALBUTEROL SULFATE 2.5; .5 MG/3ML; MG/3ML
1 SOLUTION RESPIRATORY (INHALATION)
Status: DISCONTINUED | OUTPATIENT
Start: 2023-08-12 | End: 2023-08-17 | Stop reason: HOSPADM

## 2023-08-11 RX ORDER — SODIUM CHLORIDE 9 MG/ML
INJECTION, SOLUTION INTRAVENOUS PRN
Status: DISCONTINUED | OUTPATIENT
Start: 2023-08-11 | End: 2023-08-17 | Stop reason: HOSPADM

## 2023-08-11 RX ORDER — WATER 1000 ML/1000ML
INJECTION, SOLUTION INTRAVENOUS
Status: DISPENSED
Start: 2023-08-11 | End: 2023-08-12

## 2023-08-11 RX ADMIN — OXYCODONE HYDROCHLORIDE AND ACETAMINOPHEN 1 TABLET: 5; 325 TABLET ORAL at 23:17

## 2023-08-11 RX ADMIN — BUSPIRONE HYDROCHLORIDE 10 MG: 5 TABLET ORAL at 22:28

## 2023-08-11 RX ADMIN — SODIUM CHLORIDE: 9 INJECTION, SOLUTION INTRAVENOUS at 23:22

## 2023-08-11 RX ADMIN — METHYLPREDNISOLONE SODIUM SUCCINATE 125 MG: 125 INJECTION INTRAMUSCULAR; INTRAVENOUS at 19:39

## 2023-08-11 RX ADMIN — METHOCARBAMOL 500 MG: 500 TABLET ORAL at 22:28

## 2023-08-11 RX ADMIN — Medication 10 ML: at 22:29

## 2023-08-11 RX ADMIN — CEFTRIAXONE SODIUM 1000 MG: 1 INJECTION, POWDER, FOR SOLUTION INTRAMUSCULAR; INTRAVENOUS at 22:41

## 2023-08-11 RX ADMIN — SODIUM CHLORIDE, PRESERVATIVE FREE 10 ML: 5 INJECTION INTRAVENOUS at 22:58

## 2023-08-11 RX ADMIN — IPRATROPIUM BROMIDE AND ALBUTEROL SULFATE 2 DOSE: .5; 3 SOLUTION RESPIRATORY (INHALATION) at 15:20

## 2023-08-11 RX ADMIN — PANTOPRAZOLE SODIUM 40 MG: 40 TABLET, DELAYED RELEASE ORAL at 22:28

## 2023-08-11 ASSESSMENT — PAIN DESCRIPTION - LOCATION
LOCATION: CHEST
LOCATION: BACK
LOCATION: BACK

## 2023-08-11 ASSESSMENT — PAIN SCALES - GENERAL
PAINLEVEL_OUTOF10: 7
PAINLEVEL_OUTOF10: 8
PAINLEVEL_OUTOF10: 7
PAINLEVEL_OUTOF10: 4
PAINLEVEL_OUTOF10: 7

## 2023-08-11 ASSESSMENT — PAIN DESCRIPTION - ORIENTATION: ORIENTATION: RIGHT

## 2023-08-11 ASSESSMENT — PAIN - FUNCTIONAL ASSESSMENT: PAIN_FUNCTIONAL_ASSESSMENT: 0-10

## 2023-08-11 NOTE — PROGRESS NOTES
Arrived to place PICC after chart review. Pre-procedure and timeout done with RN Tillman Paget. Discussed limitations of placement and allergies. Vessels easily collapsible upon assessment. No difficulty accessing R basilic vein. Blood free flowing and non-pulsatile. Guidewire, introducer, and catheter all easily inserted. PICC placement verified using 3CG technology as evidenced by peaked P-waves with no initial deflection. OK to use PICC. Please use new IV tubing when connecting to the newly placed central line. Patient tolerated sterile procedure well. Bed left in low position with belongings and call light within reach. Educated on line care. Handoff to bedside RN. Please call with any questions or concerns. The  will direct you to the PICC RN that is on call.       [phone]

## 2023-08-11 NOTE — TELEPHONE ENCOUNTER
Writer contacted Dr Farzaneh Eisenberg to inform of 30 day readmission risk. Dr Farzaneh Eisenberg informed Juarez Chen of readmission.     Call Back: If you need to call back to inform of disposition you can contact me at [phone]

## 2023-08-11 NOTE — ED NOTES
Delay in labs due to poor venous access, Dr. Mortimer Cower aware, PICC order in place, Dr. Mortimer Cower aware of only able to get a [facility]      Soumya Navarro RN  08/11/23 [phone]

## 2023-08-12 LAB
ANION GAP SERPL CALCULATED.3IONS-SCNC: 5 MMOL/L (ref 3–16)
BASE EXCESS BLDV CALC-SCNC: 4.1 MMOL/L (ref -3–3)
BASOPHILS # BLD: 0 K/UL (ref 0–0.2)
BASOPHILS NFR BLD: 0.1 %
BUN SERPL-MCNC: 16 MG/DL (ref 7–20)
C DIFF TOX A+B STL QL IA: NORMAL
CALCIUM SERPL-MCNC: 9.3 MG/DL (ref 8.3–10.6)
CHLORIDE SERPL-SCNC: 101 MMOL/L (ref 99–110)
CO2 BLDV-SCNC: 31 MMOL/L
CO2 SERPL-SCNC: 32 MMOL/L (ref 21–32)
COHGB MFR BLDV: 1.4 % (ref 0–1.5)
CREAT SERPL-MCNC: <0.5 MG/DL (ref 0.6–1.2)
DEPRECATED RDW RBC AUTO: 19.3 % (ref 12.4–15.4)
EOSINOPHIL # BLD: 0 K/UL (ref 0–0.6)
EOSINOPHIL NFR BLD: 0 %
GFR SERPLBLD CREATININE-BSD FMLA CKD-EPI: >60 ML/MIN/{1.73_M2}
GLUCOSE SERPL-MCNC: 159 MG/DL (ref 70–99)
HCO3 BLDV-SCNC: 29.9 MMOL/L (ref 23–29)
HCT VFR BLD AUTO: 32.8 % (ref 36–48)
HGB BLD-MCNC: 10.6 G/DL (ref 12–16)
LACTATE BLDV-SCNC: 1.1 MMOL/L (ref 0.4–1.9)
LYMPHOCYTES # BLD: 0.6 K/UL (ref 1–5.1)
LYMPHOCYTES NFR BLD: 8.5 %
MCH RBC QN AUTO: 31.6 PG (ref 26–34)
MCHC RBC AUTO-ENTMCNC: 32.3 G/DL (ref 31–36)
MCV RBC AUTO: 98 FL (ref 80–100)
METHGB MFR BLDV: 0.3 %
MONOCYTES # BLD: 0.1 K/UL (ref 0–1.3)
MONOCYTES NFR BLD: 0.9 %
NEUTROPHILS # BLD: 6.6 K/UL (ref 1.7–7.7)
NEUTROPHILS NFR BLD: 90.5 %
O2 THERAPY: ABNORMAL
PCO2 BLDV: 50.3 MMHG (ref 40–50)
PH BLDV: 7.39 [PH] (ref 7.35–7.45)
PLATELET # BLD AUTO: 213 K/UL (ref 135–450)
PMV BLD AUTO: 9 FL (ref 5–10.5)
PO2 BLDV: 29 MMHG (ref 25–40)
POTASSIUM SERPL-SCNC: 4.3 MMOL/L (ref 3.5–5.1)
RBC # BLD AUTO: 3.35 M/UL (ref 4–5.2)
SAO2 % BLDV: 55 %
SODIUM SERPL-SCNC: 138 MMOL/L (ref 136–145)
TROPONIN, HIGH SENSITIVITY: 24 NG/L (ref 0–14)
WBC # BLD AUTO: 7.3 K/UL (ref 4–11)

## 2023-08-12 PROCEDURE — 94761 N-INVAS EAR/PLS OXIMETRY MLT: CPT

## 2023-08-12 PROCEDURE — 6370000000 HC RX 637 (ALT 250 FOR IP): Performed by: INTERNAL MEDICINE

## 2023-08-12 PROCEDURE — 6360000002 HC RX W HCPCS: Performed by: NURSE PRACTITIONER

## 2023-08-12 PROCEDURE — 2580000003 HC RX 258: Performed by: NURSE PRACTITIONER

## 2023-08-12 PROCEDURE — 2060000000 HC ICU INTERMEDIATE R&B

## 2023-08-12 PROCEDURE — 97530 THERAPEUTIC ACTIVITIES: CPT

## 2023-08-12 PROCEDURE — 2700000000 HC OXYGEN THERAPY PER DAY

## 2023-08-12 PROCEDURE — 97161 PT EVAL LOW COMPLEX 20 MIN: CPT

## 2023-08-12 PROCEDURE — 2580000003 HC RX 258: Performed by: STUDENT IN AN ORGANIZED HEALTH CARE EDUCATION/TRAINING PROGRAM

## 2023-08-12 PROCEDURE — 6360000002 HC RX W HCPCS: Performed by: INTERNAL MEDICINE

## 2023-08-12 PROCEDURE — 96372 THER/PROPH/DIAG INJ SC/IM: CPT

## 2023-08-12 PROCEDURE — 82803 BLOOD GASES ANY COMBINATION: CPT

## 2023-08-12 PROCEDURE — 84484 ASSAY OF TROPONIN QUANT: CPT

## 2023-08-12 PROCEDURE — 94640 AIRWAY INHALATION TREATMENT: CPT

## 2023-08-12 PROCEDURE — 36600 WITHDRAWAL OF ARTERIAL BLOOD: CPT

## 2023-08-12 PROCEDURE — 96376 TX/PRO/DX INJ SAME DRUG ADON: CPT

## 2023-08-12 PROCEDURE — 96366 THER/PROPH/DIAG IV INF ADDON: CPT

## 2023-08-12 PROCEDURE — 6370000000 HC RX 637 (ALT 250 FOR IP): Performed by: NURSE PRACTITIONER

## 2023-08-12 PROCEDURE — 2580000003 HC RX 258

## 2023-08-12 PROCEDURE — 85025 COMPLETE CBC W/AUTO DIFF WBC: CPT

## 2023-08-12 PROCEDURE — 83605 ASSAY OF LACTIC ACID: CPT

## 2023-08-12 PROCEDURE — 99222 1ST HOSP IP/OBS MODERATE 55: CPT | Performed by: INTERNAL MEDICINE

## 2023-08-12 PROCEDURE — 96375 TX/PRO/DX INJ NEW DRUG ADDON: CPT

## 2023-08-12 PROCEDURE — 97166 OT EVAL MOD COMPLEX 45 MIN: CPT

## 2023-08-12 PROCEDURE — 80048 BASIC METABOLIC PNL TOTAL CA: CPT

## 2023-08-12 RX ORDER — TRAMADOL HYDROCHLORIDE 50 MG/1
50 TABLET ORAL EVERY 6 HOURS PRN
Status: DISCONTINUED | OUTPATIENT
Start: 2023-08-12 | End: 2023-08-17 | Stop reason: HOSPADM

## 2023-08-12 RX ORDER — ARFORMOTEROL TARTRATE 15 UG/2ML
15 SOLUTION RESPIRATORY (INHALATION)
Status: DISCONTINUED | OUTPATIENT
Start: 2023-08-12 | End: 2023-08-17 | Stop reason: HOSPADM

## 2023-08-12 RX ORDER — BUDESONIDE 0.5 MG/2ML
0.5 INHALANT ORAL
Status: DISCONTINUED | OUTPATIENT
Start: 2023-08-12 | End: 2023-08-17 | Stop reason: HOSPADM

## 2023-08-12 RX ORDER — WATER 1000 ML/1000ML
INJECTION, SOLUTION INTRAVENOUS
Status: COMPLETED
Start: 2023-08-12 | End: 2023-08-12

## 2023-08-12 RX ORDER — DIAZEPAM 5 MG/1
5 TABLET ORAL EVERY 12 HOURS PRN
Status: DISCONTINUED | OUTPATIENT
Start: 2023-08-12 | End: 2023-08-17 | Stop reason: HOSPADM

## 2023-08-12 RX ORDER — LOPERAMIDE HYDROCHLORIDE 2 MG/1
2 CAPSULE ORAL 4 TIMES DAILY PRN
Status: DISCONTINUED | OUTPATIENT
Start: 2023-08-12 | End: 2023-08-17 | Stop reason: HOSPADM

## 2023-08-12 RX ORDER — LACTOBACILLUS RHAMNOSUS GG 10B CELL
1 CAPSULE ORAL 2 TIMES DAILY WITH MEALS
Status: DISCONTINUED | OUTPATIENT
Start: 2023-08-12 | End: 2023-08-17 | Stop reason: HOSPADM

## 2023-08-12 RX ADMIN — IPRATROPIUM BROMIDE AND ALBUTEROL SULFATE 1 DOSE: .5; 3 SOLUTION RESPIRATORY (INHALATION) at 07:34

## 2023-08-12 RX ADMIN — Medication 2 PUFF: at 07:34

## 2023-08-12 RX ADMIN — METOPROLOL SUCCINATE 25 MG: 25 TABLET, EXTENDED RELEASE ORAL at 08:11

## 2023-08-12 RX ADMIN — BUSPIRONE HYDROCHLORIDE 10 MG: 5 TABLET ORAL at 13:49

## 2023-08-12 RX ADMIN — BUDESONIDE INHALATION 500 MCG: 0.5 SUSPENSION RESPIRATORY (INHALATION) at 20:48

## 2023-08-12 RX ADMIN — METHYLPREDNISOLONE SODIUM SUCCINATE 40 MG: 40 INJECTION INTRAMUSCULAR; INTRAVENOUS at 08:11

## 2023-08-12 RX ADMIN — METHOCARBAMOL 500 MG: 500 TABLET ORAL at 08:11

## 2023-08-12 RX ADMIN — Medication 10 ML: at 08:11

## 2023-08-12 RX ADMIN — TRAMADOL HYDROCHLORIDE 50 MG: 50 TABLET, COATED ORAL at 21:54

## 2023-08-12 RX ADMIN — METHOCARBAMOL 500 MG: 500 TABLET ORAL at 16:42

## 2023-08-12 RX ADMIN — SODIUM CHLORIDE, PRESERVATIVE FREE 10 ML: 5 INJECTION INTRAVENOUS at 21:02

## 2023-08-12 RX ADMIN — ARFORMOTEROL TARTRATE 15 MCG: 15 SOLUTION RESPIRATORY (INHALATION) at 20:48

## 2023-08-12 RX ADMIN — ACETAMINOPHEN 650 MG: 325 TABLET ORAL at 13:47

## 2023-08-12 RX ADMIN — IPRATROPIUM BROMIDE AND ALBUTEROL SULFATE 1 DOSE: .5; 3 SOLUTION RESPIRATORY (INHALATION) at 11:50

## 2023-08-12 RX ADMIN — ONDANSETRON 4 MG: 2 INJECTION INTRAMUSCULAR; INTRAVENOUS at 10:51

## 2023-08-12 RX ADMIN — SODIUM CHLORIDE, PRESERVATIVE FREE 10 ML: 5 INJECTION INTRAVENOUS at 08:11

## 2023-08-12 RX ADMIN — PANTOPRAZOLE SODIUM 40 MG: 40 TABLET, DELAYED RELEASE ORAL at 08:11

## 2023-08-12 RX ADMIN — DIAZEPAM 5 MG: 5 TABLET ORAL at 02:41

## 2023-08-12 RX ADMIN — ONDANSETRON 4 MG: 2 INJECTION INTRAMUSCULAR; INTRAVENOUS at 18:55

## 2023-08-12 RX ADMIN — Medication 1 CAPSULE: at 16:42

## 2023-08-12 RX ADMIN — ENOXAPARIN SODIUM 40 MG: 100 INJECTION SUBCUTANEOUS at 08:10

## 2023-08-12 RX ADMIN — DIAZEPAM 5 MG: 5 TABLET ORAL at 15:30

## 2023-08-12 RX ADMIN — IPRATROPIUM BROMIDE AND ALBUTEROL SULFATE 1 DOSE: .5; 3 SOLUTION RESPIRATORY (INHALATION) at 20:48

## 2023-08-12 RX ADMIN — BUSPIRONE HYDROCHLORIDE 10 MG: 5 TABLET ORAL at 08:11

## 2023-08-12 RX ADMIN — TRAMADOL HYDROCHLORIDE 50 MG: 50 TABLET, COATED ORAL at 15:30

## 2023-08-12 RX ADMIN — PANTOPRAZOLE SODIUM 40 MG: 40 TABLET, DELAYED RELEASE ORAL at 21:02

## 2023-08-12 RX ADMIN — METHOCARBAMOL 500 MG: 500 TABLET ORAL at 13:49

## 2023-08-12 RX ADMIN — CEFTRIAXONE SODIUM 1000 MG: 1 INJECTION, POWDER, FOR SOLUTION INTRAMUSCULAR; INTRAVENOUS at 21:01

## 2023-08-12 RX ADMIN — Medication 10 ML: at 20:58

## 2023-08-12 RX ADMIN — IPRATROPIUM BROMIDE AND ALBUTEROL SULFATE 1 DOSE: .5; 3 SOLUTION RESPIRATORY (INHALATION) at 16:02

## 2023-08-12 RX ADMIN — WATER 10 ML: 1 INJECTION INTRAMUSCULAR; INTRAVENOUS; SUBCUTANEOUS at 21:03

## 2023-08-12 RX ADMIN — METHOCARBAMOL 500 MG: 500 TABLET ORAL at 21:02

## 2023-08-12 RX ADMIN — BUSPIRONE HYDROCHLORIDE 10 MG: 5 TABLET ORAL at 21:02

## 2023-08-12 RX ADMIN — METHYLPREDNISOLONE SODIUM SUCCINATE 40 MG: 40 INJECTION INTRAMUSCULAR; INTRAVENOUS at 20:58

## 2023-08-12 ASSESSMENT — ENCOUNTER SYMPTOMS
ALLERGIC/IMMUNOLOGIC NEGATIVE: 1
EYES NEGATIVE: 1
SHORTNESS OF BREATH: 1
ABDOMINAL PAIN: 1
COUGH: 1

## 2023-08-12 ASSESSMENT — PAIN SCALES - GENERAL
PAINLEVEL_OUTOF10: 0
PAINLEVEL_OUTOF10: 5
PAINLEVEL_OUTOF10: 7
PAINLEVEL_OUTOF10: 0
PAINLEVEL_OUTOF10: 0
PAINLEVEL_OUTOF10: 6
PAINLEVEL_OUTOF10: 0
PAINLEVEL_OUTOF10: 2
PAINLEVEL_OUTOF10: 5
PAINLEVEL_OUTOF10: 0

## 2023-08-12 ASSESSMENT — PAIN DESCRIPTION - DESCRIPTORS
DESCRIPTORS: ACHING
DESCRIPTORS: ACHING
DESCRIPTORS: OTHER (COMMENT)

## 2023-08-12 ASSESSMENT — PAIN DESCRIPTION - LOCATION
LOCATION: OTHER (COMMENT)
LOCATION: BACK
LOCATION: ABDOMEN

## 2023-08-12 NOTE — CONSULTS
Comprehensive Nutrition Assessment    Type and Reason for Visit:  Initial, Positive Nutrition Screen    Nutrition Recommendations/Plan:   Modify diet to JUJU and encourage PO intake   Provide diet education when appropriate  Monitor nutrition adequacy, pertinent labs, bowel habits, wt changes, and clinical progress     Malnutrition Assessment:  Malnutrition Status: At risk for malnutrition (Comment) (08/12/23 1212)    Context:  Acute Illness     Findings of the 6 clinical characteristics of malnutrition:  Energy Intake:  Mild decrease in energy intake (Comment)    Nutrition Assessment:    Positive nutrition screen for wt loss and poor appetite: 60 y.o. f w/ PMH of HTN, centrilobular emphysema, prior MI, and history of CHF admitted w/ dyspnea. On cardiac diet, RD to modify to JUJU diet to provide additional menu options. PO intakes 26-50% of meals in EMR. Pt very lethargic at time of visit, did not provide much hx. Pt reports poor intake for a few days PTA. Reports 5-7 lb wt loss x 1 months d/t being sick, not eating as well. Reports able to eat most of breakfast today. Wt stable in EMR. Declined all ONS and diet education. Left handout at [facility]. Continue to encourage PO intake, will monitor. Nutrition Related Findings:    BLE trace edema. + BM today. Labs reviewed. Wound Type: None       Current Nutrition Intake & Therapies:    Average Meal Intake: 26-50%  Average Supplements Intake: None Ordered  ADULT DIET; Regular; Low Fat/Low Chol/High Fiber/2 gm Na    Anthropometric Measures:  Height: 5' 3\" (160 cm)  Ideal Body Weight (IBW): 115 lbs (52 kg)       Current Body Weight: 120 lb (54.4 kg), 104.3 % IBW. Weight Source: Bed Scale  Current BMI (kg/m2): 21.3        Weight Adjustment For: No Adjustment                 BMI Categories: Normal Weight (BMI 18.5-24. 9)    Estimated Daily Nutrient Needs:  Energy Requirements Based On: Kcal/kg (25-30 kcals/kg)  Weight Used for Energy Requirements: Current (54 kg)  Energy (kcal/day): 3028-1046  Weight Used for Protein Requirements: Current (1-1.2 g/kg)  Protein (g/day): 64-65 g  Method Used for Fluid Requirements: Other (Comment) (Per CHF guidelines)  Fluid (ml/day): Less than 2000 ml per day    Nutrition Diagnosis:   Inadequate oral intake related to inadequate protein-energy intake as evidenced by poor intake prior to admission, weight loss, intake 26-50%    Nutrition Interventions:   Food and/or Nutrient Delivery: Modify Current Diet  Nutrition Education/Counseling: Education declined  Coordination of Nutrition Care: Continue to monitor while inpatient       Goals:     Goals: PO intake 50% or greater, prior to discharge       Nutrition Monitoring and Evaluation:   Behavioral-Environmental Outcomes: None Identified  Food/Nutrient Intake Outcomes: Food and Nutrient Intake  Physical Signs/Symptoms Outcomes: Weight, Nutrition Focused Physical Findings, Biochemical Data    Discharge Planning:    Continue current diet     Amaya Chaudhry, [id number] [place] [address], [id number] [place] [address]  Contact: Office: [phone]; [address] [place] [address] Road: [facility]

## 2023-08-12 NOTE — CONSULTS
Consult Note            Date:8/12/2023        Patient Name:Joyce Uribe     YOB: 1960     Age:62 y.o. Consult to Pulmonology  Consult performed by: Kian Coburn MD  Consult ordered by: Cherre Claude, APRN - CNP  Reason for consult: Acute exacerbation of COPD        Chief Complaint     Chief Complaint   Patient presents with    Shortness of Breath     For a couple days. Taking zpack but feels like it is not helping. Wears home O2           History Obtained From   patient, electronic medical record    History of Present Illness   50-year-old female with a past medical history of hypertension, COPD, prior MI, history of CHF who presented to the emergency department on 8/11/2023 because of increasing shortness of breath. Patient was recently admitted for similar exacerbation in early July. Patient was treated at that time with IV steroids DuoNebs and a long taper of prednisone. Patient has also completed azithromycin and discharge. Patient had dyspnea cough but nonproductive no fevers at home. Patient's chest x-ray shows resolving central pulmonary vasculature with some atelectasis. Pulmonary was consulted as the patient has been followed by pulmonary in the outpatient. Patient also complaining about right shoulder pain and upper left abdominal pain.   No nausea or vomiting  No chest pains or palpitations otherwise    Past Medical History     Past Medical History:   Diagnosis Date    Anxiety     Asthma     Cancer ([address]) 1984    Uterine    Cancer of lung ([address]) 2014    CHF (congestive heart failure) (HCC)     COPD (chronic obstructive pulmonary disease) (HCC)     Cyclic vomiting syndrome     with known cannabis abuse    Cysts of both kidneys     Depression     Fatty liver 09/06/12    by CT at [facility]    Gastritis 06/29/12    EGD at [facility]    MI (myocardial infarction) ([address]) 2010    Panic attacks     Pneumonia     Pre-diabetes 04/13/13    A1c5.8%    Tricuspid regurgitation     06/26/2012 JUANJO at [facility] Cranial Nerves: No cranial nerve deficit. Sensory: No sensory deficit. Motor: No weakness. Gait: Gait normal.   Psychiatric:         Mood and Affect: Mood normal.         Thought Content: Thought content normal.         Judgment: Judgment normal.       Labs    CBC:  Recent Labs     08/11/23  1655 08/12/23  0500   WBC 10.7 7.3   RBC 3.69* 3.35*   HGB 11.8* 10.6*   HCT 35.9* 32.8*   MCV 97.4 98.0   RDW 19.4* 19.3*    213     CHEMISTRIES:  Recent Labs     08/11/23  1554 08/12/23  0500   * 138   K 5.4* 4.3   CL 98* 101   CO2 26 32   BUN 14 16   CREATININE <0.5* <0.5*   GLUCOSE 120* 159*     PT/INR:  Recent Labs     08/11/23  1554   PROTIME 12.6   INR 0.94     APTT:No results for input(s): APTT in the last 72 hours. LIVER PROFILE:  Recent Labs     08/11/23  1554   AST 28   ALT 27   BILITOT <0.2   ALKPHOS 65       Imaging/Diagnostics   XR CHEST PORTABLE    Result Date: 8/11/2023  Resolving central pulmonary congestion. Slowly resolving bibasilar atelectasis or infiltrates vs residual scarring       Assessment      Hospital Problems             Last Modified POA    * (Principal) COPD with acute exacerbation ([address]) 8/11/2023 Yes    Anxiety & depression (Chronic) 8/11/2023 Yes       Plan   Acute exacerbation of COPD  Last exacerbation that was severe was hospitalized in early July  Initially seen by Dr. Jevon Huggins in April and followed in May. Of this year    Normally at home on Trelegy 100, albuterol    Initial eosinophils of 0  Chest x-ray showing atelectasis versus infiltrate    Last PFT done in March 2012 showed a moderate form of obstructive lung disease with a bronchospastic component. We will check alpha-1    Continue with  Dulera 200-5 using 2 puffs twice daily-we will DC this  DuoNebs using 4 times daily  Solu-Medrol 40 IV every 12 hours.   Rocephin 1000 mg every 24 hours      We will start on  Brovana nebulized twice daily  Pulmicort nebulized twice daily    Right shoulder pain/abdominal pain  Liver function was normal  We will order ultrasound of the abdomen    Obstructive sleep apnea  We will probably need a sleep study as an outpatient          Thank you for the consult, will follow        Electronically signed by Maria Del Carmen Giang MD on 8/12/23 at 11:33 AM EDT

## 2023-08-12 NOTE — PROGRESS NOTES
Occupational Therapy  Facility/Department: [address] [place] [address] TELEMETRY  Occupational Therapy Initial Assessment    Name: Reagan Boggs  : 1960  MRN: [id number]  Date of Service: 2023    Discharge Recommendations:  Continue to assess pending progress  OT Equipment Recommendations  Equipment Needed:  (CTA)       Patient Diagnosis(es): The primary encounter diagnosis was COPD exacerbation ([address]). Diagnoses of Hypoxia and Elevated troponin were also pertinent to this visit. Past Medical History:  has a past medical history of Anxiety, Asthma, Cancer ([address]), Cancer of lung ([address]), CHF (congestive heart failure) ([address]), COPD (chronic obstructive pulmonary disease) ([address]), Cyclic vomiting syndrome, Cysts of both kidneys, Depression, Fatty liver, Gastritis, MI (myocardial infarction) ([address]), Panic attacks, Pneumonia, Pre-diabetes, and Tricuspid regurgitation. Past Surgical History:  has a past surgical history that includes Cholecystectomy (); Hysterectomy (); hernia repair; Colonoscopy (); Colonoscopy (); Upper gastrointestinal endoscopy (); Tonsillectomy; Incontinence surgery (Left); Colonoscopy (08/15/2017); Upper gastrointestinal endoscopy (10/03/2017); Endoscopy, colon, diagnostic; Lung cancer surgery; and Ovary removal (). Assessment   Performance deficits / Impairments: Decreased functional mobility ; Decreased ADL status; Decreased high-level IADLs;Decreased endurance;Decreased balance;Decreased strength  Assessment: Pt is a 60yo woman admitted 2/2 COPD exacerbation. Pt from home alone with 13 BERNARD. Pt reports neighbor assists in home making tasks and pt reports not bathing d/t her decreased abilities. Pt minimally agreeable and demos flat affect and decreased motivation. Pt SPV for bed mobility, demos the ability to sit chris-crossed in bed however states she can not perform LE dressing. Pts reports of baseline level and presented physical skill vary.  Pt would benefit from SNF Hobbies: Go fishing       Objective   Pulse: 85  Heart Rate Source: Monitor  BP: 119/75  BP Location: Left upper arm  BP Method: Automatic  Patient Position: Semi fowlers  MAP (Calculated): 90  Respirations: 18  SpO2: 94 %  O2 Device: Nasal cannula  Comment: 3L          Observation/Palpation  Posture: Fair  Safety Devices  Type of Devices: Call light within reach; Bed alarm in place; Left in bed;Patient at risk for falls  Bed Mobility Training  Bed Mobility Training: Yes  Overall Level of Assistance: Supervision  Supine to Sit: Supervision  Sit to Supine: Supervision  Scooting: Supervision  Balance  Sitting: With support (sat EOB for manual muscle testing mod I)  Transfer Training  Transfer Training: No (declined transfers due to fatigue)     AROM: Within functional limits  PROM: Within functional limits  Strength:  Within functional limits  Coordination: Within functional limits  Tone: Normal  Sensation: Intact  ADL  Feeding: Independent  LE Dressing: Increased time to complete;Stand by assistance  Additional Comments: \"I figured I'd try that tomorrow, let me get some rest today\" minimall agreeable to ADLs-- physically capable of sitting cross-legged in bed and moving in bed with SBA     Activity Tolerance  Activity Tolerance: Patient tolerated evaluation without incident;Patient limited by fatigue        Vision  Vision: Impaired  Vision Exceptions: Wears glasses for reading  Hearing  Hearing: Within functional limits  Cognition  Overall Cognitive Status: WFL  Cognition Comment: Flat affect  Orientation  Overall Orientation Status: Within Functional Limits                  Education Given To: Patient  Education Provided: Role of Therapy;Transfer Training;Plan of Care;Energy Conservation;Home Exercise Program;ADL Adaptive Strategies  Education Provided Comments: Disease specific  Education Method: Demonstration;Verbal  Barriers to Learning: Other (Comment) (motivation)  Education Outcome: Continued education

## 2023-08-12 NOTE — PROGRESS NOTES
08/12/23 0742   Oxygen Therapy/Pulse Ox   O2 Device Nasal cannula   O2 Flow Rate (L/min) 3 L/min   Pulse 88   Respirations 18   SpO2 98 %   Blood Gas  Performed? Yes   $ABG $Arterial Puncture   Arnulfo's Test #1 JEROD   Site #1 Left Radial   Site Prepped #1 Yes   Number of Attempts #1 2   Pressure Held #1 Yes   Complications #1 None   Post-procedure #1 JEROD   Specimen Status #1 Could not obtain   How Tolerated?  Other (Comment)  (poorly)

## 2023-08-12 NOTE — PROGRESS NOTES
Patient's EF (Ejection Fraction) is greater than 40%    Heart Failure Medications:  Diuretics[de-identified] None    (One of the following REQUIRED for EF </= 40%/SYSTOLIC FAILURE but MAY be used in EF% >40%/DIASTOLIC FAILURE)        ACE[de-identified] None        ARB[de-identified] None         ARNI[de-identified] None    (Beta Blockers)  NON- Evidenced Based Beta Blocker (for EF% >40%/DIASTOLIC FAILURE): None    Evidenced Based Beta Blocker::(REQUIRED for EF% <40%/SYSTOLIC FAILURE) Metoprolol SUCCinate- Toprol XL  . .................................................................................................................................................. Healthy Weight Tracking - BMI + Meds 12/27/2020 12/28/2020 4/20/2023 5/23/2023 7/20/2023 8/11/2023 8/12/2023   Weight 94 lb 5.7 oz 90 lb 6.4 oz 125 lb 127 lb 125 lb 125 lb 120 lb   Height - - 5' 3\" 5' 3\" 5' 3\" 5' 3\" 5' 3\"   Body Mass Index - - 22.14 kg/m2 22.5 kg/m2 22.14 kg/m2 22.14 kg/m2 21.26 kg/m2   Some recent data might be hidden         Patient's weights and intake/output reviewed: Yes    Daily Weight log at bedside, patient/family participation in use of log: no    Patient's Last Weight:    120lbs obtained by bed scale. Difference of 5 lbs less than last documented weight. No intake or output data in the 24 hours ending 08/12/23 0250    Education Booklet Provided: yes    Comorbidities Reviewed Yes    Patient has a past medical history of Anxiety, Asthma, Cancer ([address]), Cancer of lung ([address]), CHF (congestive heart failure) ([address]), COPD (chronic obstructive pulmonary disease) ([address]), Cyclic vomiting syndrome, Cysts of both kidneys, Depression, Fatty liver, Gastritis, MI (myocardial infarction) ([address]), Panic attacks, Pneumonia, Pre-diabetes, and Tricuspid regurgitation.      >>For CHF and Comorbidity documentation on Education Time and Topics, please see Education Tab    Progressive Mobility Assessment:  What is this patient's Current Level of Mobility?: Ambulatory- with Assistance  How was this patient

## 2023-08-12 NOTE — H&P
Hospital Medicine History & Physical        Date of Service: 8/11/2023    Time of Service: 2040    Disposition:    [x]Admitted to inpatient status with expected LOS greater than two midnights due to medical therapy. []Placed in observation status. Chief Admission Complaint:  Dyspnea at home    Presenting Admission History:      Adin Espinal is a/an 60 y.o. female with a significant past medical history of hypertension, centrilobular emphysema, prior MI, and history of CHF who presents to [facility] emergency department with complaint of ongoing dyspnea particularly with ambulation at home. She was actually admitted here and discharged on 7/3:23 day stay for COPD exacerbation where she was treated with IV steroids and DuoNebs, discharged with a long taper dose of prednisone. During that hospitalization, she had completed a course of azithromycin as well and discharged on Ceftin p.o. She notes that she had ongoing dyspnea, was seen by in-home nurse practitioner, given another steroid taper pack, and was restarted on Zithromax this past Monday. She is continued dyspnea, no increased frequency of sputum production or cough, no fever at home, but continues to feel fatigued and dyspneic, warranting her return here. Her evaluation here included laboratory studies, EKG, and chest x-ray. Chest x-ray showed resolving central pulmonary vascular congestion with slowly resolving bibasilar atelectasis versus infiltrates. Pertinent laboratory values tonight include sodium 135, potassium 5.4, chloride 98, creatinine 0.5, lactic acid 1.2, proBNP 374, initial troponin of 19 with a repeat of 23. Cell count was unremarkable aside from chronic anemia with a hemoglobin of the 0.8 which is stable. VBG tonight showed pH 7.47, PCO2 42, PO2 56, and HCO3 30. Patient has been stable on 3 L nasal cannula during her ER stay, which is baseline at home.   She received Solu-Medrol 125 IV push in the emergency department ORDERING SYSTEM PROVIDED HISTORY: sob TECHNOLOGIST PROVIDED HISTORY: Reason for exam:->sob Reason for Exam: sob FINDINGS: A single frontal view of the chest demonstrates no acute skeletal abnormality. The heart size and mediastinal contours are within normal limits. The pulmonary vascularity is at the upper limits of normal.  There is mild bibasilar atelectasis. The lungs are otherwise clear. There is no evidence of a pneumothorax or pleural effusion. Mild bibasilar atelectasis. CT CHEST PULMONARY EMBOLISM W CONTRAST    Result Date: 7/20/2023  EXAMINATION: CTA OF THE CHEST; CT OF THE ABDOMEN AND PELVIS WITH CONTRAST 7/20/2023 6:58 am TECHNIQUE: CTA of the chest was performed after the administration of intravenous contrast.  Multiplanar reformatted images are provided for review. MIP images are provided for review. Automated exposure control, iterative reconstruction, and/or weight based adjustment of the mA/kV was utilized to reduce the radiation dose to as low as reasonably achievable.; CT of the abdomen and pelvis was performed with the administration of intravenous contrast. Multiplanar reformatted images are provided for review. Automated exposure control, iterative reconstruction, and/or weight based adjustment of the mA/kV was utilized to reduce the radiation dose to as low as reasonably achievable.  COMPARISON: 11/26/2020 HISTORY: ORDERING SYSTEM PROVIDED HISTORY: back pain, sob TECHNOLOGIST PROVIDED HISTORY: Reason for exam:->back pain, sob Decision Support Exception - unselect if not a suspected or confirmed emergency medical condition->Emergency Medical Condition (MA) Reason for Exam: sob x 2 days; r/o pe Additional signs and symptoms: back pain Relevant Medical/Surgical History: left lung nodule removed; ORDERING SYSTEM PROVIDED HISTORY: ab pain, urinary urgency/retention TECHNOLOGIST PROVIDED HISTORY: Additional Contrast?->None Reason for exam:->ab pain, urinary urgency/retention Decision ALT 27   BILITOT <0.2   ALKPHOS 65     Recent Labs     08/11/23  1554   INR 0.94       Anticipated co-signer, Dr. Serenity Tafoya, APRN - CNP

## 2023-08-12 NOTE — PLAN OF CARE
Problem: Discharge Planning  Goal: Discharge to home or other facility with appropriate resources  Outcome: Progressing     Problem: Pain  Goal: Verbalizes/displays adequate comfort level or baseline comfort level  Outcome: Progressing       Pain medication added to patient MAR     Problem: Safety - Adult  Goal: Free from fall injury  Outcome: Progressing    Patient calls out appropriately for assistance. Problem: Skin/Tissue Integrity  Goal: Absence of new skin breakdown  Description: 1. Monitor for areas of redness and/or skin breakdown  2. Assess vascular access sites hourly  3. Every 4-6 hours minimum:  Change oxygen saturation probe site  4. Every 4-6 hours:  If on nasal continuous positive airway pressure, respiratory therapy assess nares and determine need for appliance change or resting period.   Outcome: Progressing

## 2023-08-12 NOTE — PROGRESS NOTES
Hospitalist Progress Note    CC: COPD with acute exacerbation [facility])    Name:  Tom Palafox /Age/Sex: 1960  (60 y.o. female)   MRN & CSN:  [id number] & [id number] Encounter Date/Time: 2023 1:53 PM EDT   Location:   PCP: MACEY Avelar NP, MD         Hospital course:  60 y.o. female with a significant past medical history of hypertension, centrilobular emphysema, prior MI, and history of CHF who presents to [facility] emergency department with complaint of ongoing dyspnea particularly with ambulation at home. She was actually admitted here and discharged on 7/3:23 day stay for COPD exacerbation where she was treated with IV steroids and DuoNebs, discharged with a long taper dose of prednisone. During that hospitalization, she had completed a course of azithromycin as well and discharged on Ceftin p.o. She notes that she had ongoing dyspnea, was seen by in-home nurse practitioner, given another steroid taper pack, and was restarted on Zithromax this past Monday. She is continued dyspnea, no increased frequency of sputum production or cough, no fever at home, but continues to feel fatigued and dyspneic, warranting her return here. Her evaluation here included laboratory studies, EKG, and chest x-ray. Chest x-ray showed resolving central pulmonary vascular congestion with slowly resolving bibasilar atelectasis versus infiltrates. Pertinent laboratory values tonight include sodium 135, potassium 5.4, chloride 98, creatinine 0.5, lactic acid 1.2, proBNP 374, initial troponin of 19 with a repeat of 23. Cell count was unremarkable aside from chronic anemia with a hemoglobin of the 0.8 which is stable. VBG tonight showed pH 7.47, PCO2 42, PO2 56, and HCO3 30. Patient has been stable on 3 L nasal cannula during her ER stay, which is baseline at home.   She received Solu-Medrol 125 IV push prophylaxis indicated:  Probiotic     PT/OT Eval Status:   []NOT yet ordered  [x]Ordered and Pending   []Seen with Recommendations for:  []Home independently  []Home w/ assist  []HHC  []SNF  []Acute Rehab    Disposition:  Other home with home health vs SNF  in 2-3 day(s). Patient requires continued admission due to weakness, pain on r abd/chest area. Discussed with patient and nursing. This patient has a high likelihood of being discharged tomorrow and is appropriate for A1 Discharge Unit in AM pending clinical course overnight: []Yes  [x]No        Radiology/ECHO:  CXR:     Impression:       Resolving central pulmonary congestion. Slowly resolving bibasilar atelectasis or infiltrates vs residual scarring        Diet:  ADULT DIET; Regular; No Added Salt (3-4 gm)    Medications:  Personally reviewed in detail in conjunction w/ labs as documented for evidence of drug toxicity. Infusion Medications    sodium chloride      sodium chloride       Scheduled Medications    arformoterol tartrate  15 mcg Nebulization BID RT    budesonide  0.5 mg Nebulization BID RT    lidocaine 1 % injection  5 mL IntraDERmal Once    sodium chloride flush  5-40 mL IntraVENous 2 times per day    ipratropium 0.5 mg-albuterol 2.5 mg  1 Dose Inhalation Q4H WA RT    methocarbamol  500 mg Oral 4x Daily    pantoprazole  40 mg Oral BID    sodium chloride flush  5-40 mL IntraVENous 2 times per day    enoxaparin  40 mg SubCUTAneous Daily    methylPREDNISolone  40 mg IntraVENous Q12H    busPIRone  10 mg Oral TID    metoprolol succinate  25 mg Oral Daily    cefTRIAXone (ROCEPHIN) IV  1,000 mg IntraVENous Q24H     PRN Meds: diazePAM, sodium chloride flush, sodium chloride, metoclopramide, sodium chloride flush, sodium chloride, ondansetron **OR** ondansetron, polyethylene glycol, acetaminophen **OR** acetaminophen, guaiFENesin-dextromethorphan     Labs:  Personally reviewed and interpreted for clinical significance.      CBC:   Recent Labs

## 2023-08-12 NOTE — PLAN OF CARE
Problem: Discharge Planning  Goal: Discharge to home or other facility with appropriate resources  8/12/2023 0935 by Margaret Otoole RN  Outcome: Progressing

## 2023-08-12 NOTE — PROGRESS NOTES
Physical Therapy  Facility/Department: [facility] A2 CARD TELEMETRY  Physical Therapy Initial Assessment    Name: Jeanie Escudero  : 1960  MRN: [id number]  Date of Service: 2023    Discharge Recommendations:  Continue to assess pending progress   PT Equipment Recommendations  Equipment Needed: No      Patient Diagnosis(es): The primary encounter diagnosis was COPD exacerbation ([address]). Diagnoses of Hypoxia and Elevated troponin were also pertinent to this visit. Past Medical History:  has a past medical history of Anxiety, Asthma, Cancer ([address]), Cancer of lung ([address]), CHF (congestive heart failure) ([address]), COPD (chronic obstructive pulmonary disease) ([address]), Cyclic vomiting syndrome, Cysts of both kidneys, Depression, Fatty liver, Gastritis, MI (myocardial infarction) ([address]), Panic attacks, Pneumonia, Pre-diabetes, and Tricuspid regurgitation. Past Surgical History:  has a past surgical history that includes Cholecystectomy (); Hysterectomy (); hernia repair; Colonoscopy (); Colonoscopy (); Upper gastrointestinal endoscopy (); Tonsillectomy; Incontinence surgery (Left); Colonoscopy (08/15/2017); Upper gastrointestinal endoscopy (10/03/2017); Endoscopy, colon, diagnostic; Lung cancer surgery; and Ovary removal (). Assessment   Body Structures, Functions, Activity Limitations Requiring Skilled Therapeutic Intervention: Decreased functional mobility ; Decreased endurance;Decreased strength;Decreased balance  Assessment: Pt is a 61 y/o female who presents with COPD exacerbation. Pt was living alone in 2nd floor apartment with [address]; pt required assist for ADLs and was ambulating short distances with RW without assist. Pt currently requires supervision for bed mobility but declined to perform further out of bed mobility secondary to fatigue. Pt would benefit from continued skilled PT to address current limitations.  Will continue to assess for discharge disposition as pt is willing to Education  Patient Education  Education Given To: Patient  Education Provided: Role of Therapy;Plan of Care  Education Method: Demonstration;Verbal  Barriers to Learning: None  Education Outcome: Verbalized understanding;Continued education needed      Therapy Time   Individual Concurrent Group Co-treatment   Time In 0834         Time Out 0858         Minutes 24         Timed Code Treatment Minutes: [address] [facility], [address] [facility]

## 2023-08-13 ENCOUNTER — APPOINTMENT (OUTPATIENT)
Dept: ULTRASOUND IMAGING | Age: 63
DRG: 191 | End: 2023-08-13
Payer: COMMERCIAL

## 2023-08-13 LAB
ANION GAP SERPL CALCULATED.3IONS-SCNC: 7 MMOL/L (ref 3–16)
BASOPHILS # BLD: 0 K/UL (ref 0–0.2)
BASOPHILS NFR BLD: 0.3 %
BUN SERPL-MCNC: 21 MG/DL (ref 7–20)
CALCIUM SERPL-MCNC: 8.9 MG/DL (ref 8.3–10.6)
CHLORIDE SERPL-SCNC: 103 MMOL/L (ref 99–110)
CO2 SERPL-SCNC: 31 MMOL/L (ref 21–32)
CREAT SERPL-MCNC: 0.6 MG/DL (ref 0.6–1.2)
DEPRECATED RDW RBC AUTO: 19 % (ref 12.4–15.4)
EOSINOPHIL # BLD: 0 K/UL (ref 0–0.6)
EOSINOPHIL NFR BLD: 0 %
GFR SERPLBLD CREATININE-BSD FMLA CKD-EPI: >60 ML/MIN/{1.73_M2}
GLUCOSE SERPL-MCNC: 138 MG/DL (ref 70–99)
HCT VFR BLD AUTO: 30.9 % (ref 36–48)
HGB BLD-MCNC: 10 G/DL (ref 12–16)
LYMPHOCYTES # BLD: 0.6 K/UL (ref 1–5.1)
LYMPHOCYTES NFR BLD: 6.5 %
MCH RBC QN AUTO: 31.7 PG (ref 26–34)
MCHC RBC AUTO-ENTMCNC: 32.5 G/DL (ref 31–36)
MCV RBC AUTO: 97.8 FL (ref 80–100)
MONOCYTES # BLD: 0.2 K/UL (ref 0–1.3)
MONOCYTES NFR BLD: 2.2 %
NEUTROPHILS # BLD: 8.5 K/UL (ref 1.7–7.7)
NEUTROPHILS NFR BLD: 91 %
PLATELET # BLD AUTO: 207 K/UL (ref 135–450)
PMV BLD AUTO: 9 FL (ref 5–10.5)
POTASSIUM SERPL-SCNC: 4.7 MMOL/L (ref 3.5–5.1)
RBC # BLD AUTO: 3.16 M/UL (ref 4–5.2)
SODIUM SERPL-SCNC: 141 MMOL/L (ref 136–145)
WBC # BLD AUTO: 9.3 K/UL (ref 4–11)

## 2023-08-13 PROCEDURE — 6360000002 HC RX W HCPCS: Performed by: NURSE PRACTITIONER

## 2023-08-13 PROCEDURE — 94640 AIRWAY INHALATION TREATMENT: CPT

## 2023-08-13 PROCEDURE — 96376 TX/PRO/DX INJ SAME DRUG ADON: CPT

## 2023-08-13 PROCEDURE — 94669 MECHANICAL CHEST WALL OSCILL: CPT

## 2023-08-13 PROCEDURE — 6360000002 HC RX W HCPCS: Performed by: INTERNAL MEDICINE

## 2023-08-13 PROCEDURE — 2580000003 HC RX 258: Performed by: STUDENT IN AN ORGANIZED HEALTH CARE EDUCATION/TRAINING PROGRAM

## 2023-08-13 PROCEDURE — 6370000000 HC RX 637 (ALT 250 FOR IP): Performed by: NURSE PRACTITIONER

## 2023-08-13 PROCEDURE — 2060000000 HC ICU INTERMEDIATE R&B

## 2023-08-13 PROCEDURE — 6370000000 HC RX 637 (ALT 250 FOR IP): Performed by: INTERNAL MEDICINE

## 2023-08-13 PROCEDURE — 96372 THER/PROPH/DIAG INJ SC/IM: CPT

## 2023-08-13 PROCEDURE — 99232 SBSQ HOSP IP/OBS MODERATE 35: CPT | Performed by: INTERNAL MEDICINE

## 2023-08-13 PROCEDURE — 85025 COMPLETE CBC W/AUTO DIFF WBC: CPT

## 2023-08-13 PROCEDURE — 96366 THER/PROPH/DIAG IV INF ADDON: CPT

## 2023-08-13 PROCEDURE — 2700000000 HC OXYGEN THERAPY PER DAY

## 2023-08-13 PROCEDURE — 2580000003 HC RX 258: Performed by: NURSE PRACTITIONER

## 2023-08-13 PROCEDURE — 76700 US EXAM ABDOM COMPLETE: CPT

## 2023-08-13 PROCEDURE — 94761 N-INVAS EAR/PLS OXIMETRY MLT: CPT

## 2023-08-13 PROCEDURE — 80048 BASIC METABOLIC PNL TOTAL CA: CPT

## 2023-08-13 RX ADMIN — TRAMADOL HYDROCHLORIDE 50 MG: 50 TABLET, COATED ORAL at 10:59

## 2023-08-13 RX ADMIN — Medication 10 ML: at 19:49

## 2023-08-13 RX ADMIN — METHOCARBAMOL 500 MG: 500 TABLET ORAL at 09:10

## 2023-08-13 RX ADMIN — Medication 10 ML: at 09:23

## 2023-08-13 RX ADMIN — BUDESONIDE INHALATION 500 MCG: 0.5 SUSPENSION RESPIRATORY (INHALATION) at 07:40

## 2023-08-13 RX ADMIN — DIAZEPAM 5 MG: 5 TABLET ORAL at 03:49

## 2023-08-13 RX ADMIN — BUSPIRONE HYDROCHLORIDE 10 MG: 5 TABLET ORAL at 21:00

## 2023-08-13 RX ADMIN — METHOCARBAMOL 500 MG: 500 TABLET ORAL at 21:00

## 2023-08-13 RX ADMIN — Medication 1 CAPSULE: at 09:09

## 2023-08-13 RX ADMIN — IPRATROPIUM BROMIDE AND ALBUTEROL SULFATE 1 DOSE: .5; 3 SOLUTION RESPIRATORY (INHALATION) at 15:43

## 2023-08-13 RX ADMIN — DIAZEPAM 5 MG: 5 TABLET ORAL at 19:49

## 2023-08-13 RX ADMIN — BUSPIRONE HYDROCHLORIDE 10 MG: 5 TABLET ORAL at 09:10

## 2023-08-13 RX ADMIN — IPRATROPIUM BROMIDE AND ALBUTEROL SULFATE 1 DOSE: .5; 3 SOLUTION RESPIRATORY (INHALATION) at 21:08

## 2023-08-13 RX ADMIN — SODIUM CHLORIDE, PRESERVATIVE FREE 10 ML: 5 INJECTION INTRAVENOUS at 19:50

## 2023-08-13 RX ADMIN — METHYLPREDNISOLONE SODIUM SUCCINATE 40 MG: 40 INJECTION INTRAMUSCULAR; INTRAVENOUS at 21:00

## 2023-08-13 RX ADMIN — BUSPIRONE HYDROCHLORIDE 10 MG: 5 TABLET ORAL at 13:58

## 2023-08-13 RX ADMIN — ENOXAPARIN SODIUM 40 MG: 100 INJECTION SUBCUTANEOUS at 09:28

## 2023-08-13 RX ADMIN — METHOCARBAMOL 500 MG: 500 TABLET ORAL at 17:17

## 2023-08-13 RX ADMIN — TRAMADOL HYDROCHLORIDE 50 MG: 50 TABLET, COATED ORAL at 03:49

## 2023-08-13 RX ADMIN — IPRATROPIUM BROMIDE AND ALBUTEROL SULFATE 1 DOSE: .5; 3 SOLUTION RESPIRATORY (INHALATION) at 07:40

## 2023-08-13 RX ADMIN — SODIUM CHLORIDE, PRESERVATIVE FREE 5 ML: 5 INJECTION INTRAVENOUS at 10:00

## 2023-08-13 RX ADMIN — PANTOPRAZOLE SODIUM 40 MG: 40 TABLET, DELAYED RELEASE ORAL at 21:00

## 2023-08-13 RX ADMIN — METHYLPREDNISOLONE SODIUM SUCCINATE 40 MG: 40 INJECTION INTRAMUSCULAR; INTRAVENOUS at 09:23

## 2023-08-13 RX ADMIN — IPRATROPIUM BROMIDE AND ALBUTEROL SULFATE 1 DOSE: .5; 3 SOLUTION RESPIRATORY (INHALATION) at 12:14

## 2023-08-13 RX ADMIN — METHOCARBAMOL 500 MG: 500 TABLET ORAL at 13:57

## 2023-08-13 RX ADMIN — Medication 1 CAPSULE: at 17:17

## 2023-08-13 RX ADMIN — PANTOPRAZOLE SODIUM 40 MG: 40 TABLET, DELAYED RELEASE ORAL at 09:09

## 2023-08-13 RX ADMIN — CEFTRIAXONE SODIUM 1000 MG: 1 INJECTION, POWDER, FOR SOLUTION INTRAMUSCULAR; INTRAVENOUS at 20:59

## 2023-08-13 RX ADMIN — BUDESONIDE INHALATION 500 MCG: 0.5 SUSPENSION RESPIRATORY (INHALATION) at 21:21

## 2023-08-13 RX ADMIN — METOPROLOL SUCCINATE 25 MG: 25 TABLET, EXTENDED RELEASE ORAL at 09:10

## 2023-08-13 RX ADMIN — ARFORMOTEROL TARTRATE 15 MCG: 15 SOLUTION RESPIRATORY (INHALATION) at 21:15

## 2023-08-13 RX ADMIN — TRAMADOL HYDROCHLORIDE 50 MG: 50 TABLET, COATED ORAL at 18:22

## 2023-08-13 ASSESSMENT — PAIN DESCRIPTION - LOCATION
LOCATION: BACK

## 2023-08-13 ASSESSMENT — PAIN DESCRIPTION - DESCRIPTORS
DESCRIPTORS: ACHING

## 2023-08-13 ASSESSMENT — PAIN SCALES - GENERAL
PAINLEVEL_OUTOF10: 6
PAINLEVEL_OUTOF10: 8
PAINLEVEL_OUTOF10: 6
PAINLEVEL_OUTOF10: 6
PAINLEVEL_OUTOF10: 3
PAINLEVEL_OUTOF10: 5
PAINLEVEL_OUTOF10: 8
PAINLEVEL_OUTOF10: 4

## 2023-08-13 ASSESSMENT — PAIN DESCRIPTION - ORIENTATION: ORIENTATION: RIGHT

## 2023-08-13 NOTE — PROGRESS NOTES
Hospitalist Progress Note    CC: COPD with acute exacerbation [facility])    Name:  Unruly Castillo /Age/Sex: 1960  (60 y.o. female)   MRN & CSN:  [id number] & [id number] Encounter Date/Time: 2023 1:53 PM EDT   Location:  [facility]3/0213-01 PCP: MACEY Mayen - LAURENT Reeves MD         Hospital course:  60 y.o. female with a significant past medical history of hypertension, centrilobular emphysema, prior MI, and history of CHF who presents to [facility] emergency department with complaint of ongoing dyspnea particularly with ambulation at home. She was actually admitted here and discharged on 7/3:23 day stay for COPD exacerbation where she was treated with IV steroids and DuoNebs, discharged with a long taper dose of prednisone. During that hospitalization, she had completed a course of azithromycin as well and discharged on Ceftin p.o. She notes that she had ongoing dyspnea, was seen by in-home nurse practitioner, given another steroid taper pack, and was restarted on Zithromax this past Monday. She is continued dyspnea, no increased frequency of sputum production or cough, no fever at home, but continues to feel fatigued and dyspneic, warranting her return here. Her evaluation here included laboratory studies, EKG, and chest x-ray. Chest x-ray showed resolving central pulmonary vascular congestion with slowly resolving bibasilar atelectasis versus infiltrates. Pertinent laboratory values tonight include sodium 135, potassium 5.4, chloride 98, creatinine 0.5, lactic acid 1.2, proBNP 374, initial troponin of 19 with a repeat of 23. Cell count was unremarkable aside from chronic anemia with a hemoglobin of the 0.8 which is stable. VBG tonight showed pH 7.47, PCO2 42, PO2 56, and HCO3 30. Patient has been stable on 3 L nasal cannula during her ER stay, which is baseline at home.   She received Solu-Medrol 125 IV push ondansetron, polyethylene glycol, acetaminophen **OR** acetaminophen, guaiFENesin-dextromethorphan     Labs:  Personally reviewed and interpreted for clinical significance. CBC:   Recent Labs     08/11/23  1655 08/12/23  0500 08/13/23  0600   WBC 10.7 7.3 9.3   HGB 11.8* 10.6* 10.0*   HCT 35.9* 32.8* 30.9*   MCV 97.4 98.0 97.8    213 207       BMP, Mag, Phos:    Recent Labs     08/11/23  1554 08/12/23  0500 08/13/23  0600   * 138 141   K 5.4* 4.3 4.7   CL 98* 101 103   CO2 26 32 31   BUN 14 16 21*   CREATININE <0.5* <0.5* 0.6   CALCIUM 9.6 9.3 8.9       Pro-BNP, Trop:    Recent Labs     08/11/23  1554 08/11/23  1655 08/11/23  2220 08/12/23  0030   PROBNP 374*  --   --   --    TROPHS 19* 23* 27* 24*       LFTs:    Recent Labs     08/11/23  1554   AST 28   ALT 27   BILITOT <0.2   ALKPHOS 65       PT/INR, Lactic acid, TSH:    Recent Labs     08/11/23  1554   INR 0.94       HgbA1c:  No results for input(s): LABA1C in the last 72 hours. Glucose trend:  No results found for: GLU    UA:No results for input(s): NITRITE, COLORU, PHUR, LABCAST, WBCUA, RBCUA, MUCUS, TRICHOMONAS, YEAST, BACTERIA, CLARITYU, SPECGRAV, LEUKOCYTESUR, UROBILINOGEN, BILIRUBINUR, BLOODU, GLUCOSEU, AMORPHOUS in the last 72 hours. Invalid input(s): [place]  Urine Cultures:   Lab Results   Component Value Date/Time    LABURIN No growth at 18 to 36 hours 11/26/2020 04:01 PM     Blood Cultures:   Lab Results   Component Value Date/Time    BC No growth after 5 days of incubation. 01/25/2019 08:20 PM     Lab Results   Component Value Date/Time    BLOODCULT2 No growth after 5 days of incubation.  01/25/2019 08:20 PM     Organism:   Lab Results   Component Value Date/Time    ORG Yeast 05/24/2017 09:00 PM         Invalid input(s): JULEE        Electronically signed by Olu Marquez MD on 8/13/2023 at 10:51

## 2023-08-13 NOTE — PROGRESS NOTES
Patient: Bola Amado MRN: [id number]  Date of  Admission: 8/11/2023   YOB: 1960  Age: 60 y.o. Sex: female    Unit: [facility] A2 CARD TELEMETRY  Room/Bed: [facility]/0213- Admitting Physician: Cachorro Ryder    Attending Physician:  Olu Maruqez MD         Pulmonary Service Note       Chief Complaint           Chief Complaint   Patient presents with    Shortness of Breath       For a couple days. Taking zpack but feels like it is not helping. Wears home O2            History Obtained From   patient, electronic medical record     History of Present Illness   59-year-old female with a past medical history of hypertension, COPD, prior MI, history of CHF who presented to the emergency department on 8/11/2023 because of increasing shortness of breath. Patient was recently admitted for similar exacerbation in early July. Patient was treated at that time with IV steroids DuoNebs and a long taper of prednisone. Patient has also completed azithromycin and discharge. Patient had dyspnea cough but nonproductive no fevers at home. Patient's chest x-ray shows resolving central pulmonary vasculature with some atelectasis. Pulmonary was consulted as the patient has been followed by pulmonary in the outpatient. Patient also complaining about right shoulder pain and upper left abdominal pain.   No nausea or vomiting  No chest pains or palpitations otherwise             SUBJECTIVE:    Still having issues with abdominal pain and shoulder pain  Ultrasound of the abdomen was done  No chest discomfort  No dysuria hematuria  Shortness of breath seems to be a little bit better with the Brovana and Pulmicort  No dysuria no hematuria  No hemoptysis    ROS:  A comprehensive review of systems was negative except for: Above    OBJECTIVE    Medications    Continuous Infusions:   sodium chloride      sodium chloride         Scheduled Meds:   arformoterol tartrate  15 mcg Nebulization BID RT    budesonide  0.5 mg

## 2023-08-13 NOTE — PROGRESS NOTES
Patient's EF (Ejection Fraction) is greater than 40%    Heart Failure Medications:  Diuretics[de-identified] None    (One of the following REQUIRED for EF </= 40%/SYSTOLIC FAILURE but MAY be used in EF% >40%/DIASTOLIC FAILURE)        ACE[de-identified] None        ARB[de-identified] None         ARNI[de-identified] None    (Beta Blockers)  NON- Evidenced Based Beta Blocker (for EF% >40%/DIASTOLIC FAILURE): None    Evidenced Based Beta Blocker::(REQUIRED for EF% <40%/SYSTOLIC FAILURE) Metoprolol SUCCinate- Toprol XL  . .................................................................................................................................................. Healthy Weight Tracking - BMI + Meds 4/20/2023 5/23/2023 7/20/2023 8/11/2023 8/12/2023 8/12/2023 8/13/2023   Weight 125 lb 127 lb 125 lb 125 lb 120 lb - 127 lb 12.8 oz   Height 5' 3\" 5' 3\" 5' 3\" 5' 3\" 5' 3\" 5' 3\" -   Body Mass Index 22.14 kg/m2 22.5 kg/m2 22.14 kg/m2 22.14 kg/m2 21.26 kg/m2 - 22.64 kg/m2   Some recent data might be hidden         Patient's weights and intake/output reviewed: Yes    Daily Weight log at bedside, patient/family participation in use of log: no    Patient's Last Weight:    127lbs 12 oz obtained by Standing scale. Difference of 7lbs less than last documented weight. Intake/Output Summary (Last 24 hours) at 8/13/2023 0411  Last data filed at 8/12/2023 2200  Gross per 24 hour   Intake 1798.61 ml   Output 1050 ml   Net 748.61 ml       Education Booklet Provided: yes    Comorbidities Reviewed Yes    Patient has a past medical history of Anxiety, Asthma, Cancer ([address]), Cancer of lung ([address]), CHF (congestive heart failure) ([address]), COPD (chronic obstructive pulmonary disease) ([address]), Cyclic vomiting syndrome, Cysts of both kidneys, Depression, Fatty liver, Gastritis, MI (myocardial infarction) ([address]), Panic attacks, Pneumonia, Pre-diabetes, and Tricuspid regurgitation.      >>For CHF and Comorbidity documentation on Education Time and Topics, please see Education Tab    Progressive

## 2023-08-13 NOTE — PROGRESS NOTES
Patient declined to sit up in chair, and declined to use a bedside commode, insisting that she still needs the purewick.

## 2023-08-13 NOTE — PLAN OF CARE
Problem: Pain  Goal: Verbalizes/displays adequate comfort level or baseline comfort level  Outcome: Progressing       Patient states that Tramadol has helped with her chronic pain. Problem: Discharge Planning  Goal: Discharge to home or other facility with appropriate resources  8/12/2023 2002 by Edith Melchor RN  Outcome: Progressing  8/12/2023 0935 by Henri Baez RN  Outcome: Progressing     Problem: Safety - Adult  Goal: Free from fall injury  Outcome: Progressing   Patient remains free from falls     Problem: Skin/Tissue Integrity  Goal: Absence of new skin breakdown  Description: 1. Monitor for areas of redness and/or skin breakdown  2. Assess vascular access sites hourly  3. Every 4-6 hours minimum:  Change oxygen saturation probe site  4. Every 4-6 hours:  If on nasal continuous positive airway pressure, respiratory therapy assess nares and determine need for appliance change or resting period.   Outcome: Progressing     Problem: Nutrition Deficit:  Goal: Optimize nutritional status  Outcome: Progressing

## 2023-08-14 ENCOUNTER — APPOINTMENT (OUTPATIENT)
Dept: GENERAL RADIOLOGY | Age: 63
DRG: 191 | End: 2023-08-14
Payer: COMMERCIAL

## 2023-08-14 LAB
ANION GAP SERPL CALCULATED.3IONS-SCNC: 7 MMOL/L (ref 3–16)
BASE EXCESS BLDV CALC-SCNC: 6.7 MMOL/L (ref -3–3)
BASOPHILS # BLD: 0 K/UL (ref 0–0.2)
BASOPHILS NFR BLD: 0 %
BUN SERPL-MCNC: 20 MG/DL (ref 7–20)
CALCIUM SERPL-MCNC: 9.3 MG/DL (ref 8.3–10.6)
CHLORIDE SERPL-SCNC: 102 MMOL/L (ref 99–110)
CO2 BLDV-SCNC: 35 MMOL/L
CO2 SERPL-SCNC: 32 MMOL/L (ref 21–32)
COHGB MFR BLDV: 1.9 % (ref 0–1.5)
CREAT SERPL-MCNC: <0.5 MG/DL (ref 0.6–1.2)
DEPRECATED RDW RBC AUTO: 19 % (ref 12.4–15.4)
EKG ATRIAL RATE: 83 BPM
EKG DIAGNOSIS: NORMAL
EKG P AXIS: 49 DEGREES
EKG P-R INTERVAL: 108 MS
EKG Q-T INTERVAL: 374 MS
EKG QRS DURATION: 64 MS
EKG QTC CALCULATION (BAZETT): 439 MS
EKG R AXIS: 74 DEGREES
EKG T AXIS: 69 DEGREES
EKG VENTRICULAR RATE: 83 BPM
EOSINOPHIL # BLD: 0 K/UL (ref 0–0.6)
EOSINOPHIL NFR BLD: 0 %
GFR SERPLBLD CREATININE-BSD FMLA CKD-EPI: >60 ML/MIN/{1.73_M2}
GLUCOSE SERPL-MCNC: 141 MG/DL (ref 70–99)
HCO3 BLDV-SCNC: 33.2 MMOL/L (ref 23–29)
HCT VFR BLD AUTO: 32.1 % (ref 36–48)
HGB BLD-MCNC: 10.6 G/DL (ref 12–16)
LYMPHOCYTES # BLD: 0.4 K/UL (ref 1–5.1)
LYMPHOCYTES NFR BLD: 4 %
MCH RBC QN AUTO: 32.2 PG (ref 26–34)
MCHC RBC AUTO-ENTMCNC: 33.1 G/DL (ref 31–36)
MCV RBC AUTO: 97.3 FL (ref 80–100)
METHGB MFR BLDV: 0.3 %
MONOCYTES # BLD: 0.1 K/UL (ref 0–1.3)
MONOCYTES NFR BLD: 1 %
NEUTROPHILS # BLD: 8.5 K/UL (ref 1.7–7.7)
NEUTROPHILS NFR BLD: 95 %
O2 THERAPY: ABNORMAL
PCO2 BLDV: 56.3 MMHG (ref 40–50)
PH BLDV: 7.39 [PH] (ref 7.35–7.45)
PLATELET # BLD AUTO: 201 K/UL (ref 135–450)
PLATELET BLD QL SMEAR: ADEQUATE
PMV BLD AUTO: 8.7 FL (ref 5–10.5)
PO2 BLDV: 38 MMHG (ref 25–40)
POTASSIUM SERPL-SCNC: 4.4 MMOL/L (ref 3.5–5.1)
RBC # BLD AUTO: 3.3 M/UL (ref 4–5.2)
REASON FOR REJECTION: NORMAL
REJECTED TEST: NORMAL
SAO2 % BLDV: 74 %
SLIDE REVIEW: ABNORMAL
SODIUM SERPL-SCNC: 141 MMOL/L (ref 136–145)
STOMATOCYTES BLD QL SMEAR: ABNORMAL
WBC # BLD AUTO: 8.9 K/UL (ref 4–11)

## 2023-08-14 PROCEDURE — 96376 TX/PRO/DX INJ SAME DRUG ADON: CPT

## 2023-08-14 PROCEDURE — 2580000003 HC RX 258

## 2023-08-14 PROCEDURE — 1200000000 HC SEMI PRIVATE

## 2023-08-14 PROCEDURE — 6360000002 HC RX W HCPCS: Performed by: NURSE PRACTITIONER

## 2023-08-14 PROCEDURE — 6370000000 HC RX 637 (ALT 250 FOR IP): Performed by: NURSE PRACTITIONER

## 2023-08-14 PROCEDURE — 85025 COMPLETE CBC W/AUTO DIFF WBC: CPT

## 2023-08-14 PROCEDURE — 36415 COLL VENOUS BLD VENIPUNCTURE: CPT

## 2023-08-14 PROCEDURE — 6370000000 HC RX 637 (ALT 250 FOR IP): Performed by: INTERNAL MEDICINE

## 2023-08-14 PROCEDURE — 6360000002 HC RX W HCPCS: Performed by: INTERNAL MEDICINE

## 2023-08-14 PROCEDURE — 96366 THER/PROPH/DIAG IV INF ADDON: CPT

## 2023-08-14 PROCEDURE — 94669 MECHANICAL CHEST WALL OSCILL: CPT

## 2023-08-14 PROCEDURE — 2580000003 HC RX 258: Performed by: STUDENT IN AN ORGANIZED HEALTH CARE EDUCATION/TRAINING PROGRAM

## 2023-08-14 PROCEDURE — 93010 ELECTROCARDIOGRAM REPORT: CPT | Performed by: INTERNAL MEDICINE

## 2023-08-14 PROCEDURE — 99221 1ST HOSP IP/OBS SF/LOW 40: CPT | Performed by: INTERNAL MEDICINE

## 2023-08-14 PROCEDURE — 2580000003 HC RX 258: Performed by: NURSE PRACTITIONER

## 2023-08-14 PROCEDURE — 94761 N-INVAS EAR/PLS OXIMETRY MLT: CPT

## 2023-08-14 PROCEDURE — 96361 HYDRATE IV INFUSION ADD-ON: CPT

## 2023-08-14 PROCEDURE — 2700000000 HC OXYGEN THERAPY PER DAY

## 2023-08-14 PROCEDURE — 71045 X-RAY EXAM CHEST 1 VIEW: CPT

## 2023-08-14 PROCEDURE — 96375 TX/PRO/DX INJ NEW DRUG ADDON: CPT

## 2023-08-14 PROCEDURE — 94640 AIRWAY INHALATION TREATMENT: CPT

## 2023-08-14 PROCEDURE — 96372 THER/PROPH/DIAG INJ SC/IM: CPT

## 2023-08-14 PROCEDURE — 89220 SPUTUM SPECIMEN COLLECTION: CPT

## 2023-08-14 PROCEDURE — 82803 BLOOD GASES ANY COMBINATION: CPT

## 2023-08-14 PROCEDURE — 80048 BASIC METABOLIC PNL TOTAL CA: CPT

## 2023-08-14 RX ORDER — WATER 1000 ML/1000ML
INJECTION, SOLUTION INTRAVENOUS
Status: COMPLETED
Start: 2023-08-14 | End: 2023-08-14

## 2023-08-14 RX ORDER — FUROSEMIDE 10 MG/ML
40 INJECTION INTRAMUSCULAR; INTRAVENOUS ONCE
Status: COMPLETED | OUTPATIENT
Start: 2023-08-14 | End: 2023-08-14

## 2023-08-14 RX ADMIN — METHOCARBAMOL 500 MG: 500 TABLET ORAL at 20:58

## 2023-08-14 RX ADMIN — METOPROLOL SUCCINATE 25 MG: 25 TABLET, EXTENDED RELEASE ORAL at 08:53

## 2023-08-14 RX ADMIN — SODIUM CHLORIDE, PRESERVATIVE FREE 10 ML: 5 INJECTION INTRAVENOUS at 21:04

## 2023-08-14 RX ADMIN — METHYLPREDNISOLONE SODIUM SUCCINATE 40 MG: 40 INJECTION INTRAMUSCULAR; INTRAVENOUS at 21:03

## 2023-08-14 RX ADMIN — Medication 10 ML: at 08:54

## 2023-08-14 RX ADMIN — BUDESONIDE INHALATION 500 MCG: 0.5 SUSPENSION RESPIRATORY (INHALATION) at 19:53

## 2023-08-14 RX ADMIN — PANTOPRAZOLE SODIUM 40 MG: 40 TABLET, DELAYED RELEASE ORAL at 08:54

## 2023-08-14 RX ADMIN — PANTOPRAZOLE SODIUM 40 MG: 40 TABLET, DELAYED RELEASE ORAL at 20:58

## 2023-08-14 RX ADMIN — Medication 10 ML: at 21:03

## 2023-08-14 RX ADMIN — METHOCARBAMOL 500 MG: 500 TABLET ORAL at 15:47

## 2023-08-14 RX ADMIN — BUSPIRONE HYDROCHLORIDE 10 MG: 5 TABLET ORAL at 20:58

## 2023-08-14 RX ADMIN — BUSPIRONE HYDROCHLORIDE 10 MG: 5 TABLET ORAL at 08:54

## 2023-08-14 RX ADMIN — ONDANSETRON 4 MG: 4 TABLET, ORALLY DISINTEGRATING ORAL at 18:58

## 2023-08-14 RX ADMIN — ARFORMOTEROL TARTRATE 15 MCG: 15 SOLUTION RESPIRATORY (INHALATION) at 19:58

## 2023-08-14 RX ADMIN — METHYLPREDNISOLONE SODIUM SUCCINATE 40 MG: 40 INJECTION INTRAMUSCULAR; INTRAVENOUS at 08:54

## 2023-08-14 RX ADMIN — METHOCARBAMOL 500 MG: 500 TABLET ORAL at 08:54

## 2023-08-14 RX ADMIN — SODIUM CHLORIDE 25 ML: 9 INJECTION, SOLUTION INTRAVENOUS at 21:13

## 2023-08-14 RX ADMIN — WATER 10 ML: 1 INJECTION INTRAMUSCULAR; INTRAVENOUS; SUBCUTANEOUS at 21:00

## 2023-08-14 RX ADMIN — TRAMADOL HYDROCHLORIDE 50 MG: 50 TABLET, COATED ORAL at 12:58

## 2023-08-14 RX ADMIN — DIAZEPAM 5 MG: 5 TABLET ORAL at 15:47

## 2023-08-14 RX ADMIN — IPRATROPIUM BROMIDE AND ALBUTEROL SULFATE 1 DOSE: .5; 3 SOLUTION RESPIRATORY (INHALATION) at 16:12

## 2023-08-14 RX ADMIN — Medication 1 CAPSULE: at 15:47

## 2023-08-14 RX ADMIN — ENOXAPARIN SODIUM 40 MG: 100 INJECTION SUBCUTANEOUS at 08:53

## 2023-08-14 RX ADMIN — Medication 1 CAPSULE: at 08:53

## 2023-08-14 RX ADMIN — BUSPIRONE HYDROCHLORIDE 10 MG: 5 TABLET ORAL at 15:51

## 2023-08-14 RX ADMIN — CEFTRIAXONE SODIUM 1000 MG: 1 INJECTION, POWDER, FOR SOLUTION INTRAMUSCULAR; INTRAVENOUS at 21:14

## 2023-08-14 RX ADMIN — IPRATROPIUM BROMIDE AND ALBUTEROL SULFATE 1 DOSE: .5; 3 SOLUTION RESPIRATORY (INHALATION) at 19:48

## 2023-08-14 RX ADMIN — IPRATROPIUM BROMIDE AND ALBUTEROL SULFATE 1 DOSE: .5; 3 SOLUTION RESPIRATORY (INHALATION) at 12:35

## 2023-08-14 RX ADMIN — FUROSEMIDE 40 MG: 10 INJECTION, SOLUTION INTRAMUSCULAR; INTRAVENOUS at 11:50

## 2023-08-14 ASSESSMENT — PAIN SCALES - GENERAL
PAINLEVEL_OUTOF10: 0
PAINLEVEL_OUTOF10: 0
PAINLEVEL_OUTOF10: 8
PAINLEVEL_OUTOF10: 5
PAINLEVEL_OUTOF10: 4
PAINLEVEL_OUTOF10: 4

## 2023-08-14 ASSESSMENT — PAIN DESCRIPTION - ORIENTATION
ORIENTATION: RIGHT;LEFT
ORIENTATION: RIGHT

## 2023-08-14 ASSESSMENT — PAIN - FUNCTIONAL ASSESSMENT: PAIN_FUNCTIONAL_ASSESSMENT: PREVENTS OR INTERFERES WITH ALL ACTIVE AND SOME PASSIVE ACTIVITIES

## 2023-08-14 ASSESSMENT — PAIN DESCRIPTION - LOCATION
LOCATION: SHOULDER
LOCATION: CHEST

## 2023-08-14 ASSESSMENT — PAIN DESCRIPTION - DESCRIPTORS
DESCRIPTORS: ACHING
DESCRIPTORS: ACHING

## 2023-08-14 NOTE — CARE COORDINATION
Case Management Assessment  Initial Evaluation    Date/Time of Evaluation: 8/14/2023 2:17 PM  Assessment Completed by: Mayte Mendez RN    If patient is discharged prior to next notation, then this note serves as note for discharge by case management. Patient Name: Misty Reyna                   YOB: 1960  Diagnosis: Hypoxia [R09.02]  Elevated troponin [R77.8]  COPD exacerbation ([address]) [J44.1]  COPD with acute exacerbation ([address]) [J44.1]                   Date / Time: 8/11/2023  2:45 PM    Patient Admission Status: Inpatient   Readmission Risk (Low < 19, Mod (19-27), High > 27): Readmission Risk Score: 18.3    Current PCP: MACEY Serra NP  PCP verified by CM? Yes    Chart Reviewed: Yes      History Provided by: Patient  Patient Orientation: Alert and Oriented, Person, Place, Situation, Self    Patient Cognition: Alert    Hospitalization in the last 30 days (Readmission):  Yes    If yes, Readmission Assessment in  Navigator will be completed. Advance Directives:      Code Status: Limited   Patient's Primary Decision Maker is: Legal Next of Kin    Primary Decision MakerMo Lai Child - [phone]    Primary Decision Maker: Carly Ann - [phone]    Discharge Planning:    Patient lives with: Alone Type of Home: Apartment  Primary Care Giver: Self  Patient Support Systems include: Children, Family Members, Home Care Staff   Current Financial resources: Medicaid, Medicare  Current community resources: ECF/Home Care  Current services prior to admission: [facility] ([facility] for home O2, active with [facility])            Current DME: Ryan Pradhan, Oxygen Therapy (Comment), Shower Chair, Other (Comment) (grab bars)            Type of Home Care services:   (provider through [address] [facility] home care other than this)    ADLS  Prior functional level: Independent in ADLs/IADLs  Current functional level:  Independent in

## 2023-08-14 NOTE — PROGRESS NOTES
PULMONARY AND CRITICAL CARE INPATIENT PROGRESS NOTE        Nadya Mensah  60 y.o.  female  : 1960    Attending Allegra Larios MD     Room/Bed: [address] [place] [address] MRN: [id number]      Admitting Physician: Ian Wayne MD   Date of Service 2023 Admission: 2023    PCP: MACEY Mares NP       Summary:   69-year-old lady with a past medical history of hypertension, COPD, coronary artery disease with previous MI, CHF who was admitted on  with progressive shortness of breath. Patient had a recent admission for COPD exacerbation treated with antibiotics, steroids and nebulizers in 2023. Patient follows with Dr. Dorinda Yung as outpatient last seen in May 2023. Interval history:  Patient feels she is improving slowly. Her shoulder pain on right side remains but not as bad as it was. Her abdominal pain improved. She continues to cough and produce clear phlegm when she uses a flutter valve. No fever.       Scheduled Meds:   arformoterol tartrate  15 mcg Nebulization BID RT    budesonide  0.5 mg Nebulization BID RT    lactobacillus  1 capsule Oral BID WC    lidocaine 1 % injection  5 mL IntraDERmal Once    sodium chloride flush  5-40 mL IntraVENous 2 times per day    ipratropium 0.5 mg-albuterol 2.5 mg  1 Dose Inhalation Q4H WA RT    methocarbamol  500 mg Oral 4x Daily    pantoprazole  40 mg Oral BID    sodium chloride flush  5-40 mL IntraVENous 2 times per day    enoxaparin  40 mg SubCUTAneous Daily    methylPREDNISolone  40 mg IntraVENous Q12H    busPIRone  10 mg Oral TID    metoprolol succinate  25 mg Oral Daily    cefTRIAXone (ROCEPHIN) IV  1,000 mg IntraVENous Q24H       Continuous Infusions:   sodium chloride      sodium chloride         PRN Meds:  diazePAM, loperamide, traMADol, sodium chloride flush, sodium chloride, metoclopramide, sodium chloride flush, sodium chloride, ondansetron **OR** ondansetron, polyethylene glycol, acetaminophen **OR**

## 2023-08-14 NOTE — PROGRESS NOTES
Physician Progress Note      Keerthi Glez  CSN #:                  [id number]  :                       1960  ADMIT DATE:       2023 2:45 PM  [facility] DATE:  RESPONDING  PROVIDER #:        Marcellina Goldberg MD          QUERY TEXT:    Pt admitted with SOB and has \"history of CHF\" charted on H/P. If possible,   please document in progress notes and discharge summary further specificity   regarding the type and acuity of CHF:    The medical record reflects the following:  Risk Factors: COPD, CHF, old MI, anxiety, HTN  Clinical Indicators: per Echo 5/15/23 in [address],[place]- EF 61% , CXR   -Resolving central pulmonary congestion. CXR -Mild opacities in the bilateral lung bases appears stable. No   definite pleural effusion    Treatment: Toprol XL, daily weights, labs, CHF nurse consult, No added salt   diet, I/O, supportive telemetry monitored care    Thank you, Vivienne Tellez RN BSN  Options provided:  -- Chronic Diastolic CHF/HFpEF  -- Other - I will add my own diagnosis  -- Disagree - Not applicable / Not valid  -- Disagree - Clinically unable to determine / Unknown  -- Refer to Clinical Documentation Reviewer    PROVIDER RESPONSE TEXT:    This patient has chronic diastolic CHF/HFpEF.     Query created by: Minh Gray on 2023 12:47 PM      Electronically signed by:  Marcellina Goldberg MD 2023 5:44 PM

## 2023-08-14 NOTE — PROGRESS NOTES
Patient's EF (Ejection Fraction) is greater than 40%    Heart Failure Medications:  Diuretics[de-identified] None    (One of the following REQUIRED for EF </= 40%/SYSTOLIC FAILURE but MAY be used in EF% >40%/DIASTOLIC FAILURE)        ACE[de-identified] None        ARB[de-identified] None         ARNI[de-identified] None    (Beta Blockers)  NON- Evidenced Based Beta Blocker (for EF% >40%/DIASTOLIC FAILURE): None    Evidenced Based Beta Blocker::(REQUIRED for EF% <40%/SYSTOLIC FAILURE) Metoprolol SUCCinate- Toprol XL  . .................................................................................................................................................. Healthy Weight Tracking - BMI + Meds 5/23/2023 7/20/2023 8/11/2023 8/12/2023 8/12/2023 8/13/2023 8/14/2023   Weight 127 lb 125 lb 125 lb 120 lb - 127 lb 12.8 oz 127 lb 8 oz   Height 5' 3\" 5' 3\" 5' 3\" 5' 3\" 5' 3\" - -   Body Mass Index 22.5 kg/m2 22.14 kg/m2 22.14 kg/m2 21.26 kg/m2 - 22.64 kg/m2 22.59 kg/m2   Some recent data might be hidden         Patient's weights and intake/output reviewed: Yes    Daily Weight log at bedside, patient/family participation in use of log: no    Patient's Last Weight:    127lbs 8 oz obtained by Standing scale. Difference of 0lbs less than last documented weight. Intake/Output Summary (Last 24 hours) at 8/14/2023 0500  Last data filed at 8/14/2023 0438  Gross per 24 hour   Intake --   Output 750 ml   Net -750 ml         Education Booklet Provided: yes    Comorbidities Reviewed Yes    Patient has a past medical history of Anxiety, Asthma, Cancer ([address]), Cancer of lung ([address]), CHF (congestive heart failure) ([address]), COPD (chronic obstructive pulmonary disease) ([address]), Cyclic vomiting syndrome, Cysts of both kidneys, Depression, Fatty liver, Gastritis, MI (myocardial infarction) ([address]), Panic attacks, Pneumonia, Pre-diabetes, and Tricuspid regurgitation.      >>For CHF and Comorbidity documentation on Education Time and Topics, please see Education Tab    Progressive Mobility

## 2023-08-14 NOTE — PROGRESS NOTES
Hospitalist Progress Note    CC: COPD with acute exacerbation [facility])    Name:  Adin Espinal /Age/Sex: 1960  (60 y.o. female)   MRN & CSN:  [id number] & [id number] Encounter Date/Time: 2023 1:53 PM EDT   Location:  [facility]01 PCP: MACEY Tejeda - NP     Sandeep Whiting MD         Hospital course:  60 y.o. female with a significant past medical history of hypertension, centrilobular emphysema, prior MI, and history of CHF who presents to [facility] emergency department with complaint of ongoing dyspnea particularly with ambulation at home. She was actually admitted here and discharged on 7/3:23 day stay for COPD exacerbation where she was treated with IV steroids and DuoNebs, discharged with a long taper dose of prednisone. During that hospitalization, she had completed a course of azithromycin as well and discharged on Ceftin p.o. She notes that she had ongoing dyspnea, was seen by in-home nurse practitioner, given another steroid taper pack, and was restarted on Zithromax this past Monday. She is continued dyspnea, no increased frequency of sputum production or cough, no fever at home, but continues to feel fatigued and dyspneic, warranting her return here. Her evaluation here included laboratory studies, EKG, and chest x-ray. Chest x-ray showed resolving central pulmonary vascular congestion with slowly resolving bibasilar atelectasis versus infiltrates. Pertinent laboratory values tonight include sodium 135, potassium 5.4, chloride 98, creatinine 0.5, lactic acid 1.2, proBNP 374, initial troponin of 19 with a repeat of 23. Cell count was unremarkable aside from chronic anemia with a hemoglobin of the 0.8 which is stable. VBG tonight showed pH 7.47, PCO2 42, PO2 56, and HCO3 30. Patient has been stable on 3 L nasal cannula during her ER stay, which is baseline at home.   She received Solu-Medrol 125 IV push

## 2023-08-14 NOTE — PROGRESS NOTES
Physical Therapy  Attempted to see pt this pm; pt in process of being moved to 3rd floor from 2nd floor in hospital.  Will check back and attempt at a later point as able. PT will continue to follow.   Josie Ferguson PTA

## 2023-08-14 NOTE — PLAN OF CARE
Problem: Chronic Conditions and Co-morbidities  Goal: Patient's chronic conditions and co-morbidity symptoms are monitored and maintained or improved  Outcome: Not Progressing  Flowsheets (Taken 8/14/2023 1420)  Care Plan - Patient's Chronic Conditions and Co-Morbidity Symptoms are Monitored and Maintained or Improved:   Monitor and assess patient's chronic conditions and comorbid symptoms for stability, deterioration, or improvement   Collaborate with multidisciplinary team to address chronic and comorbid conditions and prevent exacerbation or deterioration     Problem: Discharge Planning  Goal: Discharge to home or other facility with appropriate resources  Outcome: Progressing  Flowsheets (Taken 8/14/2023 0800)  Discharge to home or other facility with appropriate resources: Identify barriers to discharge with patient and caregiver     Problem: Safety - Adult  Goal: Free from fall injury  Outcome: Progressing       Patient's EF (Ejection Fraction) is greater than 40%    Heart Failure Medications:  Diuretics[de-identified] Furosemide    (One of the following REQUIRED for EF </= 40%/SYSTOLIC FAILURE but MAY be used in EF% >40%/DIASTOLIC FAILURE)        ACE[de-identified] None        ARB[de-identified] None         ARNI[de-identified] None    (Beta Blockers)  NON- Evidenced Based Beta Blocker (for EF% >40%/DIASTOLIC FAILURE): Metoprolol TARTrate- Lopressor    Evidenced Based Beta Blocker::(REQUIRED for EF% <40%/SYSTOLIC FAILURE) Metoprolol SUCCinate- Toprol XL  . ..................................................................................................................................................     Healthy Weight Tracking - BMI + Meds 5/23/2023 7/20/2023 8/11/2023 8/12/2023 8/12/2023 8/13/2023 8/14/2023   Weight 127 lb 125 lb 125 lb 120 lb - 127 lb 12.8 oz 127 lb 8 oz   Height 5' 3\" 5' 3\" 5' 3\" 5' 3\" 5' 3\" - -   Body Mass Index 22.5 kg/m2 22.14 kg/m2 22.14 kg/m2 21.26 kg/m2 - 22.64 kg/m2 22.59 kg/m2   Some recent data might be hidden         Patient's

## 2023-08-15 PROCEDURE — 96366 THER/PROPH/DIAG IV INF ADDON: CPT

## 2023-08-15 PROCEDURE — 6360000002 HC RX W HCPCS: Performed by: INTERNAL MEDICINE

## 2023-08-15 PROCEDURE — 94669 MECHANICAL CHEST WALL OSCILL: CPT

## 2023-08-15 PROCEDURE — 6360000002 HC RX W HCPCS: Performed by: NURSE PRACTITIONER

## 2023-08-15 PROCEDURE — 2580000003 HC RX 258

## 2023-08-15 PROCEDURE — 1200000000 HC SEMI PRIVATE

## 2023-08-15 PROCEDURE — 6370000000 HC RX 637 (ALT 250 FOR IP): Performed by: STUDENT IN AN ORGANIZED HEALTH CARE EDUCATION/TRAINING PROGRAM

## 2023-08-15 PROCEDURE — 96372 THER/PROPH/DIAG INJ SC/IM: CPT

## 2023-08-15 PROCEDURE — 6370000000 HC RX 637 (ALT 250 FOR IP): Performed by: NURSE PRACTITIONER

## 2023-08-15 PROCEDURE — 97110 THERAPEUTIC EXERCISES: CPT

## 2023-08-15 PROCEDURE — 96376 TX/PRO/DX INJ SAME DRUG ADON: CPT

## 2023-08-15 PROCEDURE — 2700000000 HC OXYGEN THERAPY PER DAY

## 2023-08-15 PROCEDURE — 2580000003 HC RX 258: Performed by: NURSE PRACTITIONER

## 2023-08-15 PROCEDURE — 6370000000 HC RX 637 (ALT 250 FOR IP): Performed by: INTERNAL MEDICINE

## 2023-08-15 PROCEDURE — 94761 N-INVAS EAR/PLS OXIMETRY MLT: CPT

## 2023-08-15 PROCEDURE — 94640 AIRWAY INHALATION TREATMENT: CPT

## 2023-08-15 PROCEDURE — 96361 HYDRATE IV INFUSION ADD-ON: CPT

## 2023-08-15 PROCEDURE — 2580000003 HC RX 258: Performed by: STUDENT IN AN ORGANIZED HEALTH CARE EDUCATION/TRAINING PROGRAM

## 2023-08-15 RX ORDER — WATER 1000 ML/1000ML
INJECTION, SOLUTION INTRAVENOUS
Status: COMPLETED
Start: 2023-08-15 | End: 2023-08-15

## 2023-08-15 RX ORDER — PREDNISONE 20 MG/1
40 TABLET ORAL DAILY
Status: DISCONTINUED | OUTPATIENT
Start: 2023-08-15 | End: 2023-08-17 | Stop reason: HOSPADM

## 2023-08-15 RX ADMIN — BUDESONIDE INHALATION 500 MCG: 0.5 SUSPENSION RESPIRATORY (INHALATION) at 20:05

## 2023-08-15 RX ADMIN — CEFTRIAXONE SODIUM 1000 MG: 1 INJECTION, POWDER, FOR SOLUTION INTRAMUSCULAR; INTRAVENOUS at 20:38

## 2023-08-15 RX ADMIN — IPRATROPIUM BROMIDE AND ALBUTEROL SULFATE 1 DOSE: .5; 3 SOLUTION RESPIRATORY (INHALATION) at 07:53

## 2023-08-15 RX ADMIN — METHOCARBAMOL 500 MG: 500 TABLET ORAL at 08:39

## 2023-08-15 RX ADMIN — Medication 1 CAPSULE: at 16:26

## 2023-08-15 RX ADMIN — DIAZEPAM 5 MG: 5 TABLET ORAL at 04:38

## 2023-08-15 RX ADMIN — IPRATROPIUM BROMIDE AND ALBUTEROL SULFATE 1 DOSE: .5; 3 SOLUTION RESPIRATORY (INHALATION) at 19:51

## 2023-08-15 RX ADMIN — PREDNISONE 40 MG: 20 TABLET ORAL at 14:12

## 2023-08-15 RX ADMIN — IPRATROPIUM BROMIDE AND ALBUTEROL SULFATE 1 DOSE: .5; 3 SOLUTION RESPIRATORY (INHALATION) at 11:48

## 2023-08-15 RX ADMIN — Medication 10 ML: at 20:30

## 2023-08-15 RX ADMIN — PANTOPRAZOLE SODIUM 40 MG: 40 TABLET, DELAYED RELEASE ORAL at 08:39

## 2023-08-15 RX ADMIN — SODIUM CHLORIDE, PRESERVATIVE FREE 10 ML: 5 INJECTION INTRAVENOUS at 20:30

## 2023-08-15 RX ADMIN — Medication 10 ML: at 08:39

## 2023-08-15 RX ADMIN — TRAMADOL HYDROCHLORIDE 50 MG: 50 TABLET, COATED ORAL at 17:18

## 2023-08-15 RX ADMIN — METOPROLOL SUCCINATE 25 MG: 25 TABLET, EXTENDED RELEASE ORAL at 08:39

## 2023-08-15 RX ADMIN — TRAMADOL HYDROCHLORIDE 50 MG: 50 TABLET, COATED ORAL at 00:35

## 2023-08-15 RX ADMIN — METHOCARBAMOL 500 MG: 500 TABLET ORAL at 20:29

## 2023-08-15 RX ADMIN — SODIUM CHLORIDE 25 ML: 9 INJECTION, SOLUTION INTRAVENOUS at 20:38

## 2023-08-15 RX ADMIN — DIAZEPAM 5 MG: 5 TABLET ORAL at 16:41

## 2023-08-15 RX ADMIN — Medication 10 ML: at 08:41

## 2023-08-15 RX ADMIN — BUDESONIDE INHALATION 500 MCG: 0.5 SUSPENSION RESPIRATORY (INHALATION) at 07:53

## 2023-08-15 RX ADMIN — WATER 10 ML: 1 INJECTION INTRAMUSCULAR; INTRAVENOUS; SUBCUTANEOUS at 08:39

## 2023-08-15 RX ADMIN — SODIUM CHLORIDE, PRESERVATIVE FREE 10 ML: 5 INJECTION INTRAVENOUS at 08:41

## 2023-08-15 RX ADMIN — METHYLPREDNISOLONE SODIUM SUCCINATE 40 MG: 40 INJECTION INTRAMUSCULAR; INTRAVENOUS at 08:39

## 2023-08-15 RX ADMIN — IPRATROPIUM BROMIDE AND ALBUTEROL SULFATE 1 DOSE: .5; 3 SOLUTION RESPIRATORY (INHALATION) at 15:40

## 2023-08-15 RX ADMIN — ARFORMOTEROL TARTRATE 15 MCG: 15 SOLUTION RESPIRATORY (INHALATION) at 19:59

## 2023-08-15 RX ADMIN — ARFORMOTEROL TARTRATE 15 MCG: 15 SOLUTION RESPIRATORY (INHALATION) at 07:53

## 2023-08-15 RX ADMIN — TRAMADOL HYDROCHLORIDE 50 MG: 50 TABLET, COATED ORAL at 08:39

## 2023-08-15 RX ADMIN — METHOCARBAMOL 500 MG: 500 TABLET ORAL at 12:48

## 2023-08-15 RX ADMIN — ENOXAPARIN SODIUM 40 MG: 100 INJECTION SUBCUTANEOUS at 08:39

## 2023-08-15 RX ADMIN — Medication 1 CAPSULE: at 08:39

## 2023-08-15 RX ADMIN — BUSPIRONE HYDROCHLORIDE 10 MG: 5 TABLET ORAL at 12:48

## 2023-08-15 RX ADMIN — BUSPIRONE HYDROCHLORIDE 10 MG: 5 TABLET ORAL at 08:39

## 2023-08-15 RX ADMIN — BUSPIRONE HYDROCHLORIDE 10 MG: 5 TABLET ORAL at 20:29

## 2023-08-15 RX ADMIN — METHOCARBAMOL 500 MG: 500 TABLET ORAL at 16:26

## 2023-08-15 RX ADMIN — PANTOPRAZOLE SODIUM 40 MG: 40 TABLET, DELAYED RELEASE ORAL at 20:29

## 2023-08-15 ASSESSMENT — PAIN SCALES - GENERAL
PAINLEVEL_OUTOF10: 8
PAINLEVEL_OUTOF10: 6
PAINLEVEL_OUTOF10: 7
PAINLEVEL_OUTOF10: 7
PAINLEVEL_OUTOF10: 3
PAINLEVEL_OUTOF10: 7

## 2023-08-15 ASSESSMENT — PAIN DESCRIPTION - LOCATION
LOCATION: SHOULDER

## 2023-08-15 ASSESSMENT — PAIN DESCRIPTION - ORIENTATION
ORIENTATION: RIGHT
ORIENTATION: RIGHT

## 2023-08-15 NOTE — PLAN OF CARE
Problem: Discharge Planning  Goal: Discharge to home or other facility with appropriate resources  8/14/2023 2250 by Danielito Osorio RN  Outcome: Progressing  Flowsheets (Taken 8/14/2023 2045)  Discharge to home or other facility with appropriate resources:   Identify barriers to discharge with patient and caregiver   Arrange for needed discharge resources and transportation as appropriate   Identify discharge learning needs (meds, wound care, etc)     Problem: Pain  Goal: Verbalizes/displays adequate comfort level or baseline comfort level  Outcome: Progressing     Problem: Safety - Adult  Goal: Free from fall injury  8/14/2023 2250 by Danielito Osorio RN  Outcome: Progressing     Problem: Skin/Tissue Integrity  Goal: Absence of new skin breakdown  Description: 1. Monitor for areas of redness and/or skin breakdown  2. Assess vascular access sites hourly  3. Every 4-6 hours minimum:  Change oxygen saturation probe site  4. Every 4-6 hours:  If on nasal continuous positive airway pressure, respiratory therapy assess nares and determine need for appliance change or resting period.   Outcome: Progressing     Problem: Nutrition Deficit:  Goal: Optimize nutritional status  Outcome: Progressing     Problem: Chronic Conditions and Co-morbidities  Goal: Patient's chronic conditions and co-morbidity symptoms are monitored and maintained or improved  8/14/2023 2250 by Danielito Osorio RN  Outcome: Progressing  Flowsheets (Taken 8/14/2023 2045)  Care Plan - Patient's Chronic Conditions and Co-Morbidity Symptoms are Monitored and Maintained or Improved: Monitor and assess patient's chronic conditions and comorbid symptoms for stability, deterioration, or improvement     Problem: Respiratory - Adult  Goal: Achieves optimal ventilation and oxygenation  Outcome: Progressing     Problem: Skin/Tissue Integrity - Adult  Goal: Skin integrity remains intact  Outcome: Progressing  Flowsheets (Taken 8/14/2023 2045)  Skin Integrity Remains kg/m2 22.14 kg/m2 21.26 kg/m2 - 22.64 kg/m2 22.59 kg/m2   Some recent data might be hidden         Patient's weights and intake/output reviewed: Yes    Daily Weight log at bedside, patient/family participation in use of log: \"yes    Patient's Last Weight: 126 lbs obtained by bed scale. Difference of 1 lbs less than last documented weight. Intake/Output Summary (Last 24 hours) at 8/14/2023 2251  Last data filed at 8/14/2023 1259  Gross per 24 hour   Intake 250 ml   Output 1550 ml   Net -1300 ml       Education Booklet Provided: yes    Comorbidities Reviewed Yes    Patient has a past medical history of Anxiety, Asthma, Cancer ([address]), Cancer of lung ([address]), CHF (congestive heart failure) ([address]), COPD (chronic obstructive pulmonary disease) ([address]), Cyclic vomiting syndrome, Cysts of both kidneys, Depression, Fatty liver, Gastritis, MI (myocardial infarction) ([address]), Panic attacks, Pneumonia, Pre-diabetes, and Tricuspid regurgitation. >>For CHF and Comorbidity documentation on Education Time and Topics, please see Education Tab    Progressive Mobility Assessment:  What is this patient's Current Level of Mobility?: Ambulatory- with Assistance  How was this patient Mobilized today?:  Did not ambulate , ambulated 0 ft                 With Whom? Self                 Level of Difficulty/Assistance: 1x Assist     Pt resting in bed at this time on  3 L O2. Pt denies shortness of breath. Pt without lower extremity edema.      Patient and/or Family's stated Goal of Care this Admission: reduce shortness of breath, increase activity tolerance, better understand heart failure and disease management, be more comfortable, and reduce lower extremity edema prior to discharge        :

## 2023-08-15 NOTE — PROGRESS NOTES
Hospitalist Progress Note    CC: COPD with acute exacerbation [facility])    Name:  Tom Palafox /Age/Sex: 1960  (60 y.o. female)   MRN & CSN:  [id number] & [id number] Encounter Date/Time: 8/15/2023 1:53 PM EDT   Location:  [facility]- PCP: MACEY Avelar NP     Attending:Nila Barahona, [address] course:  60 y.o. female with a significant past medical history of hypertension, centrilobular emphysema, prior MI, and history of CHF who presents to [facility] emergency department with complaint of ongoing dyspnea particularly with ambulation at home. She was actually admitted here and discharged on 7/3:23 day stay for COPD exacerbation where she was treated with IV steroids and DuoNebs, discharged with a long taper dose of prednisone. During that hospitalization, she had completed a course of azithromycin as well and discharged on Ceftin p.o. She notes that she had ongoing dyspnea, was seen by in-home nurse practitioner, given another steroid taper pack, and was restarted on Zithromax this past Monday. She is continued dyspnea, no increased frequency of sputum production or cough, no fever at home, but continues to feel fatigued and dyspneic, warranting her return here. Her evaluation here included laboratory studies, EKG, and chest x-ray. Chest x-ray showed resolving central pulmonary vascular congestion with slowly resolving bibasilar atelectasis versus infiltrates. Pertinent laboratory values tonight include sodium 135, potassium 5.4, chloride 98, creatinine 0.5, lactic acid 1.2, proBNP 374, initial troponin of 19 with a repeat of 23. Cell count was unremarkable aside from chronic anemia with a hemoglobin of the 0.8 which is stable. VBG tonight showed pH 7.47, PCO2 42, PO2 56, and HCO3 30. Patient has been stable on 3 L nasal cannula during her ER stay, which is baseline at home.   She received Solu-Medrol 125 IV push in flush, sodium chloride, metoclopramide, sodium chloride flush, sodium chloride, ondansetron **OR** ondansetron, polyethylene glycol, acetaminophen **OR** acetaminophen, guaiFENesin-dextromethorphan     Labs:  Personally reviewed and interpreted for clinical significance. CBC:   Recent Labs     08/13/23  0600 08/14/23  0540   WBC 9.3 8.9   HGB 10.0* 10.6*   HCT 30.9* 32.1*   MCV 97.8 97.3    201     BMP, Mag, Phos:    Recent Labs     08/13/23  0600 08/14/23  0540    141   K 4.7 4.4    102   CO2 31 32   BUN 21* 20   CREATININE 0.6 <0.5*   CALCIUM 8.9 9.3     Pro-BNP, Trop:    No results for input(s): PROBNP, TROPHS in the last 72 hours. LFTs:    No results for input(s): AST, ALT, LIPASE, BILIDIR, BILITOT, ALKPHOS in the last 72 hours. Invalid input(s): AMYLASE,  ALB  PT/INR, Lactic acid, TSH:    No results for input(s): INR, LACTA, TSH in the last 72 hours. HgbA1c:  No results for input(s): LABA1C in the last 72 hours. Glucose trend:  No results found for: GLU    UA:No results for input(s): NITRITE, COLORU, PHUR, LABCAST, WBCUA, RBCUA, MUCUS, TRICHOMONAS, YEAST, BACTERIA, CLARITYU, SPECGRAV, LEUKOCYTESUR, UROBILINOGEN, BILIRUBINUR, BLOODU, GLUCOSEU, AMORPHOUS in the last 72 hours. Invalid input(s): Angelina Alberto  Urine Cultures:   Lab Results   Component Value Date/Time    LABURIN No growth at 18 to 36 hours 11/26/2020 04:01 PM     Blood Cultures:   Lab Results   Component Value Date/Time    BC No growth after 5 days of incubation. 01/25/2019 08:20 PM     Lab Results   Component Value Date/Time    BLOODCULT2 No growth after 5 days of incubation.  01/25/2019 08:20 PM     Organism:   Lab Results   Component Value Date/Time    ORG Yeast 05/24/2017 09:00 PM         Invalid input(s): ABG        Electronically signed by Joe Huffman MD on 8/15/2023 at 9:36

## 2023-08-15 NOTE — PLAN OF CARE
Problem: Chronic Conditions and Co-morbidities  Goal: Patient's chronic conditions and co-morbidity symptoms are monitored and maintained or improved  8/15/2023 1040 by Brianna Pires RN  Outcome: Progressing     Patient's EF (Ejection Fraction) is greater than 40%    Heart Failure Medications:  Diuretics[de-identified] None    (One of the following REQUIRED for EF </= 40%/SYSTOLIC FAILURE but MAY be used in EF% >40%/DIASTOLIC FAILURE)        ACE[de-identified] None        ARB[de-identified] None         ARNI[de-identified] None    (Beta Blockers)  NON- Evidenced Based Beta Blocker (for EF% >40%/DIASTOLIC FAILURE): None    Evidenced Based Beta Blocker::(REQUIRED for EF% <40%/SYSTOLIC FAILURE) Metoprolol SUCCinate- Toprol XL  . .................................................................................................................................................. Healthy Weight Tracking - BMI + Meds 7/20/2023 8/11/2023 8/12/2023 8/12/2023 8/13/2023 8/14/2023 8/15/2023   Weight 125 lb 125 lb 120 lb - 127 lb 12.8 oz 127 lb 8 oz 126 lb 5.2 oz   Height 5' 3\" 5' 3\" 5' 3\" 5' 3\" - - -   Body Mass Index 22.14 kg/m2 22.14 kg/m2 21.26 kg/m2 - 22.64 kg/m2 22.59 kg/m2 22.38 kg/m2   Some recent data might be hidden         Patient's weights and intake/output reviewed: Yes    Daily Weight log at bedside, patient/family participation in use of log: \"yes    Patient's Last Weight: 126 lbs obtained by bed scale. Difference of 1 lbs less than last documented weight.       Intake/Output Summary (Last 24 hours) at 8/15/2023 1041  Last data filed at 8/15/2023 0849  Gross per 24 hour   Intake 600 ml   Output 1200 ml   Net -600 ml       Education Booklet Provided: yes    Comorbidities Reviewed Yes    Patient has a past medical history of Anxiety, Asthma, Cancer ([address]), Cancer of lung ([address]), CHF (congestive heart failure) ([address]), COPD (chronic obstructive pulmonary disease) ([address]), Cyclic vomiting syndrome, Cysts of both kidneys, Depression, Fatty liver, Gastritis, MI

## 2023-08-15 NOTE — PROGRESS NOTES
Physical Therapy  Facility/Department: [facility] B3 - MED SURG  Daily Treatment Note  NAME: Ruddy Cortes  : 1960  MRN: [id number]    Date of Service: 8/15/2023    Discharge Recommendations:  24 hour supervision or assist, [address]      Patient Diagnosis(es): The primary encounter diagnosis was COPD exacerbation ([address]). Diagnoses of Hypoxia and Elevated troponin were also pertinent to this visit. Therapy discharge recommendations take into account each patient's current medical complexities and are subject to input/oversight from the patient's healthcare team.   Barriers to Home Discharge:   [x] Steps to access home entry or bed/bath:   [x] Unable to transfer, ambulate, or propel wheelchair household distances without assist   [x] Limited available assist at home upon discharge    [] Patient or family requests d/c to post-acute facility    [] Poor cognition/safety awareness for d/c to home alone    []Unable to maintain ordered weight bearing status    [] Patient with salient signs of long-standing immobility   [] Patient is at risk for falls due to:   [] Other:  AM PAC SCORE 19/24 CK  If pt is unable to be seen after this session, please let this note serve as discharge summary. Please see case management note for discharge disposition. Thank you. Assessment   Assessment: Pt seen for therapeutic exercise and CHF education. She declined to sit EOB, transfer, ambulate or step on scale. She voiced understanding of CHF education, and performed 10 reps of therapeutic ex per CHF program. Pt lives alone and has 13 stairs to enter her apt.  Due to decreased endurance and decreased participation in mobility while hospitalized pt may need SNF unless she has 24 hr assist.  Activity Tolerance: Patient tolerated treatment well (pt declined OOB activity with therapy, states she will get up with RN)     Plan    Physcial Therapy Plan  General Plan: 3-5 times per week  Current Treatment contact your doctor or provider as soon as possible)  []Red Zone/Medical Alert:  Unrelieved chest pain or shortness of breath, especially while resting  Increased Discomfort or swelling in the abdomen or lower body  Sudden weight gain of more than 5 pounds in a week  Increased cough with bubbly and/or pink sputum  (Warning: You need to be seen right away . If you cannot reach your physician, call 911)    2)Milton Rating of Perceived Exertion (RPE) Scale     The Milton rating scale ranges from 6 to 20, where 6 means \"no exertion at all\" and 20 means \"maximal exertion. \" The patient chooses the number that best describes their level of exertion. This will objectify the intensity level of the patient's activity, and the therapist can use this information to accelerate or decelerate activity levels to reach the desired range. The patient should appraise their feeling of exertion as honestly as possible, without thinking about what the actual physical load is. Their feeling of effort and exertion is important, and it should not be compared to the level of others. Patient's should target exercises for very light to moderate (9-11/20) for this phase of their rehab. Milton RPE Scale    Activity Completed:  Supine therapeutic ex 10 x each, Pt declined EOB and OOB activity. []6  No exertion at all  []7  Extremely light  []8  [] 9  Very light (For a healthy person, it is like walking slowly at his or her own pace for some minutes)  [x]10  []11  Light  []12  []13  Somewhat hard (exercise, but it still feels OK to continue)  []14  []15  Hard (heavy)  []16  []17  Very hard (can still go on, but really has to push himself. It feels very heavy, and the person is very tired. )[]18  []19  Extremely hard (For most people this is the most strenuous exercise they have ever experienced.)  []20  Maximal exertion.     3) Pt educated in and completed Therapeutic exercise (as indicated below for 1 set) to promote circulation and prevent Importance of taking daily weight measurement             []5. Safely stepping on and off scale    [x]Pt will benefit from reinforcement of education due to   []Readiness to learn                               []Decreased cognition  []Language barrier                                  []Decreased vision/hearing  [x]First introduction to new information    []Requires intermittent cues  []Other:    Education provided via:  [x]Oral instruction  [x]Demonstration  [x]Written handout         Goals  Short Term Goals  Time Frame for Short Term Goals: 7 days (8/19/23)  Short Term Goal 1: Pt will perform bed mobility mod I- 8/14 pt declined  Short Term Goal 2: Pt will perform transfers with CGA- 8/14 pt declined  Short Term Goal 3: Pt will ambulate 50 ft with RW and CGA- 8/14 pt declined  Short Term Goal 4: By 8/15/23, pt will tolerate 15 reps of LE ther ex for improved strength and activity - 8/14 pt performed 10 reps ex per CHF program  Short Term Goal 5: Pt to meet at least 3/5 CHF program goals: MET  Patient Goals   Patient Goals : \"to be able to walk to the bathroom\"    Education  Patient Education  Education Given To: Patient  Education Provided: Role of Therapy;Plan of Care;Home Exercise Program;Energy Conservation  Education Provided Comments: CHF education on KALIA scale, RED/Yellow/Green light scale, Supine ex, fluid restrictions, importance of daily weighing at home.   Education Method: Demonstration;Verbal;Printed Information/Hand-outs  Barriers to Learning: None  Education Outcome: Verbalized understanding;Continued education needed    Therapy Time   Individual Concurrent Group Co-treatment   Time In 1020         Time Out [address], [place]

## 2023-08-16 PROCEDURE — 97116 GAIT TRAINING THERAPY: CPT

## 2023-08-16 PROCEDURE — 6370000000 HC RX 637 (ALT 250 FOR IP): Performed by: STUDENT IN AN ORGANIZED HEALTH CARE EDUCATION/TRAINING PROGRAM

## 2023-08-16 PROCEDURE — 6370000000 HC RX 637 (ALT 250 FOR IP): Performed by: NURSE PRACTITIONER

## 2023-08-16 PROCEDURE — 6360000002 HC RX W HCPCS: Performed by: INTERNAL MEDICINE

## 2023-08-16 PROCEDURE — 94761 N-INVAS EAR/PLS OXIMETRY MLT: CPT

## 2023-08-16 PROCEDURE — 96372 THER/PROPH/DIAG INJ SC/IM: CPT

## 2023-08-16 PROCEDURE — 97535 SELF CARE MNGMENT TRAINING: CPT

## 2023-08-16 PROCEDURE — 6360000002 HC RX W HCPCS: Performed by: NURSE PRACTITIONER

## 2023-08-16 PROCEDURE — 99232 SBSQ HOSP IP/OBS MODERATE 35: CPT | Performed by: INTERNAL MEDICINE

## 2023-08-16 PROCEDURE — 94640 AIRWAY INHALATION TREATMENT: CPT

## 2023-08-16 PROCEDURE — 82104 ALPHA-1-ANTITRYPSIN PHENO: CPT

## 2023-08-16 PROCEDURE — 1200000000 HC SEMI PRIVATE

## 2023-08-16 PROCEDURE — 82103 ALPHA-1-ANTITRYPSIN TOTAL: CPT

## 2023-08-16 PROCEDURE — 97530 THERAPEUTIC ACTIVITIES: CPT

## 2023-08-16 PROCEDURE — 2580000003 HC RX 258: Performed by: NURSE PRACTITIONER

## 2023-08-16 PROCEDURE — 6370000000 HC RX 637 (ALT 250 FOR IP): Performed by: INTERNAL MEDICINE

## 2023-08-16 PROCEDURE — 2580000003 HC RX 258: Performed by: STUDENT IN AN ORGANIZED HEALTH CARE EDUCATION/TRAINING PROGRAM

## 2023-08-16 PROCEDURE — 2700000000 HC OXYGEN THERAPY PER DAY

## 2023-08-16 PROCEDURE — 94669 MECHANICAL CHEST WALL OSCILL: CPT

## 2023-08-16 RX ADMIN — PANTOPRAZOLE SODIUM 40 MG: 40 TABLET, DELAYED RELEASE ORAL at 09:19

## 2023-08-16 RX ADMIN — TRAMADOL HYDROCHLORIDE 50 MG: 50 TABLET, COATED ORAL at 21:46

## 2023-08-16 RX ADMIN — PANTOPRAZOLE SODIUM 40 MG: 40 TABLET, DELAYED RELEASE ORAL at 20:23

## 2023-08-16 RX ADMIN — SODIUM CHLORIDE, PRESERVATIVE FREE 10 ML: 5 INJECTION INTRAVENOUS at 20:24

## 2023-08-16 RX ADMIN — Medication 1 CAPSULE: at 16:45

## 2023-08-16 RX ADMIN — ARFORMOTEROL TARTRATE 15 MCG: 15 SOLUTION RESPIRATORY (INHALATION) at 07:50

## 2023-08-16 RX ADMIN — Medication 1 CAPSULE: at 09:18

## 2023-08-16 RX ADMIN — ONDANSETRON 4 MG: 4 TABLET, ORALLY DISINTEGRATING ORAL at 12:05

## 2023-08-16 RX ADMIN — PREDNISONE 40 MG: 20 TABLET ORAL at 09:19

## 2023-08-16 RX ADMIN — BUSPIRONE HYDROCHLORIDE 10 MG: 5 TABLET ORAL at 20:23

## 2023-08-16 RX ADMIN — ENOXAPARIN SODIUM 40 MG: 100 INJECTION SUBCUTANEOUS at 09:17

## 2023-08-16 RX ADMIN — BUSPIRONE HYDROCHLORIDE 10 MG: 5 TABLET ORAL at 09:18

## 2023-08-16 RX ADMIN — SODIUM CHLORIDE, PRESERVATIVE FREE 10 ML: 5 INJECTION INTRAVENOUS at 09:36

## 2023-08-16 RX ADMIN — IPRATROPIUM BROMIDE AND ALBUTEROL SULFATE 1 DOSE: .5; 3 SOLUTION RESPIRATORY (INHALATION) at 11:48

## 2023-08-16 RX ADMIN — ARFORMOTEROL TARTRATE 15 MCG: 15 SOLUTION RESPIRATORY (INHALATION) at 20:02

## 2023-08-16 RX ADMIN — IPRATROPIUM BROMIDE AND ALBUTEROL SULFATE 1 DOSE: .5; 3 SOLUTION RESPIRATORY (INHALATION) at 19:56

## 2023-08-16 RX ADMIN — BUDESONIDE INHALATION 500 MCG: 0.5 SUSPENSION RESPIRATORY (INHALATION) at 19:51

## 2023-08-16 RX ADMIN — BUSPIRONE HYDROCHLORIDE 10 MG: 5 TABLET ORAL at 12:05

## 2023-08-16 RX ADMIN — TRAMADOL HYDROCHLORIDE 50 MG: 50 TABLET, COATED ORAL at 15:33

## 2023-08-16 RX ADMIN — METHOCARBAMOL 500 MG: 500 TABLET ORAL at 16:45

## 2023-08-16 RX ADMIN — METOPROLOL SUCCINATE 25 MG: 25 TABLET, EXTENDED RELEASE ORAL at 09:20

## 2023-08-16 RX ADMIN — TRAMADOL HYDROCHLORIDE 50 MG: 50 TABLET, COATED ORAL at 09:20

## 2023-08-16 RX ADMIN — METHOCARBAMOL 500 MG: 500 TABLET ORAL at 20:22

## 2023-08-16 RX ADMIN — METHOCARBAMOL 500 MG: 500 TABLET ORAL at 09:19

## 2023-08-16 RX ADMIN — Medication 10 ML: at 09:21

## 2023-08-16 RX ADMIN — METHOCARBAMOL 500 MG: 500 TABLET ORAL at 12:05

## 2023-08-16 RX ADMIN — DIAZEPAM 5 MG: 5 TABLET ORAL at 12:05

## 2023-08-16 RX ADMIN — IPRATROPIUM BROMIDE AND ALBUTEROL SULFATE 1 DOSE: .5; 3 SOLUTION RESPIRATORY (INHALATION) at 07:50

## 2023-08-16 RX ADMIN — BUDESONIDE INHALATION 500 MCG: 0.5 SUSPENSION RESPIRATORY (INHALATION) at 07:50

## 2023-08-16 RX ADMIN — IPRATROPIUM BROMIDE AND ALBUTEROL SULFATE 1 DOSE: .5; 3 SOLUTION RESPIRATORY (INHALATION) at 15:27

## 2023-08-16 RX ADMIN — Medication 10 ML: at 20:24

## 2023-08-16 ASSESSMENT — PAIN SCALES - GENERAL
PAINLEVEL_OUTOF10: 6
PAINLEVEL_OUTOF10: 7
PAINLEVEL_OUTOF10: 0
PAINLEVEL_OUTOF10: 7
PAINLEVEL_OUTOF10: 5
PAINLEVEL_OUTOF10: 8
PAINLEVEL_OUTOF10: 3
PAINLEVEL_OUTOF10: 9

## 2023-08-16 ASSESSMENT — PAIN DESCRIPTION - LOCATION
LOCATION: FLANK;SHOULDER
LOCATION: SHOULDER;FLANK
LOCATION: FLANK;SHOULDER
LOCATION: SHOULDER
LOCATION: SHOULDER
LOCATION: FLANK;SHOULDER

## 2023-08-16 ASSESSMENT — PAIN DESCRIPTION - DESCRIPTORS
DESCRIPTORS: THROBBING;SHARP

## 2023-08-16 ASSESSMENT — PAIN - FUNCTIONAL ASSESSMENT
PAIN_FUNCTIONAL_ASSESSMENT: PREVENTS OR INTERFERES WITH ALL ACTIVE AND SOME PASSIVE ACTIVITIES
PAIN_FUNCTIONAL_ASSESSMENT: PREVENTS OR INTERFERES SOME ACTIVE ACTIVITIES AND ADLS
PAIN_FUNCTIONAL_ASSESSMENT: PREVENTS OR INTERFERES WITH ALL ACTIVE AND SOME PASSIVE ACTIVITIES
PAIN_FUNCTIONAL_ASSESSMENT: PREVENTS OR INTERFERES WITH ALL ACTIVE AND SOME PASSIVE ACTIVITIES

## 2023-08-16 ASSESSMENT — PAIN DESCRIPTION - ORIENTATION
ORIENTATION: RIGHT

## 2023-08-16 NOTE — PROGRESS NOTES
Hospitalist Progress Note    CC: COPD with acute exacerbation [facility])    Name:  Abbie Hinton /Age/Sex: 1960  (60 y.o. female)   MRN & CSN:  [id number] & [id number] Encounter Date/Time: 2023 1:53 PM EDT   Location:  [facility]- PCP: Duanne Riedel, APRN - NP     Attending:Nila Perez, [address] [facility] course:  60 y.o. female with a significant past medical history of hypertension, centrilobular emphysema, prior MI, and history of CHF who presents to [facility] emergency department with complaint of ongoing dyspnea particularly with ambulation at home. She was actually admitted here and discharged on 7/3:23 day stay for COPD exacerbation where she was treated with IV steroids and DuoNebs, discharged with a long taper dose of prednisone. During that hospitalization, she had completed a course of azithromycin as well and discharged on Ceftin p.o. She notes that she had ongoing dyspnea, was seen by in-home nurse practitioner, given another steroid taper pack, and was restarted on Zithromax this past Monday. She is continued dyspnea, no increased frequency of sputum production or cough, no fever at home, but continues to feel fatigued and dyspneic, warranting her return here. Her evaluation here included laboratory studies, EKG, and chest x-ray. Chest x-ray showed resolving central pulmonary vascular congestion with slowly resolving bibasilar atelectasis versus infiltrates. Pertinent laboratory values tonight include sodium 135, potassium 5.4, chloride 98, creatinine 0.5, lactic acid 1.2, proBNP 374, initial troponin of 19 with a repeat of 23. Cell count was unremarkable aside from chronic anemia with a hemoglobin of the 0.8 which is stable. VBG tonight showed pH 7.47, PCO2 42, PO2 56, and HCO3 30. Patient has been stable on 3 L nasal cannula during her ER stay, which is baseline at home.   She received Solu-Medrol 125 IV push in 2)    A. Problems (any 1)  [] Acute/Chronic Illness/injury posing threat to life or bodily function:    [x] Severe exacerbation of chronic illness:  COPD exac  ---------------------------------------------------------------------  B. Risk of Treatment (any 1)   [] Drugs/treatments that require intensive monitoring for toxicity include:    [] Change in code status:    [] Decision to escalate care:    [] Major surgery/procedure with associated risk factors:    ----------------------------------------------------------------------  C.  Data (any 2)  [x] Discussed management of the case with:  nursing  [x] Imaging personally reviewed and interpreted, includes:  CXR  [x] Data Review (any 3)  [] Collateral history obtained from:    [x] All available Consultant notes from yesterday/today were reviewed  [x] All current labs were reviewed and interpreted for clinical significance   [x] Appropriate follow-up labs were ordered

## 2023-08-16 NOTE — CARE COORDINATION
Chart reviewed day 5. Care provided by IM. She is from home alone and is IPTA. She has a provider through [facility] and O2 with [facility]. Changed IV solumedrol to po prednisone yesterday. Rec for SNF. Pt very hesitant to consider SNF placement. Feels like once she gets in they won't let her leave. I explained to her that the intention is for her to get a few weeks of PT and be able to go home. She states she has a friend who can help her out but admits they can not stay 24 hours a day. States she has been able to get up and around in her room. She did agree to review the list of SNFs I gave her. Will review course for needs.  Parvez Sherman RN

## 2023-08-16 NOTE — PROGRESS NOTES
Comprehensive Nutrition Assessment    Type and Reason for Visit:  Reassess    Nutrition Recommendations/Plan:   Continue JUJU diet and encourage PO intake   Monitor nutrition adequacy, pertinent labs, bowel habits, wt changes, and clinical progress     Malnutrition Assessment:  Malnutrition Status: At risk for malnutrition (Comment) (08/16/23 1210)    Context:  Acute Illness     Findings of the 6 clinical characteristics of malnutrition:  Energy Intake:  Mild decrease in energy intake (Comment)    Nutrition Assessment:    Follow up: Continues on 3 L NC. On JUJU diet, PO intakes % of meals. Pt reports appetite has been good but it is reduced today, not able to eat much of breakfast. Reports she will try again for lunch. Reports nausea, asking RN for zofran. Encouraged small frequent meals to help w/ nausea. Pt declined all ONS. Continue to encourage PO intake, will continue to monitor. Nutrition Related Findings:    No labs today. + BM today. Nausea, zofran Wound Type: None       Current Nutrition Intake & Therapies:    Average Meal Intake: 26-50%, 51-75%, %  Average Supplements Intake: None Ordered  ADULT DIET; Regular; No Added Salt (3-4 gm)    Anthropometric Measures:  Height: 5' 3\" (160 cm)  Ideal Body Weight (IBW): 115 lbs (52 kg)       Current Body Weight: 124 lb (56.2 kg), 104.3 % IBW. Weight Source: Bed Scale  Current BMI (kg/m2): 22        Weight Adjustment For: No Adjustment                 BMI Categories: Normal Weight (BMI 18.5-24. 9)    Estimated Daily Nutrient Needs:  Energy Requirements Based On: Kcal/kg (25-30 kcals/kg)  Weight Used for Energy Requirements: Current (54 kg)  Energy (kcal/day): 7125-3140  Weight Used for Protein Requirements: Current (1-1.2 g/kg)  Protein (g/day): 64-65 g  Method Used for Fluid Requirements: Other (Comment) (Per CHF guidelines)  Fluid (ml/day): Less than 2000 ml per day    Nutrition Diagnosis:   Inadequate oral intake related to inadequate protein-energy

## 2023-08-16 NOTE — PROGRESS NOTES
Physical Therapy  Facility/Department: [facility] B3 - MED SURG  Daily Treatment Note  NAME: Charles Crespo  : 1960  MRN: [id number]    Date of Service: 2023    Discharge Recommendations:  24 hour supervision or assist [facility]      Patient Diagnosis(es): The primary encounter diagnosis was COPD exacerbation ([address]). Diagnoses of Hypoxia and Elevated troponin were also pertinent to this visit. Assessment   Assessment: Pt was limited by frustration with current antibiotic plan and pending discharge; spent additional time with pt listening to concerns. All concerns were communicated with RN. Transfers and gait training were steady and safe without sequencing cues. Pt was left in bed with needs in reach; pt declining any further activities. Activity Tolerance: Patient tolerated treatment well     Plan    Physcial Therapy Plan  General Plan: 3-5 times per week     Restrictions  Restrictions/Precautions  Restrictions/Precautions: Fall Risk, General Precautions  Position Activity Restriction  Other position/activity restrictions: up with assist     Subjective    Subjective  Subjective: Pt was sitting up frustrated with hospital process. Pain: Pt report generalized discomfort in shoulders and upper back, does not rate on numeric scale. Orientation  Overall Orientation Status: Within Functional Limits  Orientation Level: Oriented X4  Cognition  Overall Cognitive Status: WFL  Cognition Comment: Flat affect    Objective   Vitals  Bed Mobility Training  Sit to Supine: Modified independent    Balance  Sitting: Intact  Standing: Good With a RW    Transfer Training  Sit to Stand: Supervision; Adaptive equipment  Stand to Sit: Supervision; Adaptive equipment    Gait  Overall Level of Assistance: Supervision; Adaptive equipment  Gait Abnormalities: slow reciprocal pattern; steady  Distance (ft): 5 Feet  Assistive Device: Gait belt;Walker, rolling     Safety Devices  Type of Devices:  All fall risk precautions in place;Gait belt;Patient at risk for falls;Nurse notified; Bed alarm in place; Left in bed;Call light within reach  Restraints  Restraints Initially in Place: No     Goals  Short Term Goals  Time Frame for Short Term Goals: 7 days (8/19/23)  Short Term Goal 1: Pt will perform bed mobility mod I  Short Term Goal 2: Pt will perform transfers with CGA  Short Term Goal 3: Pt will ambulate 50 ft with RW and CGA  Short Term Goal 4: By 8/15/23, pt will tolerate 15 reps of LE ther ex for improved strength and activity tolerance  Patient Goals   Patient Goals : \"to be able to walk to the bathroom\"    Education  Patient Education  Education Given To: Patient  Education Provided Comments: CHF education on KALIA scale, RED/Yellow/Green light scale, Supine ex, fluid restrictions, importance of daily weighing at home.   Education Method: Demonstration;Verbal;Printed Information/Hand-outs  Barriers to Learning: None    Therapy Time   Individual Concurrent Group Co-treatment   Time In 1400         Time Out [address], P[place]

## 2023-08-16 NOTE — PLAN OF CARE
Problem: Chronic Conditions and Co-morbidities  Goal: Patient's chronic conditions and co-morbidity symptoms are monitored and maintained or improved  8/16/2023 1353 by Pat Brunner, RN  Outcome: Progressing     Patient's EF (Ejection Fraction) is greater than 40%    Heart Failure Medications:  Diuretics[de-identified] None    (One of the following REQUIRED for EF </= 40%/SYSTOLIC FAILURE but MAY be used in EF% >40%/DIASTOLIC FAILURE)        ACE[de-identified] None        ARB[de-identified] None         ARNI[de-identified] None    (Beta Blockers)  NON- Evidenced Based Beta Blocker (for EF% >40%/DIASTOLIC FAILURE): None    Evidenced Based Beta Blocker::(REQUIRED for EF% <40%/SYSTOLIC FAILURE) Metoprolol SUCCinate- Toprol XL  . .................................................................................................................................................. Healthy Weight Tracking - BMI + Meds 8/11/2023 8/12/2023 8/12/2023 8/13/2023 8/14/2023 8/15/2023 8/16/2023   Weight 125 lb 120 lb - 127 lb 12.8 oz 127 lb 8 oz 126 lb 5.2 oz 124 lb 11.2 oz   Height 5' 3\" 5' 3\" 5' 3\" - - - -   Body Mass Index 22.14 kg/m2 21.26 kg/m2 - 22.64 kg/m2 22.59 kg/m2 22.38 kg/m2 22.09 kg/m2   Some recent data might be hidden         Patient's weights and intake/output reviewed: Yes    Daily Weight log at bedside, patient/family participation in use of log: \"yes    Patient's Last Weight: 124 lbs obtained by standing scale. Difference of 2 lbs less than last documented weight.       Intake/Output Summary (Last 24 hours) at 8/16/2023 1353  Last data filed at 8/16/2023 1028  Gross per 24 hour   Intake 851.35 ml   Output 300 ml   Net 551.35 ml       Education Booklet Provided: yes    Comorbidities Reviewed Yes    Patient has a past medical history of Anxiety, Asthma, Cancer ([address]), Cancer of lung ([address]), CHF (congestive heart failure) ([address]), COPD (chronic obstructive pulmonary disease) ([address]), Cyclic vomiting syndrome, Cysts of both kidneys, Depression, Fatty liver, Gastritis,

## 2023-08-16 NOTE — PROGRESS NOTES
Occupational Therapy  Facility/Department: [facility] B3 - MED SURG  Daily Treatment Note  NAME: Ruddy Cortes  : 1960  MRN: [id number]    Date of Service: 2023    AM-PAC score  AM-PAC Inpatient Daily Activity Raw Score: 21 (23 1353)  AM-PAC Inpatient ADL T-Scale Score : 44.27 (23 1353)  ADL Inpatient CMS 0-100% Score: 32.79 (23 1353)  ADL Inpatient CMS G-Code Modifier : Mehran Mojica (23)    Discharge Recommendations:  24 hour supervision or assist, Home with assist PRN    Patient Diagnosis(es): The primary encounter diagnosis was COPD exacerbation ([address] Diagnoses of Hypoxia and Elevated troponin were also pertinent to this visit. Assessment   Pt is mod I bed mobility and SPV with RW for functional tasks. Assist levels due to dec strength, balance, endurance and to inc overall pt safety and ind with functional tasks. Pt is currently functioning below baseline, will continue to benefit from skilled OT services in the acute care setting, and home w/ initial 24 hr supervision progressing to assist PRN is recommended at discharge to increase pt safety and ind with ADLs/transfers/ambulation. Activity Tolerance: Patient tolerated treatment well  Discharge Recommendations: 24 hour supervision or assist;Home with assist PRN      Plan  Occupational Therapy Plan  Times Per Week: 3-5x/wk  Current Treatment Recommendations: Strengthening;ROM;Balance training;Functional mobility training;Gait training;Stair training; Endurance training; Safety education & training;Patient/Caregiver education & training;Equipment evaluation, education, & procurement;Positioning;Self-Care / ADL; Home management training    Restrictions  Fall Risk;General Precautions; up with assist, PICC R upper arm    Subjective  Subjective: pt in bed and agreeable to OT services upon arrival. RN approved therapy. Pt report 6/10 ache R shoulder/lung. Pt offered therapeutic listening and comfortable positioning end of session.  RN education needed;Verbalized understanding;Demonstrated understanding  Disease specific: Pt educated on benefits of OOB activity to decrease risks associated with prolonged bedrest while in hospital. Pt verbalized understanding. Goals all non-met goals ongoing as of 8/16/2023  Short Term Goals  Time Frame for Short Term Goals: 1wk (8/19/23)-- unless noted elsewhere  Short Term Goal 1: Pt will complete functional t/fs with SPV - GOAL MET 8/16/2023  Short Term Goal 2: Pt will complete all aspects of toileting with SPV  Short Term Goal 3: Pt will complete 2 grooming tasks in stance at sink with SPV by 8/16  Patient Goals   Patient goals : \"to be able to walk to the bathroom by myself\"    Therapy Time   Individual Concurrent Group Co-treatment   Time In 1315         Time Out 1345         Minutes 30         Timed Code Treatment Minutes: 30 Minutes    Jeffrey Schultz OTR/L  If pt is unable to be seen after this session, please let this note serve as discharge summary. Please see case management note for discharge disposition. Thank you.

## 2023-08-16 NOTE — PROGRESS NOTES
Blanchable redness on bilateral heels noted. Bilateral foam heel protectors placed. Elevated Pt's heels elevated off the bed on pillows. Educated patient on why foam heel pads were placed and why heels need to be elevated off the bed.

## 2023-08-16 NOTE — PLAN OF CARE
Problem: Discharge Planning  Goal: Discharge to home or other facility with appropriate resources  Outcome: Progressing  Flowsheets (Taken 8/15/2023 2040)  Discharge to home or other facility with appropriate resources:   Identify barriers to discharge with patient and caregiver   Arrange for needed discharge resources and transportation as appropriate   Identify discharge learning needs (meds, wound care, etc)     Problem: Pain  Goal: Verbalizes/displays adequate comfort level or baseline comfort level  Outcome: Progressing     Problem: Safety - Adult  Goal: Free from fall injury  Outcome: Progressing     Problem: Skin/Tissue Integrity  Goal: Absence of new skin breakdown  Description: 1. Monitor for areas of redness and/or skin breakdown  2. Assess vascular access sites hourly  3. Every 4-6 hours minimum:  Change oxygen saturation probe site  4. Every 4-6 hours:  If on nasal continuous positive airway pressure, respiratory therapy assess nares and determine need for appliance change or resting period.   Outcome: Progressing     Problem: Nutrition Deficit:  Goal: Optimize nutritional status  Outcome: Progressing     Problem: Chronic Conditions and Co-morbidities  Goal: Patient's chronic conditions and co-morbidity symptoms are monitored and maintained or improved  Outcome: Progressing  Flowsheets (Taken 8/15/2023 2040)  Care Plan - Patient's Chronic Conditions and Co-Morbidity Symptoms are Monitored and Maintained or Improved: Monitor and assess patient's chronic conditions and comorbid symptoms for stability, deterioration, or improvement     Problem: Respiratory - Adult  Goal: Achieves optimal ventilation and oxygenation  Outcome: Progressing  Flowsheets (Taken 8/15/2023 2040)  Achieves optimal ventilation and oxygenation: Assess for changes in respiratory status     Problem: Skin/Tissue Integrity - Adult  Goal: Skin integrity remains intact  Outcome: Progressing  Flowsheets (Taken 8/15/2023 2040)  Skin

## 2023-08-16 NOTE — PROGRESS NOTES
PULMONARY AND CRITICAL CARE INPATIENT PROGRESS NOTE        Nikhil Wooten  60 y.o.  female  : 1960    Attending Alka Leahy MD     Room/Bed: [facility] MRN: [id number]      Admitting Physician: Aleshia Mondragon MD   Date of Service 2023 Admission: 2023    PCP: MACEY Bruno - NP       Summary:   70-year-old lady with a past medical history of hypertension, COPD, coronary artery disease with previous MI, CHF who was admitted on  with progressive shortness of breath. Patient had a recent admission for COPD exacerbation treated with antibiotics, steroids and nebulizers in 2023. Patient follows with Dr. Dayna Stern as outpatient last seen in May 2023. Interval history:  Patient's oxygen needs remain at 3 L/min. Volume balance today 790 cc. Patient remains on prednisone 40 mg p.o. daily Brovana and Pulmicort, antiacids. Patient continues to improve slowly. She continues to have right shoulder pain that is better with the tramadol. She is worried that she is can go home without pain medications and would like to have some tramadol for that. Respiratory wise she feels things are improving and her abdominal pain is gone. She continues to have some right chest pain with her shoulder pain. She also was to make sure that the insurance would cover her nebulizers or inhalers this time.       Scheduled Meds:   predniSONE  40 mg Oral Daily    arformoterol tartrate  15 mcg Nebulization BID RT    budesonide  0.5 mg Nebulization BID RT    lactobacillus  1 capsule Oral BID WC    lidocaine 1 % injection  5 mL IntraDERmal Once    sodium chloride flush  5-40 mL IntraVENous 2 times per day    ipratropium 0.5 mg-albuterol 2.5 mg  1 Dose Inhalation Q4H WA RT    methocarbamol  500 mg Oral 4x Daily    pantoprazole  40 mg Oral BID    sodium chloride flush  5-40 mL IntraVENous 2 times per day    enoxaparin  40 mg SubCUTAneous Daily    busPIRone  10 mg Oral TID    metoprolol COLLECTED:  08/11/23 23:00  ANTIBIOTICS AT [facility].:                      RECEIVED :  08/11/23 23:18  Collect White vial (sterile container)    Clostridium difficile toxin/antigen [[id number]]     Order Status: Canceled Specimen: Stool              Imaging:  I have reviewed radiology images personally. Results for orders placed during the hospital encounter of 08/11/23    XR CHEST PORTABLE    Narrative  EXAMINATION:  ONE XRAY VIEW OF THE CHEST    8/11/2023 3:13 pm    COMPARISON:  07/21/2023    HISTORY:  ORDERING SYSTEM PROVIDED HISTORY: shortness of breath  TECHNOLOGIST PROVIDED HISTORY:  Reason for exam:->shortness of breath  Reason for Exam: shortness of breath, concern for allergic reaction to  medication    FINDINGS:  The heart is normal.  The pulmonary vessels are less prominent. There are  mild subsegmental linear densities scattered along the lung bases which are  less prominent. No effusion is seen. Impression  Resolving central pulmonary congestion. Slowly resolving bibasilar atelectasis or infiltrates vs residual scarring        US ABDOMEN COMPLETE    Result Date: 8/13/2023  Unremarkable abdominal ultrasound. ASSESSMENT & PLAN       Principal Problem:    COPD with acute exacerbation ([address])  Active Problems:    Anxiety & depression  Resolved Problems:    * No resolved hospital problems. *    #COPD with acute exacerbation  Moderate COPD at baseline on PFTs in 2012, maintained on Trelegy and albuterol as outpatient. Recent exacerbation in July 2023 requiring admission  Finish 10 to 14-day steroid taper  Completed antibiotics  Brovana Pulmicort nebs twice a day. Discharge on a combination inhaler that her insurance would cover. Anoro Ellipta, Stiolto Respimat, Dulera or Advair are okay  DuoNebs/albuterol as needed  Follow-up alpha-1    #HELEN  Follow-up as outpatient    #Abdominal pain with right shoulder pain  Negative ultrasound abdomen.   Slightly positive

## 2023-08-17 VITALS
TEMPERATURE: 97.8 F | RESPIRATION RATE: 16 BRPM | WEIGHT: 125 LBS | DIASTOLIC BLOOD PRESSURE: 74 MMHG | HEART RATE: 72 BPM | HEIGHT: 63 IN | BODY MASS INDEX: 22.15 KG/M2 | OXYGEN SATURATION: 94 % | SYSTOLIC BLOOD PRESSURE: 111 MMHG

## 2023-08-17 PROCEDURE — 6370000000 HC RX 637 (ALT 250 FOR IP): Performed by: STUDENT IN AN ORGANIZED HEALTH CARE EDUCATION/TRAINING PROGRAM

## 2023-08-17 PROCEDURE — 94669 MECHANICAL CHEST WALL OSCILL: CPT

## 2023-08-17 PROCEDURE — 6360000002 HC RX W HCPCS: Performed by: INTERNAL MEDICINE

## 2023-08-17 PROCEDURE — 6360000002 HC RX W HCPCS: Performed by: NURSE PRACTITIONER

## 2023-08-17 PROCEDURE — 6370000000 HC RX 637 (ALT 250 FOR IP): Performed by: NURSE PRACTITIONER

## 2023-08-17 PROCEDURE — 94640 AIRWAY INHALATION TREATMENT: CPT

## 2023-08-17 PROCEDURE — 2700000000 HC OXYGEN THERAPY PER DAY

## 2023-08-17 PROCEDURE — 96372 THER/PROPH/DIAG INJ SC/IM: CPT

## 2023-08-17 PROCEDURE — 6370000000 HC RX 637 (ALT 250 FOR IP): Performed by: INTERNAL MEDICINE

## 2023-08-17 PROCEDURE — 94761 N-INVAS EAR/PLS OXIMETRY MLT: CPT

## 2023-08-17 RX ORDER — PREDNISONE 20 MG/1
40 TABLET ORAL DAILY
Qty: 4 TABLET | Refills: 0 | Status: SHIPPED | OUTPATIENT
Start: 2023-08-18 | End: 2023-08-20

## 2023-08-17 RX ORDER — AMOXICILLIN AND CLAVULANATE POTASSIUM 875; 125 MG/1; MG/1
1 TABLET, FILM COATED ORAL EVERY 12 HOURS SCHEDULED
Status: DISCONTINUED | OUTPATIENT
Start: 2023-08-17 | End: 2023-08-17 | Stop reason: HOSPADM

## 2023-08-17 RX ORDER — AMOXICILLIN AND CLAVULANATE POTASSIUM 875; 125 MG/1; MG/1
1 TABLET, FILM COATED ORAL EVERY 12 HOURS SCHEDULED
Qty: 20 TABLET | Refills: 0 | Status: SHIPPED | OUTPATIENT
Start: 2023-08-17 | End: 2023-08-27

## 2023-08-17 RX ORDER — GUAIFENESIN/DEXTROMETHORPHAN 100-10MG/5
5 SYRUP ORAL EVERY 4 HOURS PRN
Qty: 120 ML | Refills: 0 | Status: SHIPPED | OUTPATIENT
Start: 2023-08-17 | End: 2023-08-27

## 2023-08-17 RX ORDER — TRAMADOL HYDROCHLORIDE 50 MG/1
50 TABLET ORAL EVERY 6 HOURS PRN
Qty: 12 TABLET | Refills: 0 | Status: SHIPPED | OUTPATIENT
Start: 2023-08-17 | End: 2023-08-20

## 2023-08-17 RX ADMIN — METHOCARBAMOL 500 MG: 500 TABLET ORAL at 13:49

## 2023-08-17 RX ADMIN — IPRATROPIUM BROMIDE AND ALBUTEROL SULFATE 1 DOSE: .5; 3 SOLUTION RESPIRATORY (INHALATION) at 15:50

## 2023-08-17 RX ADMIN — METHOCARBAMOL 500 MG: 500 TABLET ORAL at 08:10

## 2023-08-17 RX ADMIN — TRAMADOL HYDROCHLORIDE 50 MG: 50 TABLET, COATED ORAL at 09:34

## 2023-08-17 RX ADMIN — BUSPIRONE HYDROCHLORIDE 10 MG: 5 TABLET ORAL at 13:49

## 2023-08-17 RX ADMIN — BUDESONIDE INHALATION 500 MCG: 0.5 SUSPENSION RESPIRATORY (INHALATION) at 15:52

## 2023-08-17 RX ADMIN — BUSPIRONE HYDROCHLORIDE 10 MG: 5 TABLET ORAL at 08:10

## 2023-08-17 RX ADMIN — METOCLOPRAMIDE 10 MG: 10 TABLET ORAL at 08:23

## 2023-08-17 RX ADMIN — DIAZEPAM 5 MG: 5 TABLET ORAL at 00:23

## 2023-08-17 RX ADMIN — IPRATROPIUM BROMIDE AND ALBUTEROL SULFATE 1 DOSE: .5; 3 SOLUTION RESPIRATORY (INHALATION) at 11:28

## 2023-08-17 RX ADMIN — ENOXAPARIN SODIUM 40 MG: 100 INJECTION SUBCUTANEOUS at 08:11

## 2023-08-17 RX ADMIN — METOPROLOL SUCCINATE 25 MG: 25 TABLET, EXTENDED RELEASE ORAL at 08:10

## 2023-08-17 RX ADMIN — PANTOPRAZOLE SODIUM 40 MG: 40 TABLET, DELAYED RELEASE ORAL at 08:11

## 2023-08-17 RX ADMIN — DIAZEPAM 5 MG: 5 TABLET ORAL at 12:15

## 2023-08-17 RX ADMIN — TRAMADOL HYDROCHLORIDE 50 MG: 50 TABLET, COATED ORAL at 03:52

## 2023-08-17 RX ADMIN — AMOXICILLIN AND CLAVULANATE POTASSIUM 1 TABLET: 875; 125 TABLET, FILM COATED ORAL at 09:34

## 2023-08-17 RX ADMIN — PREDNISONE 40 MG: 20 TABLET ORAL at 08:10

## 2023-08-17 RX ADMIN — ARFORMOTEROL TARTRATE 15 MCG: 15 SOLUTION RESPIRATORY (INHALATION) at 15:52

## 2023-08-17 RX ADMIN — Medication 1 CAPSULE: at 08:10

## 2023-08-17 ASSESSMENT — PAIN DESCRIPTION - PAIN TYPE: TYPE: ACUTE PAIN

## 2023-08-17 ASSESSMENT — PAIN SCALES - GENERAL
PAINLEVEL_OUTOF10: 4
PAINLEVEL_OUTOF10: 6
PAINLEVEL_OUTOF10: 7

## 2023-08-17 ASSESSMENT — PAIN DESCRIPTION - FREQUENCY: FREQUENCY: CONTINUOUS

## 2023-08-17 ASSESSMENT — PAIN DESCRIPTION - DESCRIPTORS: DESCRIPTORS: ACHING

## 2023-08-17 ASSESSMENT — PAIN DESCRIPTION - LOCATION
LOCATION: CHEST
LOCATION: SHOULDER

## 2023-08-17 NOTE — PROGRESS NOTES
Patient's EF (Ejection Fraction) is greater than 40%    Heart Failure Medications:  Diuretics[de-identified] None    (One of the following REQUIRED for EF </= 40%/SYSTOLIC FAILURE but MAY be used in EF% >40%/DIASTOLIC FAILURE)        ACE[de-identified] None        ARB[de-identified] None         ARNI[de-identified] None    (Beta Blockers)  NON- Evidenced Based Beta Blocker (for EF% >40%/DIASTOLIC FAILURE): none    Evidenced Based Beta Blocker::(REQUIRED for EF% <40%/SYSTOLIC FAILURE) Metoprolol SUCCinate- Toprol XL  . .................................................................................................................................................. Healthy Weight Tracking - BMI + Meds 8/12/2023 8/12/2023 8/13/2023 8/14/2023 8/15/2023 8/16/2023 8/17/2023   Weight 120 lb - 127 lb 12.8 oz 127 lb 8 oz 126 lb 5.2 oz 124 lb 11.2 oz 125 lb   Height 5' 3\" 5' 3\" - - - - -   Body Mass Index 21.26 kg/m2 - 22.64 kg/m2 22.59 kg/m2 22.38 kg/m2 22.09 kg/m2 22.14 kg/m2   Some recent data might be hidden         Patient's weights and intake/output reviewed: Yes    Daily Weight log at bedside, patient/family participation in use of log: \"yes    Patient's Last Weight: 125 lbs obtained by standing scale. Difference of 1 lbs more than last documented weight. Intake/Output Summary (Last 24 hours) at 8/17/2023 1043  Last data filed at 8/16/2023 2252  Gross per 24 hour   Intake 330 ml   Output --   Net 330 ml       Education Booklet Provided: yes    Comorbidities Reviewed Yes    Patient has a past medical history of Anxiety, Asthma, Cancer ([address]), Cancer of lung ([address]), CHF (congestive heart failure) ([address]), COPD (chronic obstructive pulmonary disease) ([address]), Cyclic vomiting syndrome, Cysts of both kidneys, Depression, Fatty liver, Gastritis, MI (myocardial infarction) ([address]), Panic attacks, Pneumonia, Pre-diabetes, and Tricuspid regurgitation.      >>For CHF and Comorbidity documentation on Education Time and Topics, please see Education Tab    Progressive Mobility Assessment:  What is this patient's Current Level of Mobility?: Ambulatory- with Assistance  How was this patient Mobilized today?: Edge of Bed, Bedside Commode, and  Up to Toilet/Shower, ambulated 15 ft                 With Whom? Nurse                 Level of Difficulty/Assistance: 1x Assist     Pt resting in bed at this time on  3 L O2. Pt denies shortness of breath. Pt without lower extremity edema.      Patient and/or Family's stated Goal of Care this Admission: reduce shortness of breath, increase activity tolerance, and be more comfortable prior to discharge        :

## 2023-08-17 NOTE — PROGRESS NOTES
Patient with discharge orders. Patient VSS, received respiratory treatment prior to discharge. Discharge instructions reviewed with patient and offered an opportunity to ask questions. Patient to main entrance to discharge with son. Belongings with patient. Patient with o2 concentrator, prescriptions picked up at outpatient pharmacy and discharged without incident.

## 2023-08-17 NOTE — PLAN OF CARE
Problem: Discharge Planning  Goal: Discharge to home or other facility with appropriate resources  Outcome: Progressing  Flowsheets (Taken 8/16/2023 2015)  Discharge to home or other facility with appropriate resources:   Identify barriers to discharge with patient and caregiver   Arrange for needed discharge resources and transportation as appropriate   Identify discharge learning needs (meds, wound care, etc)     Problem: Pain  Goal: Verbalizes/displays adequate comfort level or baseline comfort level  Outcome: Progressing     Problem: Safety - Adult  Goal: Free from fall injury  Outcome: Progressing     Problem: Skin/Tissue Integrity  Goal: Absence of new skin breakdown  Description: 1. Monitor for areas of redness and/or skin breakdown  2. Assess vascular access sites hourly  3. Every 4-6 hours minimum:  Change oxygen saturation probe site  4. Every 4-6 hours:  If on nasal continuous positive airway pressure, respiratory therapy assess nares and determine need for appliance change or resting period.   Outcome: Progressing     Problem: Chronic Conditions and Co-morbidities  Goal: Patient's chronic conditions and co-morbidity symptoms are monitored and maintained or improved  8/16/2023 2329 by Jamshid Guallpa RN  Outcome: Progressing  Flowsheets (Taken 8/16/2023 2015)  Care Plan - Patient's Chronic Conditions and Co-Morbidity Symptoms are Monitored and Maintained or Improved: Monitor and assess patient's chronic conditions and comorbid symptoms for stability, deterioration, or improvement  8/16/2023 1353 by Tiffanie Abarca RN  Outcome: Progressing     Problem: Respiratory - Adult  Goal: Achieves optimal ventilation and oxygenation  Outcome: Progressing  Flowsheets (Taken 8/16/2023 2015)  Achieves optimal ventilation and oxygenation: Assess for changes in respiratory status     Problem: Skin/Tissue Integrity - Adult  Goal: Skin integrity remains intact  Outcome: Progressing  Flowsheets (Taken 8/16/2023 2015)  Skin Integrity Remains Intact: Monitor for areas of redness and/or skin breakdown     Problem: Musculoskeletal - Adult  Goal: Return mobility to safest level of function  Outcome: Progressing  Flowsheets (Taken 8/16/2023 2015)  Return Mobility to Safest Level of Function: Assess patient stability and activity tolerance for standing, transferring and ambulating with or without assistive devices     Problem: Discharge Planning  Goal: Discharge to home or other facility with appropriate resources  Outcome: Progressing  Flowsheets (Taken 8/16/2023 2015)  Discharge to home or other facility with appropriate resources:   Identify barriers to discharge with patient and caregiver   Arrange for needed discharge resources and transportation as appropriate   Identify discharge learning needs (meds, wound care, etc)     Problem: Pain  Goal: Verbalizes/displays adequate comfort level or baseline comfort level  Outcome: Progressing     Problem: Safety - Adult  Goal: Free from fall injury  Outcome: Progressing     Problem: Skin/Tissue Integrity  Goal: Absence of new skin breakdown  Description: 1. Monitor for areas of redness and/or skin breakdown  2. Assess vascular access sites hourly  3. Every 4-6 hours minimum:  Change oxygen saturation probe site  4. Every 4-6 hours:  If on nasal continuous positive airway pressure, respiratory therapy assess nares and determine need for appliance change or resting period.   Outcome: Progressing     Problem: Chronic Conditions and Co-morbidities  Goal: Patient's chronic conditions and co-morbidity symptoms are monitored and maintained or improved  8/16/2023 4330 by Louis Saenz RN  Outcome: Progressing  Flowsheets (Taken 8/16/2023 2015)  Care Plan - Patient's Chronic Conditions and Co-Morbidity Symptoms are Monitored and Maintained or Improved: Monitor and assess patient's chronic conditions and comorbid symptoms for stability, deterioration, or improvement  8/16/2023 1353 by Jose David Sprague

## 2023-08-17 NOTE — PLAN OF CARE
Problem: Discharge Planning  Goal: Discharge to home or other facility with appropriate resources  8/17/2023 0957 by Yudi Nichols RN  Outcome: Adequate for Discharge  Flowsheets (Taken 8/17/2023 0957)  Discharge to home or other facility with appropriate resources:   Identify barriers to discharge with patient and caregiver   Arrange for needed discharge resources and transportation as appropriate   Identify discharge learning needs (meds, wound care, etc)   Arrange for interpreters to assist at discharge as needed   Refer to discharge planning if patient needs post-hospital services based on physician order or complex needs related to functional status, cognitive ability or social support system  8/16/2023 2329 by Marilyn Contreras RN  Outcome: Progressing  Flowsheets (Taken 8/16/2023 2015)  Discharge to home or other facility with appropriate resources:   Identify barriers to discharge with patient and caregiver   Arrange for needed discharge resources and transportation as appropriate   Identify discharge learning needs (meds, wound care, etc)     Problem: Pain  Goal: Verbalizes/displays adequate comfort level or baseline comfort level  8/17/2023 0957 by Yudi Nichols RN  Outcome: Adequate for Discharge  Flowsheets (Taken 8/17/2023 0957)  Verbalizes/displays adequate comfort level or baseline comfort level:   Encourage patient to monitor pain and request assistance   Assess pain using appropriate pain scale   Implement non-pharmacological measures as appropriate and evaluate response   Administer analgesics based on type and severity of pain and evaluate response   Consider cultural and social influences on pain and pain management   Notify Licensed Independent Practitioner if interventions unsuccessful or patient reports new pain  8/16/2023 2329 by Marilyn Contreras RN  Outcome: Progressing     Problem: Safety - Adult  Goal: Free from fall injury  8/17/2023 0957 by Yudi Nichols RN  Outcome: Adequate for Discharge  Flowsheets (Taken 8/17/2023 0957)  Free From Fall Injury:   Instruct family/caregiver on patient safety   Based on caregiver fall risk screen, instruct family/caregiver to ask for assistance with transferring infant if caregiver noted to have fall risk factors  8/16/2023 2329 by Whitney Valdez RN  Outcome: Progressing     Problem: Skin/Tissue Integrity  Goal: Absence of new skin breakdown  Description: 1. Monitor for areas of redness and/or skin breakdown  2. Assess vascular access sites hourly  3. Every 4-6 hours minimum:  Change oxygen saturation probe site  4. Every 4-6 hours:  If on nasal continuous positive airway pressure, respiratory therapy assess nares and determine need for appliance change or resting period.   8/17/2023 0957 by Yair Lorenzana RN  Outcome: Adequate for Discharge  8/16/2023 2329 by Whitney Valdez RN  Outcome: Progressing     Problem: Nutrition Deficit:  Goal: Optimize nutritional status  Outcome: Adequate for Discharge     Problem: Chronic Conditions and Co-morbidities  Goal: Patient's chronic conditions and co-morbidity symptoms are monitored and maintained or improved  8/17/2023 0957 by Yair Lorenzana RN  Outcome: Adequate for Discharge  Flowsheets (Taken 8/17/2023 0957)  Care Plan - Patient's Chronic Conditions and Co-Morbidity Symptoms are Monitored and Maintained or Improved:   Monitor and assess patient's chronic conditions and comorbid symptoms for stability, deterioration, or improvement   Collaborate with multidisciplinary team to address chronic and comorbid conditions and prevent exacerbation or deterioration   Update acute care plan with appropriate goals if chronic or comorbid symptoms are exacerbated and prevent overall improvement and discharge  8/16/2023 2329 by Whitney Valdez RN  Outcome: Progressing  Flowsheets (Taken 8/16/2023 2015)  Care Plan - Patient's Chronic Conditions and Co-Morbidity Symptoms are Monitored and Maintained or Improved: Monitor and assess patient's chronic conditions and comorbid symptoms for stability, deterioration, or improvement     Problem: Respiratory - Adult  Goal: Achieves optimal ventilation and oxygenation  8/17/2023 0957 by Holly Gould RN  Outcome: Adequate for Discharge  8/16/2023 2329 by Roni Hunetr RN  Outcome: Progressing  Flowsheets (Taken 8/16/2023 2015)  Achieves optimal ventilation and oxygenation: Assess for changes in respiratory status     Problem: Skin/Tissue Integrity - Adult  Goal: Skin integrity remains intact  8/17/2023 0957 by Holly Gould RN  Outcome: Adequate for Discharge  8/16/2023 2329 by Roni Hunter RN  Outcome: Progressing  Flowsheets (Taken 8/16/2023 2015)  Skin Integrity Remains Intact: Monitor for areas of redness and/or skin breakdown     Problem: Musculoskeletal - Adult  Goal: Return mobility to safest level of function  8/17/2023 0957 by Holly Gould RN  Outcome: Adequate for Discharge  Flowsheets (Taken 8/17/2023 0957)  Return Mobility to Safest Level of Function:   Assess patient stability and activity tolerance for standing, transferring and ambulating with or without assistive devices   Assist with transfers and ambulation using safe patient handling equipment as needed   Ensure adequate protection for wounds/incisions during mobilization  8/16/2023 2329 by Roni Hunter RN  Outcome: Progressing  Flowsheets (Taken 8/16/2023 2015)  Return Mobility to Safest Level of Function: Assess patient stability and activity tolerance for standing, transferring and ambulating with or without assistive devices

## 2023-08-17 NOTE — PROGRESS NOTES
Patient frustrated with care being received from the doctors. Patient wanted to continue antibiotic dose but they had been discontinued. Patient stated that she is being punished because she wanted to stay another night instead of being discharged. She said she feels that the doctors are not taking her concerns seriously and they don't care and want her discharged. She also stated that her medications were changed and that she normally got another dose in the middle of the night. Stated that her methocarbamol and Buspar she is missing doses of. Both medications were given around 2100 8/16/23, and she has another dose scheduled in the morning. Educated patient on her medication schedule and what she has received from me. Provided emotional support and told her I would tell day shift her concerns so they could be address. Patient stated that would not matter because they wanted her gone.

## 2023-08-17 NOTE — CARE COORDINATION
CASE MANAGEMENT DISCHARGE SUMMARY      Discharge to: home with media house calls and [facility]     IMM given: 8/17/2023    Transportation:    Family/car: private     Confirmed discharge plan with:     Patient: yes     Family:  yes per pt.      RN, name: Emmy Clinton already has her home O2 here for her ride home. Denies needs.  Gerry Zapata RN

## 2023-08-18 ENCOUNTER — FOLLOWUP TELEPHONE ENCOUNTER (OUTPATIENT)
Dept: TELEMETRY | Age: 63
End: 2023-08-18

## 2023-08-18 NOTE — TELEPHONE ENCOUNTER
Care Transitions Initial Follow Up Call    Call within 2 business days of discharge: Yes     Patient: Ne Joiner Patient : 1960 MRN: [id number]    [unfilled]    RARS: Readmission Risk Score: 12.8       Spoke with: patient     Discharge department/facility: home    Non-face-to-face services provided:  Scheduled appointment with PCP-7days    Spoke to patient for hospital follow up. Pt denies any needs. States she is feeling tired. Follow Up  No future appointments.     Ruven Fothergill, RN

## 2023-08-18 NOTE — TELEPHONE ENCOUNTER
1st Attempt; No Answer- Left HIPAA compliant voicemail with Non-Urgent Heart Failure Resource Line number for call back.       Kinsey Dubon RN

## 2023-08-19 LAB
A1AT PHENOTYP SERPL-IMP: NORMAL
A1AT SERPL-MCNC: 152 MG/DL (ref 90–200)

## 2024-01-04 ENCOUNTER — HOSPITAL ENCOUNTER (INPATIENT)
Age: 64
LOS: 3 days | Discharge: HOME HEALTH | DRG: 281 | End: 2024-01-07
Attending: STUDENT IN AN ORGANIZED HEALTH CARE EDUCATION/TRAINING PROGRAM | Admitting: INTERNAL MEDICINE
Payer: COMMERCIAL

## 2024-01-04 ENCOUNTER — APPOINTMENT (OUTPATIENT)
Dept: GENERAL RADIOLOGY | Age: 64
DRG: 281 | End: 2024-01-04
Payer: COMMERCIAL

## 2024-01-04 DIAGNOSIS — J44.1 COPD EXACERBATION (HCC): ICD-10-CM

## 2024-01-04 DIAGNOSIS — R06.02 SOB (SHORTNESS OF BREATH): ICD-10-CM

## 2024-01-04 DIAGNOSIS — Z87.891 FORMER SMOKER: ICD-10-CM

## 2024-01-04 DIAGNOSIS — J44.1 COPD WITH ACUTE EXACERBATION (HCC): Primary | ICD-10-CM

## 2024-01-04 DIAGNOSIS — J96.11 CHRONIC RESPIRATORY FAILURE WITH HYPOXIA (HCC): ICD-10-CM

## 2024-01-04 DIAGNOSIS — I21.4 NSTEMI (NON-ST ELEVATED MYOCARDIAL INFARCTION) (HCC): ICD-10-CM

## 2024-01-04 DIAGNOSIS — J96.21 ACUTE ON CHRONIC RESPIRATORY FAILURE WITH HYPOXEMIA (HCC): ICD-10-CM

## 2024-01-04 DIAGNOSIS — E87.6 HYPOKALEMIA: ICD-10-CM

## 2024-01-04 PROBLEM — R79.89 ELEVATED TROPONIN: Status: ACTIVE | Noted: 2024-01-04

## 2024-01-04 PROBLEM — Z79.899 CHRONIC PRESCRIPTION BENZODIAZEPINE USE: Status: ACTIVE | Noted: 2024-01-04

## 2024-01-04 LAB
ALBUMIN SERPL-MCNC: 3.3 G/DL (ref 3.4–5)
ALBUMIN/GLOB SERPL: 1.6 {RATIO} (ref 1.1–2.2)
ALP SERPL-CCNC: 69 U/L (ref 40–129)
ALT SERPL-CCNC: 9 U/L (ref 10–40)
ANION GAP SERPL CALCULATED.3IONS-SCNC: 9 MMOL/L (ref 3–16)
ANTI-XA UNFRAC HEPARIN: 0.26 IU/ML (ref 0.3–0.7)
ANTI-XA UNFRAC HEPARIN: 0.3 IU/ML (ref 0.3–0.7)
APTT BLD: 73 SEC (ref 22.7–35.9)
AST SERPL-CCNC: 12 U/L (ref 15–37)
BASE EXCESS BLDV CALC-SCNC: -0.5 MMOL/L (ref -3–3)
BASOPHILS # BLD: 0.1 K/UL (ref 0–0.2)
BASOPHILS NFR BLD: 0.3 %
BILIRUB SERPL-MCNC: <0.2 MG/DL (ref 0–1)
BILIRUB UR QL STRIP.AUTO: NEGATIVE
BUN SERPL-MCNC: 14 MG/DL (ref 7–20)
CALCIUM SERPL-MCNC: 7.4 MG/DL (ref 8.3–10.6)
CHLORIDE SERPL-SCNC: 111 MMOL/L (ref 99–110)
CLARITY UR: CLEAR
CO2 BLDV-SCNC: 25 MMOL/L
CO2 SERPL-SCNC: 23 MMOL/L (ref 21–32)
COHGB MFR BLDV: 7.9 % (ref 0–1.5)
COLOR UR: YELLOW
CREAT SERPL-MCNC: <0.5 MG/DL (ref 0.6–1.2)
DEPRECATED RDW RBC AUTO: 15.1 % (ref 12.4–15.4)
EKG ATRIAL RATE: 103 BPM
EKG ATRIAL RATE: 92 BPM
EKG DIAGNOSIS: NORMAL
EKG DIAGNOSIS: NORMAL
EKG P AXIS: 74 DEGREES
EKG P AXIS: 77 DEGREES
EKG P-R INTERVAL: 120 MS
EKG P-R INTERVAL: 126 MS
EKG Q-T INTERVAL: 360 MS
EKG Q-T INTERVAL: 392 MS
EKG QRS DURATION: 76 MS
EKG QRS DURATION: 80 MS
EKG QTC CALCULATION (BAZETT): 471 MS
EKG QTC CALCULATION (BAZETT): 484 MS
EKG R AXIS: 66 DEGREES
EKG R AXIS: 75 DEGREES
EKG T AXIS: 68 DEGREES
EKG T AXIS: 72 DEGREES
EKG VENTRICULAR RATE: 103 BPM
EKG VENTRICULAR RATE: 92 BPM
EOSINOPHIL # BLD: 0.2 K/UL (ref 0–0.6)
EOSINOPHIL NFR BLD: 1.1 %
EPI CELLS #/AREA URNS HPF: NORMAL /HPF (ref 0–5)
FLUAV RNA UPPER RESP QL NAA+PROBE: NEGATIVE
FLUBV AG NPH QL: NEGATIVE
GFR SERPLBLD CREATININE-BSD FMLA CKD-EPI: >60 ML/MIN/{1.73_M2}
GLUCOSE SERPL-MCNC: 104 MG/DL (ref 70–99)
GLUCOSE UR STRIP.AUTO-MCNC: NEGATIVE MG/DL
HCO3 BLDV-SCNC: 24.1 MMOL/L (ref 23–29)
HCT VFR BLD AUTO: 42.5 % (ref 36–48)
HGB BLD-MCNC: 13.3 G/DL (ref 12–16)
HGB UR QL STRIP.AUTO: NEGATIVE
KETONES UR STRIP.AUTO-MCNC: NEGATIVE MG/DL
LACTATE BLDV-SCNC: 1.8 MMOL/L (ref 0.4–1.9)
LEUKOCYTE ESTERASE UR QL STRIP.AUTO: NEGATIVE
LYMPHOCYTES # BLD: 2.5 K/UL (ref 1–5.1)
LYMPHOCYTES NFR BLD: 17 %
MAGNESIUM SERPL-MCNC: 2.8 MG/DL (ref 1.8–2.4)
MCH RBC QN AUTO: 30.2 PG (ref 26–34)
MCHC RBC AUTO-ENTMCNC: 31.2 G/DL (ref 31–36)
MCV RBC AUTO: 96.8 FL (ref 80–100)
METHGB MFR BLDV: 0.3 %
MONOCYTES # BLD: 0.7 K/UL (ref 0–1.3)
MONOCYTES NFR BLD: 4.7 %
NEUTROPHILS # BLD: 11.5 K/UL (ref 1.7–7.7)
NEUTROPHILS NFR BLD: 76.9 %
NITRITE UR QL STRIP.AUTO: NEGATIVE
NT-PROBNP SERPL-MCNC: 164 PG/ML (ref 0–124)
O2 THERAPY: ABNORMAL
PCO2 BLDV: 39.4 MMHG (ref 40–50)
PH BLDV: 7.4 [PH] (ref 7.35–7.45)
PH UR STRIP.AUTO: 6 [PH] (ref 5–8)
PLATELET # BLD AUTO: 251 K/UL (ref 135–450)
PMV BLD AUTO: 9.6 FL (ref 5–10.5)
PO2 BLDV: 45.2 MMHG (ref 25–40)
POTASSIUM SERPL-SCNC: 2.9 MMOL/L (ref 3.5–5.1)
PROCALCITONIN SERPL IA-MCNC: 0.04 NG/ML (ref 0–0.15)
PROT SERPL-MCNC: 5.4 G/DL (ref 6.4–8.2)
PROT UR STRIP.AUTO-MCNC: ABNORMAL MG/DL
RBC # BLD AUTO: 4.39 M/UL (ref 4–5.2)
RBC #/AREA URNS HPF: NORMAL /HPF (ref 0–4)
REASON FOR REJECTION: NORMAL
REJECTED TEST: NORMAL
SAO2 % BLDV: 87 %
SARS-COV-2 RDRP RESP QL NAA+PROBE: NOT DETECTED
SODIUM SERPL-SCNC: 143 MMOL/L (ref 136–145)
SP GR UR STRIP.AUTO: 1.02 (ref 1–1.03)
TROPONIN, HIGH SENSITIVITY: 63 NG/L (ref 0–14)
TROPONIN, HIGH SENSITIVITY: 89 NG/L (ref 0–14)
TROPONIN, HIGH SENSITIVITY: 99 NG/L (ref 0–14)
UA COMPLETE W REFLEX CULTURE PNL UR: ABNORMAL
UA DIPSTICK W REFLEX MICRO PNL UR: YES
URN SPEC COLLECT METH UR: ABNORMAL
UROBILINOGEN UR STRIP-ACNC: 0.2 E.U./DL
WBC # BLD AUTO: 15 K/UL (ref 4–11)
WBC #/AREA URNS HPF: NORMAL /HPF (ref 0–5)

## 2024-01-04 PROCEDURE — 6370000000 HC RX 637 (ALT 250 FOR IP): Performed by: INTERNAL MEDICINE

## 2024-01-04 PROCEDURE — 36415 COLL VENOUS BLD VENIPUNCTURE: CPT

## 2024-01-04 PROCEDURE — 71045 X-RAY EXAM CHEST 1 VIEW: CPT

## 2024-01-04 PROCEDURE — 87635 SARS-COV-2 COVID-19 AMP PRB: CPT

## 2024-01-04 PROCEDURE — 99285 EMERGENCY DEPT VISIT HI MDM: CPT

## 2024-01-04 PROCEDURE — 6360000002 HC RX W HCPCS: Performed by: INTERNAL MEDICINE

## 2024-01-04 PROCEDURE — 83605 ASSAY OF LACTIC ACID: CPT

## 2024-01-04 PROCEDURE — 96367 TX/PROPH/DG ADDL SEQ IV INF: CPT

## 2024-01-04 PROCEDURE — 96366 THER/PROPH/DIAG IV INF ADDON: CPT

## 2024-01-04 PROCEDURE — 2580000003 HC RX 258: Performed by: INTERNAL MEDICINE

## 2024-01-04 PROCEDURE — 94761 N-INVAS EAR/PLS OXIMETRY MLT: CPT

## 2024-01-04 PROCEDURE — 6360000002 HC RX W HCPCS: Performed by: STUDENT IN AN ORGANIZED HEALTH CARE EDUCATION/TRAINING PROGRAM

## 2024-01-04 PROCEDURE — 94669 MECHANICAL CHEST WALL OSCILL: CPT

## 2024-01-04 PROCEDURE — 99222 1ST HOSP IP/OBS MODERATE 55: CPT | Performed by: INTERNAL MEDICINE

## 2024-01-04 PROCEDURE — 93010 ELECTROCARDIOGRAM REPORT: CPT | Performed by: INTERNAL MEDICINE

## 2024-01-04 PROCEDURE — 83880 ASSAY OF NATRIURETIC PEPTIDE: CPT

## 2024-01-04 PROCEDURE — 83735 ASSAY OF MAGNESIUM: CPT

## 2024-01-04 PROCEDURE — 2060000000 HC ICU INTERMEDIATE R&B

## 2024-01-04 PROCEDURE — 2700000000 HC OXYGEN THERAPY PER DAY

## 2024-01-04 PROCEDURE — 80053 COMPREHEN METABOLIC PANEL: CPT

## 2024-01-04 PROCEDURE — 85025 COMPLETE CBC W/AUTO DIFF WBC: CPT

## 2024-01-04 PROCEDURE — 87804 INFLUENZA ASSAY W/OPTIC: CPT

## 2024-01-04 PROCEDURE — 84145 PROCALCITONIN (PCT): CPT

## 2024-01-04 PROCEDURE — 82803 BLOOD GASES ANY COMBINATION: CPT

## 2024-01-04 PROCEDURE — 84484 ASSAY OF TROPONIN QUANT: CPT

## 2024-01-04 PROCEDURE — 81001 URINALYSIS AUTO W/SCOPE: CPT

## 2024-01-04 PROCEDURE — 85730 THROMBOPLASTIN TIME PARTIAL: CPT

## 2024-01-04 PROCEDURE — 85520 HEPARIN ASSAY: CPT

## 2024-01-04 PROCEDURE — 94640 AIRWAY INHALATION TREATMENT: CPT

## 2024-01-04 PROCEDURE — 93005 ELECTROCARDIOGRAM TRACING: CPT | Performed by: STUDENT IN AN ORGANIZED HEALTH CARE EDUCATION/TRAINING PROGRAM

## 2024-01-04 PROCEDURE — 99223 1ST HOSP IP/OBS HIGH 75: CPT | Performed by: INTERNAL MEDICINE

## 2024-01-04 PROCEDURE — 96365 THER/PROPH/DIAG IV INF INIT: CPT

## 2024-01-04 PROCEDURE — 96375 TX/PRO/DX INJ NEW DRUG ADDON: CPT

## 2024-01-04 PROCEDURE — 6370000000 HC RX 637 (ALT 250 FOR IP): Performed by: STUDENT IN AN ORGANIZED HEALTH CARE EDUCATION/TRAINING PROGRAM

## 2024-01-04 RX ORDER — METHYLPREDNISOLONE SODIUM SUCCINATE 125 MG/2ML
125 INJECTION, POWDER, LYOPHILIZED, FOR SOLUTION INTRAMUSCULAR; INTRAVENOUS ONCE
Status: COMPLETED | OUTPATIENT
Start: 2024-01-04 | End: 2024-01-04

## 2024-01-04 RX ORDER — HEPARIN SODIUM 1000 [USP'U]/ML
30 INJECTION, SOLUTION INTRAVENOUS; SUBCUTANEOUS ONCE
Status: COMPLETED | OUTPATIENT
Start: 2024-01-04 | End: 2024-01-04

## 2024-01-04 RX ORDER — MAGNESIUM SULFATE IN WATER 40 MG/ML
2000 INJECTION, SOLUTION INTRAVENOUS ONCE
Status: COMPLETED | OUTPATIENT
Start: 2024-01-04 | End: 2024-01-04

## 2024-01-04 RX ORDER — ACETAMINOPHEN 325 MG/1
650 TABLET ORAL EVERY 6 HOURS PRN
Status: DISCONTINUED | OUTPATIENT
Start: 2024-01-04 | End: 2024-01-07 | Stop reason: HOSPADM

## 2024-01-04 RX ORDER — HEPARIN SODIUM 10000 [USP'U]/100ML
790 INJECTION, SOLUTION INTRAVENOUS CONTINUOUS
Status: DISCONTINUED | OUTPATIENT
Start: 2024-01-04 | End: 2024-01-05

## 2024-01-04 RX ORDER — SODIUM CHLORIDE 0.9 % (FLUSH) 0.9 %
5-40 SYRINGE (ML) INJECTION PRN
Status: DISCONTINUED | OUTPATIENT
Start: 2024-01-04 | End: 2024-01-07 | Stop reason: HOSPADM

## 2024-01-04 RX ORDER — METHOCARBAMOL 500 MG/1
500 TABLET, FILM COATED ORAL EVERY 8 HOURS PRN
Status: DISCONTINUED | OUTPATIENT
Start: 2024-01-04 | End: 2024-01-07 | Stop reason: HOSPADM

## 2024-01-04 RX ORDER — DIAZEPAM 5 MG/1
5 TABLET ORAL EVERY 12 HOURS PRN
Status: DISCONTINUED | OUTPATIENT
Start: 2024-01-04 | End: 2024-01-07 | Stop reason: HOSPADM

## 2024-01-04 RX ORDER — PREDNISONE 20 MG/1
40 TABLET ORAL DAILY
Status: DISCONTINUED | OUTPATIENT
Start: 2024-01-06 | End: 2024-01-07 | Stop reason: HOSPADM

## 2024-01-04 RX ORDER — SODIUM CHLORIDE 0.9 % (FLUSH) 0.9 %
5-40 SYRINGE (ML) INJECTION EVERY 12 HOURS SCHEDULED
Status: DISCONTINUED | OUTPATIENT
Start: 2024-01-04 | End: 2024-01-07 | Stop reason: HOSPADM

## 2024-01-04 RX ORDER — DOXYCYCLINE HYCLATE 100 MG
100 TABLET ORAL EVERY 12 HOURS SCHEDULED
Status: DISCONTINUED | OUTPATIENT
Start: 2024-01-04 | End: 2024-01-07 | Stop reason: HOSPADM

## 2024-01-04 RX ORDER — SODIUM CHLORIDE 9 MG/ML
INJECTION, SOLUTION INTRAVENOUS PRN
Status: DISCONTINUED | OUTPATIENT
Start: 2024-01-04 | End: 2024-01-07 | Stop reason: HOSPADM

## 2024-01-04 RX ORDER — HEPARIN SODIUM 1000 [USP'U]/ML
60 INJECTION, SOLUTION INTRAVENOUS; SUBCUTANEOUS ONCE
Status: COMPLETED | OUTPATIENT
Start: 2024-01-04 | End: 2024-01-04

## 2024-01-04 RX ORDER — POLYETHYLENE GLYCOL 3350 17 G/17G
17 POWDER, FOR SOLUTION ORAL DAILY PRN
Status: DISCONTINUED | OUTPATIENT
Start: 2024-01-04 | End: 2024-01-06

## 2024-01-04 RX ORDER — METHYLPREDNISOLONE SODIUM SUCCINATE 40 MG/ML
40 INJECTION, POWDER, LYOPHILIZED, FOR SOLUTION INTRAMUSCULAR; INTRAVENOUS EVERY 6 HOURS
Status: COMPLETED | OUTPATIENT
Start: 2024-01-04 | End: 2024-01-06

## 2024-01-04 RX ORDER — HEPARIN SODIUM 1000 [USP'U]/ML
60 INJECTION, SOLUTION INTRAVENOUS; SUBCUTANEOUS PRN
Status: DISCONTINUED | OUTPATIENT
Start: 2024-01-04 | End: 2024-01-05

## 2024-01-04 RX ORDER — ASPIRIN 81 MG/1
324 TABLET, CHEWABLE ORAL ONCE
Status: COMPLETED | OUTPATIENT
Start: 2024-01-04 | End: 2024-01-04

## 2024-01-04 RX ORDER — HEPARIN SODIUM 1000 [USP'U]/ML
30 INJECTION, SOLUTION INTRAVENOUS; SUBCUTANEOUS PRN
Status: DISCONTINUED | OUTPATIENT
Start: 2024-01-04 | End: 2024-01-05

## 2024-01-04 RX ORDER — PANTOPRAZOLE SODIUM 40 MG/1
40 TABLET, DELAYED RELEASE ORAL DAILY
Status: DISCONTINUED | OUTPATIENT
Start: 2024-01-04 | End: 2024-01-07 | Stop reason: HOSPADM

## 2024-01-04 RX ORDER — ONDANSETRON 2 MG/ML
4 INJECTION INTRAMUSCULAR; INTRAVENOUS EVERY 6 HOURS PRN
Status: DISCONTINUED | OUTPATIENT
Start: 2024-01-04 | End: 2024-01-07 | Stop reason: HOSPADM

## 2024-01-04 RX ORDER — IPRATROPIUM BROMIDE AND ALBUTEROL SULFATE 2.5; .5 MG/3ML; MG/3ML
1 SOLUTION RESPIRATORY (INHALATION)
Status: DISCONTINUED | OUTPATIENT
Start: 2024-01-04 | End: 2024-01-07 | Stop reason: HOSPADM

## 2024-01-04 RX ORDER — ATORVASTATIN CALCIUM 80 MG/1
80 TABLET, FILM COATED ORAL NIGHTLY
Status: DISCONTINUED | OUTPATIENT
Start: 2024-01-04 | End: 2024-01-07 | Stop reason: HOSPADM

## 2024-01-04 RX ORDER — IPRATROPIUM BROMIDE AND ALBUTEROL SULFATE 2.5; .5 MG/3ML; MG/3ML
2 SOLUTION RESPIRATORY (INHALATION) ONCE
Status: COMPLETED | OUTPATIENT
Start: 2024-01-04 | End: 2024-01-04

## 2024-01-04 RX ORDER — POTASSIUM CHLORIDE 7.45 MG/ML
10 INJECTION INTRAVENOUS
Status: COMPLETED | OUTPATIENT
Start: 2024-01-04 | End: 2024-01-04

## 2024-01-04 RX ORDER — ASPIRIN 81 MG/1
81 TABLET, CHEWABLE ORAL DAILY
Status: DISCONTINUED | OUTPATIENT
Start: 2024-01-04 | End: 2024-01-07 | Stop reason: HOSPADM

## 2024-01-04 RX ORDER — FEXOFENADINE HCL 180 MG/1
180 TABLET ORAL DAILY
Status: ON HOLD | COMMUNITY
End: 2024-01-06 | Stop reason: HOSPADM

## 2024-01-04 RX ORDER — ONDANSETRON 4 MG/1
4 TABLET, ORALLY DISINTEGRATING ORAL EVERY 8 HOURS PRN
Status: DISCONTINUED | OUTPATIENT
Start: 2024-01-04 | End: 2024-01-07 | Stop reason: HOSPADM

## 2024-01-04 RX ORDER — ACETAMINOPHEN 650 MG/1
650 SUPPOSITORY RECTAL EVERY 6 HOURS PRN
Status: DISCONTINUED | OUTPATIENT
Start: 2024-01-04 | End: 2024-01-07 | Stop reason: HOSPADM

## 2024-01-04 RX ORDER — HYDROCODONE BITARTRATE AND ACETAMINOPHEN 5; 325 MG/1; MG/1
1 TABLET ORAL ONCE
Status: COMPLETED | OUTPATIENT
Start: 2024-01-04 | End: 2024-01-04

## 2024-01-04 RX ORDER — BUSPIRONE HYDROCHLORIDE 5 MG/1
10 TABLET ORAL 2 TIMES DAILY
Status: DISCONTINUED | OUTPATIENT
Start: 2024-01-04 | End: 2024-01-07 | Stop reason: HOSPADM

## 2024-01-04 RX ORDER — ONDANSETRON HYDROCHLORIDE 8 MG/1
8 TABLET, FILM COATED ORAL EVERY 8 HOURS PRN
COMMUNITY

## 2024-01-04 RX ORDER — METOCLOPRAMIDE 10 MG/1
10 TABLET ORAL EVERY 6 HOURS PRN
Status: DISCONTINUED | OUTPATIENT
Start: 2024-01-04 | End: 2024-01-07 | Stop reason: HOSPADM

## 2024-01-04 RX ADMIN — ATORVASTATIN CALCIUM 80 MG: 80 TABLET, FILM COATED ORAL at 21:19

## 2024-01-04 RX ADMIN — HEPARIN SODIUM 1700 UNITS: 1000 INJECTION INTRAVENOUS; SUBCUTANEOUS at 22:20

## 2024-01-04 RX ADMIN — METOCLOPRAMIDE 10 MG: 10 TABLET ORAL at 19:10

## 2024-01-04 RX ADMIN — POTASSIUM CHLORIDE 10 MEQ: 7.46 INJECTION, SOLUTION INTRAVENOUS at 05:27

## 2024-01-04 RX ADMIN — ONDANSETRON 4 MG: 2 INJECTION INTRAMUSCULAR; INTRAVENOUS at 15:38

## 2024-01-04 RX ADMIN — METHYLPREDNISOLONE SODIUM SUCCINATE 40 MG: 40 INJECTION INTRAMUSCULAR; INTRAVENOUS at 18:51

## 2024-01-04 RX ADMIN — POTASSIUM CHLORIDE 10 MEQ: 7.46 INJECTION, SOLUTION INTRAVENOUS at 04:25

## 2024-01-04 RX ADMIN — METHYLPREDNISOLONE SODIUM SUCCINATE 125 MG: 125 INJECTION INTRAMUSCULAR; INTRAVENOUS at 01:55

## 2024-01-04 RX ADMIN — ACETAMINOPHEN 650 MG: 325 TABLET ORAL at 15:37

## 2024-01-04 RX ADMIN — POTASSIUM CHLORIDE 10 MEQ: 7.46 INJECTION, SOLUTION INTRAVENOUS at 06:33

## 2024-01-04 RX ADMIN — IPRATROPIUM BROMIDE AND ALBUTEROL SULFATE 1 DOSE: 2.5; .5 SOLUTION RESPIRATORY (INHALATION) at 12:05

## 2024-01-04 RX ADMIN — DOXYCYCLINE HYCLATE 100 MG: 100 TABLET, COATED ORAL at 21:19

## 2024-01-04 RX ADMIN — BUSPIRONE HYDROCHLORIDE 10 MG: 5 TABLET ORAL at 21:19

## 2024-01-04 RX ADMIN — MAGNESIUM SULFATE HEPTAHYDRATE 2000 MG: 40 INJECTION, SOLUTION INTRAVENOUS at 02:03

## 2024-01-04 RX ADMIN — HEPARIN SODIUM 3400 UNITS: 1000 INJECTION INTRAVENOUS; SUBCUTANEOUS at 07:30

## 2024-01-04 RX ADMIN — IPRATROPIUM BROMIDE AND ALBUTEROL SULFATE 1 DOSE: 2.5; .5 SOLUTION RESPIRATORY (INHALATION) at 15:56

## 2024-01-04 RX ADMIN — METHYLPREDNISOLONE SODIUM SUCCINATE 40 MG: 40 INJECTION INTRAMUSCULAR; INTRAVENOUS at 13:33

## 2024-01-04 RX ADMIN — IPRATROPIUM BROMIDE AND ALBUTEROL SULFATE 2 DOSE: 2.5; .5 SOLUTION RESPIRATORY (INHALATION) at 01:54

## 2024-01-04 RX ADMIN — ONDANSETRON 4 MG: 2 INJECTION INTRAMUSCULAR; INTRAVENOUS at 07:53

## 2024-01-04 RX ADMIN — ACETAMINOPHEN 650 MG: 325 TABLET ORAL at 07:53

## 2024-01-04 RX ADMIN — HYDROCODONE BITARTRATE AND ACETAMINOPHEN 1 TABLET: 5; 325 TABLET ORAL at 03:20

## 2024-01-04 RX ADMIN — METHOCARBAMOL 500 MG: 500 TABLET ORAL at 19:10

## 2024-01-04 RX ADMIN — PANTOPRAZOLE SODIUM 40 MG: 40 TABLET, DELAYED RELEASE ORAL at 16:25

## 2024-01-04 RX ADMIN — DIAZEPAM 5 MG: 5 TABLET ORAL at 16:25

## 2024-01-04 RX ADMIN — ASPIRIN 81 MG: 81 TABLET, CHEWABLE ORAL at 18:51

## 2024-01-04 RX ADMIN — ASPIRIN 324 MG: 81 TABLET, CHEWABLE ORAL at 06:34

## 2024-01-04 RX ADMIN — IPRATROPIUM BROMIDE AND ALBUTEROL SULFATE 1 DOSE: 2.5; .5 SOLUTION RESPIRATORY (INHALATION) at 20:03

## 2024-01-04 RX ADMIN — HEPARIN SODIUM 680 UNITS/HR: 10000 INJECTION, SOLUTION INTRAVENOUS at 07:32

## 2024-01-04 RX ADMIN — Medication 10 ML: at 21:21

## 2024-01-04 ASSESSMENT — PAIN - FUNCTIONAL ASSESSMENT
PAIN_FUNCTIONAL_ASSESSMENT: ACTIVITIES ARE NOT PREVENTED
PAIN_FUNCTIONAL_ASSESSMENT: ACTIVITIES ARE NOT PREVENTED

## 2024-01-04 ASSESSMENT — PAIN DESCRIPTION - PAIN TYPE: TYPE: ACUTE PAIN

## 2024-01-04 ASSESSMENT — PAIN DESCRIPTION - LOCATION
LOCATION: HEAD

## 2024-01-04 ASSESSMENT — PAIN DESCRIPTION - ORIENTATION
ORIENTATION: OTHER (COMMENT)
ORIENTATION: OTHER (COMMENT)

## 2024-01-04 ASSESSMENT — PAIN SCALES - GENERAL
PAINLEVEL_OUTOF10: 5
PAINLEVEL_OUTOF10: 7
PAINLEVEL_OUTOF10: 9
PAINLEVEL_OUTOF10: 4
PAINLEVEL_OUTOF10: 0
PAINLEVEL_OUTOF10: 8
PAINLEVEL_OUTOF10: 5
PAINLEVEL_OUTOF10: 8

## 2024-01-04 ASSESSMENT — PAIN DESCRIPTION - DESCRIPTORS
DESCRIPTORS: THROBBING
DESCRIPTORS: THROBBING

## 2024-01-04 NOTE — ED NOTES
Patient resting comfortably in bed at this time. Call light remains in reach. Pt denies any further needs at this time

## 2024-01-04 NOTE — ED PROVIDER NOTES
predisposing factors to potential respiratory failure.    CONSULTS: (Who and What was discussed)  None    Discussion with Other Profesionals : Admitting Team hospitalist, who accepted admission.    Social Determinants : Patient has significant healthcare illiteracy    Records Reviewed : Inpatient Notes COPD exacerbation admits in July and August 2023    CC/HPI Summary, DDx, ED Course, and Reassessment: Joyce is a 63-year-old female with significant history of COPD, CHF, lung Ca presents with dyspnea, decreased exercise tolerance, wheezing throughout on examination, tachypneic, using accessory muscles. Able to maintain sats but appeared to be tired with respirations. Discussed BiPAP administration, patient did not want this, stating that she is claustrophobic with the mask.     Was given nebs, steroids IV to improvement in work of breathing on reassessment. Also considered ACS as patient had complained of chest pain prior to arrival, now with significant improvement in chest pain but with uptrending troponins.    Initial EKG showed sinus tachycardia, nonspecific ST abnormality, repeat EKG shows normal sinus rhythm without ischemic features. Has uptrending troponin 60s->90s. Likely type II NSTEMI secondary to COPD exacerbation, and potentially my treatment of such.       CMP-CMP was ordered to rule out electrolyte abnormalities, liver dysfunction, kidney dysfunction, electrolyte imbalance, abnormal transaminases, or any other pathology that might be causing the patient's symptoms.     CBC-CBC was ordered to rule out anemia, infection, abnormal platelet count, polycythemia, abnormal Red cell pathology, or any other pathology that might be causing the patient's symptoms     BNP to assess for volume overload manifested by heart strain.  VBG to assess acid base status, evaluate for CO2 retention.  Repleting K IV.        Disposition Considerations (tests considered but not done, Shared Decision Making, Pt Expectation of

## 2024-01-04 NOTE — RT PROTOCOL NOTE
RT Inhaler-Nebulizer Bronchodilator Protocol Note    There is a bronchodilator order in the chart from a provider indicating to follow the RT Bronchodilator Protocol and there is an “Initiate RT Inhaler-Nebulizer Bronchodilator Protocol” order as well (see protocol at bottom of note).    CXR Findings:  XR CHEST PORTABLE    Result Date: 1/4/2024  Shallow inflation.  Similar appearance of basilar opacities favoring subsegmental atelectasis or scarring.       The findings from the last RT Protocol Assessment were as follows:   History Pulmonary Disease: Chronic pulmonary disease  Respiratory Pattern: Mild dyspnea at rest, irregular pattern, or RR 21-25 bpm  Breath Sounds: Intermittent or unilateral wheezes  Cough: Strong, spontaneous, non-productive  Indication for Bronchodilator Therapy: Wheezing associated with pulm disorder  Bronchodilator Assessment Score: 10    Aerosolized bronchodilator medication orders have been revised according to the RT Inhaler-Nebulizer Bronchodilator Protocol below.    Respiratory Therapist to perform RT Therapy Protocol Assessment initially then follow the protocol.  Repeat RT Therapy Protocol Assessment PRN for score 0-3 or on second treatment, BID, and PRN for scores above 3.    No Indications - adjust the frequency to every 6 hours PRN wheezing or bronchospasm, if no treatments needed after 48 hours then discontinue using Per Protocol order mode.     If indication present, adjust the RT bronchodilator orders based on the Bronchodilator Assessment Score as indicated below.  Use Inhaler orders unless patient has one or more of the following: on home nebulizer, not able to hold breath for 10 seconds, is not alert and oriented, cannot activate and use MDI correctly, or respiratory rate 25 breaths per minute or more, then use the equivalent nebulizer order(s) with same Frequency and PRN reasons based on the score.  If a patient is on this medication at home then do not decrease Frequency

## 2024-01-04 NOTE — ED NOTES
Unable to obtain a second PIV for Heparin. ED RN, Teagan at pt beside with ultrasound seeking a second line.

## 2024-01-04 NOTE — H&P
V2.0  History and Physical      Name:  Joyce Uribe /Age/Sex: 1960  (63 y.o. female)   MRN & CSN:  [id number] & [id number] Encounter Date/Time: 2024 7:42 AM EST   Location:   PCP: Jaky Ramirez APRN - NP       Hospital Day: 1    Assessment and Plan:   Joyce Uribe is a 63 y.o. female with a pmh of HTN, COPD, CAD with previous MI who presents with COPD exacerbation (HCC)    Hospital Problems             Last Modified POA    * (Principal) COPD exacerbation (HCC) 2024 Yes       Plan:    Elevated troponin:  - trending up  - Continue to trend to peak  - EKG NSR    - Started on Heparin due to concern for NSTEMI  - Echo ordered  - Cardiology consulted    COPD   - Chronic  - Moderate COPD at baseline on PFTs in , maintained on Trelegy and albuterol as outpatient.    - Recent exacerbation in July and 2023 requiring admission  - Continue Duonebs and Prednisone  - CXR 24 - Shallow inflation.  Similar appearance of basilar opacities favoring subsegmental atelectasis or scarring compared to previous image      CHF  - Per history.    - Stable.    - Last echo in May 2023 with EF of 60% and no wall abnormalities.    - Continue metoprolol     HELEN  - Follow-up as outpatient       Disposition:   Current Living situation: home  Expected Disposition: home  Estimated D/C: 24    Diet No diet orders on file   DVT Prophylaxis [] Lovenox, []  Heparin, [] SCDs, [] Ambulation,  [] Eliquis, [] Xarelto, [] Coumadin   Code Status Prior         Personally reviewed Lab Studies and Imaging       History from:     patient    History of Present Illness:     Chief Complaint: SOB    Joyce Uribe is a 63 y.o. female with pmh of HTN, COPD, CAD with previous MI who presents with shortness of breath.     The patient was previously admitted on in July and August for SOB. Recent admission for COPD exacerbation treated with Azithromycin, Ceftin PO, steroids and duo nebs in 2023.  Patient follows with Dr. Alexander

## 2024-01-05 ENCOUNTER — APPOINTMENT (OUTPATIENT)
Dept: CARDIAC CATH/INVASIVE PROCEDURES | Age: 64
DRG: 281 | End: 2024-01-05
Payer: COMMERCIAL

## 2024-01-05 LAB
ANION GAP SERPL CALCULATED.3IONS-SCNC: 10 MMOL/L (ref 3–16)
ANTI-XA UNFRAC HEPARIN: 0.47 IU/ML (ref 0.3–0.7)
BASOPHILS # BLD: 0 K/UL (ref 0–0.2)
BASOPHILS NFR BLD: 0.4 %
BUN SERPL-MCNC: 16 MG/DL (ref 7–20)
CALCIUM SERPL-MCNC: 9.2 MG/DL (ref 8.3–10.6)
CHLORIDE SERPL-SCNC: 101 MMOL/L (ref 99–110)
CO2 SERPL-SCNC: 27 MMOL/L (ref 21–32)
CREAT SERPL-MCNC: <0.5 MG/DL (ref 0.6–1.2)
DEPRECATED RDW RBC AUTO: 14.7 % (ref 12.4–15.4)
EOSINOPHIL # BLD: 0 K/UL (ref 0–0.6)
EOSINOPHIL NFR BLD: 0 %
GFR SERPLBLD CREATININE-BSD FMLA CKD-EPI: >60 ML/MIN/{1.73_M2}
GLUCOSE SERPL-MCNC: 136 MG/DL (ref 70–99)
HCT VFR BLD AUTO: 36.7 % (ref 36–48)
HGB BLD-MCNC: 11.9 G/DL (ref 12–16)
LYMPHOCYTES # BLD: 0.8 K/UL (ref 1–5.1)
LYMPHOCYTES NFR BLD: 7.9 %
MCH RBC QN AUTO: 30.5 PG (ref 26–34)
MCHC RBC AUTO-ENTMCNC: 32.5 G/DL (ref 31–36)
MCV RBC AUTO: 93.8 FL (ref 80–100)
MONOCYTES # BLD: 0.1 K/UL (ref 0–1.3)
MONOCYTES NFR BLD: 1.3 %
NEUTROPHILS # BLD: 9 K/UL (ref 1.7–7.7)
NEUTROPHILS NFR BLD: 90.4 %
PLATELET # BLD AUTO: 254 K/UL (ref 135–450)
PMV BLD AUTO: 9 FL (ref 5–10.5)
POTASSIUM SERPL-SCNC: 4.4 MMOL/L (ref 3.5–5.1)
RBC # BLD AUTO: 3.91 M/UL (ref 4–5.2)
SODIUM SERPL-SCNC: 138 MMOL/L (ref 136–145)
WBC # BLD AUTO: 10 K/UL (ref 4–11)

## 2024-01-05 PROCEDURE — 6370000000 HC RX 637 (ALT 250 FOR IP): Performed by: INTERNAL MEDICINE

## 2024-01-05 PROCEDURE — 2500000003 HC RX 250 WO HCPCS

## 2024-01-05 PROCEDURE — 2580000003 HC RX 258

## 2024-01-05 PROCEDURE — 85347 COAGULATION TIME ACTIVATED: CPT

## 2024-01-05 PROCEDURE — 94761 N-INVAS EAR/PLS OXIMETRY MLT: CPT

## 2024-01-05 PROCEDURE — C1769 GUIDE WIRE: HCPCS | Performed by: INTERNAL MEDICINE

## 2024-01-05 PROCEDURE — 93458 L HRT ARTERY/VENTRICLE ANGIO: CPT | Performed by: INTERNAL MEDICINE

## 2024-01-05 PROCEDURE — C1894 INTRO/SHEATH, NON-LASER: HCPCS | Performed by: INTERNAL MEDICINE

## 2024-01-05 PROCEDURE — 97535 SELF CARE MNGMENT TRAINING: CPT

## 2024-01-05 PROCEDURE — 97166 OT EVAL MOD COMPLEX 45 MIN: CPT

## 2024-01-05 PROCEDURE — 94640 AIRWAY INHALATION TREATMENT: CPT

## 2024-01-05 PROCEDURE — B2151ZZ FLUOROSCOPY OF LEFT HEART USING LOW OSMOLAR CONTRAST: ICD-10-PCS | Performed by: INTERNAL MEDICINE

## 2024-01-05 PROCEDURE — C1887 CATHETER, GUIDING: HCPCS | Performed by: INTERNAL MEDICINE

## 2024-01-05 PROCEDURE — 2060000000 HC ICU INTERMEDIATE R&B

## 2024-01-05 PROCEDURE — 2709999900 HC NON-CHARGEABLE SUPPLY: Performed by: INTERNAL MEDICINE

## 2024-01-05 PROCEDURE — 99233 SBSQ HOSP IP/OBS HIGH 50: CPT | Performed by: INTERNAL MEDICINE

## 2024-01-05 PROCEDURE — 93458 L HRT ARTERY/VENTRICLE ANGIO: CPT

## 2024-01-05 PROCEDURE — 6360000002 HC RX W HCPCS

## 2024-01-05 PROCEDURE — 80048 BASIC METABOLIC PNL TOTAL CA: CPT

## 2024-01-05 PROCEDURE — 6360000002 HC RX W HCPCS: Performed by: INTERNAL MEDICINE

## 2024-01-05 PROCEDURE — 85520 HEPARIN ASSAY: CPT

## 2024-01-05 PROCEDURE — 97530 THERAPEUTIC ACTIVITIES: CPT

## 2024-01-05 PROCEDURE — 99152 MOD SED SAME PHYS/QHP 5/>YRS: CPT | Performed by: INTERNAL MEDICINE

## 2024-01-05 PROCEDURE — 2700000000 HC OXYGEN THERAPY PER DAY

## 2024-01-05 PROCEDURE — 4A023N7 MEASUREMENT OF CARDIAC SAMPLING AND PRESSURE, LEFT HEART, PERCUTANEOUS APPROACH: ICD-10-PCS | Performed by: INTERNAL MEDICINE

## 2024-01-05 PROCEDURE — 94669 MECHANICAL CHEST WALL OSCILL: CPT

## 2024-01-05 PROCEDURE — 85025 COMPLETE CBC W/AUTO DIFF WBC: CPT

## 2024-01-05 PROCEDURE — B2111ZZ FLUOROSCOPY OF MULTIPLE CORONARY ARTERIES USING LOW OSMOLAR CONTRAST: ICD-10-PCS | Performed by: INTERNAL MEDICINE

## 2024-01-05 RX ORDER — SODIUM CHLORIDE 0.9 % (FLUSH) 0.9 %
5-40 SYRINGE (ML) INJECTION EVERY 12 HOURS SCHEDULED
Status: DISCONTINUED | OUTPATIENT
Start: 2024-01-05 | End: 2024-01-05

## 2024-01-05 RX ORDER — FENTANYL CITRATE 50 UG/ML
INJECTION, SOLUTION INTRAMUSCULAR; INTRAVENOUS
Status: COMPLETED | OUTPATIENT
Start: 2024-01-05 | End: 2024-01-05

## 2024-01-05 RX ORDER — LORAZEPAM 0.5 MG/1
0.5 TABLET ORAL
Status: COMPLETED | OUTPATIENT
Start: 2024-01-05 | End: 2024-01-05

## 2024-01-05 RX ORDER — HEPARIN SODIUM 1000 [USP'U]/ML
INJECTION, SOLUTION INTRAVENOUS; SUBCUTANEOUS
Status: COMPLETED | OUTPATIENT
Start: 2024-01-05 | End: 2024-01-05

## 2024-01-05 RX ORDER — MIDAZOLAM HYDROCHLORIDE 1 MG/ML
INJECTION INTRAMUSCULAR; INTRAVENOUS
Status: COMPLETED | OUTPATIENT
Start: 2024-01-05 | End: 2024-01-05

## 2024-01-05 RX ORDER — ACETAMINOPHEN 325 MG/1
650 TABLET ORAL EVERY 4 HOURS PRN
Status: DISCONTINUED | OUTPATIENT
Start: 2024-01-05 | End: 2024-01-07 | Stop reason: HOSPADM

## 2024-01-05 RX ORDER — ASPIRIN 325 MG
325 TABLET ORAL ONCE
Status: COMPLETED | OUTPATIENT
Start: 2024-01-05 | End: 2024-01-05

## 2024-01-05 RX ORDER — ONDANSETRON 2 MG/ML
4 INJECTION INTRAMUSCULAR; INTRAVENOUS EVERY 6 HOURS PRN
Status: DISCONTINUED | OUTPATIENT
Start: 2024-01-05 | End: 2024-01-07 | Stop reason: HOSPADM

## 2024-01-05 RX ORDER — SODIUM CHLORIDE 0.9 % (FLUSH) 0.9 %
5-40 SYRINGE (ML) INJECTION PRN
Status: DISCONTINUED | OUTPATIENT
Start: 2024-01-05 | End: 2024-01-07 | Stop reason: HOSPADM

## 2024-01-05 RX ORDER — SODIUM CHLORIDE 9 MG/ML
INJECTION, SOLUTION INTRAVENOUS PRN
Status: DISCONTINUED | OUTPATIENT
Start: 2024-01-05 | End: 2024-01-05

## 2024-01-05 RX ORDER — WATER 10 ML/10ML
INJECTION INTRAMUSCULAR; INTRAVENOUS; SUBCUTANEOUS
Status: COMPLETED
Start: 2024-01-05 | End: 2024-01-05

## 2024-01-05 RX ADMIN — METOPROLOL TARTRATE 12.5 MG: 25 TABLET, FILM COATED ORAL at 20:23

## 2024-01-05 RX ADMIN — MIDAZOLAM HYDROCHLORIDE 0.5 MG: 1 INJECTION INTRAMUSCULAR; INTRAVENOUS at 10:06

## 2024-01-05 RX ADMIN — MIDAZOLAM HYDROCHLORIDE 0.5 MG: 1 INJECTION INTRAMUSCULAR; INTRAVENOUS at 10:29

## 2024-01-05 RX ADMIN — METHOCARBAMOL 500 MG: 500 TABLET ORAL at 20:33

## 2024-01-05 RX ADMIN — LORAZEPAM 0.5 MG: 0.5 TABLET ORAL at 08:22

## 2024-01-05 RX ADMIN — IPRATROPIUM BROMIDE AND ALBUTEROL SULFATE 1 DOSE: 2.5; .5 SOLUTION RESPIRATORY (INHALATION) at 16:34

## 2024-01-05 RX ADMIN — METHYLPREDNISOLONE SODIUM SUCCINATE 40 MG: 40 INJECTION INTRAMUSCULAR; INTRAVENOUS at 00:08

## 2024-01-05 RX ADMIN — IPRATROPIUM BROMIDE AND ALBUTEROL SULFATE 1 DOSE: 2.5; .5 SOLUTION RESPIRATORY (INHALATION) at 19:59

## 2024-01-05 RX ADMIN — DIAZEPAM 5 MG: 5 TABLET ORAL at 17:12

## 2024-01-05 RX ADMIN — BUSPIRONE HYDROCHLORIDE 10 MG: 5 TABLET ORAL at 20:23

## 2024-01-05 RX ADMIN — METOCLOPRAMIDE 10 MG: 10 TABLET ORAL at 20:33

## 2024-01-05 RX ADMIN — DOXYCYCLINE HYCLATE 100 MG: 100 TABLET, COATED ORAL at 20:23

## 2024-01-05 RX ADMIN — ONDANSETRON 4 MG: 2 INJECTION INTRAMUSCULAR; INTRAVENOUS at 04:31

## 2024-01-05 RX ADMIN — WATER: 1 INJECTION INTRAMUSCULAR; INTRAVENOUS; SUBCUTANEOUS at 00:15

## 2024-01-05 RX ADMIN — DOXYCYCLINE HYCLATE 100 MG: 100 TABLET, COATED ORAL at 11:32

## 2024-01-05 RX ADMIN — ASPIRIN 325 MG: 325 TABLET ORAL at 08:21

## 2024-01-05 RX ADMIN — IPRATROPIUM BROMIDE AND ALBUTEROL SULFATE 1 DOSE: 2.5; .5 SOLUTION RESPIRATORY (INHALATION) at 07:11

## 2024-01-05 RX ADMIN — METHYLPREDNISOLONE SODIUM SUCCINATE 40 MG: 40 INJECTION INTRAMUSCULAR; INTRAVENOUS at 17:16

## 2024-01-05 RX ADMIN — METHYLPREDNISOLONE SODIUM SUCCINATE 40 MG: 40 INJECTION INTRAMUSCULAR; INTRAVENOUS at 04:31

## 2024-01-05 RX ADMIN — HEPARIN SODIUM 3000 UNITS: 1000 INJECTION, SOLUTION INTRAVENOUS; SUBCUTANEOUS at 10:12

## 2024-01-05 RX ADMIN — MIDAZOLAM HYDROCHLORIDE 0.5 MG: 1 INJECTION INTRAMUSCULAR; INTRAVENOUS at 10:09

## 2024-01-05 RX ADMIN — DIAZEPAM 5 MG: 5 TABLET ORAL at 04:27

## 2024-01-05 RX ADMIN — METOPROLOL TARTRATE 12.5 MG: 25 TABLET, FILM COATED ORAL at 12:59

## 2024-01-05 RX ADMIN — BUSPIRONE HYDROCHLORIDE 10 MG: 5 TABLET ORAL at 11:32

## 2024-01-05 RX ADMIN — METHYLPREDNISOLONE SODIUM SUCCINATE 40 MG: 40 INJECTION INTRAMUSCULAR; INTRAVENOUS at 11:31

## 2024-01-05 RX ADMIN — ONDANSETRON 4 MG: 2 INJECTION INTRAMUSCULAR; INTRAVENOUS at 11:27

## 2024-01-05 RX ADMIN — ACETAMINOPHEN 650 MG: 325 TABLET ORAL at 11:32

## 2024-01-05 RX ADMIN — FENTANYL CITRATE 25 MCG: 50 INJECTION, SOLUTION INTRAMUSCULAR; INTRAVENOUS at 10:06

## 2024-01-05 RX ADMIN — FENTANYL CITRATE 25 MCG: 50 INJECTION, SOLUTION INTRAMUSCULAR; INTRAVENOUS at 10:09

## 2024-01-05 RX ADMIN — ATORVASTATIN CALCIUM 80 MG: 80 TABLET, FILM COATED ORAL at 20:23

## 2024-01-05 RX ADMIN — FENTANYL CITRATE 25 MCG: 50 INJECTION, SOLUTION INTRAMUSCULAR; INTRAVENOUS at 10:29

## 2024-01-05 RX ADMIN — METHOCARBAMOL 500 MG: 500 TABLET ORAL at 03:41

## 2024-01-05 RX ADMIN — PANTOPRAZOLE SODIUM 40 MG: 40 TABLET, DELAYED RELEASE ORAL at 11:32

## 2024-01-05 RX ADMIN — METHYLPREDNISOLONE SODIUM SUCCINATE 40 MG: 40 INJECTION INTRAMUSCULAR; INTRAVENOUS at 23:52

## 2024-01-05 ASSESSMENT — PAIN SCALES - GENERAL
PAINLEVEL_OUTOF10: 0
PAINLEVEL_OUTOF10: 6
PAINLEVEL_OUTOF10: 10
PAINLEVEL_OUTOF10: 6

## 2024-01-05 ASSESSMENT — PAIN - FUNCTIONAL ASSESSMENT: PAIN_FUNCTIONAL_ASSESSMENT: ACTIVITIES ARE NOT PREVENTED

## 2024-01-05 ASSESSMENT — PAIN DESCRIPTION - LOCATION
LOCATION: BACK
LOCATION: HEAD

## 2024-01-05 ASSESSMENT — PAIN DESCRIPTION - DESCRIPTORS: DESCRIPTORS: THROBBING

## 2024-01-05 NOTE — RT PROTOCOL NOTE
RT Nebulizer Bronchodilator Protocol Note    There is a bronchodilator order in the chart from a provider indicating to follow the RT Bronchodilator Protocol and there is an “Initiate RT Bronchodilator Protocol” order as well (see protocol at bottom of note).    CXR Findings:  XR CHEST PORTABLE    Result Date: 1/4/2024  Shallow inflation.  Similar appearance of basilar opacities favoring subsegmental atelectasis or scarring.       The findings from the last RT Protocol Assessment were as follows:  Smoking: Chronic pulmonary disease  Respiratory Pattern: Mild dyspnea at rest, irregular pattern, or RR 21-25 bpm  Breath Sounds: Inspiratory and expiratory or bilateral wheezing and/or rhonchi  Cough: Strong, productive  Indication for Bronchodilator Therapy: Decreased or absent breath sounds, On home bronchodilators  Bronchodilator Assessment Score: 13    Aerosolized bronchodilator medication orders have been revised according to the RT Nebulizer Bronchodilator Protocol below.    Respiratory Therapist to perform RT Therapy Protocol Assessment initially then follow the protocol.  Repeat RT Therapy Protocol Assessment PRN for score 0-3 or on second treatment, BID, and PRN for scores above 3.    No Indications - adjust the frequency to every 6 hours PRN wheezing or bronchospasm, if no treatments needed after 48 hours then discontinue using Per Protocol order mode.     If indication present, adjust the RT bronchodilator orders based on the Bronchodilator Assessment Score as indicated below.  If a patient is on this medication at home then do not decrease Frequency below that used at home.    0-3 - enter or revise RT bronchodilator order(s) to equivalent RT Bronchodilator order with Frequency of every 4 hours PRN for wheezing or increased work of breathing using Per Protocol order mode.       4-6 - enter or revise RT Bronchodilator order(s) to two equivalent RT bronchodilator orders with one order with BID Frequency and one

## 2024-01-05 NOTE — PROCEDURES
CARDIAC CATHETERIZATION REPORT     Procedure Date:  2024  Patient Name: Joyce Uribe  MRN: [id number] : 1960      INDICATION     nstemi    PROCEDURES PERFORMED     Left heart catheterization  LVgram  Coronary angiogam  Coronary cath  Monitoring of moderate conscious sedation        PROCEDURE DESCRIPTION   Risks/benefits/alternatives/outcomes were discussed with patient and/or family and informed consent was obtained.  Using the Barbeau scale, the patient's right radial artery was found to be a level B.  Patient was prepped draped in the usual sterile fashion.  Local anaesthetic was applied over puncture site.  Using ultrasound guidance with a back wall technique, a 4/5 [place] Terumo sheath was inserted into right radial artery.  Verapamil, nitroglycerin, cardene were administered through the sheath.  There was radial spasm and radial artery was noted to be small.  Spasm improved with intra-arterial vasodilators heparin was administered.  Diagnostic 4fr pigtail, jl 3.5, 3drc and 5fr 3bu 3.5 catheters were used for diagnostic angiograms.  At the conclusion of the procedure, a TR band was placed over the puncture site and hemostasis was obtained.  There were no immediate complications. I supervised sedation from 10:05 AM to 10:30 AM with versed 1.5 mg/fentanyl 75 mcg during the procedure. An independent trained observer pushed meds at my direction.  We monitored the patient's level of consciousness and vital signs/physiologic status throughout the procedure duration (see times listed previously).  200 cc contrast was utilized. <20cc EBL.      FINDINGS       LVGRAM    LVEDP 11   GRADIENT ACROSS AORTIC VALVE None   LV FUNCTION EF 75%   WALL MOTION Hyperdynamic   MITRAL REGURGITATION Mild, PVC induced         CORONARY ARTERIES    LM Less than 10% [id number] stenosis         LAD Large vessel, Prox <10% stenosis.  Mid 50% stenosis followed by mid-distal bridging with 50% stenosis.        LCX

## 2024-01-05 NOTE — ACP (ADVANCE CARE PLANNING)
Advance Care Planning     Advance Care Planning Activator (Inpatient)  Conversation Note      Date of ACP Conversation: 1/5/2024     Conversation Conducted with: Patient with Decision Making Capacity    ACP Activator: Paolo Allred RN    Health Care Decision Maker:     Current Designated Health Care Decision Maker:     Primary Decision Maker: Ty Villarreal - Child - [phone]    Primary Decision Maker: Kate Viera - Child - [phone]  Click here to complete Healthcare Decision Makers including section of the Healthcare Decision Maker Relationship (ie \"Primary\")  Today we documented Decision Maker(s) consistent with Legal Next of Kin hierarchy.          Palliative Care Initial Note  Palliative Care Admit date:  1/5/24  Reason for c/s:  [facility]    Advance Directives:  Marcia has not executed AD's.  Although she is aware that she can complete while here, she is not interested.  Have explained the [place] order of decision making in the absence of a hcpoa and that we would look to all three of her children as her collective NOK.   Marcia stated \"that is exactly what I want, all three to work together and they know my wishes.\"      Pt has a full code status.   We discussed the elements of resuscitation and pt was clear to say that she would NOT want cpr and she does NOT wish to be \"intubate\" and placed on MV support, even for the short term.   Marcia is wanting to amend code status to reflect DNR/DNI.  Offered to f/u w/ one of her children to apprise them of this change in code status to which Marcia stated \"they already know I'm going to change it.\"    Plan of care/goals: \"My lungs are shot, I wish I hadn't smoked.\"   Pt has three adult children, Ty, Kate and Ian.  Pt is very close to her children and it sounds as though they are very involved in her care, particularly Ty & Kate as they live locally.   Marcia has felt that she has obtained benefit from her multiple admissions over the last year and her overarching goal is

## 2024-01-05 NOTE — CONSULTS
Palliative Care Initial Note  Palliative Care Admit date:  1/5/24    ACP/palcare referral noted  
    Pharmacy to Manage Heparin Infusion per Hospital Policy    Dx: Elevated Troponin  Pt wt = 56.7 kg  Baseline aPTT =     Oral factor Xa-inhibitors may alter and elevate anti-Xa levels used for unfractionated heparin monitoring. As a result, anti-Xa monitoring is not accurate while Xa-inhibitor activity is detectable. Utilize aPTT monitoring when patient received an oral factor Xa-inhibitor (apixaban, betrixaban, edoxaban or rivaroxaban) within 72 hours prior to admission (please document last administration time). The goal is to allow a washout of oral factor Xa-inhibitors by using aPTT for 72 hours, then change to ant-Xa levels for UFH.     Heparin (weight-based) Infusion: CAD/STEMI/NSTEMI/UA/AFib)   Heparin 60 units/kg IVP bolus (max 4,000 units) followed by Heparin infusion at 12 units/kg/hr (recommended max initial rate: 1000 units/hr).  Recheck anti-Xa (unless aPTT being used) in 6 hours.  Goal anti-Xa 0.3-0.7 IU/mL  Goal aPTT =  seconds.    Yosef Lerner Pharm D.1/4/2024 6:30 AM    1/4 1530  Anti-Xa - 0.30  Continue heparin infusion at 680 units/hr  Next anti-Xa at 2130  Ryan MartinezD 1/4/2024 3:49 PM    1/4/24   2210  Low dose Heparin GTT:  Anti-Xa at 2130 is 0.26.  Desired range is 0.3-0.7IU/mL.  Give a 1,700 unit IV bolus dose and increase the infusion to 790 units/hr.  Check an anti-Xa at 0400 1/5/24.  Trung Juarez SASHA PharmD 1/4/2024 10:06 PM    1/5/2024 at 0427  Anti-Xa Unfrac Heparin   0.47   IU/mL  - No change to Heparin at this time, continue Heparin infusion at _790_ units/hr.  - Recheck Anti-Xa in 6 hours at 1030  Yosef Lerner Pharm D.1/5/2024 4:56 AM                
Added Paolo Allred RN to consult box on 1/5 @ 8:30AM  CHLOE AMAYA  
Called and confirmed  is aware of pulm consult on 1/5 @ 8:10AM  CHLOE AMAYA  
Called and placed a consult with cardiology on 1/4 @ 1:46PM  CHLOE AMAYA  
[facility] - INITIAL CONSULTATION        CHIEF COMPLAINT  Chest pain, elevated troponin      HISTORY OF PRESENTING ILLNESS  Joyce Uribe is a 63 y.o. patient with history of COPD, long smoking history, substance use who presented to the hospital with complaints of worsening shortness of breath compared to baseline as well as new onset chest pain. I have been asked to provide consultation regarding further management and testing.    She reports baseline COPD and supplemental oxygen requirement and has chronic shortness of breath which got worse 2 years ago when she had COVID.  Since then she has been persistently short of breath on minimal exertion and she is not able to do 1 flight of stairs without stopping.  She reports that a week ago she suddenly started to notice heaviness and pressure-like chest discomfort with minimal exertion which improved with rest.  There were no positional changes.  This was associated with worsening of her baseline shortness of breath as well.  She denies any recent illnesses, fever, cough or sick contacts.  She denies other cardiac symptoms including edema, orthopnea, lightheadedness, palpitations, syncope.  She is being treated for COPD exacerbation.  Of note, she had an abnormal stress test 11 years ago that led to a coronary angiogram at [facility] which was reported without significant coronary artery disease.      PAST MEDICAL HISTORY   has a past medical history of Anxiety, Asthma, Cancer (HCC), Cancer of lung (HCC), CHF (congestive heart failure) (HCC), COPD (chronic obstructive pulmonary disease) (HCC), Cyclic vomiting syndrome, Cysts of both kidneys, Depression, Fatty liver, Gastritis, MI (myocardial infarction) ([facility]), Panic attacks, Pneumonia, Pre-diabetes, and Tricuspid regurgitation.    SURGICAL HISTORY   has a past surgical history that includes Cholecystectomy (2009); Hysterectomy (1985); hernia repair; Colonoscopy (2001); Colonoscopy (2013); Upper gastrointestinal 
pulmonary arterial hypertension likely secondary to emphysematous changes. These findings are stable from prior CTA August 30, 2021.       PFT:PFT in 2014-PFT TEST     l     Spirometry   Spirometry shows severe obstructive defect..     Bronchodilator   There is significant response to bronchodilator demonstrated.     Flow-Volume Loop   The flow-volume loop is compatible with obstructive lung defect.     Lung Volumes   Lung volumes are normal.     Lung Volume Comments   There is air trapping and hyperinflation present.     Diffusing Capacity   Single breath diffusing capacity is moderately decreased.     Airway Resistance   Airway resistance is mildly elevated.         PFT is compatible with Severe Obstruction Golds Class 3         Surgical pathology in 2014-FINAL DIAGNOSIS:     A.  Lymph node, designated station five:   -  Negative for metastatic carcinoma (one node).     B.  Lymph node, designated station 10:   -  Negative for metastatic carcinoma (one node).     C.  Lymph node, designated station 11:   -  Negative for metastatic carcinoma (one node).     D.  Lymph node, designated station nine:   -  Negative for metastatic carcinoma (one node).     E.  Lymph node, designated station seven:   -  Fibrofatty tissue with nerve fragments; negative for metastatic   carcinoma.   -  No lymph node identified.     F.  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   -  Negative for pleural invasion.   -  Margins clear including bronchial, vascular, and parenchymal.     UDS in past -   Latest Reference Range & Units 10/16/20 07:52 11/26/20 16:01 12/25/20 15:00   Amphetamine Screen, Urine Negative <1000ng/mL  Neg Neg Neg   Benzodiazepine Screen, Urine Negative <200 ng/mL  POSITIVE ! POSITIVE ! POSITIVE !   Cocaine Metabolite Screen, Urine Negative <300 ng/mL  Neg Neg Neg   METHADONE SCREEN, URINE, 62858 Negative <300 ng/mL  Neg

## 2024-01-06 LAB
ANION GAP SERPL CALCULATED.3IONS-SCNC: 11 MMOL/L (ref 3–16)
BUN SERPL-MCNC: 23 MG/DL (ref 7–20)
CALCIUM SERPL-MCNC: 9.2 MG/DL (ref 8.3–10.6)
CHLORIDE SERPL-SCNC: 102 MMOL/L (ref 99–110)
CO2 SERPL-SCNC: 24 MMOL/L (ref 21–32)
CREAT SERPL-MCNC: <0.5 MG/DL (ref 0.6–1.2)
DEPRECATED RDW RBC AUTO: 14.5 % (ref 12.4–15.4)
GFR SERPLBLD CREATININE-BSD FMLA CKD-EPI: >60 ML/MIN/{1.73_M2}
GLUCOSE SERPL-MCNC: 135 MG/DL (ref 70–99)
HCT VFR BLD AUTO: 36.2 % (ref 36–48)
HGB BLD-MCNC: 11.5 G/DL (ref 12–16)
MCH RBC QN AUTO: 30.1 PG (ref 26–34)
MCHC RBC AUTO-ENTMCNC: 31.8 G/DL (ref 31–36)
MCV RBC AUTO: 94.6 FL (ref 80–100)
PLATELET # BLD AUTO: 262 K/UL (ref 135–450)
PMV BLD AUTO: 9.6 FL (ref 5–10.5)
POTASSIUM SERPL-SCNC: 4.7 MMOL/L (ref 3.5–5.1)
POTASSIUM SERPL-SCNC: 4.7 MMOL/L (ref 3.5–5.1)
RBC # BLD AUTO: 3.82 M/UL (ref 4–5.2)
SODIUM SERPL-SCNC: 137 MMOL/L (ref 136–145)
WBC # BLD AUTO: 13.4 K/UL (ref 4–11)

## 2024-01-06 PROCEDURE — 97530 THERAPEUTIC ACTIVITIES: CPT

## 2024-01-06 PROCEDURE — 2700000000 HC OXYGEN THERAPY PER DAY

## 2024-01-06 PROCEDURE — 36415 COLL VENOUS BLD VENIPUNCTURE: CPT

## 2024-01-06 PROCEDURE — 85027 COMPLETE CBC AUTOMATED: CPT

## 2024-01-06 PROCEDURE — 2060000000 HC ICU INTERMEDIATE R&B

## 2024-01-06 PROCEDURE — 6370000000 HC RX 637 (ALT 250 FOR IP): Performed by: INTERNAL MEDICINE

## 2024-01-06 PROCEDURE — 2580000003 HC RX 258: Performed by: INTERNAL MEDICINE

## 2024-01-06 PROCEDURE — 80048 BASIC METABOLIC PNL TOTAL CA: CPT

## 2024-01-06 PROCEDURE — 99232 SBSQ HOSP IP/OBS MODERATE 35: CPT | Performed by: INTERNAL MEDICINE

## 2024-01-06 PROCEDURE — 97162 PT EVAL MOD COMPLEX 30 MIN: CPT

## 2024-01-06 PROCEDURE — 97116 GAIT TRAINING THERAPY: CPT

## 2024-01-06 PROCEDURE — 6360000002 HC RX W HCPCS: Performed by: INTERNAL MEDICINE

## 2024-01-06 PROCEDURE — 94761 N-INVAS EAR/PLS OXIMETRY MLT: CPT

## 2024-01-06 PROCEDURE — 94640 AIRWAY INHALATION TREATMENT: CPT

## 2024-01-06 RX ORDER — PREDNISONE 20 MG/1
40 TABLET ORAL DAILY
Qty: 6 TABLET | Refills: 0 | Status: SHIPPED | OUTPATIENT
Start: 2024-01-06 | End: 2024-01-09

## 2024-01-06 RX ORDER — METOCLOPRAMIDE 10 MG/1
10 TABLET ORAL EVERY 6 HOURS PRN
Qty: 120 TABLET | Refills: 3 | Status: SHIPPED | OUTPATIENT
Start: 2024-01-06

## 2024-01-06 RX ORDER — SENNA AND DOCUSATE SODIUM 50; 8.6 MG/1; MG/1
2 TABLET, FILM COATED ORAL DAILY
Status: DISCONTINUED | OUTPATIENT
Start: 2024-01-06 | End: 2024-01-07 | Stop reason: HOSPADM

## 2024-01-06 RX ORDER — POLYETHYLENE GLYCOL 3350 17 G/17G
17 POWDER, FOR SOLUTION ORAL DAILY
Status: DISCONTINUED | OUTPATIENT
Start: 2024-01-06 | End: 2024-01-07 | Stop reason: HOSPADM

## 2024-01-06 RX ORDER — POLYETHYLENE GLYCOL 3350 17 G/17G
17 POWDER, FOR SOLUTION ORAL DAILY
Qty: 527 G | Refills: 1 | Status: SHIPPED | OUTPATIENT
Start: 2024-01-06 | End: 2024-03-08

## 2024-01-06 RX ORDER — ATORVASTATIN CALCIUM 80 MG/1
80 TABLET, FILM COATED ORAL NIGHTLY
Qty: 30 TABLET | Refills: 3 | Status: SHIPPED | OUTPATIENT
Start: 2024-01-06

## 2024-01-06 RX ORDER — DOXYCYCLINE HYCLATE 100 MG
100 TABLET ORAL EVERY 12 HOURS SCHEDULED
Qty: 7 TABLET | Refills: 0 | Status: SHIPPED | OUTPATIENT
Start: 2024-01-06 | End: 2024-01-10

## 2024-01-06 RX ORDER — ASPIRIN 81 MG/1
81 TABLET, CHEWABLE ORAL DAILY
Qty: 30 TABLET | Refills: 3 | Status: SHIPPED | OUTPATIENT
Start: 2024-01-06

## 2024-01-06 RX ORDER — PANTOPRAZOLE SODIUM 40 MG/1
40 TABLET, DELAYED RELEASE ORAL DAILY
Qty: 30 TABLET | Refills: 3 | Status: SHIPPED | OUTPATIENT
Start: 2024-01-06

## 2024-01-06 RX ADMIN — ASPIRIN 81 MG: 81 TABLET, CHEWABLE ORAL at 09:48

## 2024-01-06 RX ADMIN — PREDNISONE 40 MG: 20 TABLET ORAL at 14:37

## 2024-01-06 RX ADMIN — PANTOPRAZOLE SODIUM 40 MG: 40 TABLET, DELAYED RELEASE ORAL at 09:49

## 2024-01-06 RX ADMIN — METOPROLOL TARTRATE 12.5 MG: 25 TABLET, FILM COATED ORAL at 20:40

## 2024-01-06 RX ADMIN — Medication 10 ML: at 09:49

## 2024-01-06 RX ADMIN — BUSPIRONE HYDROCHLORIDE 10 MG: 5 TABLET ORAL at 09:48

## 2024-01-06 RX ADMIN — METHYLPREDNISOLONE SODIUM SUCCINATE 40 MG: 40 INJECTION INTRAMUSCULAR; INTRAVENOUS at 04:46

## 2024-01-06 RX ADMIN — DIAZEPAM 5 MG: 5 TABLET ORAL at 19:08

## 2024-01-06 RX ADMIN — IPRATROPIUM BROMIDE AND ALBUTEROL SULFATE 1 DOSE: 2.5; .5 SOLUTION RESPIRATORY (INHALATION) at 16:46

## 2024-01-06 RX ADMIN — Medication 10 ML: at 14:44

## 2024-01-06 RX ADMIN — IPRATROPIUM BROMIDE AND ALBUTEROL SULFATE 1 DOSE: 2.5; .5 SOLUTION RESPIRATORY (INHALATION) at 11:49

## 2024-01-06 RX ADMIN — DOXYCYCLINE HYCLATE 100 MG: 100 TABLET, COATED ORAL at 20:41

## 2024-01-06 RX ADMIN — ATORVASTATIN CALCIUM 80 MG: 80 TABLET, FILM COATED ORAL at 20:40

## 2024-01-06 RX ADMIN — Medication 10 ML: at 20:41

## 2024-01-06 RX ADMIN — METHOCARBAMOL 500 MG: 500 TABLET ORAL at 20:40

## 2024-01-06 RX ADMIN — BUSPIRONE HYDROCHLORIDE 10 MG: 5 TABLET ORAL at 20:40

## 2024-01-06 RX ADMIN — DOXYCYCLINE HYCLATE 100 MG: 100 TABLET, COATED ORAL at 09:48

## 2024-01-06 RX ADMIN — METHOCARBAMOL 500 MG: 500 TABLET ORAL at 09:48

## 2024-01-06 RX ADMIN — IPRATROPIUM BROMIDE AND ALBUTEROL SULFATE 1 DOSE: 2.5; .5 SOLUTION RESPIRATORY (INHALATION) at 08:19

## 2024-01-06 RX ADMIN — DIAZEPAM 5 MG: 5 TABLET ORAL at 05:51

## 2024-01-06 RX ADMIN — IPRATROPIUM BROMIDE AND ALBUTEROL SULFATE 1 DOSE: 2.5; .5 SOLUTION RESPIRATORY (INHALATION) at 21:44

## 2024-01-06 RX ADMIN — METOPROLOL TARTRATE 12.5 MG: 25 TABLET, FILM COATED ORAL at 09:48

## 2024-01-06 RX ADMIN — ONDANSETRON 4 MG: 2 INJECTION INTRAMUSCULAR; INTRAVENOUS at 14:43

## 2024-01-06 NOTE — PLAN OF CARE
Problem: Discharge Planning  Goal: Discharge to home or other facility with appropriate resources  Outcome: Progressing  Flowsheets (Taken 1/5/2024 0733)  Discharge to home or other facility with appropriate resources: Identify barriers to discharge with patient and caregiver     Problem: Chronic Conditions and Co-morbidities  Goal: Patient's chronic conditions and co-morbidity symptoms are monitored and maintained or improved  Outcome: Progressing  Flowsheets (Taken 1/5/2024 0733)  Care Plan - Patient's Chronic Conditions and Co-Morbidity Symptoms are Monitored and Maintained or Improved: Monitor and assess patient's chronic conditions and comorbid symptoms for stability, deterioration, or improvement     Problem: Pain  Goal: Verbalizes/displays adequate comfort level or baseline comfort level  Outcome: Progressing  Flowsheets  Taken 1/5/2024 1125  Verbalizes/displays adequate comfort level or baseline comfort level: Encourage patient to monitor pain and request assistance  Taken 1/5/2024 0733  Verbalizes/displays adequate comfort level or baseline comfort level: Encourage patient to monitor pain and request assistance     Problem: Skin/Tissue Integrity  Goal: Absence of new skin breakdown  Description: 1.  Monitor for areas of redness and/or skin breakdown  2.  Assess vascular access sites hourly  3.  Every 4-6 hours minimum:  Change oxygen saturation probe site  4.  Every 4-6 hours:  If on nasal continuous positive airway pressure, respiratory therapy assess nares and determine need for appliance change or resting period.  Outcome: Progressing     Problem: Safety - Adult  Goal: Free from fall injury  Outcome: Progressing  Flowsheets (Taken 1/5/2024 0730)  Free From Fall Injury: Instruct family/caregiver on patient safety

## 2024-01-06 NOTE — DISCHARGE INSTR - COC
Treatments: ***    Patient's personal belongings (please select all that are sent with patient):  {CHP DME Belongings:[id number]}    RN SIGNATURE:  {Esignature:[id number]}    CASE MANAGEMENT/SOCIAL WORK SECTION    Inpatient Status Date: ***    Readmission Risk Assessment Score:  Readmission Risk              Risk of Unplanned Readmission:  28           Discharging to Facility/ Agency   Name: [facility]  Address:  Phone:[phone]  Fax:[phone]    Dialysis Facility (if applicable)   Name:  Address:  Dialysis Schedule:  Phone:  Fax:    / signature: Electronically signed by Elena Aguirre RN on 1/6/24 at 1:57 PM EST    PHYSICIAN SECTION    Prognosis: Fair    Condition at Discharge:  Fair    Rehab Potential (if transferring to Rehab): Fair    Recommended Labs or Other Treatments After Discharge: CBC one week after dispo to home    Physician Certification: I certify the above information and transfer of Joyce Uribe  is necessary for the continuing treatment of the diagnosis listed and that she requires Home Care for greater 30 days.     Update Admission H&P: No change in H&P    PHYSICIAN SIGNATURE:  Electronically signed by Yola Carr MD on 1/6/24 at 8:18 AM EST

## 2024-01-07 VITALS
HEART RATE: 70 BPM | HEIGHT: 63 IN | RESPIRATION RATE: 16 BRPM | WEIGHT: 132.06 LBS | SYSTOLIC BLOOD PRESSURE: 122 MMHG | DIASTOLIC BLOOD PRESSURE: 81 MMHG | OXYGEN SATURATION: 95 % | TEMPERATURE: 97.8 F | BODY MASS INDEX: 23.4 KG/M2

## 2024-01-07 PROCEDURE — 6370000000 HC RX 637 (ALT 250 FOR IP): Performed by: INTERNAL MEDICINE

## 2024-01-07 PROCEDURE — 99232 SBSQ HOSP IP/OBS MODERATE 35: CPT | Performed by: INTERNAL MEDICINE

## 2024-01-07 PROCEDURE — 94640 AIRWAY INHALATION TREATMENT: CPT

## 2024-01-07 PROCEDURE — 94761 N-INVAS EAR/PLS OXIMETRY MLT: CPT

## 2024-01-07 PROCEDURE — 2580000003 HC RX 258: Performed by: INTERNAL MEDICINE

## 2024-01-07 PROCEDURE — 2700000000 HC OXYGEN THERAPY PER DAY

## 2024-01-07 RX ORDER — ALBUTEROL SULFATE 90 UG/1
2 AEROSOL, METERED RESPIRATORY (INHALATION) EVERY 6 HOURS PRN
Qty: 1 EACH | Refills: 0 | Status: SHIPPED | OUTPATIENT
Start: 2024-01-07

## 2024-01-07 RX ADMIN — Medication 10 ML: at 07:43

## 2024-01-07 RX ADMIN — BUSPIRONE HYDROCHLORIDE 10 MG: 5 TABLET ORAL at 07:43

## 2024-01-07 RX ADMIN — DOXYCYCLINE HYCLATE 100 MG: 100 TABLET, COATED ORAL at 07:42

## 2024-01-07 RX ADMIN — PANTOPRAZOLE SODIUM 40 MG: 40 TABLET, DELAYED RELEASE ORAL at 07:43

## 2024-01-07 RX ADMIN — ONDANSETRON 4 MG: 4 TABLET, ORALLY DISINTEGRATING ORAL at 06:47

## 2024-01-07 RX ADMIN — IPRATROPIUM BROMIDE AND ALBUTEROL SULFATE 1 DOSE: 2.5; .5 SOLUTION RESPIRATORY (INHALATION) at 07:50

## 2024-01-07 RX ADMIN — METOPROLOL TARTRATE 12.5 MG: 25 TABLET, FILM COATED ORAL at 07:43

## 2024-01-07 RX ADMIN — IPRATROPIUM BROMIDE AND ALBUTEROL SULFATE 1 DOSE: 2.5; .5 SOLUTION RESPIRATORY (INHALATION) at 11:42

## 2024-01-07 RX ADMIN — PREDNISONE 40 MG: 20 TABLET ORAL at 07:43

## 2024-01-07 RX ADMIN — ASPIRIN 81 MG: 81 TABLET, CHEWABLE ORAL at 07:43

## 2024-01-07 RX ADMIN — ACETAMINOPHEN 650 MG: 325 TABLET ORAL at 05:52

## 2024-01-07 ASSESSMENT — PAIN DESCRIPTION - DESCRIPTORS: DESCRIPTORS: THROBBING

## 2024-01-07 ASSESSMENT — PAIN DESCRIPTION - LOCATION
LOCATION: SHOULDER
LOCATION: HEAD

## 2024-01-07 ASSESSMENT — PAIN - FUNCTIONAL ASSESSMENT: PAIN_FUNCTIONAL_ASSESSMENT: PREVENTS OR INTERFERES WITH MANY ACTIVE NOT PASSIVE ACTIVITIES

## 2024-01-07 ASSESSMENT — PAIN SCALES - GENERAL
PAINLEVEL_OUTOF10: 7
PAINLEVEL_OUTOF10: 7

## 2024-01-07 ASSESSMENT — PAIN DESCRIPTION - PAIN TYPE: TYPE: ACUTE PAIN

## 2024-01-07 NOTE — PLAN OF CARE
Problem: Respiratory - Adult  Goal: Achieves optimal ventilation and oxygenation  Note: SpO2 WNL on 2.5L O2, her home dose.     Problem: Safety - Adult  Goal: Free from fall injury  Outcome: Progressing

## 2024-01-07 NOTE — PLAN OF CARE
Problem: Discharge Planning  Goal: Discharge to home or other facility with appropriate resources  Outcome: Adequate for Discharge     Problem: Chronic Conditions and Co-morbidities  Goal: Patient's chronic conditions and co-morbidity symptoms are monitored and maintained or improved  Outcome: Adequate for Discharge     Problem: Pain  Goal: Verbalizes/displays adequate comfort level or baseline comfort level  Outcome: Adequate for Discharge     Problem: Skin/Tissue Integrity  Goal: Absence of new skin breakdown  Description: 1.  Monitor for areas of redness and/or skin breakdown  2.  Assess vascular access sites hourly  3.  Every 4-6 hours minimum:  Change oxygen saturation probe site  4.  Every 4-6 hours:  If on nasal continuous positive airway pressure, respiratory therapy assess nares and determine need for appliance change or resting period.  1/7/2024 0849 by Emma Lomas RN  Outcome: Adequate for Discharge  1/7/2024 0400 by Diane Koenig RN  Outcome: Progressing     Problem: Safety - Adult  Goal: Free from fall injury  1/7/2024 0849 by Emma Lomas RN  Outcome: Adequate for Discharge  1/7/2024 0400 by Diane Koenig, RN  Outcome: Progressing     Problem: Respiratory - Adult  Goal: Achieves optimal ventilation and oxygenation  1/7/2024 0849 by Emma Lomas RN  Outcome: Adequate for Discharge  1/7/2024 0403 by Diane Koenig, RN  Note: SpO2 WNL on 2.5L O2, her home dose.     Problem: Cardiovascular - Adult  Goal: Maintains optimal cardiac output and hemodynamic stability  Outcome: Adequate for Discharge

## 2024-01-07 NOTE — CARE COORDINATION
CASE MANAGEMENT DISCHARGE SUMMARY      Discharge to: home with [facility]     New Durable Medical Equipment ordered/agency: na    Transportation:    Medical Transport explained to pt/family. Pt/family voice no agency preference.    Agency used: [facility]   time: 1300 lift assist with stair chair.    Ambulance form completed: Yes    Confirmed discharge plan with: RN, [facility], left message with sonMing Crawford     Patient: yes     Family:  yes/no    Name: Contact number:     Facility/Agency, name:  [facility] faxed [phone]   Phone number for report to facility:      RN, name: Emma    Note: Discharging nurse to complete [facility], reconcile [facility], and place final copy with patient's discharge packet. RN to ensure that written prescriptions for  Level II medications are sent with patient to the facility as per protocol.       
CM trying to secure a ride to home. At this time round trips has not picked up this ride. Patient has 15 steps into apartment. CM placed a call to [facility] about the possibilities of this transport. They are calling supervisors and will call CM back. Patient unable to navigate the steps due to severe SOB with exertion. Patient reports that transport has to use a stair chair. This may not be available on the weekend. Will continue to pursue transport to home.   
Chart reviewed and case discussed during rounds. Pt likely ready for dc today or tomorrow. Therapy ordered and pending. Pt from apt. Alone with 15 [address]. Pt has difficulty navigating steps. This is a known ongoing issue for the Pt for the last several months. Pt has medical house calls CNP that comes out every 6 weeks. Pt has HHA through [facility] 4 hours a day for 5 days a week. Pt may need palliative care services upon d/c and possibly medical transport home due to 15 BERNARD per MD. MD to speak with Pt later today.     Possible d/c today if therapy recs are in. Otherwise will be d/c'd tomorrow. Pt has had HHC services through [facility] in the past. Can restart if needed. Follow.    
exacerbation (HCC) [J44.1]    IF APPLICABLE: The Patient and/or patient representative Joyce and her family were provided with a choice of provider and agrees with the discharge plan. Freedom of choice list with basic dialogue that supports the patient's individualized plan of care/goals and shares the quality data associated with the providers was provided to:     Patient Representative Name:       The Patient and/or Patient Representative Agree with the Discharge Plan?      COLEEN Goyal, YUNI-S  Case Management Department  Ph: [phone] Fax: [phone]

## 2024-01-07 NOTE — DISCHARGE SUMMARY
needed for Wheezing     Allegra Hives 24HR 180 MG tablet  Generic drug: fexofenadine     busPIRone 10 MG tablet  Commonly known as: BUSPAR     diazePAM 5 MG tablet  Commonly known as: VALIUM     fluticasone-umeclidin-vilant 200-62.5-25 MCG/ACT Aepb inhaler  Commonly known as: TRELEGY ELLIPTA     guaiFENesin 600 MG extended release tablet  Commonly known as: MUCINEX  Take 1 tablet by mouth 2 times daily     ipratropium 0.5 mg-albuterol 2.5 mg 0.5-2.5 (3) MG/3ML Soln nebulizer solution  Commonly known as: DUONEB  Inhale 3 mLs into the lungs every 6 hours as needed for Shortness of Breath     methocarbamol 500 MG tablet  Commonly known as: ROBAXIN     metoclopramide 10 MG tablet  Commonly known as: REGLAN  Take 1 tablet by mouth 4 times daily     metoprolol succinate 25 MG extended release tablet  Commonly known as: TOPROL XL     ondansetron 8 MG tablet  Commonly known as: ZOFRAN     pantoprazole 40 MG tablet  Commonly known as: PROTONIX  Take 1 tablet by mouth 2 times daily     vitamin D3 25 MCG (1000 UT) Tabs tablet  Commonly known as: CHOLECALCIFEROL             Objective Findings at Discharge:   /71   Pulse 66   Temp 97.5 °F (36.4 °C) (Axillary)   Resp 18   Ht 1.6 m (5' 3\")   Wt 60.4 kg (133 lb 3.2 oz)   SpO2 99%   BMI 23.60 kg/m²       Physical Exam:     General: Alert, oriented, Moderate respiratory distress.  Eye: Normal conjunctiva. Sclera anicteric.  PERRL, EOMI.    HENT: Oral mucosa is moist.  Oropharynx unremarkable.   Respiratory: Respirations even and labored, tachypneic. Patient on 3L NC. Diminished breath sounds at the bilateral bases.    Cardiovascular: Normal rate, Regular rhythm.    Gastrointestinal: Non-distended. Soft   Musculoskeletal: No deformities  Integumentary: Warm, Dry.  Neurologic:  No focal deficits.         Labs and Imaging   XR CHEST PORTABLE    Result Date: 1/4/2024  EXAMINATION: ONE XRAY VIEW OF THE CHEST 1/4/2024 2:05 am COMPARISON: 08/14/2023 HISTORY: ORDERING SYSTEM 
58 09/29/2011 06:05 AM    TRIG 111 09/06/2016 01:47 PM     Hemoglobin A1C:   Lab Results   Component Value Date/Time    LABA1C 5.8 12/24/2020 09:53 PM     TSH:   Lab Results   Component Value Date/Time    TSH 3.16 04/15/2013 06:00 AM     Troponin: No results found for: \"TROPONINT\"  Lactic Acid: No results for input(s): \"LACTA\" in the last 72 hours.  BNP: No results for input(s): \"PROBNP\" in the last 72 hours.  UA:  Lab Results   Component Value Date/Time    NITRU Negative 01/04/2024 03:22 AM    COLORU Yellow 01/04/2024 03:22 AM    PHUR 6.0 01/04/2024 03:22 AM    LABCAST 20-40 Hyaline 04/09/2019 09:30 AM    WBCUA None seen 01/04/2024 03:22 AM    RBCUA None seen 01/04/2024 03:22 AM    MUCUS Rare 12/25/2020 03:07 AM    YEAST Present 04/04/2018 10:02 AM    BACTERIA 2+ 11/26/2020 04:01 PM    CLARITYU Clear 01/04/2024 03:22 AM    SPECGRAV 1.025 01/04/2024 03:22 AM    LEUKOCYTESUR Negative 01/04/2024 03:22 AM    UROBILINOGEN 0.2 01/04/2024 03:22 AM    BILIRUBINUR Negative 01/04/2024 03:22 AM    BILIRUBINUR small 05/31/2013 02:38 PM    BLOODU Negative 01/04/2024 03:22 AM    GLUCOSEU Negative 01/04/2024 03:22 AM    KETUA Negative 01/04/2024 03:22 AM    AMORPHOUS 2+ 01/25/2019 07:55 PM     Urine Cultures:   Lab Results   Component Value Date/Time    LABURIN No growth at 18 to 36 hours 11/26/2020 04:01 PM     Blood Cultures:   Lab Results   Component Value Date/Time    BC No growth after 5 days of incubation. 01/25/2019 08:20 PM     Lab Results   Component Value Date/Time    BLOODCULT2 No growth after 5 days of incubation. 01/25/2019 08:20 PM     Organism:   Lab Results   Component Value Date/Time    ORG Yeast 05/24/2017 09:00 PM       Time Spent Discharging patient 45 minutes      Adelaida Turner, DO   PGY-3  [facility]

## 2024-01-07 NOTE — PROGRESS NOTES
01/04/24 0924   RT Protocol   History Pulmonary Disease 2   Respiratory pattern 4   Breath sounds 4   Cough 0   Indications for Bronchodilator Therapy Wheezing associated with pulm disorder   Bronchodilator Assessment Score 10       
  Brief Pre-Op Note/Sedation Assessment      Joyce Uribe  1960  [id number]  9:35 AM    Planned Procedure: Cardiac Catheterization Procedure  Post Procedure Plan: Return to same level of care  Consent: I have discussed with the patient and/or the patient representative the indication, alternatives, and the possible risks and/or complications of the planned procedure and the anesthesia methods. The patient and/or patient representative appear to understand and agree to proceed.    DISCUSSION OF CARDIAC CATHETERIZATION PROCEDURES: The procedures, indications, risks and alternatives have been discussed with the patient and, as appropriate, with the patient's guardian . Risks discussed included, but are not limited to, bleeding, development of blood clots/emboli, damage to blood vessels, renal failure, malignant cardiac arrhythmias, stroke, heart attack, emergent coronary bypass surgery, death, dye allergy.  The patient (and guardian as appropriate) expressed understanding of the aforementioned and wished to proceed.          Chief Complaint:   Chest Pain/Pressure  NSTEMI      Indications for Cath Procedure:  Presentation:  ACS > 24 hrs  2.  Anginal Classification within 2 weeks:  CCS IV - Inability to perform any activity without angina or angina at rest, i.e., severe limitation  3.  Angina Symptoms Assessment:  Typical Chest Pain  4.  Heart Failure Class within last 2 weeks:  No symptoms  5.  Cardiovascular Instability:  No    Prior Ischemic Workup/Eval:  Pre-Procedural Medications: Yes: Aspirin and STATIN  2.   Stress Test Completed?  No    Does Patient need surgery?  Cath Valve Surgery:  No    Pre-Procedure Medical History:  Vital Signs:  /70   Pulse 100   Temp 97.7 °F (36.5 °C) (Oral)   Resp 16   Ht 1.6 m (5' 3\")   Wt 58.1 kg (128 lb)   SpO2 94%   BMI 22.67 kg/m²     Allergies:    Allergies   Allergen Reactions    Bactrim [Sulfamethoxazole-Trimethoprim]      N/V    Bupropion      Other 
4 Eyes Skin Assessment     NAME:  Joyce Uribe  YOB: 1960  MEDICAL RECORD NUMBER:  [id number]    The patient is being assessed for  Admission    I agree that at least one RN has performed a thorough Head to Toe Skin Assessment on the patient. ALL assessment sites listed below have been assessed.      Areas assessed by both nurses:    Head, Face, Ears, Shoulders, Back, Chest, Arms, Elbows, Hands, Sacrum. Buttock, Coccyx, Ischium, Legs. Feet and Heels, and Under Medical Devices         Does the Patient have a Wound? No noted wound(s)       [place] Prevention initiated by RN: Yes  Wound Care Orders initiated by RN: No    Pressure Injury (Stage 3,4, Unstageable, DTI, NWPT, and Complex wounds) if present, place Wound referral order by RN under : No    New Ostomies, if present place, Ostomy referral order under : No     Nurse 1 eSignature: Electronically signed by Johanny Davis RN on 1/4/24 at 6:36 PM EST    **SHARE this note so that the co-signing nurse can place an eSignature**    Nurse 2 eSignature: Electronically signed by Sujatha Crawley RN on 1/4/24 at 6:37 PM EST    
Care assumed from previous RN. A&O, VSS, NSR on tele, assessment complete as documented. SpO2 WNL on 2.5L O2 via nasal cannula, pt's home dose. Aware of need to call for ambulation assistance.  
Emily Ryant, Cardiology clinical RN notified of abnormal labs, patient added to Cath Lab schedule for 1/5/2024   
INPATIENT PULMONARY CRITICAL CARE PROGRESS NOTE      Reason for visit      advanced COPD ,poor follow up      SUBJECTIVE: Patient continues to have intermittent symptoms of shortness of breath and chest congestion along with chest tightness, patient was afebrile and medically maintained, patient will continue to benefit patient when seen this morning, patient was supposed to be going for cardiac cath today, patient blood pressure was borderline, patient has sinus rhythm on the monitor, patient was on 3 L of nasal oxygen with saturation 95%, patient's urine output has been adequate, patient glycemic control was acceptable, no other pertinent review of system of concern             Physical Exam:  Blood pressure 121/77, pulse 94, temperature 97.8 °F (36.6 °C), temperature source Oral, resp. rate 16, height 1.6 m (5' 3\"), weight 58.1 kg (128 lb), SpO2 92 %, not currently breastfeeding.'     Constitutional:  No acute distress.   HENT:  Oropharynx is clear and moist. No thyromegaly.  Eyes:  Conjunctivae are normal. Pupils equal, round, and reactive to light. No scleral icterus.   Neck: . No tracheal deviation present. No obvious thyroid mass.   Cardiovascular: Normal rate, regular rhythm, normal heart sounds.  No right ventricular heave. No lower extremity edema.  Pulmonary/Chest: No wheezes.  No rales.  Chest wall is not dull to percussion.  No accessory muscle usage or stridor.  Distant breath sound intensity  Abdominal: Soft. Bowel sounds present. No distension or hernia. No tenderness.    Musculoskeletal: No cyanosis. No clubbing. No obvious joint deformity.   Lymphadenopathy: No cervical or supraclavicular adenopathy.   Skin: Skin is warm and dry. No rash or nodules on the exposed extremities.  Psychiatric: Normal mood and affect. Behavior is normal.  Slight anxiety.   Neurologic: Alert, awake and oriented. PERRL.  Speech fluent    Results:  CBC:   Recent Labs     01/04/24  0122 01/05/24  0427   WBC 15.0* 10.0   HGB 
INPATIENT PULMONARY CRITICAL CARE PROGRESS NOTE      Reason for visit      advanced COPD ,poor follow up      SUBJECTIVE: Patient seen this morning was sleeping in the bed without any significant increased work of breathing, patient was afebrile and hemodynamically maintained, patient was on 3 L of nasal oxygen with saturation 95%, patient had good urine output with cumulative fluid balance of -1.3 L, patient's blood sugars are acceptable, no other pertinent review of system of concern      Physical Exam:  Blood pressure 98/61, pulse 62, temperature 97.8 °F (36.6 °C), temperature source Oral, resp. rate 15, height 1.6 m (5' 3\"), weight 59.9 kg (132 lb 0.9 oz), SpO2 99 %, not currently breastfeeding.'     Constitutional:  No acute distress.   HENT:  Oropharynx is clear and moist. No thyromegaly.  Eyes:  Conjunctivae are normal. Pupils equal, round, and reactive to light. No scleral icterus.   Neck: . No tracheal deviation present. No obvious thyroid mass.   Cardiovascular: Normal rate, regular rhythm, normal heart sounds.  No right ventricular heave. No lower extremity edema.  Pulmonary/Chest: Minimal wheezes.  No rales.  Chest wall is not dull to percussion.  No accessory muscle usage or stridor.  Distant breath sound intensity  Abdominal: Soft. Bowel sounds present. No distension or hernia. No tenderness.    Musculoskeletal: No cyanosis. No clubbing. No obvious joint deformity.   Lymphadenopathy: No cervical or supraclavicular adenopathy.   Skin: Skin is warm and dry. No rash or nodules on the exposed extremities.  Neurologic: Sleeping when seen    Results:  CBC:   Recent Labs     01/05/24  0427 01/06/24  0716   WBC 10.0 13.4*   HGB 11.9* 11.5*   HCT 36.7 36.2   MCV 93.8 94.6    262       BMP:   Recent Labs     01/05/24  0427 01/06/24  0716    137   K 4.4 4.7  4.7    102   CO2 27 24   BUN 16 23*   CREATININE <0.5* <0.5*         APTT:   Recent Labs     01/04/24  0722   APTT 73.0* 
Occupational Therapy  [address]  Occupational Therapy Initial Assessment    Name: Joyce Uribe  : 1960  MRN: [id number]  Date of Service: 2024    Discharge Recommendations:  24 hour supervision or assist, Home with Home health OT, Subacute/Skilled Nursing Facility    SNF vs. HHOT w/ 24hr SPV & HHOT: Pt reports she does not want to go to a SNF and has someone that would stay with her 24hrs if needed. However, pt has been bound to apartment since  d/t fear of her ~15 stairs to enter/exit apartment.     AM-PAC score  AM-PAC Inpatient Daily Activity Raw Score: 17 (24)  AM-PAC Inpatient ADL T-Scale Score : 37.26 (24)  ADL Inpatient CMS 0-100% Score: 50.11 (24)  ADL Inpatient CMS G-Code Modifier : CK (24)    Therapy discharge recommendations take into account each patient's current medical complexities and are subject to input/oversight from the patient's healthcare team.   Barriers to Home Discharge:   [x] Steps to access home entry or bed/bath: ~15 BERNARD   [x] Unable to transfer, ambulate, or propel wheelchair household distances without assist   [x] Limited available assist at home upon discharge    [] Patient or family requests d/c to post-acute facility    [] Poor cognition/safety awareness for d/c to home alone    []Unable to maintain ordered weight bearing status    [] Patient with salient signs of long-standing immobility   [x] Patient is at risk for falls    [x] Other: Decreased safety and independence w/ ADLs.     If pt is unable to be seen after this session, please let this note serve as discharge summary.  Please see case management note for discharge disposition.  Thank you.      Patient Diagnosis(es): The primary encounter diagnosis was COPD with acute exacerbation ([facility]). Diagnoses of NSTEMI (non-ST elevated myocardial infarction) ([facility]), Hypokalemia, and Acute on chronic respiratory failure with hypoxemia ([facility]) were 
Patient discharged [facility] via [facility]. [facility] with [facility] paperwork, AVS, and med rec. Patient states no further needs or questions at this time.     Tele and LDAs removed. [facility] and charge nurse aware of discharge.   
Pt would like to wait for bloodwork until after she's spoken to MD today.  
01/05/24 0427   WBC 15.0* 10.0   HGB 13.3 11.9*   HCT 42.5 36.7   MCV 96.8 93.8    254       BMP:   Recent Labs     01/04/24  0251 01/05/24 0427    138   K 2.9* 4.4   * 101   CO2 23 27   BUN 14 16   CREATININE <0.5* <0.5*       LIVER PROFILE:   Recent Labs     01/04/24 0251   AST 12*   ALT 9*   BILITOT <0.2   ALKPHOS 69       PT/INR: No results for input(s): \"PROTIME\", \"INR\" in the last 72 hours.  APTT:   Recent Labs     01/04/24  0722   APTT 73.0*       UA:  Recent Labs     01/04/24  0322   COLORU Yellow   PHUR 6.0   WBCUA None seen   RBCUA None seen   CLARITYU Clear   SPECGRAV 1.025   LEUKOCYTESUR Negative   UROBILINOGEN 0.2   BILIRUBINUR Negative   BLOODU Negative   GLUCOSEU Negative         Imaging:  I have reviewed radiology images personally.    XR CHEST PORTABLE   Final Result   Shallow inflation.  Similar appearance of basilar opacities favoring   subsegmental atelectasis or scarring.           XR CHEST PORTABLE    Result Date: 1/4/2024  EXAMINATION: ONE XRAY VIEW OF THE CHEST 1/4/2024 2:05 am COMPARISON: 08/14/2023 HISTORY: ORDERING SYSTEM PROVIDED HISTORY: DANIELA BRANCH TECHNOLOGIST PROVIDED HISTORY: Reason for exam:->SARINA, DANIELA Reason for Exam: Shortness of Breath (SOB, increasing for the last couple of days, on 3 L nasal cannula at home. Took 3 duonebs in the last 12 hours.) FINDINGS: Shallow inflation.  The cardiomediastinal silhouette is within normal limits. Basilar opacities appear similar compared to the prior exam.  No new airspace disease, pneumothorax or effusion identified.  No acute osseous abnormality is identified.     Shallow inflation.  Similar appearance of basilar opacities favoring subsegmental atelectasis or scarring.             Assessment:  Principal Problem:    COPD exacerbation (HCC)  Active Problems:    Leukocytosis    Marijuana use    H/O: lung cancer    Chronic respiratory failure with hypoxia (HCC)    Former smoker    Grade I diastolic dysfunction    SOB (shortness 
currently requires SBA for transfers and short distance ambulation. Pt would benefit from continued skilled PT to address these limitations. Pt refusing SNF. Pt will require 24hr supervision and home PT at discharge. Pt will also require medical transport to take pt up 12 steps to apartment.  Treatment Diagnosis: impaired functional mobility  Therapy Prognosis: Fair  Decision Making: Medium Complexity  Requires PT Follow-Up: Yes  Activity Tolerance  Activity Tolerance: Patient tolerated evaluation without incident;Patient limited by fatigue;Patient limited by endurance  Activity Tolerance Comments: fatigue with all activity and requires increased rest breaks between activities; SpO2 92-96% throughout session     Plan   Physical Therapy Plan  General Plan: 2-3 times per week  Current Treatment Recommendations: Strengthening, Balance training, Functional mobility training, Transfer training, Endurance training, Gait training, Safety education & training, Patient/Caregiver education & training, Therapeutic activities, Home exercise program  Safety Devices  Type of Devices: All racquel prominences offloaded, All fall risk precautions in place, Bed alarm in place, Call light within reach, Left in bed, Gait belt, Nurse notified  Restraints  Restraints Initially in Place: No     Restrictions  Restrictions/Precautions  Restrictions/Precautions: General Precautions, Fall Risk  Position Activity Restriction  Other position/activity restrictions: IV, 3.5L O2, tele     Subjective   Pain: no c/o pain  General  Chart Reviewed: Yes  Patient assessed for rehabilitation services?: Yes  Family / Caregiver Present: No  Referring Practitioner: Rupert Jackson MD  Referral Date : 01/05/24  Diagnosis: COPD exacerbation  Subjective  Subjective: Pt agreeable to therapy.         Social/Functional History  Social/Functional History  Lives With: Alone (family and friends able to assist as needed)  Type of Home: Apartment  Home Layout: One 
AM    KETUA Negative 01/04/2024 03:22 AM    AMORPHOUS 2+ 01/25/2019 07:55 PM     Urine Cultures:   Lab Results   Component Value Date/Time    LABURIN No growth at 18 to 36 hours 11/26/2020 04:01 PM     Blood Cultures:   Lab Results   Component Value Date/Time    BC No growth after 5 days of incubation. 01/25/2019 08:20 PM     Lab Results   Component Value Date/Time    BLOODCULT2 No growth after 5 days of incubation. 01/25/2019 08:20 PM     Organism:   Lab Results   Component Value Date/Time    ORG Yeast 05/24/2017 09:00 PM       Imaging/Diagnostics Last 24 Hours   XR CHEST PORTABLE    Result Date: 1/4/2024  EXAMINATION: ONE XRAY VIEW OF THE CHEST 1/4/2024 2:05 am COMPARISON: 08/14/2023 HISTORY: ORDERING SYSTEM PROVIDED HISTORY: DANIELA BRANCH TECHNOLOGIST PROVIDED HISTORY: Reason for exam:->CP, DANIELA Reason for Exam: Shortness of Breath (SOB, increasing for the last couple of days, on 3 L nasal cannula at home. Took 3 duonebs in the last 12 hours.) FINDINGS: Shallow inflation.  The cardiomediastinal silhouette is within normal limits. Basilar opacities appear similar compared to the prior exam.  No new airspace disease, pneumothorax or effusion identified.  No acute osseous abnormality is identified.     Shallow inflation.  Similar appearance of basilar opacities favoring subsegmental atelectasis or scarring.         Electronically signed by Yola Carr MD on 1/5/2024 at 1:17 PM

## 2024-01-08 ENCOUNTER — TELEPHONE (OUTPATIENT)
Dept: PULMONOLOGY | Age: 64
End: 2024-01-08

## 2024-01-08 LAB — POC ACT LR: 251 SEC

## 2024-01-08 NOTE — TELEPHONE ENCOUNTER
----- Message from Indra Alexander MD sent at 1/7/2024  1:14 PM EST -----  Patient to follow-up with Jaky in 2 weeks time

## 2024-01-08 NOTE — TELEPHONE ENCOUNTER
Patient has to get a hold of her insurance to arrange ambulance for her transportation. If she does not call back in two days we need to call her back.

## 2024-02-03 PROBLEM — R79.89 ELEVATED TROPONIN: Status: RESOLVED | Noted: 2024-01-04 | Resolved: 2024-02-03

## 2024-02-22 ENCOUNTER — TELEMEDICINE (OUTPATIENT)
Dept: PULMONOLOGY | Age: 64
End: 2024-02-22

## 2024-02-22 DIAGNOSIS — J43.2 CENTRILOBULAR EMPHYSEMA (HCC): ICD-10-CM

## 2024-02-22 DIAGNOSIS — Z85.118 H/O: LUNG CANCER: ICD-10-CM

## 2024-02-22 DIAGNOSIS — J96.11 CHRONIC RESPIRATORY FAILURE WITH HYPOXIA (HCC): ICD-10-CM

## 2024-02-22 DIAGNOSIS — I51.89 GRADE I DIASTOLIC DYSFUNCTION: Primary | ICD-10-CM

## 2024-02-22 DIAGNOSIS — R09.89 CHEST CONGESTION: ICD-10-CM

## 2024-02-22 DIAGNOSIS — J41.0 SIMPLE CHRONIC BRONCHITIS (HCC): Chronic | ICD-10-CM

## 2024-02-22 DIAGNOSIS — R06.02 SOB (SHORTNESS OF BREATH): ICD-10-CM

## 2024-02-22 DIAGNOSIS — Z87.891 FORMER SMOKER: ICD-10-CM

## 2024-02-22 NOTE — PROGRESS NOTES
C/O Hospital follow up      HPI: A virtual visit was done for the patient to discuss patient's clinical status and options post hospitalization, patient was recently hospitalized because of acute on chronic respite failure with hypoxemia along with that patient had COPD exacerbation at that time, patient was given definitive and supportive care with improvement, patient states that she continues to have increased chest congestion, patient has light milky colored phlegm which is coming out sporadically, patient also states that she has had some pleuritic chest pain, no fever no chills, no odynophagia or dysphagia, patient has some sinus congestion, patient has been given Acapella from the hospital but patient has not been doing that, patient states that she does not have any fever or chills, patient does not have any significant palpitations, patient always has nausea which is not new, patient does not have any altered bowel habits, no increased leg swelling, patient states that she uses her rescue inhaler only when she moves around, patient has been started on statin in the hospital which is new, patient has a electric bass heating with no gas, patient does not have any change in the abdomen development, patient remains abstinent from smoking, patient does not have any other pertinent review of system of concern              Review of Systems same as above     Physical Exam:  not currently breastfeeding.'  Constitutional:  No acute distress.  Some audible chest congestion on video conference  HENT:  Oropharynx is clear and moist. Nothyromegaly.  Eyes:  Conjunctivae are normal. Pupils equal, round, and reactive to light. No scleral icterus.  Neck: . No tracheal deviation present. No obviousthyroid mass.   Cardiovascular: Normal rate, regular rhythm, normal heart sounds.  No right ventricular heave. No lower extremity edema.  Pulmonary/Chest: No wheezes.  No rales.  Chest wall is not dull to percussion.  No

## 2024-04-27 ENCOUNTER — HOSPITAL ENCOUNTER (INPATIENT)
Age: 64
LOS: 5 days | Discharge: HOME | End: 2024-05-02
Attending: STUDENT IN AN ORGANIZED HEALTH CARE EDUCATION/TRAINING PROGRAM | Admitting: STUDENT IN AN ORGANIZED HEALTH CARE EDUCATION/TRAINING PROGRAM
Payer: COMMERCIAL

## 2024-04-27 ENCOUNTER — APPOINTMENT (OUTPATIENT)
Dept: CT IMAGING | Age: 64
End: 2024-04-27
Payer: COMMERCIAL

## 2024-04-27 ENCOUNTER — APPOINTMENT (OUTPATIENT)
Dept: GENERAL RADIOLOGY | Age: 64
End: 2024-04-27
Payer: COMMERCIAL

## 2024-04-27 DIAGNOSIS — R06.02 SHORTNESS OF BREATH: Primary | ICD-10-CM

## 2024-04-27 DIAGNOSIS — F05 ACUTE CONFUSIONAL STATE: ICD-10-CM

## 2024-04-27 DIAGNOSIS — J44.1 COPD EXACERBATION (HCC): ICD-10-CM

## 2024-04-27 DIAGNOSIS — R53.1 GENERALIZED WEAKNESS: ICD-10-CM

## 2024-04-27 PROBLEM — R06.00 DYSPNEA: Status: ACTIVE | Noted: 2024-04-27

## 2024-04-27 LAB
ALBUMIN SERPL-MCNC: 3.7 G/DL (ref 3.4–5)
ALBUMIN SERPL-MCNC: 3.8 G/DL (ref 3.4–5)
ALBUMIN/GLOB SERPL: 1.2 {RATIO} (ref 1.1–2.2)
ALBUMIN/GLOB SERPL: 1.3 {RATIO} (ref 1.1–2.2)
ALP SERPL-CCNC: 101 U/L (ref 40–129)
ALP SERPL-CCNC: 98 U/L (ref 40–129)
ALT SERPL-CCNC: 22 U/L (ref 10–40)
ALT SERPL-CCNC: 24 U/L (ref 10–40)
ANION GAP SERPL CALCULATED.3IONS-SCNC: 10 MMOL/L (ref 3–16)
ANION GAP SERPL CALCULATED.3IONS-SCNC: 11 MMOL/L (ref 3–16)
AST SERPL-CCNC: 31 U/L (ref 15–37)
AST SERPL-CCNC: 32 U/L (ref 15–37)
BASE EXCESS BLDV CALC-SCNC: 6.8 MMOL/L (ref -3–3)
BASOPHILS # BLD: 0 K/UL (ref 0–0.2)
BASOPHILS NFR BLD: 0.1 %
BILIRUB SERPL-MCNC: <0.2 MG/DL (ref 0–1)
BILIRUB SERPL-MCNC: <0.2 MG/DL (ref 0–1)
BUN SERPL-MCNC: 13 MG/DL (ref 7–20)
BUN SERPL-MCNC: 15 MG/DL (ref 7–20)
CALCIUM SERPL-MCNC: 9.2 MG/DL (ref 8.3–10.6)
CALCIUM SERPL-MCNC: 9.3 MG/DL (ref 8.3–10.6)
CHLORIDE SERPL-SCNC: 94 MMOL/L (ref 99–110)
CHLORIDE SERPL-SCNC: 94 MMOL/L (ref 99–110)
CO2 BLDV-SCNC: 34 MMOL/L
CO2 SERPL-SCNC: 31 MMOL/L (ref 21–32)
CO2 SERPL-SCNC: 33 MMOL/L (ref 21–32)
COHGB MFR BLDV: 4 % (ref 0–1.5)
CREAT SERPL-MCNC: 0.6 MG/DL (ref 0.6–1.2)
CREAT SERPL-MCNC: <0.5 MG/DL (ref 0.6–1.2)
DEPRECATED RDW RBC AUTO: 15.6 % (ref 12.4–15.4)
EKG ATRIAL RATE: 99 BPM
EKG DIAGNOSIS: NORMAL
EKG P AXIS: 61 DEGREES
EKG P-R INTERVAL: 118 MS
EKG Q-T INTERVAL: 386 MS
EKG QRS DURATION: 88 MS
EKG QTC CALCULATION (BAZETT): 495 MS
EKG R AXIS: 69 DEGREES
EKG T AXIS: 68 DEGREES
EKG VENTRICULAR RATE: 99 BPM
EOSINOPHIL # BLD: 0.1 K/UL (ref 0–0.6)
EOSINOPHIL NFR BLD: 0.5 %
FLUAV RNA RESP QL NAA+PROBE: NOT DETECTED
FLUBV RNA RESP QL NAA+PROBE: NOT DETECTED
GFR SERPLBLD CREATININE-BSD FMLA CKD-EPI: >90 ML/MIN/{1.73_M2}
GFR SERPLBLD CREATININE-BSD FMLA CKD-EPI: >90 ML/MIN/{1.73_M2}
GLUCOSE SERPL-MCNC: 101 MG/DL (ref 70–99)
GLUCOSE SERPL-MCNC: 194 MG/DL (ref 70–99)
HCO3 BLDV-SCNC: 32.2 MMOL/L (ref 23–29)
HCT VFR BLD AUTO: 37.6 % (ref 36–48)
HGB BLD-MCNC: 11.6 G/DL (ref 12–16)
LACTATE BLDV-SCNC: 0.7 MMOL/L (ref 0.4–1.9)
LACTATE BLDV-SCNC: 1.1 MMOL/L (ref 0.4–1.9)
LYMPHOCYTES # BLD: 0.8 K/UL (ref 1–5.1)
LYMPHOCYTES NFR BLD: 5.7 %
MCH RBC QN AUTO: 29.4 PG (ref 26–34)
MCHC RBC AUTO-ENTMCNC: 30.8 G/DL (ref 31–36)
MCV RBC AUTO: 95.5 FL (ref 80–100)
METHGB MFR BLDV: 0.3 %
MONOCYTES # BLD: 1 K/UL (ref 0–1.3)
MONOCYTES NFR BLD: 6.6 %
NEUTROPHILS # BLD: 12.7 K/UL (ref 1.7–7.7)
NEUTROPHILS NFR BLD: 87.1 %
NT-PROBNP SERPL-MCNC: 387 PG/ML (ref 0–124)
O2 THERAPY: ABNORMAL
PCO2 BLDV: 49 MMHG (ref 40–50)
PH BLDV: 7.43 [PH] (ref 7.35–7.45)
PLATELET # BLD AUTO: 261 K/UL (ref 135–450)
PMV BLD AUTO: 8.8 FL (ref 5–10.5)
PO2 BLDV: 105.6 MMHG (ref 25–40)
POTASSIUM SERPL-SCNC: 4 MMOL/L (ref 3.5–5.1)
POTASSIUM SERPL-SCNC: 5 MMOL/L (ref 3.5–5.1)
PROCALCITONIN SERPL IA-MCNC: 0.05 NG/ML (ref 0–0.15)
PROT SERPL-MCNC: 6.7 G/DL (ref 6.4–8.2)
PROT SERPL-MCNC: 6.9 G/DL (ref 6.4–8.2)
RBC # BLD AUTO: 3.93 M/UL (ref 4–5.2)
SAO2 % BLDV: 99 %
SARS-COV-2 RNA RESP QL NAA+PROBE: NOT DETECTED
SODIUM SERPL-SCNC: 135 MMOL/L (ref 136–145)
SODIUM SERPL-SCNC: 138 MMOL/L (ref 136–145)
TROPONIN, HIGH SENSITIVITY: 25 NG/L (ref 0–14)
TROPONIN, HIGH SENSITIVITY: 32 NG/L (ref 0–14)
TROPONIN, HIGH SENSITIVITY: 33 NG/L (ref 0–14)
WBC # BLD AUTO: 14.6 K/UL (ref 4–11)

## 2024-04-27 PROCEDURE — 87636 SARSCOV2 & INF A&B AMP PRB: CPT

## 2024-04-27 PROCEDURE — 94761 N-INVAS EAR/PLS OXIMETRY MLT: CPT

## 2024-04-27 PROCEDURE — 02HV33Z INSERTION OF INFUSION DEVICE INTO SUPERIOR VENA CAVA, PERCUTANEOUS APPROACH: ICD-10-PCS | Performed by: INTERNAL MEDICINE

## 2024-04-27 PROCEDURE — 6360000002 HC RX W HCPCS: Performed by: STUDENT IN AN ORGANIZED HEALTH CARE EDUCATION/TRAINING PROGRAM

## 2024-04-27 PROCEDURE — 2580000003 HC RX 258: Performed by: STUDENT IN AN ORGANIZED HEALTH CARE EDUCATION/TRAINING PROGRAM

## 2024-04-27 PROCEDURE — 83605 ASSAY OF LACTIC ACID: CPT

## 2024-04-27 PROCEDURE — 80053 COMPREHEN METABOLIC PANEL: CPT

## 2024-04-27 PROCEDURE — 1200000000 HC SEMI PRIVATE

## 2024-04-27 PROCEDURE — 6370000000 HC RX 637 (ALT 250 FOR IP): Performed by: STUDENT IN AN ORGANIZED HEALTH CARE EDUCATION/TRAINING PROGRAM

## 2024-04-27 PROCEDURE — 82803 BLOOD GASES ANY COMBINATION: CPT

## 2024-04-27 PROCEDURE — 2500000003 HC RX 250 WO HCPCS: Performed by: STUDENT IN AN ORGANIZED HEALTH CARE EDUCATION/TRAINING PROGRAM

## 2024-04-27 PROCEDURE — 83880 ASSAY OF NATRIURETIC PEPTIDE: CPT

## 2024-04-27 PROCEDURE — 36415 COLL VENOUS BLD VENIPUNCTURE: CPT

## 2024-04-27 PROCEDURE — 2700000000 HC OXYGEN THERAPY PER DAY

## 2024-04-27 PROCEDURE — 93005 ELECTROCARDIOGRAM TRACING: CPT | Performed by: STUDENT IN AN ORGANIZED HEALTH CARE EDUCATION/TRAINING PROGRAM

## 2024-04-27 PROCEDURE — 70450 CT HEAD/BRAIN W/O DYE: CPT

## 2024-04-27 PROCEDURE — 93010 ELECTROCARDIOGRAM REPORT: CPT | Performed by: INTERNAL MEDICINE

## 2024-04-27 PROCEDURE — 84145 PROCALCITONIN (PCT): CPT

## 2024-04-27 PROCEDURE — 94640 AIRWAY INHALATION TREATMENT: CPT

## 2024-04-27 PROCEDURE — 71045 X-RAY EXAM CHEST 1 VIEW: CPT

## 2024-04-27 PROCEDURE — C1751 CATH, INF, PER/CENT/MIDLINE: HCPCS

## 2024-04-27 PROCEDURE — 36569 INSJ PICC 5 YR+ W/O IMAGING: CPT

## 2024-04-27 PROCEDURE — 85025 COMPLETE CBC W/AUTO DIFF WBC: CPT

## 2024-04-27 PROCEDURE — 84484 ASSAY OF TROPONIN QUANT: CPT

## 2024-04-27 PROCEDURE — 99285 EMERGENCY DEPT VISIT HI MDM: CPT

## 2024-04-27 RX ORDER — SODIUM CHLORIDE 9 MG/ML
25 INJECTION, SOLUTION INTRAVENOUS PRN
Status: DISCONTINUED | OUTPATIENT
Start: 2024-04-27 | End: 2024-05-02 | Stop reason: HOSPADM

## 2024-04-27 RX ORDER — POTASSIUM CHLORIDE 20 MEQ/1
40 TABLET, EXTENDED RELEASE ORAL PRN
Status: DISCONTINUED | OUTPATIENT
Start: 2024-04-27 | End: 2024-04-27

## 2024-04-27 RX ORDER — ONDANSETRON 2 MG/ML
4 INJECTION INTRAMUSCULAR; INTRAVENOUS EVERY 6 HOURS PRN
Status: DISCONTINUED | OUTPATIENT
Start: 2024-04-27 | End: 2024-05-02 | Stop reason: HOSPADM

## 2024-04-27 RX ORDER — OXYCODONE HYDROCHLORIDE 5 MG/1
5 TABLET ORAL EVERY 6 HOURS PRN
Status: DISCONTINUED | OUTPATIENT
Start: 2024-04-27 | End: 2024-05-02 | Stop reason: HOSPADM

## 2024-04-27 RX ORDER — LIDOCAINE HYDROCHLORIDE 10 MG/ML
5 INJECTION, SOLUTION INFILTRATION; PERINEURAL ONCE
Status: DISCONTINUED | OUTPATIENT
Start: 2024-04-27 | End: 2024-05-02 | Stop reason: HOSPADM

## 2024-04-27 RX ORDER — SODIUM CHLORIDE 0.9 % (FLUSH) 0.9 %
5-40 SYRINGE (ML) INJECTION PRN
Status: DISCONTINUED | OUTPATIENT
Start: 2024-04-27 | End: 2024-05-02 | Stop reason: HOSPADM

## 2024-04-27 RX ORDER — POTASSIUM CHLORIDE 7.45 MG/ML
10 INJECTION INTRAVENOUS PRN
Status: DISCONTINUED | OUTPATIENT
Start: 2024-04-27 | End: 2024-04-27

## 2024-04-27 RX ORDER — GUAIFENESIN 600 MG/1
600 TABLET, EXTENDED RELEASE ORAL 2 TIMES DAILY
Status: DISCONTINUED | OUTPATIENT
Start: 2024-04-27 | End: 2024-05-02 | Stop reason: HOSPADM

## 2024-04-27 RX ORDER — ACETAMINOPHEN 325 MG/1
650 TABLET ORAL EVERY 6 HOURS PRN
Status: DISCONTINUED | OUTPATIENT
Start: 2024-04-27 | End: 2024-05-02 | Stop reason: HOSPADM

## 2024-04-27 RX ORDER — IPRATROPIUM BROMIDE AND ALBUTEROL SULFATE 2.5; .5 MG/3ML; MG/3ML
1 SOLUTION RESPIRATORY (INHALATION)
Status: DISCONTINUED | OUTPATIENT
Start: 2024-04-27 | End: 2024-04-27

## 2024-04-27 RX ORDER — ONDANSETRON 4 MG/1
4 TABLET, ORALLY DISINTEGRATING ORAL EVERY 8 HOURS PRN
Status: DISCONTINUED | OUTPATIENT
Start: 2024-04-27 | End: 2024-05-02 | Stop reason: HOSPADM

## 2024-04-27 RX ORDER — POLYETHYLENE GLYCOL 3350 17 G/17G
17 POWDER, FOR SOLUTION ORAL DAILY PRN
Status: DISCONTINUED | OUTPATIENT
Start: 2024-04-27 | End: 2024-05-02 | Stop reason: HOSPADM

## 2024-04-27 RX ORDER — MAGNESIUM SULFATE IN WATER 40 MG/ML
2000 INJECTION, SOLUTION INTRAVENOUS PRN
Status: DISCONTINUED | OUTPATIENT
Start: 2024-04-27 | End: 2024-04-27

## 2024-04-27 RX ORDER — ENOXAPARIN SODIUM 100 MG/ML
40 INJECTION SUBCUTANEOUS DAILY
Status: DISCONTINUED | OUTPATIENT
Start: 2024-04-27 | End: 2024-05-02 | Stop reason: HOSPADM

## 2024-04-27 RX ORDER — SODIUM CHLORIDE 9 MG/ML
INJECTION, SOLUTION INTRAVENOUS PRN
Status: DISCONTINUED | OUTPATIENT
Start: 2024-04-27 | End: 2024-05-02 | Stop reason: HOSPADM

## 2024-04-27 RX ORDER — ACETAMINOPHEN 650 MG/1
650 SUPPOSITORY RECTAL EVERY 6 HOURS PRN
Status: DISCONTINUED | OUTPATIENT
Start: 2024-04-27 | End: 2024-05-02 | Stop reason: HOSPADM

## 2024-04-27 RX ORDER — IPRATROPIUM BROMIDE AND ALBUTEROL SULFATE 2.5; .5 MG/3ML; MG/3ML
1 SOLUTION RESPIRATORY (INHALATION)
Status: DISCONTINUED | OUTPATIENT
Start: 2024-04-27 | End: 2024-05-02 | Stop reason: HOSPADM

## 2024-04-27 RX ORDER — SODIUM CHLORIDE 0.9 % (FLUSH) 0.9 %
5-40 SYRINGE (ML) INJECTION EVERY 12 HOURS SCHEDULED
Status: DISCONTINUED | OUTPATIENT
Start: 2024-04-27 | End: 2024-05-02 | Stop reason: HOSPADM

## 2024-04-27 RX ADMIN — ONDANSETRON 4 MG: 4 TABLET, ORALLY DISINTEGRATING ORAL at 09:54

## 2024-04-27 RX ADMIN — ENOXAPARIN SODIUM 40 MG: 100 INJECTION SUBCUTANEOUS at 10:48

## 2024-04-27 RX ADMIN — IPRATROPIUM BROMIDE AND ALBUTEROL SULFATE 1 DOSE: 2.5; .5 SOLUTION RESPIRATORY (INHALATION) at 15:31

## 2024-04-27 RX ADMIN — OXYCODONE 5 MG: 5 TABLET ORAL at 18:51

## 2024-04-27 RX ADMIN — GUAIFENESIN 600 MG: 600 TABLET ORAL at 10:49

## 2024-04-27 RX ADMIN — IPRATROPIUM BROMIDE AND ALBUTEROL SULFATE 1 DOSE: 2.5; .5 SOLUTION RESPIRATORY (INHALATION) at 11:34

## 2024-04-27 RX ADMIN — ONDANSETRON 4 MG: 4 TABLET, ORALLY DISINTEGRATING ORAL at 16:29

## 2024-04-27 RX ADMIN — WATER 125 MG: 1 INJECTION INTRAMUSCULAR; INTRAVENOUS; SUBCUTANEOUS at 06:01

## 2024-04-27 RX ADMIN — IPRATROPIUM BROMIDE AND ALBUTEROL SULFATE 1 DOSE: .5; 2.5 SOLUTION RESPIRATORY (INHALATION) at 04:09

## 2024-04-27 RX ADMIN — DOXYCYCLINE 100 MG: 100 INJECTION, POWDER, LYOPHILIZED, FOR SOLUTION INTRAVENOUS at 10:48

## 2024-04-27 RX ADMIN — GUAIFENESIN 600 MG: 600 TABLET ORAL at 19:47

## 2024-04-27 RX ADMIN — SODIUM CHLORIDE, PRESERVATIVE FREE 10 ML: 5 INJECTION INTRAVENOUS at 19:47

## 2024-04-27 RX ADMIN — IPRATROPIUM BROMIDE AND ALBUTEROL SULFATE 1 DOSE: 2.5; .5 SOLUTION RESPIRATORY (INHALATION) at 21:29

## 2024-04-27 RX ADMIN — ACETAMINOPHEN 650 MG: 325 TABLET ORAL at 18:32

## 2024-04-27 RX ADMIN — ACETAMINOPHEN 650 MG: 325 TABLET ORAL at 09:53

## 2024-04-27 RX ADMIN — SODIUM CHLORIDE, PRESERVATIVE FREE 10 ML: 5 INJECTION INTRAVENOUS at 10:58

## 2024-04-27 RX ADMIN — WATER 60 MG: 1 INJECTION INTRAMUSCULAR; INTRAVENOUS; SUBCUTANEOUS at 19:47

## 2024-04-27 RX ADMIN — DOXYCYCLINE 100 MG: 100 INJECTION, POWDER, LYOPHILIZED, FOR SOLUTION INTRAVENOUS at 19:58

## 2024-04-27 ASSESSMENT — PAIN - FUNCTIONAL ASSESSMENT: PAIN_FUNCTIONAL_ASSESSMENT: 0-10

## 2024-04-27 ASSESSMENT — PAIN SCALES - GENERAL
PAINLEVEL_OUTOF10: 0
PAINLEVEL_OUTOF10: 9
PAINLEVEL_OUTOF10: 8
PAINLEVEL_OUTOF10: 0
PAINLEVEL_OUTOF10: 8
PAINLEVEL_OUTOF10: 8

## 2024-04-27 ASSESSMENT — PAIN DESCRIPTION - ORIENTATION: ORIENTATION: RIGHT;LEFT

## 2024-04-27 ASSESSMENT — PAIN DESCRIPTION - LOCATION
LOCATION: HEAD
LOCATION: BACK

## 2024-04-27 ASSESSMENT — PAIN DESCRIPTION - PAIN TYPE: TYPE: ACUTE PAIN

## 2024-04-27 NOTE — PLAN OF CARE
Problem: Safety - Adult  Goal: Free from fall injury  Outcome: Progressing  Flowsheets (Taken 4/27/2024 1456)  Free From Fall Injury: Instruct family/caregiver on patient safety     Problem: Musculoskeletal - Adult  Goal: Return mobility to safest level of function  Outcome: Progressing  Flowsheets (Taken 4/27/2024 1456)  Return Mobility to Safest Level of Function:   Assess patient stability and activity tolerance for standing, transferring and ambulating with or without assistive devices   Assist with transfers and ambulation using safe patient handling equipment as needed   Obtain physical therapy/occupational therapy consults as needed     Problem: Musculoskeletal - Adult  Goal: Return ADL status to a safe level of function  Outcome: Progressing  Flowsheets (Taken 4/27/2024 1456)  Return ADL Status to a Safe Level of Function:   Administer medication as ordered   Assess activities of daily living deficits and provide assistive devices as needed   Obtain physical therapy/occupational therapy consults as needed

## 2024-04-27 NOTE — PROGRESS NOTES
04/27/24 0709   RT Protocol   History Pulmonary Disease 2   Respiratory pattern 2   Breath sounds 2   Cough 0   Indications for Bronchodilator Therapy Decreased or absent breath sounds   Bronchodilator Assessment Score 6

## 2024-04-27 NOTE — PROGRESS NOTES
Arrived to place PICC line with bedside RN Leyla. Pre-procedure and timeout done with RN, discussed limitations of placement and allergies. Consent confirmed. Vital signs stable. Labs, allergies, medications, and code status reviewed. No contraindications noted.    Procedure explained to pt, including the risk and benefits of the procedure. All questions answered. Pt verbalizes understanding of the procedure and states no more questions.     Pt's basilic, brachial, cephalic are all easily collapsible with no indication for a clot. Vein selected is large enough for catheter. Pt tolerated sterile procedure well, with no difficulty accessing basilic vein, when accessed - blood was free flowing and non-pulsatile. Guidewire, introducer, and catheter went in smoothly.     PICC line verified with 3CG  PICC is verified:  Please replace all existing IV tubing with new IV tubing prior to using the PICC for current IV infusions.  Please remove any PIVs from PICC arm.  All of the above may be sources of infection or an increase chance of a clot.      Post procedure - reorganized pt table, placed pt in lowest position, with call light and educated on line care. Instructed pt/RN not to use arm for at least 30min to avoid bleeding. Reported off to bedside RN.        [phone]       W Plasty Text: The lesion was extirpated to the level of the fat with a #15 scalpel blade.  Given the location of the defect, shape of the defect and the proximity to free margins a W-plasty was deemed most appropriate for repair.  Using a sterile surgical marker, the appropriate transposition arms of the W-plasty were drawn incorporating the defect and placing the expected incisions within the relaxed skin tension lines where possible.    The area thus outlined was incised deep to adipose tissue with a #15 scalpel blade.  The skin margins were undermined to an appropriate distance in all directions utilizing iris scissors.  The opposing transposition arms were then transposed into place in opposite direction and anchored with interrupted buried subcutaneous sutures.

## 2024-04-27 NOTE — ED PROVIDER NOTES
[facility]  ED  EMERGENCY DEPARTMENT ENCOUNTER        Patient Name: Joyce Uribe  MRN: [id number]  Birthdate 1960  Date of evaluation: 4/27/2024  Provider: Tata Concepcion MD  PCP: Jaky Ramirez APRN - NP  Note Started: 4:55 AM EDT 4/27/24      CHIEF COMPLAINT  Shortness of Breath         HISTORY & PHYSICAL     HISTORY OF PRESENT ILLNESS  History from : Patient    Limitations to history : None    Joyce Uribe is a 63 y.o. female  has a past medical history of Anxiety, Asthma, Cancer ([facility]) (1984), Cancer of lung ([facility]) (2014), CHF (congestive heart failure) ([facility]), COPD (chronic obstructive pulmonary disease) ([facility]), Cyclic vomiting syndrome, Cysts of both kidneys, Depression, Fatty liver (09/06/12), Gastritis (06/29/12), MI (myocardial infarction) ([facility]) (2010), Panic attacks, Pneumonia, Pre-diabetes (04/13/13), and Tricuspid regurgitation., who presents to the ED complaining of shortness of breath.  Patient reports she has a chronic history of shortness of breath and is on 3 L of oxygen at baseline.  However she has had worsening shortness of breath over the past few days.  Today she had an episode where she felt extremely short of breath, diaphoresis, felt confused and generally weak.  No focal weakness or any focal neurologic deficits.  She reports occasional cough, no fever..  She denies any falls.  However she does live alone and states today she was unable to get off the toilet due to her symptoms.    Denies history of blood clots or active malignancy.  Patient denies unilateral leg swelling, hemoptysis, recent travel or surgery/immobilization, or OCP or other hormone use. Patient is not post partum.    Family history:   Family History   Problem Relation Age of Onset    Heart Disease Father     Hypertension Father     High Blood Pressure Father     Heart Disease Mother     Diabetes Maternal Grandmother     Cancer Son         NHL    High Blood Pressure Son      Patient does not smoke.

## 2024-04-27 NOTE — ED NOTES
Attempted to call report to B3.   Night shift still giving report at this time, unable to give report to day shift staff.

## 2024-04-27 NOTE — H&P
Hospital Medicine History & Physical      Date of Admission: 4/27/2024    Date of Service:  Pt seen/examined on 27 April 2024     [x]Admitted to Inpatient with expected LOS greater than two midnights due to medical therapy.  []Placed in Observation status.    Chief Admission Complaint:  SOB      Presenting Admission History:       63 y.o. female w/ hx COPD/CHF on baseline home O2 at 3L and remote Lung CA 2014 [facility] a several day hx of progressive SOB/MCKEE, now severe and unable to perform her daily activities.     She denies any associated CP and though generally weak has no focal deficits.     The patient denies any fever/chills or other signs/sxs of systemic illness or identifiable aggravating/alleviating factors\"      Assessment/Plan:      Current Principal Problem:  Dyspnea      COPD - w/ chronic hypoxic respiratory failure on baseline home O2 at 3L per [place].  Normally controlled on home medication regimen - continued. Here w/ acute exacerbation. Started on IV Steroids/HHN/ABX    Acute Respiratory Failure - w/ hypoxia, POArrival.  Presence of clinical respiratory distress w/ tachypnea/dyspnea/SOB and wheezing w/ use of accessory muscles to breath.  Supplemental O2 and wean as tolerated.     HTN/CAD - w/ known non-obstructive CAD s/p diagnostic Cat Jan 2024 by [facility] w/ no evidence of active signs/sxs of ischemia/failure. Currently controlled on home meds w/ vitals documented and reviewed.    CHF - chronic diastolic failure w/ preserved EF clinically suspected.  Likely due to hypertensive heart disease.  Patient is euvolemic w/ no evidence of acute decompensation.  Continue current medical mgt.      Troponin elevation - c/w myocardial injury 2nd to      [] STEMI/NSTEMI  [] Trop leak 2nd to CKD/ESRD  [x] Acute/Chronic Myocardial Injury 2nd to CHF  [] Recent MI (with 4 weeks of admission) dated:   [] Type II MI w/ demand ischemia  [] Demand ischemia w/out MI     []  Date Taking? Authorizing Provider   albuterol sulfate HFA (PROAIR HFA) 108 (90 Base) MCG/ACT inhaler Inhale 2 puffs into the lungs every 6 hours as needed for Wheezing or Shortness of Breath 1/7/24   Adelaida Turner DO   aspirin 81 MG chewable tablet Take 1 tablet by mouth daily 1/6/24   Yola Carr MD   atorvastatin (LIPITOR) 80 MG tablet Take 1 tablet by mouth nightly 1/6/24   Yola Carr MD   metoprolol tartrate (LOPRESSOR) 25 MG tablet Take 0.5 tablets by mouth 2 times daily 1/6/24   Yola Carr MD   metoclopramide (REGLAN) 10 MG tablet Take 1 tablet by mouth every 6 hours as needed (Nausea) 1/6/24   Yola Carr MD   pantoprazole (PROTONIX) 40 MG tablet Take 1 tablet by mouth daily 1/6/24   Yola Carr MD   ondansetron (ZOFRAN) 8 MG tablet Take 1 tablet by mouth every 8 hours as needed for Nausea or Vomiting    Yoly Beltre MD   methocarbamol (ROBAXIN) 500 MG tablet Take 1 tablet by mouth every 8 hours as needed (Muscle Spasm)    ProviderYoly MD   busPIRone (BUSPAR) 10 MG tablet Take 1 tablet by mouth in the morning and at bedtime 3/28/23   Yoly Beltre MD   diazePAM (VALIUM) 5 MG tablet Take 1 tablet by mouth every 12 hours as needed for Anxiety. 3/28/23   Yoly Beltre MD   fluticasone-umeclidin-vilant (TRELEGY ELLIPTA) 200-62.5-25 MCG/ACT AEPB inhaler Inhale 1 puff into the lungs daily 3/29/23   Yoly Beltre MD   vitamin D3 (CHOLECALCIFEROL) 25 MCG (1000 UT) TABS tablet Take 1 tablet by mouth daily    Yoly Beltre MD   ipratropium-albuterol (DUONEB) 0.5-2.5 (3) MG/3ML SOLN nebulizer solution Inhale 3 mLs into the lungs every 6 hours as needed for Shortness of Breath 4/21/18   Cheryl Mims, APRN - CNP       Labs: Personally reviewed and interpreted for clinical significance.   Recent Labs     04/27/24  0350   WBC 14.6*   HGB 11.6*   HCT 37.6        Recent Labs     04/27/24  0302   *   K 5.0   CL 94*   CO2 31   BUN 15

## 2024-04-27 NOTE — RT PROTOCOL NOTE

## 2024-04-28 LAB
ALBUMIN SERPL-MCNC: 3.8 G/DL (ref 3.4–5)
ALBUMIN/GLOB SERPL: 1.4 {RATIO} (ref 1.1–2.2)
ALP SERPL-CCNC: 93 U/L (ref 40–129)
ALT SERPL-CCNC: 21 U/L (ref 10–40)
ANION GAP SERPL CALCULATED.3IONS-SCNC: 9 MMOL/L (ref 3–16)
AST SERPL-CCNC: 24 U/L (ref 15–37)
BASOPHILS # BLD: 0 K/UL (ref 0–0.2)
BASOPHILS NFR BLD: 0.4 %
BILIRUB SERPL-MCNC: <0.2 MG/DL (ref 0–1)
BUN SERPL-MCNC: 16 MG/DL (ref 7–20)
CALCIUM SERPL-MCNC: 9.2 MG/DL (ref 8.3–10.6)
CHLORIDE SERPL-SCNC: 97 MMOL/L (ref 99–110)
CO2 SERPL-SCNC: 35 MMOL/L (ref 21–32)
CREAT SERPL-MCNC: <0.5 MG/DL (ref 0.6–1.2)
DEPRECATED RDW RBC AUTO: 15.2 % (ref 12.4–15.4)
EOSINOPHIL # BLD: 0 K/UL (ref 0–0.6)
EOSINOPHIL NFR BLD: 0 %
GFR SERPLBLD CREATININE-BSD FMLA CKD-EPI: >90 ML/MIN/{1.73_M2}
GLUCOSE BLD-MCNC: 135 MG/DL (ref 70–99)
GLUCOSE BLD-MCNC: 137 MG/DL (ref 70–99)
GLUCOSE SERPL-MCNC: 144 MG/DL (ref 70–99)
HCT VFR BLD AUTO: 35 % (ref 36–48)
HGB BLD-MCNC: 11.1 G/DL (ref 12–16)
LYMPHOCYTES # BLD: 1 K/UL (ref 1–5.1)
LYMPHOCYTES NFR BLD: 14.6 %
MCH RBC QN AUTO: 29.9 PG (ref 26–34)
MCHC RBC AUTO-ENTMCNC: 31.7 G/DL (ref 31–36)
MCV RBC AUTO: 94.4 FL (ref 80–100)
MONOCYTES # BLD: 0.1 K/UL (ref 0–1.3)
MONOCYTES NFR BLD: 1.2 %
NEUTROPHILS # BLD: 5.5 K/UL (ref 1.7–7.7)
NEUTROPHILS NFR BLD: 83.8 %
PERFORMED ON: ABNORMAL
PERFORMED ON: ABNORMAL
PLATELET # BLD AUTO: 253 K/UL (ref 135–450)
PMV BLD AUTO: 9.5 FL (ref 5–10.5)
POTASSIUM SERPL-SCNC: 4.1 MMOL/L (ref 3.5–5.1)
PROT SERPL-MCNC: 6.6 G/DL (ref 6.4–8.2)
RBC # BLD AUTO: 3.71 M/UL (ref 4–5.2)
SODIUM SERPL-SCNC: 141 MMOL/L (ref 136–145)
WBC # BLD AUTO: 6.5 K/UL (ref 4–11)

## 2024-04-28 PROCEDURE — 51798 US URINE CAPACITY MEASURE: CPT

## 2024-04-28 PROCEDURE — 2500000003 HC RX 250 WO HCPCS: Performed by: STUDENT IN AN ORGANIZED HEALTH CARE EDUCATION/TRAINING PROGRAM

## 2024-04-28 PROCEDURE — 94761 N-INVAS EAR/PLS OXIMETRY MLT: CPT

## 2024-04-28 PROCEDURE — C9113 INJ PANTOPRAZOLE SODIUM, VIA: HCPCS | Performed by: INTERNAL MEDICINE

## 2024-04-28 PROCEDURE — 1200000000 HC SEMI PRIVATE

## 2024-04-28 PROCEDURE — 85025 COMPLETE CBC W/AUTO DIFF WBC: CPT

## 2024-04-28 PROCEDURE — 94640 AIRWAY INHALATION TREATMENT: CPT

## 2024-04-28 PROCEDURE — 2580000003 HC RX 258: Performed by: STUDENT IN AN ORGANIZED HEALTH CARE EDUCATION/TRAINING PROGRAM

## 2024-04-28 PROCEDURE — 6370000000 HC RX 637 (ALT 250 FOR IP): Performed by: STUDENT IN AN ORGANIZED HEALTH CARE EDUCATION/TRAINING PROGRAM

## 2024-04-28 PROCEDURE — 6360000002 HC RX W HCPCS: Performed by: INTERNAL MEDICINE

## 2024-04-28 PROCEDURE — 6360000002 HC RX W HCPCS: Performed by: STUDENT IN AN ORGANIZED HEALTH CARE EDUCATION/TRAINING PROGRAM

## 2024-04-28 PROCEDURE — 80053 COMPREHEN METABOLIC PANEL: CPT

## 2024-04-28 PROCEDURE — 2700000000 HC OXYGEN THERAPY PER DAY

## 2024-04-28 PROCEDURE — 2580000003 HC RX 258: Performed by: INTERNAL MEDICINE

## 2024-04-28 RX ORDER — PANTOPRAZOLE SODIUM 40 MG/10ML
40 INJECTION, POWDER, LYOPHILIZED, FOR SOLUTION INTRAVENOUS DAILY
Status: DISCONTINUED | OUTPATIENT
Start: 2024-04-28 | End: 2024-05-01 | Stop reason: SDUPTHER

## 2024-04-28 RX ADMIN — DOXYCYCLINE 100 MG: 100 INJECTION, POWDER, LYOPHILIZED, FOR SOLUTION INTRAVENOUS at 09:17

## 2024-04-28 RX ADMIN — WATER 60 MG: 1 INJECTION INTRAMUSCULAR; INTRAVENOUS; SUBCUTANEOUS at 18:29

## 2024-04-28 RX ADMIN — OXYCODONE 5 MG: 5 TABLET ORAL at 09:19

## 2024-04-28 RX ADMIN — IPRATROPIUM BROMIDE AND ALBUTEROL SULFATE 1 DOSE: 2.5; .5 SOLUTION RESPIRATORY (INHALATION) at 19:47

## 2024-04-28 RX ADMIN — ONDANSETRON 4 MG: 2 INJECTION INTRAMUSCULAR; INTRAVENOUS at 11:38

## 2024-04-28 RX ADMIN — SODIUM CHLORIDE, PRESERVATIVE FREE 10 ML: 5 INJECTION INTRAVENOUS at 09:26

## 2024-04-28 RX ADMIN — GUAIFENESIN 600 MG: 600 TABLET ORAL at 09:19

## 2024-04-28 RX ADMIN — OXYCODONE 5 MG: 5 TABLET ORAL at 15:20

## 2024-04-28 RX ADMIN — IPRATROPIUM BROMIDE AND ALBUTEROL SULFATE 1 DOSE: 2.5; .5 SOLUTION RESPIRATORY (INHALATION) at 15:32

## 2024-04-28 RX ADMIN — IPRATROPIUM BROMIDE AND ALBUTEROL SULFATE 1 DOSE: 2.5; .5 SOLUTION RESPIRATORY (INHALATION) at 07:52

## 2024-04-28 RX ADMIN — WATER 60 MG: 1 INJECTION INTRAMUSCULAR; INTRAVENOUS; SUBCUTANEOUS at 09:19

## 2024-04-28 RX ADMIN — GUAIFENESIN 600 MG: 600 TABLET ORAL at 20:10

## 2024-04-28 RX ADMIN — OXYCODONE 5 MG: 5 TABLET ORAL at 02:03

## 2024-04-28 RX ADMIN — PANTOPRAZOLE SODIUM 40 MG: 40 INJECTION, POWDER, FOR SOLUTION INTRAVENOUS at 18:41

## 2024-04-28 RX ADMIN — IPRATROPIUM BROMIDE AND ALBUTEROL SULFATE 1 DOSE: 2.5; .5 SOLUTION RESPIRATORY (INHALATION) at 11:48

## 2024-04-28 RX ADMIN — ENOXAPARIN SODIUM 40 MG: 100 INJECTION SUBCUTANEOUS at 09:18

## 2024-04-28 RX ADMIN — SODIUM CHLORIDE, PRESERVATIVE FREE 10 ML: 5 INJECTION INTRAVENOUS at 09:19

## 2024-04-28 RX ADMIN — OXYCODONE 5 MG: 5 TABLET ORAL at 21:24

## 2024-04-28 RX ADMIN — ONDANSETRON 4 MG: 4 TABLET, ORALLY DISINTEGRATING ORAL at 18:29

## 2024-04-28 RX ADMIN — DOXYCYCLINE 100 MG: 100 INJECTION, POWDER, LYOPHILIZED, FOR SOLUTION INTRAVENOUS at 18:35

## 2024-04-28 RX ADMIN — WATER 60 MG: 1 INJECTION INTRAMUSCULAR; INTRAVENOUS; SUBCUTANEOUS at 02:56

## 2024-04-28 ASSESSMENT — PAIN SCALES - GENERAL
PAINLEVEL_OUTOF10: 8
PAINLEVEL_OUTOF10: 6
PAINLEVEL_OUTOF10: 8

## 2024-04-28 ASSESSMENT — PAIN DESCRIPTION - LOCATION
LOCATION: BACK

## 2024-04-28 NOTE — PROGRESS NOTES
04/28/24 1950   RT Protocol   History Pulmonary Disease 2   Respiratory pattern 2   Breath sounds 6   Cough 0   Indications for Bronchodilator Therapy On home bronchodilators   Bronchodilator Assessment Score 10

## 2024-04-28 NOTE — PROGRESS NOTES
Hospital Medicine Progress Note      Date of Admission: 4/27/2024  Hospital Day: 2    Chief Admission Complaint:  \"SOB\"     Subjective:  no new c/o    Presenting Admission History:         \"63 y.o. female w/ hx COPD/CHF on baseline home O2 at 3L and remote Lung CA 2014 who presented [facility] with a several day hx of progressive SOB/MCKEE, now severe and unable to perform her daily activities.      She denies any associated CP and though generally weak has no focal deficits.      The patient denies any fever/chills or other signs/sxs of systemic illness or identifiable aggravating/alleviating factors\"       Assessment/Plan:      Current Principal Problem:  Dyspnea         COPD - w/ chronic hypoxic respiratory failure on baseline home O2 at 3L per [place].  Normally controlled on home medication regimen - continued. Here w/ acute exacerbation. Started on IV Steroids/HHN/ABX.     Acute on Chronic Respiratory Failure - w/ progressive hypoxia above baseline, POArrival.  Presence of clinical respiratory distress w/ tachypnea/dyspnea/SOB and wheezing w/ use of accessory muscles to breath.  Supplemental O2 and wean as tolerated.      HTN/CAD - w/ known non-obstructive CAD s/p diagnostic [place] Jan 2024 by [facility] w/ no evidence of active signs/sxs of ischemia/failure. Currently controlled on home meds w/ vitals documented and reviewed.     CHF - chronic diastolic failure w/ preserved EF clinically suspected.  Likely due to hypertensive heart disease.  Patient is euvolemic w/ no evidence of acute decompensation.  Continue current medical mgt.       Troponin elevation - c/w myocardial injury 2nd to                                            [] STEMI/NSTEMI  [] Trop leak 2nd to CKD/ESRD  [x] Acute/Chronic Myocardial Injury 2nd to CHF  [] Recent MI (with 4 weeks of admission) dated:   [] Type II MI w/ demand ischemia  [] Demand ischemia w/out MI      [] Clinically insignificant Troponin Elevation     of unclear

## 2024-04-28 NOTE — PLAN OF CARE
Problem: Discharge Planning  Goal: Discharge to home or other facility with appropriate resources  Outcome: Progressing     Problem: Pain  Goal: Verbalizes/displays adequate comfort level or baseline comfort level  Outcome: Progressing     Problem: Safety - Adult  Goal: Free from fall injury  4/27/2024 2141 by Odalis Quispe RN  Outcome: Progressing  4/27/2024 1456 by Leyla Merlos RN  Outcome: Progressing  Flowsheets (Taken 4/27/2024 1456)  Free From Fall Injury: Instruct family/caregiver on patient safety     Problem: Musculoskeletal - Adult  Goal: Return mobility to safest level of function  4/27/2024 2141 by Odalis Quispe RN  Outcome: Progressing  4/27/2024 1456 by Leyla Merlos RN  Outcome: Progressing  Flowsheets (Taken 4/27/2024 1456)  Return Mobility to Safest Level of Function:   Assess patient stability and activity tolerance for standing, transferring and ambulating with or without assistive devices   Assist with transfers and ambulation using safe patient handling equipment as needed   Obtain physical therapy/occupational therapy consults as needed  Goal: Return ADL status to a safe level of function  4/27/2024 2141 by Odalis Quispe RN  Outcome: Progressing  4/27/2024 1456 by Leyla Merlos RN  Outcome: Progressing  Flowsheets (Taken 4/27/2024 1456)  Return ADL Status to a Safe Level of Function:   Administer medication as ordered   Assess activities of daily living deficits and provide assistive devices as needed   Obtain physical therapy/occupational therapy consults as needed     Problem: Respiratory - Adult  Goal: Achieves optimal ventilation and oxygenation  Outcome: Progressing     Problem: Cardiovascular - Adult  Goal: Maintains optimal cardiac output and hemodynamic stability  Outcome: Progressing     Problem: Skin/Tissue Integrity - Adult  Goal: Skin integrity remains intact  Outcome: Progressing     Problem: Hematologic - Adult  Goal: Maintains hematologic stability  Outcome:  Progressing

## 2024-04-28 NOTE — PLAN OF CARE
Problem: Discharge Planning  Goal: Discharge to home or other facility with appropriate resources  4/28/2024 1039 by Jenni Chun RN  Outcome: Progressing  4/27/2024 2141 by Odalis Quispe RN  Outcome: Progressing     Problem: Pain  Goal: Verbalizes/displays adequate comfort level or baseline comfort level  4/28/2024 1039 by Jenni Chun RN  Outcome: Progressing  4/27/2024 2141 by Odalis Quispe RN  Outcome: Progressing     Problem: Safety - Adult  Goal: Free from fall injury  4/27/2024 2141 by Odalis Quispe RN  Outcome: Progressing     Problem: Musculoskeletal - Adult  Goal: Return mobility to safest level of function  4/27/2024 2141 by Odalis Quispe RN  Outcome: Progressing  Goal: Return ADL status to a safe level of function  4/27/2024 2141 by Odalis Quispe RN  Outcome: Progressing     Problem: Respiratory - Adult  Goal: Achieves optimal ventilation and oxygenation  4/28/2024 1039 by Jenni Chun RN  Outcome: Progressing  4/27/2024 2141 by Odalis Quispe RN  Outcome: Progressing  Flowsheets (Taken 4/27/2024 2000)  Achieves optimal ventilation and oxygenation: Assess for changes in respiratory status     Problem: Cardiovascular - Adult  Goal: Maintains optimal cardiac output and hemodynamic stability  4/28/2024 1039 by Jenni Chun RN  Outcome: Progressing  4/27/2024 2141 by Odalis Quispe RN  Outcome: Progressing  Flowsheets (Taken 4/27/2024 2000)  Maintains optimal cardiac output and hemodynamic stability: Monitor blood pressure and heart rate     Problem: Skin/Tissue Integrity - Adult  Goal: Skin integrity remains intact  4/27/2024 2141 by Odalis Quispe RN  Outcome: Progressing     Problem: Hematologic - Adult  Goal: Maintains hematologic stability  4/27/2024 2141 by Odalis Quispe RN  Outcome: Progressing  Flowsheets (Taken 4/27/2024 2000)  Maintains hematologic stability: Assess for signs and symptoms of bleeding or hemorrhage

## 2024-04-28 NOTE — H&P
Hospital Medicine History & Physical      Date of Admission: 4/27/2024    Date of Service:  Pt seen/examined on 27 April 2024     [x]Admitted to Inpatient with expected LOS greater than two midnights due to medical therapy.  []Placed in Observation status.    Chief Admission Complaint:  SOB      Presenting Admission History:       \"63 y.o. female w/ hx COPD/CHF on baseline home O2 at 3L and remote Lung CA 2014 who presented to [facility] with a several day hx of progressive SOB/MCKEE, now severe and unable to perform her daily activities.     She denies any associated CP and though generally weak has no focal deficits.     The patient denies any fever/chills or other signs/sxs of systemic illness or identifiable aggravating/alleviating factors\"      Assessment/Plan:      Current Principal Problem:  Dyspnea      COPD - w/ chronic hypoxic respiratory failure on baseline home O2 at 3L per [place].  Normally controlled on home medication regimen - continued. Here w/ acute exacerbation. Started on IV Steroids/HHN/ABX.    Acute on Chronic Respiratory Failure - w/ progressive hypoxia above baseline, POArrival.  Presence of clinical respiratory distress w/ tachypnea/dyspnea/SOB and wheezing w/ use of accessory muscles to breath.  Supplemental O2 and wean as tolerated.     HTN/CAD - w/ known non-obstructive CAD s/p diagnostic Cat Jan 2024 by [facility] w/ no evidence of active signs/sxs of ischemia/failure. Currently controlled on home meds w/ vitals documented and reviewed.    CHF - chronic diastolic failure w/ preserved EF clinically suspected.  Likely due to hypertensive heart disease.  Patient is euvolemic w/ no evidence of acute decompensation.  Continue current medical mgt.      Troponin elevation - c/w myocardial injury 2nd to      [] STEMI/NSTEMI  [] Trop leak 2nd to CKD/ESRD  [x] Acute/Chronic Myocardial Injury 2nd to CHF  [] Recent MI (with 4 weeks of admission) dated:   [] Type II MI w/ demand ischemia  []

## 2024-04-29 LAB
ALBUMIN SERPL-MCNC: 3.6 G/DL (ref 3.4–5)
ALBUMIN/GLOB SERPL: 1.3 {RATIO} (ref 1.1–2.2)
ALP SERPL-CCNC: 81 U/L (ref 40–129)
ALT SERPL-CCNC: 21 U/L (ref 10–40)
ANION GAP SERPL CALCULATED.3IONS-SCNC: 8 MMOL/L (ref 3–16)
AST SERPL-CCNC: 24 U/L (ref 15–37)
BASOPHILS # BLD: 0 K/UL (ref 0–0.2)
BASOPHILS NFR BLD: 0.1 %
BILIRUB SERPL-MCNC: <0.2 MG/DL (ref 0–1)
BUN SERPL-MCNC: 21 MG/DL (ref 7–20)
CALCIUM SERPL-MCNC: 8.9 MG/DL (ref 8.3–10.6)
CHLORIDE SERPL-SCNC: 98 MMOL/L (ref 99–110)
CO2 SERPL-SCNC: 34 MMOL/L (ref 21–32)
CREAT SERPL-MCNC: <0.5 MG/DL (ref 0.6–1.2)
DEPRECATED RDW RBC AUTO: 15.2 % (ref 12.4–15.4)
EOSINOPHIL # BLD: 0 K/UL (ref 0–0.6)
EOSINOPHIL NFR BLD: 0 %
GFR SERPLBLD CREATININE-BSD FMLA CKD-EPI: >90 ML/MIN/{1.73_M2}
GLUCOSE SERPL-MCNC: 218 MG/DL (ref 70–99)
HCT VFR BLD AUTO: 33.6 % (ref 36–48)
HGB BLD-MCNC: 10.7 G/DL (ref 12–16)
LYMPHOCYTES # BLD: 0.6 K/UL (ref 1–5.1)
LYMPHOCYTES NFR BLD: 5.8 %
MCH RBC QN AUTO: 30 PG (ref 26–34)
MCHC RBC AUTO-ENTMCNC: 32 G/DL (ref 31–36)
MCV RBC AUTO: 93.8 FL (ref 80–100)
MONOCYTES # BLD: 0.2 K/UL (ref 0–1.3)
MONOCYTES NFR BLD: 2.3 %
NEUTROPHILS # BLD: 10.2 K/UL (ref 1.7–7.7)
NEUTROPHILS NFR BLD: 91.8 %
PLATELET # BLD AUTO: 268 K/UL (ref 135–450)
PMV BLD AUTO: 9.4 FL (ref 5–10.5)
POTASSIUM SERPL-SCNC: 4 MMOL/L (ref 3.5–5.1)
PROT SERPL-MCNC: 6.4 G/DL (ref 6.4–8.2)
RBC # BLD AUTO: 3.58 M/UL (ref 4–5.2)
SODIUM SERPL-SCNC: 140 MMOL/L (ref 136–145)
WBC # BLD AUTO: 11.1 K/UL (ref 4–11)

## 2024-04-29 PROCEDURE — 80053 COMPREHEN METABOLIC PANEL: CPT

## 2024-04-29 PROCEDURE — 6370000000 HC RX 637 (ALT 250 FOR IP): Performed by: STUDENT IN AN ORGANIZED HEALTH CARE EDUCATION/TRAINING PROGRAM

## 2024-04-29 PROCEDURE — 2580000003 HC RX 258: Performed by: STUDENT IN AN ORGANIZED HEALTH CARE EDUCATION/TRAINING PROGRAM

## 2024-04-29 PROCEDURE — 2580000003 HC RX 258: Performed by: INTERNAL MEDICINE

## 2024-04-29 PROCEDURE — 6360000002 HC RX W HCPCS: Performed by: STUDENT IN AN ORGANIZED HEALTH CARE EDUCATION/TRAINING PROGRAM

## 2024-04-29 PROCEDURE — C9113 INJ PANTOPRAZOLE SODIUM, VIA: HCPCS | Performed by: INTERNAL MEDICINE

## 2024-04-29 PROCEDURE — 2700000000 HC OXYGEN THERAPY PER DAY

## 2024-04-29 PROCEDURE — 1200000000 HC SEMI PRIVATE

## 2024-04-29 PROCEDURE — 85025 COMPLETE CBC W/AUTO DIFF WBC: CPT

## 2024-04-29 PROCEDURE — 2500000003 HC RX 250 WO HCPCS: Performed by: STUDENT IN AN ORGANIZED HEALTH CARE EDUCATION/TRAINING PROGRAM

## 2024-04-29 PROCEDURE — 6360000002 HC RX W HCPCS: Performed by: INTERNAL MEDICINE

## 2024-04-29 PROCEDURE — 94761 N-INVAS EAR/PLS OXIMETRY MLT: CPT

## 2024-04-29 PROCEDURE — 94640 AIRWAY INHALATION TREATMENT: CPT

## 2024-04-29 RX ORDER — WATER 10 ML/10ML
INJECTION INTRAMUSCULAR; INTRAVENOUS; SUBCUTANEOUS
Status: DISCONTINUED
Start: 2024-04-29 | End: 2024-04-29 | Stop reason: WASHOUT

## 2024-04-29 RX ORDER — PROCHLORPERAZINE EDISYLATE 5 MG/ML
10 INJECTION INTRAMUSCULAR; INTRAVENOUS EVERY 6 HOURS PRN
Status: DISCONTINUED | OUTPATIENT
Start: 2024-04-29 | End: 2024-05-02 | Stop reason: HOSPADM

## 2024-04-29 RX ADMIN — DOXYCYCLINE 100 MG: 100 INJECTION, POWDER, LYOPHILIZED, FOR SOLUTION INTRAVENOUS at 05:47

## 2024-04-29 RX ADMIN — SODIUM CHLORIDE, PRESERVATIVE FREE 10 ML: 5 INJECTION INTRAVENOUS at 19:53

## 2024-04-29 RX ADMIN — WATER 60 MG: 1 INJECTION INTRAMUSCULAR; INTRAVENOUS; SUBCUTANEOUS at 02:16

## 2024-04-29 RX ADMIN — SODIUM CHLORIDE, PRESERVATIVE FREE 10 ML: 5 INJECTION INTRAVENOUS at 19:55

## 2024-04-29 RX ADMIN — IPRATROPIUM BROMIDE AND ALBUTEROL SULFATE 1 DOSE: 2.5; .5 SOLUTION RESPIRATORY (INHALATION) at 08:03

## 2024-04-29 RX ADMIN — GUAIFENESIN 600 MG: 600 TABLET ORAL at 09:25

## 2024-04-29 RX ADMIN — SODIUM CHLORIDE 25 ML: 9 INJECTION, SOLUTION INTRAVENOUS at 05:47

## 2024-04-29 RX ADMIN — OXYCODONE 5 MG: 5 TABLET ORAL at 14:42

## 2024-04-29 RX ADMIN — OXYCODONE 5 MG: 5 TABLET ORAL at 20:45

## 2024-04-29 RX ADMIN — WATER 60 MG: 1 INJECTION INTRAMUSCULAR; INTRAVENOUS; SUBCUTANEOUS at 09:24

## 2024-04-29 RX ADMIN — PANTOPRAZOLE SODIUM 40 MG: 40 INJECTION, POWDER, FOR SOLUTION INTRAVENOUS at 09:24

## 2024-04-29 RX ADMIN — OXYCODONE 5 MG: 5 TABLET ORAL at 03:57

## 2024-04-29 RX ADMIN — OXYCODONE 5 MG: 5 TABLET ORAL at 09:25

## 2024-04-29 RX ADMIN — IPRATROPIUM BROMIDE AND ALBUTEROL SULFATE 1 DOSE: 2.5; .5 SOLUTION RESPIRATORY (INHALATION) at 20:27

## 2024-04-29 RX ADMIN — SODIUM CHLORIDE, PRESERVATIVE FREE 10 ML: 5 INJECTION INTRAVENOUS at 09:30

## 2024-04-29 RX ADMIN — GUAIFENESIN 600 MG: 600 TABLET ORAL at 19:54

## 2024-04-29 RX ADMIN — ONDANSETRON 4 MG: 4 TABLET, ORALLY DISINTEGRATING ORAL at 04:41

## 2024-04-29 RX ADMIN — DOXYCYCLINE 100 MG: 100 INJECTION, POWDER, LYOPHILIZED, FOR SOLUTION INTRAVENOUS at 18:35

## 2024-04-29 RX ADMIN — IPRATROPIUM BROMIDE AND ALBUTEROL SULFATE 1 DOSE: 2.5; .5 SOLUTION RESPIRATORY (INHALATION) at 11:10

## 2024-04-29 RX ADMIN — ENOXAPARIN SODIUM 40 MG: 100 INJECTION SUBCUTANEOUS at 09:25

## 2024-04-29 RX ADMIN — ONDANSETRON 4 MG: 2 INJECTION INTRAMUSCULAR; INTRAVENOUS at 09:25

## 2024-04-29 RX ADMIN — IPRATROPIUM BROMIDE AND ALBUTEROL SULFATE 1 DOSE: 2.5; .5 SOLUTION RESPIRATORY (INHALATION) at 15:34

## 2024-04-29 RX ADMIN — ONDANSETRON 4 MG: 2 INJECTION INTRAMUSCULAR; INTRAVENOUS at 14:42

## 2024-04-29 ASSESSMENT — PAIN SCALES - GENERAL
PAINLEVEL_OUTOF10: 8

## 2024-04-29 ASSESSMENT — PAIN DESCRIPTION - LOCATION
LOCATION: BACK

## 2024-04-29 NOTE — PLAN OF CARE
Problem: Discharge Planning  Goal: Discharge to home or other facility with appropriate resources  4/28/2024 2049 by Leyla Mckoy RN  Outcome: Progressing  Flowsheets (Taken 4/28/2024 2007)  Discharge to home or other facility with appropriate resources: Identify barriers to discharge with patient and caregiver    Problem: Pain  Goal: Verbalizes/displays adequate comfort level or baseline comfort level  4/28/2024 2049 by Leyla Mckoy RN  Outcome: Progressing  Flowsheets (Taken 4/28/2024 2006)  Verbalizes/displays adequate comfort level or baseline comfort level: Encourage patient to monitor pain and request assistance     Problem: Safety - Adult  Goal: Free from fall injury  Outcome: Progressing     Problem: Musculoskeletal - Adult  Goal: Return mobility to safest level of function  Outcome: Progressing  Flowsheets (Taken 4/28/2024 2007)  Return Mobility to Safest Level of Function: Assess patient stability and activity tolerance for standing, transferring and ambulating with or without assistive devices  Goal: Return ADL status to a safe level of function  Outcome: Progressing  Flowsheets (Taken 4/28/2024 2007)  Return ADL Status to a Safe Level of Function: Administer medication as ordered     Problem: Respiratory - Adult  Goal: Achieves optimal ventilation and oxygenation  4/28/2024 2049 by Leyla Mckoy RN  Outcome: Progressing  Flowsheets (Taken 4/28/2024 2007)  Achieves optimal ventilation and oxygenation: Assess for changes in respiratory status     Problem: Cardiovascular - Adult  Goal: Maintains optimal cardiac output and hemodynamic stability  4/28/2024 2049 by Leyla Mckoy RN  Outcome: Progressing  Flowsheets (Taken 4/28/2024 2007)  Maintains optimal cardiac output and hemodynamic stability: Monitor blood pressure and heart rate     Problem: Skin/Tissue Integrity - Adult  Goal: Skin integrity remains intact  Outcome: Progressing  Flowsheets  Taken 4/28/2024 2048  Skin Integrity Remains

## 2024-04-29 NOTE — PROGRESS NOTES
Hospital Medicine Progress Note      Date of Admission: 4/27/2024  Hospital Day: 3    Chief Admission Complaint:  \"SOB\"     Subjective:  no new c/o - feeling somewhat better    Presenting Admission History:         \"63 y.o. female w/ hx COPD/CHF on baseline home O2 at 3L and remote Lung CA 2014 who presented to [facility] with a several day hx of progressive SOB/MCKEE, now severe and unable to perform her daily activities.      She denies any associated CP and though generally weak has no focal deficits.      The patient denies any fever/chills or other signs/sxs of systemic illness or identifiable aggravating/alleviating factors\"       Assessment/Plan:      Current Principal Problem:  Dyspnea       COPD - w/ chronic hypoxic respiratory failure on baseline home O2 at 3L per NC.  Normally controlled on home medication regimen - continued. Here w/ acute exacerbation - clinically improving. Started on IV Steroids/HHN/ABX.     Acute on Chronic Respiratory Failure - w/ progressive hypoxia above baseline, POArrival.  Presence of clinical respiratory distress w/ tachypnea/dyspnea/SOB and wheezing w/ use of accessory muscles to breath.  Supplemental O2 and wean as tolerated.      HTN/CAD - w/ known non-obstructive CAD s/p diagnostic LHCath Jan 2024 by [facility] w/ no evidence of active signs/sxs of ischemia/failure. Currently controlled on home meds w/ vitals documented and reviewed.     CHF - chronic diastolic failure w/ preserved EF clinically suspected.  Likely due to hypertensive heart disease.  Patient is euvolemic w/ no evidence of acute decompensation.  Continue current medical mgt.       Troponin elevation - c/w myocardial injury 2nd to                                            [] STEMI/NSTEMI  [] Trop leak 2nd to CKD/ESRD  [x] Acute/Chronic Myocardial Injury 2nd to CHF  [] Recent MI (with 4 weeks of admission) dated:   [] Type II MI w/ demand ischemia  [] Demand ischemia w/out MI      [] Clinically    ---------------------------------------------------------------------  B. Risk of Treatment (any 1)   [] Drugs/treatments that require intensive monitoring for toxicity include:    [] IV ABX requiring serial renal monitoring for nephrotoxicity:     [] Post-Cath/Contrast study requiring serial renal monitoring for Contrast Induced Nephropathy  [] IV Narcotic analgesia for ADR   [] Aggressive IV diuresis requiring serial monitoring for renal impairment and electrolyte derangements  [] Hypertonic Saline requiring serial renal monitoring for appropriate electrolyte correction rate   [] Critical electrolyte abnormalities requiring IV replacement and close serial monitoring  [] Insulin - monitoring FSBS for Hypoglycemic ADR  [] Anticoagulation requiring close serial monitoring and dose adjustments at high risk of ADR   [] HD requiring close serial monitoring of electrolytes and fluid status  [] Other -  [] Change in code status:   [] Decision to escalate care:    [] Major surgery/procedure with associated risk factors:    ----------------------------------------------------------------------  C. Data (any 2)  [] Discussed management of the case with consultants as follows:    [x] Discussed the discharge plan in detail with case mgt including timing/barriers to discharge, need for support services and placement decision   [] Imaging personally reviewed and interpreted, includes:   [] Telemetry monitoring as noted above  [] Data Review (any 3)  [] Collateral history obtained from:    [] All available Consultant notes from yesterday/today were reviewed  [x] All current labs were reviewed and interpreted for clinical significance   [x] Appropriate follow-up labs were ordered    Medications:  Personally reviewed in detail in conjunction w/ labs as documented for evidence of drug toxicity.     Infusion Medications    sodium chloride 25 mL (04/29/24 5793)    sodium chloride       Scheduled Medications    pantoprazole  40 mg

## 2024-04-29 NOTE — CARE COORDINATION
Case Management Assessment  Initial Evaluation    Date/Time of Evaluation: 4/29/2024 1:06 PM  Assessment Completed by: Amara Sawant RN    If patient is discharged prior to next notation, then this note serves as note for discharge by case management.    Patient Name: Joyce Uribe                   YOB: 1960  Diagnosis: Shortness of breath [R06.02]  Acute confusional state [F05]  Dyspnea [R06.00]  Generalized weakness [R53.1]  COPD exacerbation (HCC) [J44.1]                   Date / Time: 4/27/2024  2:09 AM    Patient Admission Status: Inpatient   Readmission Risk (Low < 19, Mod (19-27), High > 27): Readmission Risk Score: 15.7    Current PCP: Jaky Ramirez APRN - NP  PCP verified by CM?      Chart Reviewed: Yes      History Provided by:    Patient Orientation:      Patient Cognition:      Hospitalization in the last 30 days (Readmission):  No    If yes, Readmission Assessment in  Navigator will be completed.    Advance Directives:      Code Status: Limited   Patient's Primary Decision Maker is:      Primary Decision Maker: Ty Villarreal  Child - [phone]    Primary Decision Maker: Kate Viera  Child - [phone]    Discharge Planning:    Patient lives with: Friends (friend stays at night, aide stays during the dya) Type of Home: Apartment  Primary Care Giver:    Patient Support Systems include: Friends/Neighbors, Home Care Staff, Family Members, Children   Current Financial resources:    Current community resources:    Current services prior to admission: Home Care            Current DME:              Type of Home Care services:  Aide Services    ADLS  Prior functional level:    Current functional level:      PT AM-PAC:   /24  OT AM-PAC:   /24    Family can provide assistance at DC:    Would you like Case Management to discuss the discharge plan with any other family members/significant others, and if so, who?    Plans to Return to Present Housing:    Other Identified  Patient and/or Patient Representative Agree with the Discharge Plan?      Amara Sawant RN  Case Management Department

## 2024-04-29 NOTE — ACP (ADVANCE CARE PLANNING)
Advance Care Planning     General Advance Care Planning (ACP) Conversation    Date of Conversation: 4/27/2024  Conducted with: Patient with Decision Making Capacity and Legal next of kin  Other persons present: Son Ty    Healthcare Decision Maker:    Primary Decision Maker: Ty Villarreal - Child - [phone]    Primary Decision Maker: Kate Viera - Child - [phone]  Click here to complete Healthcare Decision Makers including selection of the Healthcare Decision Maker Relationship (ie \"Primary\").  Today we documented Decision Maker(s) consistent with Legal Next of Kin hierarchy.    Content/Action Overview:  Has NO ACP documents-Information provided  Reviewed DNR/DNI and patient elects Full Code (Attempt Resuscitation)      Length of Voluntary ACP Conversation in minutes:  <16 minutes (Non-Billable)    Amara Sawant RN

## 2024-04-30 LAB
ALBUMIN SERPL-MCNC: 3.3 G/DL (ref 3.4–5)
ANION GAP SERPL CALCULATED.3IONS-SCNC: 6 MMOL/L (ref 3–16)
BASOPHILS # BLD: 0 K/UL (ref 0–0.2)
BASOPHILS NFR BLD: 0.1 %
BUN SERPL-MCNC: 21 MG/DL (ref 7–20)
CALCIUM SERPL-MCNC: 8.7 MG/DL (ref 8.3–10.6)
CHLORIDE SERPL-SCNC: 100 MMOL/L (ref 99–110)
CO2 SERPL-SCNC: 34 MMOL/L (ref 21–32)
CREAT SERPL-MCNC: <0.5 MG/DL (ref 0.6–1.2)
DEPRECATED RDW RBC AUTO: 15.6 % (ref 12.4–15.4)
EOSINOPHIL # BLD: 0 K/UL (ref 0–0.6)
EOSINOPHIL NFR BLD: 0 %
GFR SERPLBLD CREATININE-BSD FMLA CKD-EPI: >90 ML/MIN/{1.73_M2}
GLUCOSE SERPL-MCNC: 65 MG/DL (ref 70–99)
HCT VFR BLD AUTO: 32.2 % (ref 36–48)
HGB BLD-MCNC: 10.5 G/DL (ref 12–16)
LYMPHOCYTES # BLD: 2.1 K/UL (ref 1–5.1)
LYMPHOCYTES NFR BLD: 22.1 %
MCH RBC QN AUTO: 30.5 PG (ref 26–34)
MCHC RBC AUTO-ENTMCNC: 32.7 G/DL (ref 31–36)
MCV RBC AUTO: 93.5 FL (ref 80–100)
MONOCYTES # BLD: 0.7 K/UL (ref 0–1.3)
MONOCYTES NFR BLD: 7.8 %
NEUTROPHILS # BLD: 6.6 K/UL (ref 1.7–7.7)
NEUTROPHILS NFR BLD: 70 %
PHOSPHATE SERPL-MCNC: 2.7 MG/DL (ref 2.5–4.9)
PLATELET # BLD AUTO: 220 K/UL (ref 135–450)
PMV BLD AUTO: 9.6 FL (ref 5–10.5)
POTASSIUM SERPL-SCNC: 3.8 MMOL/L (ref 3.5–5.1)
RBC # BLD AUTO: 3.44 M/UL (ref 4–5.2)
SODIUM SERPL-SCNC: 140 MMOL/L (ref 136–145)
WBC # BLD AUTO: 9.4 K/UL (ref 4–11)

## 2024-04-30 PROCEDURE — 2700000000 HC OXYGEN THERAPY PER DAY

## 2024-04-30 PROCEDURE — 1200000000 HC SEMI PRIVATE

## 2024-04-30 PROCEDURE — C9113 INJ PANTOPRAZOLE SODIUM, VIA: HCPCS | Performed by: INTERNAL MEDICINE

## 2024-04-30 PROCEDURE — 6370000000 HC RX 637 (ALT 250 FOR IP): Performed by: INTERNAL MEDICINE

## 2024-04-30 PROCEDURE — 6370000000 HC RX 637 (ALT 250 FOR IP): Performed by: STUDENT IN AN ORGANIZED HEALTH CARE EDUCATION/TRAINING PROGRAM

## 2024-04-30 PROCEDURE — 6360000002 HC RX W HCPCS: Performed by: STUDENT IN AN ORGANIZED HEALTH CARE EDUCATION/TRAINING PROGRAM

## 2024-04-30 PROCEDURE — 94761 N-INVAS EAR/PLS OXIMETRY MLT: CPT

## 2024-04-30 PROCEDURE — 85025 COMPLETE CBC W/AUTO DIFF WBC: CPT

## 2024-04-30 PROCEDURE — 99222 1ST HOSP IP/OBS MODERATE 55: CPT | Performed by: INTERNAL MEDICINE

## 2024-04-30 PROCEDURE — 6360000002 HC RX W HCPCS: Performed by: INTERNAL MEDICINE

## 2024-04-30 PROCEDURE — 2580000003 HC RX 258: Performed by: INTERNAL MEDICINE

## 2024-04-30 PROCEDURE — 80069 RENAL FUNCTION PANEL: CPT

## 2024-04-30 PROCEDURE — 2500000003 HC RX 250 WO HCPCS: Performed by: STUDENT IN AN ORGANIZED HEALTH CARE EDUCATION/TRAINING PROGRAM

## 2024-04-30 PROCEDURE — 2580000003 HC RX 258: Performed by: STUDENT IN AN ORGANIZED HEALTH CARE EDUCATION/TRAINING PROGRAM

## 2024-04-30 PROCEDURE — 94640 AIRWAY INHALATION TREATMENT: CPT

## 2024-04-30 RX ORDER — PREDNISONE 20 MG/1
20 TABLET ORAL DAILY
Status: DISCONTINUED | OUTPATIENT
Start: 2024-05-01 | End: 2024-05-02 | Stop reason: HOSPADM

## 2024-04-30 RX ORDER — DIAZEPAM 5 MG/1
5 TABLET ORAL 2 TIMES DAILY PRN
Status: DISCONTINUED | OUTPATIENT
Start: 2024-04-30 | End: 2024-05-02 | Stop reason: HOSPADM

## 2024-04-30 RX ORDER — BUDESONIDE 0.5 MG/2ML
0.5 INHALANT ORAL
Status: DISCONTINUED | OUTPATIENT
Start: 2024-04-30 | End: 2024-05-02 | Stop reason: HOSPADM

## 2024-04-30 RX ADMIN — DOXYCYCLINE 100 MG: 100 INJECTION, POWDER, LYOPHILIZED, FOR SOLUTION INTRAVENOUS at 18:50

## 2024-04-30 RX ADMIN — SODIUM CHLORIDE, PRESERVATIVE FREE 10 ML: 5 INJECTION INTRAVENOUS at 10:58

## 2024-04-30 RX ADMIN — SODIUM CHLORIDE 25 ML: 9 INJECTION, SOLUTION INTRAVENOUS at 05:48

## 2024-04-30 RX ADMIN — OXYCODONE 5 MG: 5 TABLET ORAL at 05:56

## 2024-04-30 RX ADMIN — ENOXAPARIN SODIUM 40 MG: 100 INJECTION SUBCUTANEOUS at 09:00

## 2024-04-30 RX ADMIN — SODIUM CHLORIDE, PRESERVATIVE FREE 5 ML: 5 INJECTION INTRAVENOUS at 09:04

## 2024-04-30 RX ADMIN — SODIUM CHLORIDE, PRESERVATIVE FREE 10 ML: 5 INJECTION INTRAVENOUS at 20:09

## 2024-04-30 RX ADMIN — IPRATROPIUM BROMIDE AND ALBUTEROL SULFATE 1 DOSE: 2.5; .5 SOLUTION RESPIRATORY (INHALATION) at 15:33

## 2024-04-30 RX ADMIN — OXYCODONE 5 MG: 5 TABLET ORAL at 21:09

## 2024-04-30 RX ADMIN — WATER 40 MG: 1 INJECTION INTRAMUSCULAR; INTRAVENOUS; SUBCUTANEOUS at 09:00

## 2024-04-30 RX ADMIN — IPRATROPIUM BROMIDE AND ALBUTEROL SULFATE 1 DOSE: 2.5; .5 SOLUTION RESPIRATORY (INHALATION) at 07:59

## 2024-04-30 RX ADMIN — PANTOPRAZOLE SODIUM 40 MG: 40 INJECTION, POWDER, FOR SOLUTION INTRAVENOUS at 09:00

## 2024-04-30 RX ADMIN — GUAIFENESIN 600 MG: 600 TABLET ORAL at 20:08

## 2024-04-30 RX ADMIN — DIAZEPAM 5 MG: 5 TABLET ORAL at 12:49

## 2024-04-30 RX ADMIN — PROCHLORPERAZINE EDISYLATE 10 MG: 5 INJECTION INTRAMUSCULAR; INTRAVENOUS at 09:00

## 2024-04-30 RX ADMIN — PROCHLORPERAZINE EDISYLATE 10 MG: 5 INJECTION INTRAMUSCULAR; INTRAVENOUS at 15:04

## 2024-04-30 RX ADMIN — DOXYCYCLINE 100 MG: 100 INJECTION, POWDER, LYOPHILIZED, FOR SOLUTION INTRAVENOUS at 05:48

## 2024-04-30 RX ADMIN — OXYCODONE 5 MG: 5 TABLET ORAL at 15:04

## 2024-04-30 RX ADMIN — GUAIFENESIN 600 MG: 600 TABLET ORAL at 09:00

## 2024-04-30 ASSESSMENT — PAIN SCALES - GENERAL
PAINLEVEL_OUTOF10: 7
PAINLEVEL_OUTOF10: 8
PAINLEVEL_OUTOF10: 8
PAINLEVEL_OUTOF10: 7
PAINLEVEL_OUTOF10: 7

## 2024-04-30 ASSESSMENT — PAIN DESCRIPTION - LOCATION
LOCATION: BACK

## 2024-04-30 NOTE — CARE COORDINATION
Chart reviewed day 3. Care provided by IM. Pt is from home alone. Her friend stays with her often. Pt is active with [facility] and has an aid 5 days a week for 4 hours each day. She has O2 at 3L with [employer] and has medical house calls. Her biggest concern is getting into her apartment due to 15 steps. She will need medical transport home. [employer] is working on finding her a first floor apartment. B/P soft 100/52. Pulm has been consulted. Will continue to follow course for needs. Amara Sawant RN

## 2024-04-30 NOTE — PLAN OF CARE
Problem: Discharge Planning  Goal: Discharge to home or other facility with appropriate resources  Outcome: Progressing  Flowsheets (Taken 4/29/2024 1955)  Discharge to home or other facility with appropriate resources: Identify barriers to discharge with patient and caregiver     Problem: Pain  Goal: Verbalizes/displays adequate comfort level or baseline comfort level  Outcome: Progressing     Problem: Safety - Adult  Goal: Free from fall injury  Outcome: Progressing     Problem: Musculoskeletal - Adult  Goal: Return mobility to safest level of function  Outcome: Progressing  Flowsheets (Taken 4/29/2024 1955)  Return Mobility to Safest Level of Function: Assess patient stability and activity tolerance for standing, transferring and ambulating with or without assistive devices  Goal: Return ADL status to a safe level of function  Outcome: Progressing  Flowsheets (Taken 4/29/2024 1955)  Return ADL Status to a Safe Level of Function: Administer medication as ordered     Problem: Respiratory - Adult  Goal: Achieves optimal ventilation and oxygenation  Outcome: Progressing  Flowsheets (Taken 4/29/2024 1955)  Achieves optimal ventilation and oxygenation: Assess for changes in respiratory status     Problem: Cardiovascular - Adult  Goal: Maintains optimal cardiac output and hemodynamic stability  Outcome: Progressing  Flowsheets (Taken 4/29/2024 1955)  Maintains optimal cardiac output and hemodynamic stability: Monitor blood pressure and heart rate     Problem: Skin/Tissue Integrity - Adult  Goal: Skin integrity remains intact  Outcome: Progressing  Flowsheets (Taken 4/29/2024 1955)  Skin Integrity Remains Intact: Monitor for areas of redness and/or skin breakdown     Problem: Hematologic - Adult  Goal: Maintains hematologic stability  Outcome: Progressing  Flowsheets (Taken 4/29/2024 1955)  Maintains hematologic stability: Assess for signs and symptoms of bleeding or hemorrhage     Problem: Chronic Conditions and

## 2024-04-30 NOTE — CONSULTS
INPATIENT PULMONARY CRITICAL CARE CONSULT NOTE      Chief Complaint/Referring Provider:  Patient is being seen at the request of Dr. Cook  for a consultation for COPD exacerbation     Presenting HPI: Patient came to the hospital because of increasing shortness of breath    As per the ER provider-patient who presents to the ED complaining of shortness of breath.  Patient reports she has a chronic history of shortness of breath and is on 3 L of oxygen at baseline.  However she has had worsening shortness of breath over the past few days.  Today she had an episode where she felt extremely short of breath, diaphoresis, felt confused and generally weak.  No focal weakness or any focal neurologic deficits.  She reports occasional cough, no fever..  She denies any falls.  However she does live alone and states today she was unable to get off the toilet due to her symptoms.     Denies history of blood clots or active malignancy.  Patient denies unilateral leg swelling, hemoptysis, recent travel or surgery/immobilization, or OCP or other hormone use.    Patient when seen this morning states that she had stopped breathing on Friday which made her come to the hospital, patient states that she continues to have cough with chest congestion shortness of breath and wheezing, patient does not have any epistaxis or hemoptysis, patient is denying any difficulty in swallowing, no coughing or choking when eating, patient is complaining of some abdominal pain and nausea but no diarrhea or any hematochezia, patient does not have any increasing leg edema, patient denies any change in the ambient environment or sick contacts, patient quit smoking about 1 year back, no other pertinent review of system of concern     Patient Active Problem List    Diagnosis Date Noted    Intractable Nausea and vomiting 04/11/2013    Dyspnea 04/27/2024    Chest congestion 02/22/2024    Chronic prescription benzodiazepine use 01/04/2024    Hypokalemia  known cannabis abuse    Cysts of both kidneys     Depression     Fatty liver 09/06/12    by CT at     Gastritis 06/29/12    EGD at     MI (myocardial infarction) (HCC) 2010    Panic attacks     Pneumonia     Pre-diabetes 04/13/13    A1c5.8%    Tricuspid regurgitation     06/26/2012 JUANJO at  Structurally Normal Tricuspid Valve        Past Surgical History:   Procedure Laterality Date    CHOLECYSTECTOMY  2009    COLONOSCOPY  2001    COLONOSCOPY  2013    tubular adenoma    COLONOSCOPY  08/15/2017    ENDOSCOPY, COLON, DIAGNOSTIC      HERNIA REPAIR      HYSTERECTOMY (CERVIX STATUS UNKNOWN)  1985    With Bladder Mesh    INCONTINENCE SURGERY Left     2009 in [place]    LUNG CANCER SURGERY      cancerous nodule removed left side    OVARY REMOVAL  2007    bilat    TONSILLECTOMY      UPPER GASTROINTESTINAL ENDOSCOPY  2013    gastritis    UPPER GASTROINTESTINAL ENDOSCOPY  10/03/2017    bx distal esophagus         Family History   Problem Relation Age of Onset    Heart Disease Father     Hypertension Father     High Blood Pressure Father     Heart Disease Mother     Diabetes Maternal Grandmother     Cancer Son         NHL    High Blood Pressure Son         Social History     Tobacco Use    Smoking status: Former     Current packs/day: 0.00     Average packs/day: 0.5 packs/day for 45.0 years (22.5 ttl pk-yrs)     Types: Cigarettes     Start date: 1976     Quit date: 2021     Years since quitting: 3.3    Smokeless tobacco: Never   Substance Use Topics    Alcohol use: No     Alcohol/week: 0.0 standard drinks of alcohol     Comment: occasional        Allergies   Allergen Reactions    Bactrim [Sulfamethoxazole-Trimethoprim]      N/V    Bupropion      Other reaction(s): Headaches    Sulfamethoxazole      Other reaction(s): GI problems    Trimethoprim      Other reaction(s): GI problems    Codeine Itching and Nausea And Vomiting     GI upset & itching  Other reaction(s): GI problems               Physical Exam:  Blood pressure 116/69,

## 2024-04-30 NOTE — PROGRESS NOTES
Occupational Therapy/Physical Therapy    Received OT/PT orders. Attempted to see pt for OT/PT eval, but pt declined services d/t fatigue, shortness of breath, and anxiety. Pt was educated on role of OT/PT and importance of OT/PT while in hospital, but continued to decline eval. Will follow-up at a later time as schedule allows. Thank you.    Lesli Bass, OTR/L  Michelle Jaquez  PT, DPT #[id number]

## 2024-04-30 NOTE — PROGRESS NOTES
admission) dated:   [] Type II MI w/ demand ischemia  [] Demand ischemia w/out MI      [] Clinically insignificant Troponin Elevation     of unclear clinical significance w/out signs/sxs of active ischemia.  Documented and reviewed.       Physical Exam Performed:      General appearance:  No apparent distress  Respiratory:  Normal respiratory effort.   Cardiovascular:  Regular rate and rhythm.  Abdomen:  Soft, non-tender, non-distended.  Musculoskelatal:  No edema  Neurologic:  Non-focal  Psychiatric:  Alert and oriented    BP (!) 100/52   Pulse 69   Temp 97.7 °F (36.5 °C) (Oral)   Resp 18   Ht 1.6 m (5' 3\")   Wt 61.2 kg (135 lb)   SpO2 94%   BMI 23.91 kg/m²     Diet: ADULT DIET; Regular    DVT Prophylaxis: [x]PPx LMWH  []SQ Heparin  []IPC/SCDs  []Eliquis  []Xarelto  []Coumadin  []Other -      Code status: Limited     PT/OT Eval Status:   []NOT yet ordered  [x]Ordered and Pending   []Seen with Recommendations for:  []Home independently  []Home w/ assist  []HHC  []SNF  []Acute Rehab    Anticipated Discharge Day/Date:  Patient is likely to remain in-house at least until Wed/Thurs 1/2 May pending clinical course, subspecialty recs and PT/OT eval/recs if/when medically appropriat     Anticipated Discharge Location: [x]Home  []HHC  []SNF  []Acute Rehab  []ECF  []LTAC  []Hospice  []Other -      Consults:      None      This patient has a high likelihood of being discharged tomorrow and is appropriate for A1 Discharge Unit in AM pending clinical course overnight: []Yes  [x]No    ------------------------------------------------------------------------------------------------------------------------------------------------------------------------    [facility]  (this section is simply meant as an aid to accurate billing and does not necessarily reflect ongoing patient care which is documented elsewhere in the medical record)    [] High (any 2)    A. Problems (any 1)  [] Acute/Chronic Illness/injury posing threat to life or  bodily function:    [] Severe exacerbation of chronic illness:    ---------------------------------------------------------------------  B. Risk of Treatment (any 1)   [] Drugs/treatments that require intensive monitoring for toxicity include:    [] IV ABX requiring serial renal monitoring for nephrotoxicity:     [] Post-Cath/Contrast study requiring serial renal monitoring for Contrast Induced Nephropathy  [] IV Narcotic analgesia for ADR   [] Aggressive IV diuresis requiring serial monitoring for renal impairment and electrolyte derangements  [] Hypertonic Saline requiring serial renal monitoring for appropriate electrolyte correction rate   [] Critical electrolyte abnormalities requiring IV replacement and close serial monitoring  [] Insulin - monitoring FSBS for Hypoglycemic ADR  [] Anticoagulation requiring close serial monitoring and dose adjustments at high risk of ADR   [] HD requiring close serial monitoring of electrolytes and fluid status  [] Other -  [] Change in code status:   [] Decision to escalate care:    [] Major surgery/procedure with associated risk factors:    ----------------------------------------------------------------------  C. Data (any 2)  [] Discussed management of the case with consultants as follows:    [x] Discussed the discharge plan in detail with case mgt including timing/barriers to discharge, need for support services and placement decision   [] Imaging personally reviewed and interpreted, includes:   [] Telemetry monitoring as noted above  [x] Data Review (any 3)  [] Collateral history obtained from:    [x] All available Consultant notes from yesterday/today were reviewed  [x] All current labs were reviewed and interpreted for clinical significance   [x] Appropriate follow-up labs were ordered    Medications:  Personally reviewed in detail in conjunction w/ labs as documented for evidence of drug toxicity.     Infusion Medications    sodium chloride 25 mL (04/30/24 0548)

## 2024-04-30 NOTE — CONSULTS
Consult Placed     Who: BRUNA VILLALTA   Date:4/30/2024  Time:0903     Electronically signed by Sergio Olsen on 4/30/2024 at 9:03 AM

## 2024-05-01 LAB
ALBUMIN SERPL-MCNC: 3.3 G/DL (ref 3.4–5)
ANION GAP SERPL CALCULATED.3IONS-SCNC: 4 MMOL/L (ref 3–16)
BUN SERPL-MCNC: 15 MG/DL (ref 7–20)
CALCIUM SERPL-MCNC: 8.7 MG/DL (ref 8.3–10.6)
CHLORIDE SERPL-SCNC: 101 MMOL/L (ref 99–110)
CO2 SERPL-SCNC: 35 MMOL/L (ref 21–32)
CREAT SERPL-MCNC: <0.5 MG/DL (ref 0.6–1.2)
DEPRECATED RDW RBC AUTO: 15.9 % (ref 12.4–15.4)
GFR SERPLBLD CREATININE-BSD FMLA CKD-EPI: >90 ML/MIN/{1.73_M2}
GLUCOSE SERPL-MCNC: 78 MG/DL (ref 70–99)
HCT VFR BLD AUTO: 34.8 % (ref 36–48)
HGB BLD-MCNC: 11.3 G/DL (ref 12–16)
MCH RBC QN AUTO: 30.4 PG (ref 26–34)
MCHC RBC AUTO-ENTMCNC: 32.5 G/DL (ref 31–36)
MCV RBC AUTO: 93.8 FL (ref 80–100)
PHOSPHATE SERPL-MCNC: 3 MG/DL (ref 2.5–4.9)
PLATELET # BLD AUTO: 237 K/UL (ref 135–450)
PMV BLD AUTO: 9.4 FL (ref 5–10.5)
POTASSIUM SERPL-SCNC: 3.6 MMOL/L (ref 3.5–5.1)
RBC # BLD AUTO: 3.7 M/UL (ref 4–5.2)
SODIUM SERPL-SCNC: 140 MMOL/L (ref 136–145)
WBC # BLD AUTO: 9.4 K/UL (ref 4–11)

## 2024-05-01 PROCEDURE — C9113 INJ PANTOPRAZOLE SODIUM, VIA: HCPCS | Performed by: INTERNAL MEDICINE

## 2024-05-01 PROCEDURE — 2580000003 HC RX 258: Performed by: STUDENT IN AN ORGANIZED HEALTH CARE EDUCATION/TRAINING PROGRAM

## 2024-05-01 PROCEDURE — 6370000000 HC RX 637 (ALT 250 FOR IP): Performed by: INTERNAL MEDICINE

## 2024-05-01 PROCEDURE — 97162 PT EVAL MOD COMPLEX 30 MIN: CPT

## 2024-05-01 PROCEDURE — 85027 COMPLETE CBC AUTOMATED: CPT

## 2024-05-01 PROCEDURE — 6360000002 HC RX W HCPCS: Performed by: INTERNAL MEDICINE

## 2024-05-01 PROCEDURE — 1200000000 HC SEMI PRIVATE

## 2024-05-01 PROCEDURE — 99232 SBSQ HOSP IP/OBS MODERATE 35: CPT | Performed by: INTERNAL MEDICINE

## 2024-05-01 PROCEDURE — 94640 AIRWAY INHALATION TREATMENT: CPT

## 2024-05-01 PROCEDURE — 97530 THERAPEUTIC ACTIVITIES: CPT

## 2024-05-01 PROCEDURE — 6370000000 HC RX 637 (ALT 250 FOR IP): Performed by: STUDENT IN AN ORGANIZED HEALTH CARE EDUCATION/TRAINING PROGRAM

## 2024-05-01 PROCEDURE — 6360000002 HC RX W HCPCS: Performed by: STUDENT IN AN ORGANIZED HEALTH CARE EDUCATION/TRAINING PROGRAM

## 2024-05-01 PROCEDURE — 80069 RENAL FUNCTION PANEL: CPT

## 2024-05-01 PROCEDURE — 2500000003 HC RX 250 WO HCPCS: Performed by: STUDENT IN AN ORGANIZED HEALTH CARE EDUCATION/TRAINING PROGRAM

## 2024-05-01 PROCEDURE — 97166 OT EVAL MOD COMPLEX 45 MIN: CPT

## 2024-05-01 PROCEDURE — 2700000000 HC OXYGEN THERAPY PER DAY

## 2024-05-01 PROCEDURE — 2580000003 HC RX 258: Performed by: INTERNAL MEDICINE

## 2024-05-01 PROCEDURE — 94761 N-INVAS EAR/PLS OXIMETRY MLT: CPT

## 2024-05-01 RX ORDER — PANTOPRAZOLE SODIUM 40 MG/1
40 TABLET, DELAYED RELEASE ORAL DAILY
Status: DISCONTINUED | OUTPATIENT
Start: 2024-05-02 | End: 2024-05-02 | Stop reason: HOSPADM

## 2024-05-01 RX ADMIN — IPRATROPIUM BROMIDE AND ALBUTEROL SULFATE 1 DOSE: 2.5; .5 SOLUTION RESPIRATORY (INHALATION) at 20:25

## 2024-05-01 RX ADMIN — DIAZEPAM 5 MG: 5 TABLET ORAL at 22:37

## 2024-05-01 RX ADMIN — OXYCODONE 5 MG: 5 TABLET ORAL at 08:51

## 2024-05-01 RX ADMIN — ENOXAPARIN SODIUM 40 MG: 100 INJECTION SUBCUTANEOUS at 08:52

## 2024-05-01 RX ADMIN — IPRATROPIUM BROMIDE AND ALBUTEROL SULFATE 1 DOSE: 2.5; .5 SOLUTION RESPIRATORY (INHALATION) at 11:40

## 2024-05-01 RX ADMIN — DOXYCYCLINE 100 MG: 100 INJECTION, POWDER, LYOPHILIZED, FOR SOLUTION INTRAVENOUS at 05:45

## 2024-05-01 RX ADMIN — IPRATROPIUM BROMIDE AND ALBUTEROL SULFATE 1 DOSE: 2.5; .5 SOLUTION RESPIRATORY (INHALATION) at 15:29

## 2024-05-01 RX ADMIN — GUAIFENESIN 600 MG: 600 TABLET ORAL at 19:14

## 2024-05-01 RX ADMIN — DOXYCYCLINE 100 MG: 100 INJECTION, POWDER, LYOPHILIZED, FOR SOLUTION INTRAVENOUS at 19:09

## 2024-05-01 RX ADMIN — SODIUM CHLORIDE, PRESERVATIVE FREE 1 ML: 5 INJECTION INTRAVENOUS at 19:19

## 2024-05-01 RX ADMIN — SODIUM CHLORIDE 25 ML: 9 INJECTION, SOLUTION INTRAVENOUS at 05:44

## 2024-05-01 RX ADMIN — BUDESONIDE 500 MCG: 0.5 SUSPENSION RESPIRATORY (INHALATION) at 20:27

## 2024-05-01 RX ADMIN — GUAIFENESIN 600 MG: 600 TABLET ORAL at 08:51

## 2024-05-01 RX ADMIN — BUDESONIDE 500 MCG: 0.5 SUSPENSION RESPIRATORY (INHALATION) at 07:52

## 2024-05-01 RX ADMIN — PREDNISONE 20 MG: 20 TABLET ORAL at 08:51

## 2024-05-01 RX ADMIN — IPRATROPIUM BROMIDE AND ALBUTEROL SULFATE 1 DOSE: 2.5; .5 SOLUTION RESPIRATORY (INHALATION) at 07:52

## 2024-05-01 RX ADMIN — DIAZEPAM 5 MG: 5 TABLET ORAL at 08:51

## 2024-05-01 RX ADMIN — SODIUM CHLORIDE, PRESERVATIVE FREE 10 ML: 5 INJECTION INTRAVENOUS at 08:52

## 2024-05-01 RX ADMIN — PANTOPRAZOLE SODIUM 40 MG: 40 INJECTION, POWDER, FOR SOLUTION INTRAVENOUS at 08:52

## 2024-05-01 ASSESSMENT — PAIN SCALES - GENERAL: PAINLEVEL_OUTOF10: 6

## 2024-05-01 NOTE — PROGRESS NOTES
Occupational Therapy  Facility/Department: [place] [id number] - MED SURG  Occupational Therapy Initial Assessment    Name: Joyce Uribe  : 1960  MRN: [id number]  Date of Service: 2024    Discharge Recommendations:  24 hour supervision or assist  OT Equipment Recommendations  Equipment Needed: No       Patient Diagnosis(es): The primary encounter diagnosis was Shortness of breath. Diagnoses of COPD exacerbation (HCC), Acute confusional state, and Generalized weakness were also pertinent to this visit.  Past Medical History:  has a past medical history of Anxiety, Asthma, Cancer (HCC), Cancer of lung (HCC), CHF (congestive heart failure) (HCC), COPD (chronic obstructive pulmonary disease) (HCC), Cyclic vomiting syndrome, Cysts of both kidneys, Depression, Fatty liver, Gastritis, MI (myocardial infarction) (HCC), Panic attacks, Pneumonia, Pre-diabetes, and Tricuspid regurgitation.  Past Surgical History:  has a past surgical history that includes Cholecystectomy (); Hysterectomy (); hernia repair; Colonoscopy (); Colonoscopy (); Upper gastrointestinal endoscopy (); Tonsillectomy; Incontinence surgery (Left); Colonoscopy (08/15/2017); Upper gastrointestinal endoscopy (10/03/2017); Endoscopy, colon, diagnostic; Lung cancer surgery; and Ovary removal ().     Therapy discharge recommendations are subject to collaboration from the patient’s interdisciplinary healthcare team, including MD and case management recommendations.    If pt is unable to be seen after this session, please let this note serve as discharge summary.  Please see case management note for discharge disposition.  Thank you.        Assessment   Performance deficits / Impairments: Decreased functional mobility ;Decreased safe awareness;Decreased ADL status;Decreased posture;Decreased high-level IADLs;Decreased endurance;Decreased strength  Assessment: [facility] with dyspnea. Pt reports PLOF needs assistance for most ADLs  for reading  Hearing  Hearing: Within functional limits  Cognition  Overall Cognitive Status: WFL  Orientation  Overall Orientation Status: Within Normal Limits  Orientation Level: Oriented X4                  Education Given To: Patient  Education Provided: Role of Therapy;Transfer Training;Plan of Care  Education Method: Demonstration;Verbal  Barriers to Learning: None;Other (Comment) (self-limiting)  Education Outcome: Verbalized understanding;Continued education needed        AM-PAC - ADL  AM-PAC Daily Activity - Inpatient   How much help is needed for putting on and taking off regular lower body clothing?: A Little  How much help is needed for bathing (which includes washing, rinsing, drying)?: A Little  How much help is needed for toileting (which includes using toilet, bedpan, or urinal)?: A Lot  How much help is needed for putting on and taking off regular upper body clothing?: A Lot  How much help is needed for taking care of personal grooming?: A Little  How much help for eating meals?: None  AM-PAC Inpatient Daily Activity Raw Score: 17  AM-PAC Inpatient ADL T-Scale Score : 37.26  ADL Inpatient CMS 0-100% Score: 50.11  ADL Inpatient CMS G-Code Modifier : CK    Goals  Short Term Goals  Time Frame for Short Term Goals: 5/08, unless otherwise  noted  Short Term Goal 1: Pt will complete toilet transfer with SPV  Short Term Goal 2: Pt will complete toileting with SPV  Short Term Goal 3: Pt will complete standing grooming tasks wtih SPV  Short Term Goal 4: Pt will complete UE HEP 15x reps each exer by 5/6       Therapy Time   Individual Concurrent Group Co-treatment   Time In 0800         Time Out 0833         Minutes 33         Timed Code Treatment Minutes: 23 Minutes (10 minute eval)       Mini Salvador OT

## 2024-05-01 NOTE — RT PROTOCOL NOTE
RT Inhaler-Nebulizer Bronchodilator Protocol Note    There is a bronchodilator order in the chart from a provider indicating to follow the RT Bronchodilator Protocol and there is an “Initiate RT Inhaler-Nebulizer Bronchodilator Protocol” order as well (see protocol at bottom of note).    CXR Findings:  No results found.    The findings from the last RT Protocol Assessment were as follows:   History Pulmonary Disease: Chronic pulmonary disease  Respiratory Pattern: Regular pattern and RR 12-20 bpm  Breath Sounds: Slightly diminished and/or crackles  Cough: Strong, spontaneous, non-productive  Indication for Bronchodilator Therapy: None  Bronchodilator Assessment Score: 4    Aerosolized bronchodilator medication orders have been revised according to the RT Inhaler-Nebulizer Bronchodilator Protocol below.    Respiratory Therapist to perform RT Therapy Protocol Assessment initially then follow the protocol.  Repeat RT Therapy Protocol Assessment PRN for score 0-3 or on second treatment, BID, and PRN for scores above 3.    No Indications - adjust the frequency to every 6 hours PRN wheezing or bronchospasm, if no treatments needed after 48 hours then discontinue using Per Protocol order mode.     If indication present, adjust the RT bronchodilator orders based on the Bronchodilator Assessment Score as indicated below.  Use Inhaler orders unless patient has one or more of the following: on home nebulizer, not able to hold breath for 10 seconds, is not alert and oriented, cannot activate and use MDI correctly, or respiratory rate 25 breaths per minute or more, then use the equivalent nebulizer order(s) with same Frequency and PRN reasons based on the score.  If a patient is on this medication at home then do not decrease Frequency below that used at home.    0-3 - enter or revise RT bronchodilator order(s) to equivalent RT Bronchodilator order with Frequency of every 4 hours PRN for wheezing or increased work of breathing  using Per Protocol order mode.        4-6 - enter or revise RT Bronchodilator order(s) to two equivalent RT bronchodilator orders with one order with BID Frequency and one order with Frequency of every 4 hours PRN wheezing or increased work of breathing using Per Protocol order mode.        7-10 - enter or revise RT Bronchodilator order(s) to two equivalent RT bronchodilator orders with one order with TID Frequency and one order with Frequency of every 4 hours PRN wheezing or increased work of breathing using Per Protocol order mode.       11-13 - enter or revise RT Bronchodilator order(s) to one equivalent RT bronchodilator order with QID Frequency and an Albuterol order with Frequency of every 4 hours PRN wheezing or increased work of breathing using Per Protocol order mode.      Greater than 13 - enter or revise RT Bronchodilator order(s) to one equivalent RT bronchodilator order with every 4 hours Frequency and an Albuterol order with Frequency of every 2 hours PRN wheezing or increased work of breathing using Per Protocol order mode.     RT to enter RT Home Evaluation for COPD & MDI Assessment order using Per Protocol order mode.    Electronically signed by Odalis Berrios RCP on 5/1/2024 at 1:58 PM

## 2024-05-01 NOTE — PROGRESS NOTES
BL blanchable redness noted on both heels/bottom of feet. Patient declined pillow under feet. Educated patient on need to reposition to prevent skin breakdown. Patient verbalized understanding.

## 2024-05-01 NOTE — PROGRESS NOTES
Physical Therapy  Facility/Department: [facility] B3 - MED SURG  Physical Therapy Initial Assessment    Name: Joyce Uribe  : 1960  MRN: [id number]  Date of Service: 2024    Discharge Recommendations:  24 hour supervision or assist (of note - pt states at last discharge was taken home via ambulance to transport her up her stairs to her apt)   PT Equipment Recommendations  Equipment Needed: No  Other: pt owns walker if needed      Patient Diagnosis(es): The primary encounter diagnosis was Shortness of breath. Diagnoses of COPD exacerbation (HCC), Acute confusional state, and Generalized weakness were also pertinent to this visit.  Past Medical History:  has a past medical history of Anxiety, Asthma, Cancer (HCC), Cancer of lung (HCC), CHF (congestive heart failure) (HCC), COPD (chronic obstructive pulmonary disease) (HCC), Cyclic vomiting syndrome, Cysts of both kidneys, Depression, Fatty liver, Gastritis, MI (myocardial infarction) (HCC), Panic attacks, Pneumonia, Pre-diabetes, and Tricuspid regurgitation.  Past Surgical History:  has a past surgical history that includes Cholecystectomy (); Hysterectomy (); hernia repair; Colonoscopy (); Colonoscopy (); Upper gastrointestinal endoscopy (); Tonsillectomy; Incontinence surgery (Left); Colonoscopy (08/15/2017); Upper gastrointestinal endoscopy (10/03/2017); Endoscopy, colon, diagnostic; Lung cancer surgery; and Ovary removal ().    Assessment   Body Structures, Functions, Activity Limitations Requiring Skilled Therapeutic Intervention: Decreased functional mobility   Assessment: pt is 64 yo female admit to [facility] 2/2 dyspnea; PMH: COPD, lung CA; pt has HH aide at baseline but indicates a decline in ability recently with R LE weakness, stated unable to get off toilet prior to admit, corroborated by MMT, pt presents requiring SBA for transfer bed to and from chair, pt declined further gait distance and declined to stay up in chair despite  Supervision  Supine to Sit: Supervision  Sit to Supine: Supervision  Balance  Sitting: Intact  Standing: High guard  Transfer Training  Transfer Training: Yes  Overall Level of Assistance: Stand-by assistance  Interventions: Verbal cues;Safety awareness training;Weight shifting training/pressure relief  Sit to Stand: Stand-by assistance  Stand to Sit: Stand-by assistance  Bed to Chair: Stand-by assistance  Gait  Gait Training: Yes  Overall Level of Assistance: Stand-by assistance  Distance (ft): 5 Feet (+5 ; max distance to which pt was agreeable)  Assistive Device: Walker  Interventions: Verbal cues (verbal instruction regarding transfer)  Base of Support: Narrowed  Speed/Yina: Pace decreased (< 100 feet/min)  Step Length: Left shortened;Right shortened  Swing Pattern: Left symmetrical;Right symmetrical  Gait Abnormalities: Decreased step clearance              Balance  Sitting - Static: Good  Sitting - Dynamic: Good  Standing - Static: Fair;+  Standing - Dynamic: Fair;+  Exercise Treatment: defer due to fatigue ; tolerated MMT B LE          AM-[facility] - Mobility    AM-PAC Basic Mobility - Inpatient   How much help is needed turning from your back to your side while in a flat bed without using bedrails?: None  How much help is needed moving from lying on your back to sitting on the side of a flat bed without using bedrails?: None  How much help is needed moving to and from a bed to a chair?: A Little  How much help is needed standing up from a chair using your arms?: A Little  How much help is needed walking in hospital room?: A Little  How much help is needed climbing 3-5 steps with a railing?: A Lot  AM-PAC Inpatient Mobility Raw Score : 19  AM-PAC Inpatient T-Scale Score : 45.44  Mobility Inpatient CMS 0-100% Score: 41.77  Mobility Inpatient CMS G-Code Modifier : CK             Goals  Short Term Goals  Time Frame for Short Term Goals: one week 5/7/24 unless otherwise indicated  Short Term Goal 1: pt will be ind

## 2024-05-01 NOTE — PROGRESS NOTES
INPATIENT PULMONARY CRITICAL CARE PROGRESS NOTE      Reason for visit    COPD exacerbation     SUBJECTIVE: Patient when seen this morning states that she was still having some shortness of breath, patient is now expectorating greenish phlegm, patient does not have any significant chest pain or palpitations, patient was afebrile and hemodynamically maintained, patient was on 3 L of nasal cannula, saturating 94% when seen, patient has sinus rhythm on the monitor, patient's urine output as documented was borderline, patient's p.o. intake is on the lower side, patient has a cumulative balance of +1 L, no other pertinent review of system of concern           Physical Exam:  Blood pressure 125/67, pulse 80, temperature 97.7 °F (36.5 °C), temperature source Oral, resp. rate 20, height 1.6 m (5' 3\"), weight 61.2 kg (135 lb), SpO2 94 %, not currently breastfeeding.'     Constitutional:  No acute distress.   HENT:  Oropharynx is clear and moist. No thyromegaly.  Eyes:  Conjunctivae are normal. Pupils equal, round, and reactive to light. No scleral icterus.   Neck: . No tracheal deviation present. No obvious thyroid mass.   Cardiovascular: Normal rate, regular rhythm, normal heart sounds.  No right ventricular heave. No lower extremity edema.  Pulmonary/Chest: No wheezes.  No rales.  Chest wall is not dull to percussion.  No accessory muscle usage or stridor.  Decreased breath sound intensity  Abdominal: Soft. Bowel sounds present. No distension or hernia. No tenderness.    Musculoskeletal: No cyanosis. No clubbing. No obvious joint deformity.   Lymphadenopathy: No cervical or supraclavicular adenopathy.   Skin: Skin is warm and dry. No rash or nodules on the exposed extremities.  Psychiatric: Normal mood and affect. Behavior is normal.  No anxiety.   Neurologic: Alert, awake and oriented. PERRL.  Speech fluent      Results:  CBC:   Recent Labs     04/29/24  0550 04/30/24  0540 05/01/24  0545   WBC 11.1* 9.4 9.4   HGB 10.7*

## 2024-05-01 NOTE — RT PROTOCOL NOTE
RT Inhaler-Nebulizer Bronchodilator Protocol Note    There is a bronchodilator order in the chart from a provider indicating to follow the RT Bronchodilator Protocol and there is an “Initiate RT Inhaler-Nebulizer Bronchodilator Protocol” order as well (see protocol at bottom of note).    CXR Findings:  No results found.    The findings from the last RT Protocol Assessment were as follows:   History Pulmonary Disease: Chronic pulmonary disease  Respiratory Pattern: Regular pattern and RR 12-20 bpm  Breath Sounds: Slightly diminished and/or crackles  Cough: Strong, spontaneous, non-productive  Indication for Bronchodilator Therapy: On home bronchodilators  Bronchodilator Assessment Score: 4    Aerosolized bronchodilator medication orders have been revised according to the RT Inhaler-Nebulizer Bronchodilator Protocol below.    Respiratory Therapist to perform RT Therapy Protocol Assessment initially then follow the protocol.  Repeat RT Therapy Protocol Assessment PRN for score 0-3 or on second treatment, BID, and PRN for scores above 3.    No Indications - adjust the frequency to every 6 hours PRN wheezing or bronchospasm, if no treatments needed after 48 hours then discontinue using Per Protocol order mode.     If indication present, adjust the RT bronchodilator orders based on the Bronchodilator Assessment Score as indicated below.  Use Inhaler orders unless patient has one or more of the following: on home nebulizer, not able to hold breath for 10 seconds, is not alert and oriented, cannot activate and use MDI correctly, or respiratory rate 25 breaths per minute or more, then use the equivalent nebulizer order(s) with same Frequency and PRN reasons based on the score.  If a patient is on this medication at home then do not decrease Frequency below that used at home.    0-3 - enter or revise RT bronchodilator order(s) to equivalent RT Bronchodilator order with Frequency of every 4 hours PRN for wheezing or increased

## 2024-05-01 NOTE — PLAN OF CARE
Problem: Discharge Planning  Goal: Discharge to home or other facility with appropriate resources  5/1/2024 1036 by Kt Galvan RN  Outcome: Progressing  4/30/2024 2324 by Tata Obregon RN  Outcome: Progressing  Flowsheets (Taken 4/30/2024 2007)  Discharge to home or other facility with appropriate resources: Identify barriers to discharge with patient and caregiver     Problem: Pain  Goal: Verbalizes/displays adequate comfort level or baseline comfort level  5/1/2024 1036 by Kt Galvan RN  Outcome: Progressing  4/30/2024 2324 by Tata Obregon RN  Outcome: Progressing     Problem: Safety - Adult  Goal: Free from fall injury  5/1/2024 1036 by Kt Galvan RN  Outcome: Progressing  4/30/2024 2324 by Tata Obregon RN  Outcome: Progressing     Problem: Musculoskeletal - Adult  Goal: Return mobility to safest level of function  5/1/2024 1036 by Kt Galvan RN  Outcome: Progressing  4/30/2024 2324 by Tata Obregon RN  Outcome: Progressing  Flowsheets (Taken 4/30/2024 2007)  Return Mobility to Safest Level of Function: Assess patient stability and activity tolerance for standing, transferring and ambulating with or without assistive devices  Goal: Return ADL status to a safe level of function  5/1/2024 1036 by Kt Galvan RN  Outcome: Progressing  4/30/2024 2324 by Tata Obregon RN  Outcome: Progressing  Flowsheets (Taken 4/30/2024 2007)  Return ADL Status to a Safe Level of Function: Administer medication as ordered     Problem: Respiratory - Adult  Goal: Achieves optimal ventilation and oxygenation  5/1/2024 1036 by Kt Galvan RN  Outcome: Progressing  4/30/2024 2324 by Tata Obregon RN  Outcome: Progressing  Flowsheets (Taken 4/30/2024 2007)  Achieves optimal ventilation and oxygenation: Assess for changes in respiratory status     Problem: Cardiovascular - Adult  Goal: Maintains optimal cardiac output and hemodynamic stability  4/30/2024 2324 by Tata Obregon RN  Outcome:

## 2024-05-01 NOTE — CARE COORDINATION
Chart reviewed day 4. Care provided by pulm and IM. Pt is from home alone. She is active with [facility] for an aid 5 days a week, 4 hours each day. She has home O2 at 3 L with [employer] and has medical house calls. Pt will need medical transport home due to 15 steps pt is unable to climb. Pt is on IVABX. Will continue to follow course for needs. Amara Sawant RN

## 2024-05-01 NOTE — PROGRESS NOTES
[] Type II MI w/ demand ischemia  [] Demand ischemia w/out MI      [] Clinically insignificant Troponin Elevation     of unclear clinical significance w/out signs/sxs of active ischemia.  Documented and reviewed.       Physical Exam Performed:      General appearance:  No apparent distress  Respiratory:  Normal respiratory effort.   Cardiovascular:  Regular rate and rhythm.  Abdomen:  Soft, non-tender, non-distended.  Musculoskelatal:  No edema  Neurologic:  Non-focal  Psychiatric:  Alert and oriented    /67   Pulse 80   Temp 97.7 °F (36.5 °C) (Oral)   Resp 20   Ht 1.6 m (5' 3\")   Wt 61.2 kg (135 lb)   SpO2 94%   BMI 23.91 kg/m²     Diet: ADULT DIET; Regular    DVT Prophylaxis: [x]PPx LMWH  []SQ Heparin  []IPC/SCDs  []Eliquis  []Xarelto  []Coumadin  []Other -      Code status: Limited     PT/OT Eval Status:   []NOT yet ordered  [x]Ordered and Pending   []Seen with Recommendations for:  []Home independently  []Home w/ assist  []HHC  []SNF  []Acute Rehab    Anticipated Discharge Day/Date:  Patient is likely to remain in-house at least until Thurs/Friday 2/3 May pending clinical course, subspecialty recs and PT/OT eval/recs     Anticipated Discharge Location: [x]Home  []HHC  []SNF  []Acute Rehab  []ECF  []LTAC  []Hospice  []Other -      Consults:      IP CONSULT TO PULMONOLOGY      This patient has a high likelihood of being discharged tomorrow and is appropriate for A1 Discharge Unit in AM pending clinical course overnight: []Yes  [x]No    ------------------------------------------------------------------------------------------------------------------------------------------------------------------------    [facility]  (this section is simply meant as an aid to accurate billing and does not necessarily reflect ongoing patient care which is documented elsewhere in the medical record)    [] High (any 2)    A. Problems (any 1)  [] Acute/Chronic Illness/injury posing threat to life or bodily function:    []  Severe exacerbation of chronic illness:    ---------------------------------------------------------------------  B. Risk of Treatment (any 1)   [] Drugs/treatments that require intensive monitoring for toxicity include:    [] IV ABX requiring serial renal monitoring for nephrotoxicity:     [] Post-Cath/Contrast study requiring serial renal monitoring for Contrast Induced Nephropathy  [] IV Narcotic analgesia for ADR   [] Aggressive IV diuresis requiring serial monitoring for renal impairment and electrolyte derangements  [] Hypertonic Saline requiring serial renal monitoring for appropriate electrolyte correction rate   [] Critical electrolyte abnormalities requiring IV replacement and close serial monitoring  [] Insulin - monitoring FSBS for Hypoglycemic ADR  [] Anticoagulation requiring close serial monitoring and dose adjustments at high risk of ADR   [] HD requiring close serial monitoring of electrolytes and fluid status  [] Other -  [] Change in code status:   [] Decision to escalate care:    [] Major surgery/procedure with associated risk factors:    ----------------------------------------------------------------------  C. Data (any 2)  [] Discussed management of the case with consultants as follows:    [x] Discussed the discharge plan in detail with case mgt including timing/barriers to discharge, need for support services and placement decision   [] Imaging personally reviewed and interpreted, includes:   [] Telemetry monitoring as noted above  [x] Data Review (any 3)  [] Collateral history obtained from:    [x] All available Consultant notes from yesterday/today were reviewed  [x] All current labs were reviewed and interpreted for clinical significance   [x] Appropriate follow-up labs were ordered    Medications:  Personally reviewed in detail in conjunction w/ labs as documented for evidence of drug toxicity.     Infusion Medications    sodium chloride 25 mL (05/01/24 8915)    sodium chloride

## 2024-05-01 NOTE — PROGRESS NOTES
04/30/24 2344   RT Protocol   History Pulmonary Disease 2   Respiratory pattern 2   Breath sounds 4   Cough 0   Indications for Bronchodilator Therapy On home bronchodilators   Bronchodilator Assessment Score 8

## 2024-05-02 VITALS
HEART RATE: 87 BPM | OXYGEN SATURATION: 93 % | HEIGHT: 63 IN | SYSTOLIC BLOOD PRESSURE: 117 MMHG | TEMPERATURE: 97.7 F | DIASTOLIC BLOOD PRESSURE: 74 MMHG | WEIGHT: 135 LBS | BODY MASS INDEX: 23.92 KG/M2 | RESPIRATION RATE: 18 BRPM

## 2024-05-02 LAB
ALBUMIN SERPL-MCNC: 3.3 G/DL (ref 3.4–5)
ANION GAP SERPL CALCULATED.3IONS-SCNC: 7 MMOL/L (ref 3–16)
BUN SERPL-MCNC: 16 MG/DL (ref 7–20)
CALCIUM SERPL-MCNC: 8.5 MG/DL (ref 8.3–10.6)
CHLORIDE SERPL-SCNC: 100 MMOL/L (ref 99–110)
CO2 SERPL-SCNC: 33 MMOL/L (ref 21–32)
CREAT SERPL-MCNC: <0.5 MG/DL (ref 0.6–1.2)
DEPRECATED RDW RBC AUTO: 15.7 % (ref 12.4–15.4)
GFR SERPLBLD CREATININE-BSD FMLA CKD-EPI: >90 ML/MIN/{1.73_M2}
GLUCOSE SERPL-MCNC: 87 MG/DL (ref 70–99)
HCT VFR BLD AUTO: 34.9 % (ref 36–48)
HGB BLD-MCNC: 11.1 G/DL (ref 12–16)
MCH RBC QN AUTO: 29.9 PG (ref 26–34)
MCHC RBC AUTO-ENTMCNC: 31.8 G/DL (ref 31–36)
MCV RBC AUTO: 93.8 FL (ref 80–100)
PHOSPHATE SERPL-MCNC: 3.3 MG/DL (ref 2.5–4.9)
PLATELET # BLD AUTO: 230 K/UL (ref 135–450)
PMV BLD AUTO: 9.2 FL (ref 5–10.5)
POTASSIUM SERPL-SCNC: 3.7 MMOL/L (ref 3.5–5.1)
RBC # BLD AUTO: 3.72 M/UL (ref 4–5.2)
SODIUM SERPL-SCNC: 140 MMOL/L (ref 136–145)
WBC # BLD AUTO: 9.2 K/UL (ref 4–11)

## 2024-05-02 PROCEDURE — 80069 RENAL FUNCTION PANEL: CPT

## 2024-05-02 PROCEDURE — 6370000000 HC RX 637 (ALT 250 FOR IP): Performed by: STUDENT IN AN ORGANIZED HEALTH CARE EDUCATION/TRAINING PROGRAM

## 2024-05-02 PROCEDURE — 2580000003 HC RX 258: Performed by: INTERNAL MEDICINE

## 2024-05-02 PROCEDURE — 36592 COLLECT BLOOD FROM PICC: CPT

## 2024-05-02 PROCEDURE — 6360000002 HC RX W HCPCS: Performed by: STUDENT IN AN ORGANIZED HEALTH CARE EDUCATION/TRAINING PROGRAM

## 2024-05-02 PROCEDURE — 85027 COMPLETE CBC AUTOMATED: CPT

## 2024-05-02 PROCEDURE — 99232 SBSQ HOSP IP/OBS MODERATE 35: CPT | Performed by: INTERNAL MEDICINE

## 2024-05-02 PROCEDURE — 94640 AIRWAY INHALATION TREATMENT: CPT

## 2024-05-02 PROCEDURE — 6370000000 HC RX 637 (ALT 250 FOR IP): Performed by: INTERNAL MEDICINE

## 2024-05-02 RX ORDER — GUAIFENESIN 600 MG/1
600 TABLET, EXTENDED RELEASE ORAL 2 TIMES DAILY
Qty: 10 TABLET | Refills: 0 | Status: ON HOLD | OUTPATIENT
Start: 2024-05-02 | End: 2024-05-07

## 2024-05-02 RX ORDER — PREDNISONE 20 MG/1
20 TABLET ORAL DAILY
Qty: 5 TABLET | Refills: 0 | Status: ON HOLD | OUTPATIENT
Start: 2024-05-03 | End: 2024-05-08

## 2024-05-02 RX ADMIN — OXYCODONE 5 MG: 5 TABLET ORAL at 08:50

## 2024-05-02 RX ADMIN — GUAIFENESIN 600 MG: 600 TABLET ORAL at 08:50

## 2024-05-02 RX ADMIN — OXYCODONE 5 MG: 5 TABLET ORAL at 15:10

## 2024-05-02 RX ADMIN — ONDANSETRON 4 MG: 2 INJECTION INTRAMUSCULAR; INTRAVENOUS at 08:50

## 2024-05-02 RX ADMIN — IPRATROPIUM BROMIDE AND ALBUTEROL SULFATE 1 DOSE: 2.5; .5 SOLUTION RESPIRATORY (INHALATION) at 15:55

## 2024-05-02 RX ADMIN — PANTOPRAZOLE SODIUM 40 MG: 40 TABLET, DELAYED RELEASE ORAL at 08:50

## 2024-05-02 RX ADMIN — ENOXAPARIN SODIUM 40 MG: 100 INJECTION SUBCUTANEOUS at 08:50

## 2024-05-02 RX ADMIN — SODIUM CHLORIDE, PRESERVATIVE FREE 10 ML: 5 INJECTION INTRAVENOUS at 08:50

## 2024-05-02 RX ADMIN — PREDNISONE 20 MG: 20 TABLET ORAL at 08:50

## 2024-05-02 RX ADMIN — IPRATROPIUM BROMIDE AND ALBUTEROL SULFATE 1 DOSE: 2.5; .5 SOLUTION RESPIRATORY (INHALATION) at 11:33

## 2024-05-02 ASSESSMENT — PAIN SCALES - GENERAL
PAINLEVEL_OUTOF10: 9
PAINLEVEL_OUTOF10: 8

## 2024-05-02 NOTE — PLAN OF CARE
Problem: Safety - Adult  Goal: Free from fall injury  Outcome: Adequate for Discharge     Problem: Respiratory - Adult  Goal: Achieves optimal ventilation and oxygenation  Outcome: Adequate for Discharge  Flowsheets (Taken 5/2/2024 0753)  Achieves optimal ventilation and oxygenation:   Assess for changes in respiratory status   Assess for changes in mentation and behavior   Position to facilitate oxygenation and minimize respiratory effort     Problem: Discharge Planning  Goal: Discharge to home or other facility with appropriate resources  Outcome: Adequate for Discharge   Patient has remained free from falls throughout the shift, call light within reach, bed alarm on, wheels locked. Patient is on home O2. Patient plans to discharge home. Electronically signed by Elisabeth Hugo RN on 5/2/2024 at 2:21 PM

## 2024-05-02 NOTE — CARE COORDINATION
CASE MANAGEMENT DISCHARGE SUMMARY      Discharge to: Spoke to patient.  Patient now asking about SNF.  Discussed SNF is not recommended per PT/OT therefore would not be covered by insurance.  Discussed going under medicaid, but SS would also go to pay the facility.  Patient reports she is not able to manage.  Discussed patient refused SNF on admission.  Patient states, \"I wasn't in my right mind.\"  Again discussed PT/OT recommend home.  Patient has [facility] 4 hours a day and states friend stays with her at night to assist.  Patient is also current with [facility]  patient agreeable to d/c home.  Referral to APS      IMM given: 5/2/24    New Durable Medical Equipment ordered/agency: NA    Transportation:       Medical Transport explained to pt/family. Pt/family voice no agency preference.    Agency used:[place]   time:1900   Ambulance form completed: Yes    Confirmed discharge plan with:     Patient: yes     Family:  yes, per patient     Facility/Agency, name:  [facility] faxed to [phone]        RN, name: Elisabeth    Note: Discharging nurse to complete [facility], reconcile [facility], and place final copy with patient's discharge packet. RN to ensure that written prescriptions for  Level II medications are sent with patient to the facility as per protocol.

## 2024-05-02 NOTE — PROGRESS NOTES
INPATIENT PULMONARY CRITICAL CARE PROGRESS NOTE      Reason for visit    COPD exacerbation     SUBJECTIVE: Patient when seen this morning states that she was having chest tightness, patient is still complaining of some phlegm which is mucopurulent in nature, no hemoptysis, no obvious wheezing, no fever no chills, patient was afebrile and he medically maintained, patient was in sinus rhythm on the monitor, patient urine output as documented is on the lower side but we may not be objective,         Physical Exam:  Blood pressure 104/63, pulse 81, temperature 97.4 °F (36.3 °C), temperature source Oral, resp. rate 20, height 1.6 m (5' 3\"), weight 61.2 kg (135 lb), SpO2 91 %, not currently breastfeeding.'     Constitutional:  No acute distress.   HENT:  Oropharynx is clear and moist. No thyromegaly.  Eyes:  Conjunctivae are normal. Pupils equal, round, and reactive to light. No scleral icterus.   Neck: . No tracheal deviation present. No obvious thyroid mass.   Cardiovascular: Normal rate, regular rhythm, normal heart sounds.  No right ventricular heave. No lower extremity edema.  Pulmonary/Chest: No wheezes.  No rales.  Chest wall is not dull to percussion.  No accessory muscle usage or stridor.  Decreased breath sound intensity  Abdominal: Soft. Bowel sounds present. No distension or hernia. No tenderness.    Musculoskeletal: No cyanosis. No clubbing. No obvious joint deformity.   Lymphadenopathy: No cervical or supraclavicular adenopathy.   Skin: Skin is warm and dry. No rash or nodules on the exposed extremities.  Psychiatric: Normal mood and affect. Behavior is normal.  No anxiety.   Neurologic: Alert, awake and oriented. PERRL.  Speech fluent      Results:  CBC:   Recent Labs     04/30/24  0540 05/01/24  0545 05/02/24  0438   WBC 9.4 9.4 9.2   HGB 10.5* 11.3* 11.1*   HCT 32.2* 34.8* 34.9*   MCV 93.5 93.8 93.8    237 230       BMP:   Recent Labs     04/30/24  0540 05/01/24  0545 05/02/24  0438    140  confirmed emergency medical condition->Emergency Medical Condition (MA) Reason for Exam: Altered/decreased LOC; pt reports episodes of confusion FINDINGS: BRAIN/VENTRICLES: No evidence of intracranial hemorrhage or acute process. 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  In the central portion of the complex mass there is a calcified mass, which is about 2 cm in maximal diameter.  Surrounding the calcified mass there is cystic component.  The entire complex mass measures 4.5 cm transversely, 4.5 cm AP and 4.2 cm craniocaudad, unchanged as compared to previous MRI of brain in 2014.  It was noted that the central calcified portion of the mass enhances with contrast on previous MRI study.  There is mass effect to the anterior aspect of the atrium of the left lateral ventricle caused by the mass but the mass itself appears to be not an intraventricular mass. There is minimal midline shift from left to right by about 3.5 mm at the level of the mass but there is no midline shift at the level of the septum pellucidum.  There is no hydrocephalus.  There is no obstruction of the 4th ventricle or the aqueduct by the mass effect. Evidence of mild-to-moderate cerebral cortical atrophic changes in the upper anterosuperior aspect of both frontal lobes. There is no evidence of any new intra-axial or extra-axial mass. ORBITS: The visualized portion of the orbits demonstrate no acute abnormality. SINUSES: The visualized paranasal sinuses and mastoid air cells demonstrate no acute abnormality. SOFT TISSUES/SKULL:  No acute abnormality of the visualized skull or soft tissues.     No definite change on CT scan of brain as compared to previous MRI of brain on 04/15/2014 and also as compared to previous CT scan of brain on 07/17/2014. No

## 2024-05-02 NOTE — PROGRESS NOTES
Hospital Medicine Progress Note      Date of Admission: 4/27/2024  Hospital Day: 6    Chief Admission Complaint:  \"SOB\"     Subjective:  no new c/o - slowly improving    Presenting Admission History:         \"63 y.o. female w/ hx COPD/CHF on baseline home O2 at 3L and remote Lung CA 2014 who presented to [facility] with a several day hx of progressive SOB/MCKEE, now severe and unable to perform her daily activities.      She denies any associated CP and though generally weak has no focal deficits.      The patient denies any fever/chills or other signs/sxs of systemic illness or identifiable aggravating/alleviating factors\"       Assessment/Plan:      Current Principal Problem:  Dyspnea       COPD - w/ chronic hypoxic respiratory failure on baseline home O2 at 3L per NC.  Normally controlled on home medication regimen - continued. Here w/ acute exacerbation - clinically improving. Started on IV Steroids/HHN/ABX. Steroids titrated down 30 April and changed to PO 1 May.     Acute on Chronic Respiratory Failure - w/ progressive hypoxia above baseline, POArrival.  Presence of clinical respiratory distress w/ tachypnea/dyspnea/SOB and wheezing w/ use of accessory muscles to breath.  Supplemental O2 and wean as tolerated. Pulmonology consulted and appreciated.      HTN/CAD - w/ known non-obstructive CAD s/p diagnostic LHCat Jan 2024 by [facility] w/ no evidence of active signs/sxs of ischemia/failure. Currently controlled on home meds w/ vitals documented and reviewed.     CHF - chronic diastolic failure w/ preserved EF clinically suspected.  Likely due to hypertensive heart disease.  Patient is euvolemic w/ no evidence of acute decompensation.  Continue current medical mgt.       Troponin elevation - c/w myocardial injury 2nd to                                            [] STEMI/NSTEMI  [] Trop leak 2nd to CKD/ESRD  [x] Acute/Chronic Myocardial Injury 2nd to CHF  [] Recent MI (with 4 weeks of admission) dated:

## 2024-05-02 NOTE — DISCHARGE INSTR - COC
Continuity of Care Form    Patient Name: Joyce Uribe   :  1960  MRN:  [id number]    Admit date:  2024  Discharge date:  2024    Code Status Order: Limited   Advance Directives:     Admitting Physician:  Ruth Alexandra DO  PCP: Jaky Ramirez APRN - NP    Discharging Nurse: Elisabeth  Discharging Hospital Unit/Room#: 0116/0116-01  Discharging Unit Phone Number: [phone]    Emergency Contact:   Extended Emergency Contact Information  Primary Emergency Contact: Ty Vlilarreal   [facility]  Home Phone: [phone]  Relation: Child  Secondary Emergency Contact: Maximil[address]esLa Place  Address: [address] [place] [address]  Home Phone: [phone]  Mobile Phone: [phone]  Relation: Child    Past Surgical History:  Past Surgical History:   Procedure Laterality Date    CHOLECYSTECTOMY      COLONOSCOPY      COLONOSCOPY      tubular adenoma    COLONOSCOPY  08/15/2017    ENDOSCOPY, COLON, DIAGNOSTIC      HERNIA REPAIR      HYSTERECTOMY (CERVIX STATUS UNKNOWN)      With Bladder Mesh    INCONTINENCE SURGERY Left      in [place]    LUNG CANCER SURGERY      cancerous nodule removed left side    OVARY REMOVAL      bilat    TONSILLECTOMY      UPPER GASTROINTESTINAL ENDOSCOPY      gastritis    UPPER GASTROINTESTINAL ENDOSCOPY  10/03/2017    bx distal esophagus        Immunization History:   Immunization History   Administered Date(s) Administered    Influenza Vaccine, unspecified formulation 2012, 2012, 10/06/2014    Influenza Virus Vaccine 2013    Influenza, FLUCELVAX, (age 6 mo+), MDCK, PF, 0.5mL 2017    Pneumococcal Conjugate 7-valent (Prevnar7) 10/08/2012    Pneumococcal, PCV-13, PREVNAR 13, (age 6w+), IM, 0.5mL 2018    Pneumococcal, PPSV23, PNEUMOVAX 23, (age 2y+), SC/IM, 0.5mL 10/08/2012       Active Problems:  Patient Active Problem List   Diagnosis Code    COPD, severe (HCC) J44.9    Anxiety &

## 2024-05-03 NOTE — DISCHARGE SUMMARY
Hospital Medicine Discharge Summary    Patient: Joyce Uribe   : 1960     Admit Date: 2024   Discharge Date: 2024    Disposition:  [x]Home   []HHC  []SNF  []Acute Rehab  []LTAC  []Hospice  Code status:  []Full  []DNR/CCA  [x]Limited (DNR/CCA with Do Not Intubate)  []DNRCC  Condition at Discharge: Stable  Primary Care Provider: Jaky Ramirez APRN - NP    Admitting Provider: Ruth Alexandra DO  Discharge Provider: Fernando Cook MD     Discharge Diagnoses:      Active Hospital Problems    Diagnosis     Dyspnea [R06.00]     Chronic prescription benzodiazepine use [Z79.899]     Centrilobular emphysema (HCC) [J43.2]     Chronic respiratory failure with hypoxia (HCC) [J96.11]     Former smoker [Z87.891]     Grade I diastolic dysfunction [I51.89]     Moderate malnutrition (HCC) [E44.0]     H/O: lung cancer [Z85.118]     Marijuana use [F12.90]     COPD, severe (HCC) [J44.9]        Presenting Admission History:        \"63 y.o. female w/ hx COPD/CHF on baseline home O2 at 3L and remote Lung CA  who presented to [facility] with a several day hx of progressive SOB/MCKEE, now severe and unable to perform her daily activities.      She denies any associated CP and though generally weak has no focal deficits.      The patient denies any fever/chills or other signs/sxs of systemic illness or identifiable aggravating/alleviating factors\"        Assessment/Plan:        COPD - w/ chronic hypoxic respiratory failure on baseline home O2 at 3L per NC.  Normally controlled on home medication regimen - continued. Here w/ acute exacerbation - clinically improving. Started on IV Steroids/HHN/ABX. Steroids titrated down  and changed to PO 1 May.     Acute on Chronic Respiratory Failure - w/ progressive hypoxia above baseline, POArrival.  Presence of clinical respiratory distress w/ tachypnea/dyspnea/SOB and wheezing w/ use of accessory muscles to breath.  Supplemental O2 and wean as tolerated. Pulmonology  4/27/2024 3:11 am COMPARISON: 01/04/2024 HISTORY: ORDERING SYSTEM PROVIDED HISTORY: shortness of breath TECHNOLOGIST PROVIDED HISTORY: Reason for exam:->shortness of breath Reason for Exam: SOB, AMS FINDINGS: The cardiomediastinal silhouette is unchanged in appearance. Emphysematous changes and chronic interstitial findings in the lung bases again noted. There is no consolidation, pneumothorax, or evidence of edema. No effusion is appreciated. The osseous structures are unchanged in appearance.     Chronic findings in the chest without acute airspace disease identified.       Consults:     IP CONSULT TO PULMONOLOGY  IP CONSULT TO HOME CARE NEEDS    Labs:     Recent Labs     05/01/24 0545 05/02/24  0438   WBC 9.4 9.2   HGB 11.3* 11.1*   HCT 34.8* 34.9*    230     Recent Labs     05/01/24 0545 05/02/24  0438    140   K 3.6 3.7    100   CO2 35* 33*   BUN 15 16   CREATININE <0.5* <0.5*   CALCIUM 8.7 8.5   PHOS 3.0 3.3     No results for input(s): \"PROBNP\", \"TROPHS\" in the last 72 hours.  No results for input(s): \"LABA1C\" in the last 72 hours.  No results for input(s): \"AST\", \"ALT\", \"BILIDIR\", \"BILITOT\", \"ALKPHOS\" in the last 72 hours.  No results for input(s): \"INR\", \"LACTA\", \"TSH\" in the last 72 hours.    Urine Cultures:   Lab Results   Component Value Date/Time    LABURIN No growth at 18 to 36 hours 11/26/2020 04:01 PM     Blood Cultures:   Lab Results   Component Value Date/Time    BC No growth after 5 days of incubation. 01/25/2019 08:20 PM     Lab Results   Component Value Date/Time    BLOODCULT2 No growth after 5 days of incubation. 01/25/2019 08:20 PM     Organism:   Lab Results   Component Value Date/Time    ORG Yeast 05/24/2017 09:00 PM       Signed:    Fernando Cook MD

## 2024-05-03 NOTE — PROGRESS NOTES
[facility] picking up patient. Vital signs stable, Pt. Sent with all belongings including personal portable oxygen.

## 2024-05-04 ENCOUNTER — APPOINTMENT (OUTPATIENT)
Dept: GENERAL RADIOLOGY | Age: 64
DRG: 871 | End: 2024-05-04
Payer: COMMERCIAL

## 2024-05-04 ENCOUNTER — HOSPITAL ENCOUNTER (INPATIENT)
Age: 64
LOS: 12 days | Discharge: HOME HEALTH | DRG: 871 | End: 2024-05-16
Attending: STUDENT IN AN ORGANIZED HEALTH CARE EDUCATION/TRAINING PROGRAM | Admitting: STUDENT IN AN ORGANIZED HEALTH CARE EDUCATION/TRAINING PROGRAM
Payer: COMMERCIAL

## 2024-05-04 DIAGNOSIS — J96.01 ACUTE RESPIRATORY FAILURE WITH HYPOXIA AND HYPERCAPNIA (HCC): Primary | ICD-10-CM

## 2024-05-04 DIAGNOSIS — G89.29 CHRONIC LOW BACK PAIN, UNSPECIFIED BACK PAIN LATERALITY, UNSPECIFIED WHETHER SCIATICA PRESENT: ICD-10-CM

## 2024-05-04 DIAGNOSIS — J96.02 ACUTE RESPIRATORY FAILURE WITH HYPOXIA AND HYPERCAPNIA (HCC): Primary | ICD-10-CM

## 2024-05-04 DIAGNOSIS — J96.21 ACUTE ON CHRONIC RESPIRATORY FAILURE WITH HYPOXEMIA (HCC): ICD-10-CM

## 2024-05-04 DIAGNOSIS — M54.50 CHRONIC LOW BACK PAIN, UNSPECIFIED BACK PAIN LATERALITY, UNSPECIFIED WHETHER SCIATICA PRESENT: ICD-10-CM

## 2024-05-04 DIAGNOSIS — J44.1 COPD WITH ACUTE EXACERBATION (HCC): ICD-10-CM

## 2024-05-04 LAB
ALBUMIN SERPL-MCNC: 4.1 G/DL (ref 3.4–5)
ALBUMIN/GLOB SERPL: 1.3 {RATIO} (ref 1.1–2.2)
ALP SERPL-CCNC: 96 U/L (ref 40–129)
ALT SERPL-CCNC: 46 U/L (ref 10–40)
ANION GAP SERPL CALCULATED.3IONS-SCNC: 10 MMOL/L (ref 3–16)
AST SERPL-CCNC: 29 U/L (ref 15–37)
BASE EXCESS BLDV CALC-SCNC: -0.2 MMOL/L (ref -3–3)
BASE EXCESS BLDV CALC-SCNC: 2.1 MMOL/L (ref -3–3)
BASOPHILS # BLD: 0.1 K/UL (ref 0–0.2)
BASOPHILS NFR BLD: 0.5 %
BILIRUB SERPL-MCNC: <0.2 MG/DL (ref 0–1)
BUN SERPL-MCNC: 20 MG/DL (ref 7–20)
CALCIUM SERPL-MCNC: 9 MG/DL (ref 8.3–10.6)
CHLORIDE SERPL-SCNC: 99 MMOL/L (ref 99–110)
CO2 BLDV-SCNC: 32 MMOL/L
CO2 BLDV-SCNC: 34 MMOL/L
CO2 SERPL-SCNC: 33 MMOL/L (ref 21–32)
COHGB MFR BLDV: 2.4 % (ref 0–1.5)
COHGB MFR BLDV: 3.2 % (ref 0–1.5)
CREAT SERPL-MCNC: 0.6 MG/DL (ref 0.6–1.2)
DEPRECATED RDW RBC AUTO: 16.2 % (ref 12.4–15.4)
EOSINOPHIL # BLD: 0 K/UL (ref 0–0.6)
EOSINOPHIL NFR BLD: 0.1 %
FLUAV RNA RESP QL NAA+PROBE: NOT DETECTED
FLUBV RNA RESP QL NAA+PROBE: NOT DETECTED
GFR SERPLBLD CREATININE-BSD FMLA CKD-EPI: >90 ML/MIN/{1.73_M2}
GLUCOSE SERPL-MCNC: 243 MG/DL (ref 70–99)
HCO3 BLDV-SCNC: 29.8 MMOL/L (ref 23–29)
HCO3 BLDV-SCNC: 31 MMOL/L (ref 23–29)
HCT VFR BLD AUTO: 39.1 % (ref 36–48)
HGB BLD-MCNC: 12 G/DL (ref 12–16)
INR PPP: 0.89 (ref 0.85–1.15)
LACTATE BLDV-SCNC: 1.6 MMOL/L (ref 0.4–1.9)
LYMPHOCYTES # BLD: 1.7 K/UL (ref 1–5.1)
LYMPHOCYTES NFR BLD: 10.7 %
MCH RBC QN AUTO: 29.6 PG (ref 26–34)
MCHC RBC AUTO-ENTMCNC: 30.8 G/DL (ref 31–36)
MCV RBC AUTO: 96.1 FL (ref 80–100)
METHGB MFR BLDV: 0 %
METHGB MFR BLDV: 0.3 %
MONOCYTES # BLD: 0.2 K/UL (ref 0–1.3)
MONOCYTES NFR BLD: 1.5 %
NEUTROPHILS # BLD: 13.7 K/UL (ref 1.7–7.7)
NEUTROPHILS NFR BLD: 87.2 %
NT-PROBNP SERPL-MCNC: 816 PG/ML (ref 0–124)
O2 THERAPY: ABNORMAL
O2 THERAPY: ABNORMAL
PCO2 BLDV: 61.6 MMHG (ref 40–50)
PCO2 BLDV: 89.3 MMHG (ref 40–50)
PH BLDV: 7.16 [PH] (ref 7.35–7.45)
PH BLDV: 7.3 [PH] (ref 7.35–7.45)
PLATELET # BLD AUTO: 384 K/UL (ref 135–450)
PMV BLD AUTO: 9.9 FL (ref 5–10.5)
PO2 BLDV: 84.7 MMHG (ref 25–40)
PO2 BLDV: 89.7 MMHG (ref 25–40)
POTASSIUM SERPL-SCNC: 4.8 MMOL/L (ref 3.5–5.1)
PROT SERPL-MCNC: 7.3 G/DL (ref 6.4–8.2)
PROTHROMBIN TIME: 12.3 SEC (ref 11.9–14.9)
RBC # BLD AUTO: 4.07 M/UL (ref 4–5.2)
SAO2 % BLDV: 95 %
SAO2 % BLDV: 97 %
SARS-COV-2 RNA RESP QL NAA+PROBE: NOT DETECTED
SODIUM SERPL-SCNC: 142 MMOL/L (ref 136–145)
TROPONIN, HIGH SENSITIVITY: 23 NG/L (ref 0–14)
TROPONIN, HIGH SENSITIVITY: 27 NG/L (ref 0–14)
WBC # BLD AUTO: 15.7 K/UL (ref 4–11)

## 2024-05-04 PROCEDURE — 36415 COLL VENOUS BLD VENIPUNCTURE: CPT

## 2024-05-04 PROCEDURE — 71045 X-RAY EXAM CHEST 1 VIEW: CPT

## 2024-05-04 PROCEDURE — 84484 ASSAY OF TROPONIN QUANT: CPT

## 2024-05-04 PROCEDURE — 87040 BLOOD CULTURE FOR BACTERIA: CPT

## 2024-05-04 PROCEDURE — 6360000002 HC RX W HCPCS: Performed by: STUDENT IN AN ORGANIZED HEALTH CARE EDUCATION/TRAINING PROGRAM

## 2024-05-04 PROCEDURE — 85610 PROTHROMBIN TIME: CPT

## 2024-05-04 PROCEDURE — 83605 ASSAY OF LACTIC ACID: CPT

## 2024-05-04 PROCEDURE — 94660 CPAP INITIATION&MGMT: CPT

## 2024-05-04 PROCEDURE — 5A09357 ASSISTANCE WITH RESPIRATORY VENTILATION, LESS THAN 24 CONSECUTIVE HOURS, CONTINUOUS POSITIVE AIRWAY PRESSURE: ICD-10-PCS | Performed by: STUDENT IN AN ORGANIZED HEALTH CARE EDUCATION/TRAINING PROGRAM

## 2024-05-04 PROCEDURE — 6370000000 HC RX 637 (ALT 250 FOR IP): Performed by: STUDENT IN AN ORGANIZED HEALTH CARE EDUCATION/TRAINING PROGRAM

## 2024-05-04 PROCEDURE — 82803 BLOOD GASES ANY COMBINATION: CPT

## 2024-05-04 PROCEDURE — 2060000000 HC ICU INTERMEDIATE R&B

## 2024-05-04 PROCEDURE — 85025 COMPLETE CBC W/AUTO DIFF WBC: CPT

## 2024-05-04 PROCEDURE — 93005 ELECTROCARDIOGRAM TRACING: CPT | Performed by: STUDENT IN AN ORGANIZED HEALTH CARE EDUCATION/TRAINING PROGRAM

## 2024-05-04 PROCEDURE — 94761 N-INVAS EAR/PLS OXIMETRY MLT: CPT

## 2024-05-04 PROCEDURE — 99285 EMERGENCY DEPT VISIT HI MDM: CPT

## 2024-05-04 PROCEDURE — 96374 THER/PROPH/DIAG INJ IV PUSH: CPT

## 2024-05-04 PROCEDURE — 96375 TX/PRO/DX INJ NEW DRUG ADDON: CPT

## 2024-05-04 PROCEDURE — 2700000000 HC OXYGEN THERAPY PER DAY

## 2024-05-04 PROCEDURE — 2580000003 HC RX 258: Performed by: STUDENT IN AN ORGANIZED HEALTH CARE EDUCATION/TRAINING PROGRAM

## 2024-05-04 PROCEDURE — 94640 AIRWAY INHALATION TREATMENT: CPT

## 2024-05-04 PROCEDURE — 83880 ASSAY OF NATRIURETIC PEPTIDE: CPT

## 2024-05-04 PROCEDURE — 80053 COMPREHEN METABOLIC PANEL: CPT

## 2024-05-04 PROCEDURE — 87636 SARSCOV2 & INF A&B AMP PRB: CPT

## 2024-05-04 RX ORDER — METHOCARBAMOL 500 MG/1
500 TABLET, FILM COATED ORAL EVERY 8 HOURS PRN
Status: DISCONTINUED | OUTPATIENT
Start: 2024-05-04 | End: 2024-05-16 | Stop reason: HOSPADM

## 2024-05-04 RX ORDER — BENZONATATE 100 MG/1
100 CAPSULE ORAL 3 TIMES DAILY PRN
Status: DISCONTINUED | OUTPATIENT
Start: 2024-05-04 | End: 2024-05-16 | Stop reason: HOSPADM

## 2024-05-04 RX ORDER — ONDANSETRON 4 MG/1
4 TABLET, ORALLY DISINTEGRATING ORAL EVERY 8 HOURS PRN
Status: DISCONTINUED | OUTPATIENT
Start: 2024-05-04 | End: 2024-05-16 | Stop reason: HOSPADM

## 2024-05-04 RX ORDER — SODIUM CHLORIDE 0.9 % (FLUSH) 0.9 %
5-40 SYRINGE (ML) INJECTION PRN
Status: DISCONTINUED | OUTPATIENT
Start: 2024-05-04 | End: 2024-05-16 | Stop reason: HOSPADM

## 2024-05-04 RX ORDER — IPRATROPIUM BROMIDE AND ALBUTEROL SULFATE 2.5; .5 MG/3ML; MG/3ML
1 SOLUTION RESPIRATORY (INHALATION)
Status: DISCONTINUED | OUTPATIENT
Start: 2024-05-05 | End: 2024-05-04

## 2024-05-04 RX ORDER — BUSPIRONE HYDROCHLORIDE 5 MG/1
10 TABLET ORAL 2 TIMES DAILY
Status: DISCONTINUED | OUTPATIENT
Start: 2024-05-05 | End: 2024-05-16 | Stop reason: HOSPADM

## 2024-05-04 RX ORDER — SODIUM CHLORIDE 0.9 % (FLUSH) 0.9 %
5-40 SYRINGE (ML) INJECTION EVERY 12 HOURS SCHEDULED
Status: DISCONTINUED | OUTPATIENT
Start: 2024-05-05 | End: 2024-05-16 | Stop reason: HOSPADM

## 2024-05-04 RX ORDER — IPRATROPIUM BROMIDE AND ALBUTEROL SULFATE 2.5; .5 MG/3ML; MG/3ML
1 SOLUTION RESPIRATORY (INHALATION) ONCE
Status: COMPLETED | OUTPATIENT
Start: 2024-05-04 | End: 2024-05-04

## 2024-05-04 RX ORDER — ASPIRIN 81 MG/1
81 TABLET, CHEWABLE ORAL DAILY
Status: DISCONTINUED | OUTPATIENT
Start: 2024-05-05 | End: 2024-05-16 | Stop reason: HOSPADM

## 2024-05-04 RX ORDER — ENOXAPARIN SODIUM 100 MG/ML
40 INJECTION SUBCUTANEOUS EVERY EVENING
Status: DISCONTINUED | OUTPATIENT
Start: 2024-05-05 | End: 2024-05-16 | Stop reason: HOSPADM

## 2024-05-04 RX ORDER — POTASSIUM CHLORIDE 7.45 MG/ML
10 INJECTION INTRAVENOUS PRN
Status: DISCONTINUED | OUTPATIENT
Start: 2024-05-04 | End: 2024-05-05

## 2024-05-04 RX ORDER — LANOLIN ALCOHOL/MO/W.PET/CERES
3 CREAM (GRAM) TOPICAL NIGHTLY PRN
Status: DISCONTINUED | OUTPATIENT
Start: 2024-05-04 | End: 2024-05-16 | Stop reason: HOSPADM

## 2024-05-04 RX ORDER — SODIUM CHLORIDE 9 MG/ML
INJECTION, SOLUTION INTRAVENOUS PRN
Status: DISCONTINUED | OUTPATIENT
Start: 2024-05-04 | End: 2024-05-16 | Stop reason: HOSPADM

## 2024-05-04 RX ORDER — ONDANSETRON 2 MG/ML
4 INJECTION INTRAMUSCULAR; INTRAVENOUS EVERY 6 HOURS PRN
Status: DISCONTINUED | OUTPATIENT
Start: 2024-05-04 | End: 2024-05-16 | Stop reason: HOSPADM

## 2024-05-04 RX ORDER — DIAZEPAM 5 MG/1
5 TABLET ORAL EVERY 12 HOURS PRN
Status: DISCONTINUED | OUTPATIENT
Start: 2024-05-04 | End: 2024-05-16 | Stop reason: HOSPADM

## 2024-05-04 RX ORDER — ACETAMINOPHEN 650 MG/1
650 SUPPOSITORY RECTAL EVERY 6 HOURS PRN
Status: DISCONTINUED | OUTPATIENT
Start: 2024-05-04 | End: 2024-05-16 | Stop reason: HOSPADM

## 2024-05-04 RX ORDER — PREDNISONE 20 MG/1
40 TABLET ORAL DAILY
Status: COMPLETED | OUTPATIENT
Start: 2024-05-05 | End: 2024-05-08

## 2024-05-04 RX ORDER — MAGNESIUM SULFATE IN WATER 40 MG/ML
2000 INJECTION, SOLUTION INTRAVENOUS PRN
Status: DISCONTINUED | OUTPATIENT
Start: 2024-05-04 | End: 2024-05-05

## 2024-05-04 RX ORDER — ACETAMINOPHEN 325 MG/1
650 TABLET ORAL EVERY 6 HOURS PRN
Status: DISCONTINUED | OUTPATIENT
Start: 2024-05-04 | End: 2024-05-16 | Stop reason: HOSPADM

## 2024-05-04 RX ORDER — SODIUM CHLORIDE, SODIUM LACTATE, POTASSIUM CHLORIDE, AND CALCIUM CHLORIDE .6; .31; .03; .02 G/100ML; G/100ML; G/100ML; G/100ML
30 INJECTION, SOLUTION INTRAVENOUS ONCE
Status: DISCONTINUED | OUTPATIENT
Start: 2024-05-04 | End: 2024-05-04

## 2024-05-04 RX ORDER — PANTOPRAZOLE SODIUM 40 MG/1
40 TABLET, DELAYED RELEASE ORAL DAILY
Status: DISCONTINUED | OUTPATIENT
Start: 2024-05-05 | End: 2024-05-16 | Stop reason: HOSPADM

## 2024-05-04 RX ORDER — METOCLOPRAMIDE 10 MG/1
10 TABLET ORAL EVERY 6 HOURS PRN
Status: DISCONTINUED | OUTPATIENT
Start: 2024-05-04 | End: 2024-05-16 | Stop reason: HOSPADM

## 2024-05-04 RX ORDER — IPRATROPIUM BROMIDE AND ALBUTEROL SULFATE 2.5; .5 MG/3ML; MG/3ML
1 SOLUTION RESPIRATORY (INHALATION)
Status: DISCONTINUED | OUTPATIENT
Start: 2024-05-05 | End: 2024-05-06

## 2024-05-04 RX ORDER — POLYETHYLENE GLYCOL 3350 17 G/17G
17 POWDER, FOR SOLUTION ORAL DAILY PRN
Status: DISCONTINUED | OUTPATIENT
Start: 2024-05-04 | End: 2024-05-12

## 2024-05-04 RX ORDER — ATORVASTATIN CALCIUM 80 MG/1
80 TABLET, FILM COATED ORAL NIGHTLY
Status: DISCONTINUED | OUTPATIENT
Start: 2024-05-05 | End: 2024-05-16 | Stop reason: HOSPADM

## 2024-05-04 RX ORDER — GUAIFENESIN 600 MG/1
600 TABLET, EXTENDED RELEASE ORAL 2 TIMES DAILY
Status: DISCONTINUED | OUTPATIENT
Start: 2024-05-04 | End: 2024-05-16 | Stop reason: HOSPADM

## 2024-05-04 RX ADMIN — AZITHROMYCIN MONOHYDRATE 500 MG: 500 INJECTION, POWDER, LYOPHILIZED, FOR SOLUTION INTRAVENOUS at 22:54

## 2024-05-04 RX ADMIN — IPRATROPIUM BROMIDE AND ALBUTEROL SULFATE 1 DOSE: .5; 2.5 SOLUTION RESPIRATORY (INHALATION) at 22:04

## 2024-05-04 RX ADMIN — SODIUM CHLORIDE, POTASSIUM CHLORIDE, SODIUM LACTATE AND CALCIUM CHLORIDE 1836 ML: 600; 310; 30; 20 INJECTION, SOLUTION INTRAVENOUS at 22:54

## 2024-05-04 RX ADMIN — METHYLPREDNISOLONE SODIUM SUCCINATE 125 MG: 125 INJECTION INTRAMUSCULAR; INTRAVENOUS at 22:53

## 2024-05-04 ASSESSMENT — PAIN DESCRIPTION - LOCATION: LOCATION: CHEST

## 2024-05-04 ASSESSMENT — LIFESTYLE VARIABLES
HOW OFTEN DO YOU HAVE A DRINK CONTAINING ALCOHOL: NEVER
HOW MANY STANDARD DRINKS CONTAINING ALCOHOL DO YOU HAVE ON A TYPICAL DAY: PATIENT DOES NOT DRINK

## 2024-05-05 LAB
ALBUMIN SERPL-MCNC: 3.6 G/DL (ref 3.4–5)
ALBUMIN/GLOB SERPL: 1.5 {RATIO} (ref 1.1–2.2)
ALP SERPL-CCNC: 76 U/L (ref 40–129)
ALT SERPL-CCNC: 36 U/L (ref 10–40)
ANION GAP SERPL CALCULATED.3IONS-SCNC: 12 MMOL/L (ref 3–16)
AST SERPL-CCNC: 20 U/L (ref 15–37)
BASE EXCESS BLDV CALC-SCNC: 5.4 MMOL/L (ref -3–3)
BASOPHILS # BLD: 0 K/UL (ref 0–0.2)
BASOPHILS NFR BLD: 0.1 %
BILIRUB SERPL-MCNC: <0.2 MG/DL (ref 0–1)
BUN SERPL-MCNC: 18 MG/DL (ref 7–20)
CALCIUM SERPL-MCNC: 8.6 MG/DL (ref 8.3–10.6)
CHLORIDE SERPL-SCNC: 101 MMOL/L (ref 99–110)
CO2 BLDV-SCNC: 36 MMOL/L
CO2 SERPL-SCNC: 29 MMOL/L (ref 21–32)
COHGB MFR BLDV: 2.3 % (ref 0–1.5)
CREAT SERPL-MCNC: <0.5 MG/DL (ref 0.6–1.2)
DEPRECATED RDW RBC AUTO: 16.3 % (ref 12.4–15.4)
EKG ATRIAL RATE: 120 BPM
EKG DIAGNOSIS: NORMAL
EKG P AXIS: 63 DEGREES
EKG P-R INTERVAL: 130 MS
EKG Q-T INTERVAL: 316 MS
EKG QRS DURATION: 72 MS
EKG QTC CALCULATION (BAZETT): 446 MS
EKG R AXIS: 74 DEGREES
EKG T AXIS: 67 DEGREES
EKG VENTRICULAR RATE: 120 BPM
EOSINOPHIL # BLD: 0 K/UL (ref 0–0.6)
EOSINOPHIL NFR BLD: 0 %
GFR SERPLBLD CREATININE-BSD FMLA CKD-EPI: >90 ML/MIN/{1.73_M2}
GLUCOSE SERPL-MCNC: 92 MG/DL (ref 70–99)
HCO3 BLDV-SCNC: 33.5 MMOL/L (ref 23–29)
HCT VFR BLD AUTO: 36.1 % (ref 36–48)
HGB BLD-MCNC: 11.2 G/DL (ref 12–16)
LYMPHOCYTES # BLD: 0.6 K/UL (ref 1–5.1)
LYMPHOCYTES NFR BLD: 5 %
MCH RBC QN AUTO: 29.7 PG (ref 26–34)
MCHC RBC AUTO-ENTMCNC: 31 G/DL (ref 31–36)
MCV RBC AUTO: 95.6 FL (ref 80–100)
METHGB MFR BLDV: 0.3 %
MONOCYTES # BLD: 0.1 K/UL (ref 0–1.3)
MONOCYTES NFR BLD: 0.6 %
NEUTROPHILS # BLD: 12.1 K/UL (ref 1.7–7.7)
NEUTROPHILS NFR BLD: 94.3 %
O2 THERAPY: ABNORMAL
PCO2 BLDV: 67.5 MMHG (ref 40–50)
PH BLDV: 7.31 [PH] (ref 7.35–7.45)
PLATELET # BLD AUTO: 233 K/UL (ref 135–450)
PMV BLD AUTO: 9.6 FL (ref 5–10.5)
PO2 BLDV: 45.5 MMHG (ref 25–40)
POTASSIUM SERPL-SCNC: 4.9 MMOL/L (ref 3.5–5.1)
PROCALCITONIN SERPL IA-MCNC: 0.13 NG/ML (ref 0–0.15)
PROT SERPL-MCNC: 6 G/DL (ref 6.4–8.2)
RBC # BLD AUTO: 3.78 M/UL (ref 4–5.2)
REASON FOR REJECTION: NORMAL
REJECTED TEST: NORMAL
SAO2 % BLDV: 84 %
SODIUM SERPL-SCNC: 142 MMOL/L (ref 136–145)
WBC # BLD AUTO: 12.8 K/UL (ref 4–11)

## 2024-05-05 PROCEDURE — 94640 AIRWAY INHALATION TREATMENT: CPT

## 2024-05-05 PROCEDURE — 94761 N-INVAS EAR/PLS OXIMETRY MLT: CPT

## 2024-05-05 PROCEDURE — 2700000000 HC OXYGEN THERAPY PER DAY

## 2024-05-05 PROCEDURE — 2580000003 HC RX 258: Performed by: STUDENT IN AN ORGANIZED HEALTH CARE EDUCATION/TRAINING PROGRAM

## 2024-05-05 PROCEDURE — 6370000000 HC RX 637 (ALT 250 FOR IP): Performed by: STUDENT IN AN ORGANIZED HEALTH CARE EDUCATION/TRAINING PROGRAM

## 2024-05-05 PROCEDURE — 36415 COLL VENOUS BLD VENIPUNCTURE: CPT

## 2024-05-05 PROCEDURE — 93010 ELECTROCARDIOGRAM REPORT: CPT | Performed by: INTERNAL MEDICINE

## 2024-05-05 PROCEDURE — 80053 COMPREHEN METABOLIC PANEL: CPT

## 2024-05-05 PROCEDURE — 2060000000 HC ICU INTERMEDIATE R&B

## 2024-05-05 PROCEDURE — 94660 CPAP INITIATION&MGMT: CPT

## 2024-05-05 PROCEDURE — 94669 MECHANICAL CHEST WALL OSCILL: CPT

## 2024-05-05 PROCEDURE — 84145 PROCALCITONIN (PCT): CPT

## 2024-05-05 PROCEDURE — 82803 BLOOD GASES ANY COMBINATION: CPT

## 2024-05-05 PROCEDURE — 83036 HEMOGLOBIN GLYCOSYLATED A1C: CPT

## 2024-05-05 PROCEDURE — 6360000002 HC RX W HCPCS: Performed by: STUDENT IN AN ORGANIZED HEALTH CARE EDUCATION/TRAINING PROGRAM

## 2024-05-05 PROCEDURE — 85025 COMPLETE CBC W/AUTO DIFF WBC: CPT

## 2024-05-05 PROCEDURE — 6370000000 HC RX 637 (ALT 250 FOR IP)

## 2024-05-05 RX ORDER — HYDROCODONE BITARTRATE AND ACETAMINOPHEN 5; 325 MG/1; MG/1
1 TABLET ORAL EVERY 6 HOURS PRN
Status: DISCONTINUED | OUTPATIENT
Start: 2024-05-05 | End: 2024-05-16 | Stop reason: HOSPADM

## 2024-05-05 RX ADMIN — PREDNISONE 40 MG: 20 TABLET ORAL at 09:22

## 2024-05-05 RX ADMIN — GUAIFENESIN 600 MG: 600 TABLET ORAL at 00:40

## 2024-05-05 RX ADMIN — IPRATROPIUM BROMIDE AND ALBUTEROL SULFATE 1 DOSE: .5; 2.5 SOLUTION RESPIRATORY (INHALATION) at 15:41

## 2024-05-05 RX ADMIN — DIAZEPAM 5 MG: 5 TABLET ORAL at 16:06

## 2024-05-05 RX ADMIN — ONDANSETRON 4 MG: 2 INJECTION INTRAMUSCULAR; INTRAVENOUS at 23:06

## 2024-05-05 RX ADMIN — SODIUM CHLORIDE, PRESERVATIVE FREE 10 ML: 5 INJECTION INTRAVENOUS at 09:23

## 2024-05-05 RX ADMIN — ENOXAPARIN SODIUM 40 MG: 100 INJECTION SUBCUTANEOUS at 16:47

## 2024-05-05 RX ADMIN — METHOCARBAMOL 500 MG: 500 TABLET ORAL at 06:26

## 2024-05-05 RX ADMIN — IPRATROPIUM BROMIDE AND ALBUTEROL SULFATE 1 DOSE: .5; 2.5 SOLUTION RESPIRATORY (INHALATION) at 20:28

## 2024-05-05 RX ADMIN — IPRATROPIUM BROMIDE AND ALBUTEROL SULFATE 1 DOSE: .5; 2.5 SOLUTION RESPIRATORY (INHALATION) at 11:50

## 2024-05-05 RX ADMIN — BUSPIRONE HYDROCHLORIDE 10 MG: 5 TABLET ORAL at 00:40

## 2024-05-05 RX ADMIN — Medication 2 PUFF: at 20:37

## 2024-05-05 RX ADMIN — IPRATROPIUM BROMIDE AND ALBUTEROL SULFATE 1 DOSE: .5; 2.5 SOLUTION RESPIRATORY (INHALATION) at 03:55

## 2024-05-05 RX ADMIN — HYDROCODONE BITARTRATE AND ACETAMINOPHEN 1 TABLET: 5; 325 TABLET ORAL at 21:53

## 2024-05-05 RX ADMIN — GUAIFENESIN 600 MG: 600 TABLET ORAL at 09:23

## 2024-05-05 RX ADMIN — ATORVASTATIN CALCIUM 80 MG: 80 TABLET, FILM COATED ORAL at 20:13

## 2024-05-05 RX ADMIN — BUSPIRONE HYDROCHLORIDE 10 MG: 5 TABLET ORAL at 09:23

## 2024-05-05 RX ADMIN — SODIUM CHLORIDE, PRESERVATIVE FREE 10 ML: 5 INJECTION INTRAVENOUS at 20:13

## 2024-05-05 RX ADMIN — IPRATROPIUM BROMIDE AND ALBUTEROL SULFATE 1 DOSE: .5; 2.5 SOLUTION RESPIRATORY (INHALATION) at 07:27

## 2024-05-05 RX ADMIN — GUAIFENESIN 600 MG: 600 TABLET ORAL at 20:12

## 2024-05-05 RX ADMIN — ASPIRIN 81 MG 81 MG: 81 TABLET ORAL at 09:22

## 2024-05-05 RX ADMIN — AZITHROMYCIN MONOHYDRATE 500 MG: 500 INJECTION, POWDER, LYOPHILIZED, FOR SOLUTION INTRAVENOUS at 21:57

## 2024-05-05 RX ADMIN — HYDROCODONE BITARTRATE AND ACETAMINOPHEN 1 TABLET: 5; 325 TABLET ORAL at 16:47

## 2024-05-05 RX ADMIN — PANTOPRAZOLE SODIUM 40 MG: 40 TABLET, DELAYED RELEASE ORAL at 09:23

## 2024-05-05 RX ADMIN — BUSPIRONE HYDROCHLORIDE 10 MG: 5 TABLET ORAL at 20:12

## 2024-05-05 ASSESSMENT — PAIN DESCRIPTION - LOCATION
LOCATION: SHOULDER
LOCATION: CHEST
LOCATION: CHEST;GROIN

## 2024-05-05 ASSESSMENT — PAIN DESCRIPTION - DESCRIPTORS: DESCRIPTORS: ACHING

## 2024-05-05 ASSESSMENT — PAIN SCALES - GENERAL
PAINLEVEL_OUTOF10: 8
PAINLEVEL_OUTOF10: 0
PAINLEVEL_OUTOF10: 8

## 2024-05-05 ASSESSMENT — PAIN DESCRIPTION - ORIENTATION
ORIENTATION: LEFT
ORIENTATION: LEFT
ORIENTATION: LEFT;RIGHT

## 2024-05-05 NOTE — H&P
History and Physical      Name:  Joyce Uribe /Age/Sex: 1960  (63 y.o. female)   MRN & CSN:  [id number] & [id number] Encounter Date/Time: 2024 10:38 PM   Location:   PCP: Jaky Ramirez APRN - NP       Hospital Day: 1    Assessment and Plan:     Patient is a 63-year-old female who presented with SOB.     # Acute exacerbation of moderate COPD   # Acute on chronic hypoxemic hypercapnic respiratory failure  - Endorsed worsening SOB with increased productive cough of green sputum for 2 days. Quit smoking 3 years ago. Compliant with inhalers. On 3L NC at baseline. PFTs in  with moderate obstruction. Recently discharged on  for similar presentation.   - Requiring BPAP 12/6/45% in ED, VBG 7.16/89 (expected CO2 53-57), CXR non-acute (independent read). Flu and COVID negative.  - Continue steroids, Azithro, Dulera and DuoNebs. Continue supportive care. Goal SpO2 of 88-92%.    # Leukocytosis  - Afebrile, recent steroid use. CXR without oblivious infiltrate.   - On Azithro. Follow-up PCT.     # Non-obstructive CAD  # Non-MI troponin elevation  - Denied any typical CP. LHC in 2024 with mild-to-moderate CAD.   - Initial Tn mildly elevated, repeat flat. ECG without acute ischemic changes.  - Likely secondary to above. Continue ASA and Lipitor.     # HFpEF, compensated  - Last TTE in  with LVEF of 55%.   - Clinically euvolemic.    - Discontinue 1.8L IVF bolus to prevent volume overload.    # Hyperglycemia  - Follow-up A1c.    # GERD  - Continue PPI.    # Anxiety  - Continue BusPar and prn Valium     Checklist:  Advanced care planning: full  Diet: NPO while on BPAP  DVT ppx: Lovenox    Disposition: admit to inpatient.  Estimated discharge: 3-4 day(s).  Current living situation: home.  Expected disposition: home.    Spoke with ED provider who recommended admission for the patient and I agree with that plan.  Personally reviewed lab studies and imaging.  EKG interpreted personally and results as

## 2024-05-05 NOTE — ED NOTES
[id number]  
exam:->patient reports some confusion Has a \"code stroke\" or \"stroke alert\" been called?->No Decision Support Exception - unselect if not a suspected or confirmed emergency medical condition->Emergency Medical Condition (MA) Reason for Exam: Altered/decreased LOC; pt reports episodes of confusion FINDINGS: BRAIN/VENTRICLES: No evidence of intracranial hemorrhage or acute process. 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  In the central portion of the complex mass there is a calcified mass, which is about 2 cm in maximal diameter.  Surrounding the calcified mass there is cystic component.  The entire complex mass measures 4.5 cm transversely, 4.5 cm AP and 4.2 cm craniocaudad, unchanged as compared to previous MRI of brain in 2014.  It was noted that the central calcified portion of the mass enhances with contrast on previous MRI study.  There is mass effect to the anterior aspect of the atrium of the left lateral ventricle caused by the mass but the mass itself appears to be not an intraventricular mass. There is minimal midline shift from left to right by about 3.5 mm at the level of the mass but there is no midline shift at the level of the septum pellucidum.  There is no hydrocephalus.  There is no obstruction of the 4th ventricle or the aqueduct by the mass effect. Evidence of mild-to-moderate cerebral cortical atrophic changes in the upper anterosuperior aspect of both frontal lobes. There is no evidence of any new intra-axial or extra-axial mass. ORBITS: The visualized portion of the orbits demonstrate no acute abnormality. SINUSES: The visualized paranasal sinuses and mastoid air cells demonstrate no acute abnormality. SOFT TISSUES/SKULL:  No acute abnormality of the visualized skull or soft tissues.     No definite

## 2024-05-05 NOTE — PLAN OF CARE
Problem: Safety - Adult  Goal: Free from fall injury  5/5/2024 1020 by Marlon Uriarte, RN  Outcome: Progressing     Problem: Discharge Planning  Goal: Discharge to home or other facility with appropriate resources  Outcome: Progressing     Problem: Pain  Goal: Verbalizes/displays adequate comfort level or baseline comfort level  5/5/2024 1020 by Marlon Uriarte, RN  Outcome: Progressing     Problem: Skin/Tissue Integrity  Goal: Absence of new skin breakdown  Description: 1.  Monitor for areas of redness and/or skin breakdown  2.  Assess vascular access sites hourly  3.  Every 4-6 hours minimum:  Change oxygen saturation probe site  4.  Every 4-6 hours:  If on nasal continuous positive airway pressure, respiratory therapy assess nares and determine need for appliance change or resting period.  5/5/2024 1020 by Marlon Uriarte, RN  Outcome: Progressing

## 2024-05-05 NOTE — ED PROVIDER NOTES
[facility]  ED  EMERGENCY DEPARTMENT ENCOUNTER        Pt Name: Joyce Uribe  MRN: [id number]  Birthdate 1960  Date of evaluation: 5/4/2024  Provider: Israel Velasco MD  PCP: Jaky Ramirez APRN - NP  Note Started: 10:50 PM EDT 5/4/24    CHIEF COMPLAINT       Chief Complaint   Patient presents with    Shortness of Breath     EMS reports they were called for shortness of breath. Upon arrival oxygen was 70% on 3L. Hx of COPD. Wears 3L of oxygen baseline    Respiratory distress    HISTORY OF PRESENT ILLNESS: 1 or more Elements     History from : Patient    Limitations to history : Acuity of condition    Joyce Uribe is a 63 y.o. female who presents with respiratory distress, recently discharged after management of similar respiratory failure.  Worse over the past several days.  Persistent shortness of breath despite oxygen, as chest tightness, cough.  Symptoms not otherwise alleviated or exacerbated by other factors, history limited secondary to acuity of condition.    Nursing Notes were all reviewed and agreed with or any disagreements were addressed in the HPI.    REVIEW OF SYSTEMS :      Positives and Pertinent negatives as per HPI.  ROS otherwise unremarkable.    SURGICAL HISTORY     Past Surgical History:   Procedure Laterality Date    CHOLECYSTECTOMY  2009    COLONOSCOPY  2001    COLONOSCOPY  2013    tubular adenoma    COLONOSCOPY  08/15/2017    ENDOSCOPY, COLON, DIAGNOSTIC      HERNIA REPAIR      HYSTERECTOMY (CERVIX STATUS UNKNOWN)  1985    With Bladder Mesh    INCONTINENCE SURGERY Left     2009 in [place]    LUNG CANCER SURGERY      cancerous nodule removed left side    OVARY REMOVAL  2007    bilat    TONSILLECTOMY      UPPER GASTROINTESTINAL ENDOSCOPY  2013    gastritis    UPPER GASTROINTESTINAL ENDOSCOPY  10/03/2017    bx distal esophagus        CURRENTMEDICATIONS       Previous Medications    ALBUTEROL SULFATE HFA (PROAIR HFA) 108 (90 BASE) MCG/ACT INHALER    Inhale 2 puffs into the

## 2024-05-06 LAB
ANION GAP SERPL CALCULATED.3IONS-SCNC: 6 MMOL/L (ref 3–16)
BASOPHILS # BLD: 0 K/UL (ref 0–0.2)
BASOPHILS NFR BLD: 0.3 %
BUN SERPL-MCNC: 24 MG/DL (ref 7–20)
CALCIUM SERPL-MCNC: 8.7 MG/DL (ref 8.3–10.6)
CHLORIDE SERPL-SCNC: 99 MMOL/L (ref 99–110)
CO2 SERPL-SCNC: 34 MMOL/L (ref 21–32)
CREAT SERPL-MCNC: 0.6 MG/DL (ref 0.6–1.2)
DEPRECATED RDW RBC AUTO: 15.7 % (ref 12.4–15.4)
EOSINOPHIL # BLD: 0 K/UL (ref 0–0.6)
EOSINOPHIL NFR BLD: 0.1 %
EST. AVERAGE GLUCOSE BLD GHB EST-MCNC: 119.8 MG/DL
GFR SERPLBLD CREATININE-BSD FMLA CKD-EPI: >90 ML/MIN/{1.73_M2}
GLUCOSE SERPL-MCNC: 90 MG/DL (ref 70–99)
HBA1C MFR BLD: 5.8 %
HCT VFR BLD AUTO: 30.8 % (ref 36–48)
HGB BLD-MCNC: 9.9 G/DL (ref 12–16)
LYMPHOCYTES # BLD: 2 K/UL (ref 1–5.1)
LYMPHOCYTES NFR BLD: 16.8 %
MCH RBC QN AUTO: 30.3 PG (ref 26–34)
MCHC RBC AUTO-ENTMCNC: 32.2 G/DL (ref 31–36)
MCV RBC AUTO: 94 FL (ref 80–100)
MONOCYTES # BLD: 0.8 K/UL (ref 0–1.3)
MONOCYTES NFR BLD: 7.1 %
NEUTROPHILS # BLD: 9 K/UL (ref 1.7–7.7)
NEUTROPHILS NFR BLD: 75.7 %
PLATELET # BLD AUTO: 199 K/UL (ref 135–450)
PMV BLD AUTO: 9.9 FL (ref 5–10.5)
POTASSIUM SERPL-SCNC: 4.2 MMOL/L (ref 3.5–5.1)
RBC # BLD AUTO: 3.27 M/UL (ref 4–5.2)
SODIUM SERPL-SCNC: 139 MMOL/L (ref 136–145)
WBC # BLD AUTO: 11.8 K/UL (ref 4–11)

## 2024-05-06 PROCEDURE — 94640 AIRWAY INHALATION TREATMENT: CPT

## 2024-05-06 PROCEDURE — 6360000002 HC RX W HCPCS: Performed by: STUDENT IN AN ORGANIZED HEALTH CARE EDUCATION/TRAINING PROGRAM

## 2024-05-06 PROCEDURE — 85025 COMPLETE CBC W/AUTO DIFF WBC: CPT

## 2024-05-06 PROCEDURE — 6370000000 HC RX 637 (ALT 250 FOR IP)

## 2024-05-06 PROCEDURE — 6370000000 HC RX 637 (ALT 250 FOR IP): Performed by: INTERNAL MEDICINE

## 2024-05-06 PROCEDURE — 2580000003 HC RX 258: Performed by: STUDENT IN AN ORGANIZED HEALTH CARE EDUCATION/TRAINING PROGRAM

## 2024-05-06 PROCEDURE — 80048 BASIC METABOLIC PNL TOTAL CA: CPT

## 2024-05-06 PROCEDURE — 6370000000 HC RX 637 (ALT 250 FOR IP): Performed by: NURSE PRACTITIONER

## 2024-05-06 PROCEDURE — 6370000000 HC RX 637 (ALT 250 FOR IP): Performed by: STUDENT IN AN ORGANIZED HEALTH CARE EDUCATION/TRAINING PROGRAM

## 2024-05-06 PROCEDURE — 2060000000 HC ICU INTERMEDIATE R&B

## 2024-05-06 PROCEDURE — 2700000000 HC OXYGEN THERAPY PER DAY

## 2024-05-06 PROCEDURE — 36415 COLL VENOUS BLD VENIPUNCTURE: CPT

## 2024-05-06 PROCEDURE — 94761 N-INVAS EAR/PLS OXIMETRY MLT: CPT

## 2024-05-06 PROCEDURE — 94669 MECHANICAL CHEST WALL OSCILL: CPT

## 2024-05-06 RX ORDER — AZITHROMYCIN 250 MG/1
500 TABLET, FILM COATED ORAL DAILY
Status: COMPLETED | OUTPATIENT
Start: 2024-05-06 | End: 2024-05-07

## 2024-05-06 RX ORDER — IPRATROPIUM BROMIDE AND ALBUTEROL SULFATE 2.5; .5 MG/3ML; MG/3ML
1 SOLUTION RESPIRATORY (INHALATION)
Status: DISCONTINUED | OUTPATIENT
Start: 2024-05-06 | End: 2024-05-13

## 2024-05-06 RX ADMIN — HYDROCODONE BITARTRATE AND ACETAMINOPHEN 1 TABLET: 5; 325 TABLET ORAL at 10:36

## 2024-05-06 RX ADMIN — ATORVASTATIN CALCIUM 80 MG: 80 TABLET, FILM COATED ORAL at 20:47

## 2024-05-06 RX ADMIN — METOCLOPRAMIDE 10 MG: 10 TABLET ORAL at 04:35

## 2024-05-06 RX ADMIN — DIAZEPAM 5 MG: 5 TABLET ORAL at 12:53

## 2024-05-06 RX ADMIN — Medication 2 PUFF: at 07:11

## 2024-05-06 RX ADMIN — IPRATROPIUM BROMIDE AND ALBUTEROL SULFATE 1 DOSE: .5; 2.5 SOLUTION RESPIRATORY (INHALATION) at 16:04

## 2024-05-06 RX ADMIN — PANTOPRAZOLE SODIUM 40 MG: 40 TABLET, DELAYED RELEASE ORAL at 09:00

## 2024-05-06 RX ADMIN — GUAIFENESIN 600 MG: 600 TABLET ORAL at 09:00

## 2024-05-06 RX ADMIN — ACETAMINOPHEN 650 MG: 325 TABLET ORAL at 09:00

## 2024-05-06 RX ADMIN — HYDROCODONE BITARTRATE AND ACETAMINOPHEN 1 TABLET: 5; 325 TABLET ORAL at 17:21

## 2024-05-06 RX ADMIN — GUAIFENESIN 600 MG: 600 TABLET ORAL at 20:47

## 2024-05-06 RX ADMIN — PREDNISONE 40 MG: 20 TABLET ORAL at 09:00

## 2024-05-06 RX ADMIN — Medication 3 MG: at 20:47

## 2024-05-06 RX ADMIN — ACETAMINOPHEN 650 MG: 325 TABLET ORAL at 16:19

## 2024-05-06 RX ADMIN — IPRATROPIUM BROMIDE AND ALBUTEROL SULFATE 1 DOSE: .5; 2.5 SOLUTION RESPIRATORY (INHALATION) at 07:10

## 2024-05-06 RX ADMIN — AZITHROMYCIN 500 MG: 250 TABLET, FILM COATED ORAL at 20:47

## 2024-05-06 RX ADMIN — METHOCARBAMOL 500 MG: 500 TABLET ORAL at 20:47

## 2024-05-06 RX ADMIN — ASPIRIN 81 MG 81 MG: 81 TABLET ORAL at 09:00

## 2024-05-06 RX ADMIN — IPRATROPIUM BROMIDE AND ALBUTEROL SULFATE 1 DOSE: .5; 2.5 SOLUTION RESPIRATORY (INHALATION) at 19:38

## 2024-05-06 RX ADMIN — ENOXAPARIN SODIUM 40 MG: 100 INJECTION SUBCUTANEOUS at 17:21

## 2024-05-06 RX ADMIN — HYDROCODONE BITARTRATE AND ACETAMINOPHEN 1 TABLET: 5; 325 TABLET ORAL at 04:35

## 2024-05-06 RX ADMIN — BUSPIRONE HYDROCHLORIDE 10 MG: 5 TABLET ORAL at 09:00

## 2024-05-06 RX ADMIN — HYDROCODONE BITARTRATE AND ACETAMINOPHEN 1 TABLET: 5; 325 TABLET ORAL at 23:42

## 2024-05-06 RX ADMIN — BUSPIRONE HYDROCHLORIDE 10 MG: 5 TABLET ORAL at 20:47

## 2024-05-06 RX ADMIN — SODIUM CHLORIDE, PRESERVATIVE FREE 10 ML: 5 INJECTION INTRAVENOUS at 09:00

## 2024-05-06 RX ADMIN — IPRATROPIUM BROMIDE AND ALBUTEROL SULFATE 1 DOSE: .5; 2.5 SOLUTION RESPIRATORY (INHALATION) at 11:47

## 2024-05-06 RX ADMIN — Medication 2 PUFF: at 19:43

## 2024-05-06 ASSESSMENT — PAIN DESCRIPTION - PAIN TYPE
TYPE: ACUTE PAIN
TYPE: ACUTE PAIN

## 2024-05-06 ASSESSMENT — PAIN DESCRIPTION - ORIENTATION
ORIENTATION: LEFT
ORIENTATION: MID
ORIENTATION: MID
ORIENTATION: LEFT;MID

## 2024-05-06 ASSESSMENT — PAIN SCALES - GENERAL
PAINLEVEL_OUTOF10: 3
PAINLEVEL_OUTOF10: 5
PAINLEVEL_OUTOF10: 3
PAINLEVEL_OUTOF10: 3
PAINLEVEL_OUTOF10: 7
PAINLEVEL_OUTOF10: 8
PAINLEVEL_OUTOF10: 5
PAINLEVEL_OUTOF10: 5
PAINLEVEL_OUTOF10: 8
PAINLEVEL_OUTOF10: 3
PAINLEVEL_OUTOF10: 3
PAINLEVEL_OUTOF10: 7

## 2024-05-06 ASSESSMENT — PAIN DESCRIPTION - LOCATION
LOCATION: BACK;SHOULDER
LOCATION: HEAD
LOCATION: BACK;SHOULDER
LOCATION: BACK;SHOULDER

## 2024-05-06 ASSESSMENT — PAIN DESCRIPTION - DESCRIPTORS
DESCRIPTORS: ACHING
DESCRIPTORS: ACHING;DISCOMFORT

## 2024-05-06 ASSESSMENT — PAIN SCALES - WONG BAKER: WONGBAKER_NUMERICALRESPONSE: NO HURT

## 2024-05-06 NOTE — ACP (ADVANCE CARE PLANNING)
Advance Care Planning     Advance Care Planning Activator (Inpatient)  Conversation Note      Date of ACP Conversation: 5/6/2024     Conversation Conducted with: patient     ACP Activator: Melony Santana RN    Health Care Decision Maker:     Current Designated Health Care Decision Maker:     Primary Decision Maker: Ty Villarreal - Child - [phone]    Primary Decision Maker: Kate Viera - Child - [phone]      Care Preferences    Ventilation:  \"If you were in your present state of health and suddenly became very ill and were unable to breathe on your own, what would your preference be about the use of a ventilator (breathing machine) if it were available to you?\"      Would the patient desire the use of ventilator (breathing machine)?: yes    \"If your health worsens and it becomes clear that your chance of recovery is unlikely, what would your preference be about the use of a ventilator (breathing machine) if it were available to you?\"     Would the patient desire the use of ventilator (breathing machine)?: No      Resuscitation  \"CPR works best to restart the heart when there is a sudden event, like a heart attack, in someone who is otherwise healthy. Unfortunately, CPR does not typically restart the heart for people who have serious health conditions or who are very sick.\"    \"In the event your heart stopped as a result of an underlying serious health condition, would you want attempts to be made to restart your heart (answer \"yes\" for attempt to resuscitate) or would you prefer a natural death (answer \"no\" for do not attempt to resuscitate)?\" n/a       [x] Yes   [] No   Educated Patient / Decision Maker regarding differences between Advance Directives and portable DNR orders.    Length of ACP Conversation in minutes:  5    Conversation Outcomes:  ACP discussion completed    Follow-up plan:    [] Schedule follow-up conversation to continue planning  [x] Referred individual to Provider for additional

## 2024-05-06 NOTE — CARE COORDINATION
Case Management Assessment  Initial Evaluation    Date/Time of Evaluation: 5/6/2024 4:10 PM  Assessment Completed by: Melony Santana RN    If patient is discharged prior to next notation, then this note serves as note for discharge by case management.    Patient Name: Joyce Uribe                   YOB: 1960  Diagnosis: COPD exacerbation (HCC) [J44.1]  COPD with acute exacerbation (HCC) [J44.1]  Acute respiratory failure with hypoxia and hypercapnia (HCC) [J96.01, J96.02]                   Date / Time: 5/4/2024  9:43 PM    Patient Admission Status: Inpatient   Readmission Risk (Low < 19, Mod (19-27), High > 27): Readmission Risk Score: 24    Current PCP: Jaky Ramirez APRN - NP  PCP verified by CM? Yes    Chart Reviewed: Yes      History Provided by: Patient  Patient Orientation: Alert and Oriented    Patient Cognition: Alert    Hospitalization in the last 30 days (Readmission):  Yes    If yes, Readmission Assessment in  Navigator will be completed.    Advance Directives:      Code Status: Full Code   Patient's Primary Decision Maker is: Legal Next of Kin    Primary Decision Maker: Ty Villarreal - Child - [phone]    Primary Decision Maker: Kate Viera - Child - [phone]    Discharge Planning:    Patient lives with: Friends Type of Home: Apartment  Primary Care Giver: Self  Patient Support Systems include: Family Members   Current Financial resources: Medicare, Medicaid  Current community resources: Other (Comment) ([facility] for aide 5 days a week for 4 hrs a day and [facility] for home provider)  Current services prior to admission: Durable Medical Equipment, Private Duty Homecare            Current DME: Oxygen Therapy (Comment) (home O2 through [employer])            Type of Home Care services:  Aide Services    ADLS  Prior functional level: Independent in ADLs/IADLs  Current functional level: Independent in ADLs/IADLs    PT AM-PAC:   /24  OT AM-PAC:   /24    Family can provide

## 2024-05-06 NOTE — PLAN OF CARE
Problem: Safety - Adult  Goal: Free from fall injury  5/5/2024 2348 by Maggy Cazares, RN  Outcome: Progressing  5/5/2024 1020 by Marlon Uriarte RN  Outcome: Progressing     Problem: Discharge Planning  Goal: Discharge to home or other facility with appropriate resources  5/5/2024 2348 by Maggy Cazares, RN  Outcome: Progressing  5/5/2024 1020 by Marlon Uriarte RN  Outcome: Progressing     Problem: Pain  Goal: Verbalizes/displays adequate comfort level or baseline comfort level  5/5/2024 2348 by Maggy Cazares, RN  Outcome: Progressing  5/5/2024 1020 by Marlon Uriarte RN  Outcome: Progressing     Problem: Skin/Tissue Integrity  Goal: Absence of new skin breakdown  Description: 1.  Monitor for areas of redness and/or skin breakdown  2.  Assess vascular access sites hourly  3.  Every 4-6 hours minimum:  Change oxygen saturation probe site  4.  Every 4-6 hours:  If on nasal continuous positive airway pressure, respiratory therapy assess nares and determine need for appliance change or resting period.  5/5/2024 2348 by Maggy Cazares, RN  Outcome: Progressing  5/5/2024 1020 by Marlon Uriarte RN  Outcome: Progressing

## 2024-05-06 NOTE — PLAN OF CARE
Problem: Safety - Adult  Goal: Free from fall injury  5/6/2024 0931 by Gabby Paul RN  Outcome: Progressing  5/6/2024 0931 by Gabby Paul RN  Outcome: Progressing  5/5/2024 2348 by Maggy Cazares RN  Outcome: Progressing     Problem: Discharge Planning  Goal: Discharge to home or other facility with appropriate resources  5/6/2024 0931 by Gabby Paul RN  Outcome: Progressing  5/6/2024 0931 by Gabby Paul RN  Outcome: Progressing  5/5/2024 2348 by Maggy Cazares RN  Outcome: Progressing     Problem: Pain  Goal: Verbalizes/displays adequate comfort level or baseline comfort level  5/6/2024 0931 by Gabby Paul RN  Outcome: Progressing  5/6/2024 0931 by Gabby Paul RN  Outcome: Progressing  5/5/2024 2348 by Maggy Cazares RN  Outcome: Progressing     Problem: Skin/Tissue Integrity  Goal: Absence of new skin breakdown  Description: 1.  Monitor for areas of redness and/or skin breakdown  2.  Assess vascular access sites hourly  3.  Every 4-6 hours minimum:  Change oxygen saturation probe site  4.  Every 4-6 hours:  If on nasal continuous positive airway pressure, respiratory therapy assess nares and determine need for appliance change or resting period.  5/6/2024 0931 by Gabby Paul RN  Outcome: Progressing  5/6/2024 0931 by Gabby Paul RN  Outcome: Progressing  5/5/2024 2348 by Maggy Cazares RN  Outcome: Progressing     Problem: Chronic Conditions and Co-morbidities  Goal: Patient's chronic conditions and co-morbidity symptoms are monitored and maintained or improved  Outcome: Progressing

## 2024-05-07 LAB
ANION GAP SERPL CALCULATED.3IONS-SCNC: 10 MMOL/L (ref 3–16)
BASOPHILS # BLD: 0 K/UL (ref 0–0.2)
BASOPHILS NFR BLD: 0.1 %
BUN SERPL-MCNC: 24 MG/DL (ref 7–20)
CALCIUM SERPL-MCNC: 8.5 MG/DL (ref 8.3–10.6)
CHLORIDE SERPL-SCNC: 98 MMOL/L (ref 99–110)
CO2 SERPL-SCNC: 30 MMOL/L (ref 21–32)
CREAT SERPL-MCNC: 0.6 MG/DL (ref 0.6–1.2)
DEPRECATED RDW RBC AUTO: 15.9 % (ref 12.4–15.4)
EOSINOPHIL # BLD: 0 K/UL (ref 0–0.6)
EOSINOPHIL NFR BLD: 0 %
GFR SERPLBLD CREATININE-BSD FMLA CKD-EPI: >90 ML/MIN/{1.73_M2}
GLUCOSE SERPL-MCNC: 110 MG/DL (ref 70–99)
HCT VFR BLD AUTO: 29.8 % (ref 36–48)
HGB BLD-MCNC: 9.2 G/DL (ref 12–16)
LYMPHOCYTES # BLD: 1.8 K/UL (ref 1–5.1)
LYMPHOCYTES NFR BLD: 15.5 %
MCH RBC QN AUTO: 29.4 PG (ref 26–34)
MCHC RBC AUTO-ENTMCNC: 31 G/DL (ref 31–36)
MCV RBC AUTO: 94.9 FL (ref 80–100)
MONOCYTES # BLD: 0.8 K/UL (ref 0–1.3)
MONOCYTES NFR BLD: 6.7 %
NEUTROPHILS # BLD: 9 K/UL (ref 1.7–7.7)
NEUTROPHILS NFR BLD: 77.7 %
PLATELET # BLD AUTO: 202 K/UL (ref 135–450)
PMV BLD AUTO: 10 FL (ref 5–10.5)
POTASSIUM SERPL-SCNC: 4.6 MMOL/L (ref 3.5–5.1)
PROCALCITONIN SERPL IA-MCNC: 0.11 NG/ML (ref 0–0.15)
RBC # BLD AUTO: 3.14 M/UL (ref 4–5.2)
SODIUM SERPL-SCNC: 138 MMOL/L (ref 136–145)
WBC # BLD AUTO: 11.5 K/UL (ref 4–11)

## 2024-05-07 PROCEDURE — 85025 COMPLETE CBC W/AUTO DIFF WBC: CPT

## 2024-05-07 PROCEDURE — 6370000000 HC RX 637 (ALT 250 FOR IP)

## 2024-05-07 PROCEDURE — 6370000000 HC RX 637 (ALT 250 FOR IP): Performed by: NURSE PRACTITIONER

## 2024-05-07 PROCEDURE — 6370000000 HC RX 637 (ALT 250 FOR IP): Performed by: INTERNAL MEDICINE

## 2024-05-07 PROCEDURE — 2060000000 HC ICU INTERMEDIATE R&B

## 2024-05-07 PROCEDURE — 6370000000 HC RX 637 (ALT 250 FOR IP): Performed by: STUDENT IN AN ORGANIZED HEALTH CARE EDUCATION/TRAINING PROGRAM

## 2024-05-07 PROCEDURE — 84145 PROCALCITONIN (PCT): CPT

## 2024-05-07 PROCEDURE — 97530 THERAPEUTIC ACTIVITIES: CPT

## 2024-05-07 PROCEDURE — 80048 BASIC METABOLIC PNL TOTAL CA: CPT

## 2024-05-07 PROCEDURE — 97162 PT EVAL MOD COMPLEX 30 MIN: CPT

## 2024-05-07 PROCEDURE — 97166 OT EVAL MOD COMPLEX 45 MIN: CPT

## 2024-05-07 PROCEDURE — 2700000000 HC OXYGEN THERAPY PER DAY

## 2024-05-07 PROCEDURE — 6360000002 HC RX W HCPCS: Performed by: STUDENT IN AN ORGANIZED HEALTH CARE EDUCATION/TRAINING PROGRAM

## 2024-05-07 PROCEDURE — 94640 AIRWAY INHALATION TREATMENT: CPT

## 2024-05-07 PROCEDURE — 94761 N-INVAS EAR/PLS OXIMETRY MLT: CPT

## 2024-05-07 PROCEDURE — 94669 MECHANICAL CHEST WALL OSCILL: CPT

## 2024-05-07 RX ORDER — AZITHROMYCIN 250 MG/1
250 TABLET, FILM COATED ORAL DAILY
Status: COMPLETED | OUTPATIENT
Start: 2024-05-08 | End: 2024-05-10

## 2024-05-07 RX ADMIN — GUAIFENESIN 600 MG: 600 TABLET ORAL at 08:30

## 2024-05-07 RX ADMIN — GUAIFENESIN 600 MG: 600 TABLET ORAL at 19:49

## 2024-05-07 RX ADMIN — ONDANSETRON 4 MG: 4 TABLET, ORALLY DISINTEGRATING ORAL at 18:00

## 2024-05-07 RX ADMIN — IPRATROPIUM BROMIDE AND ALBUTEROL SULFATE 1 DOSE: .5; 2.5 SOLUTION RESPIRATORY (INHALATION) at 11:15

## 2024-05-07 RX ADMIN — Medication 3 MG: at 19:49

## 2024-05-07 RX ADMIN — Medication 2 PUFF: at 07:09

## 2024-05-07 RX ADMIN — ATORVASTATIN CALCIUM 80 MG: 80 TABLET, FILM COATED ORAL at 19:49

## 2024-05-07 RX ADMIN — HYDROCODONE BITARTRATE AND ACETAMINOPHEN 1 TABLET: 5; 325 TABLET ORAL at 19:50

## 2024-05-07 RX ADMIN — IPRATROPIUM BROMIDE AND ALBUTEROL SULFATE 1 DOSE: .5; 2.5 SOLUTION RESPIRATORY (INHALATION) at 19:04

## 2024-05-07 RX ADMIN — BUSPIRONE HYDROCHLORIDE 10 MG: 5 TABLET ORAL at 08:30

## 2024-05-07 RX ADMIN — IPRATROPIUM BROMIDE AND ALBUTEROL SULFATE 1 DOSE: .5; 2.5 SOLUTION RESPIRATORY (INHALATION) at 07:09

## 2024-05-07 RX ADMIN — PREDNISONE 40 MG: 20 TABLET ORAL at 08:30

## 2024-05-07 RX ADMIN — HYDROCODONE BITARTRATE AND ACETAMINOPHEN 1 TABLET: 5; 325 TABLET ORAL at 11:34

## 2024-05-07 RX ADMIN — HYDROCODONE BITARTRATE AND ACETAMINOPHEN 1 TABLET: 5; 325 TABLET ORAL at 05:08

## 2024-05-07 RX ADMIN — ASPIRIN 81 MG 81 MG: 81 TABLET ORAL at 08:30

## 2024-05-07 RX ADMIN — PANTOPRAZOLE SODIUM 40 MG: 40 TABLET, DELAYED RELEASE ORAL at 08:30

## 2024-05-07 RX ADMIN — METHOCARBAMOL 500 MG: 500 TABLET ORAL at 11:35

## 2024-05-07 RX ADMIN — AZITHROMYCIN 500 MG: 250 TABLET, FILM COATED ORAL at 08:30

## 2024-05-07 RX ADMIN — Medication 2 PUFF: at 19:04

## 2024-05-07 RX ADMIN — METHOCARBAMOL 500 MG: 500 TABLET ORAL at 19:50

## 2024-05-07 RX ADMIN — IPRATROPIUM BROMIDE AND ALBUTEROL SULFATE 1 DOSE: .5; 2.5 SOLUTION RESPIRATORY (INHALATION) at 15:40

## 2024-05-07 RX ADMIN — DIAZEPAM 5 MG: 5 TABLET ORAL at 18:00

## 2024-05-07 RX ADMIN — ONDANSETRON 4 MG: 4 TABLET, ORALLY DISINTEGRATING ORAL at 04:38

## 2024-05-07 RX ADMIN — ENOXAPARIN SODIUM 40 MG: 100 INJECTION SUBCUTANEOUS at 18:00

## 2024-05-07 RX ADMIN — BUSPIRONE HYDROCHLORIDE 10 MG: 5 TABLET ORAL at 19:49

## 2024-05-07 ASSESSMENT — PAIN SCALES - GENERAL
PAINLEVEL_OUTOF10: 8
PAINLEVEL_OUTOF10: 6
PAINLEVEL_OUTOF10: 8

## 2024-05-07 ASSESSMENT — PAIN DESCRIPTION - LOCATION
LOCATION: SHOULDER;BACK
LOCATION: BACK;SHOULDER

## 2024-05-07 ASSESSMENT — PAIN DESCRIPTION - ORIENTATION: ORIENTATION: LEFT

## 2024-05-07 ASSESSMENT — PAIN SCALES - WONG BAKER
WONGBAKER_NUMERICALRESPONSE: HURTS A LITTLE BIT
WONGBAKER_NUMERICALRESPONSE: HURTS WHOLE LOT
WONGBAKER_NUMERICALRESPONSE: NO HURT

## 2024-05-07 ASSESSMENT — PAIN DESCRIPTION - DESCRIPTORS: DESCRIPTORS: ACHING;SORE

## 2024-05-07 NOTE — CONSULTS
Consult Placed Via [employer]    Who: Dr. Joiner   Date:5/7/2024,  Time:12:40 PM    Electronically signed by Adelaida Rivas on 5/7/24 at 12:40 PM EDT    
is alert and oriented to person, place, and time.            Data Reviewed:   LABS:  :   Recent Labs     05/05/24  0610 05/06/24 0420 05/07/24 0417   WBC 12.8* 11.8* 11.5*   HGB 11.2* 9.9* 9.2*   HCT 36.1 30.8* 29.8*   MCV 95.6 94.0 94.9    199 202     BMP:   Recent Labs     05/05/24 0424 05/06/24 0420 05/07/24 0417    139 138   K 4.9 4.2 4.6    99 98*   CO2 29 34* 30   BUN 18 24* 24*   CREATININE <0.5* 0.6 0.6     PROFILE:   Recent Labs     05/04/24 2156 05/05/24 0424   AST 29 20   ALT 46* 36   BILITOT <0.2 <0.2   ALKPHOS 96 76     PT/INR:   Recent Labs     05/04/24 2156   PROTIME 12.3   INR 0.89     APTT:No results for input(s): \"APTT\" in the last 72 hours.  :No results for input(s): \"NITRITE\", \"COLORU\", \"PHUR\", \"LABCAST\", \"WBCUA\", \"RBCUA\", \"MUCUS\", \"TRICHOMONAS\", \"YEAST\", \"BACTERIA\", \"CLARITYU\", \"SPECGRAV\", \"LEUKOCYTESUR\", \"UROBILINOGEN\", \"BILIRUBINUR\", \"BLOODU\", \"GLUCOSEU\", \"AMORPHOUS\" in the last 72 hours.    Invalid input(s): \"KETONESU\"  No results for input(s): \"PHART\", \"GIM5WMU\", \"PO2ART\" in the last 72 hours.         Chest x-ray 5/4/2024  No obvious consolidations.  Heart borders pushed to the right.  Costophrenic angles are not sharp bilaterally.    7/20/2023 CT PE  No pericardial or pleural effusions.  No lymphadenopathy.  Centrilobular emphysema bronchiectasis bilaterally.  Infiltrates at the right base.    PFT - None      Assessment/Plan   63 y.o. year old female with significant past medical history of centrilobular emphysema, chronic hypoxic respiratory failure, lung cancer, diastolic dysfunction happens to [facility] for shortness of breath.      Assessment:  AECOPD  Chronic hypoxic respiratory failure  Shortness of breath  Centrilobular emphysema  Former smoker  Adenocarcinoma lung s/p resection    Plan:  - Cont supplemental O2 therapy with goal saturation 88-92%, she is on her baseline of 3L  - Agree with Prednisone 40mg PO qd x 5 day course  - Duonebs q4h while

## 2024-05-07 NOTE — NURSING NOTE
Pt was laying in bed. Reports pain 8/10, nurse was notified and nurse obtained and gave pain medication. Pt was A&O x 4. Bed was locked and lowered with call light and personal possessions within reach. No further requests were made by pt at the time

## 2024-05-07 NOTE — CARE COORDINATION
Therapy rec's of SNF noted.  Discussed rec's of SNF with patient.  She states she is unsure at this time if she wants to discharge to a SNF.  She is requesting a day to think about it and talk to family.  Requested that CM check back in tomorrow. If patient decides on SNF, she will need a precert.

## 2024-05-07 NOTE — RT PROTOCOL NOTE
RT Inhaler-Nebulizer Bronchodilator Protocol Note    There is a bronchodilator order in the chart from a provider indicating to follow the RT Bronchodilator Protocol and there is an “Initiate RT Inhaler-Nebulizer Bronchodilator Protocol” order as well (see protocol at bottom of note).    CXR Findings:  XR CHEST PORTABLE    Result Date: 5/4/2024  1. At least mild peribronchial cuffing potentially due to reactive airways disease or bronchitis. 2. Emphysema.       The findings from the last RT Protocol Assessment were as follows:   History Pulmonary Disease: Chronic pulmonary disease  Respiratory Pattern: Dyspnea on exertion or RR 21-25 bpm  Breath Sounds: Inspiratory and expiratory or bilateral wheezing and/or rhonchi  Cough: Strong, productive  Indication for Bronchodilator Therapy: On home bronchodilators, Decreased or absent breath sounds, Wheezing associated with pulm disorder  Bronchodilator Assessment Score: 11    Aerosolized bronchodilator medication orders have been revised according to the RT Inhaler-Nebulizer Bronchodilator Protocol below.    Respiratory Therapist to perform RT Therapy Protocol Assessment initially then follow the protocol.  Repeat RT Therapy Protocol Assessment PRN for score 0-3 or on second treatment, BID, and PRN for scores above 3.    No Indications - adjust the frequency to every 6 hours PRN wheezing or bronchospasm, if no treatments needed after 48 hours then discontinue using Per Protocol order mode.     If indication present, adjust the RT bronchodilator orders based on the Bronchodilator Assessment Score as indicated below.  Use Inhaler orders unless patient has one or more of the following: on home nebulizer, not able to hold breath for 10 seconds, is not alert and oriented, cannot activate and use MDI correctly, or respiratory rate 25 breaths per minute or more, then use the equivalent nebulizer order(s) with same Frequency and PRN reasons based on the score.  If a patient is on 
RT Inhaler-Nebulizer Bronchodilator Protocol Note    There is a bronchodilator order in the chart from a provider indicating to follow the RT Bronchodilator Protocol and there is an “Initiate RT Inhaler-Nebulizer Bronchodilator Protocol” order as well (see protocol at bottom of note).    CXR Findings:  XR CHEST PORTABLE    Result Date: 5/4/2024  1. At least mild peribronchial cuffing potentially due to reactive airways disease or bronchitis. 2. Emphysema.       The findings from the last RT Protocol Assessment were as follows:   History Pulmonary Disease: Chronic pulmonary disease  Respiratory Pattern: Mild dyspnea at rest, irregular pattern, or RR 21-25 bpm  Breath Sounds: Intermittent or unilateral wheezes  Cough: Weak, non-productive  Indication for Bronchodilator Therapy: Wheezing associated with pulm disorder  Bronchodilator Assessment Score: 13    Aerosolized bronchodilator medication orders have been revised according to the RT Inhaler-Nebulizer Bronchodilator Protocol below.    Respiratory Therapist to perform RT Therapy Protocol Assessment initially then follow the protocol.  Repeat RT Therapy Protocol Assessment PRN for score 0-3 or on second treatment, BID, and PRN for scores above 3.    No Indications - adjust the frequency to every 6 hours PRN wheezing or bronchospasm, if no treatments needed after 48 hours then discontinue using Per Protocol order mode.     If indication present, adjust the RT bronchodilator orders based on the Bronchodilator Assessment Score as indicated below.  Use Inhaler orders unless patient has one or more of the following: on home nebulizer, not able to hold breath for 10 seconds, is not alert and oriented, cannot activate and use MDI correctly, or respiratory rate 25 breaths per minute or more, then use the equivalent nebulizer order(s) with same Frequency and PRN reasons based on the score.  If a patient is on this medication at home then do not decrease Frequency below that 
RT Inhaler-Nebulizer Bronchodilator Protocol Note    There is a bronchodilator order in the chart from a provider indicating to follow the RT Bronchodilator Protocol and there is an “Initiate RT Inhaler-Nebulizer Bronchodilator Protocol” order as well (see protocol at bottom of note).    CXR Findings:  XR CHEST PORTABLE    Result Date: 5/4/2024  1. At least mild peribronchial cuffing potentially due to reactive airways disease or bronchitis. 2. Emphysema.       The findings from the last RT Protocol Assessment were as follows:   History Pulmonary Disease: Chronic pulmonary disease  Respiratory Pattern: Use of accessory muscles, prolonged exhalation, or RR 26-30 bpm  Breath Sounds: Inspiratory and expiratory or bilateral wheezing and/or rhonchi  Cough: Weak, productive  Indication for Bronchodilator Therapy: On home bronchodilators, Wheezing associated with pulm disorder  Bronchodilator Assessment Score: 16    Aerosolized bronchodilator medication orders have been revised according to the RT Inhaler-Nebulizer Bronchodilator Protocol below.    Respiratory Therapist to perform RT Therapy Protocol Assessment initially then follow the protocol.  Repeat RT Therapy Protocol Assessment PRN for score 0-3 or on second treatment, BID, and PRN for scores above 3.    No Indications - adjust the frequency to every 6 hours PRN wheezing or bronchospasm, if no treatments needed after 48 hours then discontinue using Per Protocol order mode.     If indication present, adjust the RT bronchodilator orders based on the Bronchodilator Assessment Score as indicated below.  Use Inhaler orders unless patient has one or more of the following: on home nebulizer, not able to hold breath for 10 seconds, is not alert and oriented, cannot activate and use MDI correctly, or respiratory rate 25 breaths per minute or more, then use the equivalent nebulizer order(s) with same Frequency and PRN reasons based on the score.  If a patient is on this 
below that used at home.    0-3 - enter or revise RT bronchodilator order(s) to equivalent RT Bronchodilator order with Frequency of every 4 hours PRN for wheezing or increased work of breathing using Per Protocol order mode.        4-6 - enter or revise RT Bronchodilator order(s) to two equivalent RT bronchodilator orders with one order with BID Frequency and one order with Frequency of every 4 hours PRN wheezing or increased work of breathing using Per Protocol order mode.        7-10 - enter or revise RT Bronchodilator order(s) to two equivalent RT bronchodilator orders with one order with TID Frequency and one order with Frequency of every 4 hours PRN wheezing or increased work of breathing using Per Protocol order mode.       11-13 - enter or revise RT Bronchodilator order(s) to one equivalent RT bronchodilator order with QID Frequency and an Albuterol order with Frequency of every 4 hours PRN wheezing or increased work of breathing using Per Protocol order mode.      Greater than 13 - enter or revise RT Bronchodilator order(s) to one equivalent RT bronchodilator order with every 4 hours Frequency and an Albuterol order with Frequency of every 2 hours PRN wheezing or increased work of breathing using Per Protocol order mode.     RT to enter RT Home Evaluation for COPD & MDI Assessment order using Per Protocol order mode.    Electronically signed by Ludy Mak RCP on 5/5/2024 at 9:41 PM

## 2024-05-07 NOTE — NURSING NOTE
Pt was laying down in bed. Family member was sitting in chair at bedside. Bed was locked and lowered. Call light, side table and personal possessions within reach. Pt made no requests.

## 2024-05-08 ENCOUNTER — TELEPHONE (OUTPATIENT)
Dept: PULMONOLOGY | Age: 64
End: 2024-05-08

## 2024-05-08 LAB
ANION GAP SERPL CALCULATED.3IONS-SCNC: 8 MMOL/L (ref 3–16)
BACTERIA BLD CULT ORG #2: NORMAL
BACTERIA BLD CULT: NORMAL
BASOPHILS # BLD: 0 K/UL (ref 0–0.2)
BASOPHILS NFR BLD: 0.1 %
BUN SERPL-MCNC: 17 MG/DL (ref 7–20)
CALCIUM SERPL-MCNC: 8.8 MG/DL (ref 8.3–10.6)
CHLORIDE SERPL-SCNC: 100 MMOL/L (ref 99–110)
CO2 SERPL-SCNC: 31 MMOL/L (ref 21–32)
CREAT SERPL-MCNC: <0.5 MG/DL (ref 0.6–1.2)
DEPRECATED RDW RBC AUTO: 16 % (ref 12.4–15.4)
EOSINOPHIL # BLD: 0 K/UL (ref 0–0.6)
EOSINOPHIL NFR BLD: 0.2 %
GFR SERPLBLD CREATININE-BSD FMLA CKD-EPI: >90 ML/MIN/{1.73_M2}
GLUCOSE SERPL-MCNC: 87 MG/DL (ref 70–99)
HCT VFR BLD AUTO: 29.9 % (ref 36–48)
HGB BLD-MCNC: 9.4 G/DL (ref 12–16)
LYMPHOCYTES # BLD: 2.2 K/UL (ref 1–5.1)
LYMPHOCYTES NFR BLD: 23.4 %
MCH RBC QN AUTO: 29.9 PG (ref 26–34)
MCHC RBC AUTO-ENTMCNC: 31.6 G/DL (ref 31–36)
MCV RBC AUTO: 94.8 FL (ref 80–100)
MONOCYTES # BLD: 0.5 K/UL (ref 0–1.3)
MONOCYTES NFR BLD: 5.4 %
NEUTROPHILS # BLD: 6.8 K/UL (ref 1.7–7.7)
NEUTROPHILS NFR BLD: 70.9 %
PLATELET # BLD AUTO: 193 K/UL (ref 135–450)
PMV BLD AUTO: 10.3 FL (ref 5–10.5)
POTASSIUM SERPL-SCNC: 4.2 MMOL/L (ref 3.5–5.1)
RBC # BLD AUTO: 3.15 M/UL (ref 4–5.2)
SODIUM SERPL-SCNC: 139 MMOL/L (ref 136–145)
WBC # BLD AUTO: 9.6 K/UL (ref 4–11)

## 2024-05-08 PROCEDURE — 6360000002 HC RX W HCPCS: Performed by: STUDENT IN AN ORGANIZED HEALTH CARE EDUCATION/TRAINING PROGRAM

## 2024-05-08 PROCEDURE — 94669 MECHANICAL CHEST WALL OSCILL: CPT

## 2024-05-08 PROCEDURE — 97110 THERAPEUTIC EXERCISES: CPT

## 2024-05-08 PROCEDURE — 2580000003 HC RX 258: Performed by: STUDENT IN AN ORGANIZED HEALTH CARE EDUCATION/TRAINING PROGRAM

## 2024-05-08 PROCEDURE — 85025 COMPLETE CBC W/AUTO DIFF WBC: CPT

## 2024-05-08 PROCEDURE — 94640 AIRWAY INHALATION TREATMENT: CPT

## 2024-05-08 PROCEDURE — 97530 THERAPEUTIC ACTIVITIES: CPT

## 2024-05-08 PROCEDURE — 36415 COLL VENOUS BLD VENIPUNCTURE: CPT

## 2024-05-08 PROCEDURE — 2060000000 HC ICU INTERMEDIATE R&B

## 2024-05-08 PROCEDURE — 6370000000 HC RX 637 (ALT 250 FOR IP)

## 2024-05-08 PROCEDURE — 94761 N-INVAS EAR/PLS OXIMETRY MLT: CPT

## 2024-05-08 PROCEDURE — 2700000000 HC OXYGEN THERAPY PER DAY

## 2024-05-08 PROCEDURE — 97116 GAIT TRAINING THERAPY: CPT

## 2024-05-08 PROCEDURE — 80048 BASIC METABOLIC PNL TOTAL CA: CPT

## 2024-05-08 PROCEDURE — 6370000000 HC RX 637 (ALT 250 FOR IP): Performed by: STUDENT IN AN ORGANIZED HEALTH CARE EDUCATION/TRAINING PROGRAM

## 2024-05-08 PROCEDURE — 6370000000 HC RX 637 (ALT 250 FOR IP): Performed by: INTERNAL MEDICINE

## 2024-05-08 RX ADMIN — HYDROCODONE BITARTRATE AND ACETAMINOPHEN 1 TABLET: 5; 325 TABLET ORAL at 23:24

## 2024-05-08 RX ADMIN — GUAIFENESIN 600 MG: 600 TABLET ORAL at 08:59

## 2024-05-08 RX ADMIN — IPRATROPIUM BROMIDE AND ALBUTEROL SULFATE 1 DOSE: .5; 2.5 SOLUTION RESPIRATORY (INHALATION) at 11:54

## 2024-05-08 RX ADMIN — ONDANSETRON 4 MG: 4 TABLET, ORALLY DISINTEGRATING ORAL at 09:14

## 2024-05-08 RX ADMIN — DIAZEPAM 5 MG: 5 TABLET ORAL at 09:11

## 2024-05-08 RX ADMIN — HYDROCODONE BITARTRATE AND ACETAMINOPHEN 1 TABLET: 5; 325 TABLET ORAL at 10:20

## 2024-05-08 RX ADMIN — AZITHROMYCIN 250 MG: 250 TABLET, FILM COATED ORAL at 08:59

## 2024-05-08 RX ADMIN — IPRATROPIUM BROMIDE AND ALBUTEROL SULFATE 1 DOSE: .5; 2.5 SOLUTION RESPIRATORY (INHALATION) at 15:29

## 2024-05-08 RX ADMIN — ACETAMINOPHEN 650 MG: 325 TABLET ORAL at 19:17

## 2024-05-08 RX ADMIN — ENOXAPARIN SODIUM 40 MG: 100 INJECTION SUBCUTANEOUS at 18:31

## 2024-05-08 RX ADMIN — HYDROCODONE BITARTRATE AND ACETAMINOPHEN 1 TABLET: 5; 325 TABLET ORAL at 03:46

## 2024-05-08 RX ADMIN — BUSPIRONE HYDROCHLORIDE 10 MG: 5 TABLET ORAL at 20:25

## 2024-05-08 RX ADMIN — Medication 2 PUFF: at 19:23

## 2024-05-08 RX ADMIN — IPRATROPIUM BROMIDE AND ALBUTEROL SULFATE 1 DOSE: .5; 2.5 SOLUTION RESPIRATORY (INHALATION) at 19:23

## 2024-05-08 RX ADMIN — HYDROCODONE BITARTRATE AND ACETAMINOPHEN 1 TABLET: 5; 325 TABLET ORAL at 16:21

## 2024-05-08 RX ADMIN — ASPIRIN 81 MG 81 MG: 81 TABLET ORAL at 08:59

## 2024-05-08 RX ADMIN — PREDNISONE 40 MG: 20 TABLET ORAL at 08:59

## 2024-05-08 RX ADMIN — Medication 2 PUFF: at 07:39

## 2024-05-08 RX ADMIN — PANTOPRAZOLE SODIUM 40 MG: 40 TABLET, DELAYED RELEASE ORAL at 08:59

## 2024-05-08 RX ADMIN — METHOCARBAMOL 500 MG: 500 TABLET ORAL at 19:17

## 2024-05-08 RX ADMIN — Medication 3 MG: at 20:25

## 2024-05-08 RX ADMIN — SODIUM CHLORIDE, PRESERVATIVE FREE 10 ML: 5 INJECTION INTRAVENOUS at 08:59

## 2024-05-08 RX ADMIN — METHOCARBAMOL 500 MG: 500 TABLET ORAL at 11:05

## 2024-05-08 RX ADMIN — ATORVASTATIN CALCIUM 80 MG: 80 TABLET, FILM COATED ORAL at 20:25

## 2024-05-08 RX ADMIN — IPRATROPIUM BROMIDE AND ALBUTEROL SULFATE 1 DOSE: .5; 2.5 SOLUTION RESPIRATORY (INHALATION) at 07:39

## 2024-05-08 RX ADMIN — BUSPIRONE HYDROCHLORIDE 10 MG: 5 TABLET ORAL at 08:59

## 2024-05-08 RX ADMIN — GUAIFENESIN 600 MG: 600 TABLET ORAL at 20:25

## 2024-05-08 ASSESSMENT — PAIN DESCRIPTION - ORIENTATION
ORIENTATION: LEFT
ORIENTATION: LEFT

## 2024-05-08 ASSESSMENT — PAIN SCALES - GENERAL
PAINLEVEL_OUTOF10: 8
PAINLEVEL_OUTOF10: 9
PAINLEVEL_OUTOF10: 6
PAINLEVEL_OUTOF10: 10
PAINLEVEL_OUTOF10: 9
PAINLEVEL_OUTOF10: 6
PAINLEVEL_OUTOF10: 7
PAINLEVEL_OUTOF10: 9

## 2024-05-08 ASSESSMENT — PAIN DESCRIPTION - LOCATION
LOCATION: CHEST
LOCATION: SHOULDER
LOCATION: SHOULDER

## 2024-05-08 ASSESSMENT — PAIN DESCRIPTION - DESCRIPTORS: DESCRIPTORS: ACHING

## 2024-05-08 NOTE — CARE COORDINATION
Spoke with patient at bedside to follow up on SNF rec's.  She states she does not want to go to a SNF. She would rather go home with resumption of care through [facility] and her aide through [facility].  She is already active with [facility] for a provider.  She has home O2 through [facility].   Placed call to Danny with [facility] and message left for call back to confirm that they are still active with patient or can follow at discharge for home care needs.

## 2024-05-08 NOTE — TELEPHONE ENCOUNTER
----- Message from Hugo Joiner MD sent at 5/8/2024  2:53 PM EDT -----  Can we get a f/u appt with Dr. Alexander in 3-4 weeks. She is a known patient of his. Thank you. Should be discharged in 1-2 days.    KD

## 2024-05-08 NOTE — PLAN OF CARE
Problem: Safety - Adult  Goal: Free from fall injury  Outcome: Progressing     Problem: Discharge Planning  Goal: Discharge to home or other facility with appropriate resources  Outcome: Progressing     Problem: Pain  Goal: Verbalizes/displays adequate comfort level or baseline comfort level  Outcome: Progressing     Problem: Skin/Tissue Integrity  Goal: Absence of new skin breakdown  Description: 1.  Monitor for areas of redness and/or skin breakdown  2.  Assess vascular access sites hourly  3.  Every 4-6 hours minimum:  Change oxygen saturation probe site  4.  Every 4-6 hours:  If on nasal continuous positive airway pressure, respiratory therapy assess nares and determine need for appliance change or resting period.  Outcome: Progressing     Problem: Chronic Conditions and Co-morbidities  Goal: Patient's chronic conditions and co-morbidity symptoms are monitored and maintained or improved  Outcome: Progressing

## 2024-05-09 LAB
ANION GAP SERPL CALCULATED.3IONS-SCNC: 6 MMOL/L (ref 3–16)
BUN SERPL-MCNC: 22 MG/DL (ref 7–20)
CALCIUM SERPL-MCNC: 8.6 MG/DL (ref 8.3–10.6)
CHLORIDE SERPL-SCNC: 99 MMOL/L (ref 99–110)
CO2 SERPL-SCNC: 32 MMOL/L (ref 21–32)
CREAT SERPL-MCNC: <0.5 MG/DL (ref 0.6–1.2)
GFR SERPLBLD CREATININE-BSD FMLA CKD-EPI: >90 ML/MIN/{1.73_M2}
GLUCOSE SERPL-MCNC: 110 MG/DL (ref 70–99)
NT-PROBNP SERPL-MCNC: 907 PG/ML (ref 0–124)
POTASSIUM SERPL-SCNC: 4.5 MMOL/L (ref 3.5–5.1)
SODIUM SERPL-SCNC: 137 MMOL/L (ref 136–145)

## 2024-05-09 PROCEDURE — 36415 COLL VENOUS BLD VENIPUNCTURE: CPT

## 2024-05-09 PROCEDURE — 6370000000 HC RX 637 (ALT 250 FOR IP): Performed by: STUDENT IN AN ORGANIZED HEALTH CARE EDUCATION/TRAINING PROGRAM

## 2024-05-09 PROCEDURE — 94669 MECHANICAL CHEST WALL OSCILL: CPT

## 2024-05-09 PROCEDURE — 2060000000 HC ICU INTERMEDIATE R&B

## 2024-05-09 PROCEDURE — 6370000000 HC RX 637 (ALT 250 FOR IP)

## 2024-05-09 PROCEDURE — 94640 AIRWAY INHALATION TREATMENT: CPT

## 2024-05-09 PROCEDURE — 2700000000 HC OXYGEN THERAPY PER DAY

## 2024-05-09 PROCEDURE — 6360000002 HC RX W HCPCS: Performed by: NURSE PRACTITIONER

## 2024-05-09 PROCEDURE — 94761 N-INVAS EAR/PLS OXIMETRY MLT: CPT

## 2024-05-09 PROCEDURE — 6370000000 HC RX 637 (ALT 250 FOR IP): Performed by: INTERNAL MEDICINE

## 2024-05-09 PROCEDURE — 83880 ASSAY OF NATRIURETIC PEPTIDE: CPT

## 2024-05-09 PROCEDURE — 6360000002 HC RX W HCPCS: Performed by: STUDENT IN AN ORGANIZED HEALTH CARE EDUCATION/TRAINING PROGRAM

## 2024-05-09 PROCEDURE — 2580000003 HC RX 258: Performed by: STUDENT IN AN ORGANIZED HEALTH CARE EDUCATION/TRAINING PROGRAM

## 2024-05-09 PROCEDURE — 80048 BASIC METABOLIC PNL TOTAL CA: CPT

## 2024-05-09 RX ORDER — FUROSEMIDE 10 MG/ML
20 INJECTION INTRAMUSCULAR; INTRAVENOUS ONCE
Status: COMPLETED | OUTPATIENT
Start: 2024-05-09 | End: 2024-05-09

## 2024-05-09 RX ADMIN — ONDANSETRON 4 MG: 4 TABLET, ORALLY DISINTEGRATING ORAL at 08:55

## 2024-05-09 RX ADMIN — IPRATROPIUM BROMIDE AND ALBUTEROL SULFATE 1 DOSE: .5; 2.5 SOLUTION RESPIRATORY (INHALATION) at 11:11

## 2024-05-09 RX ADMIN — ATORVASTATIN CALCIUM 80 MG: 80 TABLET, FILM COATED ORAL at 20:16

## 2024-05-09 RX ADMIN — IPRATROPIUM BROMIDE AND ALBUTEROL SULFATE 1 DOSE: .5; 2.5 SOLUTION RESPIRATORY (INHALATION) at 19:54

## 2024-05-09 RX ADMIN — Medication 2 PUFF: at 19:55

## 2024-05-09 RX ADMIN — SODIUM CHLORIDE, PRESERVATIVE FREE 10 ML: 5 INJECTION INTRAVENOUS at 08:56

## 2024-05-09 RX ADMIN — GUAIFENESIN 600 MG: 600 TABLET ORAL at 08:55

## 2024-05-09 RX ADMIN — HYDROCODONE BITARTRATE AND ACETAMINOPHEN 1 TABLET: 5; 325 TABLET ORAL at 05:03

## 2024-05-09 RX ADMIN — HYDROCODONE BITARTRATE AND ACETAMINOPHEN 1 TABLET: 5; 325 TABLET ORAL at 18:00

## 2024-05-09 RX ADMIN — IPRATROPIUM BROMIDE AND ALBUTEROL SULFATE 1 DOSE: .5; 2.5 SOLUTION RESPIRATORY (INHALATION) at 16:11

## 2024-05-09 RX ADMIN — BUSPIRONE HYDROCHLORIDE 10 MG: 5 TABLET ORAL at 20:16

## 2024-05-09 RX ADMIN — FUROSEMIDE 20 MG: 10 INJECTION, SOLUTION INTRAMUSCULAR; INTRAVENOUS at 11:52

## 2024-05-09 RX ADMIN — Medication 3 MG: at 20:16

## 2024-05-09 RX ADMIN — ASPIRIN 81 MG 81 MG: 81 TABLET ORAL at 08:55

## 2024-05-09 RX ADMIN — HYDROCODONE BITARTRATE AND ACETAMINOPHEN 1 TABLET: 5; 325 TABLET ORAL at 11:52

## 2024-05-09 RX ADMIN — ENOXAPARIN SODIUM 40 MG: 100 INJECTION SUBCUTANEOUS at 18:00

## 2024-05-09 RX ADMIN — BUSPIRONE HYDROCHLORIDE 10 MG: 5 TABLET ORAL at 08:55

## 2024-05-09 RX ADMIN — GUAIFENESIN 600 MG: 600 TABLET ORAL at 20:16

## 2024-05-09 RX ADMIN — PANTOPRAZOLE SODIUM 40 MG: 40 TABLET, DELAYED RELEASE ORAL at 08:55

## 2024-05-09 RX ADMIN — HYDROCODONE BITARTRATE AND ACETAMINOPHEN 1 TABLET: 5; 325 TABLET ORAL at 23:42

## 2024-05-09 RX ADMIN — DIAZEPAM 5 MG: 5 TABLET ORAL at 09:29

## 2024-05-09 RX ADMIN — METHOCARBAMOL 500 MG: 500 TABLET ORAL at 05:03

## 2024-05-09 RX ADMIN — IPRATROPIUM BROMIDE AND ALBUTEROL SULFATE 1 DOSE: .5; 2.5 SOLUTION RESPIRATORY (INHALATION) at 07:26

## 2024-05-09 RX ADMIN — AZITHROMYCIN 250 MG: 250 TABLET, FILM COATED ORAL at 08:55

## 2024-05-09 RX ADMIN — Medication 2 PUFF: at 07:26

## 2024-05-09 RX ADMIN — METHOCARBAMOL 500 MG: 500 TABLET ORAL at 20:20

## 2024-05-09 RX ADMIN — SODIUM CHLORIDE, PRESERVATIVE FREE 10 ML: 5 INJECTION INTRAVENOUS at 20:16

## 2024-05-09 ASSESSMENT — PAIN SCALES - GENERAL
PAINLEVEL_OUTOF10: 8
PAINLEVEL_OUTOF10: 8
PAINLEVEL_OUTOF10: 7
PAINLEVEL_OUTOF10: 5

## 2024-05-09 ASSESSMENT — PAIN DESCRIPTION - ORIENTATION
ORIENTATION: LEFT
ORIENTATION: LEFT
ORIENTATION: MID

## 2024-05-09 ASSESSMENT — PAIN DESCRIPTION - DESCRIPTORS
DESCRIPTORS: ACHING
DESCRIPTORS: ACHING

## 2024-05-09 ASSESSMENT — PAIN DESCRIPTION - LOCATION
LOCATION: BACK;SHOULDER
LOCATION: SHOULDER
LOCATION: BACK
LOCATION: BACK

## 2024-05-09 ASSESSMENT — PAIN DESCRIPTION - PAIN TYPE: TYPE: ACUTE PAIN

## 2024-05-09 ASSESSMENT — PAIN SCALES - WONG BAKER: WONGBAKER_NUMERICALRESPONSE: HURTS A LITTLE BIT

## 2024-05-09 NOTE — CARE COORDINATION
Spoke with NP who states he will likely keep patient another night due to patient lungs sound worse today and she needs another dose of Lasix.  Spoke with patient at bedside.  She is aware of the plan.  She is still refusing SNF.  She will discharge to home with resumption of care through [facility] home care and aide through [facility].  Danny with [facility] has confirmed that they will continue to follow at discharge for home care needs.  She is also active with home provider through [facility].  She has home O2 through [facility].  She will need ambulance transport home.

## 2024-05-10 LAB
ANION GAP SERPL CALCULATED.3IONS-SCNC: 9 MMOL/L (ref 3–16)
BUN SERPL-MCNC: 19 MG/DL (ref 7–20)
CALCIUM SERPL-MCNC: 8.4 MG/DL (ref 8.3–10.6)
CHLORIDE SERPL-SCNC: 97 MMOL/L (ref 99–110)
CO2 SERPL-SCNC: 33 MMOL/L (ref 21–32)
CREAT SERPL-MCNC: <0.5 MG/DL (ref 0.6–1.2)
DEPRECATED RDW RBC AUTO: 15.8 % (ref 12.4–15.4)
GFR SERPLBLD CREATININE-BSD FMLA CKD-EPI: >90 ML/MIN/{1.73_M2}
GLUCOSE SERPL-MCNC: 87 MG/DL (ref 70–99)
HCT VFR BLD AUTO: 33 % (ref 36–48)
HGB BLD-MCNC: 10.3 G/DL (ref 12–16)
MCH RBC QN AUTO: 29.4 PG (ref 26–34)
MCHC RBC AUTO-ENTMCNC: 31.2 G/DL (ref 31–36)
MCV RBC AUTO: 94.2 FL (ref 80–100)
PLATELET # BLD AUTO: 209 K/UL (ref 135–450)
PMV BLD AUTO: 9.7 FL (ref 5–10.5)
POTASSIUM SERPL-SCNC: 4.1 MMOL/L (ref 3.5–5.1)
RBC # BLD AUTO: 3.5 M/UL (ref 4–5.2)
SODIUM SERPL-SCNC: 139 MMOL/L (ref 136–145)
WBC # BLD AUTO: 11.5 K/UL (ref 4–11)

## 2024-05-10 PROCEDURE — 6370000000 HC RX 637 (ALT 250 FOR IP)

## 2024-05-10 PROCEDURE — 36415 COLL VENOUS BLD VENIPUNCTURE: CPT

## 2024-05-10 PROCEDURE — 6370000000 HC RX 637 (ALT 250 FOR IP): Performed by: STUDENT IN AN ORGANIZED HEALTH CARE EDUCATION/TRAINING PROGRAM

## 2024-05-10 PROCEDURE — 2580000003 HC RX 258: Performed by: STUDENT IN AN ORGANIZED HEALTH CARE EDUCATION/TRAINING PROGRAM

## 2024-05-10 PROCEDURE — 80048 BASIC METABOLIC PNL TOTAL CA: CPT

## 2024-05-10 PROCEDURE — 6370000000 HC RX 637 (ALT 250 FOR IP): Performed by: NURSE PRACTITIONER

## 2024-05-10 PROCEDURE — 2700000000 HC OXYGEN THERAPY PER DAY

## 2024-05-10 PROCEDURE — 94640 AIRWAY INHALATION TREATMENT: CPT

## 2024-05-10 PROCEDURE — 6360000002 HC RX W HCPCS: Performed by: STUDENT IN AN ORGANIZED HEALTH CARE EDUCATION/TRAINING PROGRAM

## 2024-05-10 PROCEDURE — 2060000000 HC ICU INTERMEDIATE R&B

## 2024-05-10 PROCEDURE — 85027 COMPLETE CBC AUTOMATED: CPT

## 2024-05-10 PROCEDURE — 94761 N-INVAS EAR/PLS OXIMETRY MLT: CPT

## 2024-05-10 PROCEDURE — 97110 THERAPEUTIC EXERCISES: CPT

## 2024-05-10 PROCEDURE — 6370000000 HC RX 637 (ALT 250 FOR IP): Performed by: INTERNAL MEDICINE

## 2024-05-10 PROCEDURE — 94669 MECHANICAL CHEST WALL OSCILL: CPT

## 2024-05-10 PROCEDURE — 97530 THERAPEUTIC ACTIVITIES: CPT

## 2024-05-10 RX ORDER — FUROSEMIDE 20 MG/1
20 TABLET ORAL DAILY
Status: DISCONTINUED | OUTPATIENT
Start: 2024-05-10 | End: 2024-05-16 | Stop reason: HOSPADM

## 2024-05-10 RX ADMIN — Medication 3 MG: at 20:48

## 2024-05-10 RX ADMIN — GUAIFENESIN 600 MG: 600 TABLET ORAL at 20:48

## 2024-05-10 RX ADMIN — HYDROCODONE BITARTRATE AND ACETAMINOPHEN 1 TABLET: 5; 325 TABLET ORAL at 17:50

## 2024-05-10 RX ADMIN — GUAIFENESIN 600 MG: 600 TABLET ORAL at 08:23

## 2024-05-10 RX ADMIN — ATORVASTATIN CALCIUM 80 MG: 80 TABLET, FILM COATED ORAL at 20:48

## 2024-05-10 RX ADMIN — IPRATROPIUM BROMIDE AND ALBUTEROL SULFATE 1 DOSE: .5; 2.5 SOLUTION RESPIRATORY (INHALATION) at 15:38

## 2024-05-10 RX ADMIN — AZITHROMYCIN 250 MG: 250 TABLET, FILM COATED ORAL at 08:22

## 2024-05-10 RX ADMIN — ONDANSETRON 4 MG: 4 TABLET, ORALLY DISINTEGRATING ORAL at 08:23

## 2024-05-10 RX ADMIN — PANTOPRAZOLE SODIUM 40 MG: 40 TABLET, DELAYED RELEASE ORAL at 08:23

## 2024-05-10 RX ADMIN — ACETAMINOPHEN 650 MG: 325 TABLET ORAL at 08:22

## 2024-05-10 RX ADMIN — DIAZEPAM 5 MG: 5 TABLET ORAL at 20:48

## 2024-05-10 RX ADMIN — ASPIRIN 81 MG 81 MG: 81 TABLET ORAL at 08:22

## 2024-05-10 RX ADMIN — FUROSEMIDE 20 MG: 20 TABLET ORAL at 14:15

## 2024-05-10 RX ADMIN — Medication 2 PUFF: at 19:31

## 2024-05-10 RX ADMIN — IPRATROPIUM BROMIDE AND ALBUTEROL SULFATE 1 DOSE: .5; 2.5 SOLUTION RESPIRATORY (INHALATION) at 19:31

## 2024-05-10 RX ADMIN — HYDROCODONE BITARTRATE AND ACETAMINOPHEN 1 TABLET: 5; 325 TABLET ORAL at 11:50

## 2024-05-10 RX ADMIN — POLYETHYLENE GLYCOL 3350 17 G: 17 POWDER, FOR SOLUTION ORAL at 13:35

## 2024-05-10 RX ADMIN — IPRATROPIUM BROMIDE AND ALBUTEROL SULFATE 1 DOSE: .5; 2.5 SOLUTION RESPIRATORY (INHALATION) at 07:36

## 2024-05-10 RX ADMIN — BUSPIRONE HYDROCHLORIDE 10 MG: 5 TABLET ORAL at 20:48

## 2024-05-10 RX ADMIN — ENOXAPARIN SODIUM 40 MG: 100 INJECTION SUBCUTANEOUS at 17:50

## 2024-05-10 RX ADMIN — SODIUM CHLORIDE, PRESERVATIVE FREE 10 ML: 5 INJECTION INTRAVENOUS at 08:23

## 2024-05-10 RX ADMIN — DIAZEPAM 5 MG: 5 TABLET ORAL at 08:29

## 2024-05-10 RX ADMIN — SODIUM CHLORIDE, PRESERVATIVE FREE 10 ML: 5 INJECTION INTRAVENOUS at 20:48

## 2024-05-10 RX ADMIN — BUSPIRONE HYDROCHLORIDE 10 MG: 5 TABLET ORAL at 08:23

## 2024-05-10 RX ADMIN — IPRATROPIUM BROMIDE AND ALBUTEROL SULFATE 1 DOSE: .5; 2.5 SOLUTION RESPIRATORY (INHALATION) at 11:18

## 2024-05-10 RX ADMIN — Medication 2 PUFF: at 07:36

## 2024-05-10 RX ADMIN — HYDROCODONE BITARTRATE AND ACETAMINOPHEN 1 TABLET: 5; 325 TABLET ORAL at 05:35

## 2024-05-10 RX ADMIN — METHOCARBAMOL 500 MG: 500 TABLET ORAL at 15:12

## 2024-05-10 ASSESSMENT — PAIN DESCRIPTION - LOCATION
LOCATION: ABDOMEN;SHOULDER
LOCATION: BACK;SHOULDER

## 2024-05-10 ASSESSMENT — PAIN DESCRIPTION - DESCRIPTORS
DESCRIPTORS: ACHING;DISCOMFORT
DESCRIPTORS: ACHING;DISCOMFORT

## 2024-05-10 ASSESSMENT — PAIN SCALES - GENERAL
PAINLEVEL_OUTOF10: 8
PAINLEVEL_OUTOF10: 8
PAINLEVEL_OUTOF10: 7

## 2024-05-10 ASSESSMENT — PAIN DESCRIPTION - ORIENTATION
ORIENTATION: LEFT
ORIENTATION: LEFT

## 2024-05-10 NOTE — CARE COORDINATION
Plan remains to dc home with [facility] and aid through [facility]. Also active with [facility] and home O2 with [facility]. Patient continues to refuse SNF.   Likely dc 5/11 - needs one more day here per hospitalist.    Following     Christy Chadwick RN

## 2024-05-10 NOTE — PLAN OF CARE
Problem: Discharge Planning  Goal: Discharge to home or other facility with appropriate resources  5/10/2024 0726 by Funmilayo Flood RN  Outcome: Progressing     Problem: Safety - Adult  Goal: Free from fall injury  5/10/2024 0726 by Funmilayo Flood RN  Outcome: Progressing     Problem: Pain  Goal: Verbalizes/displays adequate comfort level or baseline comfort level  5/10/2024 0726 by Funmilayo Flood RN  Outcome: Progressing     Problem: Skin/Tissue Integrity  Goal: Absence of new skin breakdown  Description: 1.  Monitor for areas of redness and/or skin breakdown  2.  Assess vascular access sites hourly  3.  Every 4-6 hours minimum:  Change oxygen saturation probe site  4.  Every 4-6 hours:  If on nasal continuous positive airway pressure, respiratory therapy assess nares and determine need for appliance change or resting period.  5/10/2024 0726 by Funmilayo Flood RN  Outcome: Progressing     Problem: Chronic Conditions and Co-morbidities  Goal: Patient's chronic conditions and co-morbidity symptoms are monitored and maintained or improved  5/10/2024 0726 by Funmilayo Flood RN  Outcome: Progressing

## 2024-05-11 LAB
ANION GAP SERPL CALCULATED.3IONS-SCNC: 9 MMOL/L (ref 3–16)
BUN SERPL-MCNC: 17 MG/DL (ref 7–20)
CALCIUM SERPL-MCNC: 8.2 MG/DL (ref 8.3–10.6)
CHLORIDE SERPL-SCNC: 97 MMOL/L (ref 99–110)
CO2 SERPL-SCNC: 33 MMOL/L (ref 21–32)
CREAT SERPL-MCNC: <0.5 MG/DL (ref 0.6–1.2)
GFR SERPLBLD CREATININE-BSD FMLA CKD-EPI: >90 ML/MIN/{1.73_M2}
GLUCOSE SERPL-MCNC: 109 MG/DL (ref 70–99)
POTASSIUM SERPL-SCNC: 4.2 MMOL/L (ref 3.5–5.1)
SODIUM SERPL-SCNC: 139 MMOL/L (ref 136–145)

## 2024-05-11 PROCEDURE — 6370000000 HC RX 637 (ALT 250 FOR IP)

## 2024-05-11 PROCEDURE — 94761 N-INVAS EAR/PLS OXIMETRY MLT: CPT

## 2024-05-11 PROCEDURE — 36415 COLL VENOUS BLD VENIPUNCTURE: CPT

## 2024-05-11 PROCEDURE — 2060000000 HC ICU INTERMEDIATE R&B

## 2024-05-11 PROCEDURE — 6360000002 HC RX W HCPCS: Performed by: NURSE PRACTITIONER

## 2024-05-11 PROCEDURE — 6370000000 HC RX 637 (ALT 250 FOR IP): Performed by: NURSE PRACTITIONER

## 2024-05-11 PROCEDURE — 6370000000 HC RX 637 (ALT 250 FOR IP): Performed by: STUDENT IN AN ORGANIZED HEALTH CARE EDUCATION/TRAINING PROGRAM

## 2024-05-11 PROCEDURE — 2580000003 HC RX 258: Performed by: STUDENT IN AN ORGANIZED HEALTH CARE EDUCATION/TRAINING PROGRAM

## 2024-05-11 PROCEDURE — 6360000002 HC RX W HCPCS: Performed by: STUDENT IN AN ORGANIZED HEALTH CARE EDUCATION/TRAINING PROGRAM

## 2024-05-11 PROCEDURE — 94640 AIRWAY INHALATION TREATMENT: CPT

## 2024-05-11 PROCEDURE — 93005 ELECTROCARDIOGRAM TRACING: CPT | Performed by: STUDENT IN AN ORGANIZED HEALTH CARE EDUCATION/TRAINING PROGRAM

## 2024-05-11 PROCEDURE — 94669 MECHANICAL CHEST WALL OSCILL: CPT

## 2024-05-11 PROCEDURE — 6370000000 HC RX 637 (ALT 250 FOR IP): Performed by: INTERNAL MEDICINE

## 2024-05-11 PROCEDURE — 2700000000 HC OXYGEN THERAPY PER DAY

## 2024-05-11 PROCEDURE — 80048 BASIC METABOLIC PNL TOTAL CA: CPT

## 2024-05-11 RX ORDER — BUDESONIDE 0.5 MG/2ML
0.5 INHALANT ORAL
Status: DISCONTINUED | OUTPATIENT
Start: 2024-05-11 | End: 2024-05-16 | Stop reason: HOSPADM

## 2024-05-11 RX ORDER — ARFORMOTEROL TARTRATE 15 UG/2ML
15 SOLUTION RESPIRATORY (INHALATION)
Status: DISCONTINUED | OUTPATIENT
Start: 2024-05-11 | End: 2024-05-16 | Stop reason: HOSPADM

## 2024-05-11 RX ORDER — FENTANYL CITRATE 50 UG/ML
25 INJECTION, SOLUTION INTRAMUSCULAR; INTRAVENOUS ONCE
Status: COMPLETED | OUTPATIENT
Start: 2024-05-11 | End: 2024-05-11

## 2024-05-11 RX ADMIN — FUROSEMIDE 20 MG: 20 TABLET ORAL at 09:00

## 2024-05-11 RX ADMIN — HYDROCODONE BITARTRATE AND ACETAMINOPHEN 1 TABLET: 5; 325 TABLET ORAL at 07:56

## 2024-05-11 RX ADMIN — BUSPIRONE HYDROCHLORIDE 10 MG: 5 TABLET ORAL at 20:46

## 2024-05-11 RX ADMIN — DIAZEPAM 5 MG: 5 TABLET ORAL at 21:55

## 2024-05-11 RX ADMIN — IPRATROPIUM BROMIDE AND ALBUTEROL SULFATE 1 DOSE: .5; 2.5 SOLUTION RESPIRATORY (INHALATION) at 19:37

## 2024-05-11 RX ADMIN — METHOCARBAMOL 500 MG: 500 TABLET ORAL at 12:23

## 2024-05-11 RX ADMIN — PANTOPRAZOLE SODIUM 40 MG: 40 TABLET, DELAYED RELEASE ORAL at 07:56

## 2024-05-11 RX ADMIN — ATORVASTATIN CALCIUM 80 MG: 80 TABLET, FILM COATED ORAL at 20:47

## 2024-05-11 RX ADMIN — SODIUM CHLORIDE, PRESERVATIVE FREE 10 ML: 5 INJECTION INTRAVENOUS at 20:47

## 2024-05-11 RX ADMIN — GUAIFENESIN 600 MG: 600 TABLET ORAL at 20:46

## 2024-05-11 RX ADMIN — METHOCARBAMOL 500 MG: 500 TABLET ORAL at 00:33

## 2024-05-11 RX ADMIN — BUDESONIDE 500 MCG: 0.5 SUSPENSION RESPIRATORY (INHALATION) at 19:41

## 2024-05-11 RX ADMIN — IPRATROPIUM BROMIDE AND ALBUTEROL SULFATE 1 DOSE: .5; 2.5 SOLUTION RESPIRATORY (INHALATION) at 07:22

## 2024-05-11 RX ADMIN — HYDROCODONE BITARTRATE AND ACETAMINOPHEN 1 TABLET: 5; 325 TABLET ORAL at 14:44

## 2024-05-11 RX ADMIN — IPRATROPIUM BROMIDE AND ALBUTEROL SULFATE 1 DOSE: .5; 2.5 SOLUTION RESPIRATORY (INHALATION) at 15:19

## 2024-05-11 RX ADMIN — HYDROCODONE BITARTRATE AND ACETAMINOPHEN 1 TABLET: 5; 325 TABLET ORAL at 00:33

## 2024-05-11 RX ADMIN — BUSPIRONE HYDROCHLORIDE 10 MG: 5 TABLET ORAL at 09:00

## 2024-05-11 RX ADMIN — ASPIRIN 81 MG 81 MG: 81 TABLET ORAL at 09:00

## 2024-05-11 RX ADMIN — Medication 2 PUFF: at 07:22

## 2024-05-11 RX ADMIN — IPRATROPIUM BROMIDE AND ALBUTEROL SULFATE 1 DOSE: .5; 2.5 SOLUTION RESPIRATORY (INHALATION) at 12:06

## 2024-05-11 RX ADMIN — DIAZEPAM 5 MG: 5 TABLET ORAL at 09:02

## 2024-05-11 RX ADMIN — METHOCARBAMOL 500 MG: 500 TABLET ORAL at 20:47

## 2024-05-11 RX ADMIN — SODIUM CHLORIDE, PRESERVATIVE FREE 10 ML: 5 INJECTION INTRAVENOUS at 07:57

## 2024-05-11 RX ADMIN — POLYETHYLENE GLYCOL 3350 17 G: 17 POWDER, FOR SOLUTION ORAL at 16:56

## 2024-05-11 RX ADMIN — ARFORMOTEROL TARTRATE 15 MCG: 15 SOLUTION RESPIRATORY (INHALATION) at 19:40

## 2024-05-11 RX ADMIN — Medication 3 MG: at 20:46

## 2024-05-11 RX ADMIN — ENOXAPARIN SODIUM 40 MG: 100 INJECTION SUBCUTANEOUS at 18:13

## 2024-05-11 RX ADMIN — FENTANYL CITRATE 25 MCG: 50 INJECTION INTRAMUSCULAR; INTRAVENOUS at 04:47

## 2024-05-11 RX ADMIN — ONDANSETRON 4 MG: 2 INJECTION INTRAMUSCULAR; INTRAVENOUS at 07:56

## 2024-05-11 RX ADMIN — HYDROCODONE BITARTRATE AND ACETAMINOPHEN 1 TABLET: 5; 325 TABLET ORAL at 20:47

## 2024-05-11 RX ADMIN — GUAIFENESIN 600 MG: 600 TABLET ORAL at 09:00

## 2024-05-11 ASSESSMENT — PAIN DESCRIPTION - DESCRIPTORS
DESCRIPTORS: TIGHTNESS

## 2024-05-11 ASSESSMENT — PAIN SCALES - GENERAL
PAINLEVEL_OUTOF10: 6
PAINLEVEL_OUTOF10: 7
PAINLEVEL_OUTOF10: 8
PAINLEVEL_OUTOF10: 9
PAINLEVEL_OUTOF10: 7
PAINLEVEL_OUTOF10: 7

## 2024-05-11 ASSESSMENT — PAIN DESCRIPTION - LOCATION
LOCATION: CHEST

## 2024-05-11 ASSESSMENT — PAIN DESCRIPTION - ORIENTATION
ORIENTATION: MID
ORIENTATION: MID;LEFT
ORIENTATION: LEFT
ORIENTATION: LEFT

## 2024-05-11 NOTE — PLAN OF CARE
Problem: Discharge Planning  Goal: Discharge to home or other facility with appropriate resources  Outcome: Progressing     Problem: Pain  Goal: Verbalizes/displays adequate comfort level or baseline comfort level  Outcome: Progressing     Problem: Chronic Conditions and Co-morbidities  Goal: Patient's chronic conditions and co-morbidity symptoms are monitored and maintained or improved  Outcome: Progressing     CHF Care Plan      Patient's EF (Ejection Fraction) is greater than 40%    Heart Failure Medications:  Diuretics:: Furosemide    (One of the following REQUIRED for EF </= 40%/SYSTOLIC FAILURE but MAY be used in EF% >40%/DIASTOLIC FAILURE)        ACE:: None        ARB:: None         ARNI:: None    (Beta Blockers)  NON- Evidenced Based Beta Blocker (for EF% >40%/DIASTOLIC FAILURE): None    Evidenced Based Beta Blocker::(REQUIRED for EF% <40%/SYSTOLIC FAILURE) None  ...................................................................................................................................................    Failed to redirect to the Timeline version of the [employer] SmartLink.      Patient's weights and intake/output reviewed    Daily Weight log at bedside, patient/family participation in use of log: \"yes    Patient's current weight today shows a difference of 1 lbs more than last documented weight.      Intake/Output Summary (Last 24 hours) at 5/11/2024 0141  Last data filed at 5/10/2024 1810  Gross per 24 hour   Intake 480 ml   Output 1400 ml   Net -920 ml       Education Booklet Provided: yes    Comorbidities Reviewed Yes    Patient has a past medical history of Anxiety, Asthma, Cancer (HCC), Cancer of lung (HCC), CHF (congestive heart failure) (HCC), COPD (chronic obstructive pulmonary disease) (HCC), Cyclic vomiting syndrome, Cysts of both kidneys, Depression, Fatty liver, Gastritis, MI (myocardial infarction) (HCC), Panic attacks, Pneumonia, Pre-diabetes, and Tricuspid regurgitation.     >>For CHF

## 2024-05-11 NOTE — PLAN OF CARE
Problem: Safety - Adult  Goal: Free from fall injury  Outcome: Progressing     Problem: Pain  Goal: Verbalizes/displays adequate comfort level or baseline comfort level  5/11/2024 1449 by Yvon Granda RN  Outcome: Progressing     Problem: Skin/Tissue Integrity  Goal: Absence of new skin breakdown  Description: 1.  Monitor for areas of redness and/or skin breakdown  2.  Assess vascular access sites hourly  3.  Every 4-6 hours minimum:  Change oxygen saturation probe site  4.  Every 4-6 hours:  If on nasal continuous positive airway pressure, respiratory therapy assess nares and determine need for appliance change or resting period.  Outcome: Progressing

## 2024-05-12 LAB
ANION GAP SERPL CALCULATED.3IONS-SCNC: 9 MMOL/L (ref 3–16)
BUN SERPL-MCNC: 17 MG/DL (ref 7–20)
CALCIUM SERPL-MCNC: 8.5 MG/DL (ref 8.3–10.6)
CHLORIDE SERPL-SCNC: 98 MMOL/L (ref 99–110)
CO2 SERPL-SCNC: 32 MMOL/L (ref 21–32)
CREAT SERPL-MCNC: <0.5 MG/DL (ref 0.6–1.2)
GFR SERPLBLD CREATININE-BSD FMLA CKD-EPI: >90 ML/MIN/{1.73_M2}
GLUCOSE SERPL-MCNC: 112 MG/DL (ref 70–99)
NT-PROBNP SERPL-MCNC: 340 PG/ML (ref 0–124)
POTASSIUM SERPL-SCNC: 4 MMOL/L (ref 3.5–5.1)
SODIUM SERPL-SCNC: 139 MMOL/L (ref 136–145)

## 2024-05-12 PROCEDURE — 36415 COLL VENOUS BLD VENIPUNCTURE: CPT

## 2024-05-12 PROCEDURE — 2700000000 HC OXYGEN THERAPY PER DAY

## 2024-05-12 PROCEDURE — 2580000003 HC RX 258: Performed by: STUDENT IN AN ORGANIZED HEALTH CARE EDUCATION/TRAINING PROGRAM

## 2024-05-12 PROCEDURE — 94761 N-INVAS EAR/PLS OXIMETRY MLT: CPT

## 2024-05-12 PROCEDURE — 94669 MECHANICAL CHEST WALL OSCILL: CPT

## 2024-05-12 PROCEDURE — 6370000000 HC RX 637 (ALT 250 FOR IP): Performed by: STUDENT IN AN ORGANIZED HEALTH CARE EDUCATION/TRAINING PROGRAM

## 2024-05-12 PROCEDURE — 83880 ASSAY OF NATRIURETIC PEPTIDE: CPT

## 2024-05-12 PROCEDURE — 6370000000 HC RX 637 (ALT 250 FOR IP): Performed by: NURSE PRACTITIONER

## 2024-05-12 PROCEDURE — 6360000002 HC RX W HCPCS: Performed by: STUDENT IN AN ORGANIZED HEALTH CARE EDUCATION/TRAINING PROGRAM

## 2024-05-12 PROCEDURE — 80048 BASIC METABOLIC PNL TOTAL CA: CPT

## 2024-05-12 PROCEDURE — 6370000000 HC RX 637 (ALT 250 FOR IP)

## 2024-05-12 PROCEDURE — 2060000000 HC ICU INTERMEDIATE R&B

## 2024-05-12 PROCEDURE — 6370000000 HC RX 637 (ALT 250 FOR IP): Performed by: INTERNAL MEDICINE

## 2024-05-12 PROCEDURE — 94640 AIRWAY INHALATION TREATMENT: CPT

## 2024-05-12 RX ORDER — POLYETHYLENE GLYCOL 3350 17 G/17G
17 POWDER, FOR SOLUTION ORAL 2 TIMES DAILY
Status: DISCONTINUED | OUTPATIENT
Start: 2024-05-12 | End: 2024-05-16 | Stop reason: HOSPADM

## 2024-05-12 RX ORDER — BISACODYL 10 MG
10 SUPPOSITORY, RECTAL RECTAL ONCE
Status: COMPLETED | OUTPATIENT
Start: 2024-05-12 | End: 2024-05-12

## 2024-05-12 RX ADMIN — HYDROCODONE BITARTRATE AND ACETAMINOPHEN 1 TABLET: 5; 325 TABLET ORAL at 19:56

## 2024-05-12 RX ADMIN — SODIUM CHLORIDE, PRESERVATIVE FREE 10 ML: 5 INJECTION INTRAVENOUS at 19:56

## 2024-05-12 RX ADMIN — IPRATROPIUM BROMIDE AND ALBUTEROL SULFATE 1 DOSE: .5; 2.5 SOLUTION RESPIRATORY (INHALATION) at 19:37

## 2024-05-12 RX ADMIN — BUDESONIDE 500 MCG: 0.5 SUSPENSION RESPIRATORY (INHALATION) at 19:37

## 2024-05-12 RX ADMIN — IPRATROPIUM BROMIDE AND ALBUTEROL SULFATE 1 DOSE: .5; 2.5 SOLUTION RESPIRATORY (INHALATION) at 07:26

## 2024-05-12 RX ADMIN — DIAZEPAM 5 MG: 5 TABLET ORAL at 21:11

## 2024-05-12 RX ADMIN — BUDESONIDE 500 MCG: 0.5 SUSPENSION RESPIRATORY (INHALATION) at 07:26

## 2024-05-12 RX ADMIN — IPRATROPIUM BROMIDE AND ALBUTEROL SULFATE 1 DOSE: .5; 2.5 SOLUTION RESPIRATORY (INHALATION) at 15:34

## 2024-05-12 RX ADMIN — BISACODYL 10 MG: 10 SUPPOSITORY RECTAL at 10:29

## 2024-05-12 RX ADMIN — ARFORMOTEROL TARTRATE 15 MCG: 15 SOLUTION RESPIRATORY (INHALATION) at 07:27

## 2024-05-12 RX ADMIN — HYDROCODONE BITARTRATE AND ACETAMINOPHEN 1 TABLET: 5; 325 TABLET ORAL at 13:55

## 2024-05-12 RX ADMIN — BUSPIRONE HYDROCHLORIDE 10 MG: 5 TABLET ORAL at 09:02

## 2024-05-12 RX ADMIN — METHOCARBAMOL 500 MG: 500 TABLET ORAL at 09:06

## 2024-05-12 RX ADMIN — GUAIFENESIN 600 MG: 600 TABLET ORAL at 09:02

## 2024-05-12 RX ADMIN — GUAIFENESIN 600 MG: 600 TABLET ORAL at 19:56

## 2024-05-12 RX ADMIN — ARFORMOTEROL TARTRATE 15 MCG: 15 SOLUTION RESPIRATORY (INHALATION) at 19:37

## 2024-05-12 RX ADMIN — FUROSEMIDE 20 MG: 20 TABLET ORAL at 09:02

## 2024-05-12 RX ADMIN — ATORVASTATIN CALCIUM 80 MG: 80 TABLET, FILM COATED ORAL at 19:56

## 2024-05-12 RX ADMIN — HYDROCODONE BITARTRATE AND ACETAMINOPHEN 1 TABLET: 5; 325 TABLET ORAL at 07:53

## 2024-05-12 RX ADMIN — Medication 3 MG: at 20:04

## 2024-05-12 RX ADMIN — SODIUM CHLORIDE, PRESERVATIVE FREE 10 ML: 5 INJECTION INTRAVENOUS at 09:07

## 2024-05-12 RX ADMIN — ENOXAPARIN SODIUM 40 MG: 100 INJECTION SUBCUTANEOUS at 17:10

## 2024-05-12 RX ADMIN — POLYETHYLENE GLYCOL 3350 17 G: 17 POWDER, FOR SOLUTION ORAL at 19:56

## 2024-05-12 RX ADMIN — DIAZEPAM 5 MG: 5 TABLET ORAL at 09:02

## 2024-05-12 RX ADMIN — POLYETHYLENE GLYCOL 3350 17 G: 17 POWDER, FOR SOLUTION ORAL at 07:53

## 2024-05-12 RX ADMIN — BUSPIRONE HYDROCHLORIDE 10 MG: 5 TABLET ORAL at 19:56

## 2024-05-12 RX ADMIN — PANTOPRAZOLE SODIUM 40 MG: 40 TABLET, DELAYED RELEASE ORAL at 09:02

## 2024-05-12 RX ADMIN — ASPIRIN 81 MG 81 MG: 81 TABLET ORAL at 09:02

## 2024-05-12 RX ADMIN — METHOCARBAMOL 500 MG: 500 TABLET ORAL at 17:09

## 2024-05-12 ASSESSMENT — PAIN DESCRIPTION - LOCATION
LOCATION: BACK
LOCATION: ABDOMEN
LOCATION: BACK;CHEST
LOCATION: BACK

## 2024-05-12 ASSESSMENT — PAIN DESCRIPTION - DESCRIPTORS
DESCRIPTORS: THROBBING
DESCRIPTORS: CRAMPING
DESCRIPTORS: THROBBING
DESCRIPTORS: THROBBING;STABBING
DESCRIPTORS: THROBBING;SQUEEZING

## 2024-05-12 ASSESSMENT — PAIN SCALES - GENERAL
PAINLEVEL_OUTOF10: 0
PAINLEVEL_OUTOF10: 0
PAINLEVEL_OUTOF10: 8

## 2024-05-12 ASSESSMENT — PAIN DESCRIPTION - ORIENTATION
ORIENTATION: MID;LOWER
ORIENTATION: LOWER
ORIENTATION: LOWER;MID
ORIENTATION: LOWER;MID
ORIENTATION: MID

## 2024-05-12 NOTE — PLAN OF CARE
Problem: Chronic Conditions and Co-morbidities  Goal: Patient's chronic conditions and co-morbidity symptoms are monitored and maintained or improved  Outcome: Progressing     Problem: Safety - Adult  Goal: Free from fall injury  Outcome: Progressing     CHF Care Plan      Patient's EF (Ejection Fraction) is greater than 40%    Heart Failure Medications:  Diuretics:: Furosemide    (One of the following REQUIRED for EF </= 40%/SYSTOLIC FAILURE but MAY be used in EF% >40%/DIASTOLIC FAILURE)        ACE:: None        ARB:: None         ARNI:: None    (Beta Blockers)  NON- Evidenced Based Beta Blocker (for EF% >40%/DIASTOLIC FAILURE): None    Evidenced Based Beta Blocker::(REQUIRED for EF% <40%/SYSTOLIC FAILURE) None  ...................................................................................................................................................    Failed to redirect to the Timeline version of the [employer] SmartLink.      Patient's weights and intake/output reviewed    Daily Weight log at bedside, patient/family participation in use of log: \"yes    Patient's current weight today shows a difference of 1 lbs more than last documented weight.      Intake/Output Summary (Last 24 hours) at 5/12/2024 0459  Last data filed at 5/11/2024 1828  Gross per 24 hour   Intake 480 ml   Output 400 ml   Net 80 ml       Education Booklet Provided: yes    Comorbidities Reviewed Yes    Patient has a past medical history of Anxiety, Asthma, Cancer (HCC), Cancer of lung (HCC), CHF (congestive heart failure) (HCC), COPD (chronic obstructive pulmonary disease) (HCC), Cyclic vomiting syndrome, Cysts of both kidneys, Depression, Fatty liver, Gastritis, MI (myocardial infarction) (HCC), Panic attacks, Pneumonia, Pre-diabetes, and Tricuspid regurgitation.     >>For CHF and Comorbidity documentation on Education Time and Topics, please see Education Tab      Pt resting in bed at this time on  3 L O2. Pt with complaints of shortness

## 2024-05-12 NOTE — PLAN OF CARE
Problem: Safety - Adult  Goal: Free from fall injury  5/12/2024 1047 by Yvon Granda, RN  Outcome: Progressing     Problem: Discharge Planning  Goal: Discharge to home or other facility with appropriate resources  Outcome: Progressing     Problem: Pain  Goal: Verbalizes/displays adequate comfort level or baseline comfort level  Outcome: Progressing     Problem: Skin/Tissue Integrity  Goal: Absence of new skin breakdown  Description: 1.  Monitor for areas of redness and/or skin breakdown  2.  Assess vascular access sites hourly  3.  Every 4-6 hours minimum:  Change oxygen saturation probe site  4.  Every 4-6 hours:  If on nasal continuous positive airway pressure, respiratory therapy assess nares and determine need for appliance change or resting period.  Outcome: Progressing     Problem: Chronic Conditions and Co-morbidities  Goal: Patient's chronic conditions and co-morbidity symptoms are monitored and maintained or improved  5/12/2024 1047 by Yvon Granda, RN  Outcome: Progressing

## 2024-05-13 LAB
ANION GAP SERPL CALCULATED.3IONS-SCNC: 6 MMOL/L (ref 3–16)
BASE EXCESS BLDA CALC-SCNC: 3.5 MMOL/L (ref -3–3)
BUN SERPL-MCNC: 18 MG/DL (ref 7–20)
CALCIUM SERPL-MCNC: 8.7 MG/DL (ref 8.3–10.6)
CHLORIDE SERPL-SCNC: 100 MMOL/L (ref 99–110)
CO2 BLDA-SCNC: 29.3 MMOL/L
CO2 SERPL-SCNC: 30 MMOL/L (ref 21–32)
COHGB MFR BLDA: 1.1 % (ref 0–1.5)
CREAT SERPL-MCNC: <0.5 MG/DL (ref 0.6–1.2)
DEPRECATED RDW RBC AUTO: 16 % (ref 12.4–15.4)
EKG ATRIAL RATE: 70 BPM
EKG DIAGNOSIS: NORMAL
EKG P AXIS: 45 DEGREES
EKG P-R INTERVAL: 118 MS
EKG Q-T INTERVAL: 422 MS
EKG QRS DURATION: 80 MS
EKG QTC CALCULATION (BAZETT): 455 MS
EKG R AXIS: 67 DEGREES
EKG T AXIS: 68 DEGREES
EKG VENTRICULAR RATE: 70 BPM
GFR SERPLBLD CREATININE-BSD FMLA CKD-EPI: >90 ML/MIN/{1.73_M2}
GLUCOSE SERPL-MCNC: 96 MG/DL (ref 70–99)
HCO3 BLDA-SCNC: 28 MMOL/L (ref 21–29)
HCT VFR BLD AUTO: 32.8 % (ref 36–48)
HGB BLD-MCNC: 10.5 G/DL (ref 12–16)
HGB BLDA-MCNC: 11.8 G/DL (ref 12–16)
MCH RBC QN AUTO: 30.1 PG (ref 26–34)
MCHC RBC AUTO-ENTMCNC: 32 G/DL (ref 31–36)
MCV RBC AUTO: 94 FL (ref 80–100)
METHGB MFR BLDA: 0.3 %
O2 THERAPY: ABNORMAL
PCO2 BLDA: 42.5 MMHG (ref 35–45)
PH BLDA: 7.44 [PH] (ref 7.35–7.45)
PLATELET # BLD AUTO: 219 K/UL (ref 135–450)
PMV BLD AUTO: 9.6 FL (ref 5–10.5)
PO2 BLDA: 80.6 MMHG (ref 75–108)
POTASSIUM SERPL-SCNC: 4.6 MMOL/L (ref 3.5–5.1)
RBC # BLD AUTO: 3.49 M/UL (ref 4–5.2)
SAO2 % BLDA: 96.8 %
SODIUM SERPL-SCNC: 136 MMOL/L (ref 136–145)
WBC # BLD AUTO: 7.3 K/UL (ref 4–11)

## 2024-05-13 PROCEDURE — 94640 AIRWAY INHALATION TREATMENT: CPT

## 2024-05-13 PROCEDURE — 2060000000 HC ICU INTERMEDIATE R&B

## 2024-05-13 PROCEDURE — 94669 MECHANICAL CHEST WALL OSCILL: CPT

## 2024-05-13 PROCEDURE — 93010 ELECTROCARDIOGRAM REPORT: CPT | Performed by: INTERNAL MEDICINE

## 2024-05-13 PROCEDURE — 85027 COMPLETE CBC AUTOMATED: CPT

## 2024-05-13 PROCEDURE — 94761 N-INVAS EAR/PLS OXIMETRY MLT: CPT

## 2024-05-13 PROCEDURE — 6370000000 HC RX 637 (ALT 250 FOR IP): Performed by: INTERNAL MEDICINE

## 2024-05-13 PROCEDURE — 2700000000 HC OXYGEN THERAPY PER DAY

## 2024-05-13 PROCEDURE — 97530 THERAPEUTIC ACTIVITIES: CPT

## 2024-05-13 PROCEDURE — 82803 BLOOD GASES ANY COMBINATION: CPT

## 2024-05-13 PROCEDURE — 80048 BASIC METABOLIC PNL TOTAL CA: CPT

## 2024-05-13 PROCEDURE — 6360000002 HC RX W HCPCS: Performed by: STUDENT IN AN ORGANIZED HEALTH CARE EDUCATION/TRAINING PROGRAM

## 2024-05-13 PROCEDURE — 97116 GAIT TRAINING THERAPY: CPT

## 2024-05-13 PROCEDURE — 2580000003 HC RX 258: Performed by: STUDENT IN AN ORGANIZED HEALTH CARE EDUCATION/TRAINING PROGRAM

## 2024-05-13 PROCEDURE — 97110 THERAPEUTIC EXERCISES: CPT

## 2024-05-13 PROCEDURE — 6370000000 HC RX 637 (ALT 250 FOR IP): Performed by: NURSE PRACTITIONER

## 2024-05-13 PROCEDURE — 6370000000 HC RX 637 (ALT 250 FOR IP): Performed by: STUDENT IN AN ORGANIZED HEALTH CARE EDUCATION/TRAINING PROGRAM

## 2024-05-13 PROCEDURE — 97535 SELF CARE MNGMENT TRAINING: CPT

## 2024-05-13 PROCEDURE — 37799 UNLISTED PX VASCULAR SURGERY: CPT

## 2024-05-13 PROCEDURE — 99233 SBSQ HOSP IP/OBS HIGH 50: CPT | Performed by: INTERNAL MEDICINE

## 2024-05-13 PROCEDURE — 6370000000 HC RX 637 (ALT 250 FOR IP)

## 2024-05-13 RX ORDER — IPRATROPIUM BROMIDE AND ALBUTEROL SULFATE 2.5; .5 MG/3ML; MG/3ML
1 SOLUTION RESPIRATORY (INHALATION)
Status: DISCONTINUED | OUTPATIENT
Start: 2024-05-14 | End: 2024-05-16 | Stop reason: HOSPADM

## 2024-05-13 RX ORDER — ROFLUMILAST 500 UG/1
250 TABLET ORAL DAILY
Status: DISCONTINUED | OUTPATIENT
Start: 2024-05-13 | End: 2024-05-16 | Stop reason: HOSPADM

## 2024-05-13 RX ADMIN — Medication 3 MG: at 20:24

## 2024-05-13 RX ADMIN — METHOCARBAMOL 500 MG: 500 TABLET ORAL at 09:27

## 2024-05-13 RX ADMIN — HYDROCODONE BITARTRATE AND ACETAMINOPHEN 1 TABLET: 5; 325 TABLET ORAL at 05:26

## 2024-05-13 RX ADMIN — GUAIFENESIN 600 MG: 600 TABLET ORAL at 09:27

## 2024-05-13 RX ADMIN — PANTOPRAZOLE SODIUM 40 MG: 40 TABLET, DELAYED RELEASE ORAL at 09:27

## 2024-05-13 RX ADMIN — IPRATROPIUM BROMIDE AND ALBUTEROL SULFATE 1 DOSE: .5; 2.5 SOLUTION RESPIRATORY (INHALATION) at 19:21

## 2024-05-13 RX ADMIN — ATORVASTATIN CALCIUM 80 MG: 80 TABLET, FILM COATED ORAL at 20:16

## 2024-05-13 RX ADMIN — IPRATROPIUM BROMIDE AND ALBUTEROL SULFATE 1 DOSE: .5; 2.5 SOLUTION RESPIRATORY (INHALATION) at 07:15

## 2024-05-13 RX ADMIN — ASPIRIN 81 MG 81 MG: 81 TABLET ORAL at 09:27

## 2024-05-13 RX ADMIN — BUDESONIDE 500 MCG: 0.5 SUSPENSION RESPIRATORY (INHALATION) at 07:15

## 2024-05-13 RX ADMIN — HYDROCODONE BITARTRATE AND ACETAMINOPHEN 1 TABLET: 5; 325 TABLET ORAL at 18:17

## 2024-05-13 RX ADMIN — ARFORMOTEROL TARTRATE 15 MCG: 15 SOLUTION RESPIRATORY (INHALATION) at 07:15

## 2024-05-13 RX ADMIN — DIAZEPAM 5 MG: 5 TABLET ORAL at 21:17

## 2024-05-13 RX ADMIN — HYDROCODONE BITARTRATE AND ACETAMINOPHEN 1 TABLET: 5; 325 TABLET ORAL at 12:16

## 2024-05-13 RX ADMIN — BUSPIRONE HYDROCHLORIDE 10 MG: 5 TABLET ORAL at 20:16

## 2024-05-13 RX ADMIN — ONDANSETRON 4 MG: 4 TABLET, ORALLY DISINTEGRATING ORAL at 20:24

## 2024-05-13 RX ADMIN — SODIUM CHLORIDE, PRESERVATIVE FREE 10 ML: 5 INJECTION INTRAVENOUS at 20:18

## 2024-05-13 RX ADMIN — FUROSEMIDE 20 MG: 20 TABLET ORAL at 09:27

## 2024-05-13 RX ADMIN — BUSPIRONE HYDROCHLORIDE 10 MG: 5 TABLET ORAL at 09:27

## 2024-05-13 RX ADMIN — ONDANSETRON 4 MG: 4 TABLET, ORALLY DISINTEGRATING ORAL at 12:16

## 2024-05-13 RX ADMIN — IPRATROPIUM BROMIDE AND ALBUTEROL SULFATE 1 DOSE: .5; 2.5 SOLUTION RESPIRATORY (INHALATION) at 12:26

## 2024-05-13 RX ADMIN — ROFLUMILAST 250 MCG: 500 TABLET ORAL at 13:06

## 2024-05-13 RX ADMIN — ENOXAPARIN SODIUM 40 MG: 100 INJECTION SUBCUTANEOUS at 18:17

## 2024-05-13 RX ADMIN — ARFORMOTEROL TARTRATE 15 MCG: 15 SOLUTION RESPIRATORY (INHALATION) at 19:58

## 2024-05-13 RX ADMIN — BUDESONIDE 500 MCG: 0.5 SUSPENSION RESPIRATORY (INHALATION) at 19:53

## 2024-05-13 RX ADMIN — SODIUM CHLORIDE, PRESERVATIVE FREE 10 ML: 5 INJECTION INTRAVENOUS at 09:29

## 2024-05-13 RX ADMIN — POLYETHYLENE GLYCOL 3350 17 G: 17 POWDER, FOR SOLUTION ORAL at 09:27

## 2024-05-13 RX ADMIN — IPRATROPIUM BROMIDE AND ALBUTEROL SULFATE 1 DOSE: .5; 2.5 SOLUTION RESPIRATORY (INHALATION) at 15:57

## 2024-05-13 ASSESSMENT — PAIN DESCRIPTION - LOCATION
LOCATION: BACK;SHOULDER
LOCATION: BACK;SHOULDER
LOCATION: SHOULDER
LOCATION: SHOULDER

## 2024-05-13 ASSESSMENT — PAIN DESCRIPTION - ORIENTATION
ORIENTATION: LEFT
ORIENTATION: LEFT
ORIENTATION: RIGHT;LEFT;MID
ORIENTATION: RIGHT;LEFT;MID

## 2024-05-13 ASSESSMENT — PAIN DESCRIPTION - DESCRIPTORS
DESCRIPTORS: ACHING;DISCOMFORT
DESCRIPTORS: ACHING;DISCOMFORT;SHARP
DESCRIPTORS: ACHING;DISCOMFORT
DESCRIPTORS: ACHING;DISCOMFORT

## 2024-05-13 ASSESSMENT — PAIN SCALES - GENERAL
PAINLEVEL_OUTOF10: 8
PAINLEVEL_OUTOF10: 8
PAINLEVEL_OUTOF10: 7
PAINLEVEL_OUTOF10: 8
PAINLEVEL_OUTOF10: 7

## 2024-05-13 NOTE — PLAN OF CARE
Problem: Discharge Planning  Goal: Discharge to home or other facility with appropriate resources  5/13/2024 1024 by Funmilayo Flood RN  Outcome: Progressing     Problem: Safety - Adult  Goal: Free from fall injury  5/13/2024 1024 by Funmliayo Flood RN  Outcome: Progressing     Problem: Pain  Goal: Verbalizes/displays adequate comfort level or baseline comfort level  Outcome: Progressing     Problem: Skin/Tissue Integrity  Goal: Absence of new skin breakdown  Description: 1.  Monitor for areas of redness and/or skin breakdown  2.  Assess vascular access sites hourly  3.  Every 4-6 hours minimum:  Change oxygen saturation probe site  4.  Every 4-6 hours:  If on nasal continuous positive airway pressure, respiratory therapy assess nares and determine need for appliance change or resting period.  Outcome: Progressing     Problem: Chronic Conditions and Co-morbidities  Goal: Patient's chronic conditions and co-morbidity symptoms are monitored and maintained or improved  Outcome: Progressing

## 2024-05-13 NOTE — CARE COORDINATION
CM update note - PD # 9  Followed by Hospital Medicine and Pulmonary.   Plans to return home with [facility] aide.  Nocturnal sleep study ordered to be done tonight,    Following.      Christy Chadwick RN

## 2024-05-14 ENCOUNTER — TELEPHONE (OUTPATIENT)
Dept: PULMONOLOGY | Age: 64
End: 2024-05-14

## 2024-05-14 PROCEDURE — 2060000000 HC ICU INTERMEDIATE R&B

## 2024-05-14 PROCEDURE — 97110 THERAPEUTIC EXERCISES: CPT

## 2024-05-14 PROCEDURE — 6370000000 HC RX 637 (ALT 250 FOR IP): Performed by: STUDENT IN AN ORGANIZED HEALTH CARE EDUCATION/TRAINING PROGRAM

## 2024-05-14 PROCEDURE — 94640 AIRWAY INHALATION TREATMENT: CPT

## 2024-05-14 PROCEDURE — 6370000000 HC RX 637 (ALT 250 FOR IP): Performed by: INTERNAL MEDICINE

## 2024-05-14 PROCEDURE — 6360000002 HC RX W HCPCS: Performed by: STUDENT IN AN ORGANIZED HEALTH CARE EDUCATION/TRAINING PROGRAM

## 2024-05-14 PROCEDURE — 2700000000 HC OXYGEN THERAPY PER DAY

## 2024-05-14 PROCEDURE — 6370000000 HC RX 637 (ALT 250 FOR IP): Performed by: NURSE PRACTITIONER

## 2024-05-14 PROCEDURE — 6370000000 HC RX 637 (ALT 250 FOR IP)

## 2024-05-14 PROCEDURE — 94761 N-INVAS EAR/PLS OXIMETRY MLT: CPT

## 2024-05-14 PROCEDURE — 94669 MECHANICAL CHEST WALL OSCILL: CPT

## 2024-05-14 PROCEDURE — 2580000003 HC RX 258: Performed by: STUDENT IN AN ORGANIZED HEALTH CARE EDUCATION/TRAINING PROGRAM

## 2024-05-14 PROCEDURE — 97535 SELF CARE MNGMENT TRAINING: CPT

## 2024-05-14 PROCEDURE — 99233 SBSQ HOSP IP/OBS HIGH 50: CPT | Performed by: INTERNAL MEDICINE

## 2024-05-14 RX ORDER — ROFLUMILAST 250 UG/1
250 TABLET ORAL DAILY
Qty: 30 TABLET | Refills: 1 | Status: SHIPPED | OUTPATIENT
Start: 2024-05-15

## 2024-05-14 RX ADMIN — POLYETHYLENE GLYCOL 3350 17 G: 17 POWDER, FOR SOLUTION ORAL at 09:00

## 2024-05-14 RX ADMIN — ENOXAPARIN SODIUM 40 MG: 100 INJECTION SUBCUTANEOUS at 18:00

## 2024-05-14 RX ADMIN — DIAZEPAM 5 MG: 5 TABLET ORAL at 22:32

## 2024-05-14 RX ADMIN — PANTOPRAZOLE SODIUM 40 MG: 40 TABLET, DELAYED RELEASE ORAL at 09:00

## 2024-05-14 RX ADMIN — DIAZEPAM 5 MG: 5 TABLET ORAL at 10:22

## 2024-05-14 RX ADMIN — BUSPIRONE HYDROCHLORIDE 10 MG: 5 TABLET ORAL at 09:00

## 2024-05-14 RX ADMIN — ROFLUMILAST 250 MCG: 500 TABLET ORAL at 09:00

## 2024-05-14 RX ADMIN — BUDESONIDE 500 MCG: 0.5 SUSPENSION RESPIRATORY (INHALATION) at 19:34

## 2024-05-14 RX ADMIN — HYDROCODONE BITARTRATE AND ACETAMINOPHEN 1 TABLET: 5; 325 TABLET ORAL at 05:44

## 2024-05-14 RX ADMIN — GUAIFENESIN 600 MG: 600 TABLET ORAL at 09:00

## 2024-05-14 RX ADMIN — ASPIRIN 81 MG 81 MG: 81 TABLET ORAL at 09:00

## 2024-05-14 RX ADMIN — METOCLOPRAMIDE 10 MG: 10 TABLET ORAL at 22:33

## 2024-05-14 RX ADMIN — ONDANSETRON 4 MG: 4 TABLET, ORALLY DISINTEGRATING ORAL at 15:33

## 2024-05-14 RX ADMIN — HYDROCODONE BITARTRATE AND ACETAMINOPHEN 1 TABLET: 5; 325 TABLET ORAL at 11:49

## 2024-05-14 RX ADMIN — BUDESONIDE 500 MCG: 0.5 SUSPENSION RESPIRATORY (INHALATION) at 07:24

## 2024-05-14 RX ADMIN — SODIUM CHLORIDE, PRESERVATIVE FREE 10 ML: 5 INJECTION INTRAVENOUS at 19:25

## 2024-05-14 RX ADMIN — GUAIFENESIN 600 MG: 600 TABLET ORAL at 19:25

## 2024-05-14 RX ADMIN — FUROSEMIDE 20 MG: 20 TABLET ORAL at 09:00

## 2024-05-14 RX ADMIN — METHOCARBAMOL 500 MG: 500 TABLET ORAL at 09:00

## 2024-05-14 RX ADMIN — Medication 3 MG: at 19:25

## 2024-05-14 RX ADMIN — ARFORMOTEROL TARTRATE 15 MCG: 15 SOLUTION RESPIRATORY (INHALATION) at 07:24

## 2024-05-14 RX ADMIN — HYDROCODONE BITARTRATE AND ACETAMINOPHEN 1 TABLET: 5; 325 TABLET ORAL at 17:59

## 2024-05-14 RX ADMIN — IPRATROPIUM BROMIDE AND ALBUTEROL SULFATE 1 DOSE: .5; 2.5 SOLUTION RESPIRATORY (INHALATION) at 19:34

## 2024-05-14 RX ADMIN — ATORVASTATIN CALCIUM 80 MG: 80 TABLET, FILM COATED ORAL at 19:25

## 2024-05-14 RX ADMIN — ARFORMOTEROL TARTRATE 15 MCG: 15 SOLUTION RESPIRATORY (INHALATION) at 19:34

## 2024-05-14 RX ADMIN — BUSPIRONE HYDROCHLORIDE 10 MG: 5 TABLET ORAL at 19:25

## 2024-05-14 RX ADMIN — METHOCARBAMOL 500 MG: 500 TABLET ORAL at 22:32

## 2024-05-14 RX ADMIN — SODIUM CHLORIDE, PRESERVATIVE FREE 10 ML: 5 INJECTION INTRAVENOUS at 09:03

## 2024-05-14 RX ADMIN — METOCLOPRAMIDE 10 MG: 10 TABLET ORAL at 06:04

## 2024-05-14 RX ADMIN — IPRATROPIUM BROMIDE AND ALBUTEROL SULFATE 1 DOSE: .5; 2.5 SOLUTION RESPIRATORY (INHALATION) at 07:24

## 2024-05-14 RX ADMIN — ONDANSETRON 4 MG: 4 TABLET, ORALLY DISINTEGRATING ORAL at 04:51

## 2024-05-14 ASSESSMENT — PAIN DESCRIPTION - LOCATION
LOCATION: SHOULDER;BACK
LOCATION: BACK;SHOULDER
LOCATION: BACK
LOCATION: BACK;SHOULDER

## 2024-05-14 ASSESSMENT — PAIN - FUNCTIONAL ASSESSMENT
PAIN_FUNCTIONAL_ASSESSMENT: ACTIVITIES ARE NOT PREVENTED

## 2024-05-14 ASSESSMENT — PAIN DESCRIPTION - DESCRIPTORS
DESCRIPTORS: ACHING;DISCOMFORT
DESCRIPTORS: ACHING

## 2024-05-14 ASSESSMENT — PAIN SCALES - GENERAL
PAINLEVEL_OUTOF10: 7
PAINLEVEL_OUTOF10: 8
PAINLEVEL_OUTOF10: 8
PAINLEVEL_OUTOF10: 4
PAINLEVEL_OUTOF10: 7
PAINLEVEL_OUTOF10: 0

## 2024-05-14 ASSESSMENT — PAIN DESCRIPTION - ORIENTATION
ORIENTATION: RIGHT;LEFT;MID;POSTERIOR
ORIENTATION: MID
ORIENTATION: UPPER
ORIENTATION: UPPER

## 2024-05-14 ASSESSMENT — PAIN SCALES - WONG BAKER: WONGBAKER_NUMERICALRESPONSE: NO HURT

## 2024-05-14 NOTE — PLAN OF CARE
Problem: Discharge Planning  Goal: Discharge to home or other facility with appropriate resources  5/14/2024 0755 by Funmilayo Flood RN  Outcome: Progressing     Problem: Safety - Adult  Goal: Free from fall injury  5/14/2024 0755 by Funmilayo Flood RN  Outcome: Progressing     Problem: Pain  Goal: Verbalizes/displays adequate comfort level or baseline comfort level  5/14/2024 0755 by Funmilayo Flood RN  Outcome: Progressing     Problem: Chronic Conditions and Co-morbidities  Goal: Patient's chronic conditions and co-morbidity symptoms are monitored and maintained or improved  5/14/2024 0755 by Funmilayo Flood RN  Outcome: Progressing

## 2024-05-14 NOTE — CARE COORDINATION
PT/OT recommending SNF, Alethea MANCILLA updated writer that pt feels weaker today and will need transport home.  Writer met with pt, explained difference between Nursing Home and SNF, how this would only be for short term rehab stay, provided Bianka list of facilities.  Pt plans to talk to her son about options, writer will f/u with pt later today.  Pt already has [facility], [facility], [facility] home aide in place for when she goes home.  MALCOLM May     9712 Addendum:  Pt agreeable to SNF, would like referral to [facility].  Writer spoke with [facility], faxed referral, they will start precert this afternoon.  MALCOLM May     9294 Addendum:  [facility] received fax, can accept and initiated precert with [employer], hopeful to get approval back tomorrow.  MALCOLM May

## 2024-05-15 PROCEDURE — 94761 N-INVAS EAR/PLS OXIMETRY MLT: CPT

## 2024-05-15 PROCEDURE — 6370000000 HC RX 637 (ALT 250 FOR IP): Performed by: INTERNAL MEDICINE

## 2024-05-15 PROCEDURE — 2700000000 HC OXYGEN THERAPY PER DAY

## 2024-05-15 PROCEDURE — 6360000002 HC RX W HCPCS: Performed by: STUDENT IN AN ORGANIZED HEALTH CARE EDUCATION/TRAINING PROGRAM

## 2024-05-15 PROCEDURE — 97110 THERAPEUTIC EXERCISES: CPT

## 2024-05-15 PROCEDURE — 6370000000 HC RX 637 (ALT 250 FOR IP): Performed by: NURSE PRACTITIONER

## 2024-05-15 PROCEDURE — 94640 AIRWAY INHALATION TREATMENT: CPT

## 2024-05-15 PROCEDURE — 2580000003 HC RX 258: Performed by: STUDENT IN AN ORGANIZED HEALTH CARE EDUCATION/TRAINING PROGRAM

## 2024-05-15 PROCEDURE — 94669 MECHANICAL CHEST WALL OSCILL: CPT

## 2024-05-15 PROCEDURE — 99232 SBSQ HOSP IP/OBS MODERATE 35: CPT | Performed by: INTERNAL MEDICINE

## 2024-05-15 PROCEDURE — 6370000000 HC RX 637 (ALT 250 FOR IP): Performed by: STUDENT IN AN ORGANIZED HEALTH CARE EDUCATION/TRAINING PROGRAM

## 2024-05-15 PROCEDURE — 6370000000 HC RX 637 (ALT 250 FOR IP)

## 2024-05-15 PROCEDURE — 2060000000 HC ICU INTERMEDIATE R&B

## 2024-05-15 PROCEDURE — 97530 THERAPEUTIC ACTIVITIES: CPT

## 2024-05-15 RX ADMIN — ASPIRIN 81 MG 81 MG: 81 TABLET ORAL at 08:55

## 2024-05-15 RX ADMIN — BUDESONIDE 500 MCG: 0.5 SUSPENSION RESPIRATORY (INHALATION) at 09:05

## 2024-05-15 RX ADMIN — GUAIFENESIN 600 MG: 600 TABLET ORAL at 08:55

## 2024-05-15 RX ADMIN — PANTOPRAZOLE SODIUM 40 MG: 40 TABLET, DELAYED RELEASE ORAL at 08:56

## 2024-05-15 RX ADMIN — DIAZEPAM 5 MG: 5 TABLET ORAL at 10:58

## 2024-05-15 RX ADMIN — BUDESONIDE 500 MCG: 0.5 SUSPENSION RESPIRATORY (INHALATION) at 19:35

## 2024-05-15 RX ADMIN — IPRATROPIUM BROMIDE AND ALBUTEROL SULFATE 1 DOSE: .5; 2.5 SOLUTION RESPIRATORY (INHALATION) at 09:05

## 2024-05-15 RX ADMIN — ATORVASTATIN CALCIUM 80 MG: 80 TABLET, FILM COATED ORAL at 20:50

## 2024-05-15 RX ADMIN — ROFLUMILAST 250 MCG: 500 TABLET ORAL at 08:56

## 2024-05-15 RX ADMIN — FUROSEMIDE 20 MG: 20 TABLET ORAL at 08:56

## 2024-05-15 RX ADMIN — METHOCARBAMOL 500 MG: 500 TABLET ORAL at 17:22

## 2024-05-15 RX ADMIN — BUSPIRONE HYDROCHLORIDE 10 MG: 5 TABLET ORAL at 08:55

## 2024-05-15 RX ADMIN — GUAIFENESIN 600 MG: 600 TABLET ORAL at 20:49

## 2024-05-15 RX ADMIN — IPRATROPIUM BROMIDE AND ALBUTEROL SULFATE 1 DOSE: .5; 2.5 SOLUTION RESPIRATORY (INHALATION) at 19:23

## 2024-05-15 RX ADMIN — ACETAMINOPHEN 650 MG: 325 TABLET ORAL at 15:47

## 2024-05-15 RX ADMIN — SODIUM CHLORIDE, PRESERVATIVE FREE 10 ML: 5 INJECTION INTRAVENOUS at 20:50

## 2024-05-15 RX ADMIN — METHOCARBAMOL 500 MG: 500 TABLET ORAL at 08:56

## 2024-05-15 RX ADMIN — SODIUM CHLORIDE, PRESERVATIVE FREE 10 ML: 5 INJECTION INTRAVENOUS at 08:58

## 2024-05-15 RX ADMIN — HYDROCODONE BITARTRATE AND ACETAMINOPHEN 1 TABLET: 5; 325 TABLET ORAL at 05:48

## 2024-05-15 RX ADMIN — DIAZEPAM 5 MG: 5 TABLET ORAL at 23:17

## 2024-05-15 RX ADMIN — HYDROCODONE BITARTRATE AND ACETAMINOPHEN 1 TABLET: 5; 325 TABLET ORAL at 12:54

## 2024-05-15 RX ADMIN — BUSPIRONE HYDROCHLORIDE 10 MG: 5 TABLET ORAL at 20:48

## 2024-05-15 RX ADMIN — ARFORMOTEROL TARTRATE 15 MCG: 15 SOLUTION RESPIRATORY (INHALATION) at 19:30

## 2024-05-15 RX ADMIN — ARFORMOTEROL TARTRATE 15 MCG: 15 SOLUTION RESPIRATORY (INHALATION) at 09:05

## 2024-05-15 RX ADMIN — HYDROCODONE BITARTRATE AND ACETAMINOPHEN 1 TABLET: 5; 325 TABLET ORAL at 18:36

## 2024-05-15 RX ADMIN — ENOXAPARIN SODIUM 40 MG: 100 INJECTION SUBCUTANEOUS at 17:22

## 2024-05-15 ASSESSMENT — PAIN DESCRIPTION - LOCATION
LOCATION: BACK;SHOULDER
LOCATION: BACK;SHOULDER
LOCATION: BACK

## 2024-05-15 ASSESSMENT — PAIN DESCRIPTION - ORIENTATION
ORIENTATION: MID;LEFT
ORIENTATION: LEFT;MID
ORIENTATION: MID

## 2024-05-15 ASSESSMENT — PAIN SCALES - GENERAL
PAINLEVEL_OUTOF10: 8
PAINLEVEL_OUTOF10: 8
PAINLEVEL_OUTOF10: 7
PAINLEVEL_OUTOF10: 8

## 2024-05-15 ASSESSMENT — PAIN DESCRIPTION - DESCRIPTORS
DESCRIPTORS: ACHING;DISCOMFORT
DESCRIPTORS: ACHING
DESCRIPTORS: ACHING;DISCOMFORT

## 2024-05-15 ASSESSMENT — PAIN - FUNCTIONAL ASSESSMENT
PAIN_FUNCTIONAL_ASSESSMENT: ACTIVITIES ARE NOT PREVENTED

## 2024-05-15 NOTE — PLAN OF CARE
Problem: Discharge Planning  Goal: Discharge to home or other facility with appropriate resources  Outcome: Progressing     Problem: Safety - Adult  Goal: Free from fall injury  Outcome: Progressing     Problem: Pain  Goal: Verbalizes/displays adequate comfort level or baseline comfort level  Outcome: Progressing     Problem: Skin/Tissue Integrity  Goal: Absence of new skin breakdown  Description: 1.  Monitor for areas of redness and/or skin breakdown  2.  Assess vascular access sites hourly  3.  Every 4-6 hours minimum:  Change oxygen saturation probe site  4.  Every 4-6 hours:  If on nasal continuous positive airway pressure, respiratory therapy assess nares and determine need for appliance change or resting period.  Outcome: Progressing     Problem: Chronic Conditions and Co-morbidities  Goal: Patient's chronic conditions and co-morbidity symptoms are monitored and maintained or improved  Outcome: Progressing

## 2024-05-15 NOTE — DISCHARGE INSTR - COC
Continuity of Care Form    Patient Name: Joyce Uribe   :  1960  MRN:  [id number]    Admit date:  2024  Discharge date:  2024    Code Status Order: Full Code   Advance Directives:     Admitting Physician:  Glenn Saini MD  PCP: Jaky Ramirez APRN - NP    Discharging Nurse: Gabby Paul  Discharging Hospital Unit/Room#: 0201/0201-01  Discharging Unit Phone Number: [phone]    Emergency Contact:   Extended Emergency Contact Information  Primary Emergency Contact: Ty Villarreal   [facility]  Home Phone: [phone]  Relation: Child  Secondary Emergency Contact: MaximilianoWesAragon  Address: [address] [place] [address]  Home Phone: [phone]  Mobile Phone: [phone]  Relation: Child    Past Surgical History:  Past Surgical History:   Procedure Laterality Date    CHOLECYSTECTOMY      COLONOSCOPY      COLONOSCOPY      tubular adenoma    COLONOSCOPY  08/15/2017    ENDOSCOPY, COLON, DIAGNOSTIC      HERNIA REPAIR      HYSTERECTOMY (CERVIX STATUS UNKNOWN)      With Bladder Mesh    INCONTINENCE SURGERY Left      in [place]    LUNG CANCER SURGERY      cancerous nodule removed left side    OVARY REMOVAL      bilat    TONSILLECTOMY      UPPER GASTROINTESTINAL ENDOSCOPY      gastritis    UPPER GASTROINTESTINAL ENDOSCOPY  10/03/2017    bx distal esophagus        Immunization History:   Immunization History   Administered Date(s) Administered    Influenza Vaccine, unspecified formulation 2012, 2012, 10/06/2014    Influenza Virus Vaccine 2013    Influenza, FLUCELVAX, (age 6 mo+), MDCK, PF, 0.5mL 2017    Pneumococcal Conjugate 7-valent (Prevnar7) 10/08/2012    Pneumococcal, PCV-13, PREVNAR 13, (age 6w+), IM, 0.5mL 2018    Pneumococcal, PPSV23, PNEUMOVAX 23, (age 2y+), SC/IM, 0.5mL 10/08/2012       Active Problems:  Patient Active Problem List   Diagnosis Code    COPD, severe (HCC) J44.9

## 2024-05-15 NOTE — CARE COORDINATION
Spoke with Dr. Sawyer  (Pulm) who states he is keeping patient another day due to her breathing.  He states he will likely sign off tomorrow.  Spoke with patient at bedside. She is aware of the above and happy not to discharge today.  Plan is for patient to discharge to [facility] and patient confirmed this.  Precert was initiated yesterday.  Placed call to Zeus with [facility] to get updates on status of precert. She states it is still pending.  Therapy signed in to work with patient so will have updated notes today.

## 2024-05-16 VITALS
HEIGHT: 63 IN | HEART RATE: 101 BPM | RESPIRATION RATE: 20 BRPM | DIASTOLIC BLOOD PRESSURE: 62 MMHG | OXYGEN SATURATION: 96 % | SYSTOLIC BLOOD PRESSURE: 119 MMHG | BODY MASS INDEX: 21.79 KG/M2 | TEMPERATURE: 98.1 F | WEIGHT: 123 LBS

## 2024-05-16 LAB
ANION GAP SERPL CALCULATED.3IONS-SCNC: 11 MMOL/L (ref 3–16)
BASOPHILS # BLD: 0 K/UL (ref 0–0.2)
BASOPHILS NFR BLD: 0.4 %
BILIRUB UR QL STRIP.AUTO: NEGATIVE
BUN SERPL-MCNC: 16 MG/DL (ref 7–20)
CALCIUM SERPL-MCNC: 9.1 MG/DL (ref 8.3–10.6)
CHLORIDE SERPL-SCNC: 97 MMOL/L (ref 99–110)
CLARITY UR: CLEAR
CO2 SERPL-SCNC: 29 MMOL/L (ref 21–32)
COLOR UR: YELLOW
CREAT SERPL-MCNC: <0.5 MG/DL (ref 0.6–1.2)
DEPRECATED RDW RBC AUTO: 15.9 % (ref 12.4–15.4)
EOSINOPHIL # BLD: 0.2 K/UL (ref 0–0.6)
EOSINOPHIL NFR BLD: 2.3 %
GFR SERPLBLD CREATININE-BSD FMLA CKD-EPI: >90 ML/MIN/{1.73_M2}
GLUCOSE SERPL-MCNC: 141 MG/DL (ref 70–99)
GLUCOSE UR STRIP.AUTO-MCNC: NEGATIVE MG/DL
HCT VFR BLD AUTO: 32.9 % (ref 36–48)
HGB BLD-MCNC: 10.3 G/DL (ref 12–16)
HGB UR QL STRIP.AUTO: NEGATIVE
KETONES UR STRIP.AUTO-MCNC: NEGATIVE MG/DL
LEUKOCYTE ESTERASE UR QL STRIP.AUTO: NEGATIVE
LYMPHOCYTES # BLD: 1.5 K/UL (ref 1–5.1)
LYMPHOCYTES NFR BLD: 17.3 %
MCH RBC QN AUTO: 29.4 PG (ref 26–34)
MCHC RBC AUTO-ENTMCNC: 31.4 G/DL (ref 31–36)
MCV RBC AUTO: 93.6 FL (ref 80–100)
MONOCYTES # BLD: 0.5 K/UL (ref 0–1.3)
MONOCYTES NFR BLD: 5.3 %
NEUTROPHILS # BLD: 6.7 K/UL (ref 1.7–7.7)
NEUTROPHILS NFR BLD: 74.7 %
NITRITE UR QL STRIP.AUTO: NEGATIVE
PH UR STRIP.AUTO: 7 [PH] (ref 5–8)
PLATELET # BLD AUTO: 208 K/UL (ref 135–450)
PMV BLD AUTO: 10.1 FL (ref 5–10.5)
POTASSIUM SERPL-SCNC: 3.7 MMOL/L (ref 3.5–5.1)
PROT UR STRIP.AUTO-MCNC: NEGATIVE MG/DL
RBC # BLD AUTO: 3.52 M/UL (ref 4–5.2)
SODIUM SERPL-SCNC: 137 MMOL/L (ref 136–145)
SP GR UR STRIP.AUTO: 1.01 (ref 1–1.03)
UA DIPSTICK W REFLEX MICRO PNL UR: NORMAL
URN SPEC COLLECT METH UR: NORMAL
UROBILINOGEN UR STRIP-ACNC: 0.2 E.U./DL
WBC # BLD AUTO: 8.9 K/UL (ref 4–11)

## 2024-05-16 PROCEDURE — 97116 GAIT TRAINING THERAPY: CPT

## 2024-05-16 PROCEDURE — 97110 THERAPEUTIC EXERCISES: CPT

## 2024-05-16 PROCEDURE — 94669 MECHANICAL CHEST WALL OSCILL: CPT

## 2024-05-16 PROCEDURE — 6370000000 HC RX 637 (ALT 250 FOR IP): Performed by: STUDENT IN AN ORGANIZED HEALTH CARE EDUCATION/TRAINING PROGRAM

## 2024-05-16 PROCEDURE — 85025 COMPLETE CBC W/AUTO DIFF WBC: CPT

## 2024-05-16 PROCEDURE — 6370000000 HC RX 637 (ALT 250 FOR IP)

## 2024-05-16 PROCEDURE — 2700000000 HC OXYGEN THERAPY PER DAY

## 2024-05-16 PROCEDURE — 6360000002 HC RX W HCPCS: Performed by: STUDENT IN AN ORGANIZED HEALTH CARE EDUCATION/TRAINING PROGRAM

## 2024-05-16 PROCEDURE — 81003 URINALYSIS AUTO W/O SCOPE: CPT

## 2024-05-16 PROCEDURE — 99232 SBSQ HOSP IP/OBS MODERATE 35: CPT | Performed by: INTERNAL MEDICINE

## 2024-05-16 PROCEDURE — 6370000000 HC RX 637 (ALT 250 FOR IP): Performed by: INTERNAL MEDICINE

## 2024-05-16 PROCEDURE — 94640 AIRWAY INHALATION TREATMENT: CPT

## 2024-05-16 PROCEDURE — 94761 N-INVAS EAR/PLS OXIMETRY MLT: CPT

## 2024-05-16 PROCEDURE — 80048 BASIC METABOLIC PNL TOTAL CA: CPT

## 2024-05-16 PROCEDURE — 6370000000 HC RX 637 (ALT 250 FOR IP): Performed by: NURSE PRACTITIONER

## 2024-05-16 RX ORDER — HYDROCODONE BITARTRATE AND ACETAMINOPHEN 5; 325 MG/1; MG/1
1 TABLET ORAL EVERY 6 HOURS PRN
Qty: 12 TABLET | Refills: 0 | Status: SHIPPED | OUTPATIENT
Start: 2024-05-16 | End: 2024-05-19

## 2024-05-16 RX ORDER — IPRATROPIUM BROMIDE AND ALBUTEROL SULFATE 2.5; .5 MG/3ML; MG/3ML
1 SOLUTION RESPIRATORY (INHALATION) EVERY 4 HOURS PRN
Status: DISCONTINUED | OUTPATIENT
Start: 2024-05-16 | End: 2024-05-16 | Stop reason: HOSPADM

## 2024-05-16 RX ORDER — BUSPIRONE HYDROCHLORIDE 10 MG/1
10 TABLET ORAL 3 TIMES DAILY
Qty: 90 TABLET | Refills: 0 | Status: SHIPPED | OUTPATIENT
Start: 2024-05-16

## 2024-05-16 RX ORDER — FUROSEMIDE 20 MG/1
20 TABLET ORAL DAILY
Qty: 60 TABLET | Refills: 3 | Status: SHIPPED | OUTPATIENT
Start: 2024-05-17

## 2024-05-16 RX ADMIN — HYDROCODONE BITARTRATE AND ACETAMINOPHEN 1 TABLET: 5; 325 TABLET ORAL at 01:54

## 2024-05-16 RX ADMIN — ARFORMOTEROL TARTRATE 15 MCG: 15 SOLUTION RESPIRATORY (INHALATION) at 07:18

## 2024-05-16 RX ADMIN — DIAZEPAM 5 MG: 5 TABLET ORAL at 11:46

## 2024-05-16 RX ADMIN — ROFLUMILAST 250 MCG: 500 TABLET ORAL at 08:18

## 2024-05-16 RX ADMIN — PANTOPRAZOLE SODIUM 40 MG: 40 TABLET, DELAYED RELEASE ORAL at 08:17

## 2024-05-16 RX ADMIN — ONDANSETRON 4 MG: 4 TABLET, ORALLY DISINTEGRATING ORAL at 18:15

## 2024-05-16 RX ADMIN — BUSPIRONE HYDROCHLORIDE 10 MG: 5 TABLET ORAL at 08:18

## 2024-05-16 RX ADMIN — ONDANSETRON 4 MG: 4 TABLET, ORALLY DISINTEGRATING ORAL at 07:25

## 2024-05-16 RX ADMIN — ASPIRIN 81 MG 81 MG: 81 TABLET ORAL at 08:17

## 2024-05-16 RX ADMIN — HYDROCODONE BITARTRATE AND ACETAMINOPHEN 1 TABLET: 5; 325 TABLET ORAL at 14:27

## 2024-05-16 RX ADMIN — IPRATROPIUM BROMIDE AND ALBUTEROL SULFATE 1 DOSE: .5; 2.5 SOLUTION RESPIRATORY (INHALATION) at 07:16

## 2024-05-16 RX ADMIN — IPRATROPIUM BROMIDE AND ALBUTEROL SULFATE 1 DOSE: 2.5; .5 SOLUTION RESPIRATORY (INHALATION) at 15:42

## 2024-05-16 RX ADMIN — GUAIFENESIN 600 MG: 600 TABLET ORAL at 08:17

## 2024-05-16 RX ADMIN — METHOCARBAMOL 500 MG: 500 TABLET ORAL at 06:33

## 2024-05-16 RX ADMIN — FUROSEMIDE 20 MG: 20 TABLET ORAL at 08:18

## 2024-05-16 RX ADMIN — HYDROCODONE BITARTRATE AND ACETAMINOPHEN 1 TABLET: 5; 325 TABLET ORAL at 08:18

## 2024-05-16 RX ADMIN — METHOCARBAMOL 500 MG: 500 TABLET ORAL at 16:00

## 2024-05-16 RX ADMIN — BUDESONIDE 500 MCG: 0.5 SUSPENSION RESPIRATORY (INHALATION) at 07:18

## 2024-05-16 ASSESSMENT — PAIN DESCRIPTION - ORIENTATION
ORIENTATION: MID;POSTERIOR
ORIENTATION: MID;POSTERIOR
ORIENTATION: MID
ORIENTATION: MID;POSTERIOR

## 2024-05-16 ASSESSMENT — PAIN SCALES - WONG BAKER
WONGBAKER_NUMERICALRESPONSE: NO HURT

## 2024-05-16 ASSESSMENT — PAIN - FUNCTIONAL ASSESSMENT
PAIN_FUNCTIONAL_ASSESSMENT: ACTIVITIES ARE NOT PREVENTED

## 2024-05-16 ASSESSMENT — PAIN SCALES - GENERAL
PAINLEVEL_OUTOF10: 8
PAINLEVEL_OUTOF10: 6
PAINLEVEL_OUTOF10: 8

## 2024-05-16 ASSESSMENT — PAIN DESCRIPTION - LOCATION
LOCATION: BACK

## 2024-05-16 ASSESSMENT — PAIN DESCRIPTION - DESCRIPTORS
DESCRIPTORS: ACHING

## 2024-05-16 ASSESSMENT — PAIN DESCRIPTION - PAIN TYPE
TYPE: ACUTE PAIN

## 2024-05-16 NOTE — PLAN OF CARE
Problem: Safety - Adult  Goal: Free from fall injury  Outcome: Progressing  Note: Pt will remain free from falls throughout hospital stay. Fall precautions in place, bed alarm on, bed in lowest position with wheels locked and side rails 2/4 up. Room door open and hourly rounding completed. Will continue to monitor throughout shift.      Problem: Discharge Planning  Goal: Discharge to home or other facility with appropriate resources  Outcome: Progressing     Problem: Pain  Goal: Verbalizes/displays adequate comfort level or baseline comfort level  Outcome: Progressing  Note: Pt will be satisfied with pain control. Pt uses numeric pain rating scale with reassessments after pain med administration. Will continue to monitor progression throughout shift.      Problem: Skin/Tissue Integrity  Goal: Absence of new skin breakdown  Description: 1.  Monitor for areas of redness and/or skin breakdown  2.  Assess vascular access sites hourly  3.  Every 4-6 hours minimum:  Change oxygen saturation probe site  4.  Every 4-6 hours:  If on nasal continuous positive airway pressure, respiratory therapy assess nares and determine need for appliance change or resting period.  Outcome: Progressing     Problem: Chronic Conditions and Co-morbidities  Goal: Patient's chronic conditions and co-morbidity symptoms are monitored and maintained or improved  Outcome: Progressing     Problem: Nutrition Deficit:  Goal: Optimize nutritional status  Outcome: Progressing

## 2024-05-16 NOTE — PLAN OF CARE
Problem: Safety - Adult  Goal: Free from fall injury  5/16/2024 0852 by Gabby Paul RN  Outcome: Progressing  5/16/2024 0018 by Rachna Lacy RN  Outcome: Progressing  Note: Pt will remain free from falls throughout hospital stay. Fall precautions in place, bed alarm on, bed in lowest position with wheels locked and side rails 2/4 up. Room door open and hourly rounding completed. Will continue to monitor throughout shift.      Problem: Discharge Planning  Goal: Discharge to home or other facility with appropriate resources  5/16/2024 0852 by Gabby Paul RN  Outcome: Progressing  5/16/2024 0018 by Rachna Lacy RN  Outcome: Progressing     Problem: Pain  Goal: Verbalizes/displays adequate comfort level or baseline comfort level  5/16/2024 0852 by Gabby Paul RN  Outcome: Progressing  5/16/2024 0018 by Rachna Lacy RN  Outcome: Progressing  Note: Pt will be satisfied with pain control. Pt uses numeric pain rating scale with reassessments after pain med administration. Will continue to monitor progression throughout shift.      Problem: Skin/Tissue Integrity  Goal: Absence of new skin breakdown  Description: 1.  Monitor for areas of redness and/or skin breakdown  2.  Assess vascular access sites hourly  3.  Every 4-6 hours minimum:  Change oxygen saturation probe site  4.  Every 4-6 hours:  If on nasal continuous positive airway pressure, respiratory therapy assess nares and determine need for appliance change or resting period.  5/16/2024 0852 by Gabby Paul RN  Outcome: Progressing  5/16/2024 0018 by Rachna Lacy RN  Outcome: Progressing     Problem: Chronic Conditions and Co-morbidities  Goal: Patient's chronic conditions and co-morbidity symptoms are monitored and maintained or improved  5/16/2024 0852 by Gabby Paul RN  Outcome: Progressing  5/16/2024 0018 by Rachna Lacy RN  Outcome: Progressing     Problem: Nutrition Deficit:  Goal: Optimize nutritional

## 2024-05-16 NOTE — CARE COORDINATION
Received call from Zeus with [facility] who states she spoke with Bianka who told her they were intending to deny patient for SNF stay but are offering a peer to peer.   Discussed with NP who is willing to do the peer to peer and scheduled it for 2pm today.  Discussed the above with patient and discussed that patient may have to discharge to home with home care if it is not overturned. She verbalized understanding.

## 2024-05-16 NOTE — PROGRESS NOTES
05/04/24 2200   NIV Type   NIV Started/Stopped On   Equipment Type V60   Mode Bilevel   Mask Type Full face mask   Mask Size Medium   Assessment   Pulse (!) 124   Respirations 16   SpO2 95 %   Comfort Level Good   Using Accessory Muscles Yes   Mask Compliance Good   Skin Assessment Clean, dry, & intact   Skin Protection for O2 Device Yes   Orientation Middle   Location Nose   Settings/Measurements   IPAP 12 cmH20   CPAP/EPAP 6 cmH2O   Vt (Measured) 519 mL   Rate Ordered 12   FiO2  50 %   Minute Volume (L/min) 7.6 Liters   Mask Leak (lpm) 14 lpm   Patient's Home Machine No   Alarm Settings   Alarms On Y   Low Pressure (cmH2O) 6 cmH2O   High Pressure (cmH2O) 30 cmH2O   Delay Alarm 20 sec(s)   RR Low (bpm) 6   RR High (bpm) 40 br/min       
   05/04/24 2335   NIV Type   Equipment Type v60   Mode Bilevel   Mask Type Full face mask   Mask Size Medium   Assessment   Respirations 19   SpO2 95 %   Comfort Level Good   Using Accessory Muscles No   Mask Compliance Good   Skin Assessment Clean, dry, & intact   Skin Protection for O2 Device Yes   Orientation Middle   Location Nose   Settings/Measurements   IPAP 12 cmH20   CPAP/EPAP 6 cmH2O   Vt (Measured) 558 mL   Rate Ordered 12   FiO2  45 %   Minute Volume (L/min) 8.6 Liters   Mask Leak (lpm) 21 lpm   Patient's Home Machine No   Alarm Settings   Alarms On Y   Low Pressure (cmH2O) 6 cmH2O   High Pressure (cmH2O) 30 cmH2O   Delay Alarm 20 sec(s)   RR Low (bpm) 6   RR High (bpm) 40 br/min       
   05/05/24 0402   NIV Type   $NIV $Daily Charge   Equipment Type V60   Mode Bilevel   Mask Type Full face mask   Mask Size Medium   Assessment   Respirations 17   SpO2 98 %   Comfort Level Good   Using Accessory Muscles No   Mask Compliance Good   Skin Assessment Clean, dry, & intact   Skin Protection for O2 Device Yes   Location Nose   Settings/Measurements   PIP Observed 12 cm H20   IPAP 12 cmH20   CPAP/EPAP 6 cmH2O   Vt (Measured) 392 mL   FiO2  40 %   Minute Volume (L/min) 6 Liters   Mask Leak (lpm) 49 lpm   Patient's Home Machine No   Alarm Settings   Alarms On Y       
   05/05/24 0727   NIV Type   NIV Started/Stopped On   Equipment Type V60   Mode Bilevel   Mask Type Full face mask   Mask Size Medium   Assessment   Pulse 78   Respirations 18   SpO2 98 %   Using Accessory Muscles No   Mask Compliance Good   Skin Assessment Clean, dry, & intact   Skin Protection for O2 Device Yes   Orientation Middle   Location Nose   Settings/Measurements   PIP Observed 12 cm H20   IPAP 12 cmH20   CPAP/EPAP 6 cmH2O   Vt (Measured) 817 mL   Rate Ordered 12   O2 Flow Rate (L/min) 3 L/min   FiO2  40 %   Minute Volume (L/min) 11.3 Liters   Mask Leak (lpm) 38 lpm   Patient's Home Machine No   Alarm Settings   Alarms On Y   Low Pressure (cmH2O) 6 cmH2O   High Pressure (cmH2O) 30 cmH2O   Delay Alarm 20 sec(s)   RR Low (bpm) 6   RR High (bpm) 40 br/min   Oxygen Therapy/Pulse Ox   O2 Therapy Oxygen   $Oxygen $Daily Charge   O2 Device PAP (positive airway pressure)   $Pulse Oximeter $Spot check (multiple/continuous)       
   05/05/24 1150   NIV Type   NIV Started/Stopped On   Equipment Type v60   Mode Bilevel   Mask Type Full face mask   Mask Size Medium   Assessment   SpO2 98 %   Comfort Level Good   Using Accessory Muscles No   Mask Compliance Good   Skin Assessment Clean, dry, & intact   Skin Protection for O2 Device Yes   Orientation Middle   Location Nose   Settings/Measurements   PIP Observed 12 cm H20   IPAP 12 cmH20   CPAP/EPAP 6 cmH2O   Vt (Measured) 1053 mL   Rate Ordered 12   Insp Rise Time (%) 2 %   FiO2  40 %   I Time/ I Time % 1.1 s   Minute Volume (L/min) 13.5 Liters   Mask Leak (lpm) 8 lpm   Patient's Home Machine No   Alarm Settings   Alarms On Y   Low Pressure (cmH2O) 6 cmH2O   High Pressure (cmH2O) 30 cmH2O   Delay Alarm 20 sec(s)   RR Low (bpm) 6   RR High (bpm) 40 br/min       
   05/08/24 2341   NIV Type   NIV Started/Stopped   (Pt states that she does not want to wear BIPAP and \"will call if I need it\". Pt states \" I have been doing pretty good without it\". Pt resting comfortably on NC.)       
   05/10/24 0017   NIV Type   NIV Started/Stopped   (patient refused BiPAP @ this time)       
   05/13/24 1235   Oxygen Therapy/Pulse Ox   O2 Therapy Oxygen   O2 Flow Rate (L/min) 3 L/min   SpO2 96 %   Blood Gas  Performed? Yes   $ABG $Arterial Line Draw   Arnulfo's Test #1 Pos   Site #1 Right Radial   Site Prepped #1 Yes   Number of Attempts #1 1   Pressure Held #1 Yes   Complications #1 None   Post-procedure #1 Standard   Specimen Status #1 To lab   How Tolerated? Tolerated well       
   05/13/24 2001   RT Protocol   History Pulmonary Disease 2   Respiratory pattern 0   Breath sounds 2   Cough 0   Indications for Bronchodilator Therapy On home bronchodilators   Bronchodilator Assessment Score 4       
   05/16/24 1739   Encounter Summary   Encounter Overview/Reason Initial Encounter;Spiritual/Emotional Needs   Service Provided For Patient   Support System Children;Friends/neighbors   Last Encounter  05/16/24  (emotional support, validation, nurtured hope, Pt leaving this evening)   Complexity of Encounter Moderate   Begin Time 1630   End Time  1650   Total Time Calculated 20 min   Assessment/Intervention/Outcome   Assessment Concerns with suffering;Coping   Intervention Active listening;Explored/Affirmed feelings, thoughts, concerns;Nurtured Hope   Outcome Coping       
  Hospital Medicine Progress Note      Date of Admission: 5/4/2024  Hospital Day: 10    Chief Admission Complaint:  shortness of breath    Subjective:  reports improved dyspnea; nausea; occasionally productive cough. Denies chest pain, emesis,     Presenting Admission History: \"63-year-old female with a PMHx of moderate COPD with chronic hypoxemic hypercapnic respiratory failure (on 3L NC), HFpEF, non-obstructive CAD, GERD and anxiety who presented to the ED with worsening SOB with increased productive cough of green sputum for 2 days. Quit smoking 3 years ago. Compliant with inhalers. Initially breathing improved since discharge on 5/2, but worse over 2 days. Denied any fevers, chills, CP, N/V, abdominal pain, C/D or urinary changes. Denied any alcohol use\"     Assessment/Plan:      Current Principal Problem:  COPD exacerbation (HCC)    COPD exacerbation.  Continue brovana, pulmicort, duonebs, mucinex.  Pulmonary consulted, appreciate recommendations.  Completed course of azithromycin on 5/10.  Completed 5 days of prednisone.      Bronchitis.  Sepsis present on arrival: leukocytosis, tachycardia, tachypnea.  Continue azithromycin.       Acute (improved) on chronic hypoxic/hypercapnic respiratory failure.   As evidenced by increased work of breathing, oxygen sat of 85% on baseline oxygen.  Reports wearing 3L at home, currently stable on this.  Was on BiPAP on admit  Titrate as able for oxygen saturation 88-92%.  Encourage pulmonary toilet. Pulm consulted.  Received lasix 20mg IV x1 on 5/9.  Continue PO lasix 20mg daily.  Net negative ~4L.       Elevated troponin->demand ischemia w/o MI  HS trop flat, 23>27.  EKG: ST, no acute ischemic changes.  Suspect tachycardia, COPD exacerbation, hypoxic resp failure are contributing to elevation.      Hyperglycemia.  History of prediabetes. A1c 5.8.       Chronic diastolic heart failure.  Likely due to hypertensive heart disease.  No evidence of exacerbation at this time.  
  Hospital Medicine Progress Note      Date of Admission: 5/4/2024  Hospital Day: 11    Chief Admission Complaint:  shortness of breath    Subjective:  denies dyspnea. Reports feeling weak    Presenting Admission History: \"63-year-old female with a PMHx of moderate COPD with chronic hypoxemic hypercapnic respiratory failure (on 3L NC), HFpEF, non-obstructive CAD, GERD and anxiety who presented to the ED with worsening SOB with increased productive cough of green sputum for 2 days. Quit smoking 3 years ago. Compliant with inhalers. Initially breathing improved since discharge on 5/2, but worse over 2 days. Denied any fevers, chills, CP, N/V, abdominal pain, C/D or urinary changes. Denied any alcohol use\"     Assessment/Plan:      Current Principal Problem:  COPD exacerbation (HCC)    COPD exacerbation.  Continue brovana, pulmicort, duonejael, mucinex.  Pulmonary consulted, appreciate recommendations.  Completed course of azithromycin and prednisone.      Bronchitis.  Sepsis present on arrival: leukocytosis, tachycardia, tachypnea.  Continue azithromycin.       Acute (improved) on chronic hypoxic/hypercapnic respiratory failure.   As evidenced by increased work of breathing, oxygen sat of 85% on baseline oxygen.  Reports wearing 3L at home, currently stable on 2L per NC.  Was on BiPAP on admit  Titrate as able for oxygen saturation 88-92%.  Encourage pulmonary toilet. Pulm consulted.  Received lasix 20mg IV x1 on 5/9.  Continue PO lasix 20mg daily.  Net negative ~4L.       Elevated troponin->demand ischemia w/o MI  HS trop flat, 23>27.  EKG: ST, no acute ischemic changes.  Suspect tachycardia, COPD exacerbation, hypoxic resp failure are contributing to elevation.      Hyperglycemia.  History of prediabetes. A1c 5.8.       Chronic diastolic heart failure.  Likely due to hypertensive heart disease.  No evidence of exacerbation at this time.  Continue home regimen.  Echo 5/2023: EF 61%; no regional WMAs.  Monitor daily 
  Hospital Medicine Progress Note      Date of Admission: 5/4/2024  Hospital Day: 13    Chief Admission Complaint:  shortness of breath    Subjective:  reports lower back pain; nausea but no emesis.  Reports improved dyspnea. Denies chest pain, dizziness    Presenting Admission History: \"63-year-old female with a PMHx of moderate COPD with chronic hypoxemic hypercapnic respiratory failure (on 3L NC), HFpEF, non-obstructive CAD, GERD and anxiety who presented to the ED with worsening SOB with increased productive cough of green sputum for 2 days. Quit smoking 3 years ago. Compliant with inhalers. Initially breathing improved since discharge on 5/2, but worse over 2 days. Denied any fevers, chills, CP, N/V, abdominal pain, C/D or urinary changes. Denied any alcohol use\"     Assessment/Plan:      Current Principal Problem:  COPD exacerbation (HCC)    COPD exacerbation, resolved.  Continue brovana, pulmicort, duonebs, mucinex.  Pulmonary consulted, appreciate recommendations.  Completed course of azithromycin and prednisone.      Bronchitis.  Sepsis present on arrival: leukocytosis, tachycardia, tachypnea.  Continue azithromycin.       Acute (improved) on chronic hypoxic/hypercapnic respiratory failure.   As evidenced by increased work of breathing, oxygen sat of 85% on baseline oxygen.  Reports wearing 3L at home, currently stable on 3L per NC.  Was on BiPAP on admit.  Titrate as able for oxygen saturation 88-92%.  Encourage pulmonary toilet. Pulm consulted.  Received lasix 20mg IV x1 on 5/9.  Continue PO lasix 20mg daily.  Net negative ~6.1L.       Elevated troponin->demand ischemia w/o MI  HS trop flat, 23>27.  EKG: ST, no acute ischemic changes.  Suspect tachycardia, COPD exacerbation, hypoxic resp failure are contributing to elevation.      Hyperglycemia.  History of prediabetes. A1c 5.8 this admission.       Chronic diastolic heart failure.  Likely due to hypertensive heart disease.  No evidence of exacerbation at 
  Hospital Medicine Progress Note      Date of Admission: 5/4/2024  Hospital Day: 4    Chief Admission Complaint: Shortness of breath    Subjective:  reports sob with minimal exertion    Presenting Admission History: \" 63-year-old female with a PMHx of moderate COPD with chronic hypoxemic hypercapnic respiratory failure (on 3L NC), HFpEF, non-obstructive CAD, GERD and anxiety who presented to the ED with worsening SOB with increased productive cough of green sputum for 2 days. Quit smoking 3 years ago. Compliant with inhalers. Initially breathing improved since discharge on 5/2, but worse over 2 days. Denied any fevers, chills, CP, N/V, abdominal pain, C/D or urinary changes. Denied any alcohol use\"    Assessment/Plan:      Current Principal Problem:  COPD exacerbation (HCC)    COPD exacerbation.  Continue prednisone, scheduled duonebs, dulera, azithromycin.      Bronchitis.  Sepsis present on arrival: leukocytosis, tachycardia, tachypnea.  Continue azithromycin.      Acute (improved) on chronic hypoxic/hypercapnic respiratory failure as evidenced by increased work of breathing and oxygen sat of 85% on baseline o2.  Reports wearing 3L at home, currently requiring 3L.  Was on BiPAP on admit  Titrate as able for oxygen saturation 90-94%.  Encourage pulmonary toilet. Pulm consulted.    Elevated troponin->demand ischemia w/o MI  HS trop flat, 23>27.  EKG: ST, no acute ischemic changes.  Suspect tachycardia, COPD exacerbation, hypoxic resp failure are contributing to elevation.     Hyperglycemia.  History of prediabetes. A1c 5.8.      Chronic diastolic heart failure.  Likely due to hypertensive heart disease.  No evidence of exacerbation at this time.  Continue home regimen.  Echo 5/2023: EF 61%; no regional WMAs.  Monitor daily weights, I&O.   on arrival.      Hyperlipidemia.  No recent lipid found in chart review.  Continued home dose statin      Physical Exam Performed:      General appearance:  No apparent 
  Hospital Medicine Progress Note      Date of Admission: 5/4/2024  Hospital Day: 6    Chief Admission Complaint: Shortness of breath    Subjective:  generally weak, no events overnight, sob persists    Presenting Admission History: \" 63-year-old female with a PMHx of moderate COPD with chronic hypoxemic hypercapnic respiratory failure (on 3L NC), HFpEF, non-obstructive CAD, GERD and anxiety who presented to the ED with worsening SOB with increased productive cough of green sputum for 2 days. Quit smoking 3 years ago. Compliant with inhalers. Initially breathing improved since discharge on 5/2, but worse over 2 days. Denied any fevers, chills, CP, N/V, abdominal pain, C/D or urinary changes. Denied any alcohol use\"    Assessment/Plan:      Current Principal Problem:  COPD exacerbation (HCC)    COPD exacerbation.  Continue prednisone, scheduled duonebs, dulera, azithromycin.      Bronchitis.  Sepsis present on arrival: leukocytosis, tachycardia, tachypnea.  Continue azithromycin.      Acute (improved) on chronic hypoxic/hypercapnic respiratory failure as evidenced by increased work of breathing and oxygen sat of 85% on baseline o2.  Reports wearing 3L at home, currently requiring 3L.  Was on BiPAP on admit  Titrate as able for oxygen saturation 90-94%.  Encourage pulmonary toilet. Pulm consulted. Lasix 20mg IV x1 dose 5/9    Elevated troponin->demand ischemia w/o MI  HS trop flat, 23>27.  EKG: ST, no acute ischemic changes.  Suspect tachycardia, COPD exacerbation, hypoxic resp failure are contributing to elevation.     Hyperglycemia.  History of prediabetes. A1c 5.8.      Chronic diastolic heart failure.  Likely due to hypertensive heart disease.  No evidence of exacerbation at this time.  Continue home regimen.  Echo 5/2023: EF 61%; no regional WMAs.  Monitor daily weights, I&O.  ->907     Hyperlipidemia.  No recent lipid found in chart review.  Continued home dose statin      Physical Exam Performed:  
  Hospital Medicine Progress Note      Date of Admission: 5/4/2024  Hospital Day: 8    Chief Admission Complaint: Shortness of breath    Subjective:  sob    Presenting Admission History: \" 63-year-old female with a PMHx of moderate COPD with chronic hypoxemic hypercapnic respiratory failure (on 3L NC), HFpEF, non-obstructive CAD, GERD and anxiety who presented to the ED with worsening SOB with increased productive cough of green sputum for 2 days. Quit smoking 3 years ago. Compliant with inhalers. Initially breathing improved since discharge on 5/2, but worse over 2 days. Denied any fevers, chills, CP, N/V, abdominal pain, C/D or urinary changes. Denied any alcohol use\"    Assessment/Plan:      Current Principal Problem:  COPD exacerbation (HCC)    COPD exacerbation.  Continue prednisone, scheduled duonebs, dulera, azithromycin.      Bronchitis.  Sepsis present on arrival: leukocytosis, tachycardia, tachypnea.  Continue azithromycin.      Acute (improved) on chronic hypoxic/hypercapnic respiratory failure as evidenced by increased work of breathing and oxygen sat of 85% on baseline o2.  Reports wearing 3L at home, currently requiring 3L.  Was on BiPAP on admit  Titrate as able for oxygen saturation 90-94%.  Encourage pulmonary toilet. Pulm consulted. Lasix 20mg IV x1 dose 5/9, change to po. Net -3.8L    Elevated troponin->demand ischemia w/o MI  HS trop flat, 23>27.  EKG: ST, no acute ischemic changes.  Suspect tachycardia, COPD exacerbation, hypoxic resp failure are contributing to elevation.     Hyperglycemia.  History of prediabetes. A1c 5.8.      Chronic diastolic heart failure.  Likely due to hypertensive heart disease.  No evidence of exacerbation at this time.  Continue home regimen.  Echo 5/2023: EF 61%; no regional WMAs.  Monitor daily weights, I&O.  ->907     Hyperlipidemia.  No recent lipid found in chart review.  Continued home dose statin      Physical Exam Performed:      General appearance:  No 
  Hospital Medicine Progress Note      Date of Admission: 5/4/2024  Hospital Day: 9    Chief Admission Complaint: Shortness of breath    Subjective: sob improved today, patient reports constipation    Presenting Admission History: \" 63-year-old female with a PMHx of moderate COPD with chronic hypoxemic hypercapnic respiratory failure (on 3L NC), HFpEF, non-obstructive CAD, GERD and anxiety who presented to the ED with worsening SOB with increased productive cough of green sputum for 2 days. Quit smoking 3 years ago. Compliant with inhalers. Initially breathing improved since discharge on 5/2, but worse over 2 days. Denied any fevers, chills, CP, N/V, abdominal pain, C/D or urinary changes. Denied any alcohol use\"    Assessment/Plan:      Current Principal Problem:  COPD exacerbation (HCC)    COPD exacerbation.  Continue scheduled duonebs, dulera d/c'd. Azithromycin/Prednisone completed.  Brovana/Pulmicort added 5/11    Bronchitis.  Sepsis present on arrival: leukocytosis, tachycardia, tachypnea.  Continue azithromycin.      Acute (improved) on chronic hypoxic/hypercapnic respiratory failure as evidenced by increased work of breathing and oxygen sat of 85% on baseline o2.  Reports wearing 3L at home, currently requiring 3L.  Was on BiPAP on admit  Titrate as able for oxygen saturation 90-94%.  Encourage pulmonary toilet. Pulm consulted. Lasix 20mg IV x1 dose 5/9, change to po. Net -4L    Elevated troponin->demand ischemia w/o MI  HS trop flat, 23>27.  EKG: ST, no acute ischemic changes.  Suspect tachycardia, COPD exacerbation, hypoxic resp failure are contributing to elevation.     Hyperglycemia.  History of prediabetes. A1c 5.8.      Chronic diastolic heart failure.  Likely due to hypertensive heart disease.  No evidence of exacerbation at this time.  Continue home regimen.  Echo 5/2023: EF 61%; no regional WMAs.  Monitor daily weights, I&O.  ->907->340    Hyperlipidemia.  No recent lipid found in chart 
 Latest Reference Range & Units Most Recent   Hemoglobin, Art, Extended 12.0 - 16.0 g/dL 11.8 (L)  5/13/24 12:27   pH, Arterial 7.350 - 7.450  7.437  5/13/24 12:27   pCO2, Arterial 35.0 - 45.0 mmHg 42.5  5/13/24 12:27   pO2, Arterial 75.0 - 108.0 mmHg 80.6  5/13/24 12:27   HCO3, Arterial 21.0 - 29.0 mmol/L 28.0  5/13/24 12:27   TCO2 (calc), Art Not Established mmol/L 29.3  5/13/24 12:27   Base Excess, Arterial -3.0 - 3.0 mmol/L 3.5 (H)  5/13/24 12:27   O2 Sat, Arterial >92 % 96.8  5/13/24 12:27   O2 Content, Arterial Not Established mL/dL 18  5/17/18 12:32   Methemoglobin, Arterial <1.5 % 0.3  5/13/24 12:27   Carboxyhgb, Arterial 0.0 - 1.5 % 1.1  5/13/24 12:27   (L): Data is abnormally low  (H): Data is abnormally high  
CHF Care Plan      Patient's EF (Ejection Fraction) is greater than 40%    Heart Failure Medications:  Diuretics:: None    (One of the following REQUIRED for EF </= 40%/SYSTOLIC FAILURE but MAY be used in EF% >40%/DIASTOLIC FAILURE)        ACE:: None        ARB:: None         ARNI:: None    (Beta Blockers)  NON- Evidenced Based Beta Blocker (for EF% >40%/DIASTOLIC FAILURE): None    Evidenced Based Beta Blocker::(REQUIRED for EF% <40%/SYSTOLIC FAILURE) None  ...................................................................................................................................................    Failed to redirect to the Timeline version of the [employer].      Patient's weights and intake/output reviewed    Daily Weight log at bedside, patient/family participation in use of log: \"yes    Patient's current weight today shows a difference of 7.9 lbs less than last documented weight.      Intake/Output Summary (Last 24 hours) at 5/7/2024 1400  Last data filed at 5/7/2024 0904  Gross per 24 hour   Intake 220 ml   Output 550 ml   Net -330 ml       Education Booklet Provided: yes    Comorbidities Reviewed Yes    Patient has a past medical history of Anxiety, Asthma, Cancer (HCC), Cancer of lung (HCC), CHF (congestive heart failure) (HCC), COPD (chronic obstructive pulmonary disease) (HCC), Cyclic vomiting syndrome, Cysts of both kidneys, Depression, Fatty liver, Gastritis, MI (myocardial infarction) (HCC), Panic attacks, Pneumonia, Pre-diabetes, and Tricuspid regurgitation.     >>For CHF and Comorbidity documentation on Education Time and Topics, please see Education Tab      Pt sitting in bed at this time on  2 L O2. Pt with complaints of shortness of breath. Pt without lower extremity edema.     Patient and/or Family's stated Goal of Care this Admission: reduce shortness of breath, increase activity tolerance, and reduce lower extremity edema prior to discharge        :   
Comprehensive Nutrition Assessment    Type and Reason for Visit:  Initial, RD Nutrition Re-Screen/LOS    Nutrition Recommendations/Plan:   Continue JUJU diet and encourage PO intake   RD to add ensure once daily   Monitor nutrition adequacy, pertinent labs, bowel habits, wt changes, and clinical progress     Malnutrition Assessment:  Malnutrition Status:  At risk for malnutrition (Comment) (05/10/24 4980)    Context:  Acute Illness     Findings of the 6 clinical characteristics of malnutrition:  Energy Intake:  Mild decrease in energy intake (Comment)    Nutrition Assessment:    LOS assessment: 63 y.o f w/ PMHx of moderate COPD with chronic hypoxemic hypercapnic respiratory failure, CHF, non-obstructive CAD, GERD and anxiety admitted w/ worsening SOB with increased productive cough. On 3 L/min NC. On JUJU diet. PO intakes %, mostly % of meals in EMR. No wt loss in EMR. Will add ensure once daily. Continue to encourage PO intake, will continue to monitor.    Nutrition Related Findings:    Labs reviewed. No BM Wound Type: None       Current Nutrition Intake & Therapies:    Average Meal Intake: 26-50%, 51-75%, %  Average Supplements Intake: None Ordered  ADULT DIET; Regular; No Added Salt (3-4 gm)    Anthropometric Measures:  Height: 160 cm (5' 3\")  Ideal Body Weight (IBW): 115 lbs (52 kg)       Current Body Weight: 58.5 kg (129 lb), 112.2 % IBW. Weight Source: Standing Scale  Current BMI (kg/m2): 22.9        Weight Adjustment For: No Adjustment                 BMI Categories: Normal Weight (BMI 18.5-24.9)    Estimated Daily Nutrient Needs:  Energy Requirements Based On: Kcal/kg (25-30 kcals/kg)  Weight Used for Energy Requirements: Current (59 kg)  Energy (kcal/day): 4591-4807  Weight Used for Protein Requirements: Current (1-1.2 g/kg)  Protein (g/day): 59-71 g  Method Used for Fluid Requirements: 1 ml/kcal  Fluid (ml/day):      Nutrition Diagnosis:   Increased nutrient needs related to increase demand 
Comprehensive Nutrition Assessment    Type and Reason for Visit:  Reassess    Nutrition Recommendations/Plan:   Continue JUJU diet and encourage po intakes  Continue ensure at dinner - no vanilla  Monitor nutrition adequacy, pertinent labs, bowel habits, wt changes, and clinical progress     Malnutrition Assessment:  Malnutrition Status:  At risk for malnutrition (Comment) (05/16/24 1317)    Context:  Acute Illness     Findings of the 6 clinical characteristics of malnutrition:  Energy Intake:  Mild decrease in energy intake (Comment)    Nutrition Assessment:    Follow up: Pt continues on JUJU diet with ensure at dinner. PO intakes %, x2 0% ONS intakes recorded per EMR. Pt reports a varying appetite x1 month. Reports eating at least half of meals. Reports not drinking ensures d/t being warm, discussed ways to get ONS cold. Prefers chocolate or strawberry, RD to specify. RD encouraged good po and protein intakes. No further questions, will continue to monitor.    Nutrition Related Findings:    x1 BM 5/15. -6.4 L since admit. Wound Type: None       Current Nutrition Intake & Therapies:    Average Meal Intake: 51-75%, %  Average Supplements Intake: 0%  ADULT DIET; Regular; No Added Salt (3-4 gm)  ADULT ORAL NUTRITION SUPPLEMENT; Dinner; Standard High Calorie/High Protein Oral Supplement    Anthropometric Measures:  Height: 160 cm (5' 3\")  Ideal Body Weight (IBW): 115 lbs (52 kg)       Current Body Weight: 55.8 kg (123 lb 0.3 oz), 112.2 % IBW. Weight Source: Standing Scale  Current BMI (kg/m2): 21.8        Weight Adjustment For: No Adjustment                 BMI Categories: Normal Weight (BMI 18.5-24.9)    Estimated Daily Nutrient Needs:  Energy Requirements Based On: Kcal/kg (25-30 kcals/kg)  Weight Used for Energy Requirements: Current (59 kg)  Energy (kcal/day): 7958-4964  Weight Used for Protein Requirements: Current (1-1.2 g/kg)  Protein (g/day): 59-71 g  Method Used for Fluid Requirements: 1 
[id number]-[id number] scheduled for 1400 on 5/16  Electronically signed by MACEY Kang CNP on 5/16/24 at 10:53 AM EDT  
Occupational Therapy  Facility/Department: [facility] A2 CARD TELEMETRY  Daily Treatment Note  NAME: Joyce Uribe  : 1960  MRN: [id number]  Date of Service: 2024    Discharge Recommendations:  Subacute/Skilled Nursing Facility  OT Equipment Recommendations  Equipment Needed: No    New recs for SNF as pt is weak and would benefit from more skilled therapy before returning home alone.    AM-PAC score  AM-PAC Inpatient Daily Activity Raw Score: 20 (24 1016)  AM-PAC Inpatient ADL T-Scale Score : 42.03 (24 1016)  ADL Inpatient CMS 0-100% Score: 38.32 (24 1016)  ADL Inpatient CMS G-Code Modifier : CJ (24 1016)    Therapy discharge recommendations take into account each patient's current medical complexities and are subject to input/oversight from the patient's healthcare team.   Barriers to Home Discharge:   [] Steps to access home entry or bed/bath:   [] Unable to transfer, ambulate, or propel wheelchair household distances without assist   [x] Limited available assist at home upon discharge    [] Patient or family requests d/c to post-acute facility    [x] Poor cognition/safety awareness for d/c to home alone    []Unable to maintain ordered weight bearing status    [] Patient with salient signs of long-standing immobility   [x] Patient is at risk for falls    [x] Other: Decreased safety and independence w/ ADLs.     If pt is unable to be seen after this session, please let this note serve as discharge summary.  Please see case management note for discharge disposition.  Thank you.    Patient Diagnosis(es): The primary encounter diagnosis was Acute respiratory failure with hypoxia and hypercapnia (HCC). A diagnosis of COPD with acute exacerbation (HCC) was also pertinent to this visit.     Assessment    Assessment: Pt received for OT session resting in bed to address current functional deficits that inhibit independence and safety with ADLs and functional mobility. Pt agreeable to session, 
Occupational Therapy  Facility/Department: [facility] A2 CARD TELEMETRY  Daily Treatment Note  NAME: Joyce Uribe  : 1960  MRN: [id number]    Date of Service: 2024    Discharge Recommendations:  24 hour supervision or assist, Home with Home health OT  OT Equipment Recommendations  Equipment Needed: No    Patient Diagnosis(es): The primary encounter diagnosis was Acute respiratory failure with hypoxia and hypercapnia (HCC). A diagnosis of COPD with acute exacerbation (HCC) was also pertinent to this visit.     Assessment    Assessment: Pt tolerates session fair. Requires incr encouragement for participation in OT. Pt educated on benefits and importance of OOB mobility as well as strengthening while in the hospital. Pt performs BUE ex to incr strength and activity tolerance for ADLs and fxl transfers. Pt limited by decreased activity tolerance and SOB during exercises, SpO2 > 95%. Additionally, pt educated on ECON techniques and PLB, verbalizes understanding. Pt will cont to benefit from skilled OT in acute care. Recommending SNF at d/c. If pt refuses, recommending 24 hr SPV and HHOT.   Activity Tolerance: Patient limited by endurance;Patient limited by fatigue  Discharge Recommendations: Subacute/Skilled Nursing Facility  Equipment Needed: No      Plan   Occupational Therapy Plan  Times Per Week: 2-3x/wk  Current Treatment Recommendations: Strengthening;Balance training;Functional mobility training;Endurance training;Safety education & training;Patient/Caregiver education & training;Self-Care / ADL;Home management training     Restrictions  Restrictions/Precautions  Restrictions/Precautions: Fall Risk;Up as Tolerated  Required Braces or Orthoses?: No  Position Activity Restriction  Other position/activity restrictions: tele, O2    Subjective   Subjective  Subjective: Pt in bed at OT arrival. Sleeping at entry. Requires incr encouragement to participate in OT. Does not report pain.  Orientation  Overall 
Occupational Therapy  Facility/Department: [facility] A2 CARD TELEMETRY  Occupational Therapy Initial Assessment and Treatment    Name: Joyce Uribe  : 1960  MRN: [id number]  Date of Service: 2024    Discharge Recommendations:  24 hour supervision or assist, Home with Home health OT, S Level 1  OT Equipment Recommendations  Equipment Needed: No     If pt is unable to be seen after this session, please let this note serve as discharge summary.  Please see case management note for discharge disposition.  Thank you.    Patient Diagnosis(es): The primary encounter diagnosis was Acute respiratory failure with hypoxia and hypercapnia (HCC). A diagnosis of COPD with acute exacerbation (HCC) was also pertinent to this visit.  Past Medical History:  has a past medical history of Anxiety, Asthma, Cancer (HCC), Cancer of lung (HCC), CHF (congestive heart failure) (HCC), COPD (chronic obstructive pulmonary disease) (HCC), Cyclic vomiting syndrome, Cysts of both kidneys, Depression, Fatty liver, Gastritis, MI (myocardial infarction) (HCC), Panic attacks, Pneumonia, Pre-diabetes, and Tricuspid regurgitation.  Past Surgical History:  has a past surgical history that includes Cholecystectomy (); Hysterectomy (); hernia repair; Colonoscopy (); Colonoscopy (); Upper gastrointestinal endoscopy (); Tonsillectomy; Incontinence surgery (Left); Colonoscopy (08/15/2017); Upper gastrointestinal endoscopy (10/03/2017); Endoscopy, colon, diagnostic; Lung cancer surgery; and Ovary removal ().           Assessment   Performance deficits / Impairments: Decreased functional mobility ;Decreased endurance;Decreased ADL status;Decreased balance;Decreased strength  Assessment: Pt is 64 yo female presenting from home w/ friend in 2nd floor apartment w/ 15 steps. PLOF mod A-IND for ADLs and SPV-IND for mobility w/o AD. Pt is functioning below baseline this date, requiring total A for toileting tasks, SBA for bed mobility, 
Occupational Therapy  Facility/Department: [facility] A2 CARD TELEMETRY  Daily Treatment Note  NAME: Joyce Uribe  : 1960  MRN: [id number]    Date of Service: 5/10/2024    Discharge Recommendations:  24 hour supervision or assist, Home with Home health OT  OT Equipment Recommendations  Equipment Needed: No    Therapy discharge recommendations are subject to collaboration from the patient’s interdisciplinary healthcare team, including MD and case management recommendations.    Barriers to Home Discharge:   [x] Steps to access home entry or bed/bath:   [] Unable to transfer, ambulate, or propel wheelchair household distances without assist   [] Limited available assist at home upon discharge    [] Patient or family requests d/c to post-acute facility    [] Poor cognition/safety awareness for d/c to home alone    [] Unable to maintain ordered weight bearing status    [] Patient with salient signs of long-standing immobility   [x] Decreased independence with ADLs   [x] Increased risk for falls   [] Other:    If pt is unable to be seen after this session, please let this note serve as discharge summary.  Please see case management note for discharge disposition.  Thank you.    Patient Diagnosis(es): The primary encounter diagnosis was Acute respiratory failure with hypoxia and hypercapnia (HCC). A diagnosis of COPD with acute exacerbation (HCC) was also pertinent to this visit.     Assessment    Assessment: Pt tolerated session well, performing bed mobility w/ SPV and sit<>stand transfers and short mobility w/ SBA and RW. She required VC for hand placement and safety awareness throuhgout transfers. Performed LB dressing in bed w/ set up. Pt stated she is concerned about going home but also is afraid to go to SNF 2/2 concern about never leaving. Educated pt on level of care at SNF vs home w/ therapy vs IPR. Pt verbalized understanding. She also stated she is agreeable to home therapy and that she wants to work with 
Occupational/Physical Therapy    OT/PT orders received, chart reviewed, RN approving therapy. Pt in bed upon OT/PT arrival, declining therapy evaluation, stating she is too tired and will be leaving soon to get a new PICC line. Offered light mobility to EOB and bed level ADLs. Pt again declined stating she wanted to rest. Will follow up as pt condition and therapy schedule permit.       Mady Bedolla, OTR/L    Michelle Jorgensen, PT, DPT  
PULMONARY AND CRITICAL CARE INPATIENT NOTE        Joyce Uribe   : 1960  MRN: [id number]     Admitting Physician: Glenn Saini MD  Attending Physician: Fernando Cook MD  PCP: Jaky Ramirez APRN - NP    Admission: 2024   Date of Service: 5/15/2024    Chief Complaint   Patient presents with    Shortness of Breath     EMS reports they were called for shortness of breath. Upon arrival oxygen was 70% on 3L. Hx of COPD. Wears 3L of oxygen baseline            ASSESSMENT & PLAN       63 y.o. pleasant  female patient with:        # AECOPD  # COPD with emphysema, recurrent exacerbations and admissions  # Chronic hypoxic respiratory failure.    On 3 L at baseline  # Lung cancer history s/p resection  # HFpEF/CAD/HTN  # HELEN  # Multisubstance use history   tobacco, marijuana and benzodiazepines  Patient completed antibiotics and steroid course  Keep on Brovana and Pulmicort with DuoNebs while inpatient.  Resume home Trelegy at discharge  Continue Roflumilast to see if that would help decrease exacerbations  pCO2 on ABG and overnight pulse oximetry did not qualify patient for noninvasive ventilation  Bronchial hygiene measures  Aspiration precautions  Target blood sugar between 140 and 180 mg/dL  DVT ppx measures.  PT/OT when patient is able to participate  Pulmonary rehab referral at discharge if patient qualifies.  Follow-up with Dr. Alexander in the pulmonary clinic in 2 weeks after discharge  Patient remains anxious about discharge with fears of not getting timely care for her respiratory status at rehab.  Increasing anxiety medications might be reasonable.      LOS: Hospital Day: 12    Code:Full Code      Subjective/Objective             Summary:   63-year-old female patient with PMH of HTN, COPD/emphysema, lung cancer history, HELEN, CAD, HFpEF, marijuana and benzodiazepine use, previous admissions in July/2023 as well as 2024 for AECOPD who got admitted again on May 4, 2024 with shortness of 
PULMONARY AND CRITICAL CARE INPATIENT NOTE        Joyce Uribe   : 1960  MRN: [id number]     Admitting Physician: Glenn Saini MD  Attending Physician: Fernando Cook MD  PCP: Jaky Ramirez APRN - NP    Admission: 2024   Date of Service: 2024    Chief Complaint   Patient presents with    Shortness of Breath     EMS reports they were called for shortness of breath. Upon arrival oxygen was 70% on 3L. Hx of COPD. Wears 3L of oxygen baseline            ASSESSMENT & PLAN       63 y.o. pleasant  female patient with:    Hospital Problems             Last Modified POA    * (Principal) COPD exacerbation (HCC) 2024 Yes       # AECOPD  # COPD with emphysema, recurrent exacerbations and admissions  # Chronic hypoxic respiratory failure.  On 3 L at baseline  # Lung cancer history s/p resection  # HFpEF/CAD/HTN  # HELEN  # Multisubstance use history including tobacco, marijuana and benzodiazepines  Patient completed antibiotics and steroid course  Keep on Brovana and Pulmicort with DuoNebs while inpatient.  Resume home Trelegy at discharge  Continue Roflumilast to see if that would help decrease exacerbations  pCO2 on ABG and overnight pulse oximetry did not qualify patient for noninvasive ventilation  Bronchial hygiene measures  Aspiration precautions  Target blood sugar between 140 and 180 mg/dL  DVT ppx measures.  PT/OT when patient is able to participate  Pulmonary rehab referral at discharge if patient qualifies.  Follow-up with Dr. Alexander in the pulmonary clinic in 2 weeks after discharge      LOS: Hospital Day: 11    Code:Full Code      Subjective/Objective             Summary:   63-year-old female patient with PMH of HTN, COPD/emphysema, lung cancer history, HELEN, CAD, HFpEF, marijuana and benzodiazepine use, previous admissions in July/2023 as well as 2024 for AECOPD who got admitted again on May 4, 2024 with shortness of breath/AECOPD.      Interval history/Encounter 2024 
Patient placed on over nite pulse ox study on 2 lpm.Saturation 100%  
Patient transported to [facility] on Bip[facility].  
Patient: Joyce Uribe MRN: [id number]  Date of  Admission: 5/4/2024   YOB: 1960  Age: 63 y.o.  Sex: female    Unit: [facility] A2 CARD TELEMETRY  Room/Bed: [facility]1/0201-01 Admitting Physician: CARTER RODRIGUEZ    Attending Physician:  Fernando Cook MD         Pulmonary Service Note      SUBJECTIVE:    Patient still feels short of breath.  She denies any additional symptoms.        OBJECTIVE    Medications    Continuous Infusions:   sodium chloride         Scheduled Meds:   furosemide  20 mg Oral Daily    ipratropium 0.5 mg-albuterol 2.5 mg  1 Dose Inhalation Q4H WA RT    aspirin  81 mg Oral Daily    atorvastatin  80 mg Oral Nightly    busPIRone  10 mg Oral BID    pantoprazole  40 mg Oral Daily    mometasone-formoterol  2 puff Inhalation BID RT    sodium chloride flush  5-40 mL IntraVENous 2 times per day    enoxaparin  40 mg SubCUTAneous QPM    guaiFENesin  600 mg Oral BID       PRN Meds:  HYDROcodone 5 mg - acetaminophen, diazePAM, methocarbamol, metoclopramide, sodium chloride flush, sodium chloride, ondansetron **OR** ondansetron, melatonin, polyethylene glycol, acetaminophen **OR** acetaminophen, benzonatate    Physical    Patient Vitals for the past 24 hrs:   BP Temp Temp src Pulse Resp SpO2 Height   05/11/24 1207 -- -- -- -- -- 96 % --   05/11/24 1201 91/60 97.8 °F (36.6 °C) Oral 80 20 96 % --   05/11/24 0745 100/64 97.9 °F (36.6 °C) Oral 68 20 96 % --   05/11/24 0725 -- -- -- -- -- 96 % --   05/11/24 0422 91/74 97.9 °F (36.6 °C) -- 76 20 96 % --   05/11/24 0000 92/66 97.3 °F (36.3 °C) Oral 79 19 96 % --   05/10/24 1949 91/65 98.1 °F (36.7 °C) -- 89 20 95 % --   05/10/24 1933 -- -- -- -- -- 97 % --   05/10/24 1820 -- -- -- -- 20 -- --   05/10/24 1750 -- -- -- -- 20 -- --   05/10/24 1600 95/63 98.4 °F (36.9 °C) Oral 87 18 95 % --   05/10/24 1539 -- -- -- -- -- 99 % --   05/10/24 1537 97/62 -- -- 82 -- 96 % --   05/10/24 1501 -- -- -- -- -- -- 1.6 m (5' 3\")        Physical Exam  Constitutional:       
Physical Therapy  Facility/Department: [facility] A2 CARD TELEMETRY  Daily Treatment Note  NAME: Joyce Uribe  : 1960  MRN: [id number]    Date of Service: 5/10/2024    Discharge Recommendations:  Subacute/Skilled Nursing Facility   PT Equipment Recommendations  Equipment Needed: No    Therapy discharge recommendations take into account each patient's current medical complexities and are subject to input/oversight from the patient's healthcare team.   Barriers to Home Discharge:   [x] Steps to access home entry or bed/bath:   [x] Unable to transfer, ambulate, or propel wheelchair household distances without assist   [] Limited available assist at home upon discharge    [] Patient or family requests d/c to post-acute facility    [] Poor cognition/safety awareness for d/c to home alone    []Unable to maintain ordered weight bearing status    [] Patient with salient signs of long-standing immobility   [x] Patient is at risk for falls    [] Other:    If pt is unable to be seen after this session, please let this note serve as discharge summary.  Please see case management note for discharge disposition.  Thank you.    Patient Diagnosis(es): The primary encounter diagnosis was Acute respiratory failure with hypoxia and hypercapnia (HCC). A diagnosis of COPD with acute exacerbation (HCC) was also pertinent to this visit.    Assessment   Assessment: Pt presents to [facility] for COPD exacerbation. Pt presents below baseline for PT treatment with decreased strength, decreased endurance, and decreased mobility. Pt denied OOB mobility but agreed to bed level exercises. Pt SPO2 WFL during session. Due to pt 13 BERNARD and lack of participation this date pt is unsafe to return home at this time. Pt would continue to benefit from skilled PT to aid in the above deficits and a safe DC SNF when medically appropriate.  Activity Tolerance: Patient limited by endurance;Patient limited by fatigue  Equipment Needed: No          Plan    Physical 
Physical Therapy  Facility/Department: [facility] A2 CARD TELEMETRY  Daily Treatment Note  NAME: Joyce Uribe  : 1960  MRN: [id number]    Date of Service: 2024    Discharge Recommendations:  Subacute/Skilled Nursing Facility      Barriers to Home Discharge:   [x] Steps to access home entry or bed/bath: 2nd floor apartment    [x] Unable to transfer, ambulate, or propel wheelchair household distances without assist   [] Limited available assist at home upon discharge    [] Patient or family requests d/c to post-acute facility    [] Poor cognition/safety awareness for d/c to home alone    [] Unable to maintain ordered weight bearing status    [x] Patient with salient signs of long-standing immobility   [] Decreased independence with ADLs   [x] Increased risk for falls   [] Other:  Patient Diagnosis(es): The primary encounter diagnosis was Acute respiratory failure with hypoxia and hypercapnia (HCC). A diagnosis of COPD with acute exacerbation (HCC) was also pertinent to this visit.    Assessment   Assessment: Pt demos bed mobility with SBA and sit<>stand with CGA to a RW. Pt declined attempt to transfer to chair but amb 10' rw cga. Pt will continue to benefit from skilled PT services to address current deficits. Pt requesting to attempt PT session in the afternoon next time. It is rec pt DC to SNF when deemed medically appropriate.  Activity Tolerance: Patient limited by fatigue;Other (comment) (pt agreed she was anxious, RN offered to bring anxiety med)     Plan    Physical Therapy Plan  General Plan: 3-5 times per week  Current Treatment Recommendations: Strengthening;ROM;Balance training;Gait training;Functional mobility training;Stair training;Home exercise program;Therapeutic activities;Safety education & training;Patient/Caregiver education & training;Transfer training;Endurance training;Pain management     Restrictions  Restrictions/Precautions  Restrictions/Precautions: Fall Risk, Up as 
Physical Therapy  Facility/Department: [facility] A2 CARD TELEMETRY  Daily Treatment Note  NAME: Joyce Uribe  : 1960  MRN: [id number]    Date of Service: 2024    Discharge Recommendations:  Subacute/Skilled Nursing Facility   PT Equipment Recommendations  Equipment Needed: No    Therapy discharge recommendations take into account each patient's current medical complexities and are subject to input/oversight from the patient's healthcare team.   Barriers to Home Discharge:   [x] Steps to access home entry or bed/bath:   [x] Unable to transfer, ambulate, or propel wheelchair household distances without assist   [] Limited available assist at home upon discharge    [] Patient or family requests d/c to post-acute facility    [x] Poor cognition/safety awareness for d/c to home alone    []Unable to maintain ordered weight bearing status    [] Patient with salient signs of long-standing immobility   [x] Patient is at risk for falls   [] Other:    If pt is unable to be seen after this session, please let this note serve as discharge summary.  Please see case management note for discharge disposition.  Thank you.    Patient Diagnosis(es): The primary encounter diagnosis was Acute respiratory failure with hypoxia and hypercapnia (HCC). A diagnosis of COPD with acute exacerbation (HCC) was also pertinent to this visit.    Assessment   Assessment: Pt presents to [facility] for COPD. Pt presents below baseline for PT treatment with decreased strength, decreased endurance, and decreased mobility. Pt presents with self limiting behavior. Pt declined sitting in chair this date. Pt agreed to ambulate in room. Pt CGA for 15 ft with RW. Pt not receptive of therapy education for OOB mobility and utilizing bathroom for urination vs purewick. Pt would continue to benefit from skilled PT to aid in the above deficits and a safe DC SNF when medically appropriate.  Activity Tolerance: Patient limited by endurance;Patient limited by 
Physical Therapy/Occupational Therapy    Chart review and attempted to see patient this afternoon for follow up therapy session. Pt lying in bed stating \"I've been working all day\" and declining therapy at this time despite encouragement. Will continue to attempt to follow up as schedule and pt condition allow.     Deonte Pitts, PT, DPT  Mady Bedolla, OTR/L  
Pt discharged per MD order to home with home aid. IV removed, tele box removed and placed in dirty tele bin. Discharge review and medication education given no additional questions. Pt transported with personal belongings by private ambulance to personal  home.   
Pt refuses iv reinsertion and states she is going home tomorrow. Meds PO. Paged to cross-cover.  
Pt states that she does not want to wear BIPAP unless her \"oxygen drops below 92%\" and she \"doesn't want to get dependant on it\". RT recommended that pt wear BIPAP, pt still declined. Pt resting comfortably on NC.   
Pt states that she does not want to wear BIPAP. Pt resting comfortably on NC.  
Pt was laying down in bed with respiratory therapist at bedside. Pt was A&O x4. Bed was locked and lowered with side table, call light and personal belongings within reach. Pt made no further request at the time.   
Pt was sitting up in bed eating tomato soup. Pt reported have some pain but denied any need for pain medication. Bed was locked and lowered. Call light, side table, and personal possession were in reach. Pt requested room door closed. No further request were made.   
Normocephalic and atraumatic.      Nose: Nose normal.      Mouth/Throat:      Pharynx: No oropharyngeal exudate.   Eyes:      General: No scleral icterus.        Right eye: No discharge.         Left eye: No discharge.   Cardiovascular:      Rate and Rhythm: Normal rate.      Heart sounds: No murmur heard.     No gallop.   Pulmonary:      Effort: Pulmonary effort is normal.      Breath sounds: Wheezing present. No rales.   Abdominal:      General: Abdomen is flat. Bowel sounds are normal. There is no distension.      Tenderness: There is no abdominal tenderness.   Musculoskeletal:         General: No swelling.      Cervical back: Normal range of motion.   Skin:     General: Skin is warm and dry.   Neurological:      Mental Status: She is alert and oriented to person, place, and time.              Weight  Weight change: 0.72 kg (1 lb 9.4 oz)      Labs:   Recent Labs     05/06/24 0420 05/07/24 0417 05/08/24  0456   WBC 11.8* 11.5* 9.6   HGB 9.9* 9.2* 9.4*   HCT 30.8* 29.8* 29.9*    202 193      Recent Labs     05/06/24 0420 05/07/24 0417 05/08/24  0456    138 139   K 4.2 4.6 4.2   CL 99 98* 100   CO2 34* 30 31   BUN 24* 24* 17   GLUCOSE 90 110* 87       Additional labs      Radiology, images personally reviewed.   Chest x-ray 5/4/2024  No obvious consolidations.  Heart borders pushed to the right.  Costophrenic angles are not sharp bilaterally.     7/20/2023 CT PE  No pericardial or pleural effusions.  No lymphadenopathy.  Centrilobular emphysema bronchiectasis bilaterally.  Infiltrates at the right base.     PFT - None    Assessment/Plan   63 y.o. year old female with significant past medical history of centrilobular emphysema, chronic hypoxic respiratory failure, lung cancer, diastolic dysfunction happens to [facility] for shortness of breath.       Assessment:  AECOPD  Chronic hypoxic respiratory failure  Shortness of breath  Centrilobular emphysema  Former smoker  Adenocarcinoma lung s/p 
°F (36.5 °C)   SpO2: 95%        Bed Mobility Training  Bed Mobility Training: Yes  Supine to Sit: Supervision;Additional time;Adaptive equipment (HOB elevated, use of rails)  Sit to Supine: Supervision;Additional time  Scooting: Supervision  Balance  Sitting: Intact  Standing: Impaired (rw)  Standing - Static: Good;Occasional  Standing - Dynamic: Good;Occasional  Transfer Training  Transfer Training: Yes (rw)  Overall Level of Assistance: Stand-by assistance  Interventions: Verbal cues;Safety awareness training  Sit to Stand: Stand-by assistance;Additional time;Adaptive equipment  Stand to Sit: Stand-by assistance;Adaptive equipment;Additional time  Toilet Transfer: Stand-by assistance;Adaptive equipment  Gait  Gait Training: Yes  Overall Level of Assistance: Stand-by assistance  Distance (ft): 30 Feet (2x)  Assistive Device: Walker, rolling;Gait belt  Interventions: Verbal cues  Base of Support: Narrowed  Speed/Yina: Pace decreased (< 100 feet/min)  Step Length: Left shortened;Right shortened  Gait Abnormalities: Decreased step clearance     PT Exercises  Exercise Treatment: performed     Safety Devices  Type of Devices: Patient at risk for falls;All fall risk precautions in place;Nurse notified;Gait belt;Call light within reach;Heels elevated for pressure relief;Left in bed;Bed alarm in place  Restraints  Restraints Initially in Place: No       Goals  Short Term Goals  Time Frame for Short Term Goals: 5/14/24  Short Term Goal 1: pt will complete bed mobility with mod I  -5/13 SUP  Short Term Goal 2: pt will complete transfers with SBA  -5/13 met  Short Term Goal 3: pt will ambulate 50ft with LRAD or no AD and CGA  -5/13 30 rw sba  Short Term Goal 4: pt will navigate up<>down 8 steps with min A  -5/13 NT  Short Term Goal 5: pt will participate in 10-12 reps of BLE exercises by 5/10/24 -5/10 MET  Additional Goals?: No  Patient Goals   Patient Goals : \"to go home\"    Education  Patient Education  Education Given 
diastolic dysfunction happens to [facility] for shortness of breath.       Assessment:  AECOPD  Chronic hypoxic respiratory failure  Shortness of breath  Centrilobular emphysema  Former smoker  Adenocarcinoma lung s/p resection     Plan:  - Cont supplemental O2 therapy with goal saturation 88-92%, she is on her baseline of 2L  - Finish Prednisone 40mg PO qd x 5 day course  - Duonebs q4h while awake  - Z-tony, may benefit from three times weekly zithro to avoid AECOPD  - Cont Dulera, restart on Trelegy when discharged  - Given dose of lasix today, monitor strict I&O's  - Cont smoking cessation  - She sees Dr. Alexander outpatient, can set up hospital f/u appt on discharge   - Cont PT/OT    Hugo Joiner MD    [facility]  [phone]      Please note that some or all of this record was generated using voice recognition software. If there are any questions about the content of this document, please contact the author as some errors in transcription may have occurred.   
monitoring for Contrast Induced Nephropathy  [] IV Narcotic analgesia for ADR   [] Aggressive IV diuresis requiring serial monitoring for renal impairment and electrolyte derangements  [] Hypertonic Saline requiring serial renal monitoring for appropriate electrolyte correction rate   [] Critical electrolyte abnormalities requiring IV replacement and close serial monitoring  [] Insulin - monitoring FSBS for Hypoglycemic ADR  [] Anticoagulation requiring close serial monitoring and dose adjustments at high risk of ADR   [] HD requiring close serial monitoring of electrolytes and fluid status  [] Other -  [] Change in code status:    [] Decision to escalate care:    [] Major surgery/procedure with associated risk factors:    ----------------------------------------------------------------------  C. Data (any 2)  [] Discussed management of the case with consultants as follows:    [x] Discussed the discharge plan in detail with case mgt including timing/barriers to discharge, need for support services and placement decision   [] Imaging personally reviewed and interpreted, includes:   [x] Telemetry monitoring as noted above  [] Data Review (any 3)  [] Collateral history obtained from:    [] All available Consultant notes from yesterday/today were reviewed  [x] All current labs were reviewed and interpreted for clinical significance   [x] Appropriate follow-up labs were ordered    Medications:  Personally reviewed in detail in conjunction w/ labs as documented for evidence of drug toxicity.     Infusion Medications    sodium chloride       Scheduled Medications    ipratropium 0.5 mg-albuterol 2.5 mg  1 Dose Inhalation Q4H WA RT    azithromycin  500 mg IntraVENous Q24H    aspirin  81 mg Oral Daily    atorvastatin  80 mg Oral Nightly    busPIRone  10 mg Oral BID    pantoprazole  40 mg Oral Daily    predniSONE  40 mg Oral Daily    mometasone-formoterol  2 puff Inhalation BID RT    sodium chloride flush  5-40 mL IntraVENous 
requiring serial renal monitoring for Contrast Induced Nephropathy  [] IV Narcotic analgesia for ADR   [] Aggressive IV diuresis requiring serial monitoring for renal impairment and electrolyte derangements  [] Hypertonic Saline requiring serial renal monitoring for appropriate electrolyte correction rate   [] Critical electrolyte abnormalities requiring IV replacement and close serial monitoring  [] Insulin - monitoring FSBS for Hypoglycemic ADR  [] Anticoagulation requiring close serial monitoring and dose adjustments at high risk of ADR   [] HD requiring close serial monitoring of electrolytes and fluid status  [] Other -  [] Change in code status:    [] Decision to escalate care:    [] Major surgery/procedure with associated risk factors:    ----------------------------------------------------------------------  C. Data (any 2)  [] Discussed management of the case with consultants as follows:    [x] Discussed the discharge plan in detail with case mgt including timing/barriers to discharge, need for support services and placement decision   [] Imaging personally reviewed and interpreted, includes:   [x] Telemetry monitoring as noted above  [] Data Review (any 3)  [] Collateral history obtained from:    [x] All available Consultant notes from yesterday/today were reviewed  [x] All current labs were reviewed and interpreted for clinical significance   [x] Appropriate follow-up labs were ordered    Medications:  Personally reviewed in detail in conjunction w/ labs as documented for evidence of drug toxicity.     Infusion Medications    sodium chloride       Scheduled Medications    azithromycin  250 mg Oral Daily    ipratropium 0.5 mg-albuterol 2.5 mg  1 Dose Inhalation Q4H WA RT    aspirin  81 mg Oral Daily    atorvastatin  80 mg Oral Nightly    busPIRone  10 mg Oral BID    pantoprazole  40 mg Oral Daily    mometasone-formoterol  2 puff Inhalation BID RT    sodium chloride flush  5-40 mL IntraVENous 2 times per 
   sodium chloride flush  5-40 mL IntraVENous 2 times per day    enoxaparin  40 mg SubCUTAneous QPM    guaiFENesin  600 mg Oral BID     PRN Meds: diazePAM, methocarbamol, metoclopramide, sodium chloride flush, sodium chloride, ondansetron **OR** ondansetron, melatonin, polyethylene glycol, acetaminophen **OR** acetaminophen, benzonatate     Labs:  Personally reviewed and interpreted for clinical significance.     Recent Labs     05/04/24 2156 05/05/24  0610   WBC 15.7* 12.8*   HGB 12.0 11.2*   HCT 39.1 36.1    233     Recent Labs     05/04/24 2156 05/05/24  0424    142   K 4.8 4.9   CL 99 101   CO2 33* 29   BUN 20 18   CREATININE 0.6 <0.5*   CALCIUM 9.0 8.6     Recent Labs     05/04/24 2156 05/04/24  2300   PROBNP 816*  --    TROPHS 23* 27*     No results for input(s): \"LABA1C\" in the last 72 hours.  Recent Labs     05/04/24 2156 05/05/24  0424   AST 29 20   ALT 46* 36   BILITOT <0.2 <0.2   ALKPHOS 96 76     Recent Labs     05/04/24 2156   INR 0.89       Urine Cultures:   Lab Results   Component Value Date/Time    LABURIN No growth at 18 to 36 hours 11/26/2020 04:01 PM     Blood Cultures:   Lab Results   Component Value Date/Time    BC No growth after 5 days of incubation. 01/25/2019 08:20 PM     Lab Results   Component Value Date/Time    BLOODCULT2 No growth after 5 days of incubation. 01/25/2019 08:20 PM     Organism:   Lab Results   Component Value Date/Time    ORG Yeast 05/24/2017 09:00 PM         Lesli Claire, APRN - CNP    
Daily    ipratropium 0.5 mg-albuterol 2.5 mg  1 Dose Inhalation BID RT    polyethylene glycol  17 g Oral BID    arformoterol tartrate  15 mcg Nebulization BID RT    budesonide  0.5 mg Nebulization BID RT    furosemide  20 mg Oral Daily    aspirin  81 mg Oral Daily    atorvastatin  80 mg Oral Nightly    busPIRone  10 mg Oral BID    pantoprazole  40 mg Oral Daily    sodium chloride flush  5-40 mL IntraVENous 2 times per day    enoxaparin  40 mg SubCUTAneous QPM    guaiFENesin  600 mg Oral BID     PRN Meds: HYDROcodone 5 mg - acetaminophen, diazePAM, methocarbamol, metoclopramide, sodium chloride flush, sodium chloride, ondansetron **OR** ondansetron, melatonin, acetaminophen **OR** acetaminophen, benzonatate     Labs:  Personally reviewed and interpreted for clinical significance.     Recent Labs     05/13/24  0651   WBC 7.3   HGB 10.5*   HCT 32.8*        Recent Labs     05/13/24  0651      K 4.6      CO2 30   BUN 18   CREATININE <0.5*   CALCIUM 8.7     No results for input(s): \"PROBNP\", \"TROPHS\" in the last 72 hours.    No results for input(s): \"LABA1C\" in the last 72 hours.  No results for input(s): \"AST\", \"ALT\", \"BILIDIR\", \"BILITOT\", \"ALKPHOS\" in the last 72 hours.  No results for input(s): \"INR\", \"LACTA\", \"TSH\" in the last 72 hours.    Urine Cultures:   Lab Results   Component Value Date/Time    LABURIN No growth at 18 to 36 hours 11/26/2020 04:01 PM     Blood Cultures:   Lab Results   Component Value Date/Time    BC No Growth after 4 days of incubation. 05/04/2024 11:00 PM     Lab Results   Component Value Date/Time    BLOODCULT2 No Growth after 4 days of incubation. 05/04/2024 11:00 PM     Organism:   Lab Results   Component Value Date/Time    ORG Yeast 05/24/2017 09:00 PM         Lesli Claire, APRN - CNP  
Little  How much help is needed for putting on and taking off regular upper body clothing?: A Little  How much help is needed for taking care of personal grooming?: None  How much help for eating meals?: None  AM-PAC Inpatient Daily Activity Raw Score: 20  AM-PAC Inpatient ADL T-Scale Score : 42.03  ADL Inpatient CMS 0-100% Score: 38.32  ADL Inpatient CMS G-Code Modifier : CJ    Therapy Time   Individual Concurrent Group Co-treatment   Time In 0840         Time Out 0903         Minutes 23         Timed Code Treatment Minutes: 23 Minutes     Althea Alvarado, OT     
Sheet:  https://www.fda.gov/media/389101/download  Provider Fact Sheet: https://www.fda.gov/media/575002/download  EUA: https://www.fda.gov/media/323226/download  IFU: https://www.fda.gov/media/236982/download    Methodology:  RT-PCR          Influenza A NOT DETECTED     Influenza B NOT DETECTED               ________________  Vitals:    05/16/24 0800 05/16/24 0935 05/16/24 0939 05/16/24 0949   BP: (!) 107/57 (!) 94/59 125/64 129/64   Pulse: 94 89     Resp: 18      Temp: 97.9 °F (36.6 °C)      TempSrc: Oral      SpO2: 95% 94%     Weight:       Height:              Intake/Output Summary (Last 24 hours) at 5/16/2024 1033  Last data filed at 5/16/2024 0950  Gross per 24 hour   Intake 700 ml   Output 600 ml   Net 100 ml     Net IO Since Admission: -6,155 mL [05/16/24 1033]        Physical Exam:  General appearance: In no apparent distress.  HEENT: Moist mucus membranes.  Cardiac: Normal S1 and S2.  Lungs: Decreased air entry bilaterally  Abdomen: Soft.  Back & Extremities: Symmetric pulses with good perfusion.  Neurological: No focal deficit..       ________________________________________________________  Electronically signed by:  Sasha Sawyer MD,FACP    5/16/2024    10:33 AM.     [facility]  [address], [place] [address]   Phone (office): [phone]/wgi  
Time   Individual Concurrent Group Co-treatment   Time In 1445         Time Out 1509         Minutes 24         Timed Code Treatment Minutes: 24 Minutes       Bessie Peterson, PT           
sodium chloride flush  5-40 mL IntraVENous 2 times per day    enoxaparin  40 mg SubCUTAneous QPM    guaiFENesin  600 mg Oral BID     PRN Meds: HYDROcodone 5 mg - acetaminophen, diazePAM, methocarbamol, metoclopramide, sodium chloride flush, sodium chloride, ondansetron **OR** ondansetron, melatonin, polyethylene glycol, acetaminophen **OR** acetaminophen, benzonatate     Labs:  Personally reviewed and interpreted for clinical significance.     Recent Labs     05/08/24  0456 05/10/24  0525   WBC 9.6 11.5*   HGB 9.4* 10.3*   HCT 29.9* 33.0*    209       Recent Labs     05/08/24 0456 05/09/24 0437 05/10/24  0525    137 139   K 4.2 4.5 4.1    99 97*   CO2 31 32 33*   BUN 17 22* 19   CREATININE <0.5* <0.5* <0.5*   CALCIUM 8.8 8.6 8.4       Recent Labs     05/09/24  0437   PROBNP 907*       No results for input(s): \"LABA1C\" in the last 72 hours.    No results for input(s): \"AST\", \"ALT\", \"BILIDIR\", \"BILITOT\", \"ALKPHOS\" in the last 72 hours.    No results for input(s): \"INR\", \"LACTA\", \"TSH\" in the last 72 hours.      Urine Cultures:   Lab Results   Component Value Date/Time    LABURIN No growth at 18 to 36 hours 11/26/2020 04:01 PM     Blood Cultures:   Lab Results   Component Value Date/Time    BC No Growth after 4 days of incubation. 05/04/2024 11:00 PM     Lab Results   Component Value Date/Time    BLOODCULT2 No Growth after 4 days of incubation. 05/04/2024 11:00 PM     Organism:   Lab Results   Component Value Date/Time    ORG Yeast 05/24/2017 09:00 PM         Jase Faulkner, APRN - CNP    
swab specimens for use under the FDA’s Emergency Use  Authorization (EUA) only.    Patient Fact Sheet:  https://www.fda.gov/media/120175/download  Provider Fact Sheet: https://www.fda.gov/media/470080/download  EUA: https://www.fda.gov/media/674390/download  IFU: https://www.fda.gov/media/392781/download    Methodology:  RT-PCR          Influenza A NOT DETECTED     Influenza B NOT DETECTED    Culture, Respiratory [[id number]]     Order Status: No result Specimen: Sputum Expectorated                ________________  Vitals:    05/13/24 0526 05/13/24 0717 05/13/24 0730 05/13/24 0915   BP:   94/62 121/60   Pulse:   77 92   Resp:   20    Temp:   98.2 °F (36.8 °C)    TempSrc:   Axillary    SpO2:  98% 100% 97%   Weight: 56.8 kg (125 lb 4.8 oz)      Height:              Intake/Output Summary (Last 24 hours) at 5/13/2024 1114  Last data filed at 5/13/2024 1022  Gross per 24 hour   Intake 600 ml   Output 400 ml   Net 200 ml     Net IO Since Admission: -3,825 mL [05/13/24 1114]        Physical Exam:  General appearance: In no apparent distress.  HEENT: Moist mucus membranes.  Cardiac: Normal S1 and S2.  Lungs: Decreased air entry bilaterally  Abdomen: Soft.  Back & Extremities: Symmetric pulses with good perfusion.  Neurological: No focal deficit..       ________________________________________________________  Electronically signed by:  Sasha Sawyer MD,FACP    5/13/2024    11:14 AM.     [facility]  [address], [place] [address]   Phone (office): [phone]     
Inpatient CMS 0-100% Score: 57.7  Mobility Inpatient CMS G-Code Modifier : CK    Goals  Short Term Goals  Time Frame for Short Term Goals: 5/14/24  Short Term Goal 1: pt will complete bed mobility with mod I  Short Term Goal 2: pt will complete transfers with SBA  Short Term Goal 3: pt will ambulate 50ft with LRAD or no AD and CGA  Short Term Goal 4: pt will navigate up<>down 8 steps with min A  Short Term Goal 5: pt will participate in 10-12 reps of BLE exercises by 5/10/24  Additional Goals?: No  Patient Goals   Patient Goals : \"to go home\"       Education  Patient Education  Education Given To: Patient  Education Provided: Role of Therapy;Plan of Care;Equipment;Transfer Training  Education Method: Verbal  Barriers to Learning: None  Education Outcome: Verbalized understanding;Demonstrated understanding;Continued education needed      Therapy Time   Individual Concurrent Group Co-treatment   Time In 0858         Time Out 0918         Minutes 20         Timed Code Treatment Minutes: 10 Minutes (10 min eval)       Michelle Jorgensen, PT, DPT    If pt is unable to be seen after this session, please let this note serve as discharge summary.  Please see case management note for discharge disposition.  Thank you.

## 2024-05-16 NOTE — CARE COORDINATION
Peer to peer done but denial upheld.        CASE MANAGEMENT DISCHARGE SUMMARY      Discharge to: home with [facility]     IMM given: (date) 5/16/24    Transportation: ambulance    Medical Transport explained to pt/family. Pt/family voice no agency preference.    Agency used:   time:5pm   Ambulance form completed: Yes    Confirmed discharge plan with:patient/Ty/RN/Danny with [facility]/message left with [facility]      Patient: yes     Family:  yes        RN, name: Gabby     Note: Discharging nurse to complete ALLAN, reconcile AVS, and place final copy with patient's discharge packet.

## 2024-05-17 NOTE — TELEPHONE ENCOUNTER
IGNACIO pt and scheduled her for VV with Dr. Alexander on 6/6/24.  Pt states she cannot leave her house.

## 2024-05-17 NOTE — DISCHARGE SUMMARY
Hospital Medicine Discharge Summary    Patient: Joyce Uribe   : 1960     Admit Date: 2024   Discharge Date: 2024  Disposition:  []Home   []HHC  []SNF  []ECF  []Acute Rehab  []LTAC  []Hospice  Code status:  []Full  []DNR/CCA  []Limited (DNR/CCA with Do Not Intubate)  []DNRCC  Condition at Discharge: Stable  Primary Care Provider: Jaky Ramirez APRN - NP    Admitting Provider: Glenn Saini MD  Discharge Provider: MACEY Kang - CNP     Discharge Diagnoses:      Active Hospital Problems    Diagnosis     COPD exacerbation (HCC) [J44.1]      Presenting Admission History: \"63-year-old female with a PMHx of moderate COPD with chronic hypoxemic hypercapnic respiratory failure (on 3L NC), HFpEF, non-obstructive CAD, GERD and anxiety who presented to the ED with worsening SOB with increased productive cough of green sputum for 2 days. Quit smoking 3 years ago. Compliant with inhalers. Initially breathing improved since discharge on , but worse over 2 days. Denied any fevers, chills, CP, N/V, abdominal pain, C/D or urinary changes. Denied any alcohol use\"      Assessment/Plan:       Current Principal Problem:  COPD exacerbation (HCC)     COPD exacerbation, resolved.  Continue brovana, pulmicort, duonebs, mucinex while admitted.  Pulmonary consulted, appreciate recommendations.  Completed course of azithromycin and prednisone.  Discharge with roflumilast daily, trelegy, PRN albuterol, duonebs PRN     Bronchitis.  Sepsis present on arrival: leukocytosis, tachycardia, tachypnea.  Continue azithromycin.       Acute (improved) on chronic hypoxic/hypercapnic respiratory failure.   As evidenced by increased work of breathing, oxygen sat of 85% on baseline oxygen.  Reports wearing 3L at home, currently stable on 3L per NC.  Was on BiPAP on admit.  Titrate as able for oxygen saturation 88-92%.  Encourage pulmonary toilet. Pulm consulted.  Received lasix 20mg IV x1 on .  Continue PO lasix 20mg

## 2024-05-20 ENCOUNTER — HOSPITAL ENCOUNTER (INPATIENT)
Age: 64
LOS: 8 days | Discharge: NURSING, REHAB OR LONG-TERM CARE | DRG: 177 | End: 2024-05-29
Attending: EMERGENCY MEDICINE | Admitting: INTERNAL MEDICINE
Payer: COMMERCIAL

## 2024-05-20 ENCOUNTER — APPOINTMENT (OUTPATIENT)
Dept: GENERAL RADIOLOGY | Age: 64
DRG: 177 | End: 2024-05-20
Payer: COMMERCIAL

## 2024-05-20 DIAGNOSIS — J96.21 ACUTE ON CHRONIC RESPIRATORY FAILURE WITH HYPOXIA AND HYPERCAPNIA (HCC): Primary | ICD-10-CM

## 2024-05-20 DIAGNOSIS — G89.4 CHRONIC PAIN SYNDROME: ICD-10-CM

## 2024-05-20 DIAGNOSIS — J96.22 ACUTE ON CHRONIC RESPIRATORY FAILURE WITH HYPOXIA AND HYPERCAPNIA (HCC): Primary | ICD-10-CM

## 2024-05-20 DIAGNOSIS — J18.9 HCAP (HEALTHCARE-ASSOCIATED PNEUMONIA): ICD-10-CM

## 2024-05-20 DIAGNOSIS — F41.9 ANXIETY: ICD-10-CM

## 2024-05-20 LAB
BASE EXCESS BLDV CALC-SCNC: 1.3 MMOL/L (ref -3–3)
BASOPHILS # BLD: 0.1 K/UL (ref 0–0.2)
BASOPHILS NFR BLD: 0.7 %
CO2 BLDV-SCNC: 38 MMOL/L
COHGB MFR BLDV: 2.4 % (ref 0–1.5)
DEPRECATED RDW RBC AUTO: 16.2 % (ref 12.4–15.4)
EOSINOPHIL # BLD: 0.4 K/UL (ref 0–0.6)
EOSINOPHIL NFR BLD: 3.9 %
HCO3 BLDV-SCNC: 34.4 MMOL/L (ref 23–29)
HCT VFR BLD AUTO: 35.3 % (ref 36–48)
HGB BLD-MCNC: 10.7 G/DL (ref 12–16)
LACTATE BLDV-SCNC: 2.9 MMOL/L (ref 0.4–1.9)
LYMPHOCYTES # BLD: 4.9 K/UL (ref 1–5.1)
LYMPHOCYTES NFR BLD: 43.2 %
MCH RBC QN AUTO: 29.4 PG (ref 26–34)
MCHC RBC AUTO-ENTMCNC: 30.4 G/DL (ref 31–36)
MCV RBC AUTO: 96.7 FL (ref 80–100)
METHGB MFR BLDV: 0.3 %
MONOCYTES # BLD: 0.7 K/UL (ref 0–1.3)
MONOCYTES NFR BLD: 6.4 %
NEUTROPHILS # BLD: 5.2 K/UL (ref 1.7–7.7)
NEUTROPHILS NFR BLD: 45.8 %
O2 THERAPY: ABNORMAL
PCO2 BLDV: 117.5 MMHG (ref 40–50)
PH BLDV: 7.08 [PH] (ref 7.35–7.45)
PLATELET # BLD AUTO: 285 K/UL (ref 135–450)
PMV BLD AUTO: 9.3 FL (ref 5–10.5)
PO2 BLDV: 77.5 MMHG (ref 25–40)
RBC # BLD AUTO: 3.65 M/UL (ref 4–5.2)
SAO2 % BLDV: 92 %
WBC # BLD AUTO: 11.3 K/UL (ref 4–11)

## 2024-05-20 PROCEDURE — 96368 THER/DIAG CONCURRENT INF: CPT

## 2024-05-20 PROCEDURE — 94644 CONT INHLJ TX 1ST HOUR: CPT

## 2024-05-20 PROCEDURE — 71045 X-RAY EXAM CHEST 1 VIEW: CPT

## 2024-05-20 PROCEDURE — 87636 SARSCOV2 & INF A&B AMP PRB: CPT

## 2024-05-20 PROCEDURE — 2700000000 HC OXYGEN THERAPY PER DAY

## 2024-05-20 PROCEDURE — 80053 COMPREHEN METABOLIC PANEL: CPT

## 2024-05-20 PROCEDURE — 94640 AIRWAY INHALATION TREATMENT: CPT

## 2024-05-20 PROCEDURE — 83880 ASSAY OF NATRIURETIC PEPTIDE: CPT

## 2024-05-20 PROCEDURE — 5A09357 ASSISTANCE WITH RESPIRATORY VENTILATION, LESS THAN 24 CONSECUTIVE HOURS, CONTINUOUS POSITIVE AIRWAY PRESSURE: ICD-10-PCS | Performed by: INTERNAL MEDICINE

## 2024-05-20 PROCEDURE — 83605 ASSAY OF LACTIC ACID: CPT

## 2024-05-20 PROCEDURE — 93005 ELECTROCARDIOGRAM TRACING: CPT | Performed by: EMERGENCY MEDICINE

## 2024-05-20 PROCEDURE — 85025 COMPLETE CBC W/AUTO DIFF WBC: CPT

## 2024-05-20 PROCEDURE — 99285 EMERGENCY DEPT VISIT HI MDM: CPT

## 2024-05-20 PROCEDURE — 6360000002 HC RX W HCPCS

## 2024-05-20 PROCEDURE — 96366 THER/PROPH/DIAG IV INF ADDON: CPT

## 2024-05-20 PROCEDURE — 87040 BLOOD CULTURE FOR BACTERIA: CPT

## 2024-05-20 PROCEDURE — 94660 CPAP INITIATION&MGMT: CPT

## 2024-05-20 PROCEDURE — 6370000000 HC RX 637 (ALT 250 FOR IP): Performed by: EMERGENCY MEDICINE

## 2024-05-20 PROCEDURE — 94761 N-INVAS EAR/PLS OXIMETRY MLT: CPT

## 2024-05-20 PROCEDURE — 84484 ASSAY OF TROPONIN QUANT: CPT

## 2024-05-20 PROCEDURE — 82803 BLOOD GASES ANY COMBINATION: CPT

## 2024-05-20 RX ORDER — IPRATROPIUM BROMIDE AND ALBUTEROL SULFATE 2.5; .5 MG/3ML; MG/3ML
3 SOLUTION RESPIRATORY (INHALATION)
Status: DISCONTINUED | OUTPATIENT
Start: 2024-05-21 | End: 2024-05-20

## 2024-05-20 RX ORDER — IPRATROPIUM BROMIDE AND ALBUTEROL SULFATE 2.5; .5 MG/3ML; MG/3ML
3 SOLUTION RESPIRATORY (INHALATION) ONCE
Status: COMPLETED | OUTPATIENT
Start: 2024-05-20 | End: 2024-05-20

## 2024-05-20 RX ORDER — METHYLPREDNISOLONE SODIUM SUCCINATE 125 MG/2ML
INJECTION, POWDER, LYOPHILIZED, FOR SOLUTION INTRAMUSCULAR; INTRAVENOUS
Status: COMPLETED
Start: 2024-05-20 | End: 2024-05-20

## 2024-05-20 RX ADMIN — METHYLPREDNISOLONE SODIUM SUCCINATE 125 MG: 125 INJECTION INTRAMUSCULAR; INTRAVENOUS at 23:21

## 2024-05-20 RX ADMIN — IPRATROPIUM BROMIDE AND ALBUTEROL SULFATE 3 DOSE: 2.5; .5 SOLUTION RESPIRATORY (INHALATION) at 23:28

## 2024-05-21 LAB
ALBUMIN SERPL-MCNC: 4.2 G/DL (ref 3.4–5)
ALBUMIN/GLOB SERPL: 1.4 {RATIO} (ref 1.1–2.2)
ALP SERPL-CCNC: 100 U/L (ref 40–129)
ALT SERPL-CCNC: 31 U/L (ref 10–40)
ANION GAP SERPL CALCULATED.3IONS-SCNC: 11 MMOL/L (ref 3–16)
AST SERPL-CCNC: 26 U/L (ref 15–37)
BASE EXCESS BLDV CALC-SCNC: -1.5 MMOL/L (ref -3–3)
BASE EXCESS BLDV CALC-SCNC: 2.2 MMOL/L (ref -3–3)
BASE EXCESS BLDV CALC-SCNC: 2.3 MMOL/L (ref -3–3)
BILIRUB SERPL-MCNC: <0.2 MG/DL (ref 0–1)
BUN SERPL-MCNC: 18 MG/DL (ref 7–20)
CALCIUM SERPL-MCNC: 9.5 MG/DL (ref 8.3–10.6)
CHLORIDE SERPL-SCNC: 97 MMOL/L (ref 99–110)
CO2 BLDV-SCNC: 28 MMOL/L
CO2 BLDV-SCNC: 28 MMOL/L
CO2 BLDV-SCNC: 34 MMOL/L
CO2 SERPL-SCNC: 33 MMOL/L (ref 21–32)
COHGB MFR BLDV: 2.6 % (ref 0–1.5)
COHGB MFR BLDV: 3.5 % (ref 0–1.5)
COHGB MFR BLDV: 4.7 % (ref 0–1.5)
CREAT SERPL-MCNC: 0.8 MG/DL (ref 0.6–1.2)
EKG ATRIAL RATE: 144 BPM
EKG ATRIAL RATE: 88 BPM
EKG DIAGNOSIS: NORMAL
EKG DIAGNOSIS: NORMAL
EKG P AXIS: 66 DEGREES
EKG P AXIS: 72 DEGREES
EKG P-R INTERVAL: 134 MS
EKG P-R INTERVAL: 134 MS
EKG Q-T INTERVAL: 276 MS
EKG Q-T INTERVAL: 396 MS
EKG QRS DURATION: 74 MS
EKG QRS DURATION: 80 MS
EKG QTC CALCULATION (BAZETT): 427 MS
EKG QTC CALCULATION (BAZETT): 479 MS
EKG R AXIS: 80 DEGREES
EKG R AXIS: 83 DEGREES
EKG T AXIS: 56 DEGREES
EKG T AXIS: 74 DEGREES
EKG VENTRICULAR RATE: 144 BPM
EKG VENTRICULAR RATE: 88 BPM
FLUAV RNA RESP QL NAA+PROBE: NOT DETECTED
FLUBV RNA RESP QL NAA+PROBE: NOT DETECTED
GFR SERPLBLD CREATININE-BSD FMLA CKD-EPI: 82 ML/MIN/{1.73_M2}
GLUCOSE SERPL-MCNC: 238 MG/DL (ref 70–99)
HCO3 BLDV-SCNC: 25.9 MMOL/L (ref 23–29)
HCO3 BLDV-SCNC: 26.9 MMOL/L (ref 23–29)
HCO3 BLDV-SCNC: 31.5 MMOL/L (ref 23–29)
LACTATE BLDV-SCNC: 1 MMOL/L (ref 0.4–1.9)
METHGB MFR BLDV: 0.2 %
METHGB MFR BLDV: 0.3 %
METHGB MFR BLDV: 0.3 %
NT-PROBNP SERPL-MCNC: 640 PG/ML (ref 0–124)
O2 THERAPY: ABNORMAL
PCO2 BLDV: 42.6 MMHG (ref 40–50)
PCO2 BLDV: 56.3 MMHG (ref 40–50)
PCO2 BLDV: 75.6 MMHG (ref 40–50)
PH BLDV: 7.24 [PH] (ref 7.35–7.45)
PH BLDV: 7.28 [PH] (ref 7.35–7.45)
PH BLDV: 7.42 [PH] (ref 7.35–7.45)
PO2 BLDV: 163.8 MMHG (ref 25–40)
PO2 BLDV: 175.2 MMHG (ref 25–40)
PO2 BLDV: 54.6 MMHG (ref 25–40)
POTASSIUM SERPL-SCNC: 3.8 MMOL/L (ref 3.5–5.1)
PROT SERPL-MCNC: 7.1 G/DL (ref 6.4–8.2)
SAO2 % BLDV: 100 %
SAO2 % BLDV: 87 %
SAO2 % BLDV: 99 %
SARS-COV-2 RNA RESP QL NAA+PROBE: NOT DETECTED
SODIUM SERPL-SCNC: 141 MMOL/L (ref 136–145)
TROPONIN, HIGH SENSITIVITY: 34 NG/L (ref 0–14)
TROPONIN, HIGH SENSITIVITY: 52 NG/L (ref 0–14)

## 2024-05-21 PROCEDURE — 6360000002 HC RX W HCPCS: Performed by: INTERNAL MEDICINE

## 2024-05-21 PROCEDURE — 93005 ELECTROCARDIOGRAM TRACING: CPT | Performed by: EMERGENCY MEDICINE

## 2024-05-21 PROCEDURE — 97166 OT EVAL MOD COMPLEX 45 MIN: CPT

## 2024-05-21 PROCEDURE — 94660 CPAP INITIATION&MGMT: CPT

## 2024-05-21 PROCEDURE — 6370000000 HC RX 637 (ALT 250 FOR IP): Performed by: INTERNAL MEDICINE

## 2024-05-21 PROCEDURE — 2700000000 HC OXYGEN THERAPY PER DAY

## 2024-05-21 PROCEDURE — 97530 THERAPEUTIC ACTIVITIES: CPT

## 2024-05-21 PROCEDURE — 96368 THER/DIAG CONCURRENT INF: CPT

## 2024-05-21 PROCEDURE — 6360000002 HC RX W HCPCS: Performed by: EMERGENCY MEDICINE

## 2024-05-21 PROCEDURE — 94640 AIRWAY INHALATION TREATMENT: CPT

## 2024-05-21 PROCEDURE — 2580000003 HC RX 258: Performed by: INTERNAL MEDICINE

## 2024-05-21 PROCEDURE — 97162 PT EVAL MOD COMPLEX 30 MIN: CPT

## 2024-05-21 PROCEDURE — 82803 BLOOD GASES ANY COMBINATION: CPT

## 2024-05-21 PROCEDURE — 2060000000 HC ICU INTERMEDIATE R&B

## 2024-05-21 PROCEDURE — 36415 COLL VENOUS BLD VENIPUNCTURE: CPT

## 2024-05-21 PROCEDURE — 96365 THER/PROPH/DIAG IV INF INIT: CPT

## 2024-05-21 PROCEDURE — 93010 ELECTROCARDIOGRAM REPORT: CPT | Performed by: INTERNAL MEDICINE

## 2024-05-21 PROCEDURE — 94761 N-INVAS EAR/PLS OXIMETRY MLT: CPT

## 2024-05-21 PROCEDURE — 2580000003 HC RX 258: Performed by: EMERGENCY MEDICINE

## 2024-05-21 PROCEDURE — 96366 THER/PROPH/DIAG IV INF ADDON: CPT

## 2024-05-21 PROCEDURE — 84484 ASSAY OF TROPONIN QUANT: CPT

## 2024-05-21 RX ORDER — ONDANSETRON HYDROCHLORIDE 8 MG/1
8 TABLET, FILM COATED ORAL EVERY 8 HOURS PRN
Status: DISCONTINUED | OUTPATIENT
Start: 2024-05-21 | End: 2024-05-21

## 2024-05-21 RX ORDER — ONDANSETRON 4 MG/1
4 TABLET, ORALLY DISINTEGRATING ORAL EVERY 8 HOURS PRN
Status: DISCONTINUED | OUTPATIENT
Start: 2024-05-21 | End: 2024-05-29 | Stop reason: HOSPADM

## 2024-05-21 RX ORDER — PANTOPRAZOLE SODIUM 40 MG/1
40 TABLET, DELAYED RELEASE ORAL DAILY
Status: DISCONTINUED | OUTPATIENT
Start: 2024-05-21 | End: 2024-05-23

## 2024-05-21 RX ORDER — 0.9 % SODIUM CHLORIDE 0.9 %
30 INTRAVENOUS SOLUTION INTRAVENOUS ONCE
Status: COMPLETED | OUTPATIENT
Start: 2024-05-21 | End: 2024-05-21

## 2024-05-21 RX ORDER — BUDESONIDE 0.5 MG/2ML
0.5 INHALANT ORAL
Status: DISCONTINUED | OUTPATIENT
Start: 2024-05-21 | End: 2024-05-29 | Stop reason: HOSPADM

## 2024-05-21 RX ORDER — ENOXAPARIN SODIUM 100 MG/ML
40 INJECTION SUBCUTANEOUS DAILY
Status: DISCONTINUED | OUTPATIENT
Start: 2024-05-21 | End: 2024-05-29 | Stop reason: HOSPADM

## 2024-05-21 RX ORDER — BUSPIRONE HYDROCHLORIDE 5 MG/1
10 TABLET ORAL 3 TIMES DAILY
Status: DISCONTINUED | OUTPATIENT
Start: 2024-05-21 | End: 2024-05-29 | Stop reason: HOSPADM

## 2024-05-21 RX ORDER — POTASSIUM CHLORIDE 20 MEQ/1
40 TABLET, EXTENDED RELEASE ORAL PRN
Status: DISCONTINUED | OUTPATIENT
Start: 2024-05-21 | End: 2024-05-24

## 2024-05-21 RX ORDER — ONDANSETRON 2 MG/ML
4 INJECTION INTRAMUSCULAR; INTRAVENOUS EVERY 6 HOURS PRN
Status: DISCONTINUED | OUTPATIENT
Start: 2024-05-21 | End: 2024-05-29 | Stop reason: HOSPADM

## 2024-05-21 RX ORDER — ACETAMINOPHEN 325 MG/1
650 TABLET ORAL EVERY 6 HOURS PRN
Status: DISCONTINUED | OUTPATIENT
Start: 2024-05-21 | End: 2024-05-29 | Stop reason: HOSPADM

## 2024-05-21 RX ORDER — IPRATROPIUM BROMIDE AND ALBUTEROL SULFATE 2.5; .5 MG/3ML; MG/3ML
1 SOLUTION RESPIRATORY (INHALATION)
Status: DISCONTINUED | OUTPATIENT
Start: 2024-05-21 | End: 2024-05-28

## 2024-05-21 RX ORDER — SODIUM CHLORIDE 0.9 % (FLUSH) 0.9 %
5-40 SYRINGE (ML) INJECTION EVERY 12 HOURS SCHEDULED
Status: DISCONTINUED | OUTPATIENT
Start: 2024-05-21 | End: 2024-05-29 | Stop reason: HOSPADM

## 2024-05-21 RX ORDER — POTASSIUM CHLORIDE 7.45 MG/ML
10 INJECTION INTRAVENOUS PRN
Status: DISCONTINUED | OUTPATIENT
Start: 2024-05-21 | End: 2024-05-24

## 2024-05-21 RX ORDER — 0.9 % SODIUM CHLORIDE 0.9 %
1000 INTRAVENOUS SOLUTION INTRAVENOUS ONCE
Status: COMPLETED | OUTPATIENT
Start: 2024-05-21 | End: 2024-05-21

## 2024-05-21 RX ORDER — SODIUM CHLORIDE 9 MG/ML
INJECTION, SOLUTION INTRAVENOUS PRN
Status: DISCONTINUED | OUTPATIENT
Start: 2024-05-21 | End: 2024-05-29 | Stop reason: HOSPADM

## 2024-05-21 RX ORDER — VITAMIN B COMPLEX
1000 TABLET ORAL DAILY
Status: DISCONTINUED | OUTPATIENT
Start: 2024-05-21 | End: 2024-05-29 | Stop reason: HOSPADM

## 2024-05-21 RX ORDER — ATORVASTATIN CALCIUM 80 MG/1
80 TABLET, FILM COATED ORAL NIGHTLY
Status: DISCONTINUED | OUTPATIENT
Start: 2024-05-21 | End: 2024-05-29 | Stop reason: HOSPADM

## 2024-05-21 RX ORDER — POLYETHYLENE GLYCOL 3350 17 G/17G
17 POWDER, FOR SOLUTION ORAL DAILY PRN
Status: DISCONTINUED | OUTPATIENT
Start: 2024-05-21 | End: 2024-05-29 | Stop reason: HOSPADM

## 2024-05-21 RX ORDER — MAGNESIUM SULFATE IN WATER 40 MG/ML
2000 INJECTION, SOLUTION INTRAVENOUS PRN
Status: DISCONTINUED | OUTPATIENT
Start: 2024-05-21 | End: 2024-05-29 | Stop reason: HOSPADM

## 2024-05-21 RX ORDER — ACETAMINOPHEN 650 MG/1
650 SUPPOSITORY RECTAL EVERY 6 HOURS PRN
Status: DISCONTINUED | OUTPATIENT
Start: 2024-05-21 | End: 2024-05-29 | Stop reason: HOSPADM

## 2024-05-21 RX ORDER — ASPIRIN 81 MG/1
81 TABLET, CHEWABLE ORAL DAILY
Status: DISCONTINUED | OUTPATIENT
Start: 2024-05-21 | End: 2024-05-29 | Stop reason: HOSPADM

## 2024-05-21 RX ORDER — METHOCARBAMOL 500 MG/1
500 TABLET, FILM COATED ORAL EVERY 8 HOURS PRN
Status: DISCONTINUED | OUTPATIENT
Start: 2024-05-21 | End: 2024-05-29 | Stop reason: HOSPADM

## 2024-05-21 RX ORDER — SODIUM CHLORIDE 0.9 % (FLUSH) 0.9 %
5-40 SYRINGE (ML) INJECTION PRN
Status: DISCONTINUED | OUTPATIENT
Start: 2024-05-21 | End: 2024-05-29 | Stop reason: HOSPADM

## 2024-05-21 RX ADMIN — ONDANSETRON 4 MG: 4 TABLET, ORALLY DISINTEGRATING ORAL at 08:49

## 2024-05-21 RX ADMIN — BUSPIRONE HYDROCHLORIDE 10 MG: 5 TABLET ORAL at 14:16

## 2024-05-21 RX ADMIN — VANCOMYCIN HYDROCHLORIDE 750 MG: 750 INJECTION, POWDER, LYOPHILIZED, FOR SOLUTION INTRAVENOUS at 01:26

## 2024-05-21 RX ADMIN — IPRATROPIUM BROMIDE AND ALBUTEROL SULFATE 1 DOSE: 2.5; .5 SOLUTION RESPIRATORY (INHALATION) at 15:40

## 2024-05-21 RX ADMIN — WATER 40 MG: 1 INJECTION INTRAMUSCULAR; INTRAVENOUS; SUBCUTANEOUS at 21:05

## 2024-05-21 RX ADMIN — BUSPIRONE HYDROCHLORIDE 10 MG: 5 TABLET ORAL at 08:49

## 2024-05-21 RX ADMIN — METHOCARBAMOL 500 MG: 500 TABLET ORAL at 15:40

## 2024-05-21 RX ADMIN — ONDANSETRON 4 MG: 2 INJECTION INTRAMUSCULAR; INTRAVENOUS at 16:35

## 2024-05-21 RX ADMIN — BUDESONIDE 500 MCG: 0.5 SUSPENSION RESPIRATORY (INHALATION) at 19:56

## 2024-05-21 RX ADMIN — SODIUM CHLORIDE 1000 ML: 9 INJECTION, SOLUTION INTRAVENOUS at 02:15

## 2024-05-21 RX ADMIN — WATER 40 MG: 1 INJECTION INTRAMUSCULAR; INTRAVENOUS; SUBCUTANEOUS at 14:16

## 2024-05-21 RX ADMIN — CEFEPIME 2000 MG: 2 INJECTION, POWDER, FOR SOLUTION INTRAVENOUS at 05:07

## 2024-05-21 RX ADMIN — BUDESONIDE 500 MCG: 0.5 SUSPENSION RESPIRATORY (INHALATION) at 08:16

## 2024-05-21 RX ADMIN — SODIUM CHLORIDE 1674 ML: 9 INJECTION, SOLUTION INTRAVENOUS at 01:12

## 2024-05-21 RX ADMIN — SODIUM CHLORIDE, PRESERVATIVE FREE 10 ML: 5 INJECTION INTRAVENOUS at 21:05

## 2024-05-21 RX ADMIN — PIPERACILLIN AND TAZOBACTAM 3375 MG: 3; .375 INJECTION, POWDER, LYOPHILIZED, FOR SOLUTION INTRAVENOUS at 00:55

## 2024-05-21 RX ADMIN — PANTOPRAZOLE SODIUM 40 MG: 40 TABLET, DELAYED RELEASE ORAL at 08:49

## 2024-05-21 RX ADMIN — BUSPIRONE HYDROCHLORIDE 10 MG: 5 TABLET ORAL at 21:05

## 2024-05-21 RX ADMIN — ATORVASTATIN CALCIUM 80 MG: 80 TABLET, FILM COATED ORAL at 21:05

## 2024-05-21 RX ADMIN — IPRATROPIUM BROMIDE AND ALBUTEROL SULFATE 1 DOSE: 2.5; .5 SOLUTION RESPIRATORY (INHALATION) at 19:49

## 2024-05-21 RX ADMIN — ASPIRIN 81 MG 81 MG: 81 TABLET ORAL at 08:49

## 2024-05-21 RX ADMIN — WATER 40 MG: 1 INJECTION INTRAMUSCULAR; INTRAVENOUS; SUBCUTANEOUS at 05:13

## 2024-05-21 RX ADMIN — SODIUM CHLORIDE 10 ML: 9 INJECTION, SOLUTION INTRAVENOUS at 16:40

## 2024-05-21 RX ADMIN — ENOXAPARIN SODIUM 40 MG: 100 INJECTION SUBCUTANEOUS at 08:50

## 2024-05-21 RX ADMIN — IPRATROPIUM BROMIDE AND ALBUTEROL SULFATE 1 DOSE: 2.5; .5 SOLUTION RESPIRATORY (INHALATION) at 08:16

## 2024-05-21 RX ADMIN — CEFEPIME 2000 MG: 2 INJECTION, POWDER, FOR SOLUTION INTRAVENOUS at 16:41

## 2024-05-21 RX ADMIN — Medication 1000 UNITS: at 08:50

## 2024-05-21 ASSESSMENT — PAIN SCALES - WONG BAKER
WONGBAKER_NUMERICALRESPONSE: NO HURT

## 2024-05-21 ASSESSMENT — PAIN SCALES - GENERAL: PAINLEVEL_OUTOF10: 0

## 2024-05-21 NOTE — ED TRIAGE NOTES
Pt arrives from home on bipap via squad.  Reported she has these episodes of falling out.  She was 60% at home on 4L  Squad reported 86 on bi pap.  Pt arrives in respiratory distress is able to follow commands.

## 2024-05-21 NOTE — H&P
Risk  (5/5/2024)    Housing Stability Vital Sign     Unable to Pay for Housing in the Last Year: No     Number of Places Lived in the Last Year: 1     Unstable Housing in the Last Year: No       Medications:   Medications:    sodium chloride  1,000 mL IntraVENous Once    sodium chloride flush  5-40 mL IntraVENous 2 times per day    enoxaparin  40 mg SubCUTAneous Daily    budesonide  0.5 mg Nebulization BID RT    ipratropium 0.5 mg-albuterol 2.5 mg  1 Dose Inhalation Q4H WA RT    methylPREDNISolone  40 mg IntraVENous Q8H    cefepime  2,000 mg IntraVENous Q12H    aspirin  81 mg Oral Daily    atorvastatin  80 mg Oral Nightly    busPIRone  10 mg Oral TID    pantoprazole  40 mg Oral Daily    vitamin D3  1,000 Units Oral Daily      Infusions:    sodium chloride       PRN Meds: sodium chloride flush, 5-40 mL, PRN  sodium chloride, , PRN  potassium chloride, 40 mEq, PRN   Or  potassium alternative oral replacement, 40 mEq, PRN   Or  potassium chloride, 10 mEq, PRN  magnesium sulfate, 2,000 mg, PRN  ondansetron, 4 mg, Q8H PRN   Or  ondansetron, 4 mg, Q6H PRN  polyethylene glycol, 17 g, Daily PRN  acetaminophen, 650 mg, Q6H PRN   Or  acetaminophen, 650 mg, Q6H PRN  methocarbamol, 500 mg, Q8H PRN  ondansetron, 8 mg, Q8H PRN        Labs      CBC:   Recent Labs     05/20/24  2339   WBC 11.3*   HGB 10.7*        BMP:    Recent Labs     05/20/24  2339      K 3.8   CL 97*   CO2 33*   BUN 18   CREATININE 0.8   GLUCOSE 238*     Hepatic:   Recent Labs     05/20/24  2339   AST 26   ALT 31   BILITOT <0.2   ALKPHOS 100     Lipids:   Lab Results   Component Value Date/Time    CHOL 172 09/06/2016 01:47 PM    HDL 59 09/06/2016 01:47 PM    HDL 58 09/29/2011 06:05 AM    TRIG 111 09/06/2016 01:47 PM     Hemoglobin A1C:   Lab Results   Component Value Date/Time    LABA1C 5.8 05/05/2024 06:10 AM     TSH:   Lab Results   Component Value Date/Time    TSH 0.96 01/17/2018 08:31 PM     Troponin: No results found for: \"TROPONINT\"  Lactic

## 2024-05-22 LAB
ANION GAP SERPL CALCULATED.3IONS-SCNC: 9 MMOL/L (ref 3–16)
BASOPHILS # BLD: 0 K/UL (ref 0–0.2)
BASOPHILS NFR BLD: 0.1 %
BUN SERPL-MCNC: 12 MG/DL (ref 7–20)
CALCIUM SERPL-MCNC: 9.1 MG/DL (ref 8.3–10.6)
CHLORIDE SERPL-SCNC: 102 MMOL/L (ref 99–110)
CO2 SERPL-SCNC: 29 MMOL/L (ref 21–32)
CREAT SERPL-MCNC: <0.5 MG/DL (ref 0.6–1.2)
DEPRECATED RDW RBC AUTO: 15.8 % (ref 12.4–15.4)
EOSINOPHIL # BLD: 0 K/UL (ref 0–0.6)
EOSINOPHIL NFR BLD: 0 %
GFR SERPLBLD CREATININE-BSD FMLA CKD-EPI: >90 ML/MIN/{1.73_M2}
GLUCOSE SERPL-MCNC: 123 MG/DL (ref 70–99)
HCT VFR BLD AUTO: 32.9 % (ref 36–48)
HGB BLD-MCNC: 10.5 G/DL (ref 12–16)
LYMPHOCYTES # BLD: 0.6 K/UL (ref 1–5.1)
LYMPHOCYTES NFR BLD: 7.5 %
MCH RBC QN AUTO: 30.3 PG (ref 26–34)
MCHC RBC AUTO-ENTMCNC: 31.7 G/DL (ref 31–36)
MCV RBC AUTO: 95.4 FL (ref 80–100)
MONOCYTES # BLD: 0.2 K/UL (ref 0–1.3)
MONOCYTES NFR BLD: 3.2 %
NEUTROPHILS # BLD: 7 K/UL (ref 1.7–7.7)
NEUTROPHILS NFR BLD: 89.2 %
PLATELET # BLD AUTO: 197 K/UL (ref 135–450)
PMV BLD AUTO: 9.3 FL (ref 5–10.5)
POTASSIUM SERPL-SCNC: 3.8 MMOL/L (ref 3.5–5.1)
RBC # BLD AUTO: 3.45 M/UL (ref 4–5.2)
SODIUM SERPL-SCNC: 140 MMOL/L (ref 136–145)
WBC # BLD AUTO: 7.8 K/UL (ref 4–11)

## 2024-05-22 PROCEDURE — 6360000002 HC RX W HCPCS: Performed by: INTERNAL MEDICINE

## 2024-05-22 PROCEDURE — 85025 COMPLETE CBC W/AUTO DIFF WBC: CPT

## 2024-05-22 PROCEDURE — 94640 AIRWAY INHALATION TREATMENT: CPT

## 2024-05-22 PROCEDURE — 6370000000 HC RX 637 (ALT 250 FOR IP): Performed by: INTERNAL MEDICINE

## 2024-05-22 PROCEDURE — 36415 COLL VENOUS BLD VENIPUNCTURE: CPT

## 2024-05-22 PROCEDURE — 2700000000 HC OXYGEN THERAPY PER DAY

## 2024-05-22 PROCEDURE — 80048 BASIC METABOLIC PNL TOTAL CA: CPT

## 2024-05-22 PROCEDURE — 2580000003 HC RX 258: Performed by: INTERNAL MEDICINE

## 2024-05-22 PROCEDURE — 94761 N-INVAS EAR/PLS OXIMETRY MLT: CPT

## 2024-05-22 PROCEDURE — 2060000000 HC ICU INTERMEDIATE R&B

## 2024-05-22 PROCEDURE — 94660 CPAP INITIATION&MGMT: CPT

## 2024-05-22 RX ADMIN — ONDANSETRON 4 MG: 2 INJECTION INTRAMUSCULAR; INTRAVENOUS at 02:10

## 2024-05-22 RX ADMIN — IPRATROPIUM BROMIDE AND ALBUTEROL SULFATE 1 DOSE: 2.5; .5 SOLUTION RESPIRATORY (INHALATION) at 07:27

## 2024-05-22 RX ADMIN — BUSPIRONE HYDROCHLORIDE 10 MG: 5 TABLET ORAL at 09:28

## 2024-05-22 RX ADMIN — METHOCARBAMOL 500 MG: 500 TABLET ORAL at 02:38

## 2024-05-22 RX ADMIN — ACETAMINOPHEN 650 MG: 325 TABLET ORAL at 03:31

## 2024-05-22 RX ADMIN — METHOCARBAMOL 500 MG: 500 TABLET ORAL at 21:03

## 2024-05-22 RX ADMIN — ONDANSETRON 4 MG: 2 INJECTION INTRAMUSCULAR; INTRAVENOUS at 23:11

## 2024-05-22 RX ADMIN — ONDANSETRON 4 MG: 2 INJECTION INTRAMUSCULAR; INTRAVENOUS at 16:17

## 2024-05-22 RX ADMIN — CEFEPIME 2000 MG: 2 INJECTION, POWDER, FOR SOLUTION INTRAVENOUS at 16:16

## 2024-05-22 RX ADMIN — Medication 1000 UNITS: at 09:28

## 2024-05-22 RX ADMIN — BUDESONIDE 500 MCG: 0.5 SUSPENSION RESPIRATORY (INHALATION) at 07:27

## 2024-05-22 RX ADMIN — IPRATROPIUM BROMIDE AND ALBUTEROL SULFATE 1 DOSE: 2.5; .5 SOLUTION RESPIRATORY (INHALATION) at 19:46

## 2024-05-22 RX ADMIN — ACETAMINOPHEN 650 MG: 325 TABLET ORAL at 16:23

## 2024-05-22 RX ADMIN — PANTOPRAZOLE SODIUM 40 MG: 40 TABLET, DELAYED RELEASE ORAL at 09:28

## 2024-05-22 RX ADMIN — METHOCARBAMOL 500 MG: 500 TABLET ORAL at 11:51

## 2024-05-22 RX ADMIN — WATER 40 MG: 1 INJECTION INTRAMUSCULAR; INTRAVENOUS; SUBCUTANEOUS at 23:12

## 2024-05-22 RX ADMIN — ENOXAPARIN SODIUM 40 MG: 100 INJECTION SUBCUTANEOUS at 09:28

## 2024-05-22 RX ADMIN — BUSPIRONE HYDROCHLORIDE 10 MG: 5 TABLET ORAL at 16:18

## 2024-05-22 RX ADMIN — BUSPIRONE HYDROCHLORIDE 10 MG: 5 TABLET ORAL at 21:00

## 2024-05-22 RX ADMIN — WATER 40 MG: 1 INJECTION INTRAMUSCULAR; INTRAVENOUS; SUBCUTANEOUS at 03:31

## 2024-05-22 RX ADMIN — ONDANSETRON 4 MG: 2 INJECTION INTRAMUSCULAR; INTRAVENOUS at 09:28

## 2024-05-22 RX ADMIN — WATER 40 MG: 1 INJECTION INTRAMUSCULAR; INTRAVENOUS; SUBCUTANEOUS at 11:51

## 2024-05-22 RX ADMIN — ASPIRIN 81 MG 81 MG: 81 TABLET ORAL at 09:28

## 2024-05-22 RX ADMIN — ATORVASTATIN CALCIUM 80 MG: 80 TABLET, FILM COATED ORAL at 21:00

## 2024-05-22 RX ADMIN — SODIUM CHLORIDE 10 ML: 9 INJECTION, SOLUTION INTRAVENOUS at 16:15

## 2024-05-22 RX ADMIN — IPRATROPIUM BROMIDE AND ALBUTEROL SULFATE 1 DOSE: 2.5; .5 SOLUTION RESPIRATORY (INHALATION) at 16:06

## 2024-05-22 RX ADMIN — CEFEPIME 2000 MG: 2 INJECTION, POWDER, FOR SOLUTION INTRAVENOUS at 03:35

## 2024-05-22 RX ADMIN — SODIUM CHLORIDE, PRESERVATIVE FREE 10 ML: 5 INJECTION INTRAVENOUS at 09:00

## 2024-05-22 RX ADMIN — BUDESONIDE 500 MCG: 0.5 SUSPENSION RESPIRATORY (INHALATION) at 19:46

## 2024-05-22 RX ADMIN — IPRATROPIUM BROMIDE AND ALBUTEROL SULFATE 1 DOSE: 2.5; .5 SOLUTION RESPIRATORY (INHALATION) at 11:19

## 2024-05-22 ASSESSMENT — PAIN SCALES - WONG BAKER
WONGBAKER_NUMERICALRESPONSE: NO HURT

## 2024-05-22 ASSESSMENT — PAIN - FUNCTIONAL ASSESSMENT: PAIN_FUNCTIONAL_ASSESSMENT: ACTIVITIES ARE NOT PREVENTED

## 2024-05-22 ASSESSMENT — PAIN DESCRIPTION - LOCATION: LOCATION: SHOULDER;OTHER (COMMENT)

## 2024-05-22 ASSESSMENT — PAIN SCALES - GENERAL: PAINLEVEL_OUTOF10: 8

## 2024-05-22 ASSESSMENT — PAIN DESCRIPTION - DESCRIPTORS: DESCRIPTORS: CRAMPING;ACHING

## 2024-05-23 LAB
EKG ATRIAL RATE: 72 BPM
EKG DIAGNOSIS: NORMAL
EKG P AXIS: 48 DEGREES
EKG P-R INTERVAL: 114 MS
EKG Q-T INTERVAL: 428 MS
EKG QRS DURATION: 82 MS
EKG QTC CALCULATION (BAZETT): 468 MS
EKG R AXIS: 66 DEGREES
EKG T AXIS: 60 DEGREES
EKG VENTRICULAR RATE: 72 BPM

## 2024-05-23 PROCEDURE — 93010 ELECTROCARDIOGRAM REPORT: CPT | Performed by: INTERNAL MEDICINE

## 2024-05-23 PROCEDURE — 2060000000 HC ICU INTERMEDIATE R&B

## 2024-05-23 PROCEDURE — 2580000003 HC RX 258: Performed by: INTERNAL MEDICINE

## 2024-05-23 PROCEDURE — 93005 ELECTROCARDIOGRAM TRACING: CPT

## 2024-05-23 PROCEDURE — 6360000002 HC RX W HCPCS: Performed by: INTERNAL MEDICINE

## 2024-05-23 PROCEDURE — 6370000000 HC RX 637 (ALT 250 FOR IP): Performed by: INTERNAL MEDICINE

## 2024-05-23 PROCEDURE — C9113 INJ PANTOPRAZOLE SODIUM, VIA: HCPCS | Performed by: INTERNAL MEDICINE

## 2024-05-23 PROCEDURE — 94640 AIRWAY INHALATION TREATMENT: CPT

## 2024-05-23 RX ORDER — PANTOPRAZOLE SODIUM 40 MG/10ML
40 INJECTION, POWDER, LYOPHILIZED, FOR SOLUTION INTRAVENOUS DAILY
Status: DISCONTINUED | OUTPATIENT
Start: 2024-05-23 | End: 2024-05-29 | Stop reason: HOSPADM

## 2024-05-23 RX ORDER — DIAZEPAM 5 MG/1
5 TABLET ORAL EVERY 12 HOURS PRN
Status: COMPLETED | OUTPATIENT
Start: 2024-05-23 | End: 2024-05-27

## 2024-05-23 RX ORDER — ARFORMOTEROL TARTRATE 15 UG/2ML
15 SOLUTION RESPIRATORY (INHALATION)
Status: DISCONTINUED | OUTPATIENT
Start: 2024-05-23 | End: 2024-05-29 | Stop reason: HOSPADM

## 2024-05-23 RX ORDER — HYDROCODONE BITARTRATE AND ACETAMINOPHEN 5; 325 MG/1; MG/1
1 TABLET ORAL EVERY 12 HOURS PRN
Status: DISCONTINUED | OUTPATIENT
Start: 2024-05-23 | End: 2024-05-26

## 2024-05-23 RX ORDER — METOCLOPRAMIDE HYDROCHLORIDE 5 MG/ML
10 INJECTION INTRAMUSCULAR; INTRAVENOUS EVERY 6 HOURS PRN
Status: DISCONTINUED | OUTPATIENT
Start: 2024-05-23 | End: 2024-05-29 | Stop reason: HOSPADM

## 2024-05-23 RX ADMIN — ENOXAPARIN SODIUM 40 MG: 100 INJECTION SUBCUTANEOUS at 10:17

## 2024-05-23 RX ADMIN — SODIUM CHLORIDE 10 ML: 9 INJECTION, SOLUTION INTRAVENOUS at 13:44

## 2024-05-23 RX ADMIN — BUSPIRONE HYDROCHLORIDE 10 MG: 5 TABLET ORAL at 21:02

## 2024-05-23 RX ADMIN — BUSPIRONE HYDROCHLORIDE 10 MG: 5 TABLET ORAL at 10:17

## 2024-05-23 RX ADMIN — ATORVASTATIN CALCIUM 80 MG: 80 TABLET, FILM COATED ORAL at 21:02

## 2024-05-23 RX ADMIN — ASPIRIN 81 MG 81 MG: 81 TABLET ORAL at 10:17

## 2024-05-23 RX ADMIN — ACETAMINOPHEN 650 MG: 325 TABLET ORAL at 04:24

## 2024-05-23 RX ADMIN — METOCLOPRAMIDE HYDROCHLORIDE 10 MG: 5 INJECTION INTRAMUSCULAR; INTRAVENOUS at 18:45

## 2024-05-23 RX ADMIN — METOCLOPRAMIDE HYDROCHLORIDE 10 MG: 5 INJECTION INTRAMUSCULAR; INTRAVENOUS at 10:18

## 2024-05-23 RX ADMIN — HYDROCODONE BITARTRATE AND ACETAMINOPHEN 1 TABLET: 5; 325 TABLET ORAL at 10:17

## 2024-05-23 RX ADMIN — ONDANSETRON 4 MG: 2 INJECTION INTRAMUSCULAR; INTRAVENOUS at 06:30

## 2024-05-23 RX ADMIN — ONDANSETRON 4 MG: 2 INJECTION INTRAMUSCULAR; INTRAVENOUS at 13:36

## 2024-05-23 RX ADMIN — CEFEPIME 2000 MG: 2 INJECTION, POWDER, FOR SOLUTION INTRAVENOUS at 13:44

## 2024-05-23 RX ADMIN — IPRATROPIUM BROMIDE AND ALBUTEROL SULFATE 1 DOSE: 2.5; .5 SOLUTION RESPIRATORY (INHALATION) at 15:44

## 2024-05-23 RX ADMIN — CEFEPIME 2000 MG: 2 INJECTION, POWDER, FOR SOLUTION INTRAVENOUS at 04:08

## 2024-05-23 RX ADMIN — BUSPIRONE HYDROCHLORIDE 10 MG: 5 TABLET ORAL at 13:36

## 2024-05-23 RX ADMIN — PANTOPRAZOLE SODIUM 40 MG: 40 INJECTION, POWDER, FOR SOLUTION INTRAVENOUS at 13:36

## 2024-05-23 RX ADMIN — ONDANSETRON 4 MG: 2 INJECTION INTRAMUSCULAR; INTRAVENOUS at 21:13

## 2024-05-23 RX ADMIN — Medication 1000 UNITS: at 10:17

## 2024-05-23 RX ADMIN — CEFEPIME 2000 MG: 2 INJECTION, POWDER, FOR SOLUTION INTRAVENOUS at 21:08

## 2024-05-23 ASSESSMENT — PAIN SCALES - GENERAL
PAINLEVEL_OUTOF10: 9
PAINLEVEL_OUTOF10: 8

## 2024-05-23 ASSESSMENT — PAIN DESCRIPTION - LOCATION
LOCATION: BACK;RIB CAGE
LOCATION: CHEST

## 2024-05-23 NOTE — CONSULTS
5/22/24 @ 1432 added Paolo Allred to treatment team.Yuridia Edmonds  
bed; Extensive disease; Mainly assist; Intake normal or reduced; Occasional assist; LOC full/confusion   [] 30%  Bed bound, Extensive disease; Total care; Intake reduced; LOC full/confusion   [] 20%  Bed bound; Extensive disease; Total care; Intake minimal; Drowsy/coma   [] 10%  Bed bound; Extensive disease; Total care; Mouth care only; Drowsy/coma   []  0%   Death       Home med list and hospital medications reviewed in chart as of 5/23/2024     EXAM     Vitals:    05/23/24 0400   BP:    Pulse: 66   Resp: 20   Temp: 97.9 °F (36.6 °C)   SpO2: 94%                OBJECTIVE   /75   Pulse 66   Temp 97.9 °F (36.6 °C) (Oral)   Resp 20   Wt 56.5 kg (124 lb 9 oz)   SpO2 94%   BMI 22.06 kg/m²   I/O last 3 completed shifts:  In: 422 [P.O.:120; I.V.:302]  Out: 1700 [Urine:1700]  No intake/output data recorded.      Palliative Medicine Interventions:    patient/family support  Goals of Care discussions with patient/surrogate  Spiritual Interventions: none identified          DATA:  Current labs in the epic chart reviewed as of 5/23/2024   Review of previous notes, admits, labs, radiology and testing relevant to this consult done in this chart today 5/23/2024  Review of advance directives (if any) in chart    Medical Decision Making:  The following items were considered in medical decision making:  Review of prior external note(s) from each unique source relevant to today's visit: Hospitalist, Case management, PT/OT/ST, pulm  Discussion of management or test with external physician/qualified health care professional: Hospitalist, Case management,    Unique test results reviewed: CBC and BMP, Nutrition labs, Hepatic studies, cardiac labs, CXR, echo         Risk of Complications/Morbidity: High   Illness(es)/ Infection present that pose threat to bodily function.   There is potential for severe exacerbation of condition/side effects of treatment.                       Signed By: Electronically signed by Sharron Bragg

## 2024-05-24 LAB
ANION GAP SERPL CALCULATED.3IONS-SCNC: 6 MMOL/L (ref 3–16)
BASOPHILS # BLD: 0 K/UL (ref 0–0.2)
BASOPHILS NFR BLD: 0.3 %
BUN SERPL-MCNC: 10 MG/DL (ref 7–20)
CALCIUM SERPL-MCNC: 8.9 MG/DL (ref 8.3–10.6)
CHLORIDE SERPL-SCNC: 107 MMOL/L (ref 99–110)
CO2 SERPL-SCNC: 31 MMOL/L (ref 21–32)
CREAT SERPL-MCNC: <0.5 MG/DL (ref 0.6–1.2)
DEPRECATED RDW RBC AUTO: 16.6 % (ref 12.4–15.4)
EOSINOPHIL # BLD: 0.1 K/UL (ref 0–0.6)
EOSINOPHIL NFR BLD: 0.7 %
GFR SERPLBLD CREATININE-BSD FMLA CKD-EPI: >90 ML/MIN/{1.73_M2}
GLUCOSE SERPL-MCNC: 88 MG/DL (ref 70–99)
HCT VFR BLD AUTO: 31.7 % (ref 36–48)
HGB BLD-MCNC: 9.7 G/DL (ref 12–16)
LYMPHOCYTES # BLD: 1.8 K/UL (ref 1–5.1)
LYMPHOCYTES NFR BLD: 23.9 %
MAGNESIUM SERPL-MCNC: 1.6 MG/DL (ref 1.8–2.4)
MCH RBC QN AUTO: 29.6 PG (ref 26–34)
MCHC RBC AUTO-ENTMCNC: 30.5 G/DL (ref 31–36)
MCV RBC AUTO: 96.8 FL (ref 80–100)
MONOCYTES # BLD: 0.5 K/UL (ref 0–1.3)
MONOCYTES NFR BLD: 6.4 %
NEUTROPHILS # BLD: 5.3 K/UL (ref 1.7–7.7)
NEUTROPHILS NFR BLD: 68.7 %
PLATELET # BLD AUTO: 179 K/UL (ref 135–450)
PMV BLD AUTO: 9.4 FL (ref 5–10.5)
POTASSIUM SERPL-SCNC: 3.3 MMOL/L (ref 3.5–5.1)
RBC # BLD AUTO: 3.28 M/UL (ref 4–5.2)
SODIUM SERPL-SCNC: 144 MMOL/L (ref 136–145)
WBC # BLD AUTO: 7.7 K/UL (ref 4–11)

## 2024-05-24 PROCEDURE — 2580000003 HC RX 258: Performed by: INTERNAL MEDICINE

## 2024-05-24 PROCEDURE — 6360000002 HC RX W HCPCS: Performed by: INTERNAL MEDICINE

## 2024-05-24 PROCEDURE — 97530 THERAPEUTIC ACTIVITIES: CPT

## 2024-05-24 PROCEDURE — 97535 SELF CARE MNGMENT TRAINING: CPT

## 2024-05-24 PROCEDURE — 94640 AIRWAY INHALATION TREATMENT: CPT

## 2024-05-24 PROCEDURE — 6370000000 HC RX 637 (ALT 250 FOR IP): Performed by: INTERNAL MEDICINE

## 2024-05-24 PROCEDURE — 2700000000 HC OXYGEN THERAPY PER DAY

## 2024-05-24 PROCEDURE — C9113 INJ PANTOPRAZOLE SODIUM, VIA: HCPCS | Performed by: INTERNAL MEDICINE

## 2024-05-24 PROCEDURE — 85025 COMPLETE CBC W/AUTO DIFF WBC: CPT

## 2024-05-24 PROCEDURE — 2060000000 HC ICU INTERMEDIATE R&B

## 2024-05-24 PROCEDURE — 80048 BASIC METABOLIC PNL TOTAL CA: CPT

## 2024-05-24 PROCEDURE — 94761 N-INVAS EAR/PLS OXIMETRY MLT: CPT

## 2024-05-24 PROCEDURE — 36415 COLL VENOUS BLD VENIPUNCTURE: CPT

## 2024-05-24 PROCEDURE — 83735 ASSAY OF MAGNESIUM: CPT

## 2024-05-24 PROCEDURE — 97110 THERAPEUTIC EXERCISES: CPT

## 2024-05-24 RX ORDER — MAGNESIUM SULFATE IN WATER 40 MG/ML
2000 INJECTION, SOLUTION INTRAVENOUS ONCE
Status: DISCONTINUED | OUTPATIENT
Start: 2024-05-24 | End: 2024-05-24

## 2024-05-24 RX ORDER — POTASSIUM CHLORIDE 20 MEQ/1
40 TABLET, EXTENDED RELEASE ORAL ONCE
Status: DISCONTINUED | OUTPATIENT
Start: 2024-05-24 | End: 2024-05-24

## 2024-05-24 RX ORDER — SUCRALFATE 1 G/1
1 TABLET ORAL EVERY 8 HOURS SCHEDULED
Status: DISCONTINUED | OUTPATIENT
Start: 2024-05-24 | End: 2024-05-29 | Stop reason: HOSPADM

## 2024-05-24 RX ORDER — CALCIUM CARBONATE 500 MG/1
500 TABLET, CHEWABLE ORAL 3 TIMES DAILY PRN
Status: DISCONTINUED | OUTPATIENT
Start: 2024-05-24 | End: 2024-05-29 | Stop reason: HOSPADM

## 2024-05-24 RX ADMIN — IPRATROPIUM BROMIDE AND ALBUTEROL SULFATE 1 DOSE: 2.5; .5 SOLUTION RESPIRATORY (INHALATION) at 20:00

## 2024-05-24 RX ADMIN — METOCLOPRAMIDE HYDROCHLORIDE 10 MG: 5 INJECTION INTRAMUSCULAR; INTRAVENOUS at 23:22

## 2024-05-24 RX ADMIN — ASPIRIN 81 MG 81 MG: 81 TABLET ORAL at 09:30

## 2024-05-24 RX ADMIN — IPRATROPIUM BROMIDE AND ALBUTEROL SULFATE 1 DOSE: 2.5; .5 SOLUTION RESPIRATORY (INHALATION) at 07:48

## 2024-05-24 RX ADMIN — CEFEPIME 2000 MG: 2 INJECTION, POWDER, FOR SOLUTION INTRAVENOUS at 05:05

## 2024-05-24 RX ADMIN — Medication 1000 UNITS: at 09:33

## 2024-05-24 RX ADMIN — BUSPIRONE HYDROCHLORIDE 10 MG: 5 TABLET ORAL at 09:33

## 2024-05-24 RX ADMIN — IPRATROPIUM BROMIDE AND ALBUTEROL SULFATE 1 DOSE: 2.5; .5 SOLUTION RESPIRATORY (INHALATION) at 11:37

## 2024-05-24 RX ADMIN — DIAZEPAM 5 MG: 5 TABLET ORAL at 14:08

## 2024-05-24 RX ADMIN — METOCLOPRAMIDE HYDROCHLORIDE 10 MG: 5 INJECTION INTRAMUSCULAR; INTRAVENOUS at 09:27

## 2024-05-24 RX ADMIN — ATORVASTATIN CALCIUM 80 MG: 80 TABLET, FILM COATED ORAL at 19:56

## 2024-05-24 RX ADMIN — ONDANSETRON 4 MG: 2 INJECTION INTRAMUSCULAR; INTRAVENOUS at 05:01

## 2024-05-24 RX ADMIN — POTASSIUM CHLORIDE 40 MEQ: 1500 TABLET, EXTENDED RELEASE ORAL at 09:30

## 2024-05-24 RX ADMIN — ARFORMOTEROL TARTRATE 15 MCG: 15 SOLUTION RESPIRATORY (INHALATION) at 07:48

## 2024-05-24 RX ADMIN — HYDROCODONE BITARTRATE AND ACETAMINOPHEN 1 TABLET: 5; 325 TABLET ORAL at 00:54

## 2024-05-24 RX ADMIN — HYDROCODONE BITARTRATE AND ACETAMINOPHEN 1 TABLET: 5; 325 TABLET ORAL at 12:38

## 2024-05-24 RX ADMIN — METOCLOPRAMIDE HYDROCHLORIDE 10 MG: 5 INJECTION INTRAMUSCULAR; INTRAVENOUS at 00:54

## 2024-05-24 RX ADMIN — SUCRALFATE 1 G: 1 TABLET ORAL at 20:55

## 2024-05-24 RX ADMIN — MAGNESIUM SULFATE HEPTAHYDRATE 2000 MG: 40 INJECTION, SOLUTION INTRAVENOUS at 09:43

## 2024-05-24 RX ADMIN — ONDANSETRON 4 MG: 2 INJECTION INTRAMUSCULAR; INTRAVENOUS at 19:56

## 2024-05-24 RX ADMIN — BUDESONIDE 500 MCG: 0.5 SUSPENSION RESPIRATORY (INHALATION) at 07:48

## 2024-05-24 RX ADMIN — IPRATROPIUM BROMIDE AND ALBUTEROL SULFATE 1 DOSE: 2.5; .5 SOLUTION RESPIRATORY (INHALATION) at 16:30

## 2024-05-24 RX ADMIN — ONDANSETRON 4 MG: 2 INJECTION INTRAMUSCULAR; INTRAVENOUS at 11:01

## 2024-05-24 RX ADMIN — CEFEPIME 2000 MG: 2 INJECTION, POWDER, FOR SOLUTION INTRAVENOUS at 14:07

## 2024-05-24 RX ADMIN — ARFORMOTEROL TARTRATE 15 MCG: 15 SOLUTION RESPIRATORY (INHALATION) at 20:00

## 2024-05-24 RX ADMIN — CEFEPIME 2000 MG: 2 INJECTION, POWDER, FOR SOLUTION INTRAVENOUS at 20:59

## 2024-05-24 RX ADMIN — BUSPIRONE HYDROCHLORIDE 10 MG: 5 TABLET ORAL at 14:21

## 2024-05-24 RX ADMIN — ACETAMINOPHEN 650 MG: 325 TABLET ORAL at 20:54

## 2024-05-24 RX ADMIN — ENOXAPARIN SODIUM 40 MG: 100 INJECTION SUBCUTANEOUS at 09:30

## 2024-05-24 RX ADMIN — BUSPIRONE HYDROCHLORIDE 10 MG: 5 TABLET ORAL at 19:56

## 2024-05-24 RX ADMIN — PANTOPRAZOLE SODIUM 40 MG: 40 INJECTION, POWDER, FOR SOLUTION INTRAVENOUS at 09:36

## 2024-05-24 ASSESSMENT — PAIN DESCRIPTION - LOCATION
LOCATION: CHEST;NECK
LOCATION: CHEST;NECK;BACK

## 2024-05-24 ASSESSMENT — PAIN SCALES - GENERAL
PAINLEVEL_OUTOF10: 6
PAINLEVEL_OUTOF10: 6
PAINLEVEL_OUTOF10: 8
PAINLEVEL_OUTOF10: 6
PAINLEVEL_OUTOF10: 8
PAINLEVEL_OUTOF10: 6
PAINLEVEL_OUTOF10: 5
PAINLEVEL_OUTOF10: 8

## 2024-05-24 ASSESSMENT — PAIN DESCRIPTION - DESCRIPTORS
DESCRIPTORS: THROBBING

## 2024-05-24 ASSESSMENT — PAIN SCALES - WONG BAKER: WONGBAKER_NUMERICALRESPONSE: NO HURT

## 2024-05-25 LAB
ANION GAP SERPL CALCULATED.3IONS-SCNC: 10 MMOL/L (ref 3–16)
BACTERIA BLD CULT ORG #2: NORMAL
BACTERIA BLD CULT: NORMAL
BUN SERPL-MCNC: 9 MG/DL (ref 7–20)
CALCIUM SERPL-MCNC: 8.5 MG/DL (ref 8.3–10.6)
CHLORIDE SERPL-SCNC: 104 MMOL/L (ref 99–110)
CO2 SERPL-SCNC: 23 MMOL/L (ref 21–32)
CREAT SERPL-MCNC: <0.5 MG/DL (ref 0.6–1.2)
GFR SERPLBLD CREATININE-BSD FMLA CKD-EPI: >90 ML/MIN/{1.73_M2}
GLUCOSE SERPL-MCNC: 95 MG/DL (ref 70–99)
POTASSIUM SERPL-SCNC: 4.2 MMOL/L (ref 3.5–5.1)
SODIUM SERPL-SCNC: 137 MMOL/L (ref 136–145)

## 2024-05-25 PROCEDURE — 94640 AIRWAY INHALATION TREATMENT: CPT

## 2024-05-25 PROCEDURE — 80048 BASIC METABOLIC PNL TOTAL CA: CPT

## 2024-05-25 PROCEDURE — C9113 INJ PANTOPRAZOLE SODIUM, VIA: HCPCS | Performed by: INTERNAL MEDICINE

## 2024-05-25 PROCEDURE — 2580000003 HC RX 258: Performed by: INTERNAL MEDICINE

## 2024-05-25 PROCEDURE — 2700000000 HC OXYGEN THERAPY PER DAY

## 2024-05-25 PROCEDURE — 6370000000 HC RX 637 (ALT 250 FOR IP): Performed by: INTERNAL MEDICINE

## 2024-05-25 PROCEDURE — 6360000002 HC RX W HCPCS: Performed by: INTERNAL MEDICINE

## 2024-05-25 PROCEDURE — 94761 N-INVAS EAR/PLS OXIMETRY MLT: CPT

## 2024-05-25 PROCEDURE — 2060000000 HC ICU INTERMEDIATE R&B

## 2024-05-25 RX ADMIN — CEFEPIME 2000 MG: 2 INJECTION, POWDER, FOR SOLUTION INTRAVENOUS at 22:02

## 2024-05-25 RX ADMIN — SUCRALFATE 1 G: 1 TABLET ORAL at 12:29

## 2024-05-25 RX ADMIN — DIAZEPAM 5 MG: 5 TABLET ORAL at 17:18

## 2024-05-25 RX ADMIN — BUDESONIDE 500 MCG: 0.5 SUSPENSION RESPIRATORY (INHALATION) at 20:26

## 2024-05-25 RX ADMIN — METOCLOPRAMIDE HYDROCHLORIDE 10 MG: 5 INJECTION INTRAMUSCULAR; INTRAVENOUS at 08:47

## 2024-05-25 RX ADMIN — ACETAMINOPHEN 650 MG: 325 TABLET ORAL at 15:09

## 2024-05-25 RX ADMIN — SODIUM CHLORIDE, PRESERVATIVE FREE 10 ML: 5 INJECTION INTRAVENOUS at 20:39

## 2024-05-25 RX ADMIN — ACETAMINOPHEN 650 MG: 325 TABLET ORAL at 20:34

## 2024-05-25 RX ADMIN — CEFEPIME 2000 MG: 2 INJECTION, POWDER, FOR SOLUTION INTRAVENOUS at 05:33

## 2024-05-25 RX ADMIN — ARFORMOTEROL TARTRATE 15 MCG: 15 SOLUTION RESPIRATORY (INHALATION) at 20:21

## 2024-05-25 RX ADMIN — ENOXAPARIN SODIUM 40 MG: 100 INJECTION SUBCUTANEOUS at 08:48

## 2024-05-25 RX ADMIN — ACETAMINOPHEN 650 MG: 325 TABLET ORAL at 08:47

## 2024-05-25 RX ADMIN — IPRATROPIUM BROMIDE AND ALBUTEROL SULFATE 1 DOSE: 2.5; .5 SOLUTION RESPIRATORY (INHALATION) at 08:21

## 2024-05-25 RX ADMIN — ARFORMOTEROL TARTRATE 15 MCG: 15 SOLUTION RESPIRATORY (INHALATION) at 08:21

## 2024-05-25 RX ADMIN — BUSPIRONE HYDROCHLORIDE 10 MG: 5 TABLET ORAL at 20:34

## 2024-05-25 RX ADMIN — BUSPIRONE HYDROCHLORIDE 10 MG: 5 TABLET ORAL at 08:49

## 2024-05-25 RX ADMIN — PANTOPRAZOLE SODIUM 40 MG: 40 INJECTION, POWDER, FOR SOLUTION INTRAVENOUS at 08:48

## 2024-05-25 RX ADMIN — ONDANSETRON 4 MG: 2 INJECTION INTRAMUSCULAR; INTRAVENOUS at 13:11

## 2024-05-25 RX ADMIN — METOCLOPRAMIDE HYDROCHLORIDE 10 MG: 5 INJECTION INTRAMUSCULAR; INTRAVENOUS at 20:34

## 2024-05-25 RX ADMIN — DIAZEPAM 5 MG: 5 TABLET ORAL at 05:33

## 2024-05-25 RX ADMIN — HYDROCODONE BITARTRATE AND ACETAMINOPHEN 1 TABLET: 5; 325 TABLET ORAL at 00:35

## 2024-05-25 RX ADMIN — BUSPIRONE HYDROCHLORIDE 10 MG: 5 TABLET ORAL at 12:29

## 2024-05-25 RX ADMIN — IPRATROPIUM BROMIDE AND ALBUTEROL SULFATE 1 DOSE: 2.5; .5 SOLUTION RESPIRATORY (INHALATION) at 15:38

## 2024-05-25 RX ADMIN — CEFEPIME 2000 MG: 2 INJECTION, POWDER, FOR SOLUTION INTRAVENOUS at 13:01

## 2024-05-25 RX ADMIN — ONDANSETRON 4 MG: 2 INJECTION INTRAMUSCULAR; INTRAVENOUS at 05:33

## 2024-05-25 RX ADMIN — IPRATROPIUM BROMIDE AND ALBUTEROL SULFATE 1 DOSE: 2.5; .5 SOLUTION RESPIRATORY (INHALATION) at 20:14

## 2024-05-25 RX ADMIN — ASPIRIN 81 MG 81 MG: 81 TABLET ORAL at 08:49

## 2024-05-25 RX ADMIN — SODIUM CHLORIDE, PRESERVATIVE FREE 10 ML: 5 INJECTION INTRAVENOUS at 08:49

## 2024-05-25 RX ADMIN — METOCLOPRAMIDE HYDROCHLORIDE 10 MG: 5 INJECTION INTRAMUSCULAR; INTRAVENOUS at 15:09

## 2024-05-25 RX ADMIN — ATORVASTATIN CALCIUM 80 MG: 80 TABLET, FILM COATED ORAL at 20:34

## 2024-05-25 RX ADMIN — SUCRALFATE 1 G: 1 TABLET ORAL at 05:33

## 2024-05-25 RX ADMIN — HYDROCODONE BITARTRATE AND ACETAMINOPHEN 1 TABLET: 5; 325 TABLET ORAL at 12:29

## 2024-05-25 RX ADMIN — Medication 1000 UNITS: at 08:49

## 2024-05-25 RX ADMIN — METHOCARBAMOL 500 MG: 500 TABLET ORAL at 17:18

## 2024-05-25 RX ADMIN — SUCRALFATE 1 G: 1 TABLET ORAL at 20:34

## 2024-05-25 RX ADMIN — METHOCARBAMOL 500 MG: 500 TABLET ORAL at 08:47

## 2024-05-25 RX ADMIN — IPRATROPIUM BROMIDE AND ALBUTEROL SULFATE 1 DOSE: 2.5; .5 SOLUTION RESPIRATORY (INHALATION) at 11:37

## 2024-05-25 ASSESSMENT — PAIN SCALES - GENERAL
PAINLEVEL_OUTOF10: 5
PAINLEVEL_OUTOF10: 7
PAINLEVEL_OUTOF10: 8
PAINLEVEL_OUTOF10: 7
PAINLEVEL_OUTOF10: 6

## 2024-05-25 ASSESSMENT — PAIN DESCRIPTION - LOCATION
LOCATION: CHEST
LOCATION: CHEST

## 2024-05-25 ASSESSMENT — PAIN DESCRIPTION - ORIENTATION: ORIENTATION: MID

## 2024-05-26 PROCEDURE — 94640 AIRWAY INHALATION TREATMENT: CPT

## 2024-05-26 PROCEDURE — 2580000003 HC RX 258: Performed by: INTERNAL MEDICINE

## 2024-05-26 PROCEDURE — 6370000000 HC RX 637 (ALT 250 FOR IP): Performed by: INTERNAL MEDICINE

## 2024-05-26 PROCEDURE — 6360000002 HC RX W HCPCS: Performed by: INTERNAL MEDICINE

## 2024-05-26 PROCEDURE — 94761 N-INVAS EAR/PLS OXIMETRY MLT: CPT

## 2024-05-26 PROCEDURE — 2700000000 HC OXYGEN THERAPY PER DAY

## 2024-05-26 PROCEDURE — 2060000000 HC ICU INTERMEDIATE R&B

## 2024-05-26 PROCEDURE — C9113 INJ PANTOPRAZOLE SODIUM, VIA: HCPCS | Performed by: INTERNAL MEDICINE

## 2024-05-26 RX ORDER — HYDROCODONE BITARTRATE AND ACETAMINOPHEN 5; 325 MG/1; MG/1
1 TABLET ORAL EVERY 8 HOURS PRN
Status: DISCONTINUED | OUTPATIENT
Start: 2024-05-26 | End: 2024-05-29 | Stop reason: HOSPADM

## 2024-05-26 RX ADMIN — METHOCARBAMOL 500 MG: 500 TABLET ORAL at 09:11

## 2024-05-26 RX ADMIN — ENOXAPARIN SODIUM 40 MG: 100 INJECTION SUBCUTANEOUS at 09:12

## 2024-05-26 RX ADMIN — HYDROCODONE BITARTRATE AND ACETAMINOPHEN 1 TABLET: 5; 325 TABLET ORAL at 22:37

## 2024-05-26 RX ADMIN — Medication 10 ML: at 15:17

## 2024-05-26 RX ADMIN — SUCRALFATE 1 G: 1 TABLET ORAL at 05:43

## 2024-05-26 RX ADMIN — METOCLOPRAMIDE HYDROCHLORIDE 10 MG: 5 INJECTION INTRAMUSCULAR; INTRAVENOUS at 09:11

## 2024-05-26 RX ADMIN — METOCLOPRAMIDE HYDROCHLORIDE 10 MG: 5 INJECTION INTRAMUSCULAR; INTRAVENOUS at 15:17

## 2024-05-26 RX ADMIN — SUCRALFATE 1 G: 1 TABLET ORAL at 14:10

## 2024-05-26 RX ADMIN — BUSPIRONE HYDROCHLORIDE 10 MG: 5 TABLET ORAL at 14:10

## 2024-05-26 RX ADMIN — IPRATROPIUM BROMIDE AND ALBUTEROL SULFATE 1 DOSE: 2.5; .5 SOLUTION RESPIRATORY (INHALATION) at 11:46

## 2024-05-26 RX ADMIN — IPRATROPIUM BROMIDE AND ALBUTEROL SULFATE 1 DOSE: 2.5; .5 SOLUTION RESPIRATORY (INHALATION) at 07:47

## 2024-05-26 RX ADMIN — ONDANSETRON 4 MG: 2 INJECTION INTRAMUSCULAR; INTRAVENOUS at 05:43

## 2024-05-26 RX ADMIN — Medication 10 ML: at 12:19

## 2024-05-26 RX ADMIN — IPRATROPIUM BROMIDE AND ALBUTEROL SULFATE 1 DOSE: 2.5; .5 SOLUTION RESPIRATORY (INHALATION) at 19:56

## 2024-05-26 RX ADMIN — ACETAMINOPHEN 650 MG: 325 TABLET ORAL at 15:18

## 2024-05-26 RX ADMIN — METHOCARBAMOL 500 MG: 500 TABLET ORAL at 18:48

## 2024-05-26 RX ADMIN — ATORVASTATIN CALCIUM 80 MG: 80 TABLET, FILM COATED ORAL at 21:29

## 2024-05-26 RX ADMIN — SUCRALFATE 1 G: 1 TABLET ORAL at 21:29

## 2024-05-26 RX ADMIN — ARFORMOTEROL TARTRATE 15 MCG: 15 SOLUTION RESPIRATORY (INHALATION) at 07:47

## 2024-05-26 RX ADMIN — DIAZEPAM 5 MG: 5 TABLET ORAL at 05:43

## 2024-05-26 RX ADMIN — ACETAMINOPHEN 650 MG: 325 TABLET ORAL at 09:11

## 2024-05-26 RX ADMIN — ONDANSETRON 4 MG: 2 INJECTION INTRAMUSCULAR; INTRAVENOUS at 18:48

## 2024-05-26 RX ADMIN — ONDANSETRON 4 MG: 2 INJECTION INTRAMUSCULAR; INTRAVENOUS at 12:19

## 2024-05-26 RX ADMIN — BUDESONIDE 500 MCG: 0.5 SUSPENSION RESPIRATORY (INHALATION) at 07:47

## 2024-05-26 RX ADMIN — SODIUM CHLORIDE, PRESERVATIVE FREE 10 ML: 5 INJECTION INTRAVENOUS at 21:30

## 2024-05-26 RX ADMIN — BUSPIRONE HYDROCHLORIDE 10 MG: 5 TABLET ORAL at 09:11

## 2024-05-26 RX ADMIN — METOCLOPRAMIDE HYDROCHLORIDE 10 MG: 5 INJECTION INTRAMUSCULAR; INTRAVENOUS at 22:37

## 2024-05-26 RX ADMIN — PANTOPRAZOLE SODIUM 40 MG: 40 INJECTION, POWDER, FOR SOLUTION INTRAVENOUS at 09:11

## 2024-05-26 RX ADMIN — CEFEPIME 2000 MG: 2 INJECTION, POWDER, FOR SOLUTION INTRAVENOUS at 05:46

## 2024-05-26 RX ADMIN — CEFEPIME 2000 MG: 2 INJECTION, POWDER, FOR SOLUTION INTRAVENOUS at 21:34

## 2024-05-26 RX ADMIN — BUSPIRONE HYDROCHLORIDE 10 MG: 5 TABLET ORAL at 21:29

## 2024-05-26 RX ADMIN — IPRATROPIUM BROMIDE AND ALBUTEROL SULFATE 1 DOSE: 2.5; .5 SOLUTION RESPIRATORY (INHALATION) at 16:09

## 2024-05-26 RX ADMIN — HYDROCODONE BITARTRATE AND ACETAMINOPHEN 1 TABLET: 5; 325 TABLET ORAL at 01:31

## 2024-05-26 RX ADMIN — ASPIRIN 81 MG 81 MG: 81 TABLET ORAL at 09:11

## 2024-05-26 RX ADMIN — CEFEPIME 2000 MG: 2 INJECTION, POWDER, FOR SOLUTION INTRAVENOUS at 15:25

## 2024-05-26 RX ADMIN — BUDESONIDE 500 MCG: 0.5 SUSPENSION RESPIRATORY (INHALATION) at 20:08

## 2024-05-26 RX ADMIN — Medication 1000 UNITS: at 09:11

## 2024-05-26 RX ADMIN — DIAZEPAM 5 MG: 5 TABLET ORAL at 18:48

## 2024-05-26 RX ADMIN — HYDROCODONE BITARTRATE AND ACETAMINOPHEN 1 TABLET: 5; 325 TABLET ORAL at 14:10

## 2024-05-26 RX ADMIN — ARFORMOTEROL TARTRATE 15 MCG: 15 SOLUTION RESPIRATORY (INHALATION) at 20:02

## 2024-05-26 ASSESSMENT — PAIN SCALES - GENERAL
PAINLEVEL_OUTOF10: 8
PAINLEVEL_OUTOF10: 7
PAINLEVEL_OUTOF10: 8
PAINLEVEL_OUTOF10: 7
PAINLEVEL_OUTOF10: 7
PAINLEVEL_OUTOF10: 8
PAINLEVEL_OUTOF10: 7
PAINLEVEL_OUTOF10: 7

## 2024-05-26 ASSESSMENT — PAIN - FUNCTIONAL ASSESSMENT: PAIN_FUNCTIONAL_ASSESSMENT: PREVENTS OR INTERFERES SOME ACTIVE ACTIVITIES AND ADLS

## 2024-05-26 ASSESSMENT — PAIN DESCRIPTION - DESCRIPTORS: DESCRIPTORS: ACHING

## 2024-05-26 ASSESSMENT — PAIN DESCRIPTION - ORIENTATION: ORIENTATION: MID

## 2024-05-26 ASSESSMENT — PAIN DESCRIPTION - LOCATION: LOCATION: CHEST

## 2024-05-27 LAB
ANION GAP SERPL CALCULATED.3IONS-SCNC: 7 MMOL/L (ref 3–16)
BASOPHILS # BLD: 0 K/UL (ref 0–0.2)
BASOPHILS NFR BLD: 0.3 %
BUN SERPL-MCNC: 10 MG/DL (ref 7–20)
CALCIUM SERPL-MCNC: 8.7 MG/DL (ref 8.3–10.6)
CHLORIDE SERPL-SCNC: 106 MMOL/L (ref 99–110)
CO2 SERPL-SCNC: 28 MMOL/L (ref 21–32)
CREAT SERPL-MCNC: <0.5 MG/DL (ref 0.6–1.2)
DEPRECATED RDW RBC AUTO: 16 % (ref 12.4–15.4)
EOSINOPHIL # BLD: 0.4 K/UL (ref 0–0.6)
EOSINOPHIL NFR BLD: 6.6 %
GFR SERPLBLD CREATININE-BSD FMLA CKD-EPI: >90 ML/MIN/{1.73_M2}
GLUCOSE SERPL-MCNC: 115 MG/DL (ref 70–99)
HCT VFR BLD AUTO: 31.8 % (ref 36–48)
HGB BLD-MCNC: 10 G/DL (ref 12–16)
LYMPHOCYTES # BLD: 1.7 K/UL (ref 1–5.1)
LYMPHOCYTES NFR BLD: 27.8 %
MCH RBC QN AUTO: 29.6 PG (ref 26–34)
MCHC RBC AUTO-ENTMCNC: 31.3 G/DL (ref 31–36)
MCV RBC AUTO: 94.5 FL (ref 80–100)
MONOCYTES # BLD: 0.4 K/UL (ref 0–1.3)
MONOCYTES NFR BLD: 7 %
NEUTROPHILS # BLD: 3.6 K/UL (ref 1.7–7.7)
NEUTROPHILS NFR BLD: 58.3 %
PLATELET # BLD AUTO: 224 K/UL (ref 135–450)
PMV BLD AUTO: 10.2 FL (ref 5–10.5)
POTASSIUM SERPL-SCNC: 3.7 MMOL/L (ref 3.5–5.1)
RBC # BLD AUTO: 3.37 M/UL (ref 4–5.2)
SODIUM SERPL-SCNC: 141 MMOL/L (ref 136–145)
WBC # BLD AUTO: 6.3 K/UL (ref 4–11)

## 2024-05-27 PROCEDURE — 6360000002 HC RX W HCPCS: Performed by: INTERNAL MEDICINE

## 2024-05-27 PROCEDURE — 80048 BASIC METABOLIC PNL TOTAL CA: CPT

## 2024-05-27 PROCEDURE — 36415 COLL VENOUS BLD VENIPUNCTURE: CPT

## 2024-05-27 PROCEDURE — 2060000000 HC ICU INTERMEDIATE R&B

## 2024-05-27 PROCEDURE — 94761 N-INVAS EAR/PLS OXIMETRY MLT: CPT

## 2024-05-27 PROCEDURE — 2580000003 HC RX 258: Performed by: INTERNAL MEDICINE

## 2024-05-27 PROCEDURE — 2700000000 HC OXYGEN THERAPY PER DAY

## 2024-05-27 PROCEDURE — C9113 INJ PANTOPRAZOLE SODIUM, VIA: HCPCS | Performed by: INTERNAL MEDICINE

## 2024-05-27 PROCEDURE — 94640 AIRWAY INHALATION TREATMENT: CPT

## 2024-05-27 PROCEDURE — 6370000000 HC RX 637 (ALT 250 FOR IP): Performed by: INTERNAL MEDICINE

## 2024-05-27 PROCEDURE — 85025 COMPLETE CBC W/AUTO DIFF WBC: CPT

## 2024-05-27 RX ADMIN — DIAZEPAM 5 MG: 5 TABLET ORAL at 09:13

## 2024-05-27 RX ADMIN — ONDANSETRON 4 MG: 2 INJECTION INTRAMUSCULAR; INTRAVENOUS at 14:46

## 2024-05-27 RX ADMIN — METOCLOPRAMIDE HYDROCHLORIDE 10 MG: 5 INJECTION INTRAMUSCULAR; INTRAVENOUS at 09:13

## 2024-05-27 RX ADMIN — IPRATROPIUM BROMIDE AND ALBUTEROL SULFATE 1 DOSE: 2.5; .5 SOLUTION RESPIRATORY (INHALATION) at 11:30

## 2024-05-27 RX ADMIN — ASPIRIN 81 MG 81 MG: 81 TABLET ORAL at 09:16

## 2024-05-27 RX ADMIN — METHOCARBAMOL 500 MG: 500 TABLET ORAL at 09:15

## 2024-05-27 RX ADMIN — BUSPIRONE HYDROCHLORIDE 10 MG: 5 TABLET ORAL at 14:46

## 2024-05-27 RX ADMIN — CEFEPIME 2000 MG: 2 INJECTION, POWDER, FOR SOLUTION INTRAVENOUS at 05:45

## 2024-05-27 RX ADMIN — Medication 10 ML: at 14:47

## 2024-05-27 RX ADMIN — HYDROCODONE BITARTRATE AND ACETAMINOPHEN 1 TABLET: 5; 325 TABLET ORAL at 05:39

## 2024-05-27 RX ADMIN — ONDANSETRON 4 MG: 2 INJECTION INTRAMUSCULAR; INTRAVENOUS at 05:39

## 2024-05-27 RX ADMIN — BUDESONIDE 500 MCG: 0.5 SUSPENSION RESPIRATORY (INHALATION) at 20:31

## 2024-05-27 RX ADMIN — ARFORMOTEROL TARTRATE 15 MCG: 15 SOLUTION RESPIRATORY (INHALATION) at 20:26

## 2024-05-27 RX ADMIN — ENOXAPARIN SODIUM 40 MG: 100 INJECTION SUBCUTANEOUS at 09:16

## 2024-05-27 RX ADMIN — IPRATROPIUM BROMIDE AND ALBUTEROL SULFATE 1 DOSE: 2.5; .5 SOLUTION RESPIRATORY (INHALATION) at 07:41

## 2024-05-27 RX ADMIN — ARFORMOTEROL TARTRATE 15 MCG: 15 SOLUTION RESPIRATORY (INHALATION) at 07:41

## 2024-05-27 RX ADMIN — IPRATROPIUM BROMIDE AND ALBUTEROL SULFATE 1 DOSE: 2.5; .5 SOLUTION RESPIRATORY (INHALATION) at 20:21

## 2024-05-27 RX ADMIN — SUCRALFATE 1 G: 1 TABLET ORAL at 14:46

## 2024-05-27 RX ADMIN — ATORVASTATIN CALCIUM 80 MG: 80 TABLET, FILM COATED ORAL at 20:02

## 2024-05-27 RX ADMIN — METHOCARBAMOL 500 MG: 500 TABLET ORAL at 20:02

## 2024-05-27 RX ADMIN — SUCRALFATE 1 G: 1 TABLET ORAL at 05:39

## 2024-05-27 RX ADMIN — SODIUM CHLORIDE, PRESERVATIVE FREE 10 ML: 5 INJECTION INTRAVENOUS at 09:16

## 2024-05-27 RX ADMIN — SUCRALFATE 1 G: 1 TABLET ORAL at 21:38

## 2024-05-27 RX ADMIN — HYDROCODONE BITARTRATE AND ACETAMINOPHEN 1 TABLET: 5; 325 TABLET ORAL at 14:46

## 2024-05-27 RX ADMIN — CEFEPIME 2000 MG: 2 INJECTION, POWDER, FOR SOLUTION INTRAVENOUS at 21:41

## 2024-05-27 RX ADMIN — BUSPIRONE HYDROCHLORIDE 10 MG: 5 TABLET ORAL at 09:15

## 2024-05-27 RX ADMIN — ONDANSETRON 4 MG: 2 INJECTION INTRAMUSCULAR; INTRAVENOUS at 21:47

## 2024-05-27 RX ADMIN — BUSPIRONE HYDROCHLORIDE 10 MG: 5 TABLET ORAL at 20:02

## 2024-05-27 RX ADMIN — CEFEPIME 2000 MG: 2 INJECTION, POWDER, FOR SOLUTION INTRAVENOUS at 14:57

## 2024-05-27 RX ADMIN — IPRATROPIUM BROMIDE AND ALBUTEROL SULFATE 1 DOSE: 2.5; .5 SOLUTION RESPIRATORY (INHALATION) at 15:26

## 2024-05-27 RX ADMIN — ACETAMINOPHEN 650 MG: 325 TABLET ORAL at 09:13

## 2024-05-27 RX ADMIN — BUDESONIDE 500 MCG: 0.5 SUSPENSION RESPIRATORY (INHALATION) at 07:42

## 2024-05-27 RX ADMIN — Medication 1000 UNITS: at 09:15

## 2024-05-27 RX ADMIN — PANTOPRAZOLE SODIUM 40 MG: 40 INJECTION, POWDER, FOR SOLUTION INTRAVENOUS at 09:13

## 2024-05-27 ASSESSMENT — PAIN SCALES - GENERAL
PAINLEVEL_OUTOF10: 7
PAINLEVEL_OUTOF10: 8
PAINLEVEL_OUTOF10: 6
PAINLEVEL_OUTOF10: 8
PAINLEVEL_OUTOF10: 5

## 2024-05-27 ASSESSMENT — PAIN DESCRIPTION - DESCRIPTORS: DESCRIPTORS: ACHING

## 2024-05-27 ASSESSMENT — PAIN DESCRIPTION - LOCATION: LOCATION: CHEST

## 2024-05-27 ASSESSMENT — PAIN DESCRIPTION - ORIENTATION: ORIENTATION: RIGHT;LEFT

## 2024-05-27 ASSESSMENT — PAIN DESCRIPTION - FREQUENCY: FREQUENCY: CONTINUOUS

## 2024-05-27 NOTE — RT PROTOCOL NOTE
RT Inhaler-Nebulizer Bronchodilator Protocol Note    There is a bronchodilator order in the chart from a provider indicating to follow the RT Bronchodilator Protocol and there is an “Initiate RT Inhaler-Nebulizer Bronchodilator Protocol” order as well (see protocol at bottom of note).    CXR Findings:  No results found.    The findings from the last RT Protocol Assessment were as follows:   History Pulmonary Disease: Chronic pulmonary disease  Respiratory Pattern: Mild dyspnea at rest, irregular pattern, or RR 21-25 bpm  Breath Sounds: Slightly diminished and/or crackles  Cough: Strong, spontaneous, non-productive  Indication for Bronchodilator Therapy: On home bronchodilators, Wheezing associated with pulm disorder, Decreased or absent breath sounds  Bronchodilator Assessment Score: 8    Aerosolized bronchodilator medication orders have been revised according to the RT Inhaler-Nebulizer Bronchodilator Protocol below.    Respiratory Therapist to perform RT Therapy Protocol Assessment initially then follow the protocol.  Repeat RT Therapy Protocol Assessment PRN for score 0-3 or on second treatment, BID, and PRN for scores above 3.    No Indications - adjust the frequency to every 6 hours PRN wheezing or bronchospasm, if no treatments needed after 48 hours then discontinue using Per Protocol order mode.     If indication present, adjust the RT bronchodilator orders based on the Bronchodilator Assessment Score as indicated below.  Use Inhaler orders unless patient has one or more of the following: on home nebulizer, not able to hold breath for 10 seconds, is not alert and oriented, cannot activate and use MDI correctly, or respiratory rate 25 breaths per minute or more, then use the equivalent nebulizer order(s) with same Frequency and PRN reasons based on the score.  If a patient is on this medication at home then do not decrease Frequency below that used at home.    0-3 - enter or revise RT bronchodilator order(s)

## 2024-05-28 PROCEDURE — C9113 INJ PANTOPRAZOLE SODIUM, VIA: HCPCS | Performed by: INTERNAL MEDICINE

## 2024-05-28 PROCEDURE — 6370000000 HC RX 637 (ALT 250 FOR IP): Performed by: INTERNAL MEDICINE

## 2024-05-28 PROCEDURE — 94640 AIRWAY INHALATION TREATMENT: CPT

## 2024-05-28 PROCEDURE — 2580000003 HC RX 258: Performed by: INTERNAL MEDICINE

## 2024-05-28 PROCEDURE — 2700000000 HC OXYGEN THERAPY PER DAY

## 2024-05-28 PROCEDURE — 97530 THERAPEUTIC ACTIVITIES: CPT

## 2024-05-28 PROCEDURE — 97110 THERAPEUTIC EXERCISES: CPT

## 2024-05-28 PROCEDURE — 2060000000 HC ICU INTERMEDIATE R&B

## 2024-05-28 PROCEDURE — 6360000002 HC RX W HCPCS: Performed by: INTERNAL MEDICINE

## 2024-05-28 PROCEDURE — 94761 N-INVAS EAR/PLS OXIMETRY MLT: CPT

## 2024-05-28 RX ORDER — CALCIUM CARBONATE 500 MG/1
500 TABLET, CHEWABLE ORAL 3 TIMES DAILY PRN
DISCHARGE
Start: 2024-05-28 | End: 2024-06-27

## 2024-05-28 RX ORDER — IPRATROPIUM BROMIDE AND ALBUTEROL SULFATE 2.5; .5 MG/3ML; MG/3ML
1 SOLUTION RESPIRATORY (INHALATION) EVERY 4 HOURS PRN
Status: DISCONTINUED | OUTPATIENT
Start: 2024-05-28 | End: 2024-05-29 | Stop reason: HOSPADM

## 2024-05-28 RX ORDER — METHOCARBAMOL 500 MG/1
500 TABLET, FILM COATED ORAL EVERY 8 HOURS PRN
Qty: 9 TABLET | Refills: 0 | Status: SHIPPED | OUTPATIENT
Start: 2024-05-28 | End: 2024-05-31

## 2024-05-28 RX ORDER — HYDROCODONE BITARTRATE AND ACETAMINOPHEN 5; 325 MG/1; MG/1
1 TABLET ORAL EVERY 8 HOURS PRN
Qty: 9 TABLET | Refills: 0 | Status: SHIPPED | OUTPATIENT
Start: 2024-05-28 | End: 2024-05-31

## 2024-05-28 RX ORDER — DIAZEPAM 5 MG/1
5 TABLET ORAL EVERY 12 HOURS PRN
Qty: 6 TABLET | Refills: 0 | Status: SHIPPED | OUTPATIENT
Start: 2024-05-28 | End: 2024-05-31

## 2024-05-28 RX ORDER — SUCRALFATE 1 G/1
1 TABLET ORAL EVERY 8 HOURS SCHEDULED
Qty: 120 TABLET | Refills: 3 | DISCHARGE
Start: 2024-05-28

## 2024-05-28 RX ADMIN — IPRATROPIUM BROMIDE AND ALBUTEROL SULFATE 1 DOSE: 2.5; .5 SOLUTION RESPIRATORY (INHALATION) at 11:21

## 2024-05-28 RX ADMIN — ARFORMOTEROL TARTRATE 15 MCG: 15 SOLUTION RESPIRATORY (INHALATION) at 08:02

## 2024-05-28 RX ADMIN — PANTOPRAZOLE SODIUM 40 MG: 40 INJECTION, POWDER, FOR SOLUTION INTRAVENOUS at 08:46

## 2024-05-28 RX ADMIN — ARFORMOTEROL TARTRATE 15 MCG: 15 SOLUTION RESPIRATORY (INHALATION) at 19:57

## 2024-05-28 RX ADMIN — HYDROCODONE BITARTRATE AND ACETAMINOPHEN 1 TABLET: 5; 325 TABLET ORAL at 00:26

## 2024-05-28 RX ADMIN — BUSPIRONE HYDROCHLORIDE 10 MG: 5 TABLET ORAL at 13:13

## 2024-05-28 RX ADMIN — BUDESONIDE 500 MCG: 0.5 SUSPENSION RESPIRATORY (INHALATION) at 19:52

## 2024-05-28 RX ADMIN — ACETAMINOPHEN 650 MG: 325 TABLET ORAL at 13:13

## 2024-05-28 RX ADMIN — ASPIRIN 81 MG 81 MG: 81 TABLET ORAL at 08:46

## 2024-05-28 RX ADMIN — SUCRALFATE 1 G: 1 TABLET ORAL at 04:56

## 2024-05-28 RX ADMIN — IPRATROPIUM BROMIDE AND ALBUTEROL SULFATE 1 DOSE: 2.5; .5 SOLUTION RESPIRATORY (INHALATION) at 15:46

## 2024-05-28 RX ADMIN — METOCLOPRAMIDE HYDROCHLORIDE 10 MG: 5 INJECTION INTRAMUSCULAR; INTRAVENOUS at 17:44

## 2024-05-28 RX ADMIN — METHOCARBAMOL 500 MG: 500 TABLET ORAL at 13:13

## 2024-05-28 RX ADMIN — BUSPIRONE HYDROCHLORIDE 10 MG: 5 TABLET ORAL at 20:49

## 2024-05-28 RX ADMIN — ACETAMINOPHEN 650 MG: 325 TABLET ORAL at 06:56

## 2024-05-28 RX ADMIN — ONDANSETRON 4 MG: 2 INJECTION INTRAMUSCULAR; INTRAVENOUS at 06:56

## 2024-05-28 RX ADMIN — SODIUM CHLORIDE, PRESERVATIVE FREE 10 ML: 5 INJECTION INTRAVENOUS at 20:49

## 2024-05-28 RX ADMIN — IPRATROPIUM BROMIDE AND ALBUTEROL SULFATE 1 DOSE: 2.5; .5 SOLUTION RESPIRATORY (INHALATION) at 08:02

## 2024-05-28 RX ADMIN — METOCLOPRAMIDE HYDROCHLORIDE 10 MG: 5 INJECTION INTRAMUSCULAR; INTRAVENOUS at 04:56

## 2024-05-28 RX ADMIN — ACETAMINOPHEN 650 MG: 325 TABLET ORAL at 22:18

## 2024-05-28 RX ADMIN — ATORVASTATIN CALCIUM 80 MG: 80 TABLET, FILM COATED ORAL at 20:49

## 2024-05-28 RX ADMIN — HYDROCODONE BITARTRATE AND ACETAMINOPHEN 1 TABLET: 5; 325 TABLET ORAL at 16:46

## 2024-05-28 RX ADMIN — SUCRALFATE 1 G: 1 TABLET ORAL at 13:13

## 2024-05-28 RX ADMIN — METHOCARBAMOL 500 MG: 500 TABLET ORAL at 04:56

## 2024-05-28 RX ADMIN — ONDANSETRON 4 MG: 2 INJECTION INTRAMUSCULAR; INTRAVENOUS at 13:13

## 2024-05-28 RX ADMIN — BUDESONIDE 500 MCG: 0.5 SUSPENSION RESPIRATORY (INHALATION) at 08:02

## 2024-05-28 RX ADMIN — METOCLOPRAMIDE HYDROCHLORIDE 10 MG: 5 INJECTION INTRAMUSCULAR; INTRAVENOUS at 11:43

## 2024-05-28 RX ADMIN — SODIUM CHLORIDE, PRESERVATIVE FREE 10 ML: 5 INJECTION INTRAVENOUS at 08:47

## 2024-05-28 RX ADMIN — HYDROCODONE BITARTRATE AND ACETAMINOPHEN 1 TABLET: 5; 325 TABLET ORAL at 08:46

## 2024-05-28 RX ADMIN — BUSPIRONE HYDROCHLORIDE 10 MG: 5 TABLET ORAL at 08:46

## 2024-05-28 RX ADMIN — Medication 1000 UNITS: at 08:46

## 2024-05-28 RX ADMIN — ONDANSETRON 4 MG: 2 INJECTION INTRAMUSCULAR; INTRAVENOUS at 22:18

## 2024-05-28 RX ADMIN — ENOXAPARIN SODIUM 40 MG: 100 INJECTION SUBCUTANEOUS at 08:46

## 2024-05-28 RX ADMIN — METHOCARBAMOL 500 MG: 500 TABLET ORAL at 22:18

## 2024-05-28 RX ADMIN — SUCRALFATE 1 G: 1 TABLET ORAL at 20:49

## 2024-05-28 ASSESSMENT — PAIN SCALES - GENERAL
PAINLEVEL_OUTOF10: 9
PAINLEVEL_OUTOF10: 8
PAINLEVEL_OUTOF10: 7
PAINLEVEL_OUTOF10: 8
PAINLEVEL_OUTOF10: 8
PAINLEVEL_OUTOF10: 5
PAINLEVEL_OUTOF10: 8

## 2024-05-28 ASSESSMENT — PAIN DESCRIPTION - LOCATION
LOCATION: CHEST
LOCATION: CHEST;OTHER (COMMENT)

## 2024-05-28 NOTE — DISCHARGE INSTR - COC
MANAGEMENT/SOCIAL WORK SECTION    Inpatient Status Date: 05/21/24    Readmission Risk Assessment Score:  Readmission Risk              Risk of Unplanned Readmission:  43           Discharging to Facility/ Agency   Name: [facility]  Phone: [phone]  Fax:[phone]    / signature: Electronically signed by Lyssa Anderson RN on 5/29/24 at 2:13 PM EDT    PHYSICIAN SECTION    Prognosis: Guarded    Condition at Discharge: Stable    Rehab Potential (if transferring to Rehab): Fair    Recommended Labs or Other Treatments After Discharge: PCP/ pain management/ palliative / or hospice      Physician Certification: I certify the above information and transfer of Joyce Uribe  is necessary for the continuing treatment of the diagnosis listed and that she requires Skilled Nursing Facility for less 30 days.     Update Admission H&P: No change in H&P    PHYSICIAN SIGNATURE:  Electronically signed by Az Hampton MD on 5/28/24 at 3:54 PM EDT

## 2024-05-28 NOTE — ACP (ADVANCE CARE PLANNING)
Advance Care Planning     General Advance Care Planning (ACP) Conversation    Date of Conversation: 5/28/2024  Conducted with: Patient with Decision Making Capacity  Other persons present: None    Healthcare Decision Maker:   Primary Decision Maker: Ty Villarreal - Child - [phone]    Primary Decision Maker: Kate Viera - Child - [phone]  Click here to complete Healthcare Decision Makers including selection of the Healthcare Decision Maker Relationship (ie \"Primary\").   Today we documented Decision Maker(s) consistent with Legal Next of Kin hierarchy.    Content/Action Overview:  Has NO ACP documents-Information provided  Reviewed DNR/DNI and patient confirms current DNR status - completed forms on file (place new order if needed)        Length of Voluntary ACP Conversation in minutes:  <16 minutes (Non-Billable)    Lyssa Anderson RN

## 2024-05-29 VITALS
SYSTOLIC BLOOD PRESSURE: 119 MMHG | WEIGHT: 125.66 LBS | TEMPERATURE: 97.7 F | HEIGHT: 63 IN | DIASTOLIC BLOOD PRESSURE: 74 MMHG | HEART RATE: 73 BPM | BODY MASS INDEX: 22.27 KG/M2 | OXYGEN SATURATION: 93 % | RESPIRATION RATE: 18 BRPM

## 2024-05-29 PROCEDURE — 6360000002 HC RX W HCPCS: Performed by: INTERNAL MEDICINE

## 2024-05-29 PROCEDURE — 6370000000 HC RX 637 (ALT 250 FOR IP): Performed by: INTERNAL MEDICINE

## 2024-05-29 PROCEDURE — 94640 AIRWAY INHALATION TREATMENT: CPT

## 2024-05-29 PROCEDURE — C9113 INJ PANTOPRAZOLE SODIUM, VIA: HCPCS | Performed by: INTERNAL MEDICINE

## 2024-05-29 PROCEDURE — 2580000003 HC RX 258: Performed by: INTERNAL MEDICINE

## 2024-05-29 PROCEDURE — 97110 THERAPEUTIC EXERCISES: CPT

## 2024-05-29 PROCEDURE — 97530 THERAPEUTIC ACTIVITIES: CPT

## 2024-05-29 RX ORDER — IPRATROPIUM BROMIDE AND ALBUTEROL SULFATE 2.5; .5 MG/3ML; MG/3ML
3 SOLUTION RESPIRATORY (INHALATION) EVERY 4 HOURS PRN
Qty: 360 ML | DISCHARGE
Start: 2024-05-29

## 2024-05-29 RX ORDER — PREDNISONE 20 MG/1
20 TABLET ORAL DAILY
Qty: 5 TABLET | Refills: 0 | DISCHARGE
Start: 2024-05-29 | End: 2024-06-03

## 2024-05-29 RX ORDER — PREDNISONE 20 MG/1
20 TABLET ORAL DAILY
Status: DISCONTINUED | OUTPATIENT
Start: 2024-05-29 | End: 2024-05-29 | Stop reason: HOSPADM

## 2024-05-29 RX ADMIN — SODIUM CHLORIDE, PRESERVATIVE FREE 10 ML: 5 INJECTION INTRAVENOUS at 09:10

## 2024-05-29 RX ADMIN — ONDANSETRON 4 MG: 4 TABLET, ORALLY DISINTEGRATING ORAL at 09:10

## 2024-05-29 RX ADMIN — METHOCARBAMOL 500 MG: 500 TABLET ORAL at 06:21

## 2024-05-29 RX ADMIN — ASPIRIN 81 MG 81 MG: 81 TABLET ORAL at 09:11

## 2024-05-29 RX ADMIN — ONDANSETRON 4 MG: 2 INJECTION INTRAMUSCULAR; INTRAVENOUS at 04:33

## 2024-05-29 RX ADMIN — Medication 1000 UNITS: at 09:11

## 2024-05-29 RX ADMIN — PREDNISONE 20 MG: 20 TABLET ORAL at 11:54

## 2024-05-29 RX ADMIN — SUCRALFATE 1 G: 1 TABLET ORAL at 14:56

## 2024-05-29 RX ADMIN — ACETAMINOPHEN 650 MG: 325 TABLET ORAL at 11:57

## 2024-05-29 RX ADMIN — BUSPIRONE HYDROCHLORIDE 10 MG: 5 TABLET ORAL at 14:55

## 2024-05-29 RX ADMIN — BUSPIRONE HYDROCHLORIDE 10 MG: 5 TABLET ORAL at 09:11

## 2024-05-29 RX ADMIN — HYDROCODONE BITARTRATE AND ACETAMINOPHEN 1 TABLET: 5; 325 TABLET ORAL at 10:22

## 2024-05-29 RX ADMIN — IPRATROPIUM BROMIDE AND ALBUTEROL SULFATE 1 DOSE: 2.5; .5 SOLUTION RESPIRATORY (INHALATION) at 09:18

## 2024-05-29 RX ADMIN — METOCLOPRAMIDE HYDROCHLORIDE 10 MG: 5 INJECTION INTRAMUSCULAR; INTRAVENOUS at 11:57

## 2024-05-29 RX ADMIN — HYDROCODONE BITARTRATE AND ACETAMINOPHEN 1 TABLET: 5; 325 TABLET ORAL at 00:54

## 2024-05-29 RX ADMIN — PANTOPRAZOLE SODIUM 40 MG: 40 INJECTION, POWDER, FOR SOLUTION INTRAVENOUS at 09:11

## 2024-05-29 RX ADMIN — METOCLOPRAMIDE HYDROCHLORIDE 10 MG: 5 INJECTION INTRAMUSCULAR; INTRAVENOUS at 06:21

## 2024-05-29 RX ADMIN — Medication 10 ML: at 11:58

## 2024-05-29 RX ADMIN — ACETAMINOPHEN 650 MG: 325 TABLET ORAL at 04:33

## 2024-05-29 RX ADMIN — ENOXAPARIN SODIUM 40 MG: 100 INJECTION SUBCUTANEOUS at 09:11

## 2024-05-29 RX ADMIN — BUDESONIDE 500 MCG: 0.5 SUSPENSION RESPIRATORY (INHALATION) at 09:23

## 2024-05-29 RX ADMIN — METHOCARBAMOL 500 MG: 500 TABLET ORAL at 14:54

## 2024-05-29 RX ADMIN — ARFORMOTEROL TARTRATE 15 MCG: 15 SOLUTION RESPIRATORY (INHALATION) at 09:16

## 2024-05-29 RX ADMIN — SUCRALFATE 1 G: 1 TABLET ORAL at 04:34

## 2024-05-29 ASSESSMENT — PAIN DESCRIPTION - DESCRIPTORS
DESCRIPTORS: ACHING
DESCRIPTORS: ACHING

## 2024-05-29 ASSESSMENT — PAIN SCALES - GENERAL
PAINLEVEL_OUTOF10: 3
PAINLEVEL_OUTOF10: 8
PAINLEVEL_OUTOF10: 8
PAINLEVEL_OUTOF10: 2
PAINLEVEL_OUTOF10: 2

## 2024-05-29 ASSESSMENT — PAIN DESCRIPTION - LOCATION
LOCATION: GENERALIZED
LOCATION: GENERALIZED

## 2024-05-29 ASSESSMENT — PAIN SCALES - WONG BAKER: WONGBAKER_NUMERICALRESPONSE: NO HURT

## 2024-05-29 ASSESSMENT — PAIN - FUNCTIONAL ASSESSMENT
PAIN_FUNCTIONAL_ASSESSMENT: ACTIVITIES ARE NOT PREVENTED

## 2024-05-29 NOTE — CARE COORDINATION
CASE MANAGEMENT DISCHARGE SUMMARY      Discharge to: [facility]    Precertification completed: yes  Hospital Exemption Notification (HENS) completed: yes    IMM given: 05/29/24    Transportation: ambulance   Medical Transport explained to pt/family. Pt/family voice no agency preference.    Agency used: [facility] transport   time: 1730   Ambulance form completed: Yes    Confirmed discharge plan with: nursing and MD     Patient: yes       Facility/Agency, name:  [facility] pulled from [facility] per facility   Phone number for report to facility: [phone]      Note: Discharging nurse to complete [facility], reconcile AVS, and place final copy with patient's discharge packet. RN to ensure that written prescriptions for  Level II medications are sent with patient to the facility as per protocol.        
Advised Zeus streetison with [facility] that all clinicals available in EPIC to start cert. She acknowledged.   
Awaiting pre cert approval. Zeus with [facility] escalated to [place] this AM.   
COA called and verified patient has 23 hours weekly of home care services with home health aid. Emergency response system in the home and meals on wheels.  
Chart reviewed and met with the Pt at the bedside to confirm dc plan. [facility] pre cert is pending. Started 5/26. Call placed to Zeus at [facility] and ref # for cert is [id number]. Will follow.  
DC order noted. Awaiting pre cert approval at this time.  
Danny/[facility] updated writer, pt may be interested in home Palliative care, they will still follow and can service again for skilled home health care visits if pt wants them.  Per primary RN/James, pt wants to change code status.  Writer aware Palliative has been consulted today, will await discussions.  SAMUEL May-RN   
Met with Pt at the bedside to discuss SNF options. Per PC notes from yesterday, the Pt was agreeable to SNF placement and felt it is what she needed. Pt appears uncomfortable in bed, states that she is waiting on her nurse to come in because she is having BM and can't control it. Pt asking to talk later. This RN CM verbalized understanding. SNF list left at bedside for review.    Addendum 1151am: Met with Pt to review SNF list. Pt's choices are as follows: [facility] (#1), [facility] (#2), and [facility] (#3). Referral made to [facility]. They are able to accept. Awaiting updated therapy notes to start pre cert.   
and other options for pt.      The Plan for Transition of Care is related to the following treatment goals of Acute respiratory failure (HCC) [J96.00]  HCAP (healthcare-associated pneumonia) [J18.9]  Acute on chronic respiratory failure with hypoxia and hypercapnia (HCC) [J96.21, J96.22]    Mary Wynn RN  Case Management Department  Ph:

## 2024-05-29 NOTE — PROGRESS NOTES
05/20/24 2326   NIV Type   $NIV $Daily Charge   NIV Started/Stopped On   Equipment Type v60   Mode Bilevel   Mask Type Full face mask   Mask Size Medium   Assessment   Pulse (!) 129   Respirations 21   SpO2 97 %   Comfort Level Good   Using Accessory Muscles No   Mask Compliance Good   Skin Assessment Clean, dry, & intact   Settings/Measurements   PIP Observed 17 cm H20   IPAP 16 cmH20   CPAP/EPAP 8 cmH2O   Vt (Measured) 404 mL   Rate Ordered 20   FiO2  100 %  (decreased to 90%)   I Time/ I Time % 1.2 s   Minute Volume (L/min) 9 Liters   Mask Leak (lpm) 5 lpm   Patient's Home Machine No   Alarm Settings   Alarms On Y   Low Pressure (cmH2O) 6 cmH2O   High Pressure (cmH2O) 30 cmH2O   Apnea (secs) 20 secs   RR Low (bpm) 6   RR High (bpm) 40 br/min       
   05/20/24 2341   NIV Type   Equipment Type v60   Mode Bilevel   Mask Type Full face mask   Mask Size Medium   Assessment   Pulse (!) 127   Respirations 22   SpO2 99 %   Comfort Level Good   Using Accessory Muscles No   Mask Compliance Good   Skin Assessment Clean, dry, & intact   Skin Protection for O2 Device Yes   Orientation Middle   Location Nose   Settings/Measurements   PIP Observed 21 cm H20   IPAP 20 cmH20   CPAP/EPAP 5 cmH2O   Vt (Measured) 448 mL   Rate Ordered 20   FiO2  40 %   I Time/ I Time % 1.1 s   Minute Volume (L/min) 9.5 Liters   Mask Leak (lpm) 0 lpm   Patient's Home Machine No   Alarm Settings   Alarms On Y   Low Pressure (cmH2O) 6 cmH2O   High Pressure (cmH2O) 30 cmH2O   Apnea (secs) 20 secs   RR Low (bpm) 6   RR High (bpm) 40 br/min       
   05/20/24 2351   NIV Type   Equipment Type v60   Mode Bilevel   Mask Type Full face mask   Mask Size Medium   Assessment   Pulse (!) 120   Respirations 24   /67   SpO2 97 %   Comfort Level Good   Using Accessory Muscles No   Mask Compliance Good   Skin Protection for O2 Device Yes   Orientation Middle   Location Nose   Settings/Measurements   PIP Observed 19 cm H20   IPAP 18 cmH20   CPAP/EPAP 5 cmH2O   Vt (Measured) 563 mL   Rate Ordered 20   FiO2  40 %   I Time/ I Time % 1 s   Minute Volume (L/min) 13.2 Liters   Mask Leak (lpm) 2 lpm   Patient's Home Machine No   Alarm Settings   Alarms On Y   Low Pressure (cmH2O) 6 cmH2O   High Pressure (cmH2O) 30 cmH2O   Apnea (secs) 20 secs   RR Low (bpm) 6   RR High (bpm) 40 br/min       
   05/21/24 0040   NIV Type   $NIV $Daily Charge   Equipment Type v60   Mode Bilevel   Mask Type Full face mask   Mask Size Medium   Assessment   Pulse (!) 113   Respirations 20   BP 93/67   SpO2 94 %   Comfort Level Good   Using Accessory Muscles No   Mask Compliance Good   Skin Protection for O2 Device Yes   Orientation Middle   Location Nose   Settings/Measurements   PIP Observed 18 cm H20   IPAP 18 cmH20   CPAP/EPAP 5 cmH2O   Vt (Measured) 635 mL   Rate Ordered 20   FiO2  40 %   I Time/ I Time % 1 s   Minute Volume (L/min) 12.7 Liters   Mask Leak (lpm) 0 lpm   Patient's Home Machine No   Alarm Settings   Alarms On Y   Low Pressure (cmH2O) 6 cmH2O   High Pressure (cmH2O) 30 cmH2O   Apnea (secs) 20 secs   RR Low (bpm) 6   RR High (bpm) 40 br/min       
   05/21/24 0503   NIV Type   NIV Started/Stopped On   Equipment Type v60   Mode Bilevel   Mask Type Full face mask   Mask Size Medium   Assessment   Pulse 100   Respirations 20   /73   SpO2 97 %   Comfort Level Good   Using Accessory Muscles No   Mask Compliance Good   Skin Protection for O2 Device Yes   Orientation Middle   Location Nose   Settings/Measurements   PIP Observed 19 cm H20   IPAP 18 cmH20   CPAP/EPAP 5 cmH2O   Vt (Measured) 509 mL   Rate Ordered 20   FiO2  40 %   I Time/ I Time % 1 s   Minute Volume (L/min) 10.1 Liters   Mask Leak (lpm) 0 lpm   Patient's Home Machine No   Alarm Settings   Alarms On Y   Low Pressure (cmH2O) 6 cmH2O   High Pressure (cmH2O) 30 cmH2O   Apnea (secs) 20 secs   RR Low (bpm) 6   RR High (bpm) 40 br/min       
   05/21/24 0816   Oxygen Therapy/Pulse Ox   O2 Therapy Oxygen   $Oxygen $Daily Charge   O2 Device Nasal cannula   O2 Flow Rate (L/min) 6 L/min   Pulse 87   Respirations 20   SpO2 99 %   $Pulse Oximeter $Spot check (multiple/continuous)       
   05/21/24 0818   Oxygen Therapy/Pulse Ox   O2 Therapy Oxygen   O2 Device Nasal cannula   O2 Flow Rate (L/min) 3 L/min   Pulse 85   Respirations 20   SpO2 98 %       
   05/21/24 2205   NIV Type   NIV Started/Stopped On   Equipment Type v60   Mode Bilevel   Mask Type Full face mask   Mask Size Medium   Assessment   Respirations 22   Comfort Level Good   Using Accessory Muscles No   Mask Compliance Good   Skin Assessment Clean, dry, & intact   Skin Protection for O2 Device Yes   Orientation Middle   Location Nose   Settings/Measurements   PIP Observed 19 cm H20   IPAP 18 cmH20   CPAP/EPAP 5 cmH2O   Vt (Measured) 543 mL   Rate Ordered 20   FiO2  40 %   I Time/ I Time % 1 s   Minute Volume (L/min) 12.6 Liters   Mask Leak (lpm) 0 lpm   Patient's Home Machine No   Alarm Settings   Alarms On Y   Low Pressure (cmH2O) 6 cmH2O   High Pressure (cmH2O) 30 cmH2O   Apnea (secs) 20 secs   RR Low (bpm) 6   RR High (bpm) 40 br/min       
   05/22/24 0009   NIV Type   $NIV $Daily Charge   Equipment Type v60   Mode Bilevel   Mask Type Full face mask   Mask Size Medium   Assessment   Respirations 21   Comfort Level Good   Using Accessory Muscles No   Mask Compliance Good   Skin Protection for O2 Device Yes   Orientation Middle   Location Nose   Settings/Measurements   PIP Observed 18 cm H20   IPAP 18 cmH20   CPAP/EPAP 5 cmH2O   Vt (Measured) 501 mL   Rate Ordered 20   FiO2  35 %  (decreased to 30%)   I Time/ I Time % 1 s   Minute Volume (L/min) 10.4 Liters   Mask Leak (lpm) 1 lpm   Patient's Home Machine No   Alarm Settings   Alarms On Y   Low Pressure (cmH2O) 6 cmH2O   High Pressure (cmH2O) 30 cmH2O   Apnea (secs) 20 secs   RR Low (bpm) 6   RR High (bpm) 40 br/min       
   05/28/24 1950   RT Protocol   History Pulmonary Disease 2   Respiratory pattern 0   Breath sounds 2   Cough 0   Indications for Bronchodilator Therapy On home bronchodilators   Bronchodilator Assessment Score 4       
  Hospital Medicine Progress Note      Date of Admission: 5/20/2024  Hospital Day: 4    Chief Admission Complaint:  Shortness of breath      Subjective:   Has nausea , vomiting  Ab discomfort     Presenting Admission History:       Joyce Uribe is a 63 y.o. female with pmh of COPD, chronic respiratory failure on 3 L of oxygen, heart failure with preserved ejection fraction, nonobstructive coronary artery disease, gastroesophageal reflux disease and anxiety who was recently admitted here and discharged on the 16th for COPD exacerbation.  Initially patient felt well but started to have increasing cough and shortness of breath as well as wheezing.  Patient was brought in by EMS in acute respiratory distress was initiated on BiPAP.  ABG done initially was consistent with acute respiratory acidosis which improved on BiPAP and patient was transitioned to 6 L of oxygen.  She also did have a change in mental status prior to coming into the hospital.     Assessment/Plan:      Current Principal Problem:  Acute respiratory failure (HCC)    Acute on chronic respiratory failure- secondary to pneumonia/COPD exacerbation  Recurrent admission?  Poor compliance with medication  She was just discharged from the hospital for same reason   improved currently on 3 L at his baseline  Ejection fraction 61% in 2023  On antibiotics, breathing treatment  OT PT  Cut down the steroid to twice daily  Monitor blood sugar  Hemoglobin A1c 5.8  Pulmonology input if necessary   exa - resolved - no wheezing- given stomach upset - dc solumedrol    Palliative care input appreciated   Ativan prn for anxiety       Left-sided pleuritic pain- Narco - pt cannot tolerate NSAIDS 2/2 N , V and epigastric discomfits       Nausea-/ vomiting - reglan IV prn , Protonix IV dc solumedrol     Chronic diastolic CHF- no evidnce of fluid over load     H/o HELEN    H/o lung cancer and sx             Physical Exam Performed:      General appearance:  No apparent 
  Hospital Medicine Progress Note      Date of Admission: 5/20/2024  Hospital Day: 8    Chief Admission Complaint:  Shortness of breath      Subjective:    No  nausea , no  vomiting  Ab discomfort better  Tolerating po     Presenting Admission History:       Joyce Uribe is a 63 y.o. female with pmh of COPD, chronic respiratory failure on 3 L of oxygen, heart failure with preserved ejection fraction, nonobstructive coronary artery disease, gastroesophageal reflux disease and anxiety who was recently admitted here and discharged on the 16th for COPD exacerbation.  Initially patient felt well but started to have increasing cough and shortness of breath as well as wheezing.  Patient was brought in by EMS in acute respiratory distress was initiated on BiPAP.  ABG done initially was consistent with acute respiratory acidosis which improved on BiPAP and patient was transitioned to 6 L of oxygen.  She also did have a change in mental status prior to coming into the hospital.     Assessment/Plan:      Current Principal Problem:  Acute respiratory failure (HCC)    Acute on chronic respiratory failure- secondary to pneumonia/COPD exacerbation  Recurrent admission?  Poor compliance with medication  She was just discharged from the hospital for same reason   improved currently on 3 L at his baseline  Ejection fraction 61% in 2023  On antibiotics, breathing treatment  OT PT  Cut down the steroid to twice daily  Monitor blood sugar  Hemoglobin A1c 5.8  Pulmonology input if necessary   exa - resolved - no wheezing- given stomach upset - dc solumedrol  as of COPD and occ wheezing - prednisone po started   Palliative care input appreciated   Ativan prn for anxiety   Stable , doing better       Left-sided pleuritic pain- Narco - pt cannot tolerate NSAIDS 2/2 N , V and epigastric discomfits   Improved markedly       Nausea-/ vomiting - reglan IV prn , Protonix IV Carafate  She told it s a ongoing issue and had EGD a year ago and  
  Hospital Medicine Progress Note      Date of Admission: 5/20/2024  Hospital Day: 9    Chief Admission Complaint:  Shortness of breath      Subjective:    No  nausea , no  vomiting  Ab discomfort better  Tolerating po     Presenting Admission History:       Joyce Uribe is a 63 y.o. female with pmh of COPD, chronic respiratory failure on 3 L of oxygen, heart failure with preserved ejection fraction, nonobstructive coronary artery disease, gastroesophageal reflux disease and anxiety who was recently admitted here and discharged on the 16th for COPD exacerbation.  Initially patient felt well but started to have increasing cough and shortness of breath as well as wheezing.  Patient was brought in by EMS in acute respiratory distress was initiated on BiPAP.  ABG done initially was consistent with acute respiratory acidosis which improved on BiPAP and patient was transitioned to 6 L of oxygen.  She also did have a change in mental status prior to coming into the hospital.     Assessment/Plan:      Current Principal Problem:  Acute respiratory failure (HCC)    Acute on chronic respiratory failure- secondary to pneumonia/COPD exacerbation  Recurrent admission?  Poor compliance with medication  She was just discharged from the hospital for same reason   improved currently on 3 L at his baseline  Ejection fraction 61% in 2023  On antibiotics, breathing treatment  OT PT  Cut down the steroid to twice daily  Monitor blood sugar  Hemoglobin A1c 5.8  Pulmonology input if necessary   exa - resolved - no wheezing- given stomach upset - dc solumedrol  - no need for po prednisone   Palliative care input appreciated   Ativan prn for anxiety   Stable , doing better       Left-sided pleuritic pain- Narco - pt cannot tolerate NSAIDS 2/2 N , V and epigastric discomfits   Improved markedly       Nausea-/ vomiting - reglan IV prn , Protonix IV Carafate  She told it s a ongoing issue and had EGD a year ago and  it was normal  Poor po 
  Physician Progress Note      PATIENT:               ADELINE MCMAHON  [facility] #:                  [id number]  :                       1960  ADMIT DATE:       2024 11:17 PM  DISCH DATE:  RESPONDING  PROVIDER #:        Az Hampton MD          QUERY TEXT:    Pt admitted with Acute on chronic respiratory failure- secondary to   pneumonia/COPD exacerbation.  ED consult notes-Severe sepsis -Healthcare   associated pneumonia. If possible, please document in progress notes and   discharge summary:      The medical record reflects the following:  Risk Factors: recent admission HAP, COPD  Clinical Indicators: HR- 119, LA 2.9  resp 28, ED consult severe sepsis-    X-ray shows possible pneumonia and we are treating with healthcare associated   pneumonia antibiotics.  acute respiratory failure  Treatment: repeat LA level,  On antibiotics, breathing treatment-Patient did   have some brief hypotension while receiving vancomycin.  Since we stopped the   vancomycin the hypotension resolved.  I believe it was perhaps a mild reaction   but not a true allergic reaction.1. Start cefepime Q8 Methylprednisone 40   every 8 DuoNebs, Budesonide nebs every 12    Thank-You, Jayleen Gupta RN, BSN, CCDS  Options provided:  -- Severe sepsis due to suspected gram neg pneumonia POA  -- Sepsis ruled out. Suspected gram neg pneumonia with treatment POA  -- Other - I will add my own diagnosis  -- Disagree - Not applicable / Not valid  -- Disagree - Clinically unable to determine / Unknown  -- Refer to Clinical Documentation Reviewer    PROVIDER RESPONSE TEXT:    Sepsis ruled out. Suspected gram neg pneumonia with treatment POA.    Query created by: Pat Gupta on 2024 2:10 PM      Electronically signed by:  Az Hampton MD 2024 2:30 PM          
Comprehensive Nutrition Assessment    Type and Reason for Visit:  Initial, RD Nutrition Re-Screen/LOS    Nutrition Recommendations/Plan:   Continue regular diet  Encourage po intakes  Monitor nutrition adequacy, pertinent labs, bowel habits, wt changes, and clinical progress     Malnutrition Assessment:  Malnutrition Status:  At risk for malnutrition (Comment) (05/29/24 1250)    Context:  Acute Illness     Findings of the 6 clinical characteristics of malnutrition:  Energy Intake:  Mild decrease in energy intake (Comment)  Fluid Accumulation:  Mild Extremities    Nutrition Assessment:    LOS: 62 yo F w pmh uterine and lung cancer, CHF, COPD admitted with acute respiratory failure. Currently on regular diet with % po intakes recorded per EMR. Pt reports fair appetite and eating 25-50% of most meals. Reports stable weight, EMR reflects stable weight in the past month. Endorses current nausea. RD offered ONS, pt declined at this time. Encouraged po intakes as tolerated. Pt denied further nutrition related questions/needs. Will monitor.    Nutrition Related Findings:    x1 BM 5/28. +1 nonpitting BLE edema. Wound Type: None       Current Nutrition Intake & Therapies:    Average Meal Intake: 51-75%, %  Average Supplements Intake: None Ordered  ADULT DIET; Regular    Anthropometric Measures:  Height: 160 cm (5' 2.99\")  Ideal Body Weight (IBW): 115 lbs (52 kg)       Current Body Weight: 57 kg (125 lb 10.6 oz),   IBW. Weight Source: Bed Scale  Current BMI (kg/m2): 22.3  Usual Body Weight: 57.7 kg (127 lb 3.3 oz) (5/4/24 standing scale)  % Weight Change (Calculated): -1.2  Weight Adjustment For: No Adjustment                 BMI Categories: Normal Weight (BMI 18.5-24.9)    Estimated Daily Nutrient Needs:  Energy Requirements Based On: Kcal/kg (25-30)  Weight Used for Energy Requirements: Current (57)  Energy (kcal/day): 0400-6674 kcals  Weight Used for Protein Requirements: Current (1-1.2)  Protein (g/day): 57-68 
Occupational Therapy      Attempted to see pt for follow up OT session this AM. Pt in bed upon OT arrival, stated she was tired after having PT earlier and feeling nauseous after having breathing tx. Requested OT return this PM. Offered bed level ax and educated on benefits of mobility. Pt politely declined and requested again that pt return at a later time. Will follow up as pt condition and therapy schedule permit.       Mady Bedolla, OTR/L  
Occupational Therapy  Facility/Department: [facility]  Occupational Therapy Initial Assessment & Treatment    Name: Joyce Uribe  : 1960  MRN: [id number]  Date of Service: 2024    Discharge Recommendations:  24 hour supervision or assist, Home with Home health OT  OT Equipment Recommendations  Equipment Needed: No    AM-PAC score  AM-PAC Inpatient Daily Activity Raw Score: 17 (24 1536)  AM-PAC Inpatient ADL T-Scale Score : 37.26 (24 1536)  ADL Inpatient CMS 0-100% Score: 50.11 (24 1536)  ADL Inpatient CMS G-Code Modifier : CK (24)    If pt is unable to be seen after this session, please let this note serve as discharge summary.  Please see case management note for discharge disposition.  Thank you.     Patient Diagnosis(es): The primary encounter diagnosis was Acute on chronic respiratory failure with hypoxia and hypercapnia (HCC). A diagnosis of HCAP (healthcare-associated pneumonia) was also pertinent to this visit.  Past Medical History:  has a past medical history of Anxiety, Asthma, Cancer (HCC), Cancer of lung (HCC), CHF (congestive heart failure) (HCC), COPD (chronic obstructive pulmonary disease) (HCC), Cyclic vomiting syndrome, Cysts of both kidneys, Depression, Fatty liver, Gastritis, MI (myocardial infarction) (HCC), Panic attacks, Pneumonia, Pre-diabetes, and Tricuspid regurgitation.  Past Surgical History:  has a past surgical history that includes Cholecystectomy (); Hysterectomy (); hernia repair; Colonoscopy (); Colonoscopy (); Upper gastrointestinal endoscopy (); Tonsillectomy; Incontinence surgery (Left); Colonoscopy (08/15/2017); Upper gastrointestinal endoscopy (10/03/2017); Endoscopy, colon, diagnostic; Lung cancer surgery; and Ovary removal ().     Assessment   Performance deficits / Impairments: Decreased functional mobility ;Decreased ADL status;Decreased endurance;Decreased sensation;Decreased safe awareness  Assessment: Pt 
Occupational Therapy  Facility/Department: [facility]  Daily Treatment Note  NAME: Joyce Uribe  : 1960  MRN: [id number]    Date of Service: 2024    Discharge Recommendations:  Subacute/Skilled Nursing Facility       Therapy discharge recommendations are subject to collaboration from the patient’s interdisciplinary healthcare team, including MD and case management recommendations.  Barriers to Home Discharge:   [x] Steps to access home entry or bed/bath   [x] Unable to transfer, ambulate, or propel wheelchair household distances without assist   [x] Limited available assist at home upon discharge    [x] Patient or family requests d/c to post-acute facility    [] Poor cognition/safety awareness for d/c to home alone    [] Unable to maintain ordered weight bearing status    [] Patient with salient signs of long-standing immobility   [x] Decreased independence with ADLs   [x] Increased risk for falls   [] Other:     Patient Diagnosis(es): The primary encounter diagnosis was Acute on chronic respiratory failure with hypoxia and hypercapnia (HCC). A diagnosis of HCAP (healthcare-associated pneumonia) was also pertinent to this visit.     Assessment    Assessment: Pt tolerated session fair, very limited by endurance, reports fatigue and dizziness when upright. Pt able to participate in some seated UE therex with rest breaks, at EOB ~12 mins total. Pt functioning below her baseline, unsafe to return home alone (see barriers noted above). Continue to recommend SNF at d/c.   Activity Tolerance: Patient limited by endurance;Patient limited by fatigue  Discharge Recommendations: Subacute/Skilled Nursing Facility      Plan   Occupational Therapy Plan  Times Per Week: 3-5x/week     Restrictions  Restrictions/Precautions  Restrictions/Precautions: General Precautions  Position Activity Restriction  Other position/activity restrictions: O2, tele, purewick    Subjective   Subjective  Subjective: Pt resting in bed, 
Occupational Therapy  Facility/Department: [facility]  Daily Treatment Note  NAME: Joyce Uribe  : 1960  MRN: [id number]    Date of Service: 2024    Discharge Recommendations:  Subacute/Skilled Nursing Facility  OT Equipment Recommendations  Equipment Needed: No  Other: Defer to next level of care    Therapy discharge recommendations are subject to collaboration from the patient’s interdisciplinary healthcare team, including MD and case management recommendations.    Barriers to Home Discharge:   [x] Steps to access home entry or bed/bath:   [x] Unable to transfer, ambulate, or propel wheelchair household distances without assist   [x] Limited available assist at home upon discharge    [x] Patient or family requests d/c to post-acute facility    [] Poor cognition/safety awareness for d/c to home alone    [] Unable to maintain ordered weight bearing status    [] Patient with salient signs of long-standing immobility   [x] Decreased independence with ADLs   [x] Increased risk for falls   [] Other:    If pt is unable to be seen after this session, please let this note serve as discharge summary.  Please see case management note for discharge disposition.  Thank you.    Patient Diagnosis(es): The primary encounter diagnosis was Acute on chronic respiratory failure with hypoxia and hypercapnia (HCC). A diagnosis of HCAP (healthcare-associated pneumonia) was also pertinent to this visit.     Assessment    Assessment: Pt tolerated session fair, however declined mobility this date 2/2 fatigue. Performed bed mobility mod I and tolerated 15 min sitting at EOB to perform grooming tasks w/ SBA. Pt displayed good balance throughout and even when reaching outside [place] for task objects. Her spO2 remained above 90% throughout session on 4L of O2. Pt is limited by weakness, fatigue, balance, endurance, safety awareness, and activity tolerance and will benefit from continued acute OT. Recommend SNF upon d/c to address 
Paged NP d/t lethargy, pt will open eyes and state and  but will fall back asleep. Pt stating she feels tired. Stat VBG ordered  
Paged NP. Pt called out d/t nausea, pt requesting off BIPAP, nausea medication given. Pt now approp answering questions and stating \" when she goes she goes\". Explained to pt that she is listed as full code. RN explained full code and DNR, pt stated she's been wanting to be DNR for years and has been telling people.   
Patient was seen by one of my partners this morning    Patient was seen and examined    Acute on chronic respiratory failure-improved currently on 3 L at his baseline secondary to pneumonia/COPD exacerbation  Ejection fraction 61% in 2023  On antibiotics, breathing treatment  OT PT  
Physical Therapy    Chart reviewed and attempted to see patient for therapy treatment this afternoon. Pt declining therapy at this time, stating she is \"too sick\" and has been feeling nauseous and throwing up this date. Spoke with pt about goals of care and pt stated she is considering SNF vs hospice/comfort care vs home, and explained to patient that is her wishes were SNF, therapy would have to work with her. Pt voices understanding, but not wishing to work with therapy at this time and has not made a decision. Will continue to follow and treat as able.     Deonte Pitts, PT, DPT  
Physical Therapy  Facility/Department: [facility]  Daily Treatment Note  NAME: Joyce Uribe  : 1960  MRN: [id number]    Date of Service: 2024    Discharge Recommendations:  Subacute/Skilled Nursing Facility   PT Equipment Recommendations  Equipment Needed: No  Other: owns RW, defer    Patient Diagnosis(es): The primary encounter diagnosis was Acute on chronic respiratory failure with hypoxia and hypercapnia (HCC). A diagnosis of HCAP (healthcare-associated pneumonia) was also pertinent to this visit.    Assessment   Assessment: Pt agreed to sit EOB. Pt SUP for supine <> sit. Pt CGA for STS with RW. Performed 2 STS from bed to rw from EOB CGA. Pt sat EOB for 7' w/ SUP. Pt pulse was 139 upon 1st STS w/ c/o SOB and mild dizziness. Pt returned to bed. Due to pt 13 BERNARD pt not safe to DC home. Pt would continue to benefit from skilled PT to aid in the above deficits and a safe DC SNF when medically appropriate.  Activity Tolerance: Patient limited by endurance;Patient limited by fatigue  Equipment Needed: No  Other: owns RW, defer     Plan    Physical Therapy Plan  General Plan: 3-5 times per week  Current Treatment Recommendations: Strengthening;ROM;Balance training;Functional mobility training;Transfer training;Endurance training;Stair training;Gait training;Neuromuscular re-education;Therapeutic activities     Restrictions  Restrictions/Precautions  Restrictions/Precautions: General Precautions  Position Activity Restriction  Other position/activity restrictions: O2, tele, purewick     Subjective    Subjective  Subjective: pt in bed, agrees to PT session  Pain: Soreness unrated  Orientation  Overall Orientation Status: Within Normal Limits  Cognition  Overall Cognitive Status: WFL     Objective   Vitals  Pulse: (!) 117  BP: 114/69  BP Location: Right upper arm  MAP (Calculated): 84  SpO2: 93 %  O2 Device: Nasal cannula (3L)  Bed Mobility Training  Bed Mobility Training: Yes  Interventions: Verbal 
Physical Therapy  Facility/Department: [facility] [id number] [facility]  Daily Treatment Note  NAME: Joyce Uribe  : 1960  MRN: [id number]    Date of Service: 2024    Discharge Recommendations:  Subacute/Skilled Nursing Facility   PT Equipment Recommendations  Equipment Needed: No  Other: owns RW, defer    Therapy discharge recommendations are subject to collaboration from the patient’s interdisciplinary healthcare team, including MD and case management recommendations.  Barriers to Home Discharge:   [x] Steps to access home entry or bed/bath   [x] Unable to transfer, ambulate, or propel wheelchair household distances without assist   [x] Limited available assist at home upon discharge    [x] Patient or family requests d/c to post-acute facility    [] Poor cognition/safety awareness for d/c to home alone    [] Unable to maintain ordered weight bearing status    [] Patient with salient signs of long-standing immobility   [x] Decreased independence with ADLs   [x] Increased risk for falls   [] Other:     Patient Diagnosis(es): The primary encounter diagnosis was Acute on chronic respiratory failure with hypoxia and hypercapnia (HCC). Diagnoses of HCAP (healthcare-associated pneumonia), Anxiety, and Chronic pain syndrome were also pertinent to this visit.    Assessment   Assessment: Pt demos Ax1 for STS and SPT bned ><commode using rw. Pt returned to bed. Due to Ax1 transfers, non ability to ambulate and pt's home with [address] pt not safe to DC home. Pt would continue to benefit from skilled PT to aid in the above deficits and a safe DC SNF when medically appropriate.  Activity Tolerance: Patient limited by endurance;Patient limited by fatigue  Equipment Needed: No  Other: owns RW, defer     Plan    Physical Therapy Plan  General Plan: 3-5 times per week  Current Treatment Recommendations: Strengthening;ROM;Balance training;Functional mobility training;Transfer training;Endurance training;Stair training;Gait 
Pt does not want to wear bipap tonight.  
Pt states that she does not want to wear BIPAP.  
Pt states that she does not want to wear the bipap tonight.  
Report called to the godwin valdivia rn  
Stopped in to check on patient about the bipap, pt states she does not feel that she needs at this time.  Encouraged pt to call if needed.    
  Better     Chronic diastolic CHF- no evidnce of fluid over load     H/o HELEN    H/o lung cancer and sx     Hypokalemia / hypomagnesemia - replaced and monitor             Physical Exam Performed:      General appearance:  No apparent distress on oxygen  Respiratory:  Normal respiratory effort.   Cardiovascular:  Regular rate and rhythm.  Reduced air entry, occ wheez  Abdomen:  Soft, non-tender, non-distended.  Bowel sounds present  Musculoskeletal:  No edema  Neurologic:  Non-focal  Psychiatric:  Alert and oriented    BP (!) 146/89   Pulse 86   Temp 97.6 °F (36.4 °C) (Oral)   Resp 18   Ht 1.6 m (5' 2.99\")   Wt 56.5 kg (124 lb 9 oz)   SpO2 92%   BMI 22.07 kg/m²     Diet: ADULT DIET; Regular  DVT Prophylaxis: [x]PPx LMWH  []SQ Heparin  []IPC/SCDs  []Eliquis  []Xarelto  []Coumadin  []Other -      Code status: Limited  PT/OT Eval Status:   []NOT yet ordered  []Ordered and Pending   [x]Seen with Recommendations for:  []Home independently  []Home w/ assist  []HHC  [x]SNF  []Acute Rehab    Anticipated Discharge Day/Date:   today - pre cert pending   Anticipated Discharge Location: []Home  []HHC  []SNF  []Acute Rehab  []ECF  []LTAC  []Hospice  []Other -      Consults:      IP CONSULT TO HOSPITALIST  IP CONSULT TO PALLIATIVE CARE      This patient has a high likelihood of being discharged tomorrow and is appropriate for A1 Discharge Unit in AM pending clinical course overnight: []Yes  [x]No    ------------------------------------------------------------------------------------------------------------------------------------------------------------------------    MDM      [x] High (any 2)    A. Problems (any 1)  [x] Acute/Chronic Illness/injury posing threat to life or bodily function:    [] Severe exacerbation of chronic illness:    ---------------------------------------------------------------------  B. Risk of Treatment (any 1)   [x] Drugs/treatments that require intensive monitoring for toxicity include: 
entry  Abdomen:  Soft, non-tender, non-distended.  Bowel sounds present  Musculoskeletal:  No edema  Neurologic:  Non-focal  Psychiatric:  Alert and oriented    /74   Pulse 67   Temp 97.7 °F (36.5 °C) (Oral)   Resp 21   SpO2 98%     Diet: ADULT DIET; Regular  DVT Prophylaxis: [x]PPx LMWH  []SQ Heparin  []IPC/SCDs  []Eliquis  []Xarelto  []Coumadin  []Other -      Code status: Full Code  PT/OT Eval Status:   []NOT yet ordered  [x]Ordered and Pending   []Seen with Recommendations for:  []Home independently  []Home w/ assist  []HHC  []SNF  []Acute Rehab    Anticipated Discharge Day/Date:  2 days    Anticipated Discharge Location: []Home  []HHC  []SNF  []Acute Rehab  []ECF  []LTAC  []Hospice  []Other -      Consults:      IP CONSULT TO HOSPITALIST  IP CONSULT TO PALLIATIVE CARE      This patient has a high likelihood of being discharged tomorrow and is appropriate for A1 Discharge Unit in AM pending clinical course overnight: []Yes  [x]No    ------------------------------------------------------------------------------------------------------------------------------------------------------------------------    MDM      [x] High (any 2)    A. Problems (any 1)  [x] Acute/Chronic Illness/injury posing threat to life or bodily function:    [] Severe exacerbation of chronic illness:    ---------------------------------------------------------------------  B. Risk of Treatment (any 1)   [x] Drugs/treatments that require intensive monitoring for toxicity include: Steroid  [] Change in code status:    [] Decision to escalate care:    [] Major surgery/procedure with associated risk factors:    ----------------------------------------------------------------------  C. Data (any 2)  [x] Discussed current management and discharge planning options with Case Management.  [] Discussed management of the case with:    [] Telemetry personally reviewed and interpreted as documented above    [] Imaging personally reviewed and 
from skilled PT to aid in the above deficits and a safe DC SNF when medically appropriate.  Activity Tolerance: Patient tolerated evaluation without incident;Patient limited by pain;Patient limited by fatigue  Equipment Needed: No  Other: owns RW, defer          Plan    Physical Therapy Plan  General Plan: 3-5 times per week  Current Treatment Recommendations: Strengthening;ROM;Balance training;Functional mobility training;Transfer training;Endurance training;Stair training;Gait training;Neuromuscular re-education;Therapeutic activities     Restrictions  Restrictions/Precautions  Restrictions/Precautions: General Precautions  Position Activity Restriction  Other position/activity restrictions: 4L O2, tele, purewick     Subjective    Subjective  Subjective: pt in bed, RN leaving upon entry of PT  Pain: 8/10 back and chest  Orientation  Overall Orientation Status: Within Functional Limits  Orientation Level: Oriented X4  Cognition  Overall Cognitive Status: WFL     Objective   Vitals  Pulse: 91  Heart Rate Source: Monitor  BP: (!) 162/76  BP Location: Right upper arm  BP Method: Automatic  Patient Position: Standing  MAP (Calculated): 105  SpO2: 90 %  O2 Device: Nasal cannula  Bed Mobility Training  Bed Mobility Training: Yes  Supine to Sit: Modified independent  Sit to Supine: Modified independent  Scooting: Supervision (EOB)  Balance  Sitting: Intact  Standing: Impaired  Standing - Static: Good;Occasional  Transfer Training  Transfer Training: Yes  Interventions: Safety awareness training;Verbal cues  Sit to Stand: Contact-guard assistance (EOB no AD)  Stand to Sit: Contact-guard assistance  Gait  Gait Training: No (pt refused)     PT Exercises  Exercise Treatment: seated BLE AP, LAQ, marches x10     Safety Devices  Type of Devices: All fall risk precautions in place;Bed alarm in place;Call light within reach;Left in bed;Nurse notified;Gait belt  Restraints  Restraints Initially in Place: No       Goals  Short Term 
Equipment: Cane, Walker - Rolling, Oxygen (3L O2 at baseline)  Has the patient had two or more falls in the past year or any fall with injury in the past year?: No  Receives Help From: Family, Friend(s) (son ~5mins; friend comes and stays often)  ADL Assistance: Needs assistance  Bath: Moderate assistance  Dressing: Moderate assistance  Homemaking Assistance: Needs assistance  Meal Prep: Total  Laundry: Total  Vacuuming: Total  Cleaning: Total  Shopping: Total  Homemaking Responsibilities: No  Ambulation Assistance: Independent (reports too small to walk with RW; uses furniture)  Transfer Assistance: Independent  Active : No  Patient's  Info: Son drives; telehealth visits    Vision/Hearing  Vision  Vision: Impaired  Vision Exceptions: Wears glasses for reading  Hearing  Hearing: Within functional limits      Cognition   Orientation  Overall Orientation Status: Within Functional Limits  Orientation Level: Oriented X4;Oriented to place;Oriented to time;Oriented to situation;Oriented to person  Cognition  Overall Cognitive Status: WFL     Objective   Temp: 98.6 °F (37 °C)  Pulse: 86  Heart Rate Source: Monitor  Respirations: 20  SpO2: 96 %  O2 Device: Nasal cannula (4 L)  BP: 106/69  MAP (Calculated): 81  BP Location: Left upper arm  BP Method: Automatic  Patient Position: Sitting (EOB)     Observation/Palpation  Posture: Good  Gross Assessment  AROM: Generally decreased, functional  Strength: Generally decreased, functional  Sensation: Intact      Bed Mobility Training  Bed Mobility Training: Yes  Interventions: Safety awareness training;Verbal cues  Supine to Sit: Stand-by assistance;Additional time (HOB elevated with rails)  Sit to Supine: Stand-by assistance;Additional time (HOB elevated with rails)  Scooting: Supervision (EOB)    Balance  Sitting: Intact (EOB)  Standing: Impaired  Standing - Static: Good;Occasional  Standing - Dynamic: Occasional;Fair    Transfer Training  Transfer Training: 
intensive monitoring for toxicity include: Steroid  [] Change in code status:    [] Decision to escalate care:    [] Major surgery/procedure with associated risk factors:    ----------------------------------------------------------------------  C. Data (any 2)  [x] Discussed current management and discharge planning options with Case Management.  [] Discussed management of the case with:  palliative care   [] Telemetry personally reviewed and interpreted as documented above    [] Imaging personally reviewed and interpreted, includes:    [x] Data Review (any 3)  [] Collateral history obtained from:    [x] All available Consultant notes from yesterday/today were reviewed  [x] All current labs were reviewed and interpreted for clinical significance   [x] Appropriate follow-up labs were ordered    Medications:  Personally reviewed in detail in conjunction w/ labs as documented for evidence of drug toxicity.     Infusion Medications    sodium chloride 10 mL (05/23/24 1344)     Scheduled Medications    sucralfate  1 g Oral 3 times per day    arformoterol tartrate  15 mcg Nebulization BID RT    pantoprazole  40 mg IntraVENous Daily    cefepime  2,000 mg IntraVENous Q8H    sodium chloride flush  5-40 mL IntraVENous 2 times per day    enoxaparin  40 mg SubCUTAneous Daily    budesonide  0.5 mg Nebulization BID RT    ipratropium 0.5 mg-albuterol 2.5 mg  1 Dose Inhalation Q4H WA RT    aspirin  81 mg Oral Daily    atorvastatin  80 mg Oral Nightly    busPIRone  10 mg Oral TID    Vitamin D  1,000 Units Oral Daily     PRN Meds: calcium carbonate, metoclopramide, HYDROcodone 5 mg - acetaminophen, diazePAM, sodium chloride flush, sodium chloride, magnesium sulfate, ondansetron **OR** ondansetron, polyethylene glycol, acetaminophen **OR** acetaminophen, methocarbamol     Labs:  Personally reviewed and interpreted for clinical significance.     Recent Labs     05/24/24  0518   WBC 7.7   HGB 9.7*   HCT 31.7*          Recent Labs 
   144   K 3.8 3.3*    107   CO2 29 31   BUN 12 10   CREATININE <0.5* <0.5*   CALCIUM 9.1 8.9   MG  --  1.60*       No results for input(s): \"PROBNP\", \"TROPHS\" in the last 72 hours.    No results for input(s): \"LABA1C\" in the last 72 hours.  No results for input(s): \"AST\", \"ALT\", \"BILIDIR\", \"BILITOT\", \"ALKPHOS\" in the last 72 hours.    No results for input(s): \"INR\", \"LACTA\", \"TSH\" in the last 72 hours.    Urine Cultures:   Lab Results   Component Value Date/Time    LABURIN No growth at 18 to 36 hours 11/26/2020 04:01 PM     Blood Cultures:   Lab Results   Component Value Date/Time    BC  05/20/2024 11:39 PM     No Growth to date.  Any change in status will be called.     Lab Results   Component Value Date/Time    BLOODCULT2  05/20/2024 11:39 PM     No Growth to date.  Any change in status will be called.     Organism:   Lab Results   Component Value Date/Time    ORG Yeast 05/24/2017 09:00 PM         Az Hampton MD

## 2024-05-29 NOTE — DISCHARGE SUMMARY
Hospital Medicine Discharge Summary    Patient: Joyce Uribe   : 1960     Admit Date: 2024   Discharge Date: 2024    Disposition:  []Home   []HHC  [x]SNF  []ECF  []Acute Rehab  []LTAC  []Hospice  Code status:  []Full  [x]DNR/CCA  []Limited (DNR/CCA with Do Not Intubate)  []DNRCC  Condition at Discharge: Stable  Primary Care Provider: Ghada Marr, APRN - CNP    Admitting Provider: Lory Ni MD  Discharge Provider: Az Hampton MD     Discharge Diagnoses:      Active Hospital Problems    Diagnosis     Acute respiratory failure (HCC) [J96.00]        Presenting Admission History:      Joyce Uribe is a 63 y.o. female with pmh of COPD, chronic respiratory failure on 3 L of oxygen, heart failure with preserved ejection fraction, nonobstructive coronary artery disease, gastroesophageal reflux disease and anxiety who was recently admitted here and discharged on the  for COPD exacerbation. Initially patient felt well but started to have increasing cough and shortness of breath as well as wheezing. Patient was brought in by EMS in acute respiratory distress was initiated on BiPAP. ABG done initially was consistent with acute respiratory acidosis which improved on BiPAP and patient was transitioned to 6 L of oxygen. She also did have a change in mental status prior to coming into the hospital.      Assessment/Plan:      Acute on chronic respiratory failure- secondary to pneumonia/COPD exacerbation  Recurrent admission?  Poor compliance with medication  She was just discharged from the hospital for same reason   improved currently on 3 L at his baseline  Ejection fraction 61% in   On antibiotics, breathing treatment  OT PT  Cut down the steroid to twice daily  Monitor blood sugar  Hemoglobin A1c 5.8  Pulmonology input if necessary   exa - resolved - no wheezing- given stomach upset - dc solumedrol  -  r po prednisone   Palliative care input appreciated   Ativan prn for anxiety

## 2024-05-29 NOTE — PLAN OF CARE
Problem: Discharge Planning  Goal: Discharge to home or other facility with appropriate resources  5/24/2024 2122 by Filomena Guerrero RN  Outcome: Progressing  5/24/2024 1828 by Lissette Rivera RN  Outcome: Progressing     Problem: Pain  Goal: Verbalizes/displays adequate comfort level or baseline comfort level  5/24/2024 2122 by Filomena Guerrero RN  Outcome: Progressing  5/24/2024 1828 by Lissette Rivera RN  Outcome: Progressing     Problem: Skin/Tissue Integrity  Goal: Absence of new skin breakdown  Description: 1.  Monitor for areas of redness and/or skin breakdown  2.  Assess vascular access sites hourly  3.  Every 4-6 hours minimum:  Change oxygen saturation probe site  4.  Every 4-6 hours:  If on nasal continuous positive airway pressure, respiratory therapy assess nares and determine need for appliance change or resting period.  5/24/2024 2122 by Filomena Guerrero RN  Outcome: Progressing  5/24/2024 1828 by Lissette Rivera RN  Outcome: Progressing     Problem: Safety - Adult  Goal: Free from fall injury  5/24/2024 2122 by Filomena Guerrero RN  Outcome: Progressing  5/24/2024 1828 by Lissette Rivera RN  Outcome: Progressing     Problem: Chronic Conditions and Co-morbidities  Goal: Patient's chronic conditions and co-morbidity symptoms are monitored and maintained or improved  5/24/2024 2122 by Filomena Guerrero RN  Outcome: Progressing  5/24/2024 1828 by Lissette Rivera RN  Outcome: Progressing     
  Problem: Discharge Planning  Goal: Discharge to home or other facility with appropriate resources  5/27/2024 1930 by Cecily Yu RN  Outcome: Progressing  5/27/2024 0603 by Mariam Owen RN  Outcome: Progressing     Problem: Pain  Goal: Verbalizes/displays adequate comfort level or baseline comfort level  5/27/2024 1930 by Cecily Yu RN  Outcome: Progressing  5/27/2024 0603 by Mariam Owen RN  Outcome: Progressing     Problem: Skin/Tissue Integrity  Goal: Absence of new skin breakdown  Description: 1.  Monitor for areas of redness and/or skin breakdown  2.  Assess vascular access sites hourly  3.  Every 4-6 hours minimum:  Change oxygen saturation probe site  4.  Every 4-6 hours:  If on nasal continuous positive airway pressure, respiratory therapy assess nares and determine need for appliance change or resting period.  5/27/2024 1930 by Cecily Yu RN  Outcome: Progressing  5/27/2024 0603 by Mariam Owen RN  Outcome: Progressing     Problem: Safety - Adult  Goal: Free from fall injury  5/27/2024 1930 by Cecily Yu RN  Outcome: Progressing  5/27/2024 0603 by Mariam Owen RN  Outcome: Progressing     Problem: Chronic Conditions and Co-morbidities  Goal: Patient's chronic conditions and co-morbidity symptoms are monitored and maintained or improved  5/27/2024 1930 by Cecily Yu RN  Outcome: Progressing  5/27/2024 0603 by Mariam Owen RN  Outcome: Progressing     Problem: Neurosensory - Adult  Goal: Achieves stable or improved neurological status  5/27/2024 1930 by Cecily Yu RN  Outcome: Progressing  5/27/2024 0603 by Mariam Owen RN  Outcome: Progressing     Problem: Respiratory - Adult  Goal: Achieves optimal ventilation and oxygenation  5/27/2024 1930 by Cecily Yu RN  Outcome: Progressing  5/27/2024 0603 by Mariam Owen RN  Outcome: Progressing     Problem: Cardiovascular - Adult  Goal: Maintains optimal cardiac output and 
  Problem: Discharge Planning  Goal: Discharge to home or other facility with appropriate resources  5/29/2024 1520 by Bhavya Smith RN  Outcome: Completed  5/29/2024 1101 by Bhavya Smith RN  Outcome: Progressing     Problem: Pain  Goal: Verbalizes/displays adequate comfort level or baseline comfort level  5/29/2024 1520 by Bhavya Smith RN  Outcome: Completed  5/29/2024 1101 by Bhavya Smith RN  Outcome: Progressing     Problem: Skin/Tissue Integrity  Goal: Absence of new skin breakdown  Description: 1.  Monitor for areas of redness and/or skin breakdown  2.  Assess vascular access sites hourly  3.  Every 4-6 hours minimum:  Change oxygen saturation probe site  4.  Every 4-6 hours:  If on nasal continuous positive airway pressure, respiratory therapy assess nares and determine need for appliance change or resting period.  5/29/2024 1520 by Bhavya Smith RN  Outcome: Completed  5/29/2024 1101 by Bhavya Smith RN  Outcome: Progressing     Problem: Safety - Adult  Goal: Free from fall injury  5/29/2024 1520 by Bhavya Smith RN  Outcome: Completed  5/29/2024 1101 by Bhavya Smith RN  Outcome: Progressing     Problem: Chronic Conditions and Co-morbidities  Goal: Patient's chronic conditions and co-morbidity symptoms are monitored and maintained or improved  5/29/2024 1520 by Bhavya Smith RN  Outcome: Completed  5/29/2024 1101 by Bhavya Smith RN  Outcome: Progressing     Problem: Respiratory - Adult  Goal: Achieves optimal ventilation and oxygenation  5/29/2024 1520 by Bhavya Smith RN  Outcome: Completed  5/29/2024 1101 by Bhavya Smith RN  Outcome: Progressing     Problem: Cardiovascular - Adult  Goal: Maintains optimal cardiac output and hemodynamic stability  5/29/2024 1520 by Bhavya Smith RN  Outcome: Completed  5/29/2024 1101 by Bhavya Smith RN  Outcome: Progressing     Problem: Skin/Tissue Integrity - Adult  Goal: Skin integrity remains intact  5/29/2024 
  Problem: Discharge Planning  Goal: Discharge to home or other facility with appropriate resources  Outcome: Progressing     Problem: Pain  Goal: Verbalizes/displays adequate comfort level or baseline comfort level  Outcome: Progressing     Problem: Skin/Tissue Integrity  Goal: Absence of new skin breakdown  Description: 1.  Monitor for areas of redness and/or skin breakdown  2.  Assess vascular access sites hourly  3.  Every 4-6 hours minimum:  Change oxygen saturation probe site  4.  Every 4-6 hours:  If on nasal continuous positive airway pressure, respiratory therapy assess nares and determine need for appliance change or resting period.  Outcome: Progressing     Problem: Neurosensory - Adult  Goal: Achieves stable or improved neurological status  Outcome: Progressing     Problem: Respiratory - Adult  Goal: Achieves optimal ventilation and oxygenation  Outcome: Progressing     Problem: Musculoskeletal - Adult  Goal: Return mobility to safest level of function  Outcome: Progressing     
  Problem: Discharge Planning  Goal: Discharge to home or other facility with appropriate resources  Outcome: Progressing     Problem: Pain  Goal: Verbalizes/displays adequate comfort level or baseline comfort level  Outcome: Progressing     Problem: Skin/Tissue Integrity  Goal: Absence of new skin breakdown  Description: 1.  Monitor for areas of redness and/or skin breakdown  2.  Assess vascular access sites hourly  3.  Every 4-6 hours minimum:  Change oxygen saturation probe site  4.  Every 4-6 hours:  If on nasal continuous positive airway pressure, respiratory therapy assess nares and determine need for appliance change or resting period.  Outcome: Progressing     Problem: Safety - Adult  Goal: Free from fall injury  Outcome: Progressing     
  Problem: Discharge Planning  Goal: Discharge to home or other facility with appropriate resources  Outcome: Progressing     Problem: Pain  Goal: Verbalizes/displays adequate comfort level or baseline comfort level  Outcome: Progressing     Problem: Skin/Tissue Integrity  Goal: Absence of new skin breakdown  Description: 1.  Monitor for areas of redness and/or skin breakdown  2.  Assess vascular access sites hourly  3.  Every 4-6 hours minimum:  Change oxygen saturation probe site  4.  Every 4-6 hours:  If on nasal continuous positive airway pressure, respiratory therapy assess nares and determine need for appliance change or resting period.  Outcome: Progressing     Problem: Safety - Adult  Goal: Free from fall injury  Outcome: Progressing     Problem: Chronic Conditions and Co-morbidities  Goal: Patient's chronic conditions and co-morbidity symptoms are monitored and maintained or improved  Outcome: Progressing     
  Problem: Discharge Planning  Goal: Discharge to home or other facility with appropriate resources  Outcome: Progressing     Problem: Pain  Goal: Verbalizes/displays adequate comfort level or baseline comfort level  Outcome: Progressing     Problem: Skin/Tissue Integrity  Goal: Absence of new skin breakdown  Description: 1.  Monitor for areas of redness and/or skin breakdown  2.  Assess vascular access sites hourly  3.  Every 4-6 hours minimum:  Change oxygen saturation probe site  4.  Every 4-6 hours:  If on nasal continuous positive airway pressure, respiratory therapy assess nares and determine need for appliance change or resting period.  Outcome: Progressing     Problem: Safety - Adult  Goal: Free from fall injury  Outcome: Progressing     Problem: Respiratory - Adult  Goal: Achieves optimal ventilation and oxygenation  Outcome: Progressing     
CHF Care Plan      Patient's EF (Ejection Fraction) is greater than 40%    Heart Failure Medications:  Diuretics:: Furosemide, Torsemide, Spironolactone, Metalozone, Other, and None    (One of the following REQUIRED for EF </= 40%/SYSTOLIC FAILURE but MAY be used in EF% >40%/DIASTOLIC FAILURE)        ACE:: None        ARB:: None         ARNI:: None    (Beta Blockers)  NON- Evidenced Based Beta Blocker (for EF% >40%/DIASTOLIC FAILURE): None    Evidenced Based Beta Blocker::(REQUIRED for EF% <40%/SYSTOLIC FAILURE) None  ...................................................................................................................................................    Failed to redirect to the Timeline version of the [employer] SmartLink.      Patient's weights and intake/output reviewed    Daily Weight log at bedside, patient/family participation in use of log: no    Patient's current weight today shows a difference of 1.1 lbs more than last documented weight.      Intake/Output Summary (Last 24 hours) at 5/24/2024 1705  Last data filed at 5/24/2024 1107  Gross per 24 hour   Intake 110 ml   Output 450 ml   Net -340 ml       Education Booklet Provided: yes    Comorbidities Reviewed No    Patient has a past medical history of Anxiety, Asthma, Cancer (HCC), Cancer of lung (HCC), CHF (congestive heart failure) (HCC), COPD (chronic obstructive pulmonary disease) (HCC), Cyclic vomiting syndrome, Cysts of both kidneys, Depression, Fatty liver, Gastritis, MI (myocardial infarction) (HCC), Panic attacks, Pneumonia, Pre-diabetes, and Tricuspid regurgitation.     >>For CHF and Comorbidity documentation on Education Time and Topics, please see Education Tab      Pt sitting in bed at this time on  3 L O2. Pt with complaints of shortness of breath. Pt without lower extremity edema.     Patient and/or Family's stated Goal of Care this Admission: reduce shortness of breath, increase activity tolerance, and be more comfortable prior to 
CHF Care Plan      Patient's EF (Ejection Fraction) is greater than 40%    Heart Failure Medications:  Diuretics:: None    (One of the following REQUIRED for EF </= 40%/SYSTOLIC FAILURE but MAY be used in EF% >40%/DIASTOLIC FAILURE)        ACE:: None        ARB:: None         ARNI:: None    (Beta Blockers)  NON- Evidenced Based Beta Blocker (for EF% >40%/DIASTOLIC FAILURE): None    Evidenced Based Beta Blocker::(REQUIRED for EF% <40%/SYSTOLIC FAILURE) None  ...................................................................................................................................................    Failed to redirect to the Timeline version of the [employer] SmartLink.      Patient's weights and intake/output reviewed    Daily Weight log at bedside, patient/family participation in use of log: \"yes    Patient's current weight today shows a difference of 2 lbs less than last documented weight.      Intake/Output Summary (Last 24 hours) at 5/28/2024 0608  Last data filed at 5/28/2024 0334  Gross per 24 hour   Intake 510 ml   Output 600 ml   Net -90 ml       Education Booklet Provided: yes    Comorbidities Reviewed Yes    Patient has a past medical history of Anxiety, Asthma, Cancer (HCC), Cancer of lung (HCC), CHF (congestive heart failure) (HCC), COPD (chronic obstructive pulmonary disease) (HCC), Cyclic vomiting syndrome, Cysts of both kidneys, Depression, Fatty liver, Gastritis, MI (myocardial infarction) (HCC), Panic attacks, Pneumonia, Pre-diabetes, and Tricuspid regurgitation.     >>For CHF and Comorbidity documentation on Education Time and Topics, please see Education Tab      Pt resting in bed at this time on  3.5 L O2. Pt with complaints of shortness of breath. Pt with nonpitting lower extremity edema.     Patient and/or Family's stated Goal of Care this Admission: reduce shortness of breath, increase activity tolerance, better understand heart failure and disease management, be more comfortable, and reduce 
CHF Care Plan      Patient's EF (Ejection Fraction) is greater than 40%    Heart Failure Medications:  Diuretics:: None    (One of the following REQUIRED for EF </= 40%/SYSTOLIC FAILURE but MAY be used in EF% >40%/DIASTOLIC FAILURE)        ACE:: None        ARB:: None         ARNI:: None    (Beta Blockers)  NON- Evidenced Based Beta Blocker (for EF% >40%/DIASTOLIC FAILURE): None    Evidenced Based Beta Blocker::(REQUIRED for EF% <40%/SYSTOLIC FAILURE) None  ...................................................................................................................................................    Failed to redirect to the Timeline version of the [employer] SmartLink.      Patient's weights and intake/output reviewed    Daily Weight log at bedside, patient/family participation in use of log: \"yes    Patient's current weight today shows a difference of 1 lbs less than last documented weight.      Intake/Output Summary (Last 24 hours) at 5/25/2024 0553  Last data filed at 5/24/2024 1107  Gross per 24 hour   Intake 50 ml   Output 450 ml   Net -400 ml       Education Booklet Provided: yes    Comorbidities Reviewed No    Patient has a past medical history of Anxiety, Asthma, Cancer (HCC), Cancer of lung (HCC), CHF (congestive heart failure) (HCC), COPD (chronic obstructive pulmonary disease) (HCC), Cyclic vomiting syndrome, Cysts of both kidneys, Depression, Fatty liver, Gastritis, MI (myocardial infarction) (HCC), Panic attacks, Pneumonia, Pre-diabetes, and Tricuspid regurgitation.     >>For CHF and Comorbidity documentation on Education Time and Topics, please see Education Tab      Pt sitting in bed at this time on  3 L O2. Pt denies shortness of breath. Pt with pitting lower extremity edema.     Patient and/or Family's stated Goal of Care this Admission: increase activity tolerance, better understand heart failure and disease management, be more comfortable, and reduce lower extremity edema prior to 
CHF Care Plan      Patient's EF (Ejection Fraction) is greater than 40%    Heart Failure Medications:  Diuretics:: None    (One of the following REQUIRED for EF </= 40%/SYSTOLIC FAILURE but MAY be used in EF% >40%/DIASTOLIC FAILURE)        ACE:: None        ARB:: None         ARNI:: None    (Beta Blockers)  NON- Evidenced Based Beta Blocker (for EF% >40%/DIASTOLIC FAILURE): None    Evidenced Based Beta Blocker::(REQUIRED for EF% <40%/SYSTOLIC FAILURE) None  ...................................................................................................................................................    Failed to redirect to the Timeline version of the [employer] SmartLink.      Patient's weights and intake/output reviewed    Daily Weight log at bedside, patient/family participation in use of log: \"yes    Patient's current weight today shows a difference of 2 lbs less than last documented weight.      Intake/Output Summary (Last 24 hours) at 5/28/2024 1047  Last data filed at 5/28/2024 0445  Gross per 24 hour   Intake 270 ml   Output 1300 ml   Net -1030 ml         Education Booklet Provided: yes    Comorbidities Reviewed Yes    Patient has a past medical history of Anxiety, Asthma, Cancer (HCC), Cancer of lung (HCC), CHF (congestive heart failure) (HCC), COPD (chronic obstructive pulmonary disease) (HCC), Cyclic vomiting syndrome, Cysts of both kidneys, Depression, Fatty liver, Gastritis, MI (myocardial infarction) (HCC), Panic attacks, Pneumonia, Pre-diabetes, and Tricuspid regurgitation.     >>For CHF and Comorbidity documentation on Education Time and Topics, please see Education Tab      Pt resting in bed at this time on  3.5 L O2. Pt with complaints of shortness of breath. Pt with nonpitting lower extremity edema.     Patient and/or Family's stated Goal of Care this Admission: reduce shortness of breath, increase activity tolerance, better understand heart failure and disease management, be more comfortable, and 
CHF Care Plan      Patient's EF (Ejection Fraction) is greater than 40%    Heart Failure Medications:  Diuretics:: None    (One of the following REQUIRED for EF </= 40%/SYSTOLIC FAILURE but MAY be used in EF% >40%/DIASTOLIC FAILURE)        ACE:: None        ARB:: None         ARNI:: None    (Beta Blockers)  NON- Evidenced Based Beta Blocker (for EF% >40%/DIASTOLIC FAILURE): None    Evidenced Based Beta Blocker::(REQUIRED for EF% <40%/SYSTOLIC FAILURE) None  ...................................................................................................................................................    Failed to redirect to the Timeline version of the [employer] SmartLink.      Patient's weights and intake/output reviewed    Daily Weight log at bedside, patient/family participation in use of log: \"yes    Patient's current weight today shows a difference of 0 lbs  ~  than last documented weight.      Intake/Output Summary (Last 24 hours) at 5/27/2024 0604  Last data filed at 5/26/2024 2121  Gross per 24 hour   Intake --   Output 1050 ml   Net -1050 ml       Education Booklet Provided: yes    Comorbidities Reviewed Yes    Patient has a past medical history of Anxiety, Asthma, Cancer (HCC), Cancer of lung (HCC), CHF (congestive heart failure) (HCC), COPD (chronic obstructive pulmonary disease) (HCC), Cyclic vomiting syndrome, Cysts of both kidneys, Depression, Fatty liver, Gastritis, MI (myocardial infarction) (HCC), Panic attacks, Pneumonia, Pre-diabetes, and Tricuspid regurgitation.     >>For CHF and Comorbidity documentation on Education Time and Topics, please see Education Tab      Pt resting in bed at this time on  3 L O2. Pt denies shortness of breath. Pt without lower extremity edema.     Patient and/or Family's stated Goal of Care this Admission: reduce shortness of breath, increase activity tolerance, better understand heart failure and disease management, be more comfortable, and reduce lower extremity edema 
CHF Care Plan      Patient's EF (Ejection Fraction) is greater than 40%    Heart Failure Medications:  Diuretics:: None    (One of the following REQUIRED for EF </= 40%/SYSTOLIC FAILURE but MAY be used in EF% >40%/DIASTOLIC FAILURE)        ACE:: None        ARB:: None         ARNI:: None    (Beta Blockers)  NON- Evidenced Based Beta Blocker (for EF% >40%/DIASTOLIC FAILURE): None    Evidenced Based Beta Blocker::(REQUIRED for EF% <40%/SYSTOLIC FAILURE) None  ...................................................................................................................................................    Failed to redirect to the Timeline version of the [employer] SmartLink.      Patient's weights and intake/output reviewed    Daily Weight log at bedside, patient/family participation in use of log: \"yes    Patient's current weight today shows a difference of 1 lbs more than last documented weight.      Intake/Output Summary (Last 24 hours) at 5/24/2024 0614  Last data filed at 5/24/2024 0106  Gross per 24 hour   Intake 60 ml   Output 350 ml   Net -290 ml       Education Booklet Provided: yes    Comorbidities Reviewed Yes    Patient has a past medical history of Anxiety, Asthma, Cancer (HCC), Cancer of lung (HCC), CHF (congestive heart failure) (HCC), COPD (chronic obstructive pulmonary disease) (HCC), Cyclic vomiting syndrome, Cysts of both kidneys, Depression, Fatty liver, Gastritis, MI (myocardial infarction) (HCC), Panic attacks, Pneumonia, Pre-diabetes, and Tricuspid regurgitation.     >>For CHF and Comorbidity documentation on Education Time and Topics, please see Education Tab      Pt resting in bed at this time on  3 L O2. Pt denies shortness of breath. Pt without lower extremity edema.     Patient and/or Family's stated Goal of Care this Admission: increase activity tolerance, better understand heart failure and disease management, and be more comfortable prior to discharge        :   
CHF Care Plan    20 mins review  Patient's EF (Ejection Fraction) is greater than 40%    Heart Failure Medications:  Diuretics:: None    (One of the following REQUIRED for EF </= 40%/SYSTOLIC FAILURE but MAY be used in EF% >40%/DIASTOLIC FAILURE)        ACE:: None        ARB:: None         ARNI:: None    (Beta Blockers)  NON- Evidenced Based Beta Blocker (for EF% >40%/DIASTOLIC FAILURE): None    Evidenced Based Beta Blocker::(REQUIRED for EF% <40%/SYSTOLIC FAILURE) None  ...................................................................................................................................................    Failed to redirect to the Timeline version of the "[employer] SmartLink.      Patient's weights and intake/output reviewed    Daily Weight log at bedside, patient/family participation in use of log: \"yes    Patient's current weight today shows a difference of 2 lbs less than last documented weight.      Intake/Output Summary (Last 24 hours) at 5/29/2024 1101  Last data filed at 5/29/2024 1023  Gross per 24 hour   Intake 870 ml   Output 1400 ml   Net -530 ml         Education Booklet Provided: yes    Comorbidities Reviewed Yes    Patient has a past medical history of Anxiety, Asthma, Cancer (HCC), Cancer of lung (HCC), CHF (congestive heart failure) (HCC), COPD (chronic obstructive pulmonary disease) (HCC), Cyclic vomiting syndrome, Cysts of both kidneys, Depression, Fatty liver, Gastritis, MI (myocardial infarction) (HCC), Panic attacks, Pneumonia, Pre-diabetes, and Tricuspid regurgitation.     >>For CHF and Comorbidity documentation on Education Time and Topics, please see Education Tab      Pt resting in bed at this time on  3.5 L O2. Pt with complaints of shortness of breath. Pt with nonpitting lower extremity edema.     Patient and/or Family's stated Goal of Care this Admission: reduce shortness of breath, increase activity tolerance, better understand heart failure and disease management, be more 
Increase function to baseline.  
Increase patients ADLs/functional status to baseline.    
Reviewed plan of care including dc barriers  
absence of nausea and vomiting  Outcome: Progressing     
lower extremity edema prior to discharge        :

## 2024-07-15 ENCOUNTER — APPOINTMENT (OUTPATIENT)
Dept: GENERAL RADIOLOGY | Age: 64
DRG: 871 | End: 2024-07-15
Payer: COMMERCIAL

## 2024-07-15 ENCOUNTER — HOSPITAL ENCOUNTER (INPATIENT)
Age: 64
LOS: 6 days | Discharge: HOME HEALTH | DRG: 871 | End: 2024-07-21
Attending: EMERGENCY MEDICINE | Admitting: INTERNAL MEDICINE
Payer: COMMERCIAL

## 2024-07-15 DIAGNOSIS — U07.1 COVID: ICD-10-CM

## 2024-07-15 DIAGNOSIS — J18.9 PNEUMONIA OF LEFT LOWER LOBE DUE TO INFECTIOUS ORGANISM: ICD-10-CM

## 2024-07-15 DIAGNOSIS — E83.42 HYPOMAGNESEMIA: ICD-10-CM

## 2024-07-15 DIAGNOSIS — J44.1 COPD EXACERBATION (HCC): ICD-10-CM

## 2024-07-15 DIAGNOSIS — E87.6 HYPOKALEMIA: ICD-10-CM

## 2024-07-15 DIAGNOSIS — J96.21 ACUTE ON CHRONIC RESPIRATORY FAILURE WITH HYPOXIA (HCC): Primary | ICD-10-CM

## 2024-07-15 LAB
ALBUMIN SERPL-MCNC: 4.4 G/DL (ref 3.4–5)
ALBUMIN/GLOB SERPL: 1.2 {RATIO} (ref 1.1–2.2)
ALP SERPL-CCNC: 129 U/L (ref 40–129)
ALT SERPL-CCNC: 15 U/L (ref 10–40)
ANION GAP SERPL CALCULATED.3IONS-SCNC: 14 MMOL/L (ref 3–16)
AST SERPL-CCNC: 21 U/L (ref 15–37)
BASE EXCESS BLDV CALC-SCNC: 5.8 MMOL/L (ref -3–3)
BASOPHILS # BLD: 0 K/UL (ref 0–0.2)
BASOPHILS NFR BLD: 0.4 %
BILIRUB SERPL-MCNC: <0.2 MG/DL (ref 0–1)
BUN SERPL-MCNC: 13 MG/DL (ref 7–20)
CALCIUM SERPL-MCNC: 9.4 MG/DL (ref 8.3–10.6)
CHLORIDE SERPL-SCNC: 96 MMOL/L (ref 99–110)
CO2 BLDV-SCNC: 33 MMOL/L
CO2 SERPL-SCNC: 30 MMOL/L (ref 21–32)
COHGB MFR BLDV: 2.5 % (ref 0–1.5)
CREAT SERPL-MCNC: 0.6 MG/DL (ref 0.6–1.2)
D-DIMER QUANTITATIVE: <0.27 UG/ML FEU (ref 0–0.6)
DEPRECATED RDW RBC AUTO: 14.7 % (ref 12.4–15.4)
EKG ATRIAL RATE: 120 BPM
EKG DIAGNOSIS: NORMAL
EKG P AXIS: 61 DEGREES
EKG P-R INTERVAL: 112 MS
EKG Q-T INTERVAL: 360 MS
EKG QRS DURATION: 70 MS
EKG QTC CALCULATION (BAZETT): 508 MS
EKG R AXIS: 78 DEGREES
EKG T AXIS: 46 DEGREES
EKG VENTRICULAR RATE: 120 BPM
EOSINOPHIL # BLD: 0 K/UL (ref 0–0.6)
EOSINOPHIL NFR BLD: 0.1 %
FLUAV RNA RESP QL NAA+PROBE: NOT DETECTED
FLUBV RNA RESP QL NAA+PROBE: NOT DETECTED
GFR SERPLBLD CREATININE-BSD FMLA CKD-EPI: >90 ML/MIN/{1.73_M2}
GLUCOSE SERPL-MCNC: 124 MG/DL (ref 70–99)
HCO3 BLDV-SCNC: 31.6 MMOL/L (ref 23–29)
HCT VFR BLD AUTO: 37.9 % (ref 36–48)
HGB BLD-MCNC: 12 G/DL (ref 12–16)
LACTATE BLDV-SCNC: 1.9 MMOL/L (ref 0.4–2)
LYMPHOCYTES # BLD: 0.6 K/UL (ref 1–5.1)
LYMPHOCYTES NFR BLD: 4.3 %
MAGNESIUM SERPL-MCNC: 1.5 MG/DL (ref 1.8–2.4)
MCH RBC QN AUTO: 28.3 PG (ref 26–34)
MCHC RBC AUTO-ENTMCNC: 31.7 G/DL (ref 31–36)
MCV RBC AUTO: 89.2 FL (ref 80–100)
METHGB MFR BLDV: 0 %
MONOCYTES # BLD: 0.8 K/UL (ref 0–1.3)
MONOCYTES NFR BLD: 6.3 %
NEUTROPHILS # BLD: 11.7 K/UL (ref 1.7–7.7)
NEUTROPHILS NFR BLD: 88.9 %
NT-PROBNP SERPL-MCNC: 569 PG/ML (ref 0–124)
O2 THERAPY: ABNORMAL
PCO2 BLDV: 50.7 MMHG (ref 40–50)
PH BLDV: 7.41 [PH] (ref 7.35–7.45)
PLATELET # BLD AUTO: 334 K/UL (ref 135–450)
PMV BLD AUTO: 10 FL (ref 5–10.5)
PO2 BLDV: 47.6 MMHG (ref 25–40)
POTASSIUM SERPL-SCNC: 3.4 MMOL/L (ref 3.5–5.1)
PROCALCITONIN SERPL IA-MCNC: 0.08 NG/ML (ref 0–0.15)
PROT SERPL-MCNC: 8 G/DL (ref 6.4–8.2)
RBC # BLD AUTO: 4.25 M/UL (ref 4–5.2)
SAO2 % BLDV: 85 %
SARS-COV-2 RNA RESP QL NAA+PROBE: DETECTED
SODIUM SERPL-SCNC: 140 MMOL/L (ref 136–145)
TROPONIN, HIGH SENSITIVITY: 18 NG/L (ref 0–14)
TROPONIN, HIGH SENSITIVITY: 21 NG/L (ref 0–14)
WBC # BLD AUTO: 13.2 K/UL (ref 4–11)

## 2024-07-15 PROCEDURE — 99285 EMERGENCY DEPT VISIT HI MDM: CPT

## 2024-07-15 PROCEDURE — 2580000003 HC RX 258: Performed by: EMERGENCY MEDICINE

## 2024-07-15 PROCEDURE — 93010 ELECTROCARDIOGRAM REPORT: CPT | Performed by: INTERNAL MEDICINE

## 2024-07-15 PROCEDURE — 96365 THER/PROPH/DIAG IV INF INIT: CPT

## 2024-07-15 PROCEDURE — 87636 SARSCOV2 & INF A&B AMP PRB: CPT

## 2024-07-15 PROCEDURE — 36415 COLL VENOUS BLD VENIPUNCTURE: CPT

## 2024-07-15 PROCEDURE — 6370000000 HC RX 637 (ALT 250 FOR IP): Performed by: EMERGENCY MEDICINE

## 2024-07-15 PROCEDURE — 6360000002 HC RX W HCPCS: Performed by: EMERGENCY MEDICINE

## 2024-07-15 PROCEDURE — 83880 ASSAY OF NATRIURETIC PEPTIDE: CPT

## 2024-07-15 PROCEDURE — 2500000003 HC RX 250 WO HCPCS: Performed by: EMERGENCY MEDICINE

## 2024-07-15 PROCEDURE — 84443 ASSAY THYROID STIM HORMONE: CPT

## 2024-07-15 PROCEDURE — 2580000003 HC RX 258: Performed by: INTERNAL MEDICINE

## 2024-07-15 PROCEDURE — 96375 TX/PRO/DX INJ NEW DRUG ADDON: CPT

## 2024-07-15 PROCEDURE — 6360000002 HC RX W HCPCS: Performed by: INTERNAL MEDICINE

## 2024-07-15 PROCEDURE — 84484 ASSAY OF TROPONIN QUANT: CPT

## 2024-07-15 PROCEDURE — 82803 BLOOD GASES ANY COMBINATION: CPT

## 2024-07-15 PROCEDURE — 84145 PROCALCITONIN (PCT): CPT

## 2024-07-15 PROCEDURE — 80053 COMPREHEN METABOLIC PANEL: CPT

## 2024-07-15 PROCEDURE — 83605 ASSAY OF LACTIC ACID: CPT

## 2024-07-15 PROCEDURE — 6370000000 HC RX 637 (ALT 250 FOR IP): Performed by: INTERNAL MEDICINE

## 2024-07-15 PROCEDURE — 94640 AIRWAY INHALATION TREATMENT: CPT

## 2024-07-15 PROCEDURE — 83735 ASSAY OF MAGNESIUM: CPT

## 2024-07-15 PROCEDURE — 1200000000 HC SEMI PRIVATE

## 2024-07-15 PROCEDURE — 85025 COMPLETE CBC W/AUTO DIFF WBC: CPT

## 2024-07-15 PROCEDURE — 85379 FIBRIN DEGRADATION QUANT: CPT

## 2024-07-15 PROCEDURE — 71045 X-RAY EXAM CHEST 1 VIEW: CPT

## 2024-07-15 PROCEDURE — 87040 BLOOD CULTURE FOR BACTERIA: CPT

## 2024-07-15 PROCEDURE — 6370000000 HC RX 637 (ALT 250 FOR IP): Performed by: NURSE PRACTITIONER

## 2024-07-15 PROCEDURE — 93005 ELECTROCARDIOGRAM TRACING: CPT | Performed by: EMERGENCY MEDICINE

## 2024-07-15 RX ORDER — DIAZEPAM 5 MG/1
5 TABLET ORAL 3 TIMES DAILY
Status: DISCONTINUED | OUTPATIENT
Start: 2024-07-15 | End: 2024-07-21 | Stop reason: HOSPADM

## 2024-07-15 RX ORDER — ONDANSETRON 2 MG/ML
4 INJECTION INTRAMUSCULAR; INTRAVENOUS EVERY 6 HOURS PRN
Status: DISCONTINUED | OUTPATIENT
Start: 2024-07-15 | End: 2024-07-16

## 2024-07-15 RX ORDER — ATORVASTATIN CALCIUM 40 MG/1
80 TABLET, FILM COATED ORAL NIGHTLY
Status: DISCONTINUED | OUTPATIENT
Start: 2024-07-15 | End: 2024-07-21 | Stop reason: HOSPADM

## 2024-07-15 RX ORDER — SODIUM CHLORIDE 0.9 % (FLUSH) 0.9 %
5-40 SYRINGE (ML) INJECTION EVERY 12 HOURS SCHEDULED
Status: DISCONTINUED | OUTPATIENT
Start: 2024-07-15 | End: 2024-07-21 | Stop reason: HOSPADM

## 2024-07-15 RX ORDER — ALBUTEROL SULFATE 90 UG/1
2 AEROSOL, METERED RESPIRATORY (INHALATION) EVERY 6 HOURS PRN
Status: DISCONTINUED | OUTPATIENT
Start: 2024-07-15 | End: 2024-07-21 | Stop reason: HOSPADM

## 2024-07-15 RX ORDER — POTASSIUM CHLORIDE 20 MEQ/1
40 TABLET, EXTENDED RELEASE ORAL ONCE
Status: COMPLETED | OUTPATIENT
Start: 2024-07-15 | End: 2024-07-15

## 2024-07-15 RX ORDER — SODIUM CHLORIDE 9 MG/ML
INJECTION, SOLUTION INTRAVENOUS PRN
Status: DISCONTINUED | OUTPATIENT
Start: 2024-07-15 | End: 2024-07-21 | Stop reason: HOSPADM

## 2024-07-15 RX ORDER — PANTOPRAZOLE SODIUM 40 MG/1
40 TABLET, DELAYED RELEASE ORAL DAILY
Status: DISCONTINUED | OUTPATIENT
Start: 2024-07-16 | End: 2024-07-20

## 2024-07-15 RX ORDER — METOCLOPRAMIDE 10 MG/1
10 TABLET ORAL EVERY 6 HOURS PRN
Status: DISCONTINUED | OUTPATIENT
Start: 2024-07-15 | End: 2024-07-21 | Stop reason: HOSPADM

## 2024-07-15 RX ORDER — FUROSEMIDE 20 MG/1
20 TABLET ORAL DAILY
Status: DISCONTINUED | OUTPATIENT
Start: 2024-07-16 | End: 2024-07-21 | Stop reason: HOSPADM

## 2024-07-15 RX ORDER — ONDANSETRON 4 MG/1
4 TABLET, ORALLY DISINTEGRATING ORAL EVERY 8 HOURS PRN
Status: DISCONTINUED | OUTPATIENT
Start: 2024-07-15 | End: 2024-07-16

## 2024-07-15 RX ORDER — DIAZEPAM 5 MG/1
5 TABLET ORAL 3 TIMES DAILY
COMMUNITY

## 2024-07-15 RX ORDER — DILTIAZEM HYDROCHLORIDE 5 MG/ML
5 INJECTION INTRAVENOUS ONCE
Status: COMPLETED | OUTPATIENT
Start: 2024-07-15 | End: 2024-07-15

## 2024-07-15 RX ORDER — POLYETHYLENE GLYCOL 3350 17 G/17G
17 POWDER, FOR SOLUTION ORAL DAILY PRN
Status: DISCONTINUED | OUTPATIENT
Start: 2024-07-15 | End: 2024-07-21 | Stop reason: HOSPADM

## 2024-07-15 RX ORDER — 0.9 % SODIUM CHLORIDE 0.9 %
500 INTRAVENOUS SOLUTION INTRAVENOUS ONCE
Status: COMPLETED | OUTPATIENT
Start: 2024-07-15 | End: 2024-07-15

## 2024-07-15 RX ORDER — MAGNESIUM SULFATE 1 G/100ML
1000 INJECTION INTRAVENOUS ONCE
Status: COMPLETED | OUTPATIENT
Start: 2024-07-15 | End: 2024-07-15

## 2024-07-15 RX ORDER — ASPIRIN 81 MG/1
81 TABLET, CHEWABLE ORAL DAILY
Status: DISCONTINUED | OUTPATIENT
Start: 2024-07-16 | End: 2024-07-21 | Stop reason: HOSPADM

## 2024-07-15 RX ORDER — ACETAMINOPHEN 325 MG/1
650 TABLET ORAL EVERY 6 HOURS PRN
Status: DISCONTINUED | OUTPATIENT
Start: 2024-07-15 | End: 2024-07-21 | Stop reason: HOSPADM

## 2024-07-15 RX ORDER — ROFLUMILAST 500 UG/1
250 TABLET ORAL DAILY
Status: DISCONTINUED | OUTPATIENT
Start: 2024-07-16 | End: 2024-07-21 | Stop reason: HOSPADM

## 2024-07-15 RX ORDER — LEVALBUTEROL 1.25 MG/.5ML
1.25 SOLUTION, CONCENTRATE RESPIRATORY (INHALATION) ONCE
Status: COMPLETED | OUTPATIENT
Start: 2024-07-15 | End: 2024-07-15

## 2024-07-15 RX ORDER — BUSPIRONE HYDROCHLORIDE 5 MG/1
10 TABLET ORAL 3 TIMES DAILY
Status: DISCONTINUED | OUTPATIENT
Start: 2024-07-15 | End: 2024-07-21 | Stop reason: HOSPADM

## 2024-07-15 RX ORDER — ENOXAPARIN SODIUM 100 MG/ML
40 INJECTION SUBCUTANEOUS DAILY
Status: DISCONTINUED | OUTPATIENT
Start: 2024-07-15 | End: 2024-07-21 | Stop reason: HOSPADM

## 2024-07-15 RX ORDER — SODIUM CHLORIDE 0.9 % (FLUSH) 0.9 %
5-40 SYRINGE (ML) INJECTION PRN
Status: DISCONTINUED | OUTPATIENT
Start: 2024-07-15 | End: 2024-07-21 | Stop reason: HOSPADM

## 2024-07-15 RX ORDER — IPRATROPIUM BROMIDE AND ALBUTEROL SULFATE 2.5; .5 MG/3ML; MG/3ML
1 SOLUTION RESPIRATORY (INHALATION) ONCE
Status: DISCONTINUED | OUTPATIENT
Start: 2024-07-15 | End: 2024-07-15

## 2024-07-15 RX ORDER — ONDANSETRON 2 MG/ML
4 INJECTION INTRAMUSCULAR; INTRAVENOUS ONCE
Status: COMPLETED | OUTPATIENT
Start: 2024-07-15 | End: 2024-07-15

## 2024-07-15 RX ORDER — BUDESONIDE AND FORMOTEROL FUMARATE DIHYDRATE 160; 4.5 UG/1; UG/1
2 AEROSOL RESPIRATORY (INHALATION)
Status: DISCONTINUED | OUTPATIENT
Start: 2024-07-15 | End: 2024-07-21 | Stop reason: HOSPADM

## 2024-07-15 RX ORDER — IPRATROPIUM BROMIDE AND ALBUTEROL SULFATE 2.5; .5 MG/3ML; MG/3ML
1 SOLUTION RESPIRATORY (INHALATION) EVERY 4 HOURS PRN
Status: DISCONTINUED | OUTPATIENT
Start: 2024-07-15 | End: 2024-07-21 | Stop reason: HOSPADM

## 2024-07-15 RX ORDER — ACETAMINOPHEN 650 MG/1
650 SUPPOSITORY RECTAL EVERY 6 HOURS PRN
Status: DISCONTINUED | OUTPATIENT
Start: 2024-07-15 | End: 2024-07-21 | Stop reason: HOSPADM

## 2024-07-15 RX ORDER — DEXAMETHASONE SODIUM PHOSPHATE 10 MG/ML
8 INJECTION INTRAMUSCULAR; INTRAVENOUS ONCE
Status: COMPLETED | OUTPATIENT
Start: 2024-07-15 | End: 2024-07-15

## 2024-07-15 RX ORDER — DEXAMETHASONE 4 MG/1
6 TABLET ORAL DAILY
Status: DISCONTINUED | OUTPATIENT
Start: 2024-07-16 | End: 2024-07-21 | Stop reason: HOSPADM

## 2024-07-15 RX ADMIN — LEVALBUTEROL 1.25 MG: 1.25 SOLUTION, CONCENTRATE RESPIRATORY (INHALATION) at 14:20

## 2024-07-15 RX ADMIN — DILTIAZEM HYDROCHLORIDE 5 MG: 5 INJECTION, SOLUTION INTRAVENOUS at 15:33

## 2024-07-15 RX ADMIN — SODIUM CHLORIDE 500 ML: 9 INJECTION, SOLUTION INTRAVENOUS at 15:02

## 2024-07-15 RX ADMIN — ATORVASTATIN CALCIUM 80 MG: 40 TABLET, FILM COATED ORAL at 21:55

## 2024-07-15 RX ADMIN — DEXAMETHASONE SODIUM PHOSPHATE 8 MG: 10 INJECTION INTRAMUSCULAR; INTRAVENOUS at 16:23

## 2024-07-15 RX ADMIN — POTASSIUM CHLORIDE 40 MEQ: 1500 TABLET, EXTENDED RELEASE ORAL at 16:23

## 2024-07-15 RX ADMIN — MAGNESIUM SULFATE HEPTAHYDRATE 1000 MG: 1 INJECTION, SOLUTION INTRAVENOUS at 15:33

## 2024-07-15 RX ADMIN — ONDANSETRON 4 MG: 2 INJECTION INTRAMUSCULAR; INTRAVENOUS at 15:33

## 2024-07-15 RX ADMIN — DIAZEPAM 5 MG: 5 TABLET ORAL at 22:12

## 2024-07-15 RX ADMIN — ENOXAPARIN SODIUM 40 MG: 100 INJECTION SUBCUTANEOUS at 21:55

## 2024-07-15 RX ADMIN — CEFTRIAXONE SODIUM 1000 MG: 1 INJECTION, POWDER, FOR SOLUTION INTRAMUSCULAR; INTRAVENOUS at 16:41

## 2024-07-15 RX ADMIN — BUSPIRONE HYDROCHLORIDE 10 MG: 5 TABLET ORAL at 21:54

## 2024-07-15 RX ADMIN — ACETAMINOPHEN 650 MG: 325 TABLET, FILM COATED ORAL at 21:55

## 2024-07-15 RX ADMIN — Medication 2 PUFF: at 20:44

## 2024-07-15 RX ADMIN — AZITHROMYCIN MONOHYDRATE 500 MG: 500 INJECTION, POWDER, LYOPHILIZED, FOR SOLUTION INTRAVENOUS at 17:27

## 2024-07-15 RX ADMIN — SODIUM CHLORIDE, PRESERVATIVE FREE 10 ML: 5 INJECTION INTRAVENOUS at 21:55

## 2024-07-15 NOTE — H&P
Hospital Medicine History & Physical      Date of Admission: 7/15/2024    Date of Service:  Pt seen/examined on 07/15/24     [x]Admitted to Inpatient with expected LOS greater than two midnights due to medical therapy.  []Placed in Observation status.    Chief Admission Complaint:  SOB    Presenting Admission History:      63 y.o. female who presented to [facility] with SOB.  PMHx significant for COPD, asthma, chronic hypoxic respiratory failure, CAD, CHF, depression, anxiety. Patient has felt fatigued and increasingly SOB for the past two days. She normally wears 3L NC. She has been using her inhalers as prescribed. She is a former smoker. She has a recent sick contact in the past week. She has never been admitted for COVID before.    Assessment/Plan:      Current Principal Problem:  COVID-19    COVID 19 pneumonia  - started on steroids   - continue home inhalers  - supportive care    Acute on chronic hypoxic respiratory failure  - 2/2 above  - wean O2 as able    COPD/Asthma  - no evidence of exacerbation  - steroids as above  - continue home inhalers    CAD  - no active chest pain  - continue ASA, statin    Chronic diastolic heart failure  - appears euvolemic  - echo 5/23 with EF 61%  - continue lasix    Anxiety/Depression  - mood stable  - continue buspar, valium    Discussed management and the need for Hospitalization of the patient w/ the Emergency Department Provider: Dr. Garcia    CXR: I have reviewed the CXR with the following interpretation: no acute disease  EKG:  I have reviewed the EKG with the following interpretation: sinus tachycardia    Physical Exam Performed:      /74   Pulse (!) 108   Temp 99 °F (37.2 °C) (Oral)   Resp 30   Ht 1.6 m (5' 3\")   Wt 56.7 kg (125 lb)   SpO2 95%   BMI 22.14 kg/m²     General appearance:  No apparent distress, appears stated age and cooperative.  HEENT:  Pupils equal, round, and reactive to light. Conjunctivae/corneas clear.  Respiratory:  Normal

## 2024-07-15 NOTE — ED PROVIDER NOTES
EMERGENCY DEPARTMENT ENCOUNTER        Pt Name: Joyce Uribe  MRN: [id number]  Birthdate 1960  Date of evaluation: 7/15/2024  Provider: Deonte Garcia MD  PCP: Ghada Marr APRN - CNP      CHIEF COMPLAINT       Chief Complaint   Patient presents with    Shortness of Breath     Was doing rehab, therapist said her O2 was in the 80s. CPOD, Asthma, chronic resp problems.        HISTORY OFPRESENT ILLNESS   (Location/Symptom, Timing/Onset, Context/Setting, Quality, Duration, Modifying Factors,Severity)  Note limiting factors.     Joyce Uribe is a 63 y.o. female presenting today due to concern for being at rehab today when she was checked and her oxygen was in the 80s.  She is complaining of shortness of breath increased over the past couple of days.  She also is nauseated but denies any vomiting.  No reported chest pain.  She does normally wear 3 L nasal cannula at home.  She overall does not feel well.  She has a slight headache.  No abdominal pain or vomiting.  Due to overall not feeling well with hypoxia, she was brought to the ED for further evaluation.  She denies any falls or trauma but states she did get lightheaded at 1 point where she thought she could pass out.        REVIEW OF SYSTEMS    (2-9 systems for level 4, 10 or more for level 5)     Review of Systems   Constitutional:  Positive for activity change, appetite change, chills and fatigue. Negative for fever.   HENT:  Positive for congestion.    Respiratory:  Positive for cough and shortness of breath.    Cardiovascular:  Negative for chest pain.   Gastrointestinal:  Positive for nausea. Negative for abdominal pain and vomiting.   Genitourinary:  Negative for dysuria and flank pain.   Musculoskeletal:  Positive for gait problem (Due to generalized weakness). Negative for neck pain.   Skin:  Negative for wound (No reported falls or trauma).   Neurological:  Positive for weakness (Generalized), light-headedness and headaches (mild).

## 2024-07-15 NOTE — ED NOTES
755 @ 7167  
Brief changed and purewick placed.   
Patient identified as a positive fall risk on the ED triage fall screening.  Patient placed in fall precautions which includes:  yellow fall risk bracelet on wrist and yellow socks on feet. Patient instructed on importance of not getting out of bed or ambulating without assistance for safety.  Pt verbalized understanding.    
administered: n/a  Mental Status: oriented  NIH Score:    C-SSRS: Risk of Suicide: No Risk  Bedside swallow:    [place] Coma Scale (GCS):    Active LDA's:   Peripheral IV 07/15/24 Proximal;Right Antecubital (Active)     PO Status: Regular  Pertinent or High Risk Medications/Drips: no  oIf Yes, please provide details: n/a  Pending Blood Product Administration: no    You may also review the ED PT Care Timeline found under the Summary Nursing Index tab.    Recommendation    Pending orders: medications not verified  Plan for Discharge (if known):  Additional Comments: n/a  If any further questions, please call Sending RN at n/a    Electronically signed by: Electronically signed by Johanny Wasserman RN on 7/15/2024 at 7:48 PM

## 2024-07-16 LAB
25(OH)D3 SERPL-MCNC: 28.2 NG/ML
ANION GAP SERPL CALCULATED.3IONS-SCNC: 13 MMOL/L (ref 3–16)
BUN SERPL-MCNC: 12 MG/DL (ref 7–20)
CALCIUM SERPL-MCNC: 8.8 MG/DL (ref 8.3–10.6)
CHLORIDE SERPL-SCNC: 100 MMOL/L (ref 99–110)
CO2 SERPL-SCNC: 25 MMOL/L (ref 21–32)
CREAT SERPL-MCNC: <0.5 MG/DL (ref 0.6–1.2)
CRP SERPL-MCNC: 20.9 MG/L (ref 0–5.1)
GFR SERPLBLD CREATININE-BSD FMLA CKD-EPI: >90 ML/MIN/{1.73_M2}
GLUCOSE SERPL-MCNC: 113 MG/DL (ref 70–99)
POTASSIUM SERPL-SCNC: 4.2 MMOL/L (ref 3.5–5.1)
SODIUM SERPL-SCNC: 138 MMOL/L (ref 136–145)
TSH SERPL DL<=0.005 MIU/L-ACNC: 2.25 UIU/ML (ref 0.27–4.2)

## 2024-07-16 PROCEDURE — 82306 VITAMIN D 25 HYDROXY: CPT

## 2024-07-16 PROCEDURE — 36415 COLL VENOUS BLD VENIPUNCTURE: CPT

## 2024-07-16 PROCEDURE — 6370000000 HC RX 637 (ALT 250 FOR IP): Performed by: INTERNAL MEDICINE

## 2024-07-16 PROCEDURE — 6370000000 HC RX 637 (ALT 250 FOR IP): Performed by: NURSE PRACTITIONER

## 2024-07-16 PROCEDURE — 1200000000 HC SEMI PRIVATE

## 2024-07-16 PROCEDURE — 86140 C-REACTIVE PROTEIN: CPT

## 2024-07-16 PROCEDURE — 2580000003 HC RX 258: Performed by: INTERNAL MEDICINE

## 2024-07-16 PROCEDURE — 6360000002 HC RX W HCPCS: Performed by: INTERNAL MEDICINE

## 2024-07-16 PROCEDURE — 2700000000 HC OXYGEN THERAPY PER DAY

## 2024-07-16 PROCEDURE — 93005 ELECTROCARDIOGRAM TRACING: CPT | Performed by: INTERNAL MEDICINE

## 2024-07-16 PROCEDURE — 80048 BASIC METABOLIC PNL TOTAL CA: CPT

## 2024-07-16 PROCEDURE — 94640 AIRWAY INHALATION TREATMENT: CPT

## 2024-07-16 PROCEDURE — 94761 N-INVAS EAR/PLS OXIMETRY MLT: CPT

## 2024-07-16 RX ORDER — IBUPROFEN 400 MG/1
400 TABLET ORAL EVERY 8 HOURS PRN
Status: DISCONTINUED | OUTPATIENT
Start: 2024-07-16 | End: 2024-07-21 | Stop reason: HOSPADM

## 2024-07-16 RX ORDER — PROCHLORPERAZINE EDISYLATE 5 MG/ML
10 INJECTION INTRAMUSCULAR; INTRAVENOUS EVERY 6 HOURS PRN
Status: DISCONTINUED | OUTPATIENT
Start: 2024-07-16 | End: 2024-07-21 | Stop reason: HOSPADM

## 2024-07-16 RX ADMIN — ASPIRIN 81 MG 81 MG: 81 TABLET ORAL at 08:29

## 2024-07-16 RX ADMIN — DIAZEPAM 5 MG: 5 TABLET ORAL at 20:35

## 2024-07-16 RX ADMIN — ENOXAPARIN SODIUM 40 MG: 100 INJECTION SUBCUTANEOUS at 08:30

## 2024-07-16 RX ADMIN — Medication 2 PUFF: at 19:25

## 2024-07-16 RX ADMIN — TIOTROPIUM BROMIDE INHALATION SPRAY 2 PUFF: 3.12 SPRAY, METERED RESPIRATORY (INHALATION) at 07:52

## 2024-07-16 RX ADMIN — BUSPIRONE HYDROCHLORIDE 10 MG: 5 TABLET ORAL at 13:19

## 2024-07-16 RX ADMIN — ATORVASTATIN CALCIUM 80 MG: 40 TABLET, FILM COATED ORAL at 20:35

## 2024-07-16 RX ADMIN — PROCHLORPERAZINE EDISYLATE 10 MG: 5 INJECTION INTRAMUSCULAR; INTRAVENOUS at 15:20

## 2024-07-16 RX ADMIN — PANTOPRAZOLE SODIUM 40 MG: 40 TABLET, DELAYED RELEASE ORAL at 08:29

## 2024-07-16 RX ADMIN — IBUPROFEN 400 MG: 400 TABLET, FILM COATED ORAL at 11:05

## 2024-07-16 RX ADMIN — BUSPIRONE HYDROCHLORIDE 10 MG: 5 TABLET ORAL at 08:29

## 2024-07-16 RX ADMIN — SODIUM CHLORIDE, PRESERVATIVE FREE 10 ML: 5 INJECTION INTRAVENOUS at 08:30

## 2024-07-16 RX ADMIN — Medication 2 PUFF: at 07:52

## 2024-07-16 RX ADMIN — ACETAMINOPHEN 650 MG: 325 TABLET, FILM COATED ORAL at 06:57

## 2024-07-16 RX ADMIN — SODIUM CHLORIDE, PRESERVATIVE FREE 10 ML: 5 INJECTION INTRAVENOUS at 20:36

## 2024-07-16 RX ADMIN — Medication 2 PUFF: at 19:23

## 2024-07-16 RX ADMIN — BUSPIRONE HYDROCHLORIDE 10 MG: 5 TABLET ORAL at 20:35

## 2024-07-16 RX ADMIN — ROFLUMILAST 250 MCG: 500 TABLET ORAL at 08:29

## 2024-07-16 RX ADMIN — DEXAMETHASONE 6 MG: 4 TABLET ORAL at 08:29

## 2024-07-16 RX ADMIN — DIAZEPAM 5 MG: 5 TABLET ORAL at 08:29

## 2024-07-16 RX ADMIN — ACETAMINOPHEN 650 MG: 325 TABLET, FILM COATED ORAL at 14:32

## 2024-07-16 RX ADMIN — DIAZEPAM 5 MG: 5 TABLET ORAL at 13:19

## 2024-07-16 NOTE — DISCHARGE INSTR - COC
Patient Name: Joyce Uribe   :  1960                     MRN:  [id number]     Admit date:  7/15/2024                      Discharge date:  24     Code Status Order: Limited   Advance Directives:      Admitting Physician:  Rupert Jackson MD  PCP: Ghada Marr APRN - CNP     Discharging Nurse: Compa SORENSON RN  Discharging Hospital Unit/Room#: 0331/0331-01  Discharging Unit Phone Number: [phone]     Emergency Contact:   Extended Emergency Contact Information  Primary Emergency Contact: Ty Villarreal   [facility]  Home Phone: [phone]  Relation: Child  Secondary Emergency Contact: Kate Viera  Address: [address] [place] [address]  Home Phone: [phone]  Mobile Phone: [phone]  Relation: Child     Past Surgical History:        Past Surgical History:   Procedure Laterality Date    CHOLECYSTECTOMY       COLONOSCOPY       COLONOSCOPY        tubular adenoma    COLONOSCOPY   08/15/2017    ENDOSCOPY, COLON, DIAGNOSTIC        HERNIA REPAIR        HYSTERECTOMY (CERVIX STATUS UNKNOWN)        With Bladder Mesh    INCONTINENCE SURGERY Left        in [place]    LUNG CANCER SURGERY         cancerous nodule removed left side    OVARY REMOVAL        bilat    TONSILLECTOMY        UPPER GASTROINTESTINAL ENDOSCOPY        gastritis    UPPER GASTROINTESTINAL ENDOSCOPY   10/03/2017     bx distal esophagus          Immunization History:        Immunization History   Administered Date(s) Administered    Influenza Vaccine, unspecified formulation 2012, 2012, 10/06/2014    Influenza Virus Vaccine 2013    Influenza, FLUCELVAX, (age 6 mo+), MDCK, PF, 0.5mL 2017    Pneumococcal Conjugate 7-valent (Prevnar7) 10/08/2012    Pneumococcal, PCV-13, PREVNAR 13, (age 6w+), IM, 0.5mL 2018    Pneumococcal, PPSV23, PNEUMOVAX 23, (age 2y+), SC/IM, 0.5mL 10/08/2012         Active Problems:       Patient Active

## 2024-07-16 NOTE — CARE COORDINATION
Case Management Assessment  Initial Evaluation    Date/Time of Evaluation: 7/16/2024 10:40 AM  Assessment Completed by: Simon Beach RN    If patient is discharged prior to next notation, then this note serves as note for discharge by case management.    Patient Name: Joyce Uribe                   YOB: 1960  Diagnosis: Hypokalemia [E87.6]  Hypomagnesemia [E83.42]  Acute on chronic respiratory failure with hypoxia (HCC) [J96.21]  Pneumonia of left lower lobe due to infectious organism [J18.9]  COVID [U07.1]  COVID-19 [U07.1]                   Date / Time: 7/15/2024  1:13 PM    Patient Admission Status: Inpatient   Readmission Risk (Low < 19, Mod (19-27), High > 27): Readmission Risk Score: 29.8    Current PCP: Ghada Marr APRN - CNP  PCP verified by CM? Yes    Chart Reviewed: Yes      History Provided by: Patient  Patient Orientation: Alert and Oriented, Person, Place, Situation, Self    Patient Cognition: Alert    Hospitalization in the last 30 days (Readmission):  No    If yes, Readmission Assessment in  Navigator will be completed.    Advance Directives:      Code Status: Limited   Patient's Primary Decision Maker is: Legal Next of Kin    Primary Decision Maker: VillarrealTy - Child - [phone]    Primary Decision Maker: MaximilianoKate - Child - [phone]    Discharge Planning:    Patient lives with: Alone Type of Home: Apartment ([address])  Primary Care Giver: Other (Comment) (aide provided by [employer] daily for 4hrs/day)  Patient Support Systems include: Children   Current Financial resources: Medicare, Medicaid  Current community resources: None  Current services prior to admission: Home Care (Supportive HHC, aide services provided by [employer]. daily ofr 4hrs)            Current DME:  cane walker w/c home O2 provided by [employer] 3LNC            Type of Home Care services:  None    ADLS  Prior functional level: Assistance with the following:, Bathing, Dressing, Cooking, Mobility,

## 2024-07-16 NOTE — PLAN OF CARE
Problem: Pain  Goal: Verbalizes/displays adequate comfort level or baseline comfort level  7/15/2024 2338 by Francisco Huffman RN  Outcome: Progressing  Flowsheets (Taken 7/15/2024 2338)  Verbalizes/displays adequate comfort level or baseline comfort level:   Encourage patient to monitor pain and request assistance   Assess pain using appropriate pain scale   Administer analgesics based on type and severity of pain and evaluate response   Implement non-pharmacological measures as appropriate and evaluate response     Problem: Safety - Adult  Goal: Free from fall injury  Outcome: Progressing     Problem: Respiratory - Adult  Goal: Achieves optimal ventilation and oxygenation  Outcome: Progressing  Flowsheets (Taken 7/15/2024 2338)  Achieves optimal ventilation and oxygenation:   Assess for changes in respiratory status   Encourage broncho-pulmonary hygiene including cough, deep breathe, incentive spirometry   Assess and instruct to report shortness of breath or any respiratory difficulty

## 2024-07-16 NOTE — ACP (ADVANCE CARE PLANNING)
Advance Care Planning     General Advance Care Planning (ACP) Conversation    Date of Conversation: 7/16/2024  Conducted with: Patient with Decision Making Capacity  Other persons present: None    Healthcare Decision Maker:   Primary Decision Maker: Ty Villarreal - Child - [phone]    Primary Decision Maker: Kate Viera - Child - [phone]  Click here to complete Healthcare Decision Makers including selection of the Healthcare Decision Maker Relationship (ie \"Primary\").       Content/Action Overview:  Has NO ACP documents-Information provided  Reviewed DNR/DNI and patient confirms current DNR status - completed forms on file (place new order if needed)      Length of Voluntary ACP Conversation in minutes:  <16 minutes (Non-Billable)    Simon Beach RN

## 2024-07-17 LAB
EKG ATRIAL RATE: 103 BPM
EKG ATRIAL RATE: 98 BPM
EKG DIAGNOSIS: NORMAL
EKG DIAGNOSIS: NORMAL
EKG P AXIS: 59 DEGREES
EKG P AXIS: 60 DEGREES
EKG P-R INTERVAL: 112 MS
EKG P-R INTERVAL: 130 MS
EKG Q-T INTERVAL: 370 MS
EKG Q-T INTERVAL: 386 MS
EKG QRS DURATION: 72 MS
EKG QRS DURATION: 78 MS
EKG QTC CALCULATION (BAZETT): 484 MS
EKG QTC CALCULATION (BAZETT): 492 MS
EKG R AXIS: 64 DEGREES
EKG R AXIS: 66 DEGREES
EKG T AXIS: 41 DEGREES
EKG T AXIS: 48 DEGREES
EKG VENTRICULAR RATE: 103 BPM
EKG VENTRICULAR RATE: 98 BPM

## 2024-07-17 PROCEDURE — 6370000000 HC RX 637 (ALT 250 FOR IP): Performed by: NURSE PRACTITIONER

## 2024-07-17 PROCEDURE — 2700000000 HC OXYGEN THERAPY PER DAY

## 2024-07-17 PROCEDURE — 6370000000 HC RX 637 (ALT 250 FOR IP): Performed by: INTERNAL MEDICINE

## 2024-07-17 PROCEDURE — 93005 ELECTROCARDIOGRAM TRACING: CPT | Performed by: INTERNAL MEDICINE

## 2024-07-17 PROCEDURE — 94761 N-INVAS EAR/PLS OXIMETRY MLT: CPT

## 2024-07-17 PROCEDURE — 93010 ELECTROCARDIOGRAM REPORT: CPT | Performed by: INTERNAL MEDICINE

## 2024-07-17 PROCEDURE — 6360000002 HC RX W HCPCS: Performed by: INTERNAL MEDICINE

## 2024-07-17 PROCEDURE — 94640 AIRWAY INHALATION TREATMENT: CPT

## 2024-07-17 PROCEDURE — 1200000000 HC SEMI PRIVATE

## 2024-07-17 PROCEDURE — 2580000003 HC RX 258: Performed by: INTERNAL MEDICINE

## 2024-07-17 RX ORDER — GUAIFENESIN 600 MG/1
600 TABLET, EXTENDED RELEASE ORAL 2 TIMES DAILY
Status: DISCONTINUED | OUTPATIENT
Start: 2024-07-17 | End: 2024-07-21 | Stop reason: HOSPADM

## 2024-07-17 RX ORDER — LOPERAMIDE HYDROCHLORIDE 2 MG/1
2 CAPSULE ORAL 4 TIMES DAILY PRN
Status: DISCONTINUED | OUTPATIENT
Start: 2024-07-17 | End: 2024-07-21 | Stop reason: HOSPADM

## 2024-07-17 RX ADMIN — ASPIRIN 81 MG 81 MG: 81 TABLET ORAL at 07:35

## 2024-07-17 RX ADMIN — TIOTROPIUM BROMIDE INHALATION SPRAY 2 PUFF: 3.12 SPRAY, METERED RESPIRATORY (INHALATION) at 07:50

## 2024-07-17 RX ADMIN — ENOXAPARIN SODIUM 40 MG: 100 INJECTION SUBCUTANEOUS at 07:34

## 2024-07-17 RX ADMIN — DIAZEPAM 5 MG: 5 TABLET ORAL at 21:17

## 2024-07-17 RX ADMIN — FUROSEMIDE 20 MG: 20 TABLET ORAL at 07:35

## 2024-07-17 RX ADMIN — GUAIFENESIN 600 MG: 600 TABLET ORAL at 17:50

## 2024-07-17 RX ADMIN — ATORVASTATIN CALCIUM 80 MG: 40 TABLET, FILM COATED ORAL at 21:17

## 2024-07-17 RX ADMIN — SODIUM CHLORIDE, PRESERVATIVE FREE 10 ML: 5 INJECTION INTRAVENOUS at 21:17

## 2024-07-17 RX ADMIN — Medication 2 PUFF: at 07:50

## 2024-07-17 RX ADMIN — PROCHLORPERAZINE EDISYLATE 10 MG: 5 INJECTION INTRAMUSCULAR; INTRAVENOUS at 11:24

## 2024-07-17 RX ADMIN — DEXAMETHASONE 6 MG: 4 TABLET ORAL at 07:34

## 2024-07-17 RX ADMIN — PANTOPRAZOLE SODIUM 40 MG: 40 TABLET, DELAYED RELEASE ORAL at 07:35

## 2024-07-17 RX ADMIN — Medication 2 PUFF: at 12:35

## 2024-07-17 RX ADMIN — ROFLUMILAST 250 MCG: 500 TABLET ORAL at 07:35

## 2024-07-17 RX ADMIN — BUSPIRONE HYDROCHLORIDE 10 MG: 5 TABLET ORAL at 07:35

## 2024-07-17 RX ADMIN — SODIUM CHLORIDE, PRESERVATIVE FREE 10 ML: 5 INJECTION INTRAVENOUS at 07:36

## 2024-07-17 RX ADMIN — METOCLOPRAMIDE 10 MG: 10 TABLET ORAL at 17:50

## 2024-07-17 RX ADMIN — BUSPIRONE HYDROCHLORIDE 10 MG: 5 TABLET ORAL at 21:17

## 2024-07-17 RX ADMIN — Medication 2 PUFF: at 20:31

## 2024-07-17 RX ADMIN — DIAZEPAM 5 MG: 5 TABLET ORAL at 13:29

## 2024-07-17 RX ADMIN — BUSPIRONE HYDROCHLORIDE 10 MG: 5 TABLET ORAL at 13:29

## 2024-07-17 RX ADMIN — LOPERAMIDE HYDROCHLORIDE 2 MG: 2 CAPSULE ORAL at 17:50

## 2024-07-17 RX ADMIN — DIAZEPAM 5 MG: 5 TABLET ORAL at 07:35

## 2024-07-17 NOTE — CARE COORDINATION
Chart reviewed. Care managed by IM. Patient ambulatory in room per notes. Currently on 3LNC O2 with sat of 93%. That is home baseline O2, provided by [employer]. Will resume [employer] and daily aides at d/c. Following for additional needs.  Simon Beach RN

## 2024-07-17 NOTE — PLAN OF CARE
Pt a/o. Verbalized understanding of calling for assistance. Observed steady gait with walker. Room well lit and free of clutter. Bed locked and in lowest position. Will continue to monitor.

## 2024-07-17 NOTE — PLAN OF CARE
Problem: Pain  Goal: Verbalizes/displays adequate comfort level or baseline comfort level  Outcome: Progressing     Problem: Safety - Adult  Goal: Free from fall injury  Outcome: Progressing     Problem: Respiratory - Adult  Goal: Achieves optimal ventilation and oxygenation  Outcome: Progressing     Problem: Chronic Conditions and Co-morbidities  Goal: Patient's chronic conditions and co-morbidity symptoms are monitored and maintained or improved  Outcome: Progressing

## 2024-07-18 LAB — CRP SERPL-MCNC: 7.3 MG/L (ref 0–5.1)

## 2024-07-18 PROCEDURE — 6360000002 HC RX W HCPCS: Performed by: INTERNAL MEDICINE

## 2024-07-18 PROCEDURE — 6370000000 HC RX 637 (ALT 250 FOR IP): Performed by: INTERNAL MEDICINE

## 2024-07-18 PROCEDURE — 86140 C-REACTIVE PROTEIN: CPT

## 2024-07-18 PROCEDURE — 2580000003 HC RX 258: Performed by: INTERNAL MEDICINE

## 2024-07-18 PROCEDURE — 97162 PT EVAL MOD COMPLEX 30 MIN: CPT

## 2024-07-18 PROCEDURE — 97166 OT EVAL MOD COMPLEX 45 MIN: CPT

## 2024-07-18 PROCEDURE — 6370000000 HC RX 637 (ALT 250 FOR IP): Performed by: NURSE PRACTITIONER

## 2024-07-18 PROCEDURE — 97530 THERAPEUTIC ACTIVITIES: CPT

## 2024-07-18 PROCEDURE — 94640 AIRWAY INHALATION TREATMENT: CPT

## 2024-07-18 PROCEDURE — 94761 N-INVAS EAR/PLS OXIMETRY MLT: CPT

## 2024-07-18 PROCEDURE — 6370000000 HC RX 637 (ALT 250 FOR IP): Performed by: HOSPITALIST

## 2024-07-18 PROCEDURE — 36415 COLL VENOUS BLD VENIPUNCTURE: CPT

## 2024-07-18 PROCEDURE — 2700000000 HC OXYGEN THERAPY PER DAY

## 2024-07-18 PROCEDURE — 1200000000 HC SEMI PRIVATE

## 2024-07-18 RX ORDER — ALBUTEROL SULFATE 90 UG/1
2 AEROSOL, METERED RESPIRATORY (INHALATION)
Status: DISCONTINUED | OUTPATIENT
Start: 2024-07-18 | End: 2024-07-21 | Stop reason: HOSPADM

## 2024-07-18 RX ADMIN — BUSPIRONE HYDROCHLORIDE 10 MG: 5 TABLET ORAL at 21:19

## 2024-07-18 RX ADMIN — Medication 2 PUFF: at 16:21

## 2024-07-18 RX ADMIN — ROFLUMILAST 250 MCG: 500 TABLET ORAL at 12:41

## 2024-07-18 RX ADMIN — ACETAMINOPHEN 650 MG: 325 TABLET, FILM COATED ORAL at 21:18

## 2024-07-18 RX ADMIN — ENOXAPARIN SODIUM 40 MG: 100 INJECTION SUBCUTANEOUS at 10:31

## 2024-07-18 RX ADMIN — Medication 2 PUFF: at 20:13

## 2024-07-18 RX ADMIN — BUSPIRONE HYDROCHLORIDE 10 MG: 5 TABLET ORAL at 10:31

## 2024-07-18 RX ADMIN — PANTOPRAZOLE SODIUM 40 MG: 40 TABLET, DELAYED RELEASE ORAL at 10:30

## 2024-07-18 RX ADMIN — TIOTROPIUM BROMIDE INHALATION SPRAY 2 PUFF: 3.12 SPRAY, METERED RESPIRATORY (INHALATION) at 08:17

## 2024-07-18 RX ADMIN — GUAIFENESIN 600 MG: 600 TABLET ORAL at 21:19

## 2024-07-18 RX ADMIN — ASPIRIN 81 MG 81 MG: 81 TABLET ORAL at 10:30

## 2024-07-18 RX ADMIN — SODIUM CHLORIDE, PRESERVATIVE FREE 10 ML: 5 INJECTION INTRAVENOUS at 10:35

## 2024-07-18 RX ADMIN — Medication 2 PUFF: at 08:17

## 2024-07-18 RX ADMIN — DIAZEPAM 5 MG: 5 TABLET ORAL at 21:19

## 2024-07-18 RX ADMIN — SODIUM CHLORIDE, PRESERVATIVE FREE 10 ML: 5 INJECTION INTRAVENOUS at 21:19

## 2024-07-18 RX ADMIN — DIAZEPAM 5 MG: 5 TABLET ORAL at 15:08

## 2024-07-18 RX ADMIN — METOCLOPRAMIDE 10 MG: 10 TABLET ORAL at 10:33

## 2024-07-18 RX ADMIN — ATORVASTATIN CALCIUM 80 MG: 40 TABLET, FILM COATED ORAL at 21:19

## 2024-07-18 RX ADMIN — BUSPIRONE HYDROCHLORIDE 10 MG: 5 TABLET ORAL at 15:08

## 2024-07-18 RX ADMIN — DIAZEPAM 5 MG: 5 TABLET ORAL at 10:30

## 2024-07-18 RX ADMIN — GUAIFENESIN 600 MG: 600 TABLET ORAL at 10:30

## 2024-07-18 RX ADMIN — ACETAMINOPHEN 650 MG: 325 TABLET, FILM COATED ORAL at 00:03

## 2024-07-18 RX ADMIN — Medication 2 PUFF: at 13:29

## 2024-07-18 RX ADMIN — ACETAMINOPHEN 650 MG: 325 TABLET, FILM COATED ORAL at 15:10

## 2024-07-18 RX ADMIN — DEXAMETHASONE 6 MG: 4 TABLET ORAL at 10:30

## 2024-07-18 RX ADMIN — FUROSEMIDE 20 MG: 20 TABLET ORAL at 10:30

## 2024-07-18 NOTE — PLAN OF CARE
Problem: Pain  Goal: Verbalizes/displays adequate comfort level or baseline comfort level  Outcome: Progressing     Problem: Safety - Adult  Goal: Free from fall injury  Outcome: Progressing  Flowsheets (Taken 7/18/2024 0003)  Free From Fall Injury: Instruct family/caregiver on patient safety     Problem: Respiratory - Adult  Goal: Achieves optimal ventilation and oxygenation  Outcome: Progressing

## 2024-07-18 NOTE — CARE COORDINATION
Chart reviewed day 3. Care managed by IM. Sats 94% with home O2 liter flow, 3LNC provided by [employer]. Plan to resume [employer] and daily Aide services. She stated her son would be available to transport home. Simon Beach RN

## 2024-07-18 NOTE — PLAN OF CARE
Problem: Safety - Adult  Goal: Free from fall injury  7/18/2024 1750 by Ivett Lee, RN  Outcome: Progressing  7/18/2024 0509 by Jane Mcdowell RN  Outcome: Progressing  Flowsheets (Taken 7/18/2024 0003)  Free From Fall Injury: Instruct family/caregiver on patient safety     Problem: Respiratory - Adult  Goal: Achieves optimal ventilation and oxygenation  7/18/2024 1750 by Ivett Lee, RN  Outcome: Progressing  7/18/2024 0509 by Jane Mcdowell RN  Outcome: Progressing

## 2024-07-19 ENCOUNTER — APPOINTMENT (OUTPATIENT)
Dept: GENERAL RADIOLOGY | Age: 64
DRG: 871 | End: 2024-07-19
Payer: COMMERCIAL

## 2024-07-19 LAB
BACTERIA BLD CULT ORG #2: NORMAL
BACTERIA BLD CULT: NORMAL
CRP SERPL-MCNC: 41.2 MG/L (ref 0–5.1)

## 2024-07-19 PROCEDURE — 86140 C-REACTIVE PROTEIN: CPT

## 2024-07-19 PROCEDURE — 71045 X-RAY EXAM CHEST 1 VIEW: CPT

## 2024-07-19 PROCEDURE — 6370000000 HC RX 637 (ALT 250 FOR IP): Performed by: INTERNAL MEDICINE

## 2024-07-19 PROCEDURE — 97110 THERAPEUTIC EXERCISES: CPT

## 2024-07-19 PROCEDURE — 2700000000 HC OXYGEN THERAPY PER DAY

## 2024-07-19 PROCEDURE — 97530 THERAPEUTIC ACTIVITIES: CPT

## 2024-07-19 PROCEDURE — 36415 COLL VENOUS BLD VENIPUNCTURE: CPT

## 2024-07-19 PROCEDURE — 94640 AIRWAY INHALATION TREATMENT: CPT

## 2024-07-19 PROCEDURE — 6370000000 HC RX 637 (ALT 250 FOR IP): Performed by: NURSE PRACTITIONER

## 2024-07-19 PROCEDURE — 94761 N-INVAS EAR/PLS OXIMETRY MLT: CPT

## 2024-07-19 PROCEDURE — 6360000002 HC RX W HCPCS: Performed by: INTERNAL MEDICINE

## 2024-07-19 PROCEDURE — 1200000000 HC SEMI PRIVATE

## 2024-07-19 PROCEDURE — 2580000003 HC RX 258: Performed by: INTERNAL MEDICINE

## 2024-07-19 RX ORDER — TRAMADOL HYDROCHLORIDE 50 MG/1
50 TABLET ORAL EVERY 6 HOURS PRN
Status: DISCONTINUED | OUTPATIENT
Start: 2024-07-19 | End: 2024-07-21 | Stop reason: HOSPADM

## 2024-07-19 RX ADMIN — LOPERAMIDE HYDROCHLORIDE 2 MG: 2 CAPSULE ORAL at 11:17

## 2024-07-19 RX ADMIN — GUAIFENESIN 600 MG: 600 TABLET ORAL at 21:54

## 2024-07-19 RX ADMIN — LOPERAMIDE HYDROCHLORIDE 2 MG: 2 CAPSULE ORAL at 19:03

## 2024-07-19 RX ADMIN — Medication 2 PUFF: at 08:48

## 2024-07-19 RX ADMIN — DIAZEPAM 5 MG: 5 TABLET ORAL at 21:54

## 2024-07-19 RX ADMIN — GUAIFENESIN 600 MG: 600 TABLET ORAL at 08:01

## 2024-07-19 RX ADMIN — FUROSEMIDE 20 MG: 20 TABLET ORAL at 08:01

## 2024-07-19 RX ADMIN — PANTOPRAZOLE SODIUM 40 MG: 40 TABLET, DELAYED RELEASE ORAL at 08:01

## 2024-07-19 RX ADMIN — TRAMADOL HYDROCHLORIDE 50 MG: 50 TABLET ORAL at 21:54

## 2024-07-19 RX ADMIN — ACETAMINOPHEN 650 MG: 325 TABLET, FILM COATED ORAL at 13:55

## 2024-07-19 RX ADMIN — IBUPROFEN 400 MG: 400 TABLET, FILM COATED ORAL at 19:03

## 2024-07-19 RX ADMIN — Medication 2 PUFF: at 11:32

## 2024-07-19 RX ADMIN — Medication 2 PUFF: at 20:56

## 2024-07-19 RX ADMIN — TIOTROPIUM BROMIDE INHALATION SPRAY 2 PUFF: 3.12 SPRAY, METERED RESPIRATORY (INHALATION) at 08:48

## 2024-07-19 RX ADMIN — ASPIRIN 81 MG 81 MG: 81 TABLET ORAL at 08:01

## 2024-07-19 RX ADMIN — SODIUM CHLORIDE, PRESERVATIVE FREE 10 ML: 5 INJECTION INTRAVENOUS at 08:05

## 2024-07-19 RX ADMIN — IBUPROFEN 400 MG: 400 TABLET, FILM COATED ORAL at 05:34

## 2024-07-19 RX ADMIN — DIAZEPAM 5 MG: 5 TABLET ORAL at 08:01

## 2024-07-19 RX ADMIN — ENOXAPARIN SODIUM 40 MG: 100 INJECTION SUBCUTANEOUS at 08:01

## 2024-07-19 RX ADMIN — DIAZEPAM 5 MG: 5 TABLET ORAL at 13:55

## 2024-07-19 RX ADMIN — LOPERAMIDE HYDROCHLORIDE 2 MG: 2 CAPSULE ORAL at 05:11

## 2024-07-19 RX ADMIN — BUSPIRONE HYDROCHLORIDE 10 MG: 5 TABLET ORAL at 08:01

## 2024-07-19 RX ADMIN — DEXAMETHASONE 6 MG: 4 TABLET ORAL at 08:01

## 2024-07-19 RX ADMIN — ROFLUMILAST 250 MCG: 500 TABLET ORAL at 11:17

## 2024-07-19 RX ADMIN — ATORVASTATIN CALCIUM 80 MG: 40 TABLET, FILM COATED ORAL at 21:54

## 2024-07-19 RX ADMIN — ACETAMINOPHEN 650 MG: 325 TABLET, FILM COATED ORAL at 19:03

## 2024-07-19 RX ADMIN — BUSPIRONE HYDROCHLORIDE 10 MG: 5 TABLET ORAL at 21:54

## 2024-07-19 RX ADMIN — Medication 2 PUFF: at 16:02

## 2024-07-19 RX ADMIN — SODIUM CHLORIDE, PRESERVATIVE FREE 10 ML: 5 INJECTION INTRAVENOUS at 22:02

## 2024-07-19 RX ADMIN — BUSPIRONE HYDROCHLORIDE 10 MG: 5 TABLET ORAL at 13:55

## 2024-07-19 NOTE — CARE COORDINATION
Chart reviewed day 4. Care managed by IM. Spoke with patient via phone. Discussed therapy recs for SNF. Stated she has been to rehab and they did nothing more than she can do at home. Declined. Ok with resumption of [facility]. She can resume aides upon d/c. And will have O2 for transport home. Son can assist with transport. Simon Beach RN

## 2024-07-19 NOTE — PLAN OF CARE
Problem: Pain  Goal: Verbalizes/displays adequate comfort level or baseline comfort level  7/19/2024 0615 by Jane Mcdowell RN  Outcome: Progressing  7/18/2024 1750 by Ivett Lee RN  Outcome: Progressing     Problem: Safety - Adult  Goal: Free from fall injury  7/19/2024 0615 by Jane Mcdowell RN  Outcome: Progressing  Flowsheets (Taken 7/18/2024 2235)  Free From Fall Injury: Instruct family/caregiver on patient safety  7/18/2024 1750 by Ivett Lee RN  Outcome: Progressing     Problem: Respiratory - Adult  Goal: Achieves optimal ventilation and oxygenation  7/19/2024 0615 by Jane Mcdowell RN  Outcome: Progressing  7/18/2024 1750 by Ivett Lee RN  Outcome: Progressing     Problem: Chronic Conditions and Co-morbidities  Goal: Patient's chronic conditions and co-morbidity symptoms are monitored and maintained or improved  7/18/2024 1750 by Ivett Lee, RN  Outcome: Progressing

## 2024-07-20 PROCEDURE — 6370000000 HC RX 637 (ALT 250 FOR IP): Performed by: INTERNAL MEDICINE

## 2024-07-20 PROCEDURE — 6360000002 HC RX W HCPCS: Performed by: INTERNAL MEDICINE

## 2024-07-20 PROCEDURE — 94761 N-INVAS EAR/PLS OXIMETRY MLT: CPT

## 2024-07-20 PROCEDURE — 2580000003 HC RX 258: Performed by: INTERNAL MEDICINE

## 2024-07-20 PROCEDURE — 2700000000 HC OXYGEN THERAPY PER DAY

## 2024-07-20 PROCEDURE — 6370000000 HC RX 637 (ALT 250 FOR IP): Performed by: NURSE PRACTITIONER

## 2024-07-20 PROCEDURE — 1200000000 HC SEMI PRIVATE

## 2024-07-20 PROCEDURE — 94640 AIRWAY INHALATION TREATMENT: CPT

## 2024-07-20 RX ORDER — PANTOPRAZOLE SODIUM 40 MG/1
40 TABLET, DELAYED RELEASE ORAL
Status: DISCONTINUED | OUTPATIENT
Start: 2024-07-20 | End: 2024-07-21 | Stop reason: HOSPADM

## 2024-07-20 RX ADMIN — BUSPIRONE HYDROCHLORIDE 10 MG: 5 TABLET ORAL at 07:22

## 2024-07-20 RX ADMIN — ASPIRIN 81 MG 81 MG: 81 TABLET ORAL at 07:22

## 2024-07-20 RX ADMIN — PANTOPRAZOLE SODIUM 40 MG: 40 TABLET, DELAYED RELEASE ORAL at 16:42

## 2024-07-20 RX ADMIN — Medication 2 PUFF: at 21:26

## 2024-07-20 RX ADMIN — Medication 2 PUFF: at 21:23

## 2024-07-20 RX ADMIN — TRAMADOL HYDROCHLORIDE 50 MG: 50 TABLET ORAL at 07:38

## 2024-07-20 RX ADMIN — PROCHLORPERAZINE EDISYLATE 10 MG: 5 INJECTION INTRAMUSCULAR; INTRAVENOUS at 09:23

## 2024-07-20 RX ADMIN — DIAZEPAM 5 MG: 5 TABLET ORAL at 07:22

## 2024-07-20 RX ADMIN — BUSPIRONE HYDROCHLORIDE 10 MG: 5 TABLET ORAL at 14:55

## 2024-07-20 RX ADMIN — Medication 2 PUFF: at 07:47

## 2024-07-20 RX ADMIN — Medication 2 PUFF: at 16:26

## 2024-07-20 RX ADMIN — PANTOPRAZOLE SODIUM 40 MG: 40 TABLET, DELAYED RELEASE ORAL at 07:23

## 2024-07-20 RX ADMIN — BUSPIRONE HYDROCHLORIDE 10 MG: 5 TABLET ORAL at 21:40

## 2024-07-20 RX ADMIN — ROFLUMILAST 250 MCG: 500 TABLET ORAL at 07:23

## 2024-07-20 RX ADMIN — Medication 2 PUFF: at 11:46

## 2024-07-20 RX ADMIN — DIAZEPAM 5 MG: 5 TABLET ORAL at 14:55

## 2024-07-20 RX ADMIN — ENOXAPARIN SODIUM 40 MG: 100 INJECTION SUBCUTANEOUS at 07:23

## 2024-07-20 RX ADMIN — GUAIFENESIN 600 MG: 600 TABLET ORAL at 21:40

## 2024-07-20 RX ADMIN — SODIUM CHLORIDE, PRESERVATIVE FREE 10 ML: 5 INJECTION INTRAVENOUS at 17:05

## 2024-07-20 RX ADMIN — SODIUM CHLORIDE, PRESERVATIVE FREE 10 ML: 5 INJECTION INTRAVENOUS at 21:43

## 2024-07-20 RX ADMIN — TIOTROPIUM BROMIDE INHALATION SPRAY 2 PUFF: 3.12 SPRAY, METERED RESPIRATORY (INHALATION) at 07:47

## 2024-07-20 RX ADMIN — GUAIFENESIN 600 MG: 600 TABLET ORAL at 07:22

## 2024-07-20 RX ADMIN — DIAZEPAM 5 MG: 5 TABLET ORAL at 21:40

## 2024-07-20 RX ADMIN — LOPERAMIDE HYDROCHLORIDE 2 MG: 2 CAPSULE ORAL at 07:38

## 2024-07-20 RX ADMIN — DEXAMETHASONE 6 MG: 4 TABLET ORAL at 07:22

## 2024-07-20 RX ADMIN — FUROSEMIDE 20 MG: 20 TABLET ORAL at 07:22

## 2024-07-20 RX ADMIN — ATORVASTATIN CALCIUM 80 MG: 40 TABLET, FILM COATED ORAL at 21:40

## 2024-07-20 RX ADMIN — TRAMADOL HYDROCHLORIDE 50 MG: 50 TABLET ORAL at 18:14

## 2024-07-20 NOTE — PLAN OF CARE
Verbalizes adequate pain relief with tramadol as ordered. Standard safety measures in place.  Problem: Pain  Goal: Verbalizes/displays adequate comfort level or baseline comfort level  Outcome: Progressing     Problem: Safety - Adult  Goal: Free from fall injury  Outcome: Progressing

## 2024-07-21 VITALS
BODY MASS INDEX: 21.16 KG/M2 | TEMPERATURE: 97.6 F | DIASTOLIC BLOOD PRESSURE: 67 MMHG | HEART RATE: 89 BPM | HEIGHT: 63 IN | SYSTOLIC BLOOD PRESSURE: 105 MMHG | OXYGEN SATURATION: 92 % | WEIGHT: 119.4 LBS | RESPIRATION RATE: 18 BRPM

## 2024-07-21 PROCEDURE — 6370000000 HC RX 637 (ALT 250 FOR IP): Performed by: NURSE PRACTITIONER

## 2024-07-21 PROCEDURE — 94761 N-INVAS EAR/PLS OXIMETRY MLT: CPT

## 2024-07-21 PROCEDURE — 94640 AIRWAY INHALATION TREATMENT: CPT

## 2024-07-21 PROCEDURE — 6370000000 HC RX 637 (ALT 250 FOR IP): Performed by: INTERNAL MEDICINE

## 2024-07-21 PROCEDURE — 6360000002 HC RX W HCPCS: Performed by: INTERNAL MEDICINE

## 2024-07-21 PROCEDURE — 2700000000 HC OXYGEN THERAPY PER DAY

## 2024-07-21 RX ORDER — GUAIFENESIN 600 MG/1
600 TABLET, EXTENDED RELEASE ORAL 2 TIMES DAILY
Qty: 6 TABLET | Refills: 0 | Status: SHIPPED | OUTPATIENT
Start: 2024-07-21

## 2024-07-21 RX ORDER — DEXAMETHASONE 6 MG/1
6 TABLET ORAL DAILY
Qty: 3 TABLET | Refills: 0 | Status: SHIPPED | OUTPATIENT
Start: 2024-07-22 | End: 2024-07-25

## 2024-07-21 RX ADMIN — Medication 2 PUFF: at 11:43

## 2024-07-21 RX ADMIN — ENOXAPARIN SODIUM 40 MG: 100 INJECTION SUBCUTANEOUS at 08:37

## 2024-07-21 RX ADMIN — Medication 2 PUFF: at 08:32

## 2024-07-21 RX ADMIN — ROFLUMILAST 250 MCG: 500 TABLET ORAL at 08:37

## 2024-07-21 RX ADMIN — ASPIRIN 81 MG 81 MG: 81 TABLET ORAL at 08:37

## 2024-07-21 RX ADMIN — BUSPIRONE HYDROCHLORIDE 10 MG: 5 TABLET ORAL at 08:37

## 2024-07-21 RX ADMIN — TRAMADOL HYDROCHLORIDE 50 MG: 50 TABLET ORAL at 00:11

## 2024-07-21 RX ADMIN — DEXAMETHASONE 6 MG: 4 TABLET ORAL at 08:37

## 2024-07-21 RX ADMIN — TRAMADOL HYDROCHLORIDE 50 MG: 50 TABLET ORAL at 06:24

## 2024-07-21 RX ADMIN — TIOTROPIUM BROMIDE INHALATION SPRAY 2 PUFF: 3.12 SPRAY, METERED RESPIRATORY (INHALATION) at 08:32

## 2024-07-21 RX ADMIN — DIAZEPAM 5 MG: 5 TABLET ORAL at 08:37

## 2024-07-21 RX ADMIN — GUAIFENESIN 600 MG: 600 TABLET ORAL at 08:37

## 2024-07-21 RX ADMIN — FUROSEMIDE 20 MG: 20 TABLET ORAL at 08:37

## 2024-07-21 RX ADMIN — PANTOPRAZOLE SODIUM 40 MG: 40 TABLET, DELAYED RELEASE ORAL at 06:24

## 2024-07-21 NOTE — CARE COORDINATION
CASE MANAGEMENT DISCHARGE SUMMARY      Discharge to: Home with [facility]    Transportation:    Family/car: yes       Confirmed discharge plan with:     Patient: yes per RN     Family:  no per pt       RN, name: Compa HUGHES    Note: Discharging nurse to complete ALLAN, reconcile AVS, and place final copy with patient's discharge packet. RN to ensure that written prescriptions for  Level II medications are sent with patient to the facility as per protocol.

## 2024-07-21 NOTE — PLAN OF CARE
Problem: Pain  Goal: Verbalizes/displays adequate comfort level or baseline comfort level  7/21/2024 1001 by Compa Elaine RN  Outcome: Adequate for Discharge  Flowsheets (Taken 7/20/2024 2155 by Gabby Cabral RN)  Verbalizes/displays adequate comfort level or baseline comfort level:   Encourage patient to monitor pain and request assistance   Assess pain using appropriate pain scale   Implement non-pharmacological measures as appropriate and evaluate response   Administer analgesics based on type and severity of pain and evaluate response  7/20/2024 2155 by Gabby Cabral RN  Outcome: Progressing  Flowsheets (Taken 7/20/2024 2155)  Verbalizes/displays adequate comfort level or baseline comfort level:   Encourage patient to monitor pain and request assistance   Assess pain using appropriate pain scale   Implement non-pharmacological measures as appropriate and evaluate response   Administer analgesics based on type and severity of pain and evaluate response     Problem: Safety - Adult  Goal: Free from fall injury  7/21/2024 1001 by Compa Elaine RN  Outcome: Adequate for Discharge  Flowsheets (Taken 7/20/2024 2155 by Gabby Cabral RN)  Free From Fall Injury: Instruct family/caregiver on patient safety  7/20/2024 2155 by Gabby Cabral RN  Outcome: Progressing  Flowsheets (Taken 7/20/2024 2155)  Free From Fall Injury: Instruct family/caregiver on patient safety     Problem: Respiratory - Adult  Goal: Achieves optimal ventilation and oxygenation  7/21/2024 1001 by Compa Elaine RN  Outcome: Adequate for Discharge  Flowsheets (Taken 7/20/2024 2155 by Gabby Cabral RN)  Achieves optimal ventilation and oxygenation:   Assess for changes in respiratory status   Assess for changes in mentation and behavior   Oxygen supplementation based on oxygen saturation or arterial blood gases   Position to facilitate oxygenation and minimize respiratory effort   Respiratory therapy

## 2024-07-21 NOTE — PLAN OF CARE
Problem: Pain  Goal: Verbalizes/displays adequate comfort level or baseline comfort level  Outcome: Progressing  Flowsheets (Taken 7/20/2024 2155)  Verbalizes/displays adequate comfort level or baseline comfort level:   Encourage patient to monitor pain and request assistance   Assess pain using appropriate pain scale   Implement non-pharmacological measures as appropriate and evaluate response   Administer analgesics based on type and severity of pain and evaluate response     Problem: Safety - Adult  Goal: Free from fall injury  Outcome: Progressing  Flowsheets (Taken 7/20/2024 2155)  Free From Fall Injury: Instruct family/caregiver on patient safety     Problem: Respiratory - Adult  Goal: Achieves optimal ventilation and oxygenation  Outcome: Progressing  Flowsheets (Taken 7/20/2024 2155)  Achieves optimal ventilation and oxygenation:   Assess for changes in respiratory status   Assess for changes in mentation and behavior   Oxygen supplementation based on oxygen saturation or arterial blood gases   Position to facilitate oxygenation and minimize respiratory effort   Respiratory therapy support as indicated   Encourage broncho-pulmonary hygiene including cough, deep breathe, incentive spirometry   Assess and instruct to report shortness of breath or any respiratory difficulty     Problem: Chronic Conditions and Co-morbidities  Goal: Patient's chronic conditions and co-morbidity symptoms are monitored and maintained or improved  Outcome: Progressing

## 2024-07-21 NOTE — DISCHARGE SUMMARY
Hospital Medicine Discharge Summary    Patient: Joyce Uribe   : 1960     Admit Date: 7/15/2024   Discharge Date:   ***  Disposition:  []Home   [x]HHC  []SNF  []ECF  []Acute Rehab  []LTAC  []Hospice, refused SNF  Code status:  []Full  []DNR/CCA  []Limited (DNR/CCA with Do Not Intubate)  []DNRCC  Condition at Discharge: Stable  Primary Care Provider: Ghada Marr APRN - CNP    Admitting Provider: Rupert Jackson MD  Discharge Provider: Az Hampton MD     Discharge Diagnoses:      Active Hospital Problems    Diagnosis     COVID-19 [U07.1]        Presenting Admission History:      ***     Assessment/Plan:      ***    Physical Exam Performed:      /67   Pulse 90   Temp 97.6 °F (36.4 °C) (Oral)   Resp 18   Ht 1.6 m (5' 3\")   Wt 54.2 kg (119 lb 6.4 oz)   SpO2 92%   BMI 21.15 kg/m²     ***  General appearance:  No apparent distress, appears stated age and cooperative.  Respiratory:  Normal respiratory effort.   Cardiovascular:  Regular rate and rhythm.  Abdomen:  Soft, non-tender, non-distended.  Musculoskeletal:  No edema  Neurologic:  Non-focal  Psychiatric:  Alert and oriented    Patient Discharge Instructions:      Follow up:    1.  Primary Care Provider Ghada Marr APRN - CNP in the next 1-2 weeks.  ***    The patient was seen and examined on day of discharge and this discharge summary is in conjunction with any daily progress note from day of discharge. Time spent on discharge: 3*** minutes in the examination, evaluation, counseling and review of medications and discharge plan.    ------------------------------------------------------------------------------------------------------------------------------------------------------    Discharge Medications:   Current Discharge Medication List        START taking these medications    Details   dexAMETHasone (DECADRON) 6 MG tablet Take 1 tablet by mouth daily for 3 doses  Qty: 3 tablet, Refills: 0      guaiFENesin (MUCINEX) 600

## 2024-07-21 NOTE — DISCHARGE INSTR - DIET

## 2024-07-22 NOTE — PROGRESS NOTES
V2.0    [facility] Progress Note      Name:  Joyce Uribe /Age/Sex: 1960  (63 y.o. female)   MRN & CSN:  [id number] & [id number] Encounter Date/Time: 2024 1:10 PM EDT   Location:   PCP: Ghada Marr APRN - CNP     Attending:Az Hampton MD       Hospital Day: 7    Assessment and Recommendations       Patient is a 63-year-old female with past medical history of COPD, asthma, chronic hypoxic respiratory failure, CAD, CHF, depression, anxiety. Patient has felt fatigued and increasingly SOB for the past two days. She normally wears 3L NC.       Viral pneumonia due to COVID   CRP improving. Recheck today since patient feels more sob  Continue bronchodilators  Continue dexamethasone      COPD exacerbation.  Steroids as above, inhaled bronchodilators.  She quit smoking years ago.     Acute on chronic hypoxic respiratory failure, was 88% on admission, required 4LNC.  Weaned to baseline 3LNC     Pleurisy.  Supportive care.       CAD.  Aspirin, statin.      Chronic diastolic heart failure  - appears euvolemic  - echo  with EF 61%  - continue lasix     Anxiety/Depression  - continue buspar, valium    Possible gastritis - PPI     Pt refused SNF_ possible  dc am            DVT Prophylaxis: Lovenox.   Code Status: Total Support.   Barrier to DC: Pending clinical improvement            Subjective:     Patient was seen and examined today.     Vomited after am meds , no ab pain , n or V now , poor appetite   Review of Systems:      Pertinent positives and negatives discussed in HPI    Objective:     Intake/Output Summary (Last 24 hours) at 2024 0852  Last data filed at 2024 2143  Gross per 24 hour   Intake 250 ml   Output 250 ml   Net 0 ml        Vitals:   Vitals:    24 0011 24 0041 24 0624 24 0834   BP:       Pulse:    90   Resp: 16 18 16 18   Temp:       TempSrc:       SpO2:    92%   Weight:       Height:             Physical Exam:      General: awake, 
    V2.0    [facility] [place] Progress Note      Name:  Joyce Uribe /Age/Sex: 1960  (63 y.o. female)   MRN & CSN:  [id number] & [id number] Encounter Date/Time: 2024 1:10 PM EDT   Location:  033 PCP: Ghada Marr APRN - CNP     Attending:Az Hampton MD       Hospital Day: 6    Assessment and Recommendations       Patient is a 63-year-old female with past medical history of COPD, asthma, chronic hypoxic respiratory failure, CAD, CHF, depression, anxiety. Patient has felt fatigued and increasingly SOB for the past two days. She normally wears 3L NC.       Viral pneumonia due to COVID   CRP improving. Recheck today since patient feels more sob  Continue bronchodilators  Continue dexamethasone      COPD exacerbation.  Steroids as above, inhaled bronchodilators.  She quit smoking years ago.     Acute on chronic hypoxic respiratory failure, was 88% on admission, required 4LNC.  Weaned to baseline 3LNC     Pleurisy.  Supportive care.       CAD.  Aspirin, statin.      Chronic diastolic heart failure  - appears euvolemic  - echo  with EF 61%  - continue lasix     Anxiety/Depression  - continue buspar, valium    Possible gastritis - PPI     Pt refused SNF_ possible  dc am            DVT Prophylaxis: Lovenox.   Code Status: Total Support.   Barrier to DC: Pending clinical improvement            Subjective:     Patient was seen and examined today.     Vomited after am meds , no ab pain , n or V now , poor appetite   Review of Systems:      Pertinent positives and negatives discussed in HPI    Objective:     Intake/Output Summary (Last 24 hours) at 2024 0911  Last data filed at 2024 0619  Gross per 24 hour   Intake 120 ml   Output 750 ml   Net -630 ml        Vitals:   Vitals:    24 2154 24 2224 24 0715 24 0748   BP: 103/68  102/73    Pulse: 89  80    Resp: 16 16 16    Temp: 97.6 °F (36.4 °C)  97.6 °F (36.4 °C)    TempSrc: Axillary  Oral    SpO2: 96%  94% 93%   Weight: 
   07/15/24 2051   RT Protocol   History Pulmonary Disease 2   Respiratory pattern 0   Breath sounds 2   Cough 0   Indications for Bronchodilator Therapy On home bronchodilators   Bronchodilator Assessment Score 4       
  Physician Progress Note      PATIENT:               ADELINE MCMAHON  CSN #:                  [id number]  :                       1960  ADMIT DATE:       7/15/2024 1:13 PM  DISCH DATE:        2024 2:19 PM  RESPONDING  PROVIDER #:        Sharad Myers MD          QUERY TEXT:    Pt admitted with COVID 19 pneumonia/Acute on chronic hypoxic respiratory   failure.    Noted documentation of sepsis per ED consult.  If possible, please   document in progress notes and discharge summary:    The medical record reflects the following:  Risk Factors: Covid Pna- COPD multiple admissions  Clinical Indicators: WBC-13.2,  HR-119, resp- 32, LA- 2.9, Temp- 99.1 O2 sat   88%. ED-\" her tachycardia is more likely related to sepsis and   infection-criteria for sepsis, but does not meet criteria for severe sepsis or   septic shock \"  Treatment:  treating for acute on chronic respiratory failure associated with   needing oxygen and also giving IV steroids due to concern for worsening   hypoxia with COVID and giving IV antibiotics in case this is related to   bacterial pneumonia as well Steroids as above, inhaled bronchodilators. serial   labs, supportive care    Thank-You, Jayleen Gupta RN, BSN, CCDS  Options provided:  -- Sepsis due to Covid/PNA  present on admission  -- Sepsis ruled out  -- Other - I will add my own diagnosis  -- Disagree - Not applicable / Not valid  -- Disagree - Clinically unable to determine / Unknown  -- Refer to Clinical Documentation Reviewer    PROVIDER RESPONSE TEXT:    The diagnosis of sepsis due to Covid/PNA confirmed as present on admission.    Query created by: Pat Gupta on 2024 2:56 PM      Electronically signed by:  Sharad Myers MD 2024 7:10 AM          
..4 Eyes Skin Assessment     The patient is being assess for  Admission    I agree that 2 RN's have performed a thorough Head to Toe Skin Assessment on the patient. ALL assessment sites listed below have been assessed.       Areas assessed by both nurses: SAUL Singh/ SAUL Villa  [x]   Head, Face, and Ears   [x]   Shoulders, Back, and Chest  [x]   Arms, Elbows, and Hands   [x]   Coccyx, Sacrum, and IschIum  [x]   Legs, Feet, and Heels        Does the Patient have Skin Breakdown?  No         Darien Prevention initiated:  Yes   Wound Care Orders initiated:  No      WOC nurse consulted for Pressure Injury (Stage 3,4, Unstageable, DTI, NWPT, and Complex wounds), New and Established Ostomies:  No      Nurse 1 eSignature: Electronically signed by Francisco Huffman RN on 7/15/24 at 11:40 PM EDT    **SHARE this note so that the co-signing nurse is able to place an eSignature**    Nurse 2 eSignature: Electronically signed by Daisy Mera RN on 7/16/24 at 1:57 AM EDT            
2 episodes of coughing and \"puking\" clear liquid.  
Hospital Medicine Progress Note        Subjective:  Objectively she is doing well.  Oxygenation is good on her home flow rate.  She isn't wheezing.  She doesn't cough during my visit.  Afebrile.  She says that she is feeling worse, though.  More dyspneic.  She says that she is coughing so badly that she vomited earlier today.  Loose stools twice today.  She feels weak.  Respiratory knows her and feels like anxiety might be contributing to the ways she experiences her symptoms and I think they are probably correct.       General appearance: No apparent distress, appears stated age and cooperative.  HEENT: Pupils equal, round.  Conjunctivae/corneas clear.  Neck: No jugular venous distention.   Respiratory:  Normal respiratory effort.  Bilaterally without Rales/Rhonchi.  Not coughing during my visit.  Bilateral expiratory wheezing has resolved.   Cardiovascular: Normal rate and regular rhythm with normal S1/S2 without murmurs, rubs or gallops.  Abdomen: Soft, non-tender, non-distended with normal bowel sounds.  Musculoskeletal: No cyanosis bilaterally.  No edema.  Without deformity.  Skin: No jaundice.  No rashes or lesions.  Neurologic:  Neurovascularly intact without any focal sensory/motor deficits. Cranial nerves: II-XII intact, grossly non-focal.  Psychiatric: Alert and oriented, normal insight.  Anxious affect.      Assessment and Plan:       \"63 y.o. female who presented to [facility] with SOB.  PMHx significant for COPD, asthma, chronic hypoxic respiratory failure, CAD, CHF, depression, anxiety. Patient has felt fatigued and increasingly SOB for the past two days. She normally wears 3L NC. She has been using her inhalers as prescribed. She is a former smoker. She has a recent sick contact in the past week. She has never been admitted for COVID before.\"  I see that this is her 7th admission in the last year, mostly for COPD exacerbations.       COVID 19 pneumonia.  Steroids.  Loperamide for her infrequent 
Occupational Therapy  Facility/Department: [facility] C3 TELE/MED SURG/ONC  Occupational Therapy Initial Assessment    Name: Joyce Uribe  : 1960  MRN: [id number]  Date of Service: 2024    Discharge Recommendations:  Continue to assess pending progress (SNF versus home with 24 and HHOT pending progress)        Therapy discharge recommendations are subject to collaboration from the patient’s interdisciplinary healthcare team, including MD and case management recommendations.    Barriers to Home Discharge:   [x] Steps to access home entry or bed/bath:   [] Unable to transfer, ambulate, or propel wheelchair household distances without assist   [x] Limited available assist at home upon discharge    [] Patient or family requests d/c to post-acute facility    [] Poor cognition/safety awareness for d/c to home alone    [] Unable to maintain ordered weight bearing status    [] Patient with salient signs of long-standing immobility   [x] Decreased independence with ADLs   [x] Increased risk for falls   [] Other:    If pt is unable to be seen after this session, please let this note serve as discharge summary.  Please see case management note for discharge disposition.  Thank you.    Patient Diagnosis(es): The primary encounter diagnosis was Acute on chronic respiratory failure with hypoxia (HCC). Diagnoses of COVID, Pneumonia of left lower lobe due to infectious organism, Hypokalemia, Hypomagnesemia, and COPD exacerbation (HCC) were also pertinent to this visit.  Past Medical History:  has a past medical history of Anxiety, Asthma, Cancer (HCC), Cancer of lung (HCC), CHF (congestive heart failure) (HCC), COPD (chronic obstructive pulmonary disease) (HCC), Cyclic vomiting syndrome, Cysts of both kidneys, Depression, Fatty liver, Gastritis, MI (myocardial infarction) (HCC), Panic attacks, Pneumonia, Pre-diabetes, and Tricuspid regurgitation.  Past Surgical History:  has a past surgical history that includes 
Patient admitted to C3 room 331 from ED. Transported by staff via stretcher. Belongings at bedside. Patient oriented to room,POC and call light use. 4 eye skin assessment done with Nurse Villa. Admission assessment complete as charted. Medications administered per MAR. Nonskid footware on. Bed alarm on. Bed in low position, wheels locked, SR x2, call light and bedside table within reach.    -A/O X4, VSS.  -occational incontinence of B/B per pt, purewick in placed  -tolerating PO intake well  -denies further needs at this time.    
Physical Therapy  Facility/Department: [facility] C3 TELE/MED SURG/ONC  Physical Therapy Initial Assessment/Treatment     Name: Joyce Uribe  : 1960  MRN: [id number]  Date of Service: 2024    Discharge Recommendations:  24 hour supervision or assist, Subacute/Skilled Nursing Facility, Continue to assess pending progress   PT Equipment Recommendations  Equipment Needed: No      Patient Diagnosis(es): The primary encounter diagnosis was Acute on chronic respiratory failure with hypoxia (HCC). Diagnoses of COVID, Pneumonia of left lower lobe due to infectious organism, Hypokalemia, Hypomagnesemia, and COPD exacerbation (HCC) were also pertinent to this visit.  Past Medical History:  has a past medical history of Anxiety, Asthma, Cancer (HCC), Cancer of lung (HCC), CHF (congestive heart failure) (HCC), COPD (chronic obstructive pulmonary disease) (HCC), Cyclic vomiting syndrome, Cysts of both kidneys, Depression, Fatty liver, Gastritis, MI (myocardial infarction) (HCC), Panic attacks, Pneumonia, Pre-diabetes, and Tricuspid regurgitation.  Past Surgical History:  has a past surgical history that includes Cholecystectomy (); Hysterectomy (); hernia repair; Colonoscopy (); Colonoscopy (); Upper gastrointestinal endoscopy (); Tonsillectomy; Incontinence surgery (Left); Colonoscopy (08/15/2017); Upper gastrointestinal endoscopy (10/03/2017); Endoscopy, colon, diagnostic; Lung cancer surgery; and Ovary removal ().    Assessment   Body Structures, Functions, Activity Limitations Requiring Skilled Therapeutic Intervention: Decreased functional mobility ;Decreased endurance;Decreased posture;Decreased balance;Decreased strength;Decreased safe awareness;Decreased ROM  Assessment: Pt to [facility] with diagnosis of COVID-19. PTA pt lives in a 2nd story apartment with [address]. Pt had home health aide 5 days a week for 4rs a day. Pt was independent with transfers and ambulation with no AD but will use 
Printed and reviewed AVS with patient and family. Patient without any questions at this time. Knows to pick medications up at her pharmacy in [facility]. Patient dressed and changed with PCA. Transported via wheelchair to private vehicle on oxygen. Left in a stable condition.  
Pt a/o. VSS, BP normal but a little soft so held lasix until MD rounds. Shift assessment complete and charted. Pt c/o 9/10 aching L chest pain that started this morning when she woke up. STAT EKG ordered and MD notified. Pt denied SOB and is stable on 3L NC. Pt states she has not been coughing but while in room this RN observed a productive cough at least 2 separate times. Pt stable and denied needs when writer left room.   
Pt a/o. VSS. Shift assessment complete and charted. BS active. Pt c/o loose stools this morning and asking for imodium. Lungs rhonchi and pt coughing up thick \"cloudy\" mucous that she has a hard time getting up. Pt reports SOB and work of breathing increased with ambulation. Stable on 3L NC, which is baseline. Pt stable and denied needs when writer left room.   
Pt alert and orientated. Call light within reach. No complaints at this time. Enc to call for assistance.Ivett Lee RN   
Pt assessment completed and charted. VSS. Pt a/ox4. Nightly meds given with food, no n/v reported. Tramadol is working well for pain control and pt is resting comfortably in bed at this time. Pt denies any other needs at this time.  Pt calls out appropriately.       Pt is a fall risk;  -Bed in lowest position and wheels locked.  -Call light within reach.   -Bedside table within reach.   -Non-skid footwear in place.  -bed check on.    
Pt has only had 200mL lino urine output today but we did hold lasix for soft BP this morning. Per crosscover monitor for now.   
Pt was assisted to bathroom. Upon return to the bed, pt complained of feeling dizzy. She stated she's about to pass out. Pt O2 was tuned up to 5L, oxygen saturation was down to the 76. Pt started shaking and sweating. Due to pt hx of anxiety, I kept reassuring her that she's is ok; that she is in bed safely, she will not pass out. Pt kept trembling, cold wash cloth was placed on her forehead and neck area. Pt started to calm down. O2 sat went back up in the high 80s-90.   Reassuring pt helped patient center herself. Physical improvement was noted upon increase saturation. VS was taking and recorded. VS were within normal limit. Pt improved when this writer notified her of her VS. Imodium and ibuprofen were given to help with there diarrhea and headache. Pt verbalized feeling better before this writer left the room. Pt monitoring ongoing.  
times per week  Current Treatment Recommendations: Strengthening;ROM;Balance training;Gait training;Functional mobility training;Home exercise program;Therapeutic activities;Stair training;Safety education & training;Transfer training;Endurance training;Patient/Caregiver education & training     Restrictions  Restrictions/Precautions  Restrictions/Precautions: Fall Risk, Up as Tolerated, Isolation  Required Braces or Orthoses?: No  Position Activity Restriction  Other position/activity restrictions: Oxygen, purewick, COVID +     Subjective    Subjective  Subjective: pt in bed, agrees to PT session with encourgament and education  Pain: 7/10 pain in head  Orientation  Overall Orientation Status: Within Normal Limits  Orientation Level: Oriented X4  Cognition  Overall Cognitive Status: Exceptions  Arousal/Alertness: Appears intact  Following Commands: Follows one step commands with increased time;Follows one step commands with repetition  Attention Span: Appears intact  Safety Judgement: Decreased awareness of need for safety;Decreased awareness of need for assistance  Insights: Decreased awareness of deficits     Objective   Vitals  Vitals  Pulse: 89  Heart Rate Source: Monitor  SpO2: 95 %  O2 Device: Nasal cannula  BP: 99/66  MAP (Calculated): 77  BP Location: Left upper arm  BP Method: Automatic  Patient Position: Semi fowlers  Comment: 4L O2, Vitals sitting EOB with report of dizziness; /72, SpO2 96%, HR 99. Vitals sitting EOB after exercises; /73, SpO2 93%, HR 93. Vitals standing; /60, SpO2 96%,   Bed Mobility Training  Bed Mobility Training: Yes  Supine to Sit: Stand-by assistance;Adaptive equipment (HOB elevated, towards R)  Balance  Sitting: Intact  Standing: Impaired  Standing - Static: Good;Constant support  Standing - Dynamic: Fair;Constant support  Transfer Training  Transfer Training: Yes  Interventions: Safety awareness training;Verbal cues  Sit to Stand: Contact-guard 
4.2   CL 96* 100   CO2 30 25   BUN 13 12   CREATININE 0.6 <0.5*   CALCIUM 9.4 8.8     Recent Labs     07/15/24  1411   AST 21   ALT 15   BILITOT <0.2   ALKPHOS 129     No results for input(s): \"INR\" in the last 72 hours.  Recent Labs     07/15/24  1411 07/15/24  1616   TROPHS 21* 18*       Urinalysis:      Lab Results   Component Value Date/Time    NITRU Negative 05/16/2024 11:30 AM    WBCUA None seen 01/04/2024 03:22 AM    BACTERIA 2+ 11/26/2020 04:01 PM    RBCUA None seen 01/04/2024 03:22 AM    BLOODU Negative 05/16/2024 11:30 AM    SPECGRAV 1.025 05/31/2013 02:38 PM    GLUCOSEU Negative 05/16/2024 11:30 AM       Consults:     IP CONSULT TO HOSPITALIST    I personally reviewed Lab Studies and Imaging and spoke with the  to plan details for eventual discharge.    LAURA SHRAP MD    Limited        
03:01 PM     Organism:   Lab Results   Component Value Date/Time    ORG Yeast 05/24/2017 09:00 PM         Electronically signed by Jayesh Wiley MD on 7/19/2024 at 5:33 PM     
much help is needed for bathing (which includes washing, rinsing, drying)?: A Lot  How much help is needed for toileting (which includes using toilet, bedpan, or urinal)?: A Lot  How much help is needed for putting on and taking off regular upper body clothing?: A Little  How much help is needed for taking care of personal grooming?: A Little  How much help for eating meals?: None  AM-[facility] Inpatient Daily Activity Raw Score: 15  AM-P[facility] Inpatient ADL T-Scale Score : 34.69  ADL Inpatient CMS 0-100% Score: 56.46  ADL Inpatient CMS G-Code Modifier : CK    Therapy Time   Individual Concurrent Group Co-treatment   Time In       1406   Time Out       1435   Minutes       29   Timed Code Treatment Minutes: 29 Minutes       Lissette Quan, OT     
will be called.     Organism:   Lab Results   Component Value Date/Time    ORG Yeast 05/24/2017 09:00 PM         Electronically signed by Jayesh Wiley MD on 7/18/2024 at 1:10 PM

## 2024-08-22 ENCOUNTER — HOSPITAL ENCOUNTER (INPATIENT)
Age: 64
LOS: 6 days | Discharge: NURSING, REHAB OR LONG-TERM CARE | End: 2024-08-29
Attending: EMERGENCY MEDICINE | Admitting: STUDENT IN AN ORGANIZED HEALTH CARE EDUCATION/TRAINING PROGRAM
Payer: COMMERCIAL

## 2024-08-22 DIAGNOSIS — J18.9 PNEUMONIA OF RIGHT LOWER LOBE DUE TO INFECTIOUS ORGANISM: ICD-10-CM

## 2024-08-22 DIAGNOSIS — R06.89 DYSPNEA AND RESPIRATORY ABNORMALITIES: ICD-10-CM

## 2024-08-22 DIAGNOSIS — J44.1 COPD EXACERBATION (HCC): ICD-10-CM

## 2024-08-22 DIAGNOSIS — R09.02 HYPOXIA: ICD-10-CM

## 2024-08-22 DIAGNOSIS — J96.21 ACUTE ON CHRONIC HYPOXIC RESPIRATORY FAILURE (HCC): ICD-10-CM

## 2024-08-22 DIAGNOSIS — Z79.899 CHRONIC PRESCRIPTION BENZODIAZEPINE USE: Primary | ICD-10-CM

## 2024-08-22 DIAGNOSIS — R06.00 DYSPNEA AND RESPIRATORY ABNORMALITIES: ICD-10-CM

## 2024-08-22 PROCEDURE — 99285 EMERGENCY DEPT VISIT HI MDM: CPT

## 2024-08-22 ASSESSMENT — PAIN - FUNCTIONAL ASSESSMENT: PAIN_FUNCTIONAL_ASSESSMENT: NONE - DENIES PAIN

## 2024-08-23 ENCOUNTER — APPOINTMENT (OUTPATIENT)
Dept: GENERAL RADIOLOGY | Age: 64
End: 2024-08-23
Payer: COMMERCIAL

## 2024-08-23 ENCOUNTER — ANCILLARY PROCEDURE (OUTPATIENT)
Dept: EMERGENCY DEPT | Age: 64
End: 2024-08-23
Attending: EMERGENCY MEDICINE
Payer: COMMERCIAL

## 2024-08-23 ENCOUNTER — APPOINTMENT (OUTPATIENT)
Dept: CT IMAGING | Age: 64
End: 2024-08-23
Payer: COMMERCIAL

## 2024-08-23 PROBLEM — J96.22 ACUTE ON CHRONIC RESPIRATORY FAILURE WITH HYPOXIA AND HYPERCAPNIA (HCC): Status: ACTIVE | Noted: 2023-07-20

## 2024-08-23 PROBLEM — R91.8 PULMONARY INFILTRATES: Status: ACTIVE | Noted: 2024-08-23

## 2024-08-23 LAB
ALBUMIN SERPL-MCNC: 4.1 G/DL (ref 3.4–5)
ALBUMIN/GLOB SERPL: 1 {RATIO} (ref 1.1–2.2)
ALP SERPL-CCNC: 162 U/L (ref 40–129)
ALT SERPL-CCNC: 16 U/L (ref 10–40)
ANION GAP SERPL CALCULATED.3IONS-SCNC: 17 MMOL/L (ref 3–16)
AST SERPL-CCNC: 27 U/L (ref 15–37)
BASE EXCESS BLDV CALC-SCNC: 0.9 MMOL/L (ref -3–3)
BASE EXCESS BLDV CALC-SCNC: 2.2 MMOL/L (ref -3–3)
BASE EXCESS BLDV CALC-SCNC: 4.2 MMOL/L (ref -3–3)
BASOPHILS # BLD: 0.1 K/UL (ref 0–0.2)
BASOPHILS NFR BLD: 0.4 %
BILIRUB SERPL-MCNC: <0.2 MG/DL (ref 0–1)
BILIRUB UR QL STRIP.AUTO: NEGATIVE
BUN SERPL-MCNC: 14 MG/DL (ref 7–20)
CALCIUM SERPL-MCNC: 10.2 MG/DL (ref 8.3–10.6)
CHLORIDE SERPL-SCNC: 92 MMOL/L (ref 99–110)
CLARITY UR: CLEAR
CO2 BLDV-SCNC: 28 MMOL/L
CO2 BLDV-SCNC: 30 MMOL/L
CO2 BLDV-SCNC: 35 MMOL/L
CO2 SERPL-SCNC: 30 MMOL/L (ref 21–32)
COHGB MFR BLDV: 2.3 % (ref 0–1.5)
COHGB MFR BLDV: 2.5 % (ref 0–1.5)
COHGB MFR BLDV: 5.2 % (ref 0–1.5)
COLOR UR: YELLOW
CREAT SERPL-MCNC: 0.9 MG/DL (ref 0.6–1.2)
DEPRECATED RDW RBC AUTO: 15.5 % (ref 12.4–15.4)
EOSINOPHIL # BLD: 0.1 K/UL (ref 0–0.6)
EOSINOPHIL NFR BLD: 0.3 %
EPI CELLS #/AREA URNS HPF: ABNORMAL /HPF (ref 0–5)
FLUAV RNA RESP QL NAA+PROBE: NOT DETECTED
FLUBV RNA RESP QL NAA+PROBE: NOT DETECTED
GFR SERPLBLD CREATININE-BSD FMLA CKD-EPI: 71 ML/MIN/{1.73_M2}
GLUCOSE SERPL-MCNC: 250 MG/DL (ref 70–99)
GLUCOSE UR STRIP.AUTO-MCNC: NEGATIVE MG/DL
HCO3 BLDV-SCNC: 26.4 MMOL/L (ref 23–29)
HCO3 BLDV-SCNC: 28.4 MMOL/L (ref 23–29)
HCO3 BLDV-SCNC: 32.6 MMOL/L (ref 23–29)
HCT VFR BLD AUTO: 38.5 % (ref 36–48)
HGB BLD-MCNC: 12 G/DL (ref 12–16)
HGB UR QL STRIP.AUTO: NEGATIVE
INR PPP: 0.93 (ref 0.85–1.15)
KETONES UR STRIP.AUTO-MCNC: NEGATIVE MG/DL
LACTATE BLDV-SCNC: 2.8 MMOL/L (ref 0.4–1.9)
LACTATE BLDV-SCNC: 3.2 MMOL/L (ref 0.4–1.9)
LEUKOCYTE ESTERASE UR QL STRIP.AUTO: NEGATIVE
LYMPHOCYTES # BLD: 3.6 K/UL (ref 1–5.1)
LYMPHOCYTES NFR BLD: 12.3 %
MAGNESIUM SERPL-MCNC: 1.7 MG/DL (ref 1.8–2.4)
MCH RBC QN AUTO: 27.5 PG (ref 26–34)
MCHC RBC AUTO-ENTMCNC: 31.2 G/DL (ref 31–36)
MCV RBC AUTO: 87.9 FL (ref 80–100)
METHGB MFR BLDV: 0 %
METHGB MFR BLDV: 0.2 %
METHGB MFR BLDV: 0.3 %
MONOCYTES # BLD: 1 K/UL (ref 0–1.3)
MONOCYTES NFR BLD: 3.5 %
NEUTROPHILS # BLD: 24.6 K/UL (ref 1.7–7.7)
NEUTROPHILS NFR BLD: 83.5 %
NITRITE UR QL STRIP.AUTO: NEGATIVE
NT-PROBNP SERPL-MCNC: 592 PG/ML (ref 0–124)
O2 THERAPY: ABNORMAL
PCO2 BLDV: 45.9 MMHG (ref 40–50)
PCO2 BLDV: 50.8 MMHG (ref 40–50)
PCO2 BLDV: 68.7 MMHG (ref 40–50)
PH BLDV: 7.29 [PH] (ref 7.35–7.45)
PH BLDV: 7.37 [PH] (ref 7.35–7.45)
PH BLDV: 7.38 [PH] (ref 7.35–7.45)
PH UR STRIP.AUTO: 6 [PH] (ref 5–8)
PLATELET # BLD AUTO: 505 K/UL (ref 135–450)
PMV BLD AUTO: 9.8 FL (ref 5–10.5)
PO2 BLDV: 136.1 MMHG (ref 25–40)
PO2 BLDV: 174 MMHG (ref 25–40)
PO2 BLDV: 46.5 MMHG (ref 25–40)
POTASSIUM SERPL-SCNC: 3 MMOL/L (ref 3.5–5.1)
PROT SERPL-MCNC: 8.1 G/DL (ref 6.4–8.2)
PROT UR STRIP.AUTO-MCNC: ABNORMAL MG/DL
PROTHROMBIN TIME: 12.6 SEC (ref 11.9–14.9)
RBC # BLD AUTO: 4.38 M/UL (ref 4–5.2)
RBC #/AREA URNS HPF: ABNORMAL /HPF (ref 0–4)
REASON FOR REJECTION: NORMAL
REJECTED TEST: NORMAL
SAO2 % BLDV: 81 %
SAO2 % BLDV: 99 %
SAO2 % BLDV: 99 %
SARS-COV-2 RNA RESP QL NAA+PROBE: NOT DETECTED
SODIUM SERPL-SCNC: 139 MMOL/L (ref 136–145)
SP GR UR STRIP.AUTO: 1.01 (ref 1–1.03)
TROPONIN, HIGH SENSITIVITY: 25 NG/L (ref 0–14)
TROPONIN, HIGH SENSITIVITY: 37 NG/L (ref 0–14)
UA DIPSTICK W REFLEX MICRO PNL UR: YES
URN SPEC COLLECT METH UR: ABNORMAL
UROBILINOGEN UR STRIP-ACNC: 0.2 E.U./DL
WBC # BLD AUTO: 29.4 K/UL (ref 4–11)
WBC #/AREA URNS HPF: ABNORMAL /HPF (ref 0–5)

## 2024-08-23 PROCEDURE — 94660 CPAP INITIATION&MGMT: CPT

## 2024-08-23 PROCEDURE — 6360000002 HC RX W HCPCS: Performed by: EMERGENCY MEDICINE

## 2024-08-23 PROCEDURE — 71260 CT THORAX DX C+: CPT

## 2024-08-23 PROCEDURE — 83880 ASSAY OF NATRIURETIC PEPTIDE: CPT

## 2024-08-23 PROCEDURE — 96365 THER/PROPH/DIAG IV INF INIT: CPT

## 2024-08-23 PROCEDURE — 84484 ASSAY OF TROPONIN QUANT: CPT

## 2024-08-23 PROCEDURE — 76604 US EXAM CHEST: CPT

## 2024-08-23 PROCEDURE — 99223 1ST HOSP IP/OBS HIGH 75: CPT | Performed by: INTERNAL MEDICINE

## 2024-08-23 PROCEDURE — 83605 ASSAY OF LACTIC ACID: CPT

## 2024-08-23 PROCEDURE — 6370000000 HC RX 637 (ALT 250 FOR IP): Performed by: STUDENT IN AN ORGANIZED HEALTH CARE EDUCATION/TRAINING PROGRAM

## 2024-08-23 PROCEDURE — 6360000002 HC RX W HCPCS: Performed by: STUDENT IN AN ORGANIZED HEALTH CARE EDUCATION/TRAINING PROGRAM

## 2024-08-23 PROCEDURE — 80053 COMPREHEN METABOLIC PANEL: CPT

## 2024-08-23 PROCEDURE — 6360000004 HC RX CONTRAST MEDICATION: Performed by: EMERGENCY MEDICINE

## 2024-08-23 PROCEDURE — 2580000003 HC RX 258: Performed by: INTERNAL MEDICINE

## 2024-08-23 PROCEDURE — 83735 ASSAY OF MAGNESIUM: CPT

## 2024-08-23 PROCEDURE — 6360000002 HC RX W HCPCS: Performed by: INTERNAL MEDICINE

## 2024-08-23 PROCEDURE — 85610 PROTHROMBIN TIME: CPT

## 2024-08-23 PROCEDURE — 36415 COLL VENOUS BLD VENIPUNCTURE: CPT

## 2024-08-23 PROCEDURE — 71045 X-RAY EXAM CHEST 1 VIEW: CPT

## 2024-08-23 PROCEDURE — 87081 CULTURE SCREEN ONLY: CPT

## 2024-08-23 PROCEDURE — 6370000000 HC RX 637 (ALT 250 FOR IP): Performed by: EMERGENCY MEDICINE

## 2024-08-23 PROCEDURE — 87449 NOS EACH ORGANISM AG IA: CPT

## 2024-08-23 PROCEDURE — 93308 TTE F-UP OR LMTD: CPT

## 2024-08-23 PROCEDURE — 5A09357 ASSISTANCE WITH RESPIRATORY VENTILATION, LESS THAN 24 CONSECUTIVE HOURS, CONTINUOUS POSITIVE AIRWAY PRESSURE: ICD-10-PCS | Performed by: INTERNAL MEDICINE

## 2024-08-23 PROCEDURE — 96366 THER/PROPH/DIAG IV INF ADDON: CPT

## 2024-08-23 PROCEDURE — 82803 BLOOD GASES ANY COMBINATION: CPT

## 2024-08-23 PROCEDURE — 87636 SARSCOV2 & INF A&B AMP PRB: CPT

## 2024-08-23 PROCEDURE — 96368 THER/DIAG CONCURRENT INF: CPT

## 2024-08-23 PROCEDURE — 2580000003 HC RX 258: Performed by: EMERGENCY MEDICINE

## 2024-08-23 PROCEDURE — 2580000003 HC RX 258: Performed by: STUDENT IN AN ORGANIZED HEALTH CARE EDUCATION/TRAINING PROGRAM

## 2024-08-23 PROCEDURE — 81001 URINALYSIS AUTO W/SCOPE: CPT

## 2024-08-23 PROCEDURE — 85025 COMPLETE CBC W/AUTO DIFF WBC: CPT

## 2024-08-23 PROCEDURE — 96375 TX/PRO/DX INJ NEW DRUG ADDON: CPT

## 2024-08-23 PROCEDURE — 2700000000 HC OXYGEN THERAPY PER DAY

## 2024-08-23 PROCEDURE — 2060000000 HC ICU INTERMEDIATE R&B

## 2024-08-23 PROCEDURE — 96374 THER/PROPH/DIAG INJ IV PUSH: CPT

## 2024-08-23 PROCEDURE — 94640 AIRWAY INHALATION TREATMENT: CPT

## 2024-08-23 PROCEDURE — 87040 BLOOD CULTURE FOR BACTERIA: CPT

## 2024-08-23 PROCEDURE — 6370000000 HC RX 637 (ALT 250 FOR IP): Performed by: NURSE PRACTITIONER

## 2024-08-23 PROCEDURE — 94761 N-INVAS EAR/PLS OXIMETRY MLT: CPT

## 2024-08-23 RX ORDER — IOPAMIDOL 755 MG/ML
75 INJECTION, SOLUTION INTRAVASCULAR
Status: COMPLETED | OUTPATIENT
Start: 2024-08-23 | End: 2024-08-23

## 2024-08-23 RX ORDER — ONDANSETRON 4 MG/1
4 TABLET, ORALLY DISINTEGRATING ORAL EVERY 8 HOURS PRN
Status: DISCONTINUED | OUTPATIENT
Start: 2024-08-23 | End: 2024-08-29 | Stop reason: HOSPADM

## 2024-08-23 RX ORDER — ASPIRIN 81 MG/1
81 TABLET, CHEWABLE ORAL DAILY
Status: DISCONTINUED | OUTPATIENT
Start: 2024-08-23 | End: 2024-08-29 | Stop reason: HOSPADM

## 2024-08-23 RX ORDER — LORAZEPAM 2 MG/ML
0.5 INJECTION INTRAMUSCULAR ONCE
Status: COMPLETED | OUTPATIENT
Start: 2024-08-23 | End: 2024-08-23

## 2024-08-23 RX ORDER — MAGNESIUM SULFATE IN WATER 40 MG/ML
2000 INJECTION, SOLUTION INTRAVENOUS ONCE
Status: COMPLETED | OUTPATIENT
Start: 2024-08-23 | End: 2024-08-23

## 2024-08-23 RX ORDER — PREDNISONE 20 MG/1
40 TABLET ORAL DAILY
Status: DISCONTINUED | OUTPATIENT
Start: 2024-08-23 | End: 2024-08-23

## 2024-08-23 RX ORDER — BUDESONIDE AND FORMOTEROL FUMARATE DIHYDRATE 160; 4.5 UG/1; UG/1
2 AEROSOL RESPIRATORY (INHALATION)
Status: DISCONTINUED | OUTPATIENT
Start: 2024-08-23 | End: 2024-08-29 | Stop reason: HOSPADM

## 2024-08-23 RX ORDER — POLYETHYLENE GLYCOL 3350 17 G/17G
17 POWDER, FOR SOLUTION ORAL DAILY PRN
Status: DISCONTINUED | OUTPATIENT
Start: 2024-08-23 | End: 2024-08-29 | Stop reason: HOSPADM

## 2024-08-23 RX ORDER — ACETAMINOPHEN 650 MG/1
650 SUPPOSITORY RECTAL EVERY 6 HOURS PRN
Status: DISCONTINUED | OUTPATIENT
Start: 2024-08-23 | End: 2024-08-29 | Stop reason: HOSPADM

## 2024-08-23 RX ORDER — POTASSIUM CHLORIDE 7.45 MG/ML
10 INJECTION INTRAVENOUS
Status: COMPLETED | OUTPATIENT
Start: 2024-08-23 | End: 2024-08-23

## 2024-08-23 RX ORDER — ENOXAPARIN SODIUM 100 MG/ML
40 INJECTION SUBCUTANEOUS EVERY EVENING
Status: DISCONTINUED | OUTPATIENT
Start: 2024-08-23 | End: 2024-08-29 | Stop reason: HOSPADM

## 2024-08-23 RX ORDER — SODIUM CHLORIDE 9 MG/ML
INJECTION, SOLUTION INTRAVENOUS PRN
Status: DISCONTINUED | OUTPATIENT
Start: 2024-08-23 | End: 2024-08-28 | Stop reason: SDUPTHER

## 2024-08-23 RX ORDER — ATORVASTATIN CALCIUM 80 MG/1
80 TABLET, FILM COATED ORAL NIGHTLY
Status: DISCONTINUED | OUTPATIENT
Start: 2024-08-23 | End: 2024-08-29 | Stop reason: HOSPADM

## 2024-08-23 RX ORDER — BUSPIRONE HYDROCHLORIDE 5 MG/1
10 TABLET ORAL 3 TIMES DAILY
Status: DISCONTINUED | OUTPATIENT
Start: 2024-08-23 | End: 2024-08-29 | Stop reason: HOSPADM

## 2024-08-23 RX ORDER — DIAZEPAM 5 MG
5 TABLET ORAL 3 TIMES DAILY
Status: DISCONTINUED | OUTPATIENT
Start: 2024-08-23 | End: 2024-08-25

## 2024-08-23 RX ORDER — ACETAMINOPHEN 325 MG/1
650 TABLET ORAL EVERY 6 HOURS PRN
Status: DISCONTINUED | OUTPATIENT
Start: 2024-08-23 | End: 2024-08-29 | Stop reason: HOSPADM

## 2024-08-23 RX ORDER — GUAIFENESIN 600 MG/1
600 TABLET, EXTENDED RELEASE ORAL 2 TIMES DAILY
Status: DISCONTINUED | OUTPATIENT
Start: 2024-08-23 | End: 2024-08-29 | Stop reason: HOSPADM

## 2024-08-23 RX ORDER — SODIUM CHLORIDE, SODIUM LACTATE, POTASSIUM CHLORIDE, AND CALCIUM CHLORIDE .6; .31; .03; .02 G/100ML; G/100ML; G/100ML; G/100ML
1000 INJECTION, SOLUTION INTRAVENOUS ONCE
Status: COMPLETED | OUTPATIENT
Start: 2024-08-23 | End: 2024-08-23

## 2024-08-23 RX ORDER — ROFLUMILAST 500 UG/1
250 TABLET ORAL DAILY
Status: DISCONTINUED | OUTPATIENT
Start: 2024-08-23 | End: 2024-08-29 | Stop reason: HOSPADM

## 2024-08-23 RX ORDER — IPRATROPIUM BROMIDE AND ALBUTEROL SULFATE 2.5; .5 MG/3ML; MG/3ML
1 SOLUTION RESPIRATORY (INHALATION)
Status: COMPLETED | OUTPATIENT
Start: 2024-08-23 | End: 2024-08-23

## 2024-08-23 RX ORDER — TRAMADOL HYDROCHLORIDE 50 MG/1
50 TABLET ORAL EVERY 6 HOURS PRN
Status: DISCONTINUED | OUTPATIENT
Start: 2024-08-23 | End: 2024-08-29 | Stop reason: HOSPADM

## 2024-08-23 RX ORDER — BENZONATATE 100 MG/1
100 CAPSULE ORAL 3 TIMES DAILY PRN
Status: DISCONTINUED | OUTPATIENT
Start: 2024-08-23 | End: 2024-08-29 | Stop reason: HOSPADM

## 2024-08-23 RX ORDER — LANOLIN ALCOHOL/MO/W.PET/CERES
3 CREAM (GRAM) TOPICAL NIGHTLY PRN
Status: DISCONTINUED | OUTPATIENT
Start: 2024-08-23 | End: 2024-08-29 | Stop reason: HOSPADM

## 2024-08-23 RX ORDER — SODIUM CHLORIDE 0.9 % (FLUSH) 0.9 %
5-40 SYRINGE (ML) INJECTION EVERY 12 HOURS SCHEDULED
Status: DISCONTINUED | OUTPATIENT
Start: 2024-08-23 | End: 2024-08-28 | Stop reason: SDUPTHER

## 2024-08-23 RX ORDER — SODIUM CHLORIDE 0.9 % (FLUSH) 0.9 %
5-40 SYRINGE (ML) INJECTION PRN
Status: DISCONTINUED | OUTPATIENT
Start: 2024-08-23 | End: 2024-08-28 | Stop reason: SDUPTHER

## 2024-08-23 RX ORDER — PANTOPRAZOLE SODIUM 40 MG/1
40 TABLET, DELAYED RELEASE ORAL DAILY
Status: DISCONTINUED | OUTPATIENT
Start: 2024-08-23 | End: 2024-08-29 | Stop reason: HOSPADM

## 2024-08-23 RX ORDER — ONDANSETRON 2 MG/ML
4 INJECTION INTRAMUSCULAR; INTRAVENOUS EVERY 6 HOURS PRN
Status: DISCONTINUED | OUTPATIENT
Start: 2024-08-23 | End: 2024-08-29 | Stop reason: HOSPADM

## 2024-08-23 RX ADMIN — IPRATROPIUM BROMIDE AND ALBUTEROL SULFATE 1 DOSE: 2.5; .5 SOLUTION RESPIRATORY (INHALATION) at 08:11

## 2024-08-23 RX ADMIN — PANTOPRAZOLE SODIUM 40 MG: 40 TABLET, DELAYED RELEASE ORAL at 10:39

## 2024-08-23 RX ADMIN — IPRATROPIUM BROMIDE AND ALBUTEROL SULFATE 1 DOSE: 2.5; .5 SOLUTION RESPIRATORY (INHALATION) at 00:41

## 2024-08-23 RX ADMIN — IPRATROPIUM BROMIDE AND ALBUTEROL SULFATE 1 DOSE: 2.5; .5 SOLUTION RESPIRATORY (INHALATION) at 12:31

## 2024-08-23 RX ADMIN — VANCOMYCIN HYDROCHLORIDE 1000 MG: 1 INJECTION, POWDER, LYOPHILIZED, FOR SOLUTION INTRAVENOUS at 08:35

## 2024-08-23 RX ADMIN — IPRATROPIUM BROMIDE AND ALBUTEROL SULFATE 1 DOSE: 2.5; .5 SOLUTION RESPIRATORY (INHALATION) at 01:02

## 2024-08-23 RX ADMIN — ONDANSETRON 4 MG: 2 INJECTION INTRAMUSCULAR; INTRAVENOUS at 05:38

## 2024-08-23 RX ADMIN — IOPAMIDOL 75 ML: 755 INJECTION, SOLUTION INTRAVENOUS at 02:07

## 2024-08-23 RX ADMIN — PREDNISONE 40 MG: 20 TABLET ORAL at 10:39

## 2024-08-23 RX ADMIN — BUSPIRONE HYDROCHLORIDE 10 MG: 5 TABLET ORAL at 15:57

## 2024-08-23 RX ADMIN — ONDANSETRON 4 MG: 2 INJECTION INTRAMUSCULAR; INTRAVENOUS at 17:34

## 2024-08-23 RX ADMIN — AZITHROMYCIN MONOHYDRATE 500 MG: 500 INJECTION, POWDER, LYOPHILIZED, FOR SOLUTION INTRAVENOUS at 05:30

## 2024-08-23 RX ADMIN — CEFEPIME 2000 MG: 2 INJECTION, POWDER, FOR SOLUTION INTRAVENOUS at 15:59

## 2024-08-23 RX ADMIN — Medication 2 PUFF: at 08:12

## 2024-08-23 RX ADMIN — ENOXAPARIN SODIUM 40 MG: 100 INJECTION SUBCUTANEOUS at 17:34

## 2024-08-23 RX ADMIN — POTASSIUM CHLORIDE 10 MEQ: 7.46 INJECTION, SOLUTION INTRAVENOUS at 00:52

## 2024-08-23 RX ADMIN — POTASSIUM CHLORIDE 10 MEQ: 10 INJECTION, SOLUTION INTRAVENOUS at 05:14

## 2024-08-23 RX ADMIN — Medication 2 PUFF: at 20:32

## 2024-08-23 RX ADMIN — Medication 3 MG: at 23:40

## 2024-08-23 RX ADMIN — MAGNESIUM SULFATE HEPTAHYDRATE 2000 MG: 40 INJECTION, SOLUTION INTRAVENOUS at 00:53

## 2024-08-23 RX ADMIN — SODIUM CHLORIDE, POTASSIUM CHLORIDE, SODIUM LACTATE AND CALCIUM CHLORIDE 1000 ML: 600; 310; 30; 20 INJECTION, SOLUTION INTRAVENOUS at 00:45

## 2024-08-23 RX ADMIN — BUSPIRONE HYDROCHLORIDE 10 MG: 5 TABLET ORAL at 21:40

## 2024-08-23 RX ADMIN — IPRATROPIUM BROMIDE AND ALBUTEROL SULFATE 1 DOSE: 2.5; .5 SOLUTION RESPIRATORY (INHALATION) at 04:07

## 2024-08-23 RX ADMIN — TRAMADOL HYDROCHLORIDE 50 MG: 50 TABLET ORAL at 21:38

## 2024-08-23 RX ADMIN — ROFLUMILAST 250 MCG: 500 TABLET ORAL at 10:39

## 2024-08-23 RX ADMIN — WATER 40 MG: 1 INJECTION INTRAMUSCULAR; INTRAVENOUS; SUBCUTANEOUS at 21:27

## 2024-08-23 RX ADMIN — CEFEPIME 2000 MG: 2 INJECTION, POWDER, FOR SOLUTION INTRAVENOUS at 03:47

## 2024-08-23 RX ADMIN — POTASSIUM CHLORIDE 10 MEQ: 7.46 INJECTION, SOLUTION INTRAVENOUS at 01:54

## 2024-08-23 RX ADMIN — WATER 125 MG: 1 INJECTION INTRAMUSCULAR; INTRAVENOUS; SUBCUTANEOUS at 00:43

## 2024-08-23 RX ADMIN — ASPIRIN 81 MG 81 MG: 81 TABLET ORAL at 10:39

## 2024-08-23 RX ADMIN — IPRATROPIUM BROMIDE AND ALBUTEROL SULFATE 1 DOSE: 2.5; .5 SOLUTION RESPIRATORY (INHALATION) at 01:06

## 2024-08-23 RX ADMIN — POTASSIUM CHLORIDE 10 MEQ: 10 INJECTION, SOLUTION INTRAVENOUS at 06:49

## 2024-08-23 RX ADMIN — GUAIFENESIN 600 MG: 600 TABLET ORAL at 10:39

## 2024-08-23 RX ADMIN — ATORVASTATIN CALCIUM 80 MG: 80 TABLET, FILM COATED ORAL at 21:40

## 2024-08-23 RX ADMIN — POTASSIUM CHLORIDE 10 MEQ: 7.46 INJECTION, SOLUTION INTRAVENOUS at 03:03

## 2024-08-23 RX ADMIN — LORAZEPAM 0.5 MG: 2 INJECTION INTRAMUSCULAR; INTRAVENOUS at 01:18

## 2024-08-23 RX ADMIN — GUAIFENESIN 600 MG: 600 TABLET ORAL at 21:40

## 2024-08-23 ASSESSMENT — PAIN SCALES - GENERAL
PAINLEVEL_OUTOF10: 8
PAINLEVEL_OUTOF10: 0

## 2024-08-23 ASSESSMENT — LIFESTYLE VARIABLES
HOW MANY STANDARD DRINKS CONTAINING ALCOHOL DO YOU HAVE ON A TYPICAL DAY: PATIENT DOES NOT DRINK
HOW OFTEN DO YOU HAVE A DRINK CONTAINING ALCOHOL: NEVER

## 2024-08-23 ASSESSMENT — PAIN DESCRIPTION - DESCRIPTORS: DESCRIPTORS: ACHING

## 2024-08-23 ASSESSMENT — PAIN DESCRIPTION - LOCATION
LOCATION: OTHER (COMMENT)
LOCATION: OTHER (COMMENT)

## 2024-08-23 ASSESSMENT — PAIN - FUNCTIONAL ASSESSMENT: PAIN_FUNCTIONAL_ASSESSMENT: ACTIVITIES ARE NOT PREVENTED

## 2024-08-23 NOTE — PROGRESS NOTES
4 Eyes Skin Assessment     NAME:  Joyce Uribe  YOB: 1960  MEDICAL RECORD NUMBER:  [id number]    The patient is being assessed for  {Reason for Assessment:53096}    I agree that at least one RN has performed a thorough Head to Toe Skin Assessment on the patient. ALL assessment sites listed below have been assessed.      Areas assessed by both nurses:    Head, Face, Ears, Shoulders, Back, Chest, Arms, Elbows, Hands, Sacrum. Buttock, Coccyx, Ischium, Legs. Feet and Heels, and Under Medical Devices         Does the Patient have a Wound? No noted wound(s)       Darien Prevention initiated by RN: No  Wound Care Orders initiated by RN: No    Pressure Injury (Stage 3,4, Unstageable, DTI, NWPT, and Complex wounds) if present, place Wound referral order by RN under : No    New Ostomies, if present place, Ostomy referral order under : No     Nurse 1 eSignature: Electronically signed by Petra Dukes RN on 8/23/24 at 4:57 AM EDT    **SHARE this note so that the co-signing nurse can place an eSignature**    Nurse 2 eSignature: {Esignature:[id number]}

## 2024-08-23 NOTE — PROGRESS NOTES
Hospital Medicine Progress Note      Date of Admission: 8/22/2024  Hospital Day: 2    Chief Admission Complaint:    Shortness of Breath (Was found down in bathroom by neighbor. Wears 3L 02 at baseline. When EMS arrived, patient Sp02 at 60%. On nonrebreather upon arrival. Hx lung cancer and COPD.)         Subjective:    Patient resting in bed. She has BiPaP on. She tells me she can not remember coming to the hospital. She still complains of SOB and MCKEE. Very lethargic but easily awakened and oriented.     Presenting Admission History:       Patient is a 63-year-old female with a PMHx of moderate COPD with chronic hypoxemic hypercapnic respiratory failure (on 3L NC), HFpEF, non-obstructive CAD, GERD and anxiety who presented to the ED with worsening SOB with increased productive cough of green sputum for 1 day. Quit smoking 3 years ago. Compliant with inhalers. Denied any fevers, chills, CP, N/V, abdominal pain, C/D or urinary changes. Denied any alcohol use.     Assessment/Plan:      Current Principal Problem:  COPD exacerbation (HCC)    Severe sepsis POA: Leukocytosis, Tachycardia and Lactic acidosis  Hospital-acquired pneumonia of right lower lung acute exacerbation of moderate COPD. Acute on chronic hypoxemic hypercapnic respiratory failure  - Endorsed worsening SOB with increased productive cough of green sputum for 1 day. Quit smoking 3 years ago. Compliant with inhalers.   - CT chest positive for RLL pneumonia  - On 3L NC at baseline.   - PFTs in 2012 with moderate obstruction.   - Recently admitted for COVID-19 pneumonia in 7/2024.    - Influenza and Covid negative in ED  - Required BPAP 12/6/40% in ED, VBG 7.29/69 on admission. VGB improved overnight   - Continue Vanc,Cefepime, and Azithro.   - Continue steroids  - Continue Symbicort and DuoNebs with Daliresp.  - Goal O2 88-92%  - Strep, MRSA, and Legionella ordered  - Hold sedative medications, Valium held. Possibly causing hypoventilation syndrome. Use  Buspar for anxiety  - Pulmonology consulted and appreciated.       HANK, mild, possibly prerenal   - Clinically hypovolemic. Cr 0.9, baseline ~ <0.5. UA ordered.  - Received IVF in ED, follow-up repeat labs. Strict I/O's. Bladder scan if decreased voiding.     Hypokalemia  K+ 3.0 today  - Electrolyte protocol in place  - Replete as needed     Non-obstructive CAD. Non-MI troponin elevation possibly 2/2 HANK  - Denied any typical CP. C in 1/2024 with mild-to-moderate CAD.   - Initial Tn mildly elevated, repeat flat.  -  ECG without acute ischemic changes.  - Likely secondary to above. Continue ASA and Lipitor.      HFpEF, compensated  - Last TTE in 2011 with LVEF of 55%.   - Clinically euvolemic.    - Monitor volume status closely.      GERD - w/out active signs/sxs of dysphagia/odynophagia. No evidence of active PUD or hx of GI bleed.   - Controlled on home PPI - continue.    Anxiety  - Continue BusPar and prn Valium, OARRS appropriate.      Physical Exam Performed:      General appearance:  No apparent distress  Respiratory:  Normal respiratory effort.   Cardiovascular:  Regular rate and rhythm.  Abdomen:  Soft, non-tender, non-distended.  Musculoskeletal:  No edema  Neurologic:  Non-focal  Psychiatric:  Alert and oriented    BP (!) 111/59   Pulse (!) 106   Temp 99.3 °F (37.4 °C) (Oral)   Resp 20   Ht 1.6 m (5' 3\")   Wt 56.7 kg (125 lb)   SpO2 90%   BMI 22.14 kg/m²     Diet: Diet NPO Exceptions are: Ice Chips, Sips of Water with Meds  DVT Prophylaxis: [x]PPx LMWH  []SQ Heparin  []IPC/SCDs  []Eliquis  []Xarelto  []Coumadin  []Other -      Code status: Full Code  PT/OT Eval Status:   []NOT yet ordered  [x]Ordered and Pending   []Seen with Recommendations for:  []Home independently  []Home w/ assist  []HHC  []SNF  []Acute Rehab    Anticipated Discharge Day/Date:  8/26/2024    Anticipated Discharge Location: [x]Home  []HHC  []SNF  []Acute Rehab  []ECF  []LTAC  []Hospice  []Other -      Consults:      PHARMACY TO

## 2024-08-23 NOTE — PROGRESS NOTES
Patient admitted to room 28 from ED.  Patient oriented to room, call light, bed rails, phone, lights and bathroom.  Patient instructed about the schedule of the day including: vital sign frequency, lab draws, possible tests, frequency of MD and staff rounds, including RN/MD rounding together at bedside, daily weights, and I &O's.  Patient instructed about prescribed diet, how to use 8MENU, and television.   bed alarm in place, patient aware of placement and reason. Telemetry box  in place, patient aware of placement and reason.  Bed locked, in lowest position, side rails up 2/4, call light within reach.  Will continue to monitor.

## 2024-08-23 NOTE — PROGRESS NOTES
08/23/24 0111   NIV Type   $NIV $Daily Charge   NIV Started/Stopped On   Equipment Type v60   Mode Bilevel   Mask Type Full face mask   Mask Size Medium   Settings/Measurements   PIP Observed 17 cm H20   IPAP 16 cmH20   CPAP/EPAP 6 cmH2O   Vt (Measured) 495 mL   Rate Ordered 16   Insp Rise Time (%) 1 %   FiO2  90 %   I Time/ I Time % 1 s   Minute Volume (L/min) 10.3 Liters   Mask Leak (lpm) 0 lpm   Patient's Home Machine No   Alarm Settings   Alarms On Y   Low Pressure (cmH2O) 6 cmH2O   High Pressure (cmH2O) 30 cmH2O   Apnea (secs) 20 secs   RR Low (bpm) 6   RR High (bpm) 40 br/min

## 2024-08-23 NOTE — PLAN OF CARE
Problem: Discharge Planning  Goal: Discharge to home or other facility with appropriate resources  Outcome: Progressing     Problem: Safety - Adult  Goal: Free from fall injury  Outcome: Progressing     Problem: Chronic Conditions and Co-morbidities  Goal: Patient's chronic conditions and co-morbidity symptoms are monitored and maintained or improved  Outcome: Progressing     Problem: Respiratory - Adult  Goal: Achieves optimal ventilation and oxygenation  Outcome: Progressing     Problem: Cardiovascular - Adult  Goal: Maintains optimal cardiac output and hemodynamic stability  Outcome: Progressing  Goal: Absence of cardiac dysrhythmias or at baseline  Outcome: Progressing     Problem: Skin/Tissue Integrity - Adult  Goal: Skin integrity remains intact  Outcome: Progressing  Goal: Incisions, wounds, or drain sites healing without S/S of infection  Outcome: Progressing  Goal: Oral mucous membranes remain intact  Outcome: Progressing     Problem: Musculoskeletal - Adult  Goal: Return mobility to safest level of function  Outcome: Progressing  Goal: Maintain proper alignment of affected body part  Outcome: Progressing  Goal: Return ADL status to a safe level of function  Outcome: Progressing     Problem: Genitourinary - Adult  Goal: Absence of urinary retention  Outcome: Progressing  Goal: Urinary catheter remains patent  Outcome: Progressing

## 2024-08-23 NOTE — ED NOTES
Joyce Uribe is a 63 y.o. female admitted for  Principal Problem:    COPD exacerbation (HCC)  Resolved Problems:    * No resolved hospital problems. *  .   Patient Home via EMS transportation with   Chief Complaint   Patient presents with    Shortness of Breath     Was found down in bathroom by neighbor. Wears 3L 02 at baseline. When EMS arrived, patient Sp02 at 60%. On nonrebreather upon arrival. Hx lung cancer and COPD.   .  Patient is alert and Person, Place, Time, and Situation  Patient's baseline mobility: Baseline Mobility: Walker  Code Status: Prior   Cardiac Rhythm:    O2 Flow Rate (L/min): 3 L/min  Is patient on baseline Oxygen: yes,  how many Liters3:   Abnormal Assessment Findings: see labs    Isolation: None      NIH Score:    C-SSRS: Risk of Suicide: No Risk  Bedside swallow:        Active LDA's:   Peripheral IV 08/23/24 Right Forearm (Active)   Site Assessment Clean, dry & intact 08/23/24 0037   Line Status Flushed 08/23/24 0037       Peripheral IV 08/23/24 Left Cephalic (Active)   Site Assessment Clean, dry & intact 08/23/24 0037   Line Status Blood return noted;Flushed 08/23/24 0037     Patient admitted with a ang: no If the ang is chronic was it exchanged:NA  Reason for ang: Other (specify)   Patient admitted with Central Line:  NA . PICC line placement confirmed: YES OR NO:[id number]}   Reason for Central line:   Was central line Inserted from an outside facility:        Family/Caregiver Present yes Any Concerns: no   Restraints no  Sitter no         Vitals: MEWS Score: 5    Vitals:    08/23/24 0145 08/23/24 0155 08/23/24 0215 08/23/24 0232   BP: 108/65  109/70 103/71   Pulse: (!) 115 (!) 112 (!) 105 (!) 105   Resp: 24 25 21 21   Temp:       TempSrc:       SpO2: 95% 95% 96% 96%   Weight:       Height:           Last documented pain score (0-10 scale)    Pain medication administered No.    Pertinent or High Risk Medications/Drips: Yes- see MAR.    Pending Blood Product Administration:  no    Abnormal labs:   Abnormal Labs Reviewed   COMPREHENSIVE METABOLIC PANEL W/ REFLEX TO MG FOR LOW K - Abnormal; Notable for the following components:       Result Value    Potassium reflex Magnesium 3.0 (*)     Chloride 92 (*)     Anion Gap 17 (*)     Glucose 250 (*)     Albumin/Globulin Ratio 1.0 (*)     Alkaline Phosphatase 162 (*)     All other components within normal limits   LACTATE, SEPSIS - Abnormal; Notable for the following components:    Lactic Acid, Sepsis 3.2 (*)     All other components within normal limits   LACTATE, SEPSIS - Abnormal; Notable for the following components:    Lactic Acid, Sepsis 2.8 (*)     All other components within normal limits   BLOOD GAS, VENOUS - Abnormal; Notable for the following components:    pCO2, Brian 50.8 (*)     PO2, Brian 174.0 (*)     Carboxyhemoglobin 5.2 (*)     All other components within normal limits   TROPONIN - Abnormal; Notable for the following components:    Troponin, High Sensitivity 25 (*)     All other components within normal limits   TROPONIN - Abnormal; Notable for the following components:    Troponin, High Sensitivity 37 (*)     All other components within normal limits   BRAIN NATRIURETIC PEPTIDE - Abnormal; Notable for the following components:    Pro- (*)     All other components within normal limits   CBC WITH AUTO DIFFERENTIAL - Abnormal; Notable for the following components:    WBC 29.4 (*)     RDW 15.5 (*)     Platelets 505 (*)     Neutrophils Absolute 24.6 (*)     All other components within normal limits   MAGNESIUM - Abnormal; Notable for the following components:    Magnesium 1.70 (*)     All other components within normal limits   BLOOD GAS, VENOUS - Abnormal; Notable for the following components:    pH, Brian 7.294 (*)     pCO2, Brian 68.7 (*)     PO2, Brian 46.5 (*)     HCO3, Venous 32.6 (*)     Base Excess, Brian 4.2 (*)     Carboxyhemoglobin 2.5 (*)     All other components within normal limits     Critical values: no  Intervention

## 2024-08-23 NOTE — PROGRESS NOTES
08/23/24 0357   NIV Type   NIV Started/Stopped On   Equipment Type v60   Mode Bilevel   Mask Type Full face mask   Mask Size Medium   Settings/Measurements   PIP Observed 21 cm H20   IPAP 20 cmH20   CPAP/EPAP 6 cmH2O   Vt (Measured) 592 mL   Rate Ordered 20   Insp Rise Time (%) 0.9 %   FiO2  40 %   I Time/ I Time % 0.9 s   Minute Volume (L/min) 14.1 Liters   Mask Leak (lpm) 5 lpm   Patient's Home Machine No   Alarm Settings   Alarms On Y   Low Pressure (cmH2O) 6 cmH2O   High Pressure (cmH2O) 30 cmH2O   Apnea (secs) 20 secs   RR Low (bpm) 6   RR High (bpm) 40 br/min

## 2024-08-23 NOTE — H&P
History and Physical      Name:  Joyce Uribe /Age/Sex: 1960  (63 y.o. female)   MRN & CSN:  [id number] & [id number] Encounter Date/Time: 2024 3:22 AM   Location:   PCP: Ghada Marr APRN - CNP       Hospital Day: 2    Assessment and Plan:     Patient is a 63-year-old female who presented with SOB.     # Severe sepsis  # Hospital-acquired pneumonia of right lower lung  # Acute exacerbation of moderate COPD   # Acute on chronic hypoxemic hypercapnic respiratory failure  - Endorsed worsening SOB with increased productive cough of green sputum for 1 day. Quit smoking 3 years ago. Compliant with inhalers. On 3L NC at baseline. PFTs in  with moderate obstruction. Recently admitted for COVID-19 pneumonia in 2024.    - Requiring BPAP 12/6/40% in ED, VBG 7.29/69 (expected CO2 56-60), afebrile, leukocytosis of 29.4, LA 3.2, CTPE negative for PE, but showed RLL infiltrate.   - Started on Vanc/Cefepime in ED, continue with Azithro. Follow-up cultures and inflammatory markers. Continue steroids, Azithro, Symbicort and DuoNebs with Daliresp. Continue supportive care. Goal SpO2 of 88-92%.    # HANK, mild  - Clinically hypovolemic. Cr 0.9, baseline ~ <0.5. UA ordered.  - Received IVF in ED, follow-up repeat labs. Strict I/O's. Bladder scan if decreased voiding.    # Hypokalemia  - Mild.    - Repleted, follow-up repeat labs.     # Non-obstructive CAD  # Non-MI troponin elevation  - Denied any typical CP. [facility] in 2024 with mild-to-moderate CAD.   - Initial Tn mildly elevated, repeat flat. ECG without acute ischemic changes.  - Likely secondary to above. Continue ASA and Lipitor.     # HFpEF, compensated  - Last TTE in  with LVEF of 55%.   - Clinically euvolemic.    - Monitor volume status closely.     # IFG  - Last A1c 5.8% in 2024.      # GERD  - Continue PPI.    # Anxiety  - Continue BusPar and prn Valium, OARRS appropriate.      Checklist:  Advanced care planning: full  Diet: NPO while on  with questionable hazy perihilar and   bibasilar interstitial edema vs pneumonitis.      Tiny bibasilar pleural effusions which is more prominent.             Glenn Saini MD  08/23/24 3:22 AM

## 2024-08-23 NOTE — CONSULTS
INPATIENT PULMONARY CRITICAL CARE CONSULT NOTE      Chief Complaint/Referring Provider:  Patient is being seen at the request of LEISA Colmenares for a consultation for acute exacerbation of COPD     Presenting HPI: Patient came to the hospital because of increasing shortness of breath    As per the admitting provider-Patient is a 63-year-old female with a PMHx of moderate COPD with chronic hypoxemic hypercapnic respiratory failure (on 3L NC), HFpEF, non-obstructive CAD, GERD and anxiety who presented to the ED with worsening SOB with increased productive cough of green sputum for 1 day. Quit smoking 3 years ago. Compliant with inhalers. Denied any fevers, chills, CP, N/V, abdominal pain, C/D or urinary changes. Denied any alcohol use.   Patient was found to be in acute on chronic respiratory failure with hypercapnia and patient was given BiPAP; pulmonary consult was requested  Patient continues to be on BiPAP when seen this morning, patient had a IPAP of 20 and EPAP of 6, patient had mild tachypnea on the BiPAP, patient was afebrile and had borderline hemodynamics, patient was on 40% oxygen on the BiPAP to maintain oxygen saturation which was 90%, patient had sinus rhythm on the monitor ranging from normal sinus rhythm with sinus tachycardia, patient has been n.p.o. overnight, urine output has not been recorded in the epic for review, patient blood sugar was slightly on the higher side , no other pertinent review of system of concern could be elucidated     Patient Active Problem List    Diagnosis Date Noted    Intractable Nausea and vomiting 04/11/2013    Pulmonary infiltrates 08/23/2024    COVID-19 07/15/2024    Dyspnea 04/27/2024    Chest congestion 02/22/2024    Chronic prescription benzodiazepine use 01/04/2024    Hypokalemia 01/04/2024    COPD with acute exacerbation (HCC) 08/11/2023    Acute on chronic respiratory failure with hypoxia and hypercapnia (HCC) 07/20/2023    Chronic respiratory

## 2024-08-23 NOTE — CONSULTS
Pharmacy Note  Vancomycin Consult    Joyce Uribe is a 63 y.o. female started on Vancomycin for PNA; consult received from Dr. Saini to manage therapy.   Allergies:  Bactrim [sulfamethoxazole-trimethoprim], Bupropion, Sulfamethoxazole, Trimethoprim, and Codeine     Recent Labs     08/23/24  0009   CREATININE 0.9       Recent Labs     08/23/24  0026   WBC 29.4*       Estimated Creatinine Clearance: 52 mL/min (based on SCr of 0.9 mg/dL).    No intake or output data in the 24 hours ending 08/23/24 0338    Wt Readings from Last 1 Encounters:   08/22/24 56.7 kg (125 lb)         Body mass index is 22.14 kg/m².    Loading dose (critically ill or in ICU, require dialysis or renal replacement therapy): Vancomycin 25 mg/kg IVPB x 1 (maximum 2500 mg).  Maintenance dose: 10-20 mg/kg (maximum: 2000 mg/dose and 4500 mg/day) starting at the next dosing interval determined by renal function  Pulse dose: fluctuating renal function, HANK, ESRD  CRRT: 7.5-10 mg/kg q12h   Goal Vancomycin trough: 15-20 mcg/mL   Goal Vancomycin AUC: 400-600     Assessment/Plan:  Will initiate Vancomycin with a one time loading dose of 1000 mg x1, followed by 1250 mg IV every 24 hours. Calculated Vancomycin AUC = 510 mg/L*h with an estimated steady-state vancomycin trough = 12 mcg/mL. Vancomycin level ordered for 8/24/24 1200. Timing of trough level will be determined based on culture results, renal function, and clinical response.     Thank you for the consult.  Torres Ruiz PharmD 8/23/2024  3:42 AM

## 2024-08-23 NOTE — CARE COORDINATION
Case Management Assessment  Initial Evaluation    Date/Time of Evaluation: 8/23/2024 3:45 PM  Assessment Completed by: Chiara Pena RN    If patient is discharged prior to next notation, then this note serves as note for discharge by case management.    Patient Name: Joyce Uribe                   YOB: 1960  Diagnosis: Dyspnea and respiratory abnormalities [R06.00, R06.89]  Hypoxia [R09.02]  COPD exacerbation (HCC) [J44.1]  Pneumonia of right lower lobe due to infectious organism [J18.9]  Acute on chronic hypoxic respiratory failure (HCC) [J96.21]                   Date / Time: 8/22/2024 11:51 PM    Patient Admission Status: Inpatient   Readmission Risk (Low < 19, Mod (19-27), High > 27): Readmission Risk Score: 26    Current PCP: Ghada Marr APRN - CNP  PCP verified by CM? (P) Yes    Chart Reviewed: Yes      History Provided by: (P) Patient  Patient Orientation: (P) Alert and Oriented    Patient Cognition: (P) Alert    Hospitalization in the last 30 days (Readmission):  No    If yes, Readmission Assessment in CM Navigator will be completed.    Advance Directives:      Code Status: Full Code   Patient's Primary Decision Maker is: (P) Legal Next of Kin    Primary Decision Maker: PhilTy - Child - [phone]    Primary Decision Maker: MaximilianoKate - Child - [phone]    Discharge Planning:    Patient lives with: (P) Alone Type of Home: (P) Apartment  Primary Care Giver: (P) Self (HHA)  Patient Support Systems include: (P) Children, Friends/Neighbors   Current Financial resources: (P) Medicare, Medicaid  Current community resources: (P) None  Current services prior to admission: (P) Durable Medical Equipment            Current DME: (P) Oxygen Therapy (Comment), Walker ([facility] 3 liters)            Type of Home Care services:  (P) Aide Services    ADLS  Prior functional level: (P) Assistance with the following:, Mobility, Shopping, Housework  Current functional level: (P) Housework,  [J96.21]    IF APPLICABLE: The Patient and/or patient representative Joyce and her family were provided with a choice of provider and agrees with the discharge plan. Freedom of choice list with basic dialogue that supports the patient's individualized plan of care/goals and shares the quality data associated with the providers was provided to: (P) Patient   Patient Representative Name:       The Patient and/or Patient Representative Agree with the Discharge Plan? (P) Yes    Chiara Pena RN  Case Management Department  Ph: [phone] Fax: [phone]

## 2024-08-23 NOTE — FLOWSHEET NOTE
08/23/24 0450   Assessment   Charting Type Admission   Psychosocial   Psychosocial (WDL) WDL   Neurological   Neuro (WDL) WDL   Level of Consciousness 0   Gonzalo Coma Scale   Eye Opening 4   Best Verbal Response 5   Best Motor Response 6   Penn Laird Coma Scale Score 15   HEENT (Head, Ears, Eyes, Nose, & Throat)   HEENT (WDL) X   Teeth Dentures upper   Respiratory   Respiratory (WDL) X   Respiratory Interventions H.O.B. elevated   Respiratory Pattern Tachypneic   Respiratory Depth Normal   Respiratory Quality/Effort Dyspnea with exertion   Chest Assessment Chest expansion symmetrical   Breath Sounds   Right Upper Lobe Diminished   Right Middle Lobe Diminished   Right Lower Lobe Diminished   Left Upper Lobe Diminished   Left Lower Lobe Diminished   Cardiac   Cardiac (WDL) X   Cardiac Regularity Regular   Heart Sounds S1, S2   Cardiac Rhythm Sinus tachy   Cardiac Monitor   Cardiac/Telemetry Monitor On Portable telemetry pack applied   Alarm Audible Yes   Alarms Set Yes   Gastrointestinal   Abdominal (WDL) WDL   Genitourinary   Genitourinary (WDL) X   Peripheral Vascular   Peripheral Vascular (WDL) WDL   Skin Integumentary    Skin Integumentary (WDL) X   Skin Integrity Excoriation   Location under right breast   Musculoskeletal   Musculoskeletal (WDL) X   RL Extremity Weakness   LL Extremity Weakness

## 2024-08-23 NOTE — CONSULTS
Consult Placed     Who: Dr. Alexander  Date:  Time:     Electronically signed by Ruthie Winkler on 8/23/2024 at 2:35 PM

## 2024-08-23 NOTE — ED PROVIDER NOTES
[facility]  ED  EMERGENCY DEPARTMENT ENCOUNTER        Patient Name: Joyce Uribe  MRN: [id number]  Birthdate 1960  Date of evaluation: 8/22/2024  Provider: Aneudy Anand MD  PCP: Ghada Marr APRN - CNP  Note Started: 11:58 PM EDT 8/22/24    CHIEF COMPLAINT       Shortness of Breath (Was found down in bathroom by neighbor. Wears 3L 02 at baseline. When EMS arrived, patient Sp02 at 60%. On nonrebreather upon arrival. Hx lung cancer and COPD.)      HISTORY OF PRESENT ILLNESS: 1 or more Elements     History from : Patient and EMS    Limitations to history : None    Joyce Uribe is a 63 y.o. female who presents for evaluation of shortness of breath.  She was found on the bathroom floor.  She reports no head injury.  She was helped up by EMS.  Her initial SpO2 was 60%.  She has history of lung cancer and COPD and wears 3 L of O2 at baseline.  She has main complaint of shortness of breath.  Denies any new swelling in the legs.  She has history of lung cancer.    Nursing Notes were all reviewed and agreed with or any disagreements were addressed in the HPI.    REVIEW OF SYSTEMS :      Review of Systems    Positives and Pertinent negatives as per HPI.     SURGICAL HISTORY     Past Surgical History:   Procedure Laterality Date    CHOLECYSTECTOMY  2009    COLONOSCOPY  2001    COLONOSCOPY  2013    tubular adenoma    COLONOSCOPY  08/15/2017    ENDOSCOPY, COLON, DIAGNOSTIC      HERNIA REPAIR      HYSTERECTOMY (CERVIX STATUS UNKNOWN)  1985    With Bladder Mesh    INCONTINENCE SURGERY Left     2009 in [place]    LUNG CANCER SURGERY      cancerous nodule removed left side    OVARY REMOVAL  2007    bilat    TONSILLECTOMY      UPPER GASTROINTESTINAL ENDOSCOPY  2013    gastritis    UPPER GASTROINTESTINAL ENDOSCOPY  10/03/2017    bx distal esophagus        CURRENTMEDICATIONS       Previous Medications    ALBUTEROL SULFATE HFA (PROAIR HFA) 108 (90 BASE) MCG/ACT INHALER    Inhale 2 puffs into the lungs every

## 2024-08-24 LAB
ANION GAP SERPL CALCULATED.3IONS-SCNC: 10 MMOL/L (ref 3–16)
BASOPHILS # BLD: 0 K/UL (ref 0–0.2)
BASOPHILS NFR BLD: 0.1 %
BUN SERPL-MCNC: 13 MG/DL (ref 7–20)
CALCIUM SERPL-MCNC: 9.5 MG/DL (ref 8.3–10.6)
CHLORIDE SERPL-SCNC: 99 MMOL/L (ref 99–110)
CO2 SERPL-SCNC: 29 MMOL/L (ref 21–32)
CREAT SERPL-MCNC: <0.5 MG/DL (ref 0.6–1.2)
DEPRECATED RDW RBC AUTO: 15.2 % (ref 12.4–15.4)
EOSINOPHIL # BLD: 0 K/UL (ref 0–0.6)
EOSINOPHIL NFR BLD: 0 %
GFR SERPLBLD CREATININE-BSD FMLA CKD-EPI: >90 ML/MIN/{1.73_M2}
GLUCOSE SERPL-MCNC: 134 MG/DL (ref 70–99)
HCT VFR BLD AUTO: 30.8 % (ref 36–48)
HGB BLD-MCNC: 9.8 G/DL (ref 12–16)
LEGIONELLA AG UR QL: NORMAL
LYMPHOCYTES # BLD: 1 K/UL (ref 1–5.1)
LYMPHOCYTES NFR BLD: 7.6 %
MAGNESIUM SERPL-MCNC: 2.1 MG/DL (ref 1.8–2.4)
MCH RBC QN AUTO: 27.5 PG (ref 26–34)
MCHC RBC AUTO-ENTMCNC: 31.7 G/DL (ref 31–36)
MCV RBC AUTO: 86.8 FL (ref 80–100)
MONOCYTES # BLD: 0.2 K/UL (ref 0–1.3)
MONOCYTES NFR BLD: 1.8 %
NEUTROPHILS # BLD: 11.7 K/UL (ref 1.7–7.7)
NEUTROPHILS NFR BLD: 90.5 %
PLATELET # BLD AUTO: 262 K/UL (ref 135–450)
PMV BLD AUTO: 10 FL (ref 5–10.5)
POTASSIUM SERPL-SCNC: 4.3 MMOL/L (ref 3.5–5.1)
PROCALCITONIN SERPL IA-MCNC: 1.19 NG/ML (ref 0–0.15)
RBC # BLD AUTO: 3.55 M/UL (ref 4–5.2)
S PNEUM AG UR QL: NORMAL
SODIUM SERPL-SCNC: 138 MMOL/L (ref 136–145)
WBC # BLD AUTO: 12.9 K/UL (ref 4–11)

## 2024-08-24 PROCEDURE — 83735 ASSAY OF MAGNESIUM: CPT

## 2024-08-24 PROCEDURE — 6370000000 HC RX 637 (ALT 250 FOR IP): Performed by: NURSE PRACTITIONER

## 2024-08-24 PROCEDURE — 6360000002 HC RX W HCPCS: Performed by: STUDENT IN AN ORGANIZED HEALTH CARE EDUCATION/TRAINING PROGRAM

## 2024-08-24 PROCEDURE — 2700000000 HC OXYGEN THERAPY PER DAY

## 2024-08-24 PROCEDURE — 2060000000 HC ICU INTERMEDIATE R&B

## 2024-08-24 PROCEDURE — 97530 THERAPEUTIC ACTIVITIES: CPT

## 2024-08-24 PROCEDURE — 97110 THERAPEUTIC EXERCISES: CPT

## 2024-08-24 PROCEDURE — 94640 AIRWAY INHALATION TREATMENT: CPT

## 2024-08-24 PROCEDURE — 99232 SBSQ HOSP IP/OBS MODERATE 35: CPT | Performed by: INTERNAL MEDICINE

## 2024-08-24 PROCEDURE — 2580000003 HC RX 258: Performed by: INTERNAL MEDICINE

## 2024-08-24 PROCEDURE — 6370000000 HC RX 637 (ALT 250 FOR IP): Performed by: INTERNAL MEDICINE

## 2024-08-24 PROCEDURE — 97162 PT EVAL MOD COMPLEX 30 MIN: CPT

## 2024-08-24 PROCEDURE — 84145 PROCALCITONIN (PCT): CPT

## 2024-08-24 PROCEDURE — 83036 HEMOGLOBIN GLYCOSYLATED A1C: CPT

## 2024-08-24 PROCEDURE — 85025 COMPLETE CBC W/AUTO DIFF WBC: CPT

## 2024-08-24 PROCEDURE — 2580000003 HC RX 258: Performed by: STUDENT IN AN ORGANIZED HEALTH CARE EDUCATION/TRAINING PROGRAM

## 2024-08-24 PROCEDURE — 6360000002 HC RX W HCPCS: Performed by: INTERNAL MEDICINE

## 2024-08-24 PROCEDURE — 80048 BASIC METABOLIC PNL TOTAL CA: CPT

## 2024-08-24 PROCEDURE — 6370000000 HC RX 637 (ALT 250 FOR IP): Performed by: STUDENT IN AN ORGANIZED HEALTH CARE EDUCATION/TRAINING PROGRAM

## 2024-08-24 PROCEDURE — 94761 N-INVAS EAR/PLS OXIMETRY MLT: CPT

## 2024-08-24 RX ADMIN — Medication 2 PUFF: at 19:45

## 2024-08-24 RX ADMIN — ONDANSETRON 4 MG: 2 INJECTION INTRAMUSCULAR; INTRAVENOUS at 16:16

## 2024-08-24 RX ADMIN — BUSPIRONE HYDROCHLORIDE 10 MG: 5 TABLET ORAL at 08:32

## 2024-08-24 RX ADMIN — CEFEPIME 2000 MG: 2 INJECTION, POWDER, FOR SOLUTION INTRAVENOUS at 11:13

## 2024-08-24 RX ADMIN — ONDANSETRON 4 MG: 2 INJECTION INTRAMUSCULAR; INTRAVENOUS at 09:19

## 2024-08-24 RX ADMIN — SODIUM CHLORIDE, PRESERVATIVE FREE 10 ML: 5 INJECTION INTRAVENOUS at 08:33

## 2024-08-24 RX ADMIN — TIOTROPIUM BROMIDE INHALATION SPRAY 2 PUFF: 3.12 SPRAY, METERED RESPIRATORY (INHALATION) at 07:46

## 2024-08-24 RX ADMIN — ATORVASTATIN CALCIUM 80 MG: 80 TABLET, FILM COATED ORAL at 20:21

## 2024-08-24 RX ADMIN — CEFEPIME 2000 MG: 2 INJECTION, POWDER, FOR SOLUTION INTRAVENOUS at 04:31

## 2024-08-24 RX ADMIN — WATER 40 MG: 1 INJECTION INTRAMUSCULAR; INTRAVENOUS; SUBCUTANEOUS at 20:22

## 2024-08-24 RX ADMIN — CEFEPIME 2000 MG: 2 INJECTION, POWDER, FOR SOLUTION INTRAVENOUS at 20:20

## 2024-08-24 RX ADMIN — TRAMADOL HYDROCHLORIDE 50 MG: 50 TABLET ORAL at 11:11

## 2024-08-24 RX ADMIN — ENOXAPARIN SODIUM 40 MG: 100 INJECTION SUBCUTANEOUS at 17:41

## 2024-08-24 RX ADMIN — PANTOPRAZOLE SODIUM 40 MG: 40 TABLET, DELAYED RELEASE ORAL at 08:32

## 2024-08-24 RX ADMIN — WATER 40 MG: 1 INJECTION INTRAMUSCULAR; INTRAVENOUS; SUBCUTANEOUS at 08:32

## 2024-08-24 RX ADMIN — GUAIFENESIN 600 MG: 600 TABLET ORAL at 20:21

## 2024-08-24 RX ADMIN — ROFLUMILAST 250 MCG: 500 TABLET ORAL at 08:33

## 2024-08-24 RX ADMIN — ASPIRIN 81 MG 81 MG: 81 TABLET ORAL at 08:32

## 2024-08-24 RX ADMIN — AZITHROMYCIN MONOHYDRATE 500 MG: 500 INJECTION, POWDER, LYOPHILIZED, FOR SOLUTION INTRAVENOUS at 04:29

## 2024-08-24 RX ADMIN — BUSPIRONE HYDROCHLORIDE 10 MG: 5 TABLET ORAL at 14:36

## 2024-08-24 RX ADMIN — Medication 2 PUFF: at 07:46

## 2024-08-24 RX ADMIN — ACETAMINOPHEN 650 MG: 325 TABLET ORAL at 14:35

## 2024-08-24 RX ADMIN — GUAIFENESIN 600 MG: 600 TABLET ORAL at 08:32

## 2024-08-24 RX ADMIN — BUSPIRONE HYDROCHLORIDE 10 MG: 5 TABLET ORAL at 20:21

## 2024-08-24 RX ADMIN — Medication 3 MG: at 22:54

## 2024-08-24 ASSESSMENT — PAIN DESCRIPTION - DESCRIPTORS
DESCRIPTORS: ACHING

## 2024-08-24 ASSESSMENT — PAIN SCALES - GENERAL
PAINLEVEL_OUTOF10: 6
PAINLEVEL_OUTOF10: 2
PAINLEVEL_OUTOF10: 8
PAINLEVEL_OUTOF10: 4
PAINLEVEL_OUTOF10: 8

## 2024-08-24 ASSESSMENT — PAIN DESCRIPTION - ORIENTATION: ORIENTATION: RIGHT;LEFT

## 2024-08-24 ASSESSMENT — PAIN DESCRIPTION - LOCATION: LOCATION: KNEE

## 2024-08-24 NOTE — PROGRESS NOTES
INPATIENT PULMONARY CRITICAL CARE PROGRESS NOTE      Reason for visit    acute exacerbation of COPD     SUBJECTIVE: Patient apparently was refusing to use the BiPAP and had told the RT that she will only use the BiPAP if her oxygen drops below 92% patient also told the RT that she does not tolerate the BiPAP well because of claustrophobia, patient when seen this morning was sleeping in the bed without any significant increased shortness of breath, patient was afebrile and hemodynamically maintained, patient has been getting Ultram for pain, patient has sinus rhythm on the monitor, patient was on 4 L of nasal oxygen with saturation 95% when seen, patient document urine output was on the lower side but it may not be objective, patient blood sugars are slightly on the higher side but acceptable, no other pertinent review of system of concern   .        Physical Exam:  Blood pressure 110/78, pulse 79, temperature 98.1 °F (36.7 °C), temperature source Oral, resp. rate 18, height 1.6 m (5' 3\"), weight 56.7 kg (125 lb), SpO2 96%, not currently breastfeeding.'     Constitutional:  No acute distress.  HENT: No facial asymmetry, no thyromegaly.  Eyes:  Conjunctivae are normal. Pupils equal, round, and reactive to light. No scleral icterus.   Neck: . No tracheal deviation present. No obvious thyroid mass.   Cardiovascular: Sinus rhythm normal heart sounds.  No right ventricular heave. No lower extremity edema.  Pulmonary/Chest: No significant wheezes.  No rales.  Chest wall is not dull to percussion.  No accessory muscle usage or stridor.  Decreased breath sound intensity  Abdominal: Soft. Bowel sounds present. No distension or hernia. No tenderness.    Musculoskeletal: No cyanosis. No clubbing. No obvious joint deformity.   Lymphadenopathy: No cervical or supraclavicular adenopathy.   Skin: Skin is warm and dry. No rash or nodules on the exposed extremities.  Neurologic: Sleeping when seen         Results:  CBC:   Recent Labs

## 2024-08-24 NOTE — PROGRESS NOTES
Occupational Therapy  Pts chart reviewed and orders received. Pt politely declining therapy at this time. Pt stating she was having 10/10 knee pain and did not want her pain to get worse with mvmt. Will attempt eval at a later time/date as pt is medically appropriate and agreeable.     Yoselin Lau, OTR/L

## 2024-08-24 NOTE — PROGRESS NOTES
notified, Gait belt    Restrictions  Restrictions/Precautions  Restrictions/Precautions: Fall Risk, General Precautions, Up as Tolerated  Position Activity Restriction  Other position/activity restrictions: O2 4L, titrate between 90-94% only     Subjective  General  Chart Reviewed: Yes  Patient assessed for rehabilitation services?: Yes  Referring Practitioner: Minor  Referral Date : 08/23/24  Diagnosis: dyspnea, resp failure, PNA, COPD  Follows Commands: Within Functional Limits  General Comment  Comments: RN cleraed pt for therapy, pt resting in bed on approach  Subjective  Subjective: Pt agrees to therapy   Pain: denies at rest. Reports \"just a little pain in my back \" post OOB activity         Social/Functional History  Social/Functional History  Lives With: Alone  Type of Home: Apartment  Home Layout: One level  Home Access: Stairs to enter with rails  Entrance Stairs - Number of Steps: 13  Bathroom Shower/Tub: Tub/Shower unit  Bathroom Toilet: Handicap height  Bathroom Equipment: Grab bars in shower  Home Equipment: Oxygen, Walker - Rolling  Has the patient had two or more falls in the past year or any fall with injury in the past year?: No  ADL Assistance: Needs assistance (home aide M-F 3-4 hrs each day)  Ambulation Assistance: Needs assistance (pt states she doesn't usually walk unless someone is with her)  Transfer Assistance: Independent  Active : No  Additional Comments: home health aide services 5 days wk , friend comes to stay with her as well 4-5 days wk on weekends  Vision/Hearing  Vision  Vision: Within Functional Limits  Hearing  Hearing: Within functional limits    Cognition   Orientation  Overall Orientation Status: Within Functional Limits  Cognition  Overall Cognitive Status: WFL    Objective  Temp: 98.9 °F (37.2 °C)  Pulse: 91  Heart Rate Source: Monitor  Respirations: 18  SpO2: 95 %  O2 Device: Nasal cannula  BP: (!) 88/59  MAP (Calculated): 69  BP Location: Right upper arm  BP Method:  Automatic  Patient Position: Semi fowlers        Gross Assessment  AROM: Within functional limits (BLEs)  Strength: Generally decreased, functional (BLE)     Bed mobility  Supine to Sit: Minimal assistance  Sit to Supine: Stand by assistance    Transfers  Sit to Stand: Contact guard assistance  Stand to Sit: Contact guard assistance  Comment: pt performed 3 sit><stand transfers from EOB    Ambulation  Surface: Level tile  Device: Standard Walker  Assistance: Contact guard assistance  Quality of Gait: slow pace, shortened stride, early fatigue  Distance: 3 ft forward and 3 ft back, then stood with walker and marched in place 10 x B     Balance  Sitting - Static: Good  Sitting - Dynamic: Good  Standing - Static: Good (with walker)  Standing - Dynamic: Good (with walker)    Exercise Treatment:   Ankle pumps: 10 x B    Glut sets: 5 x    SLR: 5 x B    Heel slides: 5 x B    Hip abd/add: 5 x B    Hip IR/ER: 5X B    Diaphragmatic breathin x                  AM-PAC - Mobility    AM-PAC Basic Mobility - Inpatient   How much help is needed turning from your back to your side while in a flat bed without using bedrails?: None  How much help is needed moving from lying on your back to sitting on the side of a flat bed without using bedrails?: A Little  How much help is needed moving to and from a bed to a chair?: A Little  How much help is needed standing up from a chair using your arms?: A Little  How much help is needed walking in hospital room?: A Little  How much help is needed climbing 3-5 steps with a railing?: A Little  AM-[facility] Inpatient Mobility Raw Score : 19  AM-PAC Inpatient T-Scale Score : 45.44  Mobility Inpatient CMS 0-100% Score: 41.77  Mobility Inpatient CMS G-Code Modifier : CK       Goals  Short Term Goals  Time Frame for Short Term Goals: 1 week () unless otherwise specified  Short Term Goal 1: pt to be modified indep supine<>sit  Short Term Goal 2: pt to be Supervised sit><stnad  Short Term Goal 3: pt

## 2024-08-24 NOTE — PROGRESS NOTES
08/23/24 2348   Oxygen Therapy/Pulse Ox   O2 Therapy Oxygen humidified   O2 Device Nasal cannula   O2 Flow Rate (L/min) 6 L/min   SpO2 95 %     Pt states that she only wants to wear BIPAP if her \"oxygen drops below 92%\". Pt states that she does not tolerate BIPAP because of claustrophobia. Pt resting comfortably on 6LNC, RN at bedside. RN will contact RT if BIPAP is needed.

## 2024-08-24 NOTE — PLAN OF CARE
Problem: Discharge Planning  Goal: Discharge to home or other facility with appropriate resources  8/24/2024 1831 by Radha Hart RN  Outcome: Progressing  8/24/2024 0844 by Christy Nuñez RN  Outcome: Progressing     Problem: Safety - Adult  Goal: Free from fall injury  8/24/2024 1831 by Radha Hart RN  Outcome: Progressing  8/24/2024 0844 by Christy Nuñez RN  Outcome: Progressing     Problem: Chronic Conditions and Co-morbidities  Goal: Patient's chronic conditions and co-morbidity symptoms are monitored and maintained or improved  8/24/2024 1831 by Radha Hart RN  Outcome: Progressing  8/24/2024 0844 by Christy Nuñez RN  Outcome: Progressing     Problem: Respiratory - Adult  Goal: Achieves optimal ventilation and oxygenation  8/24/2024 1831 by Radha Hart RN  Outcome: Progressing  8/24/2024 0844 by Christy Nuñez RN  Outcome: Progressing     Problem: Cardiovascular - Adult  Goal: Maintains optimal cardiac output and hemodynamic stability  8/24/2024 1831 by Radha Hart RN  Outcome: Progressing  8/24/2024 0844 by Christy Nuñez RN  Outcome: Progressing  Goal: Absence of cardiac dysrhythmias or at baseline  8/24/2024 1831 by Radha Hart RN  Outcome: Progressing  8/24/2024 0844 by Christy Nuñez RN  Outcome: Progressing     Problem: Skin/Tissue Integrity - Adult  Goal: Skin integrity remains intact  8/24/2024 1831 by Radha Hart RN  Outcome: Progressing  Flowsheets (Taken 8/24/2024 0901)  Skin Integrity Remains Intact:   Monitor for areas of redness and/or skin breakdown   Assess vascular access sites hourly   Every 4-6 hours minimum: Change oxygen saturation probe site   Every 4-6 hours: If on nasal continuous positive airway pressure, respiratory therapy assesses nares and determine need for appliance change or resting period  8/24/2024 0844 by Christy Nuñez RN  Outcome: Progressing  Goal: Incisions, wounds, or drain sites healing without S/S of

## 2024-08-24 NOTE — PROGRESS NOTES
Hospital Medicine Progress Note      Date of Admission: 8/22/2024  Hospital Day: 3    Chief Admission Complaint:  shortness of breath    Subjective:  reports improved shortness of breath. Denies chest pain, fever, chills, n/v    Presenting Admission History: \"63-year-old female with a PMHx of moderate COPD with chronic hypoxemic hypercapnic respiratory failure (on 3L NC), HFpEF, non-obstructive CAD, GERD and anxiety who presented to the ED with worsening SOB with increased productive cough of green sputum for 1 day. Quit smoking 3 years ago. Compliant with inhalers. Denied any fevers, chills, CP, N/V, abdominal pain, C/D or urinary changes. Denied any alcohol use.  History obtained from: patient, son and ED provider\"    Assessment/Plan:      Current Principal Problem:  COPD exacerbation (HCC)    COPD exacerbation.  Recommend oxygen saturation 90-94%.  Encourage pulmonary toilet.  Continue spiriva, daliresp, symbicort, mucinex, solu-medrol.      Pneumonia.  Sepsis was present on arrival: leukocytosis, tachycardia, tachypnea, elevated lactic,  elevated procal.  CXR on arrival with edema vs pneumonitis.  CT chest PE protocol: RLL pneumonia; negative for PE.  Suspect health-care acquired and was initially on vanc, cefepime, azithromycin.  Vanc was stopped 8/23 by pulm.  Pulmonary has been consulted, appreciate recommendations.      Acute kidney injury (resolved).  Likely due to poor intake 2/2 acute illness.  Baseline creat likely 0.5-0.6, on admission it was 0.9 and is currently <0.5.  UA bland.  Avoid hypotension, nephrotoxics as able.  Monitor     Hypokalemia/magnesemia.  Monitor and replace as needed.      Acute on chronic hypercapnic/hypoxic respiratory failure.  Reports wearing 3L per NC at home, currently requiring 4L.  Documented desaturation to 84%, nahun required 6L per NC at one point.  Titrate as able for oxygen saturation 90-94%.  Encourage pulmonary toilet    Chronic diastolic heart failure.  Likely due

## 2024-08-25 PROBLEM — R09.89 PULMONARY VENOUS CONGESTION: Status: ACTIVE | Noted: 2024-08-25

## 2024-08-25 LAB
EST. AVERAGE GLUCOSE BLD GHB EST-MCNC: 128.4 MG/DL
HBA1C MFR BLD: 6.1 %
MRSA SPEC QL CULT: NORMAL

## 2024-08-25 PROCEDURE — 6360000002 HC RX W HCPCS: Performed by: STUDENT IN AN ORGANIZED HEALTH CARE EDUCATION/TRAINING PROGRAM

## 2024-08-25 PROCEDURE — 6370000000 HC RX 637 (ALT 250 FOR IP): Performed by: NURSE PRACTITIONER

## 2024-08-25 PROCEDURE — 94640 AIRWAY INHALATION TREATMENT: CPT

## 2024-08-25 PROCEDURE — 94761 N-INVAS EAR/PLS OXIMETRY MLT: CPT

## 2024-08-25 PROCEDURE — 2580000003 HC RX 258: Performed by: INTERNAL MEDICINE

## 2024-08-25 PROCEDURE — 6360000002 HC RX W HCPCS: Performed by: INTERNAL MEDICINE

## 2024-08-25 PROCEDURE — 6370000000 HC RX 637 (ALT 250 FOR IP)

## 2024-08-25 PROCEDURE — 05HY33Z INSERTION OF INFUSION DEVICE INTO UPPER VEIN, PERCUTANEOUS APPROACH: ICD-10-PCS | Performed by: INTERNAL MEDICINE

## 2024-08-25 PROCEDURE — 2580000003 HC RX 258: Performed by: STUDENT IN AN ORGANIZED HEALTH CARE EDUCATION/TRAINING PROGRAM

## 2024-08-25 PROCEDURE — 36569 INSJ PICC 5 YR+ W/O IMAGING: CPT

## 2024-08-25 PROCEDURE — 2700000000 HC OXYGEN THERAPY PER DAY

## 2024-08-25 PROCEDURE — 2060000000 HC ICU INTERMEDIATE R&B

## 2024-08-25 PROCEDURE — C1751 CATH, INF, PER/CENT/MIDLINE: HCPCS

## 2024-08-25 PROCEDURE — 6370000000 HC RX 637 (ALT 250 FOR IP): Performed by: STUDENT IN AN ORGANIZED HEALTH CARE EDUCATION/TRAINING PROGRAM

## 2024-08-25 PROCEDURE — 99232 SBSQ HOSP IP/OBS MODERATE 35: CPT | Performed by: INTERNAL MEDICINE

## 2024-08-25 RX ORDER — SODIUM CHLORIDE 0.9 % (FLUSH) 0.9 %
5-40 SYRINGE (ML) INJECTION PRN
Status: DISCONTINUED | OUTPATIENT
Start: 2024-08-25 | End: 2024-08-29 | Stop reason: HOSPADM

## 2024-08-25 RX ORDER — LIDOCAINE HYDROCHLORIDE 10 MG/ML
50 INJECTION, SOLUTION INFILTRATION; PERINEURAL ONCE
Status: DISCONTINUED | OUTPATIENT
Start: 2024-08-25 | End: 2024-08-29 | Stop reason: HOSPADM

## 2024-08-25 RX ORDER — SODIUM CHLORIDE 0.9 % (FLUSH) 0.9 %
5-40 SYRINGE (ML) INJECTION EVERY 12 HOURS SCHEDULED
Status: DISCONTINUED | OUTPATIENT
Start: 2024-08-25 | End: 2024-08-29 | Stop reason: HOSPADM

## 2024-08-25 RX ORDER — DIAZEPAM 2 MG
2 TABLET ORAL 2 TIMES DAILY PRN
Status: DISCONTINUED | OUTPATIENT
Start: 2024-08-25 | End: 2024-08-29 | Stop reason: HOSPADM

## 2024-08-25 RX ORDER — SODIUM CHLORIDE 9 MG/ML
INJECTION, SOLUTION INTRAVENOUS PRN
Status: DISCONTINUED | OUTPATIENT
Start: 2024-08-25 | End: 2024-08-29 | Stop reason: HOSPADM

## 2024-08-25 RX ORDER — FUROSEMIDE 10 MG/ML
20 INJECTION INTRAMUSCULAR; INTRAVENOUS ONCE
Status: COMPLETED | OUTPATIENT
Start: 2024-08-25 | End: 2024-08-25

## 2024-08-25 RX ADMIN — AZITHROMYCIN MONOHYDRATE 500 MG: 500 INJECTION, POWDER, LYOPHILIZED, FOR SOLUTION INTRAVENOUS at 03:16

## 2024-08-25 RX ADMIN — Medication 2 PUFF: at 19:10

## 2024-08-25 RX ADMIN — TRAMADOL HYDROCHLORIDE 50 MG: 50 TABLET ORAL at 07:47

## 2024-08-25 RX ADMIN — BUSPIRONE HYDROCHLORIDE 10 MG: 5 TABLET ORAL at 08:47

## 2024-08-25 RX ADMIN — ONDANSETRON 4 MG: 2 INJECTION INTRAMUSCULAR; INTRAVENOUS at 15:16

## 2024-08-25 RX ADMIN — Medication 2 PUFF: at 08:33

## 2024-08-25 RX ADMIN — SODIUM CHLORIDE, PRESERVATIVE FREE 1 ML: 5 INJECTION INTRAVENOUS at 20:34

## 2024-08-25 RX ADMIN — ATORVASTATIN CALCIUM 80 MG: 80 TABLET, FILM COATED ORAL at 20:33

## 2024-08-25 RX ADMIN — DIAZEPAM 2 MG: 2 TABLET ORAL at 12:29

## 2024-08-25 RX ADMIN — CEFEPIME 2000 MG: 2 INJECTION, POWDER, FOR SOLUTION INTRAVENOUS at 14:27

## 2024-08-25 RX ADMIN — ENOXAPARIN SODIUM 40 MG: 100 INJECTION SUBCUTANEOUS at 17:42

## 2024-08-25 RX ADMIN — GUAIFENESIN 600 MG: 600 TABLET ORAL at 20:33

## 2024-08-25 RX ADMIN — CEFEPIME 2000 MG: 2 INJECTION, POWDER, FOR SOLUTION INTRAVENOUS at 03:20

## 2024-08-25 RX ADMIN — WATER 40 MG: 1 INJECTION INTRAMUSCULAR; INTRAVENOUS; SUBCUTANEOUS at 08:47

## 2024-08-25 RX ADMIN — ROFLUMILAST 250 MCG: 500 TABLET ORAL at 09:50

## 2024-08-25 RX ADMIN — BUSPIRONE HYDROCHLORIDE 10 MG: 5 TABLET ORAL at 20:33

## 2024-08-25 RX ADMIN — CEFEPIME 2000 MG: 2 INJECTION, POWDER, FOR SOLUTION INTRAVENOUS at 21:52

## 2024-08-25 RX ADMIN — Medication 3 MG: at 20:33

## 2024-08-25 RX ADMIN — GUAIFENESIN 600 MG: 600 TABLET ORAL at 08:47

## 2024-08-25 RX ADMIN — TIOTROPIUM BROMIDE INHALATION SPRAY 2 PUFF: 3.12 SPRAY, METERED RESPIRATORY (INHALATION) at 08:33

## 2024-08-25 RX ADMIN — TRAMADOL HYDROCHLORIDE 50 MG: 50 TABLET ORAL at 14:25

## 2024-08-25 RX ADMIN — PANTOPRAZOLE SODIUM 40 MG: 40 TABLET, DELAYED RELEASE ORAL at 08:47

## 2024-08-25 RX ADMIN — BUSPIRONE HYDROCHLORIDE 10 MG: 5 TABLET ORAL at 14:25

## 2024-08-25 RX ADMIN — ASPIRIN 81 MG 81 MG: 81 TABLET ORAL at 08:47

## 2024-08-25 RX ADMIN — ONDANSETRON 4 MG: 2 INJECTION INTRAMUSCULAR; INTRAVENOUS at 07:37

## 2024-08-25 RX ADMIN — FUROSEMIDE 20 MG: 10 INJECTION, SOLUTION INTRAMUSCULAR; INTRAVENOUS at 17:41

## 2024-08-25 ASSESSMENT — PAIN SCALES - GENERAL
PAINLEVEL_OUTOF10: 4
PAINLEVEL_OUTOF10: 6

## 2024-08-25 ASSESSMENT — PAIN DESCRIPTION - DESCRIPTORS: DESCRIPTORS: ACHING

## 2024-08-25 ASSESSMENT — PAIN DESCRIPTION - ORIENTATION: ORIENTATION: RIGHT;LEFT

## 2024-08-25 ASSESSMENT — PAIN DESCRIPTION - LOCATION: LOCATION: KNEE

## 2024-08-25 NOTE — FLOWSHEET NOTE
08/25/24 0427   Vital Signs   Temp 97.6 °F (36.4 °C)   Temp Source Oral   Pulse 67   Heart Rate Source Monitor   Respirations 18   BP (!) 106/48   MAP (Calculated) 67   BP Location Right upper arm   BP Method Automatic   Patient Position Semi shannawlers   Pain Assessment   Pain Assessment None - Denies Pain   Care Plan - Pain Goals   Verbalizes/displays adequate comfort level or baseline comfort level Encourage patient to monitor pain and request assistance   Opioid-Induced Sedation   POSS Score 1   RASS   Nieto Agitation Sedation Scale (RASS) 0   Oxygen Therapy   SpO2 97 %   Pulse Oximetry Type Intermittent   Pulse Oximeter Device Mode Intermittent   Pulse Oximeter Device Location Left;Finger   O2 Device Nasal cannula   O2 Flow Rate (L/min) 4 L/min   Oxygen Therapy Supplemental oxygen   Height and Weight   Weight - Scale 52.3 kg (115 lb 4.8 oz)   Weight Method Bed scale   BMI (Calculated) 20.5

## 2024-08-25 NOTE — PROGRESS NOTES
Occupational Therapy        Occupational Therapy    Chart reviewed, attempted to see pt for OT eval  this date;  pt declined d/t complaints nausea & pain, RN aware. Pt agreeable for OT eval \"mid-morning tomorrow.\"    Debra Aguilar,OT   Undermining Type: Entire Wound

## 2024-08-25 NOTE — PLAN OF CARE
Progressing  8/24/2024 0844 by Christy Nuñez RN  Outcome: Progressing  Goal: Absence of cardiac dysrhythmias or at baseline  8/24/2024 2157 by Karlie Maier RN  Outcome: Progressing  Flowsheets (Taken 8/24/2024 2004)  Absence of cardiac dysrhythmias or at baseline: Monitor cardiac rate and rhythm  8/24/2024 1831 by Radha Hart RN  Outcome: Progressing  8/24/2024 0844 by Christy Nuñez RN  Outcome: Progressing     Problem: Skin/Tissue Integrity - Adult  Goal: Skin integrity remains intact  8/24/2024 2157 by Karlie Maier RN  Outcome: Progressing  Flowsheets (Taken 8/24/2024 2004)  Skin Integrity Remains Intact: Monitor for areas of redness and/or skin breakdown  8/24/2024 1831 by Radha Hart RN  Outcome: Progressing  Flowsheets (Taken 8/24/2024 0901)  Skin Integrity Remains Intact:   Monitor for areas of redness and/or skin breakdown   Assess vascular access sites hourly   Every 4-6 hours minimum: Change oxygen saturation probe site   Every 4-6 hours: If on nasal continuous positive airway pressure, respiratory therapy assesses nares and determine need for appliance change or resting period  8/24/2024 0844 by Christy Nuñez RN  Outcome: Progressing  Goal: Incisions, wounds, or drain sites healing without S/S of infection  8/24/2024 2157 by Karlie Maier RN  Outcome: Progressing  Flowsheets (Taken 8/24/2024 2004)  Incisions, Wounds, or Drain Sites Healing Without Sign and Symptoms of Infection: ADMISSION and DAILY: Assess and document risk factors for pressure ulcer development  8/24/2024 1831 by Radha Hart RN  Outcome: Progressing  8/24/2024 0844 by Christy Nuñez RN  Outcome: Progressing  Goal: Oral mucous membranes remain intact  8/24/2024 2157 by Karlie Maier RN  Outcome: Progressing  Flowsheets (Taken 8/24/2024 2004)  Oral Mucous Membranes Remain Intact: Assess oral mucosa and hygiene practices  8/24/2024 1831 by Radha Hart  RN  Outcome: Progressing  8/24/2024 0844 by Christy Nuñez RN  Outcome: Progressing     Problem: Musculoskeletal - Adult  Goal: Return mobility to safest level of function  8/24/2024 2157 by Karlie Maier RN  Outcome: Progressing  Flowsheets (Taken 8/24/2024 2004)  Return Mobility to Safest Level of Function: Assess patient stability and activity tolerance for standing, transferring and ambulating with or without assistive devices  8/24/2024 1831 by Radha Hart RN  Outcome: Progressing  8/24/2024 0844 by Christy Nuñez RN  Outcome: Progressing  Goal: Maintain proper alignment of affected body part  8/24/2024 2157 by Karlie Maier RN  Outcome: Progressing  Flowsheets (Taken 8/24/2024 2004)  Maintain proper alignment of affected body part: Support and protect limb and body alignment per provider's orders  8/24/2024 1831 by Radha Hart RN  Outcome: Progressing  8/24/2024 0844 by Christy Nuñez RN  Outcome: Progressing  Goal: Return ADL status to a safe level of function  8/24/2024 2157 by Karlie Maier RN  Outcome: Progressing  Flowsheets (Taken 8/24/2024 2004)  Return ADL Status to a Safe Level of Function: Administer medication as ordered  8/24/2024 1831 by Radha Hart RN  Outcome: Progressing  8/24/2024 0844 by Christy Nuñez RN  Outcome: Progressing     Problem: Genitourinary - Adult  Goal: Absence of urinary retention  8/24/2024 2157 by Karlie Maier RN  Outcome: Progressing  Flowsheets (Taken 8/24/2024 2004)  Absence of urinary retention: Assess patient’s ability to void and empty bladder  8/24/2024 1831 by Radha Hart RN  Outcome: Progressing  8/24/2024 0844 by Christy Nuñez RN  Outcome: Progressing     Problem: Pain  Goal: Verbalizes/displays adequate comfort level or baseline comfort level  8/24/2024 2157 by Karlie Maier RN  Outcome: Progressing  Flowsheets (Taken 8/24/2024 2004)  Verbalizes/displays adequate comfort  No

## 2024-08-25 NOTE — FLOWSHEET NOTE
08/24/24 2004   Vital Signs   Temp 98.2 °F (36.8 °C)   Temp Source Oral   Pulse 86   Heart Rate Source Monitor   Respirations 18   BP (!) 99/58   MAP (Calculated) 72   BP Location Right upper arm   BP Method Automatic   Patient Position Semi shannawlers   Pain Assessment   Pain Assessment None - Denies Pain   Care Plan - Pain Goals   Verbalizes/displays adequate comfort level or baseline comfort level Encourage patient to monitor pain and request assistance   Opioid-Induced Sedation   POSS Score 1   RASS   Nieto Agitation Sedation Scale (RASS) 0   Oxygen Therapy   SpO2 95 %   Pulse Oximetry Type Intermittent   Pulse Oximeter Device Mode Intermittent   Pulse Oximeter Device Location Left;Finger   O2 Device Nasal cannula   O2 Flow Rate (L/min) 4 L/min   Oxygen Therapy Supplemental oxygen     Pt resting comfortably in bed. Bed alarm on, call light within reach. No needs expressed at this time. Will continue to monitor.

## 2024-08-25 NOTE — PROGRESS NOTES
Arrived to place PICC line with bedside RN Karlie. Pre-procedure and timeout done with RN, discussed limitations of placement and allergies. Consent confirmed. Vital signs stable. Labs, allergies, medications, and code status reviewed. No contraindications noted.     Procedure explained to pt, including the risk and benefits of the procedure. All questions answered. Pt verbalizes understanding of the procedure and states no more questions.       Pt's basilic, brachial, and cephalic are all easily collapsible with no indication for a clot. Vein selected is large enough for catheter. Pt tolerated sterile procedure well, with no difficulty accessing right brachial vein, when accessed - blood was free flowing and non-pulsatile. Guidewire, introducer, and catheter went in smoothly. PICC line verified with 3CG technology with peaked P-waves (please see image below).      Nurses:  OK to use PICC.    Please replace all existing IV tubing with new IV tubing prior to using the PICC for current IV infusions.  Please remove any PIVs from PICC arm.  All of the above may be sources of infection or an increase chance of a clot.      Post procedure - reorganized pt table, placed pt in lowest position, with call light and educated on line care. Instructed pt/RN not to use arm for at least 30min to avoid bleeding. Reported off to bedside RN.      If you have any questions please call number below and dispatch will direct you to the PICC RN that is on call.    [phone]

## 2024-08-25 NOTE — PROGRESS NOTES
Pt lost IV access. Ultrasound access attempted with no success. IV Abx due and on hold. Pt stated PICC is maxwell needed during admissions. Provider Lynn notified.Will continue to monitor.

## 2024-08-25 NOTE — PROGRESS NOTES
INPATIENT PULMONARY CRITICAL CARE PROGRESS NOTE      Reason for visit    acute exacerbation of COPD     SUBJECTIVE: Patient was seen this morning was feeling congested, patient still had some shortness of breath, patient was not have any chest pain, patient has stated yesterday the patient refused to use the BiPAP to keep the saturation was below 92% and does not want to change her opinion about that, patient has been afebrile and hemoglobin obtained, patient was on 4 L of nasal oxygen with sats of 94% when seen, patient document urine output is on the lower side, patient glycemic control has been acceptable, no other pertinent review of system of concern   .        Physical Exam:  Blood pressure 98/61, pulse 79, temperature 98.2 °F (36.8 °C), temperature source Oral, resp. rate 18, height 1.6 m (5' 3\"), weight 52.3 kg (115 lb 4.8 oz), SpO2 95%, not currently breastfeeding.'     Constitutional:  No acute distress.  HENT: No facial asymmetry, no thyromegaly.  Eyes:  Conjunctivae are normal. Pupils equal, round, and reactive to light. No scleral icterus.   Neck: . No tracheal deviation present. No obvious thyroid mass.   Cardiovascular: Sinus rhythm normal heart sounds.  No right ventricular heave. No lower extremity edema.  Pulmonary/Chest: No significant wheezes.  No rales.  Chest wall is not dull to percussion.  No accessory muscle usage or stridor.  Decreased breath sound intensity  Abdominal: Soft. Bowel sounds present. No distension or hernia. No tenderness.    Musculoskeletal: No cyanosis. No clubbing. No obvious joint deformity.   Lymphadenopathy: No cervical or supraclavicular adenopathy.   Skin: Skin is warm and dry. No rash or nodules on the exposed extremities.  Neurologic: Alert and oriented, slightly anxious when seen         Results:  CBC:   Recent Labs     08/23/24  0026 08/24/24  0433   WBC 29.4* 12.9*   HGB 12.0 9.8*   HCT 38.5 30.8*   MCV 87.9 86.8   * 262     BMP:   Recent Labs      counterproductive  Pulmonary toilet  Patient was getting cefepime and Zithromax and patient's white count is improving  Patient also has some pulmonary venous congestion  Will give 1 dose of Lasix and reassess  Patient's CBC and electrolytes to be monitored closely and BMP ordered for tomorrow morning  Further titration as per clinical status and cultures  Bronchodilators  Patient was getting Symbicort inhaler which can be continued  Spiriva Respimat has been added to the regimen  DuoNeb on whenever necessary basis  IV Solu-Medrol decreased to 40 mg IV push daily-will consider changing that to p.o. steroids in the morning  Patient's blood sugars to be monitored closely with steroids  Patient does have a history of marijuana use with cyclic nausea  Patient does have chronic benzodiazepine use which is adding to the complexity of the disease process  Monitor for any withdrawal symptoms  Trend the WBC count-trending in the right direction  Monitor input output and BMP  Correct electrolytes on whenever necessary basis  PUD /PUD prophylaxis  PT/OT    Patient is noncompliant with recommendations and use of BiPAP which can be counterproductive            Electronically signed by:  BRUNA VILLALTA MD    8/25/2024    5:06 PM.

## 2024-08-25 NOTE — PROGRESS NOTES
[][facility]  []Hospice  []Other -      Consults:      PHARMACY TO DOSE VANCOMYCIN  IP CONSULT TO PULMONOLOGY  IP CONSULT TO VASCULAR ACCESS TEAM      ------------------------------------------------------------------------------------------------------------------------------------------------------------------------    MDM  (this section is simply meant as an aid to accurate billing and does not necessarily reflect ongoing patient care which is documented elsewhere in the medical record)    [x] High (any 2)    A. Problems (any 1)  [x] Acute/Chronic Illness/injury posing threat to life or bodily function: a/c hypoxic resp failure, pna, copd,, hypoK/mag  [] Severe exacerbation of chronic illness:    ---------------------------------------------------------------------  B. Risk of Treatment (any 1)   [] Drugs/treatments that require intensive monitoring for toxicity include:    [] IV ABX requiring serial renal monitoring for nephrotoxicity:     [] Post-Cath/Contrast study requiring serial renal monitoring for Contrast Induced Nephropathy  [] IV Narcotic analgesia for ADR   [] Aggressive IV diuresis requiring serial monitoring for renal impairment and electrolyte derangements  [] Hypertonic Saline requiring serial renal monitoring for appropriate electrolyte correction rate   [] Critical electrolyte abnormalities requiring IV replacement and close serial monitoring  [] Insulin - monitoring FSBS for Hypoglycemic ADR  [] Anticoagulation requiring close serial monitoring and dose adjustments at high risk of ADR   [] HD requiring close serial monitoring of electrolytes and fluid status  [] Other -  [] Change in code status:    [] Decision to escalate care:    [] Major surgery/procedure with associated risk factors:    ----------------------------------------------------------------------  C. Data (any 2)  [] Discussed management of the case with consultants as follows:    [x] Discussed the discharge plan in detail with case mgtom

## 2024-08-25 NOTE — FLOWSHEET NOTE
08/24/24 2324   Vital Signs   Temp 98.1 °F (36.7 °C)   Temp Source Oral   Pulse 66   Heart Rate Source Monitor   BP 97/63   MAP (Calculated) 74   BP Location Right upper arm   BP Method Automatic   Patient Position Semi fowlers   Pain Assessment   Pain Assessment None - Denies Pain   Oxygen Therapy   SpO2 97 %   Pulse Oximetry Type Intermittent   Pulse Oximeter Device Mode Intermittent   Pulse Oximeter Device Location Left;Finger   O2 Device Nasal cannula   O2 Flow Rate (L/min) 4 L/min   Oxygen Therapy Supplemental oxygen

## 2024-08-25 NOTE — PROGRESS NOTES
Came to see pt to place on bipap, pt declined and stated she did not want to wear tonight and informed me she did not wear it the night before also.  Encouraged pt to call if she becomes SOB

## 2024-08-25 NOTE — PROGRESS NOTES
PICC order obtained. PICC team called at [phone]. Kaya took message-will have PICC nurse call when they have an ETA.    IV ABX on hold, pharmacy notified through MAR.  Will restart when PICC access obtained.

## 2024-08-26 LAB
ANION GAP SERPL CALCULATED.3IONS-SCNC: 7 MMOL/L (ref 3–16)
BUN SERPL-MCNC: 19 MG/DL (ref 7–20)
CALCIUM SERPL-MCNC: 9.1 MG/DL (ref 8.3–10.6)
CHLORIDE SERPL-SCNC: 104 MMOL/L (ref 99–110)
CO2 SERPL-SCNC: 30 MMOL/L (ref 21–32)
CREAT SERPL-MCNC: 0.6 MG/DL (ref 0.6–1.2)
DEPRECATED RDW RBC AUTO: 15.6 % (ref 12.4–15.4)
GFR SERPLBLD CREATININE-BSD FMLA CKD-EPI: >90 ML/MIN/{1.73_M2}
GLUCOSE SERPL-MCNC: 106 MG/DL (ref 70–99)
HCT VFR BLD AUTO: 29.6 % (ref 36–48)
HGB BLD-MCNC: 9.4 G/DL (ref 12–16)
MCH RBC QN AUTO: 27.8 PG (ref 26–34)
MCHC RBC AUTO-ENTMCNC: 31.8 G/DL (ref 31–36)
MCV RBC AUTO: 87.3 FL (ref 80–100)
PLATELET # BLD AUTO: 215 K/UL (ref 135–450)
PMV BLD AUTO: 10 FL (ref 5–10.5)
POTASSIUM SERPL-SCNC: 3.7 MMOL/L (ref 3.5–5.1)
RBC # BLD AUTO: 3.39 M/UL (ref 4–5.2)
SODIUM SERPL-SCNC: 141 MMOL/L (ref 136–145)
WBC # BLD AUTO: 9.1 K/UL (ref 4–11)

## 2024-08-26 PROCEDURE — 6370000000 HC RX 637 (ALT 250 FOR IP)

## 2024-08-26 PROCEDURE — 97530 THERAPEUTIC ACTIVITIES: CPT

## 2024-08-26 PROCEDURE — 2060000000 HC ICU INTERMEDIATE R&B

## 2024-08-26 PROCEDURE — 2700000000 HC OXYGEN THERAPY PER DAY

## 2024-08-26 PROCEDURE — 6370000000 HC RX 637 (ALT 250 FOR IP): Performed by: INTERNAL MEDICINE

## 2024-08-26 PROCEDURE — 6360000002 HC RX W HCPCS: Performed by: INTERNAL MEDICINE

## 2024-08-26 PROCEDURE — 2580000003 HC RX 258: Performed by: INTERNAL MEDICINE

## 2024-08-26 PROCEDURE — 36592 COLLECT BLOOD FROM PICC: CPT

## 2024-08-26 PROCEDURE — 94669 MECHANICAL CHEST WALL OSCILL: CPT

## 2024-08-26 PROCEDURE — 6370000000 HC RX 637 (ALT 250 FOR IP): Performed by: STUDENT IN AN ORGANIZED HEALTH CARE EDUCATION/TRAINING PROGRAM

## 2024-08-26 PROCEDURE — 80048 BASIC METABOLIC PNL TOTAL CA: CPT

## 2024-08-26 PROCEDURE — 94640 AIRWAY INHALATION TREATMENT: CPT

## 2024-08-26 PROCEDURE — 6370000000 HC RX 637 (ALT 250 FOR IP): Performed by: NURSE PRACTITIONER

## 2024-08-26 PROCEDURE — 94761 N-INVAS EAR/PLS OXIMETRY MLT: CPT

## 2024-08-26 PROCEDURE — 97535 SELF CARE MNGMENT TRAINING: CPT

## 2024-08-26 PROCEDURE — 97166 OT EVAL MOD COMPLEX 45 MIN: CPT

## 2024-08-26 PROCEDURE — 2580000003 HC RX 258: Performed by: STUDENT IN AN ORGANIZED HEALTH CARE EDUCATION/TRAINING PROGRAM

## 2024-08-26 PROCEDURE — 97116 GAIT TRAINING THERAPY: CPT

## 2024-08-26 PROCEDURE — 85027 COMPLETE CBC AUTOMATED: CPT

## 2024-08-26 PROCEDURE — 6360000002 HC RX W HCPCS: Performed by: STUDENT IN AN ORGANIZED HEALTH CARE EDUCATION/TRAINING PROGRAM

## 2024-08-26 PROCEDURE — 99233 SBSQ HOSP IP/OBS HIGH 50: CPT | Performed by: INTERNAL MEDICINE

## 2024-08-26 RX ORDER — AZITHROMYCIN 250 MG/1
250 TABLET, FILM COATED ORAL
Status: DISCONTINUED | OUTPATIENT
Start: 2024-08-26 | End: 2024-08-29 | Stop reason: HOSPADM

## 2024-08-26 RX ORDER — PREDNISONE 20 MG/1
40 TABLET ORAL DAILY
Status: DISCONTINUED | OUTPATIENT
Start: 2024-08-27 | End: 2024-08-29 | Stop reason: HOSPADM

## 2024-08-26 RX ADMIN — ROFLUMILAST 250 MCG: 500 TABLET ORAL at 08:39

## 2024-08-26 RX ADMIN — CEFEPIME 2000 MG: 2 INJECTION, POWDER, FOR SOLUTION INTRAVENOUS at 22:37

## 2024-08-26 RX ADMIN — TRAMADOL HYDROCHLORIDE 50 MG: 50 TABLET ORAL at 09:58

## 2024-08-26 RX ADMIN — Medication 2 PUFF: at 19:27

## 2024-08-26 RX ADMIN — CEFEPIME 2000 MG: 2 INJECTION, POWDER, FOR SOLUTION INTRAVENOUS at 13:41

## 2024-08-26 RX ADMIN — BUSPIRONE HYDROCHLORIDE 10 MG: 5 TABLET ORAL at 08:31

## 2024-08-26 RX ADMIN — TRAMADOL HYDROCHLORIDE 50 MG: 50 TABLET ORAL at 16:35

## 2024-08-26 RX ADMIN — BUSPIRONE HYDROCHLORIDE 10 MG: 5 TABLET ORAL at 13:41

## 2024-08-26 RX ADMIN — ASPIRIN 81 MG 81 MG: 81 TABLET ORAL at 08:32

## 2024-08-26 RX ADMIN — SODIUM CHLORIDE, PRESERVATIVE FREE 10 ML: 5 INJECTION INTRAVENOUS at 20:02

## 2024-08-26 RX ADMIN — PANTOPRAZOLE SODIUM 40 MG: 40 TABLET, DELAYED RELEASE ORAL at 08:31

## 2024-08-26 RX ADMIN — Medication 3 MG: at 22:34

## 2024-08-26 RX ADMIN — BUSPIRONE HYDROCHLORIDE 10 MG: 5 TABLET ORAL at 20:01

## 2024-08-26 RX ADMIN — DIAZEPAM 2 MG: 2 TABLET ORAL at 22:34

## 2024-08-26 RX ADMIN — Medication 2 PUFF: at 07:37

## 2024-08-26 RX ADMIN — GUAIFENESIN 600 MG: 600 TABLET ORAL at 20:01

## 2024-08-26 RX ADMIN — SODIUM CHLORIDE, PRESERVATIVE FREE 10 ML: 5 INJECTION INTRAVENOUS at 20:01

## 2024-08-26 RX ADMIN — CEFEPIME 2000 MG: 2 INJECTION, POWDER, FOR SOLUTION INTRAVENOUS at 05:25

## 2024-08-26 RX ADMIN — ONDANSETRON 4 MG: 2 INJECTION INTRAMUSCULAR; INTRAVENOUS at 20:01

## 2024-08-26 RX ADMIN — SODIUM CHLORIDE, PRESERVATIVE FREE 10 ML: 5 INJECTION INTRAVENOUS at 08:32

## 2024-08-26 RX ADMIN — METHYLPREDNISOLONE SODIUM SUCCINATE 40 MG: 40 INJECTION INTRAMUSCULAR; INTRAVENOUS at 08:32

## 2024-08-26 RX ADMIN — ONDANSETRON 4 MG: 4 TABLET, ORALLY DISINTEGRATING ORAL at 09:20

## 2024-08-26 RX ADMIN — ATORVASTATIN CALCIUM 80 MG: 80 TABLET, FILM COATED ORAL at 20:01

## 2024-08-26 RX ADMIN — ENOXAPARIN SODIUM 40 MG: 100 INJECTION SUBCUTANEOUS at 16:33

## 2024-08-26 RX ADMIN — TIOTROPIUM BROMIDE INHALATION SPRAY 2 PUFF: 3.12 SPRAY, METERED RESPIRATORY (INHALATION) at 07:37

## 2024-08-26 RX ADMIN — AZITHROMYCIN 250 MG: 250 TABLET, FILM COATED ORAL at 13:41

## 2024-08-26 RX ADMIN — GUAIFENESIN 600 MG: 600 TABLET ORAL at 08:31

## 2024-08-26 ASSESSMENT — PAIN DESCRIPTION - LOCATION
LOCATION: BACK
LOCATION: BACK

## 2024-08-26 ASSESSMENT — PAIN SCALES - GENERAL
PAINLEVEL_OUTOF10: 8
PAINLEVEL_OUTOF10: 0
PAINLEVEL_OUTOF10: 5
PAINLEVEL_OUTOF10: 5
PAINLEVEL_OUTOF10: 8
PAINLEVEL_OUTOF10: 0

## 2024-08-26 NOTE — PROGRESS NOTES
Shift assessment completed and charted. A&OX4. Per pt nausea noted after eating all pts breakfast, prn meds given, see MAR. Bed alarm on. Purewick in place. Bed locked and in lowest position. Call light within reach. Pt denies any other needs at this time. Will continue to monitor.

## 2024-08-26 NOTE — PROGRESS NOTES
PULMONARY AND CRITICAL CARE INPATIENT NOTE        Joyce Uribe   : 1960  MRN: [id number]     Admitting Physician: Glenn Saini MD  Attending Physician: Varun Doe MD  PCP: Ghada Marr APRN - CNP    Admission: 2024   Date of Service: 2024    Chief Complaint   Patient presents with    Shortness of Breath     Was found down in bathroom by neighbor. Wears 3L 02 at baseline. When EMS arrived, patient Sp02 at 60%. On nonrebreather upon arrival. Hx lung cancer and COPD.           ASSESSMENT & PLAN       63 y.o. pleasant  female patient with:    Assessment:  AECOPD, emphysema/severe COPD at baseline, recurrent admissions  Leukocytosis  Pulmonary infiltrates/Rt lower lobe pneumonia  Pulmonary venous congestion  Acute on chronic hypoxic and hypercapnic respiratory failure.  On 3 L at baseline.  Lung cancer s/p resection  HFpEF/CAD  HELEN  HTN  Multisubstance use history including marijuana, benzodiazepines  Tobacco abuse history  Noncompliance      Plan:              Continue antimicrobials for Rt lower lobe pneumonia  Follow-up on micro work-up  Continue systemic steroids.  Okay to switch to oral  Continue Symbicort, Spiriva with as needed nebs  Continue Roflumilast  Will add azithromycin  and Friday for recurrent exacerbations.  QTc is okay.  Diuresis as tolerated with electrolyte replacement  Bronchial hygiene measures  Aspiration precautions  Target blood sugar between 140 and 180 mg/dL  DVT ppx measures.  PT/OT when patient is able to participate  Continue to wean oxygen with target 90 to 94%.  Rest/Exercise oxymetry before discharge to estimate oxygen needs.  Pulmonary rehab referral after discharge through the pulmonary clinic.  Patient is to follow-up with Dr. Alexander in the pulmonary clinic within 4 weeks of discharge      LOS: Hospital Day: 5     Code:Full Code        Subjective/Objective             Summary:   63-year-old female patient with PMH of HTN,

## 2024-08-26 NOTE — PROGRESS NOTES
Hospital Medicine Progress Note      Date of Admission: 8/22/2024  Hospital Day: 5    Chief Admission Complaint:  shortness of breath    Subjective: no needs expressed at this time.  Denies dyspnea, chest pain, n/v.  Reports some nausea after eating breakfast - resolved with zofran    Presenting Admission History: \"63-year-old female with a PMHx of moderate COPD with chronic hypoxemic hypercapnic respiratory failure (on 3L NC), HFpEF, non-obstructive CAD, GERD and anxiety who presented to the ED with worsening SOB with increased productive cough of green sputum for 1 day. Quit smoking 3 years ago. Compliant with inhalers. Denied any fevers, chills, CP, N/V, abdominal pain, C/D or urinary changes. Denied any alcohol use.  History obtained from: patient, son and ED provider\"    Assessment/Plan:      Current Principal Problem:  COPD exacerbation (HCC)    COPD exacerbation, resolved.  Recommend oxygen saturation 90-94%.  Encourage pulmonary toilet.  Continue spiriva, daliresp, symbicort, mucinex, solu-medrol.  Transitioned to prednisone 8/26    Pneumonia.  Sepsis was present on arrival: leukocytosis, tachycardia, tachypnea, elevated lactic,  elevated procal.  CXR on arrival with edema vs pneumonitis.  CT chest PE protocol: RLL pneumonia; negative for PE.  Suspect health-care acquired and was initially on vanc, cefepime, azithromycin.  Vanc was stopped 8/23 by pulm.  Pulmonary has been consulted, appreciate recommendations.      Acute kidney injury (resolved).  Likely due to poor intake 2/2 acute illness.  Baseline creat likely 0.5-0.6, on admission it was 0.9 and is currently <0.5.  UA bland.  Avoid hypotension, nephrotoxics as able.  Monitor     Hypokalemia/magnesemia.  Monitor and replace as needed.      Acute (resolved) on chronic hypercapnic/hypoxic respiratory failure.  Reports wearing 3L per NC at home, currently requiring 3L.  Documented desaturation to 84%, nahun required 6L per NC at one point.  Titrate as  \"AST\", \"ALT\", \"BILIDIR\", \"BILITOT\", \"ALKPHOS\" in the last 72 hours.    No results for input(s): \"INR\", \"LACTA\", \"TSH\" in the last 72 hours.      Urine Cultures:   Lab Results   Component Value Date/Time    LABURIN No growth at 18 to 36 hours 11/26/2020 04:01 PM     Blood Cultures:   Lab Results   Component Value Date/Time    BC  08/23/2024 12:26 AM     No Growth to date.  Any change in status will be called.     Lab Results   Component Value Date/Time    BLOODCULT2  08/23/2024 12:26 AM     No Growth to date.  Any change in status will be called.     Organism:   Lab Results   Component Value Date/Time    ORG Yeast 05/24/2017 09:00 PM         MACEY Kang - CNP

## 2024-08-26 NOTE — CARE COORDINATION
Patient has SNF recommendations. She is agreeable to rehab and wants to go to [facility]. Referral initiated with [facility] in admissions. They can accept and she will submit pre-cert as soon as updated PT/OT notes are documented. Patient is agreeable to work therapy this AM.

## 2024-08-26 NOTE — PROGRESS NOTES
Physical Therapy  Facility/Department: [facility] C4 PCU  Daily Treatment Note  NAME: Joyce Uribe  : 1960  MRN: [id number]    Date of Service: 2024    Discharge Recommendations:  Subacute/Skilled Nursing Facility   PT Equipment Recommendations  Equipment Needed: No  Other: defer to facility    Therapy discharge recommendations are subject to collaboration from the patient’s interdisciplinary healthcare team, including MD and case management recommendations.    Barriers to Home Discharge:   [] Steps to access home entry or bed/bath:   [] Unable to transfer, ambulate, or propel wheelchair household distances without assist   [x] Limited available assist at home upon discharge    [x] Patient or family requests d/c to post-acute facility    [] Poor cognition/safety awareness for d/c to home alone    [] Unable to maintain ordered weight bearing status    [] Patient with salient signs of long-standing immobility   [] Decreased independence with ADLs   [x] Increased risk for falls   [] Other:    If pt is unable to be seen after this session, please let this note serve as discharge summary.  Please see case management note for discharge disposition.  Thank you.      Patient Diagnosis(es): The primary encounter diagnosis was COPD exacerbation (HCC). Diagnoses of Dyspnea and respiratory abnormalities, Hypoxia, Acute on chronic hypoxic respiratory failure (HCC), and Pneumonia of right lower lobe due to infectious organism were also pertinent to this visit.    Assessment  Assessment: Pt is awake and agreeable to therapy session on second attempt. Pt seen as a co-treatment with OT this date due to anticipated level of skilled assistance required to safely mobilize and progress patient. Pt is limited by decreased activity tolerance and balance deficits. She requires contact guard assistance with ambulation, but demos one loss of balance requiring minimal assist to correct. Following 35' ambulation, pt became diaphoretic;

## 2024-08-27 ENCOUNTER — TELEPHONE (OUTPATIENT)
Dept: PULMONOLOGY | Age: 64
End: 2024-08-27

## 2024-08-27 LAB
BACTERIA BLD CULT ORG #2: NORMAL
BACTERIA BLD CULT: NORMAL

## 2024-08-27 PROCEDURE — 6370000000 HC RX 637 (ALT 250 FOR IP)

## 2024-08-27 PROCEDURE — 6370000000 HC RX 637 (ALT 250 FOR IP): Performed by: NURSE PRACTITIONER

## 2024-08-27 PROCEDURE — 2580000003 HC RX 258: Performed by: INTERNAL MEDICINE

## 2024-08-27 PROCEDURE — 94669 MECHANICAL CHEST WALL OSCILL: CPT

## 2024-08-27 PROCEDURE — 6360000002 HC RX W HCPCS: Performed by: INTERNAL MEDICINE

## 2024-08-27 PROCEDURE — 6360000002 HC RX W HCPCS: Performed by: STUDENT IN AN ORGANIZED HEALTH CARE EDUCATION/TRAINING PROGRAM

## 2024-08-27 PROCEDURE — 2700000000 HC OXYGEN THERAPY PER DAY

## 2024-08-27 PROCEDURE — 94761 N-INVAS EAR/PLS OXIMETRY MLT: CPT

## 2024-08-27 PROCEDURE — 94640 AIRWAY INHALATION TREATMENT: CPT

## 2024-08-27 PROCEDURE — 99232 SBSQ HOSP IP/OBS MODERATE 35: CPT | Performed by: INTERNAL MEDICINE

## 2024-08-27 PROCEDURE — 2060000000 HC ICU INTERMEDIATE R&B

## 2024-08-27 PROCEDURE — 6370000000 HC RX 637 (ALT 250 FOR IP): Performed by: STUDENT IN AN ORGANIZED HEALTH CARE EDUCATION/TRAINING PROGRAM

## 2024-08-27 PROCEDURE — 2580000003 HC RX 258: Performed by: STUDENT IN AN ORGANIZED HEALTH CARE EDUCATION/TRAINING PROGRAM

## 2024-08-27 RX ORDER — METOCLOPRAMIDE 10 MG/1
10 TABLET ORAL 3 TIMES DAILY PRN
Status: DISCONTINUED | OUTPATIENT
Start: 2024-08-27 | End: 2024-08-29 | Stop reason: HOSPADM

## 2024-08-27 RX ADMIN — SODIUM CHLORIDE, PRESERVATIVE FREE 10 ML: 5 INJECTION INTRAVENOUS at 12:17

## 2024-08-27 RX ADMIN — ONDANSETRON 4 MG: 2 INJECTION INTRAMUSCULAR; INTRAVENOUS at 18:19

## 2024-08-27 RX ADMIN — Medication 2 PUFF: at 19:52

## 2024-08-27 RX ADMIN — SODIUM CHLORIDE, PRESERVATIVE FREE 10 ML: 5 INJECTION INTRAVENOUS at 20:55

## 2024-08-27 RX ADMIN — ATORVASTATIN CALCIUM 80 MG: 80 TABLET, FILM COATED ORAL at 20:55

## 2024-08-27 RX ADMIN — METOCLOPRAMIDE 10 MG: 10 TABLET ORAL at 13:16

## 2024-08-27 RX ADMIN — DIAZEPAM 2 MG: 2 TABLET ORAL at 20:54

## 2024-08-27 RX ADMIN — CEFEPIME 2000 MG: 2 INJECTION, POWDER, FOR SOLUTION INTRAVENOUS at 07:17

## 2024-08-27 RX ADMIN — ENOXAPARIN SODIUM 40 MG: 100 INJECTION SUBCUTANEOUS at 18:19

## 2024-08-27 RX ADMIN — GUAIFENESIN 600 MG: 600 TABLET ORAL at 20:54

## 2024-08-27 RX ADMIN — ROFLUMILAST 250 MCG: 500 TABLET ORAL at 10:30

## 2024-08-27 RX ADMIN — PREDNISONE 40 MG: 20 TABLET ORAL at 10:30

## 2024-08-27 RX ADMIN — METOCLOPRAMIDE 10 MG: 10 TABLET ORAL at 20:54

## 2024-08-27 RX ADMIN — DIAZEPAM 2 MG: 2 TABLET ORAL at 10:33

## 2024-08-27 RX ADMIN — BUSPIRONE HYDROCHLORIDE 10 MG: 5 TABLET ORAL at 10:30

## 2024-08-27 RX ADMIN — BUSPIRONE HYDROCHLORIDE 10 MG: 5 TABLET ORAL at 20:55

## 2024-08-27 RX ADMIN — Medication 3 MG: at 22:19

## 2024-08-27 RX ADMIN — ASPIRIN 81 MG 81 MG: 81 TABLET ORAL at 10:30

## 2024-08-27 RX ADMIN — BUSPIRONE HYDROCHLORIDE 10 MG: 5 TABLET ORAL at 15:30

## 2024-08-27 RX ADMIN — CEFEPIME 2000 MG: 2 INJECTION, POWDER, FOR SOLUTION INTRAVENOUS at 15:32

## 2024-08-27 RX ADMIN — GUAIFENESIN 600 MG: 600 TABLET ORAL at 10:29

## 2024-08-27 RX ADMIN — CEFEPIME 2000 MG: 2 INJECTION, POWDER, FOR SOLUTION INTRAVENOUS at 22:20

## 2024-08-27 RX ADMIN — PANTOPRAZOLE SODIUM 40 MG: 40 TABLET, DELAYED RELEASE ORAL at 10:30

## 2024-08-27 RX ADMIN — ONDANSETRON 4 MG: 2 INJECTION INTRAMUSCULAR; INTRAVENOUS at 08:25

## 2024-08-27 RX ADMIN — TRAMADOL HYDROCHLORIDE 50 MG: 50 TABLET ORAL at 10:32

## 2024-08-27 ASSESSMENT — PAIN SCALES - GENERAL
PAINLEVEL_OUTOF10: 6
PAINLEVEL_OUTOF10: 0
PAINLEVEL_OUTOF10: 4
PAINLEVEL_OUTOF10: 0

## 2024-08-27 ASSESSMENT — PAIN DESCRIPTION - LOCATION: LOCATION: BACK

## 2024-08-27 NOTE — TELEPHONE ENCOUNTER
----- Message from Dr. Sasha Sawyer MD sent at 8/27/2024  8:19 AM EDT -----  · Patient is to follow-up with Dr. Alexander in the pulmonary clinic within 4 weeks of discharge

## 2024-08-27 NOTE — PLAN OF CARE
Problem: Discharge Planning  Goal: Discharge to home or other facility with appropriate resources  Outcome: Progressing     Problem: Safety - Adult  Goal: Free from fall injury  Outcome: Progressing     Problem: Chronic Conditions and Co-morbidities  Goal: Patient's chronic conditions and co-morbidity symptoms are monitored and maintained or improved  Outcome: Progressing     Problem: Respiratory - Adult  Goal: Achieves optimal ventilation and oxygenation  Outcome: Progressing     Problem: Cardiovascular - Adult  Goal: Maintains optimal cardiac output and hemodynamic stability  Outcome: Progressing  Goal: Absence of cardiac dysrhythmias or at baseline  Outcome: Progressing     Problem: Skin/Tissue Integrity - Adult  Goal: Skin integrity remains intact  Outcome: Progressing

## 2024-08-27 NOTE — PROGRESS NOTES
Physical Therapy    Chart reviewed and attempted to see patient this afternoon for follow up therapy session. Pt reporting she would be willing to work with therapy, however, has been nauseous and vomiting this morning and does not \"feel up to it right now,\" currently laying in room with lights off in sidelying position. Will continue to follow and attempt as schedule and pt condition allow.     Deonte Pitts, PT, DPT

## 2024-08-27 NOTE — PROGRESS NOTES
Pt states that she only wants to wear Bipap if her \"oxygen drops\". Pt resting comfortably on 3LNC, O2 sat 94%. RN at bedside.

## 2024-08-27 NOTE — CARE COORDINATION
[facility] started pre-cert for skilled rehab yesterday 8/26. Status pending. Will notify team once auth for SNF received. Will notify team once auth received.

## 2024-08-27 NOTE — PLAN OF CARE
Problem: Discharge Planning  Goal: Discharge to home or other facility with appropriate resources  8/27/2024 1438 by Ke Sherman RN  Outcome: Progressing  Flowsheets (Taken 8/27/2024 0800)  Discharge to home or other facility with appropriate resources:   Identify barriers to discharge with patient and caregiver   Arrange for needed discharge resources and transportation as appropriate  8/27/2024 0415 by Negar Chaidez RN  Outcome: Progressing     Problem: Safety - Adult  Goal: Free from fall injury  8/27/2024 1438 by Ke Sherman RN  Outcome: Progressing  8/27/2024 0415 by Negar Chaidez RN  Outcome: Progressing     Problem: Chronic Conditions and Co-morbidities  Goal: Patient's chronic conditions and co-morbidity symptoms are monitored and maintained or improved  8/27/2024 1438 by Ke Sherman RN  Outcome: Progressing  Flowsheets (Taken 8/27/2024 0800)  Care Plan - Patient's Chronic Conditions and Co-Morbidity Symptoms are Monitored and Maintained or Improved:   Monitor and assess patient's chronic conditions and comorbid symptoms for stability, deterioration, or improvement   Collaborate with multidisciplinary team to address chronic and comorbid conditions and prevent exacerbation or deterioration  8/27/2024 0415 by Negar Chaidez RN  Outcome: Progressing     Problem: Respiratory - Adult  Goal: Achieves optimal ventilation and oxygenation  8/27/2024 1438 by Ke Sherman RN  Outcome: Progressing  Flowsheets (Taken 8/27/2024 0800)  Achieves optimal ventilation and oxygenation:   Assess for changes in respiratory status   Assess for changes in mentation and behavior   Position to facilitate oxygenation and minimize respiratory effort   Oxygen supplementation based on oxygen saturation or arterial blood gases  8/27/2024 0415 by Negar Chaidez RN  Outcome: Progressing     Problem: Cardiovascular - Adult  Goal: Maintains optimal cardiac output and hemodynamic

## 2024-08-27 NOTE — PROGRESS NOTES
Occupational Therapy  Attempted to see pt this afternoon for OT tx. Pt reports that she wants to work with therapy, but has been nauseous with some vomiting all morning, so declining OOB ax.     Thanks.     Yani Westbrook, OTR/L

## 2024-08-27 NOTE — PROGRESS NOTES
Hospital Medicine Progress Note      Date of Admission: 8/22/2024  Hospital Day: 6    Chief Admission Complaint:  shortness of breath    Subjective: intermittent nausea, NAD    Presenting Admission History: \"63-year-old female with a PMHx of moderate COPD with chronic hypoxemic hypercapnic respiratory failure (on 3L NC), HFpEF, non-obstructive CAD, GERD and anxiety who presented to the ED with worsening SOB with increased productive cough of green sputum for 1 day. Quit smoking 3 years ago. Compliant with inhalers. Denied any fevers, chills, CP, N/V, abdominal pain, C/D or urinary changes. Denied any alcohol use.  History obtained from: patient, son and ED provider\"    Assessment/Plan:      Current Principal Problem:  COPD exacerbation (HCC)    COPD exacerbation, resolved.  Recommend oxygen saturation 90-94%.  Encourage pulmonary toilet.  Continue spiriva, daliresp, symbicort, mucinex, solu-medrol.  Transitioned to prednisone 8/26    Pneumonia.  Sepsis was present on arrival: leukocytosis, tachycardia, tachypnea, elevated lactic,  elevated procal.  CXR on arrival with edema vs pneumonitis.  CT chest PE protocol: RLL pneumonia; negative for PE.  Suspect health-care acquired and was initially on vanc, cefepime, azithromycin.  Vanc was stopped 8/23 by pulm.  Pulmonary has been consulted, appreciate recommendations.      Acute kidney injury (resolved).  Likely due to poor intake 2/2 acute illness.  Baseline creat likely 0.5-0.6, on admission it was 0.9 and is currently 0.6.  UA bland.  Avoid hypotension, nephrotoxics as able.  Monitor     Hypokalemia/magnesemia.  Monitor and replace as needed.      Acute (resolved) on chronic hypercapnic/hypoxic respiratory failure.  Reports wearing 3L per NC at home, currently requiring 3L.  Documented desaturation to 84%, nahun required 6L per NC at one point.  Titrate as able for oxygen saturation 90-94%.  Encourage pulmonary toilet    Chronic diastolic heart failure.  Likely due to

## 2024-08-27 NOTE — PROGRESS NOTES
PULMONARY AND CRITICAL CARE INPATIENT NOTE        Joyce Uribe   : 1960  MRN: [id number]     Admitting Physician: Glenn Saini MD  Attending Physician: Je Mark MD  PCP: Ghada Marr APRN - CNP    Admission: 2024   Date of Service: 2024    Chief Complaint   Patient presents with    Shortness of Breath     Was found down in bathroom by neighbor. Wears 3L 02 at baseline. When EMS arrived, patient Sp02 at 60%. On nonrebreather upon arrival. Hx lung cancer and COPD.           ASSESSMENT & PLAN       63 y.o. pleasant  female patient with:    Assessment:  AECOPD, emphysema/severe COPD at baseline, recurrent admissions  Leukocytosis  Pulmonary infiltrates/Rt lower lobe pneumonia  Pulmonary venous congestion  Acute on chronic hypoxic and hypercapnic respiratory failure.  On 3 L at baseline.  Lung cancer s/p resection  HFpEF/CAD  HELEN  HTN  Multisubstance use history including marijuana, benzodiazepines  Tobacco abuse history  Noncompliance      Plan:              Follow-up on micro work-up  Taper prednisone at discharge over a week  Continue Symbicort, Spiriva with as needed nebs  Continue Roflumilast  Added azithromycin  and Friday for recurrent exacerbations.  QTc is okay.  Continue discharge  Diuresis as tolerated with electrolyte replacement  Bronchial hygiene measures  Aspiration precautions  Target blood sugar between 140 and 180 mg/dL  DVT ppx measures.  PT/OT when patient is able to participate  Continue to wean oxygen with target 90 to 94%.  Pulmonary rehab referral after discharge through the pulmonary clinic.  Patient is to follow-up with Dr. Alexander in the pulmonary clinic within 4 weeks of discharge.  Okay to discharge from pulmonary standpoint.      LOS: Hospital Day: 6     Code:Full Code        Subjective/Objective             Summary:   63-year-old female patient with PMH of HTN, COPD/emphysema, lung cancer history, HELEN, CAD, HFpEF, marijuana and  benzodiazepine use, multiple previous admissions in July/August 2023, January 2024, May 2024 and now August 2024 for shortness of breath, AECOPD.    Interval history/Encounter 8/27/2024   Patient remained afebrile and hemodynamically stable on 3 L of oxygen which is her baseline  I's and O's 230 negative  Patient remained on aspirin, atorvastatin, azithromycin, Symbicort, BuSpar, Maxipime, NexoBrid, Mucinex, pantoprazole, prednisone 40 mg p.o. daily, Roflumilast, tiotropium  No new positive micro results  No new chemistry or CBC today      Objective    Test Results:  Imaging:  I have reviewed radiology images personally.    CT chest August 23, 2024  No PE  Right lower lobe pneumonia  Basilar atelectasis  Extensive emphysema      PFTS:  NA      Cardiology:      Sleep:  NA      Physical Exam:  General appearance: In no apparent distress.  HEENT: Moist mucus membranes.  Cardiac: Normal S1 and S2.  Lungs: Poor air entry bilaterally with rhonchi and difficulty clear mucus  Abdomen: Soft.  Back & Extremities: Symmetric pulses with good perfusion.  Neurological: No focal deficit..       ________________________________________________________  Electronically signed by:  Sasha Sawyer MD,FACP    8/27/2024    7:59 AM.     [facility]  [address], [place] [address]   Phone (office): [phone]

## 2024-08-28 LAB
ANION GAP SERPL CALCULATED.3IONS-SCNC: 6 MMOL/L (ref 3–16)
BASOPHILS # BLD: 0.1 K/UL (ref 0–0.2)
BASOPHILS NFR BLD: 0.6 %
BUN SERPL-MCNC: 14 MG/DL (ref 7–20)
CALCIUM SERPL-MCNC: 8.9 MG/DL (ref 8.3–10.6)
CHLORIDE SERPL-SCNC: 103 MMOL/L (ref 99–110)
CO2 SERPL-SCNC: 28 MMOL/L (ref 21–32)
CREAT SERPL-MCNC: <0.5 MG/DL (ref 0.6–1.2)
DEPRECATED RDW RBC AUTO: 15.4 % (ref 12.4–15.4)
EOSINOPHIL # BLD: 0 K/UL (ref 0–0.6)
EOSINOPHIL NFR BLD: 0.3 %
GFR SERPLBLD CREATININE-BSD FMLA CKD-EPI: >90 ML/MIN/{1.73_M2}
GLUCOSE SERPL-MCNC: 90 MG/DL (ref 70–99)
HCT VFR BLD AUTO: 30.6 % (ref 36–48)
HGB BLD-MCNC: 9.7 G/DL (ref 12–16)
LYMPHOCYTES # BLD: 2.2 K/UL (ref 1–5.1)
LYMPHOCYTES NFR BLD: 21.1 %
MAGNESIUM SERPL-MCNC: 1.6 MG/DL (ref 1.8–2.4)
MCH RBC QN AUTO: 27.2 PG (ref 26–34)
MCHC RBC AUTO-ENTMCNC: 31.6 G/DL (ref 31–36)
MCV RBC AUTO: 86.3 FL (ref 80–100)
MONOCYTES # BLD: 0.6 K/UL (ref 0–1.3)
MONOCYTES NFR BLD: 5.4 %
NEUTROPHILS # BLD: 7.4 K/UL (ref 1.7–7.7)
NEUTROPHILS NFR BLD: 72.6 %
PLATELET # BLD AUTO: 199 K/UL (ref 135–450)
PMV BLD AUTO: 9.7 FL (ref 5–10.5)
POTASSIUM SERPL-SCNC: 3.5 MMOL/L (ref 3.5–5.1)
RBC # BLD AUTO: 3.55 M/UL (ref 4–5.2)
SODIUM SERPL-SCNC: 137 MMOL/L (ref 136–145)
WBC # BLD AUTO: 10.2 K/UL (ref 4–11)

## 2024-08-28 PROCEDURE — 6370000000 HC RX 637 (ALT 250 FOR IP): Performed by: STUDENT IN AN ORGANIZED HEALTH CARE EDUCATION/TRAINING PROGRAM

## 2024-08-28 PROCEDURE — 94669 MECHANICAL CHEST WALL OSCILL: CPT

## 2024-08-28 PROCEDURE — 2580000003 HC RX 258: Performed by: INTERNAL MEDICINE

## 2024-08-28 PROCEDURE — 6360000002 HC RX W HCPCS: Performed by: NURSE PRACTITIONER

## 2024-08-28 PROCEDURE — 2700000000 HC OXYGEN THERAPY PER DAY

## 2024-08-28 PROCEDURE — 83735 ASSAY OF MAGNESIUM: CPT

## 2024-08-28 PROCEDURE — 80048 BASIC METABOLIC PNL TOTAL CA: CPT

## 2024-08-28 PROCEDURE — 97110 THERAPEUTIC EXERCISES: CPT

## 2024-08-28 PROCEDURE — 6370000000 HC RX 637 (ALT 250 FOR IP): Performed by: NURSE PRACTITIONER

## 2024-08-28 PROCEDURE — 6360000002 HC RX W HCPCS: Performed by: INTERNAL MEDICINE

## 2024-08-28 PROCEDURE — 97530 THERAPEUTIC ACTIVITIES: CPT

## 2024-08-28 PROCEDURE — 6360000002 HC RX W HCPCS: Performed by: STUDENT IN AN ORGANIZED HEALTH CARE EDUCATION/TRAINING PROGRAM

## 2024-08-28 PROCEDURE — 94640 AIRWAY INHALATION TREATMENT: CPT

## 2024-08-28 PROCEDURE — 6370000000 HC RX 637 (ALT 250 FOR IP)

## 2024-08-28 PROCEDURE — 6370000000 HC RX 637 (ALT 250 FOR IP): Performed by: INTERNAL MEDICINE

## 2024-08-28 PROCEDURE — 85025 COMPLETE CBC W/AUTO DIFF WBC: CPT

## 2024-08-28 PROCEDURE — 94761 N-INVAS EAR/PLS OXIMETRY MLT: CPT

## 2024-08-28 PROCEDURE — 2060000000 HC ICU INTERMEDIATE R&B

## 2024-08-28 RX ORDER — MAGNESIUM SULFATE IN WATER 40 MG/ML
2000 INJECTION, SOLUTION INTRAVENOUS ONCE
Status: COMPLETED | OUTPATIENT
Start: 2024-08-28 | End: 2024-08-28

## 2024-08-28 RX ADMIN — Medication 2 PUFF: at 19:57

## 2024-08-28 RX ADMIN — ONDANSETRON 4 MG: 2 INJECTION INTRAMUSCULAR; INTRAVENOUS at 08:23

## 2024-08-28 RX ADMIN — ASPIRIN 81 MG 81 MG: 81 TABLET ORAL at 09:33

## 2024-08-28 RX ADMIN — CEFEPIME 2000 MG: 2 INJECTION, POWDER, FOR SOLUTION INTRAVENOUS at 13:52

## 2024-08-28 RX ADMIN — CEFEPIME 2000 MG: 2 INJECTION, POWDER, FOR SOLUTION INTRAVENOUS at 06:28

## 2024-08-28 RX ADMIN — AZITHROMYCIN 250 MG: 250 TABLET, FILM COATED ORAL at 13:50

## 2024-08-28 RX ADMIN — TRAMADOL HYDROCHLORIDE 50 MG: 50 TABLET ORAL at 21:13

## 2024-08-28 RX ADMIN — BUSPIRONE HYDROCHLORIDE 10 MG: 5 TABLET ORAL at 21:15

## 2024-08-28 RX ADMIN — BUSPIRONE HYDROCHLORIDE 10 MG: 5 TABLET ORAL at 09:32

## 2024-08-28 RX ADMIN — GUAIFENESIN 600 MG: 600 TABLET ORAL at 09:32

## 2024-08-28 RX ADMIN — CEFEPIME 2000 MG: 2 INJECTION, POWDER, FOR SOLUTION INTRAVENOUS at 21:09

## 2024-08-28 RX ADMIN — DIAZEPAM 2 MG: 2 TABLET ORAL at 21:15

## 2024-08-28 RX ADMIN — ATORVASTATIN CALCIUM 80 MG: 80 TABLET, FILM COATED ORAL at 21:13

## 2024-08-28 RX ADMIN — PREDNISONE 40 MG: 20 TABLET ORAL at 09:32

## 2024-08-28 RX ADMIN — MAGNESIUM SULFATE IN WATER 2000 MG: 40 INJECTION, SOLUTION INTRAVENOUS at 09:38

## 2024-08-28 RX ADMIN — GUAIFENESIN 600 MG: 600 TABLET ORAL at 21:13

## 2024-08-28 RX ADMIN — ROFLUMILAST 250 MCG: 500 TABLET ORAL at 09:33

## 2024-08-28 RX ADMIN — ONDANSETRON 4 MG: 2 INJECTION INTRAMUSCULAR; INTRAVENOUS at 18:03

## 2024-08-28 RX ADMIN — Medication 3 MG: at 21:23

## 2024-08-28 RX ADMIN — SODIUM CHLORIDE: 9 INJECTION, SOLUTION INTRAVENOUS at 21:19

## 2024-08-28 RX ADMIN — METOCLOPRAMIDE 10 MG: 10 TABLET ORAL at 21:13

## 2024-08-28 RX ADMIN — ENOXAPARIN SODIUM 40 MG: 100 INJECTION SUBCUTANEOUS at 17:17

## 2024-08-28 RX ADMIN — TRAMADOL HYDROCHLORIDE 50 MG: 50 TABLET ORAL at 10:23

## 2024-08-28 RX ADMIN — DIAZEPAM 2 MG: 2 TABLET ORAL at 10:23

## 2024-08-28 RX ADMIN — TIOTROPIUM BROMIDE INHALATION SPRAY 2 PUFF: 3.12 SPRAY, METERED RESPIRATORY (INHALATION) at 08:26

## 2024-08-28 RX ADMIN — BUSPIRONE HYDROCHLORIDE 10 MG: 5 TABLET ORAL at 13:51

## 2024-08-28 RX ADMIN — SODIUM CHLORIDE: 9 INJECTION, SOLUTION INTRAVENOUS at 21:09

## 2024-08-28 RX ADMIN — Medication 2 PUFF: at 08:26

## 2024-08-28 RX ADMIN — METOCLOPRAMIDE 10 MG: 10 TABLET ORAL at 09:32

## 2024-08-28 RX ADMIN — PANTOPRAZOLE SODIUM 40 MG: 40 TABLET, DELAYED RELEASE ORAL at 09:32

## 2024-08-28 RX ADMIN — SODIUM CHLORIDE, PRESERVATIVE FREE 10 ML: 5 INJECTION INTRAVENOUS at 21:15

## 2024-08-28 ASSESSMENT — PAIN DESCRIPTION - LOCATION
LOCATION: SHOULDER;OTHER (COMMENT)
LOCATION: GENERALIZED
LOCATION: BACK

## 2024-08-28 ASSESSMENT — PAIN SCALES - GENERAL
PAINLEVEL_OUTOF10: 7
PAINLEVEL_OUTOF10: 0
PAINLEVEL_OUTOF10: 0

## 2024-08-28 ASSESSMENT — PAIN - FUNCTIONAL ASSESSMENT: PAIN_FUNCTIONAL_ASSESSMENT: ACTIVITIES ARE NOT PREVENTED

## 2024-08-28 NOTE — PROGRESS NOTES
Occupational Therapy  Facility/Department: [facility] C4 PCU  Daily Treatment Note  NAME: Joyce Uribe  : 1960  MRN: [id number]    Date of Service: 2024    Discharge Recommendations:  Subacute/Skilled Nursing Facility  OT Equipment Recommendations  Equipment Needed: No  Therapy discharge recommendations are subject to collaboration from the patient’s interdisciplinary healthcare team, including MD and case management recommendations.     Barriers to Home Discharge:   [x] Steps to access home entry or bed/bath:   [x] Unable to transfer, ambulate, or propel wheelchair household distances without assist   [] Limited available assist at home upon discharge    [x] Patient or family requests d/c to post-acute facility    [x] Poor cognition/safety awareness for d/c to home alone    [] Unable to maintain ordered weight bearing status    [] Patient with salient signs of long-standing immobility   [x] Decreased independence with ADLs   [x] Increased risk for falls   [] Other:     If pt is unable to be seen after this session, please let this note serve as discharge summary.  Please see case management note for discharge disposition.  Thank you.       Patient Diagnosis(es): The primary encounter diagnosis was COPD exacerbation (HCC). Diagnoses of Dyspnea and respiratory abnormalities, Hypoxia, Acute on chronic hypoxic respiratory failure (HCC), and Pneumonia of right lower lobe due to infectious organism were also pertinent to this visit.     Assessment   Assessment: Pt w/ fair tolerance to session remaining limited by endurance, activity tolerance, fatigue and dizziness w/ positional changes though BP stable t/o session. Pt completing bed mobility SBA, functional transfers / mobility CGA, LB dress SBA and toileting total A. Pt reporting inc dizziness w/ all positional changes and requesting rest breaks w/ each dizziness onset though BP remained stable t/o entire session (95/57 supine, 134/74 + 121/77 seated EOB,

## 2024-08-28 NOTE — PROGRESS NOTES
08/28/24 1056   Encounter Summary   Encounter Overview/Reason Spiritual/Emotional Needs   Service Provided For Patient   Referral/Consult From Rounding   Support System Family members;Children   Last Encounter  08/28/24  ( visited; provided pastoral support and prayer)   Complexity of Encounter Low   Assessment/Intervention/Outcome   Assessment Compromised coping   Intervention Sustaining Presence/Ministry of presence;Prayer (assurance of)/[place];Active listening

## 2024-08-28 NOTE — CARE COORDINATION
Patient has pre-cert pending to [facility]. Started pre-cert on 8/26. Will notify team when approved. Patient already stating she will not leave today and not to discuss discharge because it makes her anxious.

## 2024-08-28 NOTE — PLAN OF CARE
Problem: Discharge Planning  Goal: Discharge to home or other facility with appropriate resources  8/28/2024 0116 by Negar Chaidez RN  Outcome: Progressing  8/27/2024 1438 by Ke Sherman RN  Outcome: Progressing  Flowsheets (Taken 8/27/2024 0800)  Discharge to home or other facility with appropriate resources:   Identify barriers to discharge with patient and caregiver   Arrange for needed discharge resources and transportation as appropriate     Problem: Safety - Adult  Goal: Free from fall injury  8/28/2024 0116 by Negar Chaidez RN  Outcome: Progressing  8/27/2024 1438 by Ke Sherman RN  Outcome: Progressing     Problem: Chronic Conditions and Co-morbidities  Goal: Patient's chronic conditions and co-morbidity symptoms are monitored and maintained or improved  8/28/2024 0116 by Negar Chaidez RN  Outcome: Progressing  8/27/2024 1438 by Ke Sherman RN  Outcome: Progressing  Flowsheets (Taken 8/27/2024 0800)  Care Plan - Patient's Chronic Conditions and Co-Morbidity Symptoms are Monitored and Maintained or Improved:   Monitor and assess patient's chronic conditions and comorbid symptoms for stability, deterioration, or improvement   Collaborate with multidisciplinary team to address chronic and comorbid conditions and prevent exacerbation or deterioration     Problem: Respiratory - Adult  Goal: Achieves optimal ventilation and oxygenation  8/28/2024 0116 by Negar Chaidez RN  Outcome: Progressing  8/27/2024 1438 by Ke Sherman RN  Outcome: Progressing  Flowsheets (Taken 8/27/2024 0800)  Achieves optimal ventilation and oxygenation:   Assess for changes in respiratory status   Assess for changes in mentation and behavior   Position to facilitate oxygenation and minimize respiratory effort   Oxygen supplementation based on oxygen saturation or arterial blood gases     Problem: Cardiovascular - Adult  Goal: Maintains optimal cardiac output and hemodynamic

## 2024-08-28 NOTE — PLAN OF CARE
Problem: Discharge Planning  Goal: Discharge to home or other facility with appropriate resources  8/28/2024 1237 by Christy Kaur RN  Outcome: Progressing  8/28/2024 0116 by Negar Chaidez RN  Outcome: Progressing     Problem: Safety - Adult  Goal: Free from fall injury  8/28/2024 1237 by Christy Kaur RN  Outcome: Progressing  8/28/2024 0116 by Negar Chaidez RN  Outcome: Progressing     Problem: Chronic Conditions and Co-morbidities  Goal: Patient's chronic conditions and co-morbidity symptoms are monitored and maintained or improved  8/28/2024 1237 by Christy Kaur RN  Outcome: Progressing  8/28/2024 0116 by Negar Chaidez RN  Outcome: Progressing     Problem: Respiratory - Adult  Goal: Achieves optimal ventilation and oxygenation  8/28/2024 1237 by Christy Kaur RN  Outcome: Progressing  8/28/2024 0116 by Negar Chaidez RN  Outcome: Progressing     Problem: Cardiovascular - Adult  Goal: Maintains optimal cardiac output and hemodynamic stability  8/28/2024 1237 by Christy Kaur RN  Outcome: Progressing  8/28/2024 0116 by Negar Chaidez RN  Outcome: Progressing  Goal: Absence of cardiac dysrhythmias or at baseline  8/28/2024 1237 by Christy Kaur RN  Outcome: Progressing  8/28/2024 0116 by Negar Chaidez RN  Outcome: Progressing     Problem: Skin/Tissue Integrity - Adult  Goal: Skin integrity remains intact  8/28/2024 1237 by Christy Kaur RN  Outcome: Progressing  8/28/2024 0116 by Negar Chaidez RN  Outcome: Progressing  Flowsheets (Taken 8/27/2024 1439 by Ke Sherman RN)  Skin Integrity Remains Intact:   Monitor for areas of redness and/or skin breakdown   Assess vascular access sites hourly  Goal: Incisions, wounds, or drain sites healing without S/S of infection  Outcome: Progressing  Goal: Oral mucous membranes remain intact  Outcome: Progressing     Problem: Musculoskeletal - Adult  Goal: Return mobility to safest level of  function  Outcome: Progressing  Goal: Maintain proper alignment of affected body part  Outcome: Progressing  Goal: Return ADL status to a safe level of function  Outcome: Progressing     Problem: Genitourinary - Adult  Goal: Absence of urinary retention  Outcome: Progressing     Problem: Gastrointestinal - Adult  Goal: Minimal or absence of nausea and vomiting  Outcome: Progressing  Goal: Maintains or returns to baseline bowel function  Outcome: Progressing  Goal: Maintains adequate nutritional intake  Outcome: Progressing     Problem: Pain  Goal: Verbalizes/displays adequate comfort level or baseline comfort level  Outcome: Progressing     Problem: Skin/Tissue Integrity  Goal: Absence of new skin breakdown  Description: 1.  Monitor for areas of redness and/or skin breakdown  2.  Assess vascular access sites hourly  3.  Every 4-6 hours minimum:  Change oxygen saturation probe site  4.  Every 4-6 hours:  If on nasal continuous positive airway pressure, respiratory therapy assess nares and determine need for appliance change or resting period.  Outcome: Progressing

## 2024-08-28 NOTE — PROGRESS NOTES
Physical Therapy  Pt refused PT participation.  Pt educated on purpose of PT.  Pt continued her refusal at this time.  Will continue to follow as able.  Booker Jin PTA

## 2024-08-28 NOTE — PROGRESS NOTES
Hospital Medicine Progress Note      Date of Admission: 8/22/2024  Hospital Day: 7    Chief Admission Complaint:  shortness of breath    Subjective: intermittent nausea, NAD    Presenting Admission History: \"63-year-old female with a PMHx of moderate COPD with chronic hypoxemic hypercapnic respiratory failure (on 3L NC), HFpEF, non-obstructive CAD, GERD and anxiety who presented to the ED with worsening SOB with increased productive cough of green sputum for 1 day. Quit smoking 3 years ago. Compliant with inhalers. Denied any fevers, chills, CP, N/V, abdominal pain, C/D or urinary changes. Denied any alcohol use.  History obtained from: patient, son and ED provider\"    Assessment/Plan:      Current Principal Problem:  COPD exacerbation (HCC)    COPD exacerbation, resolved.  Recommend oxygen saturation 90-94%.  Encourage pulmonary toilet.  Continue spiriva, daliresp, symbicort, mucinex, solu-medrol.  Transitioned to prednisone 8/26    Pneumonia.  Sepsis was present on arrival: leukocytosis, tachycardia, tachypnea, elevated lactic,  elevated procal.  CXR on arrival with edema vs pneumonitis.  CT chest PE protocol: RLL pneumonia; negative for PE.  Suspect health-care acquired and was initially on vanc, cefepime, azithromycin.  Vanc was stopped 8/23 by pulm.  Pulmonary has been consulted, appreciate recommendations.      Acute kidney injury (resolved).  Likely due to poor intake 2/2 acute illness.  Baseline creat likely 0.5-0.6, on admission it was 0.9 and is currently 0.6.  UA bland.  Avoid hypotension, nephrotoxics as able.  Monitor     Hypokalemia/magnesemia.  Monitor and replace as needed.      Acute (resolved) on chronic hypercapnic/hypoxic respiratory failure.  Reports wearing 3L per NC at home, currently requiring 3L.  Documented desaturation to 84%, and required 6L per NC at one point.  Titrate as able for oxygen saturation 90-94%.  Encourage pulmonary toilet    Chronic diastolic heart failure.  Likely due to  TEAM      ------------------------------------------------------------------------------------------------------------------------------------------------------------------------    MDM  (this section is simply meant as an aid to accurate billing and does not necessarily reflect ongoing patient care which is documented elsewhere in the medical record)    [x] High (any 2)    A. Problems (any 1)  [x] Acute/Chronic Illness/injury posing threat to life or bodily function: a/c hypoxic resp failure, pna, copd,, hypoK/mag  [] Severe exacerbation of chronic illness:    ---------------------------------------------------------------------  B. Risk of Treatment (any 1)   [] Drugs/treatments that require intensive monitoring for toxicity include:    [] IV ABX requiring serial renal monitoring for nephrotoxicity:     [] Post-Cath/Contrast study requiring serial renal monitoring for Contrast Induced Nephropathy  [] IV Narcotic analgesia for ADR   [] Aggressive IV diuresis requiring serial monitoring for renal impairment and electrolyte derangements  [] Hypertonic Saline requiring serial renal monitoring for appropriate electrolyte correction rate   [] Critical electrolyte abnormalities requiring IV replacement and close serial monitoring  [] Insulin - monitoring FSBS for Hypoglycemic ADR  [] Anticoagulation requiring close serial monitoring and dose adjustments at high risk of ADR   [] HD requiring close serial monitoring of electrolytes and fluid status  [] Other -  [] Change in code status:    [] Decision to escalate care:    [] Major surgery/procedure with associated risk factors:    ----------------------------------------------------------------------  C. Data (any 2)  [] Discussed management of the case with consultants as follows:    [x] Discussed the discharge plan in detail with case mgt including timing/barriers to discharge, need for support services and placement decision   [] Imaging personally reviewed and

## 2024-08-29 VITALS
RESPIRATION RATE: 19 BRPM | SYSTOLIC BLOOD PRESSURE: 110 MMHG | OXYGEN SATURATION: 95 % | BODY MASS INDEX: 21.02 KG/M2 | WEIGHT: 118.6 LBS | HEIGHT: 63 IN | HEART RATE: 80 BPM | TEMPERATURE: 98.1 F | DIASTOLIC BLOOD PRESSURE: 67 MMHG

## 2024-08-29 LAB
ANION GAP SERPL CALCULATED.3IONS-SCNC: 4 MMOL/L (ref 3–16)
BUN SERPL-MCNC: 14 MG/DL (ref 7–20)
CALCIUM SERPL-MCNC: 8.6 MG/DL (ref 8.3–10.6)
CHLORIDE SERPL-SCNC: 108 MMOL/L (ref 99–110)
CO2 SERPL-SCNC: 29 MMOL/L (ref 21–32)
CREAT SERPL-MCNC: <0.5 MG/DL (ref 0.6–1.2)
DEPRECATED RDW RBC AUTO: 15.9 % (ref 12.4–15.4)
GFR SERPLBLD CREATININE-BSD FMLA CKD-EPI: >90 ML/MIN/{1.73_M2}
GLUCOSE SERPL-MCNC: 93 MG/DL (ref 70–99)
HCT VFR BLD AUTO: 29.8 % (ref 36–48)
HGB BLD-MCNC: 9.2 G/DL (ref 12–16)
MAGNESIUM SERPL-MCNC: 1.9 MG/DL (ref 1.8–2.4)
MCH RBC QN AUTO: 26.8 PG (ref 26–34)
MCHC RBC AUTO-ENTMCNC: 31 G/DL (ref 31–36)
MCV RBC AUTO: 86.6 FL (ref 80–100)
PLATELET # BLD AUTO: 202 K/UL (ref 135–450)
PMV BLD AUTO: 10 FL (ref 5–10.5)
POTASSIUM SERPL-SCNC: 4.3 MMOL/L (ref 3.5–5.1)
RBC # BLD AUTO: 3.45 M/UL (ref 4–5.2)
SODIUM SERPL-SCNC: 141 MMOL/L (ref 136–145)
WBC # BLD AUTO: 11.5 K/UL (ref 4–11)

## 2024-08-29 PROCEDURE — 85027 COMPLETE CBC AUTOMATED: CPT

## 2024-08-29 PROCEDURE — 2580000003 HC RX 258: Performed by: INTERNAL MEDICINE

## 2024-08-29 PROCEDURE — 94669 MECHANICAL CHEST WALL OSCILL: CPT

## 2024-08-29 PROCEDURE — 6370000000 HC RX 637 (ALT 250 FOR IP): Performed by: STUDENT IN AN ORGANIZED HEALTH CARE EDUCATION/TRAINING PROGRAM

## 2024-08-29 PROCEDURE — 94761 N-INVAS EAR/PLS OXIMETRY MLT: CPT

## 2024-08-29 PROCEDURE — 6370000000 HC RX 637 (ALT 250 FOR IP): Performed by: NURSE PRACTITIONER

## 2024-08-29 PROCEDURE — 94640 AIRWAY INHALATION TREATMENT: CPT

## 2024-08-29 PROCEDURE — 83735 ASSAY OF MAGNESIUM: CPT

## 2024-08-29 PROCEDURE — 6370000000 HC RX 637 (ALT 250 FOR IP)

## 2024-08-29 PROCEDURE — 6360000002 HC RX W HCPCS: Performed by: INTERNAL MEDICINE

## 2024-08-29 PROCEDURE — 2700000000 HC OXYGEN THERAPY PER DAY

## 2024-08-29 PROCEDURE — 80048 BASIC METABOLIC PNL TOTAL CA: CPT

## 2024-08-29 RX ORDER — PREDNISONE 10 MG/1
TABLET ORAL
Qty: 18 TABLET | Refills: 0 | Status: SHIPPED | OUTPATIENT
Start: 2024-08-29 | End: 2024-09-07

## 2024-08-29 RX ORDER — DIAZEPAM 2 MG
2 TABLET ORAL 2 TIMES DAILY PRN
Qty: 4 TABLET | Refills: 0 | Status: SHIPPED | OUTPATIENT
Start: 2024-08-29 | End: 2024-08-31

## 2024-08-29 RX ORDER — AZITHROMYCIN 250 MG/1
250 TABLET, FILM COATED ORAL
DISCHARGE
Start: 2024-08-30

## 2024-08-29 RX ADMIN — METOCLOPRAMIDE 10 MG: 10 TABLET ORAL at 08:25

## 2024-08-29 RX ADMIN — PREDNISONE 40 MG: 20 TABLET ORAL at 08:48

## 2024-08-29 RX ADMIN — ROFLUMILAST 250 MCG: 500 TABLET ORAL at 08:47

## 2024-08-29 RX ADMIN — ASPIRIN 81 MG 81 MG: 81 TABLET ORAL at 08:48

## 2024-08-29 RX ADMIN — BUSPIRONE HYDROCHLORIDE 10 MG: 5 TABLET ORAL at 14:10

## 2024-08-29 RX ADMIN — CEFEPIME 2000 MG: 2 INJECTION, POWDER, FOR SOLUTION INTRAVENOUS at 05:26

## 2024-08-29 RX ADMIN — SODIUM CHLORIDE, PRESERVATIVE FREE 10 ML: 5 INJECTION INTRAVENOUS at 09:56

## 2024-08-29 RX ADMIN — PANTOPRAZOLE SODIUM 40 MG: 40 TABLET, DELAYED RELEASE ORAL at 08:48

## 2024-08-29 RX ADMIN — ONDANSETRON 4 MG: 4 TABLET, ORALLY DISINTEGRATING ORAL at 11:33

## 2024-08-29 RX ADMIN — DIAZEPAM 2 MG: 2 TABLET ORAL at 14:11

## 2024-08-29 RX ADMIN — BUSPIRONE HYDROCHLORIDE 10 MG: 5 TABLET ORAL at 08:49

## 2024-08-29 RX ADMIN — TIOTROPIUM BROMIDE INHALATION SPRAY 2 PUFF: 3.12 SPRAY, METERED RESPIRATORY (INHALATION) at 09:10

## 2024-08-29 RX ADMIN — TRAMADOL HYDROCHLORIDE 50 MG: 50 TABLET ORAL at 14:11

## 2024-08-29 RX ADMIN — GUAIFENESIN 600 MG: 600 TABLET ORAL at 08:48

## 2024-08-29 RX ADMIN — Medication 2 PUFF: at 09:10

## 2024-08-29 ASSESSMENT — PAIN SCALES - GENERAL: PAINLEVEL_OUTOF10: 4

## 2024-08-29 NOTE — DISCHARGE SUMMARY
Hospital Medicine Discharge Summary    Patient: Joyce Uribe   : 1960     Admit Date: 2024   Discharge Date:   ***  Disposition:  []Home   []HHC  []SNF  []ECF  []Acute Rehab  []LTAC  []Hospice  Code status:  []Full  []DNR/CCA  []Limited (DNR/CCA with Do Not Intubate)  []DNRCC  Condition at Discharge: Stable  Primary Care Provider: Ghada Marr APRN - CNP    Admitting Provider: Glenn Saini MD  Discharge Provider: MACEY Mccoy CNP     Discharge Diagnoses:      Active Hospital Problems    Diagnosis     Pulmonary venous congestion [R09.89]     Pulmonary infiltrates [R91.8]     Acute on chronic respiratory failure with hypoxia and hypercapnia (HCC) [J96.21, J96.22]     Grade I diastolic dysfunction [I51.89]     Former smoker [Z87.891]     COPD exacerbation (HCC) [J44.1]     H/O: lung cancer [Z85.118]     Leukocytosis [D72.829]     COPD, severe (HCC) [J44.9]        Presenting Admission History:      ***     Assessment/Plan:      ***    Physical Exam Performed:      /74   Pulse 74   Temp 97.7 °F (36.5 °C) (Oral)   Resp 18   Ht 1.6 m (5' 3\")   Wt 53.8 kg (118 lb 9.6 oz)   SpO2 94%   BMI 21.01 kg/m²     ***  General appearance:  No apparent distress, appears stated age and cooperative.  Respiratory:  Normal respiratory effort.   Cardiovascular:  Regular rate and rhythm.  Abdomen:  Soft, non-tender, non-distended.  Musculoskeletal:  No edema  Neurologic:  Non-focal  Psychiatric:  Alert and oriented    Patient Discharge Instructions:      Follow up:    1.  Primary Care Provider Ghada Marr APRN - CNP in the next 1-2 weeks.  ***    The patient was seen and examined on day of discharge and this discharge summary is in conjunction with any daily progress note from day of discharge. Time spent on discharge: 3*** minutes in the examination, evaluation, counseling and review of medications and discharge

## 2024-08-29 NOTE — CARE COORDINATION
CASE MANAGEMENT DISCHARGE SUMMARY      [facility]charge to: [facility]    Precertification completed: Yes  Hospital Exemption Notification (HENS) completed: Yes    IMM given: (date) 8/29/2024    New Durable Medical Equipment ordered/agency: Defer to SNF    Transportation:    Family/car:NA   Medical Transport explained to pt/family. Pt/family voice no agency preference.  None  Agency used:[facility]   time:3pm   Ambulance form completed: Yes    Confirmed discharge plan with:MD/RN/[facility]     Patient: yes     Family:  yes   Name:Paco Villarreal  Contact number:[phone]     Facility/Agency, name:  [facility] pulled from [facility]   Phone number for report to facility: [phone]     RN, name: Roxana

## 2024-08-29 NOTE — DISCHARGE INSTR - COC
Continuity of Care Form    Patient Name: Joyce Uribe   :  1960  MRN:  [id number]    Admit date:  2024  Discharge date:  24      Code Status Order: Full Code   Advance Directives:   Advance Care Flowsheet Documentation             Admitting Physician:  Glenn Saini MD  PCP: Ghada Marr APRN - CNP    Discharging Nurse: Roxana Watson RN  Discharging Hospital Unit/Room#: 0428/0428-01  Discharging Unit Phone Number: [phone]    Emergency Contact:   Extended Emergency Contact Information  Primary Emergency Contact: Ty Villarreal   [facility]  Home Phone: [phone]  Relation: Child  Secondary Emergency Contact: Kate Virea  Address: [address] [place] [address]  Home Phone: [phone]  Mobile Phone: [phone]  Relation: Child    Past Surgical History:  Past Surgical History:   Procedure Laterality Date    CHOLECYSTECTOMY      COLONOSCOPY      COLONOSCOPY      tubular adenoma    COLONOSCOPY  08/15/2017    ENDOSCOPY, COLON, DIAGNOSTIC      HERNIA REPAIR      HYSTERECTOMY (CERVIX STATUS UNKNOWN)  1985    With Bladder Mesh    INCONTINENCE SURGERY Left      in [place]    LUNG CANCER SURGERY      cancerous nodule removed left side    OVARY REMOVAL      bilat    TONSILLECTOMY      UPPER GASTROINTESTINAL ENDOSCOPY      gastritis    UPPER GASTROINTESTINAL ENDOSCOPY  10/03/2017    bx distal esophagus        Immunization History:   Immunization History   Administered Date(s) Administered    Influenza Vaccine, unspecified formulation 2012, 2012, 10/06/2014    Influenza Virus Vaccine 2013    Influenza, FLUCELVAX, (age 6 mo+), MDCK, Quadv PF, 0.5mL 2017    Pneumococcal Conjugate 7-valent (Prevnar7) 10/08/2012    Pneumococcal, PCV-13, PREVNAR 13, (age 6w+), IM, 0.5mL 2018    Pneumococcal, PPSV23, PNEUMOVAX 23, (age 2y+), SC/IM, 0.5mL 10/08/2012       Active Problems:  Patient  Occupational Therapy  Weight Bearing Status/Restrictions: No weight bearing restrictions  Other Medical Equipment (for information only, NOT a DME order):  walker  Other Treatments: nne      Patient's personal belongings (please select all that are sent with patient):  {[facility] DME Belongings:[id number]}    RN SIGNATURE:  Electronically signed by Roxana Watson RN on 8/29/24 at 10:03 AM EDT    CASE MANAGEMENT/SOCIAL WORK SECTION    Inpatient Status Date: 8/23/2024    Readmission Risk Assessment Score:  Readmission Risk              Risk of Unplanned Readmission:  48           Discharging to Facility/ Agency   Name: [facility]  Phone:[phone]          / signature: Electronically signed by Chiara Pena RN on 8/29/24 at 11:50 AM EDT    PHYSICIAN SECTION    Prognosis: Fair    Condition at Discharge: Stable    Rehab Potential (if transferring to Rehab): Fair    Recommended Labs or Other Treatments After Discharge: PT/OT/SN, Oxygen at 3lpm n/c, follow up with Pulmonology and PCP    Physician Certification: I certify the above information and transfer of Joyce Uribe  is necessary for the continuing treatment of the diagnosis listed and that she requires Skilled Nursing Facility for less 30 days.     Update Admission H&P: No change in H&P    PHYSICIAN SIGNATURE:  Electronically signed by MACEY Mccoy CNP on 8/29/24 at 9:51 AM EDT

## 2024-08-29 NOTE — PLAN OF CARE
Problem: Discharge Planning  Goal: Discharge to home or other facility with appropriate resources  Outcome: Progressing     Problem: Safety - Adult  Goal: Free from fall injury  Outcome: Progressing     Problem: Chronic Conditions and Co-morbidities  Goal: Patient's chronic conditions and co-morbidity symptoms are monitored and maintained or improved  Outcome: Progressing     Problem: Respiratory - Adult  Goal: Achieves optimal ventilation and oxygenation  Outcome: Progressing     Problem: Cardiovascular - Adult  Goal: Maintains optimal cardiac output and hemodynamic stability  Outcome: Progressing

## 2024-08-29 NOTE — CARE COORDINATION
Need updated PT notes for pre-cert. Not seen by PT since 8/26. Call placed to [facility] for update on status/left message for [facility]. She reached out to [facility] yesterday to expedite determination and has not heard back but will call again this AM. Requested pending auth number.     Addendum: [facility] called back and auth is approved. Patient okay for SNF today.

## 2024-09-13 ENCOUNTER — TELEPHONE (OUTPATIENT)
Dept: PULMONOLOGY | Age: 64
End: 2024-09-13

## 2024-10-03 NOTE — PROGRESS NOTES
PT IS HERE TODAY FOR A FLU SHOT. PT TOLERATED VACCINE WELL   RT at bedside for 0800 inhaler administration at this time. Patient's SpO2 on 1 LPM found to be 94%. Patient taken off supplemental oxygen and allowed to breathe room air. SpO2 remained at 94%. Patient left on room air. RN notified.     Electronically signed by Eloisa Wolf RCP, RRT, RRT-ACCS on 6/8/2020 at 8:10 AM        06/08/20 0809   Oxygen Therapy/Pulse Ox   O2 Therapy Room air   O2 Device None (Room air)   SpO2 94 %

## 2025-03-07 ENCOUNTER — APPOINTMENT (OUTPATIENT)
Dept: GENERAL RADIOLOGY | Age: 65
DRG: 190 | End: 2025-03-07
Payer: COMMERCIAL

## 2025-03-07 ENCOUNTER — HOSPITAL ENCOUNTER (INPATIENT)
Age: 65
LOS: 9 days | Discharge: HOME | DRG: 190 | End: 2025-03-16
Attending: EMERGENCY MEDICINE | Admitting: HOSPITALIST
Payer: COMMERCIAL

## 2025-03-07 DIAGNOSIS — I49.1 PAC (PREMATURE ATRIAL CONTRACTION): ICD-10-CM

## 2025-03-07 DIAGNOSIS — R06.02 SHORTNESS OF BREATH: ICD-10-CM

## 2025-03-07 DIAGNOSIS — I49.3 PVC (PREMATURE VENTRICULAR CONTRACTION): ICD-10-CM

## 2025-03-07 DIAGNOSIS — I47.10 PSVT (PAROXYSMAL SUPRAVENTRICULAR TACHYCARDIA): ICD-10-CM

## 2025-03-07 DIAGNOSIS — R07.9 CHEST PAIN, UNSPECIFIED TYPE: ICD-10-CM

## 2025-03-07 DIAGNOSIS — R06.00 DYSPNEA, UNSPECIFIED TYPE: ICD-10-CM

## 2025-03-07 DIAGNOSIS — J44.1 COPD EXACERBATION (HCC): Primary | ICD-10-CM

## 2025-03-07 DIAGNOSIS — I50.32 CHRONIC DIASTOLIC CONGESTIVE HEART FAILURE (HCC): ICD-10-CM

## 2025-03-07 LAB
ALBUMIN SERPL-MCNC: 4.8 G/DL (ref 3.4–5)
ALBUMIN/GLOB SERPL: 1.2 {RATIO} (ref 1.1–2.2)
ALP SERPL-CCNC: 123 U/L (ref 40–129)
ALT SERPL-CCNC: 106 U/L (ref 10–40)
ANION GAP SERPL CALCULATED.3IONS-SCNC: 16 MMOL/L (ref 3–16)
AST SERPL-CCNC: 89 U/L (ref 15–37)
BASE EXCESS BLDV CALC-SCNC: 2 MMOL/L (ref -3–3)
BASOPHILS # BLD: 0 K/UL (ref 0–0.2)
BASOPHILS NFR BLD: 0.3 %
BILIRUB SERPL-MCNC: <0.2 MG/DL (ref 0–1)
BUN SERPL-MCNC: 28 MG/DL (ref 7–20)
CALCIUM SERPL-MCNC: 10.9 MG/DL (ref 8.3–10.6)
CHLORIDE SERPL-SCNC: 95 MMOL/L (ref 99–110)
CO2 BLDV-SCNC: 32 MMOL/L
CO2 SERPL-SCNC: 28 MMOL/L (ref 21–32)
COHGB MFR BLDV: 2.9 % (ref 0–1.5)
CREAT SERPL-MCNC: 0.9 MG/DL (ref 0.6–1.2)
D-DIMER QUANTITATIVE: 0.53 UG/ML FEU (ref 0–0.6)
DEPRECATED RDW RBC AUTO: 15 % (ref 12.4–15.4)
EKG ATRIAL RATE: 135 BPM
EKG DIAGNOSIS: NORMAL
EKG P AXIS: 84 DEGREES
EKG P-R INTERVAL: 112 MS
EKG Q-T INTERVAL: 306 MS
EKG QRS DURATION: 72 MS
EKG QTC CALCULATION (BAZETT): 459 MS
EKG R AXIS: 82 DEGREES
EKG T AXIS: 71 DEGREES
EKG VENTRICULAR RATE: 135 BPM
EOSINOPHIL # BLD: 0 K/UL (ref 0–0.6)
EOSINOPHIL NFR BLD: 0 %
FLUAV RNA RESP QL NAA+PROBE: NOT DETECTED
FLUBV RNA RESP QL NAA+PROBE: NOT DETECTED
GFR SERPLBLD CREATININE-BSD FMLA CKD-EPI: 71 ML/MIN/{1.73_M2}
GLUCOSE SERPL-MCNC: 168 MG/DL (ref 70–99)
HCO3 BLDV-SCNC: 30.1 MMOL/L (ref 23–29)
HCT VFR BLD AUTO: 45.1 % (ref 36–48)
HGB BLD-MCNC: 14.7 G/DL (ref 12–16)
LACTATE BLDV-SCNC: 1.7 MMOL/L (ref 0.4–2)
LYMPHOCYTES # BLD: 1.2 K/UL (ref 1–5.1)
LYMPHOCYTES NFR BLD: 11.6 %
MCH RBC QN AUTO: 29.8 PG (ref 26–34)
MCHC RBC AUTO-ENTMCNC: 32.6 G/DL (ref 31–36)
MCV RBC AUTO: 91.5 FL (ref 80–100)
METHGB MFR BLDV: 0.3 %
MONOCYTES # BLD: 0.2 K/UL (ref 0–1.3)
MONOCYTES NFR BLD: 2.1 %
NEUTROPHILS # BLD: 8.9 K/UL (ref 1.7–7.7)
NEUTROPHILS NFR BLD: 86 %
O2 THERAPY: ABNORMAL
PCO2 BLDV: 61.6 MMHG (ref 40–50)
PH BLDV: 7.31 [PH] (ref 7.35–7.45)
PLATELET # BLD AUTO: 350 K/UL (ref 135–450)
PMV BLD AUTO: 10.5 FL (ref 5–10.5)
PO2 BLDV: 47.1 MMHG (ref 25–40)
POTASSIUM SERPL-SCNC: 4.2 MMOL/L (ref 3.5–5.1)
PROT SERPL-MCNC: 8.9 G/DL (ref 6.4–8.2)
RBC # BLD AUTO: 4.92 M/UL (ref 4–5.2)
SAO2 % BLDV: 81 %
SARS-COV-2 RNA RESP QL NAA+PROBE: NOT DETECTED
SODIUM SERPL-SCNC: 139 MMOL/L (ref 136–145)
TROPONIN, HIGH SENSITIVITY: 13 NG/L (ref 0–14)
TROPONIN, HIGH SENSITIVITY: 17 NG/L (ref 0–14)
WBC # BLD AUTO: 10.3 K/UL (ref 4–11)

## 2025-03-07 PROCEDURE — 6370000000 HC RX 637 (ALT 250 FOR IP): Performed by: EMERGENCY MEDICINE

## 2025-03-07 PROCEDURE — 83605 ASSAY OF LACTIC ACID: CPT

## 2025-03-07 PROCEDURE — 94761 N-INVAS EAR/PLS OXIMETRY MLT: CPT

## 2025-03-07 PROCEDURE — 84484 ASSAY OF TROPONIN QUANT: CPT

## 2025-03-07 PROCEDURE — 99285 EMERGENCY DEPT VISIT HI MDM: CPT

## 2025-03-07 PROCEDURE — 2500000003 HC RX 250 WO HCPCS: Performed by: EMERGENCY MEDICINE

## 2025-03-07 PROCEDURE — 94640 AIRWAY INHALATION TREATMENT: CPT

## 2025-03-07 PROCEDURE — 6370000000 HC RX 637 (ALT 250 FOR IP): Performed by: NURSE PRACTITIONER

## 2025-03-07 PROCEDURE — 96360 HYDRATION IV INFUSION INIT: CPT

## 2025-03-07 PROCEDURE — 87636 SARSCOV2 & INF A&B AMP PRB: CPT

## 2025-03-07 PROCEDURE — 93010 ELECTROCARDIOGRAM REPORT: CPT | Performed by: INTERNAL MEDICINE

## 2025-03-07 PROCEDURE — 6360000002 HC RX W HCPCS: Performed by: EMERGENCY MEDICINE

## 2025-03-07 PROCEDURE — 93005 ELECTROCARDIOGRAM TRACING: CPT | Performed by: EMERGENCY MEDICINE

## 2025-03-07 PROCEDURE — 96375 TX/PRO/DX INJ NEW DRUG ADDON: CPT

## 2025-03-07 PROCEDURE — 85379 FIBRIN DEGRADATION QUANT: CPT

## 2025-03-07 PROCEDURE — 2700000000 HC OXYGEN THERAPY PER DAY

## 2025-03-07 PROCEDURE — 2580000003 HC RX 258: Performed by: EMERGENCY MEDICINE

## 2025-03-07 PROCEDURE — 80053 COMPREHEN METABOLIC PANEL: CPT

## 2025-03-07 PROCEDURE — 96361 HYDRATE IV INFUSION ADD-ON: CPT

## 2025-03-07 PROCEDURE — 82803 BLOOD GASES ANY COMBINATION: CPT

## 2025-03-07 PROCEDURE — 85025 COMPLETE CBC W/AUTO DIFF WBC: CPT

## 2025-03-07 PROCEDURE — 1200000000 HC SEMI PRIVATE

## 2025-03-07 PROCEDURE — 71045 X-RAY EXAM CHEST 1 VIEW: CPT

## 2025-03-07 PROCEDURE — 96374 THER/PROPH/DIAG INJ IV PUSH: CPT

## 2025-03-07 RX ORDER — BUSPIRONE HYDROCHLORIDE 5 MG/1
10 TABLET ORAL 3 TIMES DAILY
Status: DISCONTINUED | OUTPATIENT
Start: 2025-03-07 | End: 2025-03-16 | Stop reason: HOSPADM

## 2025-03-07 RX ORDER — OXYCODONE AND ACETAMINOPHEN 5; 325 MG/1; MG/1
1 TABLET ORAL EVERY 4 HOURS PRN
Status: DISCONTINUED | OUTPATIENT
Start: 2025-03-07 | End: 2025-03-16 | Stop reason: HOSPADM

## 2025-03-07 RX ORDER — ALBUTEROL SULFATE 90 UG/1
2 INHALANT RESPIRATORY (INHALATION) EVERY 4 HOURS PRN
Status: DISCONTINUED | OUTPATIENT
Start: 2025-03-07 | End: 2025-03-16 | Stop reason: HOSPADM

## 2025-03-07 RX ORDER — IPRATROPIUM BROMIDE AND ALBUTEROL SULFATE 2.5; .5 MG/3ML; MG/3ML
1 SOLUTION RESPIRATORY (INHALATION) ONCE
Status: COMPLETED | OUTPATIENT
Start: 2025-03-07 | End: 2025-03-07

## 2025-03-07 RX ORDER — FUROSEMIDE 20 MG/1
20 TABLET ORAL DAILY
Status: DISCONTINUED | OUTPATIENT
Start: 2025-03-08 | End: 2025-03-16 | Stop reason: HOSPADM

## 2025-03-07 RX ORDER — ALBUTEROL SULFATE 5 MG/ML
2.5 SOLUTION RESPIRATORY (INHALATION) ONCE
Status: COMPLETED | OUTPATIENT
Start: 2025-03-07 | End: 2025-03-07

## 2025-03-07 RX ORDER — SODIUM CHLORIDE 0.9 % (FLUSH) 0.9 %
5-40 SYRINGE (ML) INJECTION PRN
Status: DISCONTINUED | OUTPATIENT
Start: 2025-03-07 | End: 2025-03-16 | Stop reason: HOSPADM

## 2025-03-07 RX ORDER — PROCHLORPERAZINE EDISYLATE 5 MG/ML
10 INJECTION INTRAMUSCULAR; INTRAVENOUS EVERY 6 HOURS PRN
Status: DISCONTINUED | OUTPATIENT
Start: 2025-03-07 | End: 2025-03-16 | Stop reason: HOSPADM

## 2025-03-07 RX ORDER — BUDESONIDE AND FORMOTEROL FUMARATE DIHYDRATE 160; 4.5 UG/1; UG/1
2 AEROSOL RESPIRATORY (INHALATION)
Status: DISCONTINUED | OUTPATIENT
Start: 2025-03-07 | End: 2025-03-08

## 2025-03-07 RX ORDER — SODIUM CHLORIDE 9 MG/ML
INJECTION, SOLUTION INTRAVENOUS PRN
Status: DISCONTINUED | OUTPATIENT
Start: 2025-03-07 | End: 2025-03-16 | Stop reason: HOSPADM

## 2025-03-07 RX ORDER — KETOROLAC TROMETHAMINE 30 MG/ML
15 INJECTION, SOLUTION INTRAMUSCULAR; INTRAVENOUS ONCE
Status: COMPLETED | OUTPATIENT
Start: 2025-03-07 | End: 2025-03-07

## 2025-03-07 RX ORDER — ENOXAPARIN SODIUM 100 MG/ML
30 INJECTION SUBCUTANEOUS DAILY
Status: DISCONTINUED | OUTPATIENT
Start: 2025-03-08 | End: 2025-03-16 | Stop reason: HOSPADM

## 2025-03-07 RX ORDER — ACETAMINOPHEN 325 MG/1
650 TABLET ORAL EVERY 6 HOURS PRN
Status: DISCONTINUED | OUTPATIENT
Start: 2025-03-07 | End: 2025-03-16 | Stop reason: HOSPADM

## 2025-03-07 RX ORDER — POLYETHYLENE GLYCOL 3350 17 G/17G
17 POWDER, FOR SOLUTION ORAL DAILY PRN
Status: DISCONTINUED | OUTPATIENT
Start: 2025-03-07 | End: 2025-03-16 | Stop reason: HOSPADM

## 2025-03-07 RX ORDER — PREDNISONE 20 MG/1
20 TABLET ORAL DAILY
Status: ON HOLD | COMMUNITY
End: 2025-03-13 | Stop reason: HOSPADM

## 2025-03-07 RX ORDER — LOPERAMIDE HYDROCHLORIDE 2 MG/1
2 CAPSULE ORAL PRN
COMMUNITY
Start: 2025-03-04

## 2025-03-07 RX ORDER — 0.9 % SODIUM CHLORIDE 0.9 %
1000 INTRAVENOUS SOLUTION INTRAVENOUS ONCE
Status: COMPLETED | OUTPATIENT
Start: 2025-03-07 | End: 2025-03-07

## 2025-03-07 RX ORDER — IPRATROPIUM BROMIDE AND ALBUTEROL SULFATE 2.5; .5 MG/3ML; MG/3ML
1 SOLUTION RESPIRATORY (INHALATION)
Status: DISCONTINUED | OUTPATIENT
Start: 2025-03-08 | End: 2025-03-08

## 2025-03-07 RX ORDER — ASPIRIN 81 MG/1
81 TABLET, CHEWABLE ORAL DAILY
Status: DISCONTINUED | OUTPATIENT
Start: 2025-03-08 | End: 2025-03-16 | Stop reason: HOSPADM

## 2025-03-07 RX ORDER — ALBUTEROL SULFATE 90 UG/1
2 INHALANT RESPIRATORY (INHALATION) EVERY 6 HOURS PRN
Status: DISCONTINUED | OUTPATIENT
Start: 2025-03-07 | End: 2025-03-07

## 2025-03-07 RX ORDER — ATORVASTATIN CALCIUM 80 MG/1
80 TABLET, FILM COATED ORAL NIGHTLY
Status: DISCONTINUED | OUTPATIENT
Start: 2025-03-07 | End: 2025-03-16 | Stop reason: HOSPADM

## 2025-03-07 RX ORDER — DIAZEPAM 5 MG/1
5 TABLET ORAL EVERY 6 HOURS PRN
COMMUNITY
Start: 2025-01-27

## 2025-03-07 RX ORDER — ACETAMINOPHEN 650 MG/1
650 SUPPOSITORY RECTAL EVERY 6 HOURS PRN
Status: DISCONTINUED | OUTPATIENT
Start: 2025-03-07 | End: 2025-03-16 | Stop reason: HOSPADM

## 2025-03-07 RX ORDER — SODIUM CHLORIDE 0.9 % (FLUSH) 0.9 %
5-40 SYRINGE (ML) INJECTION EVERY 12 HOURS SCHEDULED
Status: DISCONTINUED | OUTPATIENT
Start: 2025-03-07 | End: 2025-03-16 | Stop reason: HOSPADM

## 2025-03-07 RX ORDER — GUAIFENESIN 600 MG/1
600 TABLET, EXTENDED RELEASE ORAL 2 TIMES DAILY
Status: DISCONTINUED | OUTPATIENT
Start: 2025-03-07 | End: 2025-03-16 | Stop reason: HOSPADM

## 2025-03-07 RX ORDER — DIAZEPAM 5 MG/1
5 TABLET ORAL EVERY 6 HOURS PRN
Status: DISCONTINUED | OUTPATIENT
Start: 2025-03-07 | End: 2025-03-16 | Stop reason: HOSPADM

## 2025-03-07 RX ADMIN — KETOROLAC TROMETHAMINE 15 MG: 30 INJECTION, SOLUTION INTRAMUSCULAR at 19:57

## 2025-03-07 RX ADMIN — AMOXICILLIN AND CLAVULANATE POTASSIUM 1 TABLET: 875; 125 TABLET, FILM COATED ORAL at 23:47

## 2025-03-07 RX ADMIN — GUAIFENESIN 600 MG: 600 TABLET ORAL at 23:36

## 2025-03-07 RX ADMIN — DIAZEPAM 5 MG: 5 TABLET ORAL at 23:36

## 2025-03-07 RX ADMIN — OXYCODONE AND ACETAMINOPHEN 1 TABLET: 5; 325 TABLET ORAL at 22:02

## 2025-03-07 RX ADMIN — ATORVASTATIN CALCIUM 80 MG: 80 TABLET, FILM COATED ORAL at 23:36

## 2025-03-07 RX ADMIN — IPRATROPIUM BROMIDE AND ALBUTEROL SULFATE 1 DOSE: 2.5; .5 SOLUTION RESPIRATORY (INHALATION) at 17:16

## 2025-03-07 RX ADMIN — METHYLPREDNISOLONE SODIUM SUCCINATE 125 MG: 125 INJECTION INTRAMUSCULAR; INTRAVENOUS at 17:14

## 2025-03-07 RX ADMIN — BUSPIRONE HYDROCHLORIDE 10 MG: 5 TABLET ORAL at 23:36

## 2025-03-07 RX ADMIN — SODIUM CHLORIDE 1000 ML: 0.9 INJECTION, SOLUTION INTRAVENOUS at 17:13

## 2025-03-07 RX ADMIN — SODIUM CHLORIDE 1000 ML: 0.9 INJECTION, SOLUTION INTRAVENOUS at 19:30

## 2025-03-07 RX ADMIN — ALBUTEROL SULFATE 2.5 MG: 2.5 SOLUTION RESPIRATORY (INHALATION) at 20:48

## 2025-03-07 ASSESSMENT — PAIN - FUNCTIONAL ASSESSMENT: PAIN_FUNCTIONAL_ASSESSMENT: 0-10

## 2025-03-07 ASSESSMENT — PAIN SCALES - GENERAL
PAINLEVEL_OUTOF10: 7
PAINLEVEL_OUTOF10: 7
PAINLEVEL_OUTOF10: 8
PAINLEVEL_OUTOF10: 7
PAINLEVEL_OUTOF10: 0

## 2025-03-07 ASSESSMENT — PAIN DESCRIPTION - LOCATION: LOCATION: CHEST

## 2025-03-07 NOTE — ED PROVIDER NOTES
mmol/L    Base Excess, Brian 0.6 -3.0 - 3.0 mmol/L    O2 Sat, Brian 98 Not Established %    Carboxyhemoglobin 3.0 (H) 0.0 - 1.5 %    MetHgb, Brian 0.3 <1.5 %    TC02 (Calc), Brian 28 Not Established mmol/L    O2 Therapy Unknown    Brain Natriuretic Peptide   Result Value Ref Range    NT Pro- (H) 0 - 124 pg/mL   EKG 12 Lead   Result Value Ref Range    Ventricular Rate 135 BPM    Atrial Rate 135 BPM    P-R Interval 112 ms    QRS Duration 72 ms    Q-T Interval 306 ms    QTc Calculation (Bazett) 459 ms    P Axis 84 degrees    R Axis 82 degrees    T Axis 71 degrees    Diagnosis       Sinus tachycardiaBiatrial enlargementAbnormal ECGWhen compared with ECG of 22-AUG-2024 23:56,No significant change was foundConfirmed by RACHEL DUONG ([id number]) on 3/7/2025 6:44:48 PM         Procedures  Procedures      Patient seen and evaluated.  Relevant records reviewed.  - Patient is 64 y.o. female presented for acute COPD exacerbation in setting or chronic conditions as noted above.  - Exam showed increased work of breathing despite multiple treatments here.  She appears euvolemic.  - Patient was placed on telemetry during his/her ED stay and no malignant dysrhythmia observed.  - Diagnostic studies reviewed.  Chest x-ray shows no evidence of pneumonia.  She has no significant metabolic derangements.  Given her persisting symptoms despite multiple breathing treatments here benefit from admission further treatment.  Discussed with hospitalist service.      - Patient was given    ED Medication Orders (From admission, onward)      Start Ordered     Status Ordering Provider    03/08/25 2000 03/08/25 1359  acetylcysteine (MUCOMYST) 20 % solution 600 mg  2 TIMES DAILY RESP         Last MAR action: Given - by FERNANDEZ ARMAS on 03/08/25 at 2006 CATHLEEN SALAS    03/08/25 2000 03/08/25 1359  arformoterol tartrate (BROVANA) nebulizer solution 15 mcg  2 TIMES DAILY RESP         Last MAR action: Given - by FERNANDEZ ARMAS on 03/08/25 at 2006

## 2025-03-08 ENCOUNTER — APPOINTMENT (OUTPATIENT)
Dept: CT IMAGING | Age: 65
DRG: 190 | End: 2025-03-08
Payer: COMMERCIAL

## 2025-03-08 LAB
ALBUMIN SERPL-MCNC: 3.5 G/DL (ref 3.4–5)
ALP SERPL-CCNC: 83 U/L (ref 40–129)
ALT SERPL-CCNC: 109 U/L (ref 10–40)
ANION GAP SERPL CALCULATED.3IONS-SCNC: 13 MMOL/L (ref 3–16)
AST SERPL-CCNC: 71 U/L (ref 15–37)
BASE EXCESS BLDV CALC-SCNC: 0.6 MMOL/L (ref -3–3)
BASOPHILS # BLD: 0 K/UL (ref 0–0.2)
BASOPHILS NFR BLD: 0.3 %
BILIRUB DIRECT SERPL-MCNC: <0.1 MG/DL (ref 0–0.3)
BILIRUB INDIRECT SERPL-MCNC: ABNORMAL MG/DL (ref 0–1)
BILIRUB SERPL-MCNC: <0.2 MG/DL (ref 0–1)
BUN SERPL-MCNC: 34 MG/DL (ref 7–20)
CALCIUM SERPL-MCNC: 9 MG/DL (ref 8.3–10.6)
CHLORIDE SERPL-SCNC: 102 MMOL/L (ref 99–110)
CO2 BLDV-SCNC: 28 MMOL/L
CO2 SERPL-SCNC: 20 MMOL/L (ref 21–32)
COHGB MFR BLDV: 3 % (ref 0–1.5)
CREAT SERPL-MCNC: 0.9 MG/DL (ref 0.6–1.2)
DEPRECATED RDW RBC AUTO: 15.5 % (ref 12.4–15.4)
EOSINOPHIL # BLD: 0 K/UL (ref 0–0.6)
EOSINOPHIL NFR BLD: 0 %
GFR SERPLBLD CREATININE-BSD FMLA CKD-EPI: 71 ML/MIN/{1.73_M2}
GLUCOSE SERPL-MCNC: 196 MG/DL (ref 70–99)
HCO3 BLDV-SCNC: 26.6 MMOL/L (ref 23–29)
HCT VFR BLD AUTO: 38 % (ref 36–48)
HGB BLD-MCNC: 12.3 G/DL (ref 12–16)
LYMPHOCYTES # BLD: 1.3 K/UL (ref 1–5.1)
LYMPHOCYTES NFR BLD: 13.4 %
MAGNESIUM SERPL-MCNC: 2.25 MG/DL (ref 1.8–2.4)
MCH RBC QN AUTO: 30.4 PG (ref 26–34)
MCHC RBC AUTO-ENTMCNC: 32.3 G/DL (ref 31–36)
MCV RBC AUTO: 94.2 FL (ref 80–100)
METHGB MFR BLDV: 0.3 %
MONOCYTES # BLD: 0.5 K/UL (ref 0–1.3)
MONOCYTES NFR BLD: 4.8 %
NEUTROPHILS # BLD: 8 K/UL (ref 1.7–7.7)
NEUTROPHILS NFR BLD: 81.5 %
NT-PROBNP SERPL-MCNC: 660 PG/ML (ref 0–124)
O2 THERAPY: ABNORMAL
PCO2 BLDV: 48.6 MMHG (ref 40–50)
PH BLDV: 7.36 [PH] (ref 7.35–7.45)
PLATELET # BLD AUTO: 227 K/UL (ref 135–450)
PMV BLD AUTO: 10.3 FL (ref 5–10.5)
PO2 BLDV: 111.5 MMHG (ref 25–40)
POTASSIUM SERPL-SCNC: 4.3 MMOL/L (ref 3.5–5.1)
PROT SERPL-MCNC: 6.5 G/DL (ref 6.4–8.2)
RBC # BLD AUTO: 4.03 M/UL (ref 4–5.2)
SAO2 % BLDV: 98 %
SODIUM SERPL-SCNC: 135 MMOL/L (ref 136–145)
WBC # BLD AUTO: 9.8 K/UL (ref 4–11)

## 2025-03-08 PROCEDURE — 6360000002 HC RX W HCPCS: Performed by: NURSE PRACTITIONER

## 2025-03-08 PROCEDURE — 80076 HEPATIC FUNCTION PANEL: CPT

## 2025-03-08 PROCEDURE — 2580000003 HC RX 258: Performed by: INTERNAL MEDICINE

## 2025-03-08 PROCEDURE — 2500000003 HC RX 250 WO HCPCS: Performed by: NURSE PRACTITIONER

## 2025-03-08 PROCEDURE — 82803 BLOOD GASES ANY COMBINATION: CPT

## 2025-03-08 PROCEDURE — 71250 CT THORAX DX C-: CPT

## 2025-03-08 PROCEDURE — 6360000002 HC RX W HCPCS: Performed by: INTERNAL MEDICINE

## 2025-03-08 PROCEDURE — 94669 MECHANICAL CHEST WALL OSCILL: CPT

## 2025-03-08 PROCEDURE — 0202U NFCT DS 22 TRGT SARS-COV-2: CPT

## 2025-03-08 PROCEDURE — 87633 RESP VIRUS 12-25 TARGETS: CPT

## 2025-03-08 PROCEDURE — 94640 AIRWAY INHALATION TREATMENT: CPT

## 2025-03-08 PROCEDURE — 85025 COMPLETE CBC W/AUTO DIFF WBC: CPT

## 2025-03-08 PROCEDURE — 94761 N-INVAS EAR/PLS OXIMETRY MLT: CPT

## 2025-03-08 PROCEDURE — 6370000000 HC RX 637 (ALT 250 FOR IP): Performed by: NURSE PRACTITIONER

## 2025-03-08 PROCEDURE — 2500000003 HC RX 250 WO HCPCS: Performed by: INTERNAL MEDICINE

## 2025-03-08 PROCEDURE — 80048 BASIC METABOLIC PNL TOTAL CA: CPT

## 2025-03-08 PROCEDURE — 1200000000 HC SEMI PRIVATE

## 2025-03-08 PROCEDURE — 2700000000 HC OXYGEN THERAPY PER DAY

## 2025-03-08 PROCEDURE — 83735 ASSAY OF MAGNESIUM: CPT

## 2025-03-08 PROCEDURE — 83880 ASSAY OF NATRIURETIC PEPTIDE: CPT

## 2025-03-08 PROCEDURE — 99223 1ST HOSP IP/OBS HIGH 75: CPT | Performed by: INTERNAL MEDICINE

## 2025-03-08 RX ORDER — METOCLOPRAMIDE 10 MG/1
10 TABLET ORAL EVERY 6 HOURS PRN
Status: DISCONTINUED | OUTPATIENT
Start: 2025-03-08 | End: 2025-03-16 | Stop reason: HOSPADM

## 2025-03-08 RX ORDER — IPRATROPIUM BROMIDE AND ALBUTEROL SULFATE 2.5; .5 MG/3ML; MG/3ML
1 SOLUTION RESPIRATORY (INHALATION) EVERY 6 HOURS PRN
Status: DISCONTINUED | OUTPATIENT
Start: 2025-03-08 | End: 2025-03-09

## 2025-03-08 RX ORDER — LOPERAMIDE HYDROCHLORIDE 2 MG/1
2 CAPSULE ORAL PRN
Status: DISCONTINUED | OUTPATIENT
Start: 2025-03-08 | End: 2025-03-16 | Stop reason: HOSPADM

## 2025-03-08 RX ORDER — ROFLUMILAST 500 UG/1
250 TABLET ORAL DAILY
Status: DISCONTINUED | OUTPATIENT
Start: 2025-03-08 | End: 2025-03-16 | Stop reason: HOSPADM

## 2025-03-08 RX ORDER — PREDNISONE 20 MG/1
40 TABLET ORAL DAILY
Status: DISCONTINUED | OUTPATIENT
Start: 2025-03-08 | End: 2025-03-08

## 2025-03-08 RX ORDER — ARFORMOTEROL TARTRATE 15 UG/2ML
15 SOLUTION RESPIRATORY (INHALATION)
Status: DISCONTINUED | OUTPATIENT
Start: 2025-03-08 | End: 2025-03-16 | Stop reason: HOSPADM

## 2025-03-08 RX ORDER — BUDESONIDE 0.5 MG/2ML
0.5 INHALANT ORAL
Status: DISCONTINUED | OUTPATIENT
Start: 2025-03-08 | End: 2025-03-16 | Stop reason: HOSPADM

## 2025-03-08 RX ORDER — ALBUTEROL SULFATE 0.83 MG/ML
2.5 SOLUTION RESPIRATORY (INHALATION)
Status: DISCONTINUED | OUTPATIENT
Start: 2025-03-08 | End: 2025-03-09

## 2025-03-08 RX ORDER — PANTOPRAZOLE SODIUM 40 MG/1
40 TABLET, DELAYED RELEASE ORAL DAILY
Status: DISCONTINUED | OUTPATIENT
Start: 2025-03-08 | End: 2025-03-16 | Stop reason: HOSPADM

## 2025-03-08 RX ORDER — ACETYLCYSTEINE 200 MG/ML
600 SOLUTION ORAL; RESPIRATORY (INHALATION)
Status: DISCONTINUED | OUTPATIENT
Start: 2025-03-08 | End: 2025-03-12

## 2025-03-08 RX ADMIN — ALBUTEROL SULFATE 2.5 MG: 2.5 SOLUTION RESPIRATORY (INHALATION) at 08:00

## 2025-03-08 RX ADMIN — Medication 2 PUFF: at 08:00

## 2025-03-08 RX ADMIN — WATER 40 MG: 1 INJECTION INTRAMUSCULAR; INTRAVENOUS; SUBCUTANEOUS at 09:52

## 2025-03-08 RX ADMIN — METHYLPREDNISOLONE SODIUM SUCCINATE 40 MG: 40 INJECTION INTRAMUSCULAR; INTRAVENOUS at 20:51

## 2025-03-08 RX ADMIN — SODIUM CHLORIDE, PRESERVATIVE FREE 10 ML: 5 INJECTION INTRAVENOUS at 20:52

## 2025-03-08 RX ADMIN — ARFORMOTEROL TARTRATE 15 MCG: 15 SOLUTION RESPIRATORY (INHALATION) at 20:06

## 2025-03-08 RX ADMIN — PROCHLORPERAZINE EDISYLATE 10 MG: 5 INJECTION INTRAMUSCULAR; INTRAVENOUS at 07:14

## 2025-03-08 RX ADMIN — ALBUTEROL SULFATE 2.5 MG: 2.5 SOLUTION RESPIRATORY (INHALATION) at 15:56

## 2025-03-08 RX ADMIN — OXYCODONE AND ACETAMINOPHEN 1 TABLET: 5; 325 TABLET ORAL at 15:13

## 2025-03-08 RX ADMIN — SODIUM CHLORIDE, PRESERVATIVE FREE 10 ML: 5 INJECTION INTRAVENOUS at 09:52

## 2025-03-08 RX ADMIN — GUAIFENESIN 600 MG: 600 TABLET ORAL at 09:52

## 2025-03-08 RX ADMIN — ALBUTEROL SULFATE 2.5 MG: 2.5 SOLUTION RESPIRATORY (INHALATION) at 11:24

## 2025-03-08 RX ADMIN — GUAIFENESIN 600 MG: 600 TABLET ORAL at 20:52

## 2025-03-08 RX ADMIN — DOXYCYCLINE 100 MG: 100 INJECTION, POWDER, LYOPHILIZED, FOR SOLUTION INTRAVENOUS at 15:09

## 2025-03-08 RX ADMIN — IPRATROPIUM BROMIDE AND ALBUTEROL SULFATE 1 DOSE: 2.5; .5 SOLUTION RESPIRATORY (INHALATION) at 03:51

## 2025-03-08 RX ADMIN — PANTOPRAZOLE SODIUM 40 MG: 40 TABLET, DELAYED RELEASE ORAL at 09:52

## 2025-03-08 RX ADMIN — CEFTRIAXONE SODIUM 1000 MG: 1 INJECTION, POWDER, FOR SOLUTION INTRAMUSCULAR; INTRAVENOUS at 14:51

## 2025-03-08 RX ADMIN — ASPIRIN 81 MG: 81 TABLET, CHEWABLE ORAL at 09:52

## 2025-03-08 RX ADMIN — OXYCODONE AND ACETAMINOPHEN 1 TABLET: 5; 325 TABLET ORAL at 20:52

## 2025-03-08 RX ADMIN — FUROSEMIDE 20 MG: 20 TABLET ORAL at 09:52

## 2025-03-08 RX ADMIN — ENOXAPARIN SODIUM 30 MG: 100 INJECTION SUBCUTANEOUS at 09:52

## 2025-03-08 RX ADMIN — ALBUTEROL SULFATE 2.5 MG: 2.5 SOLUTION RESPIRATORY (INHALATION) at 20:05

## 2025-03-08 RX ADMIN — OXYCODONE AND ACETAMINOPHEN 1 TABLET: 5; 325 TABLET ORAL at 04:23

## 2025-03-08 RX ADMIN — ACETYLCYSTEINE 600 MG: 200 INHALANT RESPIRATORY (INHALATION) at 20:06

## 2025-03-08 RX ADMIN — BUDESONIDE 500 MCG: 0.5 SUSPENSION RESPIRATORY (INHALATION) at 20:06

## 2025-03-08 RX ADMIN — BUSPIRONE HYDROCHLORIDE 10 MG: 5 TABLET ORAL at 20:52

## 2025-03-08 RX ADMIN — ROFLUMILAST 250 MCG: 500 TABLET ORAL at 11:17

## 2025-03-08 RX ADMIN — AMOXICILLIN AND CLAVULANATE POTASSIUM 1 TABLET: 875; 125 TABLET, FILM COATED ORAL at 09:52

## 2025-03-08 RX ADMIN — BUSPIRONE HYDROCHLORIDE 10 MG: 5 TABLET ORAL at 14:52

## 2025-03-08 RX ADMIN — BUSPIRONE HYDROCHLORIDE 10 MG: 5 TABLET ORAL at 09:52

## 2025-03-08 RX ADMIN — TIOTROPIUM BROMIDE INHALATION SPRAY 2 PUFF: 3.12 SPRAY, METERED RESPIRATORY (INHALATION) at 08:00

## 2025-03-08 RX ADMIN — ATORVASTATIN CALCIUM 80 MG: 80 TABLET, FILM COATED ORAL at 20:52

## 2025-03-08 ASSESSMENT — PAIN DESCRIPTION - ORIENTATION
ORIENTATION: RIGHT;LEFT
ORIENTATION: LOWER

## 2025-03-08 ASSESSMENT — PAIN DESCRIPTION - LOCATION
LOCATION: BACK

## 2025-03-08 ASSESSMENT — PAIN SCALES - GENERAL
PAINLEVEL_OUTOF10: 0
PAINLEVEL_OUTOF10: 6
PAINLEVEL_OUTOF10: 3
PAINLEVEL_OUTOF10: 6
PAINLEVEL_OUTOF10: 7
PAINLEVEL_OUTOF10: 3

## 2025-03-08 ASSESSMENT — PAIN DESCRIPTION - DESCRIPTORS
DESCRIPTORS: ACHING
DESCRIPTORS: ACHING

## 2025-03-08 ASSESSMENT — PAIN - FUNCTIONAL ASSESSMENT: PAIN_FUNCTIONAL_ASSESSMENT: ACTIVITIES ARE NOT PREVENTED

## 2025-03-08 ASSESSMENT — PAIN DESCRIPTION - PAIN TYPE: TYPE: ACUTE PAIN;CHRONIC PAIN

## 2025-03-08 NOTE — ED NOTES
Patient has now been stuck 6-7 times for iv access. 3 of which are US guided. All 3 USGIV's have infiltrated. Patient is currently with out IV access again. Would benefit from central line placement.

## 2025-03-08 NOTE — RT PROTOCOL NOTE
RT Inhaler-Nebulizer Bronchodilator Protocol Note    There is a bronchodilator order in the chart from a provider indicating to follow the RT Bronchodilator Protocol and there is an “Initiate RT Inhaler-Nebulizer Bronchodilator Protocol” order as well (see protocol at bottom of note).    CXR Findings:  XR CHEST PORTABLE    Result Date: 3/7/2025  No acute findings. Electronically signed by Lalitha Valdes      The findings from the last RT Protocol Assessment were as follows:   History Pulmonary Disease: Chronic pulmonary disease  Respiratory Pattern: Dyspnea on exertion or RR 21-25 bpm  Breath Sounds: Slightly diminished and/or crackles  Cough: Strong, productive  Indication for Bronchodilator Therapy: On home bronchodilators, Wheezing associated with pulm disorder, Decreased or absent breath sounds  Bronchodilator Assessment Score: 7    Aerosolized bronchodilator medication orders have been revised according to the RT Inhaler-Nebulizer Bronchodilator Protocol below.    Respiratory Therapist to perform RT Therapy Protocol Assessment initially then follow the protocol.  Repeat RT Therapy Protocol Assessment PRN for score 0-3 or on second treatment, BID, and PRN for scores above 3.    No Indications - adjust the frequency to every 6 hours PRN wheezing or bronchospasm, if no treatments needed after 48 hours then discontinue using Per Protocol order mode.     If indication present, adjust the RT bronchodilator orders based on the Bronchodilator Assessment Score as indicated below.  Use Inhaler orders unless patient has one or more of the following: on home nebulizer, not able to hold breath for 10 seconds, is not alert and oriented, cannot activate and use MDI correctly, or respiratory rate 25 breaths per minute or more, then use the equivalent nebulizer order(s) with same Frequency and PRN reasons based on the score.  If a patient is on this medication at home then do not decrease Frequency below that used at

## 2025-03-08 NOTE — ED NOTES
Joyce Uribe is a 64 y.o. female admitted for  Principal Problem:    COPD with acute exacerbation (HCC)  Resolved Problems:    * No resolved hospital problems. *  .   Patient Home via EMS transportation with   Chief Complaint   Patient presents with    Shortness of Breath     Pt reporting flu like symptoms for the past few days. EMS reports pt has been using her nebulizer at home quite frequently. Reports she started taking steroids yesterday. Pt wears home oxygen at all times. Usually wears 3 liters. She reports an increase in SOB and says, \"I only call 911 when I need to\"     Cough     Pt placed on Augmentin on 3/6. States she was placed on abx because she coughs up \"Green stuff\"    .  Patient is alert and Person, Place, Time, and Situation  Patient's baseline mobility: Baseline Mobility: Independent   Code Status: Prior   Cardiac Rhythm:    O2 Flow Rate (L/min): 3 L/min  Is patient on baseline Oxygen: yes, 2-3 how many Liters2:   Abnormal Assessment Findings: none    Isolation: None      NIH Score:    C-SSRS: Risk of Suicide: No Risk  Bedside swallow:        Active LDA's:   Peripheral IV 03/07/25 Left Cephalic (Active)     Patient admitted with a ang: no If the ang is chronic was it exchanged:  Reason for ang:   Patient admitted with Central Line:  . PICC line placement confirmed: YES OR NO:[id number]}   Reason for Central line:   Was central line Inserted from an outside facility:        Family/Caregiver Present no Any Concerns: no   Restraints no  Sitter no         Vitals: MEWS Score: 5    Vitals:    03/07/25 1930 03/07/25 2053 03/07/25 2056 03/07/25 2148   BP: 115/73  116/74 115/77   Pulse: (!) 110 (!) 104 100 (!) 104   Resp: 12 22 14 12   Temp:       TempSrc:       SpO2: 94% 97% 99% 98%       Last documented pain score (0-10 scale) Pain Level: 7  Pain medication administered Yes- see MAR.    Pertinent or High Risk Medications/Drips: No.    Pending Blood Product Administration: no    Abnormal labs:

## 2025-03-09 LAB
ALBUMIN SERPL-MCNC: 3.5 G/DL (ref 3.4–5)
ALBUMIN/GLOB SERPL: 1.2 {RATIO} (ref 1.1–2.2)
ALP SERPL-CCNC: 77 U/L (ref 40–129)
ALT SERPL-CCNC: 119 U/L (ref 10–40)
ANION GAP SERPL CALCULATED.3IONS-SCNC: 10 MMOL/L (ref 3–16)
AST SERPL-CCNC: 65 U/L (ref 15–37)
BASOPHILS # BLD: 0 K/UL (ref 0–0.2)
BASOPHILS NFR BLD: 0.1 %
BILIRUB SERPL-MCNC: <0.2 MG/DL (ref 0–1)
BUN SERPL-MCNC: 25 MG/DL (ref 7–20)
CALCIUM SERPL-MCNC: 9 MG/DL (ref 8.3–10.6)
CHLORIDE SERPL-SCNC: 103 MMOL/L (ref 99–110)
CO2 SERPL-SCNC: 27 MMOL/L (ref 21–32)
CREAT SERPL-MCNC: 0.6 MG/DL (ref 0.6–1.2)
DEPRECATED RDW RBC AUTO: 14.6 % (ref 12.4–15.4)
EOSINOPHIL # BLD: 0 K/UL (ref 0–0.6)
EOSINOPHIL NFR BLD: 0 %
GFR SERPLBLD CREATININE-BSD FMLA CKD-EPI: >90 ML/MIN/{1.73_M2}
GLUCOSE SERPL-MCNC: 137 MG/DL (ref 70–99)
HCT VFR BLD AUTO: 36.4 % (ref 36–48)
HGB BLD-MCNC: 11.8 G/DL (ref 12–16)
LYMPHOCYTES # BLD: 1.2 K/UL (ref 1–5.1)
LYMPHOCYTES NFR BLD: 9.1 %
MAGNESIUM SERPL-MCNC: 1.47 MG/DL (ref 1.8–2.4)
MCH RBC QN AUTO: 29.7 PG (ref 26–34)
MCHC RBC AUTO-ENTMCNC: 32.3 G/DL (ref 31–36)
MCV RBC AUTO: 92 FL (ref 80–100)
MONOCYTES # BLD: 0.5 K/UL (ref 0–1.3)
MONOCYTES NFR BLD: 3.7 %
NEUTROPHILS # BLD: 11.6 K/UL (ref 1.7–7.7)
NEUTROPHILS NFR BLD: 87.1 %
ORGANISM: ABNORMAL
PHOSPHATE SERPL-MCNC: 1.9 MG/DL (ref 2.5–4.9)
PLATELET # BLD AUTO: 251 K/UL (ref 135–450)
PMV BLD AUTO: 10.2 FL (ref 5–10.5)
POTASSIUM SERPL-SCNC: 3.6 MMOL/L (ref 3.5–5.1)
PROT SERPL-MCNC: 6.4 G/DL (ref 6.4–8.2)
RBC # BLD AUTO: 3.96 M/UL (ref 4–5.2)
REPORT: NORMAL
RESP PATH DNA+RNA PNL L RESP NAA+NON-PRB: ABNORMAL
RESP PATH DNA+RNA PNL L RESP NAA+NON-PRB: ABNORMAL
SODIUM SERPL-SCNC: 140 MMOL/L (ref 136–145)
WBC # BLD AUTO: 13.3 K/UL (ref 4–11)

## 2025-03-09 PROCEDURE — 93005 ELECTROCARDIOGRAM TRACING: CPT

## 2025-03-09 PROCEDURE — 2500000003 HC RX 250 WO HCPCS: Performed by: NURSE PRACTITIONER

## 2025-03-09 PROCEDURE — 84100 ASSAY OF PHOSPHORUS: CPT

## 2025-03-09 PROCEDURE — 83735 ASSAY OF MAGNESIUM: CPT

## 2025-03-09 PROCEDURE — 6370000000 HC RX 637 (ALT 250 FOR IP)

## 2025-03-09 PROCEDURE — 6360000002 HC RX W HCPCS: Performed by: NURSE PRACTITIONER

## 2025-03-09 PROCEDURE — 6360000002 HC RX W HCPCS: Performed by: INTERNAL MEDICINE

## 2025-03-09 PROCEDURE — 2700000000 HC OXYGEN THERAPY PER DAY

## 2025-03-09 PROCEDURE — 94640 AIRWAY INHALATION TREATMENT: CPT

## 2025-03-09 PROCEDURE — 94669 MECHANICAL CHEST WALL OSCILL: CPT

## 2025-03-09 PROCEDURE — 6360000002 HC RX W HCPCS

## 2025-03-09 PROCEDURE — 2580000003 HC RX 258

## 2025-03-09 PROCEDURE — 97530 THERAPEUTIC ACTIVITIES: CPT

## 2025-03-09 PROCEDURE — 97167 OT EVAL HIGH COMPLEX 60 MIN: CPT

## 2025-03-09 PROCEDURE — 99233 SBSQ HOSP IP/OBS HIGH 50: CPT | Performed by: INTERNAL MEDICINE

## 2025-03-09 PROCEDURE — 85025 COMPLETE CBC W/AUTO DIFF WBC: CPT

## 2025-03-09 PROCEDURE — 2500000003 HC RX 250 WO HCPCS

## 2025-03-09 PROCEDURE — 80053 COMPREHEN METABOLIC PANEL: CPT

## 2025-03-09 PROCEDURE — 36415 COLL VENOUS BLD VENIPUNCTURE: CPT

## 2025-03-09 PROCEDURE — 2500000003 HC RX 250 WO HCPCS: Performed by: INTERNAL MEDICINE

## 2025-03-09 PROCEDURE — 94761 N-INVAS EAR/PLS OXIMETRY MLT: CPT

## 2025-03-09 PROCEDURE — 1200000000 HC SEMI PRIVATE

## 2025-03-09 PROCEDURE — 97163 PT EVAL HIGH COMPLEX 45 MIN: CPT

## 2025-03-09 PROCEDURE — 2580000003 HC RX 258: Performed by: INTERNAL MEDICINE

## 2025-03-09 PROCEDURE — 97535 SELF CARE MNGMENT TRAINING: CPT

## 2025-03-09 PROCEDURE — 6370000000 HC RX 637 (ALT 250 FOR IP): Performed by: NURSE PRACTITIONER

## 2025-03-09 RX ORDER — LEVALBUTEROL 1.25 MG/.5ML
0.63 SOLUTION, CONCENTRATE RESPIRATORY (INHALATION) EVERY 4 HOURS PRN
Status: DISCONTINUED | OUTPATIENT
Start: 2025-03-09 | End: 2025-03-10

## 2025-03-09 RX ORDER — PREDNISONE 20 MG/1
40 TABLET ORAL DAILY
Status: DISCONTINUED | OUTPATIENT
Start: 2025-03-09 | End: 2025-03-16 | Stop reason: HOSPADM

## 2025-03-09 RX ORDER — MAGNESIUM SULFATE IN WATER 40 MG/ML
2000 INJECTION, SOLUTION INTRAVENOUS ONCE
Status: COMPLETED | OUTPATIENT
Start: 2025-03-09 | End: 2025-03-09

## 2025-03-09 RX ADMIN — ARFORMOTEROL TARTRATE 15 MCG: 15 SOLUTION RESPIRATORY (INHALATION) at 07:16

## 2025-03-09 RX ADMIN — GUAIFENESIN 600 MG: 600 TABLET ORAL at 20:24

## 2025-03-09 RX ADMIN — DOXYCYCLINE 100 MG: 100 INJECTION, POWDER, LYOPHILIZED, FOR SOLUTION INTRAVENOUS at 04:21

## 2025-03-09 RX ADMIN — BUDESONIDE 500 MCG: 0.5 SUSPENSION RESPIRATORY (INHALATION) at 20:00

## 2025-03-09 RX ADMIN — TIOTROPIUM BROMIDE INHALATION SPRAY 2 PUFF: 3.12 SPRAY, METERED RESPIRATORY (INHALATION) at 07:19

## 2025-03-09 RX ADMIN — ASPIRIN 81 MG: 81 TABLET, CHEWABLE ORAL at 09:45

## 2025-03-09 RX ADMIN — METOCLOPRAMIDE 10 MG: 10 TABLET ORAL at 16:31

## 2025-03-09 RX ADMIN — OXYCODONE AND ACETAMINOPHEN 1 TABLET: 5; 325 TABLET ORAL at 06:38

## 2025-03-09 RX ADMIN — BUSPIRONE HYDROCHLORIDE 10 MG: 5 TABLET ORAL at 20:24

## 2025-03-09 RX ADMIN — LEVALBUTEROL 0.63 MG: 1.25 SOLUTION, CONCENTRATE RESPIRATORY (INHALATION) at 19:54

## 2025-03-09 RX ADMIN — BUSPIRONE HYDROCHLORIDE 10 MG: 5 TABLET ORAL at 09:45

## 2025-03-09 RX ADMIN — ARFORMOTEROL TARTRATE 15 MCG: 15 SOLUTION RESPIRATORY (INHALATION) at 20:03

## 2025-03-09 RX ADMIN — OXYCODONE AND ACETAMINOPHEN 1 TABLET: 5; 325 TABLET ORAL at 13:10

## 2025-03-09 RX ADMIN — PREDNISONE 40 MG: 20 TABLET ORAL at 09:45

## 2025-03-09 RX ADMIN — FUROSEMIDE 20 MG: 20 TABLET ORAL at 09:45

## 2025-03-09 RX ADMIN — ROFLUMILAST 250 MCG: 500 TABLET ORAL at 09:54

## 2025-03-09 RX ADMIN — ENOXAPARIN SODIUM 30 MG: 100 INJECTION SUBCUTANEOUS at 09:45

## 2025-03-09 RX ADMIN — DIAZEPAM 5 MG: 5 TABLET ORAL at 20:24

## 2025-03-09 RX ADMIN — SODIUM CHLORIDE, PRESERVATIVE FREE 10 ML: 5 INJECTION INTRAVENOUS at 20:25

## 2025-03-09 RX ADMIN — SODIUM CHLORIDE, PRESERVATIVE FREE 10 ML: 5 INJECTION INTRAVENOUS at 09:58

## 2025-03-09 RX ADMIN — ALBUTEROL SULFATE 2.5 MG: 2.5 SOLUTION RESPIRATORY (INHALATION) at 11:28

## 2025-03-09 RX ADMIN — ACETYLCYSTEINE 600 MG: 200 INHALANT RESPIRATORY (INHALATION) at 19:54

## 2025-03-09 RX ADMIN — PANTOPRAZOLE SODIUM 40 MG: 40 TABLET, DELAYED RELEASE ORAL at 09:45

## 2025-03-09 RX ADMIN — DOXYCYCLINE 100 MG: 100 INJECTION, POWDER, LYOPHILIZED, FOR SOLUTION INTRAVENOUS at 16:27

## 2025-03-09 RX ADMIN — ACETYLCYSTEINE 600 MG: 200 INHALANT RESPIRATORY (INHALATION) at 07:15

## 2025-03-09 RX ADMIN — MAGNESIUM SULFATE HEPTAHYDRATE 2000 MG: 40 INJECTION, SOLUTION INTRAVENOUS at 09:57

## 2025-03-09 RX ADMIN — GUAIFENESIN 600 MG: 600 TABLET ORAL at 09:45

## 2025-03-09 RX ADMIN — OXYCODONE AND ACETAMINOPHEN 1 TABLET: 5; 325 TABLET ORAL at 20:24

## 2025-03-09 RX ADMIN — CEFTRIAXONE SODIUM 1000 MG: 1 INJECTION, POWDER, FOR SOLUTION INTRAMUSCULAR; INTRAVENOUS at 15:49

## 2025-03-09 RX ADMIN — ALBUTEROL SULFATE 2.5 MG: 2.5 SOLUTION RESPIRATORY (INHALATION) at 07:15

## 2025-03-09 RX ADMIN — POTASSIUM PHOSPHATE, MONOBASIC POTASSIUM PHOSPHATE, DIBASIC 30 MMOL: 224; 236 INJECTION, SOLUTION, CONCENTRATE INTRAVENOUS at 09:50

## 2025-03-09 RX ADMIN — PROCHLORPERAZINE EDISYLATE 10 MG: 5 INJECTION INTRAMUSCULAR; INTRAVENOUS at 06:38

## 2025-03-09 RX ADMIN — BUDESONIDE 500 MCG: 0.5 SUSPENSION RESPIRATORY (INHALATION) at 07:16

## 2025-03-09 ASSESSMENT — PAIN DESCRIPTION - DESCRIPTORS
DESCRIPTORS: ACHING
DESCRIPTORS: ACHING

## 2025-03-09 ASSESSMENT — PAIN - FUNCTIONAL ASSESSMENT: PAIN_FUNCTIONAL_ASSESSMENT: ACTIVITIES ARE NOT PREVENTED

## 2025-03-09 ASSESSMENT — PAIN SCALES - WONG BAKER
WONGBAKER_NUMERICALRESPONSE: NO HURT
WONGBAKER_NUMERICALRESPONSE: NO HURT

## 2025-03-09 ASSESSMENT — PAIN DESCRIPTION - ORIENTATION
ORIENTATION: RIGHT;LEFT
ORIENTATION: RIGHT;LEFT

## 2025-03-09 ASSESSMENT — PAIN DESCRIPTION - LOCATION
LOCATION: BACK
LOCATION: BACK

## 2025-03-09 ASSESSMENT — PAIN SCALES - GENERAL
PAINLEVEL_OUTOF10: 7
PAINLEVEL_OUTOF10: 0
PAINLEVEL_OUTOF10: 3
PAINLEVEL_OUTOF10: 0
PAINLEVEL_OUTOF10: 3

## 2025-03-09 ASSESSMENT — PAIN DESCRIPTION - ONSET: ONSET: GRADUAL

## 2025-03-09 ASSESSMENT — PAIN DESCRIPTION - FREQUENCY: FREQUENCY: INTERMITTENT

## 2025-03-09 ASSESSMENT — PAIN DESCRIPTION - PAIN TYPE: TYPE: ACUTE PAIN;CHRONIC PAIN

## 2025-03-10 ENCOUNTER — TELEPHONE (OUTPATIENT)
Dept: CARDIAC CATH/INVASIVE PROCEDURES | Age: 65
End: 2025-03-10

## 2025-03-10 ENCOUNTER — APPOINTMENT (OUTPATIENT)
Age: 65
DRG: 190 | End: 2025-03-10
Payer: COMMERCIAL

## 2025-03-10 ENCOUNTER — ANCILLARY PROCEDURE (OUTPATIENT)
Dept: CARDIOLOGY CLINIC | Age: 65
End: 2025-03-10

## 2025-03-10 DIAGNOSIS — I49.1 PAC (PREMATURE ATRIAL CONTRACTION): ICD-10-CM

## 2025-03-10 DIAGNOSIS — I49.3 PVC (PREMATURE VENTRICULAR CONTRACTION): ICD-10-CM

## 2025-03-10 DIAGNOSIS — I47.10 PSVT (PAROXYSMAL SUPRAVENTRICULAR TACHYCARDIA): ICD-10-CM

## 2025-03-10 LAB
ANION GAP SERPL CALCULATED.3IONS-SCNC: 9 MMOL/L (ref 3–16)
ANISOCYTOSIS BLD QL SMEAR: ABNORMAL
BASOPHILS # BLD: 0 K/UL (ref 0–0.2)
BASOPHILS NFR BLD: 0 %
BUN SERPL-MCNC: 18 MG/DL (ref 7–20)
CALCIUM SERPL-MCNC: 8.6 MG/DL (ref 8.3–10.6)
CHLORIDE SERPL-SCNC: 104 MMOL/L (ref 99–110)
CO2 SERPL-SCNC: 31 MMOL/L (ref 21–32)
CREAT SERPL-MCNC: 0.6 MG/DL (ref 0.6–1.2)
DEPRECATED RDW RBC AUTO: 14.9 % (ref 12.4–15.4)
ECHO AO ROOT DIAM: 3.5 CM
ECHO AO ROOT INDEX: 2.22 CM/M2
ECHO AV CUSP MM: 2.2 CM
ECHO BSA: 1.5 M2
ECHO BSA: 1.5 M2
ECHO EST RA PRESSURE: 3 MMHG
ECHO LA DIAMETER INDEX: 2.28 CM/M2
ECHO LA DIAMETER: 3.6 CM
ECHO LA TO AORTIC ROOT RATIO: 1.03
ECHO LV E' SEPTAL VELOCITY: 8.39 CM/S
ECHO LV EF PHYSICIAN: 50 %
ECHO LV FRACTIONAL SHORTENING: 19 % (ref 28–44)
ECHO LV INTERNAL DIMENSION DIASTOLE INDEX: 2.72 CM/M2
ECHO LV INTERNAL DIMENSION DIASTOLIC: 4.3 CM (ref 3.9–5.3)
ECHO LV INTERNAL DIMENSION SYSTOLIC INDEX: 2.22 CM/M2
ECHO LV INTERNAL DIMENSION SYSTOLIC: 3.5 CM
ECHO LV ISOVOLUMETRIC RELAXATION TIME (IVRT): 77 MS
ECHO LV IVSD: 0.8 CM (ref 0.6–0.9)
ECHO LV MASS 2D: 105.3 G (ref 67–162)
ECHO LV MASS INDEX 2D: 66.7 G/M2 (ref 43–95)
ECHO LV POSTERIOR WALL DIASTOLIC: 0.8 CM (ref 0.6–0.9)
ECHO LV RELATIVE WALL THICKNESS RATIO: 0.37
ECHO LVOT AREA: 2.5 CM2
ECHO LVOT DIAM: 1.8 CM
ECHO RV FREE WALL PEAK S': 14 CM/S
EKG ATRIAL RATE: 101 BPM
EKG DIAGNOSIS: NORMAL
EKG P AXIS: 83 DEGREES
EKG P-R INTERVAL: 126 MS
EKG Q-T INTERVAL: 350 MS
EKG QRS DURATION: 74 MS
EKG QTC CALCULATION (BAZETT): 453 MS
EKG R AXIS: 82 DEGREES
EKG T AXIS: 78 DEGREES
EKG VENTRICULAR RATE: 101 BPM
EOSINOPHIL # BLD: 0 K/UL (ref 0–0.6)
EOSINOPHIL NFR BLD: 0 %
GFR SERPLBLD CREATININE-BSD FMLA CKD-EPI: >90 ML/MIN/{1.73_M2}
GLUCOSE SERPL-MCNC: 85 MG/DL (ref 70–99)
HCT VFR BLD AUTO: 35.1 % (ref 36–48)
HGB BLD-MCNC: 11.4 G/DL (ref 12–16)
LYMPHOCYTES # BLD: 3.2 K/UL (ref 1–5.1)
LYMPHOCYTES NFR BLD: 21 %
MAGNESIUM SERPL-MCNC: 1.64 MG/DL (ref 1.8–2.4)
MCH RBC QN AUTO: 29.6 PG (ref 26–34)
MCHC RBC AUTO-ENTMCNC: 32.6 G/DL (ref 31–36)
MCV RBC AUTO: 91 FL (ref 80–100)
METAMYELOCYTES NFR BLD MANUAL: 1 %
MICROCYTES BLD QL SMEAR: ABNORMAL
MONOCYTES # BLD: 1.1 K/UL (ref 0–1.3)
MONOCYTES NFR BLD: 8 %
MYELOCYTES NFR BLD MANUAL: 1 %
NEUTROPHILS # BLD: 9.2 K/UL (ref 1.7–7.7)
NEUTROPHILS NFR BLD: 66 %
OVALOCYTES BLD QL SMEAR: ABNORMAL
PHOSPHATE SERPL-MCNC: 2.5 MG/DL (ref 2.5–4.9)
PLATELET # BLD AUTO: 256 K/UL (ref 135–450)
PLATELET BLD QL SMEAR: ADEQUATE
PMV BLD AUTO: 10.2 FL (ref 5–10.5)
POIKILOCYTOSIS BLD QL SMEAR: ABNORMAL
POTASSIUM SERPL-SCNC: 3.3 MMOL/L (ref 3.5–5.1)
RBC # BLD AUTO: 3.86 M/UL (ref 4–5.2)
SLIDE REVIEW: ABNORMAL
SODIUM SERPL-SCNC: 144 MMOL/L (ref 136–145)
VARIANT LYMPHS NFR BLD MANUAL: 3 % (ref 0–6)
WBC # BLD AUTO: 13.5 K/UL (ref 4–11)

## 2025-03-10 PROCEDURE — 6370000000 HC RX 637 (ALT 250 FOR IP): Performed by: NURSE PRACTITIONER

## 2025-03-10 PROCEDURE — 2500000003 HC RX 250 WO HCPCS: Performed by: NURSE PRACTITIONER

## 2025-03-10 PROCEDURE — 6360000002 HC RX W HCPCS: Performed by: INTERNAL MEDICINE

## 2025-03-10 PROCEDURE — 6370000000 HC RX 637 (ALT 250 FOR IP): Performed by: STUDENT IN AN ORGANIZED HEALTH CARE EDUCATION/TRAINING PROGRAM

## 2025-03-10 PROCEDURE — 2500000003 HC RX 250 WO HCPCS

## 2025-03-10 PROCEDURE — 85025 COMPLETE CBC W/AUTO DIFF WBC: CPT

## 2025-03-10 PROCEDURE — 99222 1ST HOSP IP/OBS MODERATE 55: CPT | Performed by: INTERNAL MEDICINE

## 2025-03-10 PROCEDURE — 1200000000 HC SEMI PRIVATE

## 2025-03-10 PROCEDURE — 2700000000 HC OXYGEN THERAPY PER DAY

## 2025-03-10 PROCEDURE — 93306 TTE W/DOPPLER COMPLETE: CPT

## 2025-03-10 PROCEDURE — 6370000000 HC RX 637 (ALT 250 FOR IP): Performed by: INTERNAL MEDICINE

## 2025-03-10 PROCEDURE — 36415 COLL VENOUS BLD VENIPUNCTURE: CPT

## 2025-03-10 PROCEDURE — 84100 ASSAY OF PHOSPHORUS: CPT

## 2025-03-10 PROCEDURE — 83735 ASSAY OF MAGNESIUM: CPT

## 2025-03-10 PROCEDURE — 2580000003 HC RX 258: Performed by: INTERNAL MEDICINE

## 2025-03-10 PROCEDURE — 6360000002 HC RX W HCPCS

## 2025-03-10 PROCEDURE — 2500000003 HC RX 250 WO HCPCS: Performed by: INTERNAL MEDICINE

## 2025-03-10 PROCEDURE — 6370000000 HC RX 637 (ALT 250 FOR IP)

## 2025-03-10 PROCEDURE — 93306 TTE W/DOPPLER COMPLETE: CPT | Performed by: INTERNAL MEDICINE

## 2025-03-10 PROCEDURE — 94669 MECHANICAL CHEST WALL OSCILL: CPT

## 2025-03-10 PROCEDURE — 93010 ELECTROCARDIOGRAM REPORT: CPT | Performed by: INTERNAL MEDICINE

## 2025-03-10 PROCEDURE — 6360000002 HC RX W HCPCS: Performed by: STUDENT IN AN ORGANIZED HEALTH CARE EDUCATION/TRAINING PROGRAM

## 2025-03-10 PROCEDURE — 99232 SBSQ HOSP IP/OBS MODERATE 35: CPT | Performed by: STUDENT IN AN ORGANIZED HEALTH CARE EDUCATION/TRAINING PROGRAM

## 2025-03-10 PROCEDURE — 94640 AIRWAY INHALATION TREATMENT: CPT

## 2025-03-10 PROCEDURE — 94761 N-INVAS EAR/PLS OXIMETRY MLT: CPT

## 2025-03-10 PROCEDURE — 6360000002 HC RX W HCPCS: Performed by: NURSE PRACTITIONER

## 2025-03-10 PROCEDURE — 2580000003 HC RX 258

## 2025-03-10 PROCEDURE — 80048 BASIC METABOLIC PNL TOTAL CA: CPT

## 2025-03-10 RX ORDER — MAGNESIUM SULFATE IN WATER 40 MG/ML
2000 INJECTION, SOLUTION INTRAVENOUS ONCE
Status: COMPLETED | OUTPATIENT
Start: 2025-03-10 | End: 2025-03-10

## 2025-03-10 RX ORDER — DOXYCYCLINE HYCLATE 100 MG
100 TABLET ORAL EVERY 12 HOURS SCHEDULED
Status: COMPLETED | OUTPATIENT
Start: 2025-03-10 | End: 2025-03-12

## 2025-03-10 RX ORDER — POTASSIUM CHLORIDE 1500 MG/1
40 TABLET, EXTENDED RELEASE ORAL ONCE
Status: COMPLETED | OUTPATIENT
Start: 2025-03-10 | End: 2025-03-10

## 2025-03-10 RX ORDER — LEVALBUTEROL 1.25 MG/.5ML
1.25 SOLUTION, CONCENTRATE RESPIRATORY (INHALATION)
Status: DISCONTINUED | OUTPATIENT
Start: 2025-03-10 | End: 2025-03-15

## 2025-03-10 RX ORDER — METOPROLOL SUCCINATE 25 MG/1
25 TABLET, EXTENDED RELEASE ORAL DAILY
Status: DISCONTINUED | OUTPATIENT
Start: 2025-03-10 | End: 2025-03-12

## 2025-03-10 RX ADMIN — LOPERAMIDE HYDROCHLORIDE 2 MG: 2 CAPSULE ORAL at 22:26

## 2025-03-10 RX ADMIN — BUDESONIDE 500 MCG: 0.5 SUSPENSION RESPIRATORY (INHALATION) at 20:43

## 2025-03-10 RX ADMIN — OXYCODONE AND ACETAMINOPHEN 1 TABLET: 5; 325 TABLET ORAL at 18:58

## 2025-03-10 RX ADMIN — ACETYLCYSTEINE 600 MG: 200 INHALANT RESPIRATORY (INHALATION) at 20:38

## 2025-03-10 RX ADMIN — ASPIRIN 81 MG: 81 TABLET, CHEWABLE ORAL at 08:05

## 2025-03-10 RX ADMIN — DIAZEPAM 5 MG: 5 TABLET ORAL at 07:57

## 2025-03-10 RX ADMIN — METOCLOPRAMIDE 10 MG: 10 TABLET ORAL at 16:29

## 2025-03-10 RX ADMIN — METOCLOPRAMIDE 10 MG: 10 TABLET ORAL at 09:36

## 2025-03-10 RX ADMIN — POTASSIUM CHLORIDE 40 MEQ: 1500 TABLET, EXTENDED RELEASE ORAL at 08:05

## 2025-03-10 RX ADMIN — TIOTROPIUM BROMIDE INHALATION SPRAY 2 PUFF: 3.12 SPRAY, METERED RESPIRATORY (INHALATION) at 09:18

## 2025-03-10 RX ADMIN — OXYCODONE AND ACETAMINOPHEN 1 TABLET: 5; 325 TABLET ORAL at 13:43

## 2025-03-10 RX ADMIN — BUSPIRONE HYDROCHLORIDE 10 MG: 5 TABLET ORAL at 20:03

## 2025-03-10 RX ADMIN — ARFORMOTEROL TARTRATE 15 MCG: 15 SOLUTION RESPIRATORY (INHALATION) at 20:41

## 2025-03-10 RX ADMIN — POTASSIUM CHLORIDE 40 MEQ: 1500 TABLET, EXTENDED RELEASE ORAL at 06:38

## 2025-03-10 RX ADMIN — DOXYCYCLINE HYCLATE 100 MG: 100 TABLET, COATED ORAL at 12:04

## 2025-03-10 RX ADMIN — GUAIFENESIN 600 MG: 600 TABLET ORAL at 20:03

## 2025-03-10 RX ADMIN — BUSPIRONE HYDROCHLORIDE 10 MG: 5 TABLET ORAL at 13:43

## 2025-03-10 RX ADMIN — POTASSIUM PHOSPHATE, MONOBASIC AND POTASSIUM PHOSPHATE, DIBASIC 15 MMOL: 224; 236 INJECTION, SOLUTION, CONCENTRATE INTRAVENOUS at 10:04

## 2025-03-10 RX ADMIN — ARFORMOTEROL TARTRATE 15 MCG: 15 SOLUTION RESPIRATORY (INHALATION) at 09:15

## 2025-03-10 RX ADMIN — DOXYCYCLINE 100 MG: 100 INJECTION, POWDER, LYOPHILIZED, FOR SOLUTION INTRAVENOUS at 02:33

## 2025-03-10 RX ADMIN — LEVALBUTEROL 1.25 MG: 1.25 SOLUTION, CONCENTRATE RESPIRATORY (INHALATION) at 20:38

## 2025-03-10 RX ADMIN — MAGNESIUM SULFATE HEPTAHYDRATE 2000 MG: 40 INJECTION, SOLUTION INTRAVENOUS at 08:21

## 2025-03-10 RX ADMIN — LEVALBUTEROL 1.25 MG: 1.25 SOLUTION, CONCENTRATE RESPIRATORY (INHALATION) at 09:13

## 2025-03-10 RX ADMIN — GUAIFENESIN 600 MG: 600 TABLET ORAL at 08:05

## 2025-03-10 RX ADMIN — SODIUM CHLORIDE, PRESERVATIVE FREE 10 ML: 5 INJECTION INTRAVENOUS at 09:39

## 2025-03-10 RX ADMIN — DOXYCYCLINE HYCLATE 100 MG: 100 TABLET, COATED ORAL at 20:02

## 2025-03-10 RX ADMIN — DIAZEPAM 5 MG: 5 TABLET ORAL at 20:02

## 2025-03-10 RX ADMIN — ROFLUMILAST 250 MCG: 500 TABLET ORAL at 09:36

## 2025-03-10 RX ADMIN — BUSPIRONE HYDROCHLORIDE 10 MG: 5 TABLET ORAL at 08:04

## 2025-03-10 RX ADMIN — OXYCODONE AND ACETAMINOPHEN 1 TABLET: 5; 325 TABLET ORAL at 04:30

## 2025-03-10 RX ADMIN — BUDESONIDE 500 MCG: 0.5 SUSPENSION RESPIRATORY (INHALATION) at 09:16

## 2025-03-10 RX ADMIN — OXYCODONE AND ACETAMINOPHEN 1 TABLET: 5; 325 TABLET ORAL at 08:24

## 2025-03-10 RX ADMIN — PREDNISONE 40 MG: 20 TABLET ORAL at 08:05

## 2025-03-10 RX ADMIN — PANTOPRAZOLE SODIUM 40 MG: 40 TABLET, DELAYED RELEASE ORAL at 08:05

## 2025-03-10 RX ADMIN — FUROSEMIDE 20 MG: 20 TABLET ORAL at 08:06

## 2025-03-10 RX ADMIN — METOPROLOL SUCCINATE 25 MG: 25 TABLET, EXTENDED RELEASE ORAL at 12:04

## 2025-03-10 RX ADMIN — SODIUM CHLORIDE, PRESERVATIVE FREE 10 ML: 5 INJECTION INTRAVENOUS at 20:03

## 2025-03-10 RX ADMIN — ENOXAPARIN SODIUM 30 MG: 100 INJECTION SUBCUTANEOUS at 09:42

## 2025-03-10 ASSESSMENT — PAIN SCALES - GENERAL
PAINLEVEL_OUTOF10: 3
PAINLEVEL_OUTOF10: 7
PAINLEVEL_OUTOF10: 0
PAINLEVEL_OUTOF10: 5
PAINLEVEL_OUTOF10: 6
PAINLEVEL_OUTOF10: 0
PAINLEVEL_OUTOF10: 5

## 2025-03-10 ASSESSMENT — PAIN DESCRIPTION - DESCRIPTORS: DESCRIPTORS: ACHING

## 2025-03-10 ASSESSMENT — PAIN SCALES - WONG BAKER
WONGBAKER_NUMERICALRESPONSE: NO HURT
WONGBAKER_NUMERICALRESPONSE: NO HURT

## 2025-03-10 ASSESSMENT — PAIN DESCRIPTION - LOCATION
LOCATION: BACK
LOCATION: ABDOMEN;BACK
LOCATION: BACK

## 2025-03-10 ASSESSMENT — PAIN DESCRIPTION - ORIENTATION: ORIENTATION: RIGHT;LEFT

## 2025-03-10 NOTE — ACP (ADVANCE CARE PLANNING)
Advance Care Planning     Advance Care Planning Inpatient Note  [facility] Department    Today's Date: 3/10/2025  Unit: [place] C3 TELE/MED SURG/ONC    Received request from admission screening.  Upon review of chart and communication with care team, patient's decision making abilities are not in question.. Patient was/were present in the room during visit.    Goals of ACP Conversation:  Discuss advance care planning documents    Health Care Decision Makers:       Primary Decision Maker: PhilTy - Child - [phone]    Primary Decision Maker: Kate Viera - Child - [phone]  Summary:  Verified Healthcare Decision Maker    Advance Care Planning Documents (Patient Wishes):  Pt stated she has copy of forms at home that she has previously completed.      Assessment:  Pt stated that forms she has at home reflect her wishes and are accurate.     Interventions:   advised Pt to have son bring completed copy of AD forms in to be scanned into her chart.    Care Preferences Communicated:   No    Outcomes/Plan:  Left blank copy of AD forms with Pt in case she was unable to find previously-completed copy and needed to do new forms.    Electronically signed by Chaplain Manjinder on 3/10/2025 at 6:32 PM

## 2025-03-10 NOTE — TELEPHONE ENCOUNTER
Monitor company [employer]  Length of monitor 30 days  Monitor ordered by Mrayam Juarez DO    Patch ID [id number]   Device number [id number]  Monitor given to Yani Guzman, [facility]   Nurse to apply at the time of discharge.

## 2025-03-10 NOTE — DISCHARGE INSTR - COC
is necessary for the continuing treatment of the diagnosis listed and that she requires Home Care for greater 30 days.     Update Admission H&P: No change in H&P    PHYSICIAN SIGNATURE:  Electronically signed by Chaitanya Suresh MD on 3/16/25 at 10:22 AM EDT

## 2025-03-10 NOTE — CARE COORDINATION
Case Management Assessment  Initial Evaluation    Date/Time of Evaluation: 3/10/2025 3:59 PM  Assessment Completed by: TRAVIS Lacey    If patient is discharged prior to next notation, then this note serves as note for discharge by case management.    Patient Name: Joyce Uribe                   YOB: 1960  Diagnosis: COPD exacerbation (HCC) [J44.1]  Chronic diastolic congestive heart failure (HCC) [I50.32]  COPD with acute exacerbation (HCC) [J44.1]                   Date / Time: 3/7/2025  4:28 PM    Patient Admission Status: Inpatient   Readmission Risk (Low < 19, Mod (19-27), High > 27): Readmission Risk Score: 19.8    Current PCP: Ghada Marr APRN - CNP  PCP verified by CM? Yes    Chart Reviewed: Yes      History Provided by: Patient  Patient Orientation: Alert and Oriented, Self, Situation, Place, Person    Patient Cognition: Alert    Hospitalization in the last 30 days (Readmission):  No      Advance Directives:      Code Status: Full Code   Patient's Primary Decision Maker is: Legal Next of Kin    Primary Decision Maker: Ty Villarreal - Child - [phone]    Primary Decision Maker: MaximilianoKate - Child - [phone]    Discharge Planning:    Patient lives with: Alone Type of Home: Apartment  Primary Care Giver: Self  Patient Support Systems include: Children, Family Members   Current Financial resources: Medicare, Medicaid  Current community resources: None  Current services prior to admission: Durable Medical Equipment            Current DME: Walker, Wheelchair, Shower Chair, Cane, Oxygen Therapy (Comment) (3 L 02 thru [facility])            Type of Home Care services:  Nursing Services    ADLS  Prior functional level: Assistance with the following:, Bathing, Dressing, Toileting, Cooking, Housework, Shopping, Mobility  Current functional level: Mobility, Shopping, Housework, Cooking, Toileting, Dressing, Bathing, Assistance with the following:    PT AM-PAC: 16 /24  OT AM-PAC:

## 2025-03-10 NOTE — ACP (ADVANCE CARE PLANNING)
Advance Care Planning     General Advance Care Planning (ACP) Conversation    Date of Conversation: 3/10/2025  Conducted with: Patient with Decision Making Capacity  Other persons present: None    Healthcare Decision Maker:   Primary Decision Maker: Ty Villarreal - Child - [phone]    Primary Decision Maker: Kate Viera - Child - [phone]     Today we referred to ACP Clinical Specialist for assistance.  Content/Action Overview:  Patient is ready to consider care preferences-refer to ACP Clinical Specialist  Patient reports she is a DNR. Chart does not reflect. Message sent to MD During ACP converstation Patient reports she should be a DNR/Limited x4. Please review with Patient and update orders Thanks Alisha [phone]     Length of Voluntary ACP Conversation in minutes:  <16 minutes (Non-Billable)    TRAVIS Lacey

## 2025-03-11 LAB
ANION GAP SERPL CALCULATED.3IONS-SCNC: 9 MMOL/L (ref 3–16)
ANISOCYTOSIS BLD QL SMEAR: ABNORMAL
BASOPHILS # BLD: 0 K/UL (ref 0–0.2)
BASOPHILS NFR BLD: 0 %
BUN SERPL-MCNC: 20 MG/DL (ref 7–20)
CALCIUM SERPL-MCNC: 8.8 MG/DL (ref 8.3–10.6)
CHLORIDE SERPL-SCNC: 103 MMOL/L (ref 99–110)
CO2 SERPL-SCNC: 32 MMOL/L (ref 21–32)
CREAT SERPL-MCNC: 0.6 MG/DL (ref 0.6–1.2)
DEPRECATED RDW RBC AUTO: 14.9 % (ref 12.4–15.4)
EOSINOPHIL # BLD: 0 K/UL (ref 0–0.6)
EOSINOPHIL NFR BLD: 0 %
GFR SERPLBLD CREATININE-BSD FMLA CKD-EPI: >90 ML/MIN/{1.73_M2}
GLUCOSE SERPL-MCNC: 116 MG/DL (ref 70–99)
HCT VFR BLD AUTO: 37 % (ref 36–48)
HGB BLD-MCNC: 12 G/DL (ref 12–16)
LYMPHOCYTES # BLD: 2.9 K/UL (ref 1–5.1)
LYMPHOCYTES NFR BLD: 11 %
MAGNESIUM SERPL-MCNC: 1.68 MG/DL (ref 1.8–2.4)
MCH RBC QN AUTO: 29.6 PG (ref 26–34)
MCHC RBC AUTO-ENTMCNC: 32.4 G/DL (ref 31–36)
MCV RBC AUTO: 91.3 FL (ref 80–100)
MONOCYTES # BLD: 1.2 K/UL (ref 0–1.3)
MONOCYTES NFR BLD: 8 %
NEUTROPHILS # BLD: 11 K/UL (ref 1.7–7.7)
NEUTROPHILS NFR BLD: 60 %
NEUTS BAND NFR BLD MANUAL: 13 % (ref 0–7)
OVALOCYTES BLD QL SMEAR: ABNORMAL
PHOSPHATE SERPL-MCNC: 2.9 MG/DL (ref 2.5–4.9)
PLATELET # BLD AUTO: 289 K/UL (ref 135–450)
PMV BLD AUTO: 10.4 FL (ref 5–10.5)
POIKILOCYTOSIS BLD QL SMEAR: ABNORMAL
POTASSIUM SERPL-SCNC: 4 MMOL/L (ref 3.5–5.1)
RBC # BLD AUTO: 4.06 M/UL (ref 4–5.2)
SCHISTOCYTES BLD QL SMEAR: ABNORMAL
SODIUM SERPL-SCNC: 144 MMOL/L (ref 136–145)
VARIANT LYMPHS NFR BLD MANUAL: 8 % (ref 0–6)
WBC # BLD AUTO: 15 K/UL (ref 4–11)

## 2025-03-11 PROCEDURE — 84100 ASSAY OF PHOSPHORUS: CPT

## 2025-03-11 PROCEDURE — 80048 BASIC METABOLIC PNL TOTAL CA: CPT

## 2025-03-11 PROCEDURE — 6360000002 HC RX W HCPCS: Performed by: STUDENT IN AN ORGANIZED HEALTH CARE EDUCATION/TRAINING PROGRAM

## 2025-03-11 PROCEDURE — 6370000000 HC RX 637 (ALT 250 FOR IP): Performed by: NURSE PRACTITIONER

## 2025-03-11 PROCEDURE — 94640 AIRWAY INHALATION TREATMENT: CPT

## 2025-03-11 PROCEDURE — 97530 THERAPEUTIC ACTIVITIES: CPT

## 2025-03-11 PROCEDURE — 94761 N-INVAS EAR/PLS OXIMETRY MLT: CPT

## 2025-03-11 PROCEDURE — 2700000000 HC OXYGEN THERAPY PER DAY

## 2025-03-11 PROCEDURE — 36415 COLL VENOUS BLD VENIPUNCTURE: CPT

## 2025-03-11 PROCEDURE — 83735 ASSAY OF MAGNESIUM: CPT

## 2025-03-11 PROCEDURE — 85025 COMPLETE CBC W/AUTO DIFF WBC: CPT

## 2025-03-11 PROCEDURE — 99232 SBSQ HOSP IP/OBS MODERATE 35: CPT | Performed by: NURSE PRACTITIONER

## 2025-03-11 PROCEDURE — 1200000000 HC SEMI PRIVATE

## 2025-03-11 PROCEDURE — 6360000002 HC RX W HCPCS: Performed by: NURSE PRACTITIONER

## 2025-03-11 PROCEDURE — 97110 THERAPEUTIC EXERCISES: CPT

## 2025-03-11 PROCEDURE — 6370000000 HC RX 637 (ALT 250 FOR IP)

## 2025-03-11 PROCEDURE — 99232 SBSQ HOSP IP/OBS MODERATE 35: CPT | Performed by: STUDENT IN AN ORGANIZED HEALTH CARE EDUCATION/TRAINING PROGRAM

## 2025-03-11 PROCEDURE — 94669 MECHANICAL CHEST WALL OSCILL: CPT

## 2025-03-11 PROCEDURE — 2500000003 HC RX 250 WO HCPCS: Performed by: NURSE PRACTITIONER

## 2025-03-11 PROCEDURE — 6360000002 HC RX W HCPCS: Performed by: INTERNAL MEDICINE

## 2025-03-11 RX ADMIN — METOCLOPRAMIDE 10 MG: 10 TABLET ORAL at 06:10

## 2025-03-11 RX ADMIN — OXYCODONE AND ACETAMINOPHEN 1 TABLET: 5; 325 TABLET ORAL at 08:12

## 2025-03-11 RX ADMIN — BUDESONIDE 500 MCG: 0.5 SUSPENSION RESPIRATORY (INHALATION) at 08:38

## 2025-03-11 RX ADMIN — TIOTROPIUM BROMIDE INHALATION SPRAY 2 PUFF: 3.12 SPRAY, METERED RESPIRATORY (INHALATION) at 08:40

## 2025-03-11 RX ADMIN — BUSPIRONE HYDROCHLORIDE 10 MG: 5 TABLET ORAL at 20:23

## 2025-03-11 RX ADMIN — OXYCODONE AND ACETAMINOPHEN 1 TABLET: 5; 325 TABLET ORAL at 03:48

## 2025-03-11 RX ADMIN — LOPERAMIDE HYDROCHLORIDE 2 MG: 2 CAPSULE ORAL at 21:50

## 2025-03-11 RX ADMIN — LEVALBUTEROL 1.25 MG: 1.25 SOLUTION, CONCENTRATE RESPIRATORY (INHALATION) at 19:45

## 2025-03-11 RX ADMIN — ENOXAPARIN SODIUM 30 MG: 100 INJECTION SUBCUTANEOUS at 08:06

## 2025-03-11 RX ADMIN — OXYCODONE AND ACETAMINOPHEN 1 TABLET: 5; 325 TABLET ORAL at 20:23

## 2025-03-11 RX ADMIN — SODIUM CHLORIDE, PRESERVATIVE FREE 10 ML: 5 INJECTION INTRAVENOUS at 08:12

## 2025-03-11 RX ADMIN — PREDNISONE 40 MG: 20 TABLET ORAL at 08:06

## 2025-03-11 RX ADMIN — FUROSEMIDE 20 MG: 20 TABLET ORAL at 08:06

## 2025-03-11 RX ADMIN — ASPIRIN 81 MG: 81 TABLET, CHEWABLE ORAL at 08:06

## 2025-03-11 RX ADMIN — BUSPIRONE HYDROCHLORIDE 10 MG: 5 TABLET ORAL at 08:06

## 2025-03-11 RX ADMIN — ROFLUMILAST 250 MCG: 500 TABLET ORAL at 08:12

## 2025-03-11 RX ADMIN — PANTOPRAZOLE SODIUM 40 MG: 40 TABLET, DELAYED RELEASE ORAL at 08:06

## 2025-03-11 RX ADMIN — ARFORMOTEROL TARTRATE 15 MCG: 15 SOLUTION RESPIRATORY (INHALATION) at 08:36

## 2025-03-11 RX ADMIN — OXYCODONE AND ACETAMINOPHEN 1 TABLET: 5; 325 TABLET ORAL at 13:58

## 2025-03-11 RX ADMIN — LEVALBUTEROL 1.25 MG: 1.25 SOLUTION, CONCENTRATE RESPIRATORY (INHALATION) at 15:13

## 2025-03-11 RX ADMIN — DOXYCYCLINE HYCLATE 100 MG: 100 TABLET, COATED ORAL at 20:23

## 2025-03-11 RX ADMIN — BUDESONIDE 500 MCG: 0.5 SUSPENSION RESPIRATORY (INHALATION) at 19:50

## 2025-03-11 RX ADMIN — LEVALBUTEROL 1.25 MG: 1.25 SOLUTION, CONCENTRATE RESPIRATORY (INHALATION) at 08:34

## 2025-03-11 RX ADMIN — GUAIFENESIN 600 MG: 600 TABLET ORAL at 20:23

## 2025-03-11 RX ADMIN — LOPERAMIDE HYDROCHLORIDE 2 MG: 2 CAPSULE ORAL at 10:48

## 2025-03-11 RX ADMIN — LEVALBUTEROL 1.25 MG: 1.25 SOLUTION, CONCENTRATE RESPIRATORY (INHALATION) at 11:44

## 2025-03-11 RX ADMIN — SODIUM CHLORIDE, PRESERVATIVE FREE 10 ML: 5 INJECTION INTRAVENOUS at 20:29

## 2025-03-11 RX ADMIN — BUSPIRONE HYDROCHLORIDE 10 MG: 5 TABLET ORAL at 13:57

## 2025-03-11 RX ADMIN — ARFORMOTEROL TARTRATE 15 MCG: 15 SOLUTION RESPIRATORY (INHALATION) at 19:55

## 2025-03-11 RX ADMIN — GUAIFENESIN 600 MG: 600 TABLET ORAL at 08:06

## 2025-03-11 RX ADMIN — DOXYCYCLINE HYCLATE 100 MG: 100 TABLET, COATED ORAL at 08:06

## 2025-03-11 ASSESSMENT — PAIN SCALES - GENERAL
PAINLEVEL_OUTOF10: 7
PAINLEVEL_OUTOF10: 3
PAINLEVEL_OUTOF10: 6
PAINLEVEL_OUTOF10: 0
PAINLEVEL_OUTOF10: 5
PAINLEVEL_OUTOF10: 7

## 2025-03-11 ASSESSMENT — PAIN DESCRIPTION - ORIENTATION
ORIENTATION: RIGHT;LEFT
ORIENTATION: LOWER

## 2025-03-11 ASSESSMENT — PAIN DESCRIPTION - DESCRIPTORS
DESCRIPTORS: ACHING
DESCRIPTORS: ACHING;DISCOMFORT

## 2025-03-11 ASSESSMENT — PAIN - FUNCTIONAL ASSESSMENT: PAIN_FUNCTIONAL_ASSESSMENT: ACTIVITIES ARE NOT PREVENTED

## 2025-03-11 ASSESSMENT — PAIN DESCRIPTION - ONSET: ONSET: GRADUAL

## 2025-03-11 ASSESSMENT — PAIN DESCRIPTION - LOCATION
LOCATION: BACK
LOCATION: BACK;CHEST

## 2025-03-11 ASSESSMENT — PAIN DESCRIPTION - PAIN TYPE
TYPE: ACUTE PAIN
TYPE: ACUTE PAIN;CHRONIC PAIN

## 2025-03-11 ASSESSMENT — PAIN SCALES - WONG BAKER
WONGBAKER_NUMERICALRESPONSE: NO HURT
WONGBAKER_NUMERICALRESPONSE: NO HURT

## 2025-03-11 ASSESSMENT — PAIN DESCRIPTION - FREQUENCY: FREQUENCY: INTERMITTENT

## 2025-03-11 NOTE — CARE COORDINATION
Chart reviewed day 4. Care managed by cardiology, pulmonology and IM. Palliative consult pending. Spoke with patient. Discussed SNF recs again. Continues to decline. Discussed HHC option and referral pending to [facility]. She is now declining that as well. Stated she really only wants her aide services M-F for 4.5 hrs provided by [facility]. Stated home care \"doesn't do anything.\" Per Cardiology, pending ECHO, would recommend Yessica vs cath, medication adjustments and 30 day monitor. Will need squad transport home with continuous O2. Simon Beach RN

## 2025-03-12 LAB
ANION GAP SERPL CALCULATED.3IONS-SCNC: 13 MMOL/L (ref 3–16)
BASOPHILS # BLD: 0 K/UL (ref 0–0.2)
BASOPHILS NFR BLD: 0 %
BUN SERPL-MCNC: 24 MG/DL (ref 7–20)
CALCIUM SERPL-MCNC: 9.7 MG/DL (ref 8.3–10.6)
CHLORIDE SERPL-SCNC: 99 MMOL/L (ref 99–110)
CO2 SERPL-SCNC: 30 MMOL/L (ref 21–32)
CREAT SERPL-MCNC: 0.6 MG/DL (ref 0.6–1.2)
DEPRECATED RDW RBC AUTO: 14.7 % (ref 12.4–15.4)
EKG ATRIAL RATE: 147 BPM
EKG DIAGNOSIS: NORMAL
EKG P AXIS: 78 DEGREES
EKG P-R INTERVAL: 128 MS
EKG Q-T INTERVAL: 340 MS
EKG QRS DURATION: 74 MS
EKG QTC CALCULATION (BAZETT): 462 MS
EKG R AXIS: 68 DEGREES
EKG T AXIS: 73 DEGREES
EKG VENTRICULAR RATE: 111 BPM
EOSINOPHIL # BLD: 0 K/UL (ref 0–0.6)
EOSINOPHIL NFR BLD: 0 %
GFR SERPLBLD CREATININE-BSD FMLA CKD-EPI: >90 ML/MIN/{1.73_M2}
GLUCOSE SERPL-MCNC: 136 MG/DL (ref 70–99)
HCT VFR BLD AUTO: 41.7 % (ref 36–48)
HGB BLD-MCNC: 13.6 G/DL (ref 12–16)
LYMPHOCYTES # BLD: 4.2 K/UL (ref 1–5.1)
LYMPHOCYTES NFR BLD: 24 %
MCH RBC QN AUTO: 29.9 PG (ref 26–34)
MCHC RBC AUTO-ENTMCNC: 32.7 G/DL (ref 31–36)
MCV RBC AUTO: 91.4 FL (ref 80–100)
MONOCYTES # BLD: 1.6 K/UL (ref 0–1.3)
MONOCYTES NFR BLD: 9 %
NEUTROPHILS # BLD: 11.8 K/UL (ref 1.7–7.7)
NEUTROPHILS NFR BLD: 66 %
NEUTS BAND NFR BLD MANUAL: 1 % (ref 0–7)
OVALOCYTES BLD QL SMEAR: ABNORMAL
PLATELET # BLD AUTO: 350 K/UL (ref 135–450)
PLATELET BLD QL SMEAR: ADEQUATE
PMV BLD AUTO: 10.3 FL (ref 5–10.5)
POTASSIUM SERPL-SCNC: 4 MMOL/L (ref 3.5–5.1)
RBC # BLD AUTO: 4.56 M/UL (ref 4–5.2)
SLIDE REVIEW: ABNORMAL
SODIUM SERPL-SCNC: 142 MMOL/L (ref 136–145)
TROPONIN, HIGH SENSITIVITY: 16 NG/L (ref 0–14)
TROPONIN, HIGH SENSITIVITY: 20 NG/L (ref 0–14)
WBC # BLD AUTO: 17.6 K/UL (ref 4–11)

## 2025-03-12 PROCEDURE — 36415 COLL VENOUS BLD VENIPUNCTURE: CPT

## 2025-03-12 PROCEDURE — 6370000000 HC RX 637 (ALT 250 FOR IP): Performed by: NURSE PRACTITIONER

## 2025-03-12 PROCEDURE — 93005 ELECTROCARDIOGRAM TRACING: CPT

## 2025-03-12 PROCEDURE — 94669 MECHANICAL CHEST WALL OSCILL: CPT

## 2025-03-12 PROCEDURE — 84484 ASSAY OF TROPONIN QUANT: CPT

## 2025-03-12 PROCEDURE — 94761 N-INVAS EAR/PLS OXIMETRY MLT: CPT

## 2025-03-12 PROCEDURE — 99232 SBSQ HOSP IP/OBS MODERATE 35: CPT | Performed by: STUDENT IN AN ORGANIZED HEALTH CARE EDUCATION/TRAINING PROGRAM

## 2025-03-12 PROCEDURE — 85025 COMPLETE CBC W/AUTO DIFF WBC: CPT

## 2025-03-12 PROCEDURE — 6370000000 HC RX 637 (ALT 250 FOR IP): Performed by: INTERNAL MEDICINE

## 2025-03-12 PROCEDURE — 6360000002 HC RX W HCPCS: Performed by: INTERNAL MEDICINE

## 2025-03-12 PROCEDURE — 1200000000 HC SEMI PRIVATE

## 2025-03-12 PROCEDURE — 6360000002 HC RX W HCPCS: Performed by: STUDENT IN AN ORGANIZED HEALTH CARE EDUCATION/TRAINING PROGRAM

## 2025-03-12 PROCEDURE — 2500000003 HC RX 250 WO HCPCS: Performed by: NURSE PRACTITIONER

## 2025-03-12 PROCEDURE — 93010 ELECTROCARDIOGRAM REPORT: CPT | Performed by: INTERNAL MEDICINE

## 2025-03-12 PROCEDURE — 94640 AIRWAY INHALATION TREATMENT: CPT

## 2025-03-12 PROCEDURE — 6370000000 HC RX 637 (ALT 250 FOR IP)

## 2025-03-12 PROCEDURE — 6360000002 HC RX W HCPCS: Performed by: NURSE PRACTITIONER

## 2025-03-12 PROCEDURE — 2700000000 HC OXYGEN THERAPY PER DAY

## 2025-03-12 PROCEDURE — 80048 BASIC METABOLIC PNL TOTAL CA: CPT

## 2025-03-12 RX ORDER — METOPROLOL SUCCINATE 25 MG/1
25 TABLET, EXTENDED RELEASE ORAL 2 TIMES DAILY
Status: DISCONTINUED | OUTPATIENT
Start: 2025-03-12 | End: 2025-03-16 | Stop reason: HOSPADM

## 2025-03-12 RX ORDER — NITROGLYCERIN 0.4 MG/1
0.4 TABLET SUBLINGUAL EVERY 5 MIN PRN
Status: DISCONTINUED | OUTPATIENT
Start: 2025-03-12 | End: 2025-03-16 | Stop reason: HOSPADM

## 2025-03-12 RX ADMIN — SODIUM CHLORIDE, PRESERVATIVE FREE 10 ML: 5 INJECTION INTRAVENOUS at 20:27

## 2025-03-12 RX ADMIN — DOXYCYCLINE HYCLATE 100 MG: 100 TABLET, COATED ORAL at 08:18

## 2025-03-12 RX ADMIN — METOPROLOL SUCCINATE 25 MG: 25 TABLET, EXTENDED RELEASE ORAL at 08:19

## 2025-03-12 RX ADMIN — BUDESONIDE 500 MCG: 0.5 SUSPENSION RESPIRATORY (INHALATION) at 19:59

## 2025-03-12 RX ADMIN — OXYCODONE AND ACETAMINOPHEN 1 TABLET: 5; 325 TABLET ORAL at 01:26

## 2025-03-12 RX ADMIN — BUSPIRONE HYDROCHLORIDE 10 MG: 5 TABLET ORAL at 08:18

## 2025-03-12 RX ADMIN — ROFLUMILAST 250 MCG: 500 TABLET ORAL at 08:19

## 2025-03-12 RX ADMIN — ARFORMOTEROL TARTRATE 15 MCG: 15 SOLUTION RESPIRATORY (INHALATION) at 19:54

## 2025-03-12 RX ADMIN — ENOXAPARIN SODIUM 30 MG: 100 INJECTION SUBCUTANEOUS at 08:33

## 2025-03-12 RX ADMIN — LEVALBUTEROL 1.25 MG: 1.25 SOLUTION, CONCENTRATE RESPIRATORY (INHALATION) at 15:50

## 2025-03-12 RX ADMIN — LEVALBUTEROL 1.25 MG: 1.25 SOLUTION, CONCENTRATE RESPIRATORY (INHALATION) at 19:47

## 2025-03-12 RX ADMIN — BUSPIRONE HYDROCHLORIDE 10 MG: 5 TABLET ORAL at 13:54

## 2025-03-12 RX ADMIN — DIAZEPAM 5 MG: 5 TABLET ORAL at 20:27

## 2025-03-12 RX ADMIN — OXYCODONE AND ACETAMINOPHEN 1 TABLET: 5; 325 TABLET ORAL at 13:54

## 2025-03-12 RX ADMIN — OXYCODONE AND ACETAMINOPHEN 1 TABLET: 5; 325 TABLET ORAL at 20:27

## 2025-03-12 RX ADMIN — METOCLOPRAMIDE 10 MG: 10 TABLET ORAL at 10:19

## 2025-03-12 RX ADMIN — SODIUM CHLORIDE, PRESERVATIVE FREE 10 ML: 5 INJECTION INTRAVENOUS at 08:28

## 2025-03-12 RX ADMIN — BUSPIRONE HYDROCHLORIDE 10 MG: 5 TABLET ORAL at 20:27

## 2025-03-12 RX ADMIN — GUAIFENESIN 600 MG: 600 TABLET ORAL at 08:18

## 2025-03-12 RX ADMIN — METOPROLOL SUCCINATE 25 MG: 25 TABLET, EXTENDED RELEASE ORAL at 20:27

## 2025-03-12 RX ADMIN — PREDNISONE 40 MG: 20 TABLET ORAL at 08:19

## 2025-03-12 RX ADMIN — FUROSEMIDE 20 MG: 20 TABLET ORAL at 08:18

## 2025-03-12 RX ADMIN — METOCLOPRAMIDE 10 MG: 10 TABLET ORAL at 17:42

## 2025-03-12 RX ADMIN — ASPIRIN 81 MG: 81 TABLET, CHEWABLE ORAL at 08:19

## 2025-03-12 RX ADMIN — GUAIFENESIN 600 MG: 600 TABLET ORAL at 20:26

## 2025-03-12 RX ADMIN — PANTOPRAZOLE SODIUM 40 MG: 40 TABLET, DELAYED RELEASE ORAL at 08:18

## 2025-03-12 RX ADMIN — DOXYCYCLINE HYCLATE 100 MG: 100 TABLET, COATED ORAL at 20:27

## 2025-03-12 RX ADMIN — OXYCODONE AND ACETAMINOPHEN 1 TABLET: 5; 325 TABLET ORAL at 08:18

## 2025-03-12 RX ADMIN — LEVALBUTEROL 1.25 MG: 1.25 SOLUTION, CONCENTRATE RESPIRATORY (INHALATION) at 11:46

## 2025-03-12 ASSESSMENT — PAIN SCALES - GENERAL
PAINLEVEL_OUTOF10: 7
PAINLEVEL_OUTOF10: 3
PAINLEVEL_OUTOF10: 7

## 2025-03-12 ASSESSMENT — PAIN DESCRIPTION - PAIN TYPE: TYPE: CHRONIC PAIN

## 2025-03-12 ASSESSMENT — PAIN - FUNCTIONAL ASSESSMENT: PAIN_FUNCTIONAL_ASSESSMENT: PREVENTS OR INTERFERES SOME ACTIVE ACTIVITIES AND ADLS

## 2025-03-12 ASSESSMENT — PAIN SCALES - WONG BAKER
WONGBAKER_NUMERICALRESPONSE: NO HURT
WONGBAKER_NUMERICALRESPONSE: NO HURT

## 2025-03-12 ASSESSMENT — PAIN DESCRIPTION - LOCATION: LOCATION: BACK

## 2025-03-12 ASSESSMENT — PAIN DESCRIPTION - DESCRIPTORS: DESCRIPTORS: ACHING

## 2025-03-12 ASSESSMENT — PAIN DESCRIPTION - ORIENTATION: ORIENTATION: LOWER

## 2025-03-12 NOTE — CARE COORDINATION
Chart reviewed. Day 5. Care managed by cardiology, pulm and IM. Patient spoke with palliative yesterday. Confirmed DNR status. Agreeable to d/c monitor and would consider recommended cardiac testing. Plan to resume [facility]. Will resume aides services 4.5 hrs/day thru [facility]. Continues to decline SNF. Episode of CP today. Following. Simon Beach RN

## 2025-03-13 LAB
ANION GAP SERPL CALCULATED.3IONS-SCNC: 11 MMOL/L (ref 3–16)
BASOPHILS # BLD: 0 K/UL (ref 0–0.2)
BASOPHILS NFR BLD: 0.1 %
BUN SERPL-MCNC: 26 MG/DL (ref 7–20)
CALCIUM SERPL-MCNC: 9.4 MG/DL (ref 8.3–10.6)
CHLORIDE SERPL-SCNC: 99 MMOL/L (ref 99–110)
CO2 SERPL-SCNC: 31 MMOL/L (ref 21–32)
CREAT SERPL-MCNC: 0.6 MG/DL (ref 0.6–1.2)
DEPRECATED RDW RBC AUTO: 15.2 % (ref 12.4–15.4)
EOSINOPHIL # BLD: 0.1 K/UL (ref 0–0.6)
EOSINOPHIL NFR BLD: 0.5 %
GFR SERPLBLD CREATININE-BSD FMLA CKD-EPI: >90 ML/MIN/{1.73_M2}
GLUCOSE SERPL-MCNC: 100 MG/DL (ref 70–99)
HCT VFR BLD AUTO: 39.7 % (ref 36–48)
HGB BLD-MCNC: 13 G/DL (ref 12–16)
LYMPHOCYTES # BLD: 4.5 K/UL (ref 1–5.1)
LYMPHOCYTES NFR BLD: 26.8 %
MCH RBC QN AUTO: 29.7 PG (ref 26–34)
MCHC RBC AUTO-ENTMCNC: 32.8 G/DL (ref 31–36)
MCV RBC AUTO: 90.5 FL (ref 80–100)
MONOCYTES # BLD: 1 K/UL (ref 0–1.3)
MONOCYTES NFR BLD: 6.2 %
NEUTROPHILS # BLD: 11.1 K/UL (ref 1.7–7.7)
NEUTROPHILS NFR BLD: 66.4 %
PLATELET # BLD AUTO: 326 K/UL (ref 135–450)
PMV BLD AUTO: 10.3 FL (ref 5–10.5)
POTASSIUM SERPL-SCNC: 3.4 MMOL/L (ref 3.5–5.1)
RBC # BLD AUTO: 4.38 M/UL (ref 4–5.2)
SODIUM SERPL-SCNC: 141 MMOL/L (ref 136–145)
WBC # BLD AUTO: 16.6 K/UL (ref 4–11)

## 2025-03-13 PROCEDURE — 6370000000 HC RX 637 (ALT 250 FOR IP)

## 2025-03-13 PROCEDURE — 80048 BASIC METABOLIC PNL TOTAL CA: CPT

## 2025-03-13 PROCEDURE — 85025 COMPLETE CBC W/AUTO DIFF WBC: CPT

## 2025-03-13 PROCEDURE — 6360000002 HC RX W HCPCS: Performed by: INTERNAL MEDICINE

## 2025-03-13 PROCEDURE — 2700000000 HC OXYGEN THERAPY PER DAY

## 2025-03-13 PROCEDURE — 94761 N-INVAS EAR/PLS OXIMETRY MLT: CPT

## 2025-03-13 PROCEDURE — 6370000000 HC RX 637 (ALT 250 FOR IP): Performed by: NURSE PRACTITIONER

## 2025-03-13 PROCEDURE — 6360000002 HC RX W HCPCS: Performed by: STUDENT IN AN ORGANIZED HEALTH CARE EDUCATION/TRAINING PROGRAM

## 2025-03-13 PROCEDURE — 2500000003 HC RX 250 WO HCPCS: Performed by: NURSE PRACTITIONER

## 2025-03-13 PROCEDURE — 6360000002 HC RX W HCPCS: Performed by: NURSE PRACTITIONER

## 2025-03-13 PROCEDURE — 94640 AIRWAY INHALATION TREATMENT: CPT

## 2025-03-13 PROCEDURE — 94669 MECHANICAL CHEST WALL OSCILL: CPT

## 2025-03-13 PROCEDURE — 1200000000 HC SEMI PRIVATE

## 2025-03-13 PROCEDURE — 36415 COLL VENOUS BLD VENIPUNCTURE: CPT

## 2025-03-13 RX ORDER — REGADENOSON 0.08 MG/ML
0.4 INJECTION, SOLUTION INTRAVENOUS
Status: COMPLETED | OUTPATIENT
Start: 2025-03-13 | End: 2025-03-14

## 2025-03-13 RX ORDER — PREDNISONE 20 MG/1
TABLET ORAL
Qty: 15 TABLET | Refills: 0 | Status: SHIPPED | OUTPATIENT
Start: 2025-03-14 | End: 2025-03-16

## 2025-03-13 RX ORDER — METOPROLOL SUCCINATE 25 MG/1
25 TABLET, EXTENDED RELEASE ORAL 2 TIMES DAILY
Qty: 30 TABLET | Refills: 3 | Status: SHIPPED | OUTPATIENT
Start: 2025-03-13 | End: 2025-03-16

## 2025-03-13 RX ORDER — POTASSIUM CHLORIDE 1500 MG/1
40 TABLET, EXTENDED RELEASE ORAL ONCE
Status: COMPLETED | OUTPATIENT
Start: 2025-03-13 | End: 2025-03-13

## 2025-03-13 RX ADMIN — PANTOPRAZOLE SODIUM 40 MG: 40 TABLET, DELAYED RELEASE ORAL at 08:17

## 2025-03-13 RX ADMIN — BUSPIRONE HYDROCHLORIDE 10 MG: 5 TABLET ORAL at 08:17

## 2025-03-13 RX ADMIN — ARFORMOTEROL TARTRATE 15 MCG: 15 SOLUTION RESPIRATORY (INHALATION) at 19:26

## 2025-03-13 RX ADMIN — GUAIFENESIN 600 MG: 600 TABLET ORAL at 20:30

## 2025-03-13 RX ADMIN — OXYCODONE AND ACETAMINOPHEN 1 TABLET: 5; 325 TABLET ORAL at 12:56

## 2025-03-13 RX ADMIN — FUROSEMIDE 20 MG: 20 TABLET ORAL at 08:17

## 2025-03-13 RX ADMIN — SODIUM CHLORIDE, PRESERVATIVE FREE 10 ML: 5 INJECTION INTRAVENOUS at 08:22

## 2025-03-13 RX ADMIN — METOCLOPRAMIDE 10 MG: 10 TABLET ORAL at 20:31

## 2025-03-13 RX ADMIN — METOPROLOL SUCCINATE 25 MG: 25 TABLET, EXTENDED RELEASE ORAL at 08:17

## 2025-03-13 RX ADMIN — OXYCODONE AND ACETAMINOPHEN 1 TABLET: 5; 325 TABLET ORAL at 23:19

## 2025-03-13 RX ADMIN — ASPIRIN 81 MG: 81 TABLET, CHEWABLE ORAL at 08:17

## 2025-03-13 RX ADMIN — LEVALBUTEROL 1.25 MG: 1.25 SOLUTION, CONCENTRATE RESPIRATORY (INHALATION) at 07:37

## 2025-03-13 RX ADMIN — TIOTROPIUM BROMIDE INHALATION SPRAY 2 PUFF: 3.12 SPRAY, METERED RESPIRATORY (INHALATION) at 07:37

## 2025-03-13 RX ADMIN — OXYCODONE AND ACETAMINOPHEN 1 TABLET: 5; 325 TABLET ORAL at 18:20

## 2025-03-13 RX ADMIN — ROFLUMILAST 250 MCG: 500 TABLET ORAL at 08:17

## 2025-03-13 RX ADMIN — OXYCODONE AND ACETAMINOPHEN 1 TABLET: 5; 325 TABLET ORAL at 08:18

## 2025-03-13 RX ADMIN — SODIUM CHLORIDE, PRESERVATIVE FREE 10 ML: 5 INJECTION INTRAVENOUS at 20:32

## 2025-03-13 RX ADMIN — BUSPIRONE HYDROCHLORIDE 10 MG: 5 TABLET ORAL at 12:56

## 2025-03-13 RX ADMIN — LEVALBUTEROL 1.25 MG: 1.25 SOLUTION, CONCENTRATE RESPIRATORY (INHALATION) at 16:19

## 2025-03-13 RX ADMIN — LEVALBUTEROL 1.25 MG: 1.25 SOLUTION, CONCENTRATE RESPIRATORY (INHALATION) at 11:41

## 2025-03-13 RX ADMIN — LEVALBUTEROL 1.25 MG: 1.25 SOLUTION, CONCENTRATE RESPIRATORY (INHALATION) at 19:26

## 2025-03-13 RX ADMIN — METOPROLOL SUCCINATE 25 MG: 25 TABLET, EXTENDED RELEASE ORAL at 20:31

## 2025-03-13 RX ADMIN — DIAZEPAM 5 MG: 5 TABLET ORAL at 20:31

## 2025-03-13 RX ADMIN — ENOXAPARIN SODIUM 30 MG: 100 INJECTION SUBCUTANEOUS at 08:17

## 2025-03-13 RX ADMIN — ARFORMOTEROL TARTRATE 15 MCG: 15 SOLUTION RESPIRATORY (INHALATION) at 07:37

## 2025-03-13 RX ADMIN — BUSPIRONE HYDROCHLORIDE 10 MG: 5 TABLET ORAL at 20:31

## 2025-03-13 RX ADMIN — BUDESONIDE 500 MCG: 0.5 SUSPENSION RESPIRATORY (INHALATION) at 07:37

## 2025-03-13 RX ADMIN — ACETAMINOPHEN 650 MG: 325 TABLET ORAL at 20:31

## 2025-03-13 RX ADMIN — GUAIFENESIN 600 MG: 600 TABLET ORAL at 08:16

## 2025-03-13 RX ADMIN — BUDESONIDE 500 MCG: 0.5 SUSPENSION RESPIRATORY (INHALATION) at 19:26

## 2025-03-13 RX ADMIN — POTASSIUM CHLORIDE 40 MEQ: 1500 TABLET, EXTENDED RELEASE ORAL at 08:18

## 2025-03-13 RX ADMIN — METOCLOPRAMIDE 10 MG: 10 TABLET ORAL at 06:57

## 2025-03-13 RX ADMIN — PREDNISONE 40 MG: 20 TABLET ORAL at 08:17

## 2025-03-13 ASSESSMENT — PAIN SCALES - GENERAL
PAINLEVEL_OUTOF10: 6
PAINLEVEL_OUTOF10: 0
PAINLEVEL_OUTOF10: 6
PAINLEVEL_OUTOF10: 8
PAINLEVEL_OUTOF10: 6
PAINLEVEL_OUTOF10: 0
PAINLEVEL_OUTOF10: 7
PAINLEVEL_OUTOF10: 0

## 2025-03-13 NOTE — CARE COORDINATION
Chart reviewed day 6. Plan for lexiscan stress completion in am. D/c pending results. Will need 30 day monitor at d/c, in room. Will also need squad transport home, [address] and resumption of HHC with [place]. Has Aides M-F 4.5 hrs. Sal updated with likely nearing d/c. Simon Beach RN

## 2025-03-14 ENCOUNTER — APPOINTMENT (OUTPATIENT)
Dept: CT IMAGING | Age: 65
DRG: 190 | End: 2025-03-14
Payer: COMMERCIAL

## 2025-03-14 ENCOUNTER — APPOINTMENT (OUTPATIENT)
Dept: NUCLEAR MEDICINE | Age: 65
DRG: 190 | End: 2025-03-14
Attending: INTERNAL MEDICINE
Payer: COMMERCIAL

## 2025-03-14 ENCOUNTER — APPOINTMENT (OUTPATIENT)
Age: 65
DRG: 190 | End: 2025-03-14
Attending: INTERNAL MEDICINE
Payer: COMMERCIAL

## 2025-03-14 ENCOUNTER — HOSPITAL ENCOUNTER (INPATIENT)
Age: 65
Discharge: HOME | DRG: 190 | End: 2025-03-16
Attending: INTERNAL MEDICINE
Payer: COMMERCIAL

## 2025-03-14 ENCOUNTER — CLINICAL DOCUMENTATION (OUTPATIENT)
Age: 65
End: 2025-03-14

## 2025-03-14 LAB
ANION GAP SERPL CALCULATED.3IONS-SCNC: 12 MMOL/L (ref 3–16)
BASOPHILS # BLD: 0.1 K/UL (ref 0–0.2)
BASOPHILS NFR BLD: 0.4 %
BUN SERPL-MCNC: 27 MG/DL (ref 7–20)
CALCIUM SERPL-MCNC: 9.1 MG/DL (ref 8.3–10.6)
CHLORIDE SERPL-SCNC: 99 MMOL/L (ref 99–110)
CO2 SERPL-SCNC: 29 MMOL/L (ref 21–32)
CREAT SERPL-MCNC: 0.6 MG/DL (ref 0.6–1.2)
DEPRECATED RDW RBC AUTO: 14.7 % (ref 12.4–15.4)
ECHO BSA: 1.57 M2
ECHO BSA: 1.57 M2
ECHO LV EF PHYSICIAN: 52 %
ECHO LV FRACTIONAL SHORTENING: 31 % (ref 28–44)
ECHO LV INTERNAL DIMENSION DIASTOLE INDEX: 2.29 CM/M2
ECHO LV INTERNAL DIMENSION DIASTOLIC: 3.6 CM (ref 3.9–5.3)
ECHO LV INTERNAL DIMENSION SYSTOLIC INDEX: 1.59 CM/M2
ECHO LV INTERNAL DIMENSION SYSTOLIC: 2.5 CM
ECHO LV IVSD: 0.7 CM (ref 0.6–0.9)
ECHO LV MASS 2D: 65.8 G (ref 67–162)
ECHO LV MASS INDEX 2D: 41.9 G/M2 (ref 43–95)
ECHO LV POSTERIOR WALL DIASTOLIC: 0.7 CM (ref 0.6–0.9)
ECHO LV RELATIVE WALL THICKNESS RATIO: 0.39
EOSINOPHIL # BLD: 0.1 K/UL (ref 0–0.6)
EOSINOPHIL NFR BLD: 0.4 %
GFR SERPLBLD CREATININE-BSD FMLA CKD-EPI: >90 ML/MIN/{1.73_M2}
GLUCOSE SERPL-MCNC: 97 MG/DL (ref 70–99)
HCT VFR BLD AUTO: 37.8 % (ref 36–48)
HGB BLD-MCNC: 12.3 G/DL (ref 12–16)
LYMPHOCYTES # BLD: 4.4 K/UL (ref 1–5.1)
LYMPHOCYTES NFR BLD: 26 %
MCH RBC QN AUTO: 29.9 PG (ref 26–34)
MCHC RBC AUTO-ENTMCNC: 32.7 G/DL (ref 31–36)
MCV RBC AUTO: 91.4 FL (ref 80–100)
MONOCYTES # BLD: 1.2 K/UL (ref 0–1.3)
MONOCYTES NFR BLD: 6.8 %
NEUTROPHILS # BLD: 11.3 K/UL (ref 1.7–7.7)
NEUTROPHILS NFR BLD: 66.4 %
NUC STRESS EJECTION FRACTION: 77 %
NUC STRESS LV EDV: 35 ML (ref 56–104)
NUC STRESS LV ESV: 8 ML (ref 19–49)
NUC STRESS LV MASS: 69 G
PLATELET # BLD AUTO: 296 K/UL (ref 135–450)
PMV BLD AUTO: 10.5 FL (ref 5–10.5)
POTASSIUM SERPL-SCNC: 3.6 MMOL/L (ref 3.5–5.1)
RBC # BLD AUTO: 4.13 M/UL (ref 4–5.2)
SODIUM SERPL-SCNC: 140 MMOL/L (ref 136–145)
STRESS BASELINE DIAS BP: 71 MMHG
STRESS BASELINE HR: 93 BPM
STRESS BASELINE SYS BP: 152 MMHG
STRESS ESTIMATED WORKLOAD: 1 METS
STRESS PEAK DIAS BP: 83 MMHG
STRESS PEAK SYS BP: 153 MMHG
STRESS PERCENT HR ACHIEVED: 74 %
STRESS POST PEAK HR: 114 BPM
STRESS RATE PRESSURE PRODUCT: ABNORMAL BPM*MMHG
STRESS TARGET HR: 155 BPM
WBC # BLD AUTO: 17 K/UL (ref 4–11)

## 2025-03-14 PROCEDURE — 36415 COLL VENOUS BLD VENIPUNCTURE: CPT

## 2025-03-14 PROCEDURE — 85025 COMPLETE CBC W/AUTO DIFF WBC: CPT

## 2025-03-14 PROCEDURE — 6360000002 HC RX W HCPCS

## 2025-03-14 PROCEDURE — C8924 2D TTE W OR W/O FOL W/CON,FU: HCPCS

## 2025-03-14 PROCEDURE — A9502 TC99M TETROFOSMIN: HCPCS | Performed by: INTERNAL MEDICINE

## 2025-03-14 PROCEDURE — 94640 AIRWAY INHALATION TREATMENT: CPT

## 2025-03-14 PROCEDURE — 6370000000 HC RX 637 (ALT 250 FOR IP): Performed by: NURSE PRACTITIONER

## 2025-03-14 PROCEDURE — 75571 CT HRT W/O DYE W/CA TEST: CPT

## 2025-03-14 PROCEDURE — 93017 CV STRESS TEST TRACING ONLY: CPT

## 2025-03-14 PROCEDURE — 6360000002 HC RX W HCPCS: Performed by: INTERNAL MEDICINE

## 2025-03-14 PROCEDURE — 93016 CV STRESS TEST SUPVJ ONLY: CPT | Performed by: INTERNAL MEDICINE

## 2025-03-14 PROCEDURE — 94669 MECHANICAL CHEST WALL OSCILL: CPT

## 2025-03-14 PROCEDURE — 78452 HT MUSCLE IMAGE SPECT MULT: CPT

## 2025-03-14 PROCEDURE — 80048 BASIC METABOLIC PNL TOTAL CA: CPT

## 2025-03-14 PROCEDURE — 93308 TTE F-UP OR LMTD: CPT | Performed by: INTERNAL MEDICINE

## 2025-03-14 PROCEDURE — 2500000003 HC RX 250 WO HCPCS: Performed by: NURSE PRACTITIONER

## 2025-03-14 PROCEDURE — 6360000002 HC RX W HCPCS: Performed by: STUDENT IN AN ORGANIZED HEALTH CARE EDUCATION/TRAINING PROGRAM

## 2025-03-14 PROCEDURE — 6370000000 HC RX 637 (ALT 250 FOR IP)

## 2025-03-14 PROCEDURE — 78452 HT MUSCLE IMAGE SPECT MULT: CPT | Performed by: INTERNAL MEDICINE

## 2025-03-14 PROCEDURE — 3430000000 HC RX DIAGNOSTIC RADIOPHARMACEUTICAL: Performed by: INTERNAL MEDICINE

## 2025-03-14 PROCEDURE — 94761 N-INVAS EAR/PLS OXIMETRY MLT: CPT

## 2025-03-14 PROCEDURE — 93018 CV STRESS TEST I&R ONLY: CPT | Performed by: INTERNAL MEDICINE

## 2025-03-14 PROCEDURE — 1200000000 HC SEMI PRIVATE

## 2025-03-14 PROCEDURE — 6360000004 HC RX CONTRAST MEDICATION: Performed by: INTERNAL MEDICINE

## 2025-03-14 PROCEDURE — 2700000000 HC OXYGEN THERAPY PER DAY

## 2025-03-14 RX ORDER — LORAZEPAM 2 MG/ML
1 INJECTION INTRAMUSCULAR PRN
Status: DISCONTINUED | OUTPATIENT
Start: 2025-03-14 | End: 2025-03-15 | Stop reason: ALTCHOICE

## 2025-03-14 RX ORDER — AMINOPHYLLINE 25 MG/ML
150 INJECTION, SOLUTION INTRAVENOUS ONCE
Status: COMPLETED | OUTPATIENT
Start: 2025-03-14 | End: 2025-03-14

## 2025-03-14 RX ADMIN — PANTOPRAZOLE SODIUM 40 MG: 40 TABLET, DELAYED RELEASE ORAL at 08:40

## 2025-03-14 RX ADMIN — FUROSEMIDE 20 MG: 20 TABLET ORAL at 08:40

## 2025-03-14 RX ADMIN — METOPROLOL SUCCINATE 25 MG: 25 TABLET, EXTENDED RELEASE ORAL at 22:13

## 2025-03-14 RX ADMIN — METOPROLOL SUCCINATE 25 MG: 25 TABLET, EXTENDED RELEASE ORAL at 08:45

## 2025-03-14 RX ADMIN — BUSPIRONE HYDROCHLORIDE 10 MG: 5 TABLET ORAL at 22:12

## 2025-03-14 RX ADMIN — OXYCODONE AND ACETAMINOPHEN 1 TABLET: 5; 325 TABLET ORAL at 18:07

## 2025-03-14 RX ADMIN — BUSPIRONE HYDROCHLORIDE 10 MG: 5 TABLET ORAL at 08:40

## 2025-03-14 RX ADMIN — LEVALBUTEROL 1.25 MG: 1.25 SOLUTION, CONCENTRATE RESPIRATORY (INHALATION) at 16:20

## 2025-03-14 RX ADMIN — PREDNISONE 40 MG: 20 TABLET ORAL at 08:41

## 2025-03-14 RX ADMIN — SULFUR HEXAFLUORIDE 2 ML: KIT at 11:33

## 2025-03-14 RX ADMIN — METOCLOPRAMIDE 10 MG: 10 TABLET ORAL at 08:40

## 2025-03-14 RX ADMIN — ASPIRIN 81 MG: 81 TABLET, CHEWABLE ORAL at 08:40

## 2025-03-14 RX ADMIN — DIAZEPAM 5 MG: 5 TABLET ORAL at 07:54

## 2025-03-14 RX ADMIN — METOCLOPRAMIDE 10 MG: 10 TABLET ORAL at 18:07

## 2025-03-14 RX ADMIN — TETROFOSMIN 30 MILLICURIE: 1.38 INJECTION, POWDER, LYOPHILIZED, FOR SOLUTION INTRAVENOUS at 12:16

## 2025-03-14 RX ADMIN — BUDESONIDE 500 MCG: 0.5 SUSPENSION RESPIRATORY (INHALATION) at 20:24

## 2025-03-14 RX ADMIN — GUAIFENESIN 600 MG: 600 TABLET ORAL at 08:47

## 2025-03-14 RX ADMIN — OXYCODONE AND ACETAMINOPHEN 1 TABLET: 5; 325 TABLET ORAL at 22:15

## 2025-03-14 RX ADMIN — BUSPIRONE HYDROCHLORIDE 10 MG: 5 TABLET ORAL at 14:01

## 2025-03-14 RX ADMIN — DIAZEPAM 5 MG: 5 TABLET ORAL at 22:13

## 2025-03-14 RX ADMIN — ROFLUMILAST 250 MCG: 500 TABLET ORAL at 08:50

## 2025-03-14 RX ADMIN — TETROFOSMIN 11.2 MILLICURIE: 1.38 INJECTION, POWDER, LYOPHILIZED, FOR SOLUTION INTRAVENOUS at 10:44

## 2025-03-14 RX ADMIN — ARFORMOTEROL TARTRATE 15 MCG: 15 SOLUTION RESPIRATORY (INHALATION) at 20:24

## 2025-03-14 RX ADMIN — GUAIFENESIN 600 MG: 600 TABLET ORAL at 22:13

## 2025-03-14 RX ADMIN — AMINOPHYLLINE 150 MG: 25 INJECTION, SOLUTION INTRAVENOUS at 12:20

## 2025-03-14 RX ADMIN — REGADENOSON 0.4 MG: 0.08 INJECTION, SOLUTION INTRAVENOUS at 12:16

## 2025-03-14 RX ADMIN — OXYCODONE AND ACETAMINOPHEN 1 TABLET: 5; 325 TABLET ORAL at 14:01

## 2025-03-14 RX ADMIN — LEVALBUTEROL 1.25 MG: 1.25 SOLUTION, CONCENTRATE RESPIRATORY (INHALATION) at 20:24

## 2025-03-14 RX ADMIN — LORAZEPAM 1 MG: 2 INJECTION INTRAMUSCULAR; INTRAVENOUS at 11:25

## 2025-03-14 RX ADMIN — OXYCODONE AND ACETAMINOPHEN 1 TABLET: 5; 325 TABLET ORAL at 08:57

## 2025-03-14 RX ADMIN — SODIUM CHLORIDE, PRESERVATIVE FREE 10 ML: 5 INJECTION INTRAVENOUS at 22:13

## 2025-03-14 ASSESSMENT — PAIN DESCRIPTION - ORIENTATION
ORIENTATION: MID;RIGHT
ORIENTATION: MID
ORIENTATION: MID

## 2025-03-14 ASSESSMENT — PAIN DESCRIPTION - LOCATION
LOCATION: BACK;CHEST
LOCATION: BACK
LOCATION: BACK
LOCATION: BACK;RIB CAGE
LOCATION: BACK;RIB CAGE

## 2025-03-14 ASSESSMENT — PAIN SCALES - GENERAL
PAINLEVEL_OUTOF10: 8
PAINLEVEL_OUTOF10: 7
PAINLEVEL_OUTOF10: 8
PAINLEVEL_OUTOF10: 5
PAINLEVEL_OUTOF10: 7
PAINLEVEL_OUTOF10: 0
PAINLEVEL_OUTOF10: 5

## 2025-03-14 ASSESSMENT — PAIN DESCRIPTION - DESCRIPTORS
DESCRIPTORS: ACHING;PRESSURE
DESCRIPTORS: ACHING
DESCRIPTORS: ACHING;DISCOMFORT;TENDER

## 2025-03-14 ASSESSMENT — PAIN SCALES - WONG BAKER
WONGBAKER_NUMERICALRESPONSE: NO HURT
WONGBAKER_NUMERICALRESPONSE: NO HURT

## 2025-03-14 ASSESSMENT — PAIN - FUNCTIONAL ASSESSMENT
PAIN_FUNCTIONAL_ASSESSMENT: ACTIVITIES ARE NOT PREVENTED

## 2025-03-14 ASSESSMENT — PAIN DESCRIPTION - PAIN TYPE: TYPE: ACUTE PAIN

## 2025-03-14 NOTE — CARE COORDINATION
Stress test completed. Discussed d/c with patient. Stated she feels she needs to say in hospital longer wishes to appeal. She called Claude Tan CMA to provide medicare supportive docs. Simon Beach RN

## 2025-03-14 NOTE — CARE COORDINATION
Case Management received a fax from [employer] about patients appeal.   CASE ID #: [id number]   CM completed the Detailed Notice of Discharge and has given a copy to the patient and placed a copy on the hard chart. Yes    CMA uploaded patients chart, IMM, and DND to [employer] portal. Yes   has documented the DND in the Document List tab Yes    Along with placing times and date in the assessment section in the CM Navigator tab: Yes      CM is aware of the above.   COLEEN Michelle

## 2025-03-14 NOTE — CARE COORDINATION
CASE MANAGEMENT DISCHARGE SUMMARY      Discharge to: Home with [facility]      IMM given: 3/12/25    New Durable Medical Equipment ordered/agency: none    Transportation:    Family/car:   Medical Transport explained to pt/family. Pt/family voice no agency preference.    Agency used:[facility] round [facility]   time:3pm   Ambulance form completed: Yes    Confirmed discharge plan with:     Patient: yes     Family:  yes per patient     Facility/Agency, name:  [facility] faxed pulled per [facility], name: Brent Beach RN  l.    . Transport cancelled. Down for stress test. Simon Beach, RN

## 2025-03-15 PROCEDURE — 6370000000 HC RX 637 (ALT 250 FOR IP)

## 2025-03-15 PROCEDURE — 94640 AIRWAY INHALATION TREATMENT: CPT

## 2025-03-15 PROCEDURE — 6370000000 HC RX 637 (ALT 250 FOR IP): Performed by: NURSE PRACTITIONER

## 2025-03-15 PROCEDURE — 6360000002 HC RX W HCPCS: Performed by: STUDENT IN AN ORGANIZED HEALTH CARE EDUCATION/TRAINING PROGRAM

## 2025-03-15 PROCEDURE — 1200000000 HC SEMI PRIVATE

## 2025-03-15 PROCEDURE — 94761 N-INVAS EAR/PLS OXIMETRY MLT: CPT

## 2025-03-15 PROCEDURE — 6360000002 HC RX W HCPCS

## 2025-03-15 PROCEDURE — 94669 MECHANICAL CHEST WALL OSCILL: CPT

## 2025-03-15 PROCEDURE — 97110 THERAPEUTIC EXERCISES: CPT

## 2025-03-15 PROCEDURE — 2700000000 HC OXYGEN THERAPY PER DAY

## 2025-03-15 PROCEDURE — 6360000002 HC RX W HCPCS: Performed by: INTERNAL MEDICINE

## 2025-03-15 PROCEDURE — 2500000003 HC RX 250 WO HCPCS: Performed by: NURSE PRACTITIONER

## 2025-03-15 RX ORDER — OXYCODONE AND ACETAMINOPHEN 5; 325 MG/1; MG/1
1 TABLET ORAL EVERY 6 HOURS PRN
Qty: 12 TABLET | Refills: 0 | Status: SHIPPED | OUTPATIENT
Start: 2025-03-15 | End: 2025-03-16

## 2025-03-15 RX ORDER — LEVALBUTEROL 1.25 MG/.5ML
1.25 SOLUTION, CONCENTRATE RESPIRATORY (INHALATION)
Status: DISCONTINUED | OUTPATIENT
Start: 2025-03-15 | End: 2025-03-16 | Stop reason: HOSPADM

## 2025-03-15 RX ADMIN — METOCLOPRAMIDE 10 MG: 10 TABLET ORAL at 08:45

## 2025-03-15 RX ADMIN — LEVALBUTEROL 1.25 MG: 1.25 SOLUTION, CONCENTRATE RESPIRATORY (INHALATION) at 19:09

## 2025-03-15 RX ADMIN — LEVALBUTEROL 1.25 MG: 1.25 SOLUTION, CONCENTRATE RESPIRATORY (INHALATION) at 12:02

## 2025-03-15 RX ADMIN — ASPIRIN 81 MG: 81 TABLET, CHEWABLE ORAL at 08:45

## 2025-03-15 RX ADMIN — OXYCODONE AND ACETAMINOPHEN 1 TABLET: 5; 325 TABLET ORAL at 14:28

## 2025-03-15 RX ADMIN — SODIUM CHLORIDE, PRESERVATIVE FREE 10 ML: 5 INJECTION INTRAVENOUS at 20:01

## 2025-03-15 RX ADMIN — GUAIFENESIN 600 MG: 600 TABLET ORAL at 08:45

## 2025-03-15 RX ADMIN — METOPROLOL SUCCINATE 25 MG: 25 TABLET, EXTENDED RELEASE ORAL at 08:45

## 2025-03-15 RX ADMIN — BUDESONIDE 500 MCG: 0.5 SUSPENSION RESPIRATORY (INHALATION) at 19:08

## 2025-03-15 RX ADMIN — ARFORMOTEROL TARTRATE 15 MCG: 15 SOLUTION RESPIRATORY (INHALATION) at 19:08

## 2025-03-15 RX ADMIN — ARFORMOTEROL TARTRATE 15 MCG: 15 SOLUTION RESPIRATORY (INHALATION) at 08:18

## 2025-03-15 RX ADMIN — OXYCODONE AND ACETAMINOPHEN 1 TABLET: 5; 325 TABLET ORAL at 10:26

## 2025-03-15 RX ADMIN — OXYCODONE AND ACETAMINOPHEN 1 TABLET: 5; 325 TABLET ORAL at 06:32

## 2025-03-15 RX ADMIN — BUDESONIDE 500 MCG: 0.5 SUSPENSION RESPIRATORY (INHALATION) at 08:22

## 2025-03-15 RX ADMIN — DIAZEPAM 5 MG: 5 TABLET ORAL at 08:45

## 2025-03-15 RX ADMIN — FUROSEMIDE 20 MG: 20 TABLET ORAL at 08:45

## 2025-03-15 RX ADMIN — OXYCODONE AND ACETAMINOPHEN 1 TABLET: 5; 325 TABLET ORAL at 20:00

## 2025-03-15 RX ADMIN — DIAZEPAM 5 MG: 5 TABLET ORAL at 15:57

## 2025-03-15 RX ADMIN — BUSPIRONE HYDROCHLORIDE 10 MG: 5 TABLET ORAL at 20:01

## 2025-03-15 RX ADMIN — ROFLUMILAST 250 MCG: 500 TABLET ORAL at 08:49

## 2025-03-15 RX ADMIN — TIOTROPIUM BROMIDE INHALATION SPRAY 2 PUFF: 3.12 SPRAY, METERED RESPIRATORY (INHALATION) at 08:22

## 2025-03-15 RX ADMIN — METOPROLOL SUCCINATE 25 MG: 25 TABLET, EXTENDED RELEASE ORAL at 20:01

## 2025-03-15 RX ADMIN — PANTOPRAZOLE SODIUM 40 MG: 40 TABLET, DELAYED RELEASE ORAL at 08:45

## 2025-03-15 RX ADMIN — METOCLOPRAMIDE 10 MG: 10 TABLET ORAL at 02:56

## 2025-03-15 RX ADMIN — OXYCODONE AND ACETAMINOPHEN 1 TABLET: 5; 325 TABLET ORAL at 02:56

## 2025-03-15 RX ADMIN — BUSPIRONE HYDROCHLORIDE 10 MG: 5 TABLET ORAL at 14:28

## 2025-03-15 RX ADMIN — SODIUM CHLORIDE, PRESERVATIVE FREE 10 ML: 5 INJECTION INTRAVENOUS at 08:47

## 2025-03-15 RX ADMIN — GUAIFENESIN 600 MG: 600 TABLET ORAL at 20:01

## 2025-03-15 RX ADMIN — BUSPIRONE HYDROCHLORIDE 10 MG: 5 TABLET ORAL at 08:45

## 2025-03-15 RX ADMIN — PREDNISONE 40 MG: 20 TABLET ORAL at 08:45

## 2025-03-15 RX ADMIN — LEVALBUTEROL 1.25 MG: 1.25 SOLUTION, CONCENTRATE RESPIRATORY (INHALATION) at 08:16

## 2025-03-15 ASSESSMENT — PAIN DESCRIPTION - LOCATION
LOCATION: BACK;CHEST
LOCATION: BACK
LOCATION: BACK;CHEST
LOCATION: BACK
LOCATION: BACK;CHEST
LOCATION: BACK;CHEST

## 2025-03-15 ASSESSMENT — PAIN SCALES - GENERAL
PAINLEVEL_OUTOF10: 6
PAINLEVEL_OUTOF10: 7
PAINLEVEL_OUTOF10: 4
PAINLEVEL_OUTOF10: 6
PAINLEVEL_OUTOF10: 7
PAINLEVEL_OUTOF10: 3

## 2025-03-15 ASSESSMENT — PAIN SCALES - WONG BAKER
WONGBAKER_NUMERICALRESPONSE: NO HURT

## 2025-03-15 ASSESSMENT — PAIN DESCRIPTION - DESCRIPTORS
DESCRIPTORS: ACHING;DISCOMFORT;TENDER;THROBBING
DESCRIPTORS: ACHING;DISCOMFORT
DESCRIPTORS: ACHING;SORE
DESCRIPTORS: ACHING;DISCOMFORT;THROBBING;TENDER
DESCRIPTORS: ACHING;DISCOMFORT;SORE;THROBBING
DESCRIPTORS: ACHING;SHARP

## 2025-03-15 ASSESSMENT — PAIN DESCRIPTION - ORIENTATION
ORIENTATION: RIGHT;LEFT;MID
ORIENTATION: MID;RIGHT
ORIENTATION: MID;UPPER
ORIENTATION: MID;RIGHT

## 2025-03-15 ASSESSMENT — PAIN - FUNCTIONAL ASSESSMENT
PAIN_FUNCTIONAL_ASSESSMENT: ACTIVITIES ARE NOT PREVENTED

## 2025-03-15 NOTE — CONSULTS
Consult     [facility]    Who:Timothy  Date:3/8/2025,  Time:6:53 AM    Electronically signed by Adelaida Andre on 3/8/25 at 6:53 AM EST    
Consult Call Back    Who: Sharron Bragg   Date:3/11/2025,  Time:3:25 PM    Electronically signed by Cecily Rodriguez on 3/11/25 at 3:25 PM EDT    
Consult Call Back    Who:Maryam Juarez,    Date:3/10/2025,  Time:11:07 AM    Electronically signed by Jael Chen on 3/10/25 at 11:07 AM EDT     
Consult Placed     Who: [facility]  Date: 3/9/25  Time: 1516     Electronically signed by Gabby Cabral RN on 3/9/2025 at 3:15 PM   
Consult Placed     Who: [facility]  Date: 3/15/2025  Time: 8:50 AM       Electronically signed by Jael Chen on 3/15/2025 at 8:50 AM  
Consult Placed   Added to the treatment team  Who: Sharron Bragg  Date: 3/11/2025  Time: 11:17 AM       Electronically signed by Jael Chen on 3/11/2025 at 11:17 AM  
Consult Placed   Consult called to Cecilia  Who: [facility]  Date: 3/12/2025  Time: 12:52 PM       Electronically signed by Jael Chen on 3/12/2025 at 12:51 PM  
Consult Placed   Consult called to Cecilia  Who: [facility]  Date: 3/12/2025  Time: 12:52 PM       Electronically signed by Jael Chen on 3/12/2025 at 12:51 PM  
Interventional cardiology:    Received a message on patient who had echocardiogram with concern for reduced EF, I was able to review her echo images, EF looks normal, I would estimate that it is 50 to 55%, overall heart function is similar to prior studies, have noted that her troponin is negative, as such, I do not think she will need additional cardiac testing in hospital, would suggest outpatient follow-up for further assessment if needed.  Please call us back if there are any questions or concerns.  
She is asking for Oxycodone 10 mg tablets.?     She has 5 mg tablets on her med list. She is supposed to be taking 1 every 6 hours but now taking 2 every 6 hours.     Last refill on 6/19/20 for #120     Routing refill request to provider for review/approval because:  Drug not on the [facility] refill protocol     [place]  reviewed today.   Received #120 Oxy 5 mg on 6/19/20 and #90 on 5/26/20.         
Provider   predniSONE (DELTASONE) 20 MG tablet Take 2 tablets by mouth daily for 3 days, THEN 1 tablet daily for 7 days, THEN 0.5 tablets daily for 4 days. 3/14/25 3/28/25 Yes Chaitanya Suresh MD   metoprolol succinate (TOPROL XL) 25 MG extended release tablet Take 1 tablet by mouth in the morning and at bedtime 3/13/25  Yes Chaitanya Suresh MD   loperamide (IMODIUM) 2 MG capsule Take 1 capsule by mouth as needed for Diarrhea 3/4/25  Yes Yoly Beltre MD   diazePAM (VALIUM) 5 MG tablet Take 1 tablet by mouth every 6 hours as needed. 1/27/25  Yes Yoly Beltre MD   furosemide (LASIX) 20 MG tablet Take 1 tablet by mouth daily 5/17/24  Yes Lesli Claire APRN - CNP   busPIRone (BUSPAR) 10 MG tablet Take 1 tablet by mouth 3 times daily 5/16/24  Yes Lesli Claire APRN - CNP   albuterol sulfate HFA (PROAIR HFA) 108 (90 Base) MCG/ACT inhaler Inhale 2 puffs into the lungs every 6 hours as needed for Wheezing or Shortness of Breath 1/7/24  Yes Adelaida Turner,    aspirin 81 MG chewable tablet Take 1 tablet by mouth daily 1/6/24  Yes Yola Carr MD   atorvastatin (LIPITOR) 80 MG tablet Take 1 tablet by mouth nightly 1/6/24  Yes Yola Carr MD   pantoprazole (PROTONIX) 40 MG tablet Take 1 tablet by mouth daily 1/6/24  Yes Yola Carr MD   ipratropium-albuterol (DUONEB) 0.5-2.5 (3) MG/3ML SOLN nebulizer solution Inhale 3 mLs into the lungs every 6 hours as needed for Shortness of Breath 4/21/18  Yes Cheryl Mims APRN - CNP   guaiFENesin (MUCINEX) 600 MG extended release tablet Take 1 tablet by mouth 2 times daily 7/21/24   Az Hampton MD   ipratropium 0.5 mg-albuterol 2.5 mg (DUONEB) 0.5-2.5 (3) MG/3ML SOLN nebulizer solution Inhale 3 mLs into the lungs every 4 hours as needed for Shortness of Breath 5/29/24   Az Hampton MD   Roflumilast (DALIRESP) 250 MCG tablet Take 1 tablet by mouth daily 5/15/24   Lesli Claire APRN - CNP   metoclopramide (REGLAN) 10 MG tablet 
chewable tablet 81 mg  81 mg Oral Daily Francisco Roberts APRN - CNP   81 mg at 03/10/25 0805    [Held by provider] atorvastatin (LIPITOR) tablet 80 mg  80 mg Oral Nightly Francisco Roberts APRN - CNP   80 mg at 03/08/25 2052    busPIRone (BUSPAR) tablet 10 mg  10 mg Oral TID Francisco Roberts APRN - CNP   10 mg at 03/10/25 0804    diazePAM (VALIUM) tablet 5 mg  5 mg Oral Q6H PRN Francisco Roberts APRN - CNP   5 mg at 03/10/25 0757    tiotropium (SPIRIVA RESPIMAT) 2.5 MCG/ACT inhaler 2 puff  2 puff Inhalation Daily RT Francisco Roberts APRN - CNP   2 puff at 03/10/25 0918    furosemide (LASIX) tablet 20 mg  20 mg Oral Daily Francisco Roberts APRN - CNP   20 mg at 03/10/25 0806    guaiFENesin (MUCINEX) extended release tablet 600 mg  600 mg Oral BID Francisco Roberts APRN - CNP   600 mg at 03/10/25 0805    sodium chloride flush 0.9 % injection 5-40 mL  5-40 mL IntraVENous 2 times per day Francisco Roberts APRN - CNP   10 mL at 03/10/25 0939    sodium chloride flush 0.9 % injection 5-40 mL  5-40 mL IntraVENous PRN Francisco Roberts APRN - CNP        0.9 % sodium chloride infusion   IntraVENous PRN Francisco oRberts APRN - CNP        polyethylene glycol (GLYCOLAX) packet 17 g  17 g Oral Daily PRN Francisco Roberts APRN - CNP        enoxaparin Sodium (LOVENOX) injection 30 mg  30 mg SubCUTAneous Daily Francisco Roberts APRN - CNP   30 mg at 03/10/25 0942    acetaminophen (TYLENOL) tablet 650 mg  650 mg Oral Q6H PRN Francisco Roberts APRN - CNP        Or    acetaminophen (TYLENOL) suppository 650 mg  650 mg Rectal Q6H PRN Francisco Roberts APRN - CNP        prochlorperazine (COMPAZINE) injection 10 mg  10 mg IntraVENous Q6H PRN Francisco Roberts APRN - CNP   10 mg at 03/09/25 0638    oxyCODONE-acetaminophen (PERCOCET) 5-325 MG per tablet 1 tablet  1 tablet Oral Q4H PRN Francisco Roberts APRN - CNP   1 tablet at 03/10/25 0824    albuterol sulfate HFA (PROVENTIL;VENTOLIN;PROAIR) 108 (90 Base) MCG/ACT inhaler 2 puff  2 puff 
signs of acute ischemia.  Neurological: No focal deficit.      ________________________________________________________  Electronically signed by:  Sasha Sawyer MD,FACP    3/8/2025    7:17 AM.     [facility]  [address], [place] [address]   Phone (office): [phone]     
sirisha.     Discussed her QOL issues. She feels like she just \"exists\" due to her limitations.  She has heavy home support to help with ADLs.  She can normally walk to the bathroom on her own (about 100 feet), but by the time she comes back she has to sit in the chair and recover for several minutes.  Her SOB exacerbates her anxiety at that point and its a vicious cycle. She feels like it takes significant effort to do anything.  When she is feeling really bad, she needs her aide to help her with her clothes.  She can't think of anything that could help her be more comfortable other than her percocet. She complains of lung and back pain and says the percocet really helps.  Per patient, the only time she can  get it is in the hospital since the NPs at her home health company won't prescribe it unless she comes out of the hospital with a prescription.  She feels like her valium and Buspar are working, but she feels like she could do better.  She would like to have a first floor apartment so that she could leave her house and go to medical appointments.  She has looked into it, but needs help.  She does not have a goal of staying in her same apartment or apartment building, but she would like to stay in [facility] where she knows everyone and they know her.   She spends time watching TV and talking with daughter on the phone daily.     Patient says she is [place] but not spiritual.  She does not know where she derives inner strength.  She feels like people/agencies have generally been very good to her.  She trusts her pulmonologist and has a good relationship there and with her HH aides.     We talked about expectations with her disease and medical limitations. We discussed an eventual transition into comfort care when her goals aligned.  She is accepting of this information.     Total ACP discussion time: 60 minutes    Disposition/Discharge Plan:   - patient wants to continue with disease-directed treatment at

## 2025-03-15 NOTE — PLAN OF CARE
Problem: Chronic Conditions and Co-morbidities  Goal: Patient's chronic conditions and co-morbidity symptoms are monitored and maintained or improved  3/10/2025 1545 by Ritchie Jackman RN  Outcome: Progressing     Problem: Pain  Goal: Verbalizes/displays adequate comfort level or baseline comfort level  3/10/2025 1545 by Ritchie Jackman, RN  Outcome: Progressing     Problem: Safety - Adult  Goal: Free from fall injury  3/10/2025 1545 by Ritchie Jackman, RN  Outcome: Progressing     Problem: ABCDS Injury Assessment  Goal: Absence of physical injury  3/10/2025 1545 by Ritchie Jackamn, RN  Outcome: Progressing     
  Problem: Chronic Conditions and Co-morbidities  Goal: Patient's chronic conditions and co-morbidity symptoms are monitored and maintained or improved  3/11/2025 0937 by Bela Knapp RN  Outcome: Progressing  Flowsheets (Taken 3/10/2025 2252 by Robert Rosas RN)  Care Plan - Patient's Chronic Conditions and Co-Morbidity Symptoms are Monitored and Maintained or Improved:   Collaborate with multidisciplinary team to address chronic and comorbid conditions and prevent exacerbation or deterioration   Monitor and assess patient's chronic conditions and comorbid symptoms for stability, deterioration, or improvement   Update acute care plan with appropriate goals if chronic or comorbid symptoms are exacerbated and prevent overall improvement and discharge  3/10/2025 2253 by Robert Rosas RN  Outcome: Progressing  Flowsheets (Taken 3/10/2025 2252)  Care Plan - Patient's Chronic Conditions and Co-Morbidity Symptoms are Monitored and Maintained or Improved:   Collaborate with multidisciplinary team to address chronic and comorbid conditions and prevent exacerbation or deterioration   Monitor and assess patient's chronic conditions and comorbid symptoms for stability, deterioration, or improvement   Update acute care plan with appropriate goals if chronic or comorbid symptoms are exacerbated and prevent overall improvement and discharge     Problem: Pain  Goal: Verbalizes/displays adequate comfort level or baseline comfort level  3/11/2025 0937 by Bela Knapp RN  Outcome: Progressing  Flowsheets (Taken 3/11/2025 0344 by Robert Rosas RN)  Verbalizes/displays adequate comfort level or baseline comfort level:   Encourage patient to monitor pain and request assistance   Assess pain using appropriate pain scale   Administer analgesics based on type and severity of pain and evaluate response  3/10/2025 2253 by Robert Rosas RN  Outcome: Progressing  Flowsheets  Taken 3/10/2025 2224  Verbalizes/displays 
  Problem: Chronic Conditions and Co-morbidities  Goal: Patient's chronic conditions and co-morbidity symptoms are monitored and maintained or improved  3/14/2025 0759 by Brent Leroy, RN  Outcome: Progressing  3/13/2025 2255 by Adriana Evans RN  Outcome: Progressing     Problem: Pain  Goal: Verbalizes/displays adequate comfort level or baseline comfort level  3/14/2025 0759 by Brent Leroy, RN  Outcome: Progressing  3/13/2025 2255 by Adriana Evans RN  Outcome: Progressing     
  Problem: Chronic Conditions and Co-morbidities  Goal: Patient's chronic conditions and co-morbidity symptoms are monitored and maintained or improved  3/14/2025 1932 by Daisy Mera RN  Outcome: Progressing  Flowsheets (Taken 3/14/2025 1932)  Care Plan - Patient's Chronic Conditions and Co-Morbidity Symptoms are Monitored and Maintained or Improved:   Monitor and assess patient's chronic conditions and comorbid symptoms for stability, deterioration, or improvement   Collaborate with multidisciplinary team to address chronic and comorbid conditions and prevent exacerbation or deterioration   Update acute care plan with appropriate goals if chronic or comorbid symptoms are exacerbated and prevent overall improvement and discharge     Problem: Pain  Goal: Verbalizes/displays adequate comfort level or baseline comfort level  3/14/2025 1932 by Daisy Mera RN  Outcome: Progressing  Flowsheets (Taken 3/14/2025 1932)  Verbalizes/displays adequate comfort level or baseline comfort level:   Encourage patient to monitor pain and request assistance   Assess pain using appropriate pain scale   Administer analgesics based on type and severity of pain and evaluate response   Implement non-pharmacological measures as appropriate and evaluate response     Problem: Safety - Adult  Goal: Free from fall injury  Outcome: Progressing  Flowsheets (Taken 3/14/2025 1932)  Free From Fall Injury:   Instruct family/caregiver on patient safety   Based on caregiver fall risk screen, instruct family/caregiver to ask for assistance with transferring infant if caregiver noted to have fall risk factors     Problem: Skin/Tissue Integrity  Goal: Skin integrity remains intact  Description: 1.  Monitor for areas of redness and/or skin breakdown  2.  Assess vascular access sites hourly  3.  Every 4-6 hours minimum:  Change oxygen saturation probe site  4.  Every 4-6 hours:  If on nasal continuous positive airway pressure, respiratory therapy 
  Problem: Chronic Conditions and Co-morbidities  Goal: Patient's chronic conditions and co-morbidity symptoms are monitored and maintained or improved  3/8/2025 1209 by Sujatha Tanner RN  Outcome: Progressing  Flowsheets (Taken 3/8/2025 1209)  Care Plan - Patient's Chronic Conditions and Co-Morbidity Symptoms are Monitored and Maintained or Improved: Monitor and assess patient's chronic conditions and comorbid symptoms for stability, deterioration, or improvement     Problem: Pain  Goal: Verbalizes/displays adequate comfort level or baseline comfort level  3/8/2025 1209 by Sujatha Tanner RN  Outcome: Progressing  Flowsheets (Taken 3/8/2025 1209)  Verbalizes/displays adequate comfort level or baseline comfort level:   Encourage patient to monitor pain and request assistance   Assess pain using appropriate pain scale     Problem: Safety - Adult  Goal: Free from fall injury  3/8/2025 1209 by Sujatha Tanner RN  Outcome: Progressing  Flowsheets (Taken 3/8/2025 1209)  Free From Fall Injury: Instruct family/caregiver on patient safety     
  Problem: Chronic Conditions and Co-morbidities  Goal: Patient's chronic conditions and co-morbidity symptoms are monitored and maintained or improved  3/8/2025 2043 by Robert Rosas RN  Outcome: Progressing  3/8/2025 1209 by Sujatha Tanner RN  Outcome: Progressing  Flowsheets (Taken 3/8/2025 1209)  Care Plan - Patient's Chronic Conditions and Co-Morbidity Symptoms are Monitored and Maintained or Improved: Monitor and assess patient's chronic conditions and comorbid symptoms for stability, deterioration, or improvement     Problem: Pain  Goal: Verbalizes/displays adequate comfort level or baseline comfort level  3/8/2025 2043 by Robert Rosas RN  Outcome: Progressing  Flowsheets (Taken 3/8/2025 2037)  Verbalizes/displays adequate comfort level or baseline comfort level:   Encourage patient to monitor pain and request assistance   Assess pain using appropriate pain scale   Administer analgesics based on type and severity of pain and evaluate response   Implement non-pharmacological measures as appropriate and evaluate response  3/8/2025 1209 by Sujatha Tanner RN  Outcome: Progressing  Flowsheets (Taken 3/8/2025 1209)  Verbalizes/displays adequate comfort level or baseline comfort level:   Encourage patient to monitor pain and request assistance   Assess pain using appropriate pain scale     Problem: Safety - Adult  Goal: Free from fall injury  3/8/2025 2043 by Robert Rosas RN  Outcome: Progressing  3/8/2025 1209 by Sujatha Tanner RN  Outcome: Progressing  Flowsheets (Taken 3/8/2025 1209)  Free From Fall Injury: Instruct family/caregiver on patient safety     Problem: ABCDS Injury Assessment  Goal: Absence of physical injury  Outcome: Progressing     
  Problem: Chronic Conditions and Co-morbidities  Goal: Patient's chronic conditions and co-morbidity symptoms are monitored and maintained or improved  3/9/2025 1609 by Sujatha Tanner RN  Outcome: Progressing     Problem: Pain  Goal: Verbalizes/displays adequate comfort level or baseline comfort level  Outcome: Progressing  Flowsheets (Taken 3/9/2025 1609)  Verbalizes/displays adequate comfort level or baseline comfort level:   Encourage patient to monitor pain and request assistance   Assess pain using appropriate pain scale   Administer analgesics based on type and severity of pain and evaluate response   Implement non-pharmacological measures as appropriate and evaluate response     Problem: Safety - Adult  Goal: Free from fall injury  Outcome: Progressing  Flowsheets (Taken 3/9/2025 1609)  Free From Fall Injury: Instruct family/caregiver on patient safety     
  Problem: Chronic Conditions and Co-morbidities  Goal: Patient's chronic conditions and co-morbidity symptoms are monitored and maintained or improved  3/9/2025 1937 by Robert Rosas RN  Outcome: Progressing  3/9/2025 1609 by Sujatha Tanner RN  Outcome: Progressing  3/9/2025 1609 by Sujatha Tanner RN  Outcome: Progressing  Flowsheets (Taken 3/9/2025 1609)  Care Plan - Patient's Chronic Conditions and Co-Morbidity Symptoms are Monitored and Maintained or Improved: Monitor and assess patient's chronic conditions and comorbid symptoms for stability, deterioration, or improvement     Problem: Pain  Goal: Verbalizes/displays adequate comfort level or baseline comfort level  3/9/2025 1937 by Robert Rosas RN  Outcome: Progressing  3/9/2025 1609 by Sujatha Tanner RN  Outcome: Progressing  Flowsheets (Taken 3/9/2025 1609)  Verbalizes/displays adequate comfort level or baseline comfort level:   Encourage patient to monitor pain and request assistance   Assess pain using appropriate pain scale   Administer analgesics based on type and severity of pain and evaluate response   Implement non-pharmacological measures as appropriate and evaluate response     Problem: Safety - Adult  Goal: Free from fall injury  3/9/2025 1937 by Robert Rosas RN  Outcome: Progressing  3/9/2025 1609 by Sujatha Tanner RN  Outcome: Progressing  Flowsheets (Taken 3/9/2025 1609)  Free From Fall Injury: Instruct family/caregiver on patient safety     Problem: ABCDS Injury Assessment  Goal: Absence of physical injury  Outcome: Progressing     
  Problem: Chronic Conditions and Co-morbidities  Goal: Patient's chronic conditions and co-morbidity symptoms are monitored and maintained or improved  Outcome: Progressing     Problem: Pain  Goal: Verbalizes/displays adequate comfort level or baseline comfort level  Outcome: Progressing     Problem: Safety - Adult  Goal: Free from fall injury  Outcome: Progressing     Problem: ABCDS Injury Assessment  Goal: Absence of physical injury  Outcome: Progressing     
  Problem: Chronic Conditions and Co-morbidities  Goal: Patient's chronic conditions and co-morbidity symptoms are monitored and maintained or improved  Outcome: Progressing     Problem: Pain  Goal: Verbalizes/displays adequate comfort level or baseline comfort level  Outcome: Progressing     Problem: Safety - Adult  Goal: Free from fall injury  Outcome: Progressing     Problem: ABCDS Injury Assessment  Goal: Absence of physical injury  Outcome: Progressing     
  Problem: Chronic Conditions and Co-morbidities  Goal: Patient's chronic conditions and co-morbidity symptoms are monitored and maintained or improved  Outcome: Progressing     Problem: Pain  Goal: Verbalizes/displays adequate comfort level or baseline comfort level  Outcome: Progressing     Problem: Safety - Adult  Goal: Free from fall injury  Outcome: Progressing     Problem: ABCDS Injury Assessment  Goal: Absence of physical injury  Outcome: Progressing     Problem: Skin/Tissue Integrity  Goal: Skin integrity remains intact  Description: 1.  Monitor for areas of redness and/or skin breakdown  2.  Assess vascular access sites hourly  3.  Every 4-6 hours minimum:  Change oxygen saturation probe site  4.  Every 4-6 hours:  If on nasal continuous positive airway pressure, respiratory therapy assess nares and determine need for appliance change or resting period  Outcome: Progressing     
  Problem: Chronic Conditions and Co-morbidities  Goal: Patient's chronic conditions and co-morbidity symptoms are monitored and maintained or improved  Outcome: Progressing     Problem: Pain  Goal: Verbalizes/displays adequate comfort level or baseline comfort level  Outcome: Progressing     Problem: Safety - Adult  Goal: Free from fall injury  Outcome: Progressing     Problem: ABCDS Injury Assessment  Goal: Absence of physical injury  Outcome: Progressing     Problem: Skin/Tissue Integrity  Goal: Skin integrity remains intact  Description: 1.  Monitor for areas of redness and/or skin breakdown  2.  Assess vascular access sites hourly  3.  Every 4-6 hours minimum:  Change oxygen saturation probe site  4.  Every 4-6 hours:  If on nasal continuous positive airway pressure, respiratory therapy assess nares and determine need for appliance change or resting period  Outcome: Progressing     
Increase function to baseline    
Increase patients ADLs/functional status to baseline.    
3/11/2025 0937)  Absence of Physical Injury: Implement safety measures based on patient assessment  3/12/2025 0328 by Javi Bowen, RN  Outcome: Progressing     Problem: Skin/Tissue Integrity  Goal: Skin integrity remains intact  Description: 1.  Monitor for areas of redness and/or skin breakdown  2.  Assess vascular access sites hourly  3.  Every 4-6 hours minimum:  Change oxygen saturation probe site  4.  Every 4-6 hours:  If on nasal continuous positive airway pressure, respiratory therapy assess nares and determine need for appliance change or resting period  3/12/2025 1242 by Bela Knapp, RN  Outcome: Progressing  Flowsheets (Taken 3/10/2025 2252 by Robert Rosas, RN)  Skin Integrity Remains Intact:   Assess vascular access sites hourly   Monitor for areas of redness and/or skin breakdown  3/12/2025 0328 by Javi Bowen, RN  Outcome: Progressing     
3/11/2025 0937)  Absence of Physical Injury: Implement safety measures based on patient assessment  3/13/2025 0404 by Eugenie Hollingsworth, RN  Outcome: Progressing     Problem: Skin/Tissue Integrity  Goal: Skin integrity remains intact  Description: 1.  Monitor for areas of redness and/or skin breakdown  2.  Assess vascular access sites hourly  3.  Every 4-6 hours minimum:  Change oxygen saturation probe site  4.  Every 4-6 hours:  If on nasal continuous positive airway pressure, respiratory therapy assess nares and determine need for appliance change or resting period  3/13/2025 1157 by Bela Knapp, RN  Outcome: Progressing  Flowsheets (Taken 3/10/2025 2252 by Robert Rosas, RN)  Skin Integrity Remains Intact:   Assess vascular access sites hourly   Monitor for areas of redness and/or skin breakdown  3/13/2025 0404 by Eugenie Hollingsworth, RN  Outcome: Progressing     
Progressing     Problem: Skin/Tissue Integrity  Goal: Skin integrity remains intact  Description: 1.  Monitor for areas of redness and/or skin breakdown  2.  Assess vascular access sites hourly  3.  Every 4-6 hours minimum:  Change oxygen saturation probe site  4.  Every 4-6 hours:  If on nasal continuous positive airway pressure, respiratory therapy assess nares and determine need for appliance change or resting period  Outcome: Progressing  Flowsheets (Taken 3/10/2025 2252)  Skin Integrity Remains Intact:   Assess vascular access sites hourly   Monitor for areas of redness and/or skin breakdown     
pressure, respiratory therapy assess nares and determine need for appliance change or resting period  Flowsheets (Taken 3/10/2025 2252 by Robert Rosas RN)  Skin Integrity Remains Intact:   Assess vascular access sites hourly   Monitor for areas of redness and/or skin breakdown     
report anxiety at manageable levels:   Administer medication as ordered   Teach and rehearse alternative coping skills   Provide emotional support with 1:1 interaction with staff     Problem: Coping  Goal: Pt/Family able to verbalize concerns and demonstrate effective coping strategies  Description: INTERVENTIONS:  1. Assist patient/family to identify coping skills, available support systems and cultural and spiritual values  2. Provide emotional support, including active listening and acknowledgement of concerns of patient and caregivers  3. Reduce environmental stimuli, as able  4. Instruct patient/family in relaxation techniques, as appropriate  5. Assess for spiritual pain/suffering and initiate Spiritual Care, Psychosocial Clinical Specialist consults as needed  Outcome: Progressing  Flowsheets (Taken 3/15/2025 1917)  Patient/family able to verbalize anxieties, fears, and concerns, and demonstrate effective coping:   Assist patient/family to identify coping skills, available support systems and cultural and spiritual values   Provide emotional support, including active listening and acknowledgement of concerns of patient and caregivers   Reduce environmental stimuli, as able   Instruct patient/family in relaxation techniques, as appropriate

## 2025-03-16 VITALS
BODY MASS INDEX: 20.62 KG/M2 | OXYGEN SATURATION: 93 % | HEIGHT: 63 IN | WEIGHT: 116.4 LBS | TEMPERATURE: 98 F | HEART RATE: 50 BPM | DIASTOLIC BLOOD PRESSURE: 55 MMHG | RESPIRATION RATE: 18 BRPM | SYSTOLIC BLOOD PRESSURE: 107 MMHG

## 2025-03-16 PROCEDURE — 6360000002 HC RX W HCPCS

## 2025-03-16 PROCEDURE — 94640 AIRWAY INHALATION TREATMENT: CPT

## 2025-03-16 PROCEDURE — 6360000002 HC RX W HCPCS: Performed by: INTERNAL MEDICINE

## 2025-03-16 PROCEDURE — 94669 MECHANICAL CHEST WALL OSCILL: CPT

## 2025-03-16 PROCEDURE — 2700000000 HC OXYGEN THERAPY PER DAY

## 2025-03-16 PROCEDURE — 6370000000 HC RX 637 (ALT 250 FOR IP): Performed by: NURSE PRACTITIONER

## 2025-03-16 PROCEDURE — 94761 N-INVAS EAR/PLS OXIMETRY MLT: CPT

## 2025-03-16 PROCEDURE — 6370000000 HC RX 637 (ALT 250 FOR IP)

## 2025-03-16 RX ORDER — METOPROLOL SUCCINATE 25 MG/1
12.5 TABLET, EXTENDED RELEASE ORAL 2 TIMES DAILY
Qty: 30 TABLET | Refills: 3 | Status: SHIPPED | OUTPATIENT
Start: 2025-03-16

## 2025-03-16 RX ORDER — OXYCODONE AND ACETAMINOPHEN 5; 325 MG/1; MG/1
1 TABLET ORAL EVERY 6 HOURS PRN
Qty: 12 TABLET | Refills: 0 | Status: SHIPPED | OUTPATIENT
Start: 2025-03-16 | End: 2025-03-19

## 2025-03-16 RX ORDER — PREDNISONE 20 MG/1
TABLET ORAL
Qty: 15 TABLET | Refills: 0 | Status: SHIPPED | OUTPATIENT
Start: 2025-03-16 | End: 2025-03-30

## 2025-03-16 RX ADMIN — ASPIRIN 81 MG: 81 TABLET, CHEWABLE ORAL at 07:40

## 2025-03-16 RX ADMIN — BUDESONIDE 500 MCG: 0.5 SUSPENSION RESPIRATORY (INHALATION) at 07:42

## 2025-03-16 RX ADMIN — ROFLUMILAST 250 MCG: 500 TABLET ORAL at 09:18

## 2025-03-16 RX ADMIN — OXYCODONE AND ACETAMINOPHEN 1 TABLET: 5; 325 TABLET ORAL at 09:18

## 2025-03-16 RX ADMIN — LEVALBUTEROL 1.25 MG: 1.25 SOLUTION, CONCENTRATE RESPIRATORY (INHALATION) at 13:44

## 2025-03-16 RX ADMIN — DIAZEPAM 5 MG: 5 TABLET ORAL at 13:51

## 2025-03-16 RX ADMIN — METOPROLOL SUCCINATE 25 MG: 25 TABLET, EXTENDED RELEASE ORAL at 07:40

## 2025-03-16 RX ADMIN — FUROSEMIDE 20 MG: 20 TABLET ORAL at 07:40

## 2025-03-16 RX ADMIN — LEVALBUTEROL 1.25 MG: 1.25 SOLUTION, CONCENTRATE RESPIRATORY (INHALATION) at 07:42

## 2025-03-16 RX ADMIN — OXYCODONE AND ACETAMINOPHEN 1 TABLET: 5; 325 TABLET ORAL at 04:00

## 2025-03-16 RX ADMIN — TIOTROPIUM BROMIDE INHALATION SPRAY 2 PUFF: 3.12 SPRAY, METERED RESPIRATORY (INHALATION) at 07:42

## 2025-03-16 RX ADMIN — DIAZEPAM 5 MG: 5 TABLET ORAL at 04:00

## 2025-03-16 RX ADMIN — GUAIFENESIN 600 MG: 600 TABLET ORAL at 07:40

## 2025-03-16 RX ADMIN — PREDNISONE 40 MG: 20 TABLET ORAL at 07:40

## 2025-03-16 RX ADMIN — PANTOPRAZOLE SODIUM 40 MG: 40 TABLET, DELAYED RELEASE ORAL at 07:40

## 2025-03-16 RX ADMIN — BUSPIRONE HYDROCHLORIDE 10 MG: 5 TABLET ORAL at 12:55

## 2025-03-16 RX ADMIN — OXYCODONE AND ACETAMINOPHEN 1 TABLET: 5; 325 TABLET ORAL at 12:55

## 2025-03-16 RX ADMIN — METOCLOPRAMIDE 10 MG: 10 TABLET ORAL at 07:40

## 2025-03-16 RX ADMIN — OXYCODONE AND ACETAMINOPHEN 1 TABLET: 5; 325 TABLET ORAL at 17:54

## 2025-03-16 RX ADMIN — BUSPIRONE HYDROCHLORIDE 10 MG: 5 TABLET ORAL at 07:40

## 2025-03-16 ASSESSMENT — PAIN SCALES - GENERAL
PAINLEVEL_OUTOF10: 4
PAINLEVEL_OUTOF10: 6
PAINLEVEL_OUTOF10: 3
PAINLEVEL_OUTOF10: 7
PAINLEVEL_OUTOF10: 6

## 2025-03-16 ASSESSMENT — PAIN DESCRIPTION - ORIENTATION
ORIENTATION: RIGHT;LEFT;MID
ORIENTATION: RIGHT;LEFT;LOWER
ORIENTATION: MID;RIGHT
ORIENTATION: MID;RIGHT

## 2025-03-16 ASSESSMENT — PAIN DESCRIPTION - LOCATION
LOCATION: BACK;CHEST
LOCATION: BACK;CHEST
LOCATION: BACK
LOCATION: BACK

## 2025-03-16 ASSESSMENT — PAIN SCALES - WONG BAKER
WONGBAKER_NUMERICALRESPONSE: NO HURT
WONGBAKER_NUMERICALRESPONSE: NO HURT

## 2025-03-16 ASSESSMENT — PAIN DESCRIPTION - DESCRIPTORS
DESCRIPTORS: ACHING;DISCOMFORT;SHARP;SORE
DESCRIPTORS: ACHING;DISCOMFORT;THROBBING
DESCRIPTORS: ACHING;SHARP
DESCRIPTORS: ACHING;DISCOMFORT;TENDER

## 2025-03-16 NOTE — CARE COORDINATION
Per MD pt has spoken to cards again last pm and is still appealing dc. Appeal is still pending via [employer] site

## 2025-03-16 NOTE — CARE COORDINATION
CASE MANAGEMENT DISCHARGE SUMMARY      Discharge to: Home with [facility]    IMM given: (date) 3/14    Transportation:    Family/car: no   Medical Transport explained to pt/family. Pt/family voice no agency preference.    Agency used: [facility] [phone]    time: 1800   Ambulance form completed: Yes    Confirmed discharge plan with:     Patient: yes     Family: no per pt     RN, name: Compa HUGHES    Note: Discharging nurse to complete ALLAN, reconcile AVS, and place final copy with patient's discharge packet. RN to ensure that written prescriptions for  Level II medications are sent with patient to the facility as per protocol.

## 2025-03-16 NOTE — DISCHARGE SUMMARY
tablet, Refills: 3      !! ipratropium-albuterol (DUONEB) 0.5-2.5 (3) MG/3ML SOLN nebulizer solution Inhale 3 mLs into the lungs every 6 hours as needed for Shortness of Breath  Qty: 360 mL, Refills: 0      guaiFENesin (MUCINEX) 600 MG extended release tablet Take 1 tablet by mouth 2 times daily  Qty: 6 tablet, Refills: 0      !! ipratropium 0.5 mg-albuterol 2.5 mg (DUONEB) 0.5-2.5 (3) MG/3ML SOLN nebulizer solution Inhale 3 mLs into the lungs every 4 hours as needed for Shortness of Breath  Qty: 360 mL      Roflumilast (DALIRESP) 250 MCG tablet Take 1 tablet by mouth daily  Qty: 30 tablet, Refills: 1      metoclopramide (REGLAN) 10 MG tablet Take 1 tablet by mouth every 6 hours as needed (Nausea)  Qty: 120 tablet, Refills: 3      ondansetron (ZOFRAN) 8 MG tablet Take 1 tablet by mouth every 8 hours as needed for Nausea or Vomiting      fluticasone-umeclidin-vilant (TRELEGY ELLIPTA) 200-62.5-25 MCG/ACT AEPB inhaler Inhale 1 puff into the lungs daily       !! - Potential duplicate medications found. Please discuss with provider.        Current Discharge Medication List        STOP taking these medications       amoxicillin-clavulanate (AUGMENTIN) 875-125 MG per tablet Comments:   Reason for Stopping:         azithromycin (ZITHROMAX) 250 MG tablet Comments:   Reason for Stopping:               Significant Test Results    Nuclear stress test with myocardial perfusion  Result Date: 3/14/2025  Technically difficult study. No significant perfusion defects seen on myocardial perfusion study. LVEF is 77% with normal wall motion.  TID: 0.81. Normal hemodynamic response to pharmacologic stress. Normal EKG response to pharmacologic stress. Overall this represents a lower risk scan.    Echo (TTE) limited (PRN contrast/bubble/strain/3D)  Result Date: 3/14/2025    Image quality is poor. Contrast used: Lumason. Technically difficult study despite use of  contrast due to patient's body habitus and technically difficult study due to 
history: SOB . Comparison: 23 August 2024 Technique: Upright AP view of the chest demonstrated. Findings:   The cardiac silhouette, mediastinal contours, and pulmonary vessels appear normal. The lungs are clear. No pneumothorax. No consolidations or effusions are seen.  Senescent change dorsal spine and cardiovascular structures.     No acute findings. Electronically signed by Lalitha Valdes      Consults:     IP CONSULT TO HEART FAILURE NURSE/COORDINATOR  IP CONSULT TO PULMONOLOGY  IP CONSULT TO HEART FAILURE NURSE/COORDINATOR  IP CONSULT TO CASE MANAGEMENT  IP CONSULT TO SOCIAL WORK  IP CONSULT TO CARDIOLOGY  IP CONSULT TO CASE MANAGEMENT  IP CONSULT TO SPIRITUAL SERVICES  IP CONSULT TO PALLIATIVE CARE  IP CONSULT TO CARDIOLOGY  IP CONSULT TO HOME CARE NEEDS  IP CONSULT TO SPIRITUAL SERVICES  IP CONSULT TO CARDIOLOGY    Labs:     Recent Labs     03/14/25  0547   WBC 17.0*   HGB 12.3   HCT 37.8        Recent Labs     03/14/25  0547      K 3.6   CL 99   CO2 29   BUN 27*   CREATININE 0.6   CALCIUM 9.1     No results for input(s): \"PROBNP\", \"TROPHS\" in the last 72 hours.  No results for input(s): \"LABA1C\" in the last 72 hours.  No results for input(s): \"AST\", \"ALT\", \"BILIDIR\", \"BILITOT\", \"ALKPHOS\" in the last 72 hours.  No results for input(s): \"INR\", \"LACTA\", \"TSH\" in the last 72 hours.    Urine Cultures:   Lab Results   Component Value Date/Time    LABURIN No growth at 18 to 36 hours 11/26/2020 04:01 PM     Blood Cultures:   Lab Results   Component Value Date/Time    BC No Growth after 4 days of incubation. 08/23/2024 12:26 AM     Lab Results   Component Value Date/Time    BLOODCULT2 No Growth after 4 days of incubation. 08/23/2024 12:26 AM     Organism:   Lab Results   Component Value Date/Time    ORG Pseudomonas aeruginosa DNA Detected 03/08/2025 06:29 PM       Signed:    Chaitanya Suresh MD

## 2025-03-16 NOTE — CARE COORDINATION
[employer] faxed appeal decision, [facility] agrees with termination of services. Pt liability begins 3/17 noon. Will d/w pt.     Addendum: Spoke to pt, she is willing to dc home today. Will send [facility] orders and set up ambulance transport. Pt will notify aide.

## 2025-03-16 NOTE — PROGRESS NOTES
[facility]     Electrophysiology                                     Progress Note    Admission date:  3/7/2025    Reason for follow up visit: PSVT, PAC's, PVC's     HPI/CC: Joyce Uribe was admitted on 3/7/2025 with COPD exacerbation.  EP consulted due to PSVT, PACs and PVCs.  Echo showed an EF of 45 to 50%. Rhythm has been sinus with PAC's.     Subjective: She complains of ongoing chest pain and shortness of breath.     Vitals:  Blood pressure 108/63, pulse 55, temperature 97.5 °F (36.4 °C), temperature source Axillary, resp. rate 16, height 1.6 m (5' 2.99\"), weight 56.2 kg (123 lb 14.4 oz), SpO2 95%, not currently breastfeeding.  Temp  Av °F (36.7 °C)  Min: 97.5 °F (36.4 °C)  Max: 98.7 °F (37.1 °C)  Pulse  Av.7  Min: 51  Max: 92  BP  Min: 107/71  Max: 152/69  SpO2  Av.7 %  Min: 93 %  Max: 98 %    24 hour I/O    Intake/Output Summary (Last 24 hours) at 3/11/2025 1429  Last data filed at 3/11/2025 1014  Gross per 24 hour   Intake 450 ml   Output 1100 ml   Net -650 ml     Current Facility-Administered Medications   Medication Dose Route Frequency Provider Last Rate Last Admin    doxycycline hyclate (VIBRA-TABS) tablet 100 mg  100 mg Oral 2 times per day Chaitanya Suresh MD   100 mg at 25 0806    metoprolol succinate (TOPROL XL) extended release tablet 25 mg  25 mg Oral Daily Maryam Juarez DO   25 mg at 03/10/25 1204    levalbuterol (XOPENEX) nebulizer solution 1.25 mg  1.25 mg Nebulization 4x Daily RT Hugo Joiner MD   1.25 mg at 25 1144    predniSONE (DELTASONE) tablet 40 mg  40 mg Oral Daily Chaitanya Suresh MD   40 mg at 25 0806    sulfur hexafluoride microspheres (LUMASON) 60.7-25 MG injection 2 mL  2 mL IntraVENous ONCE PRN ArmandoFrancisco andrade APRN - CNP        loperamide (IMODIUM) capsule 2 mg  2 mg Oral PRN Francisco Roberts APRN - CNP   2 mg at 25 1048    metoclopramide (REGLAN) tablet 10 mg  10 mg Oral Q6H PRN Francisco Roberts APRN - CNP   
  4 Eyes Skin Assessment and Patient belongings     The patient is being assess for  Admission    I agree that 2 Nurses have performed a thorough Head to Toe Skin Assessment on the patient. ALL assessment sites listed below have been assessed.       Areas assessed by both nurses: Javi / Wade  [x]   Head, Face, and Ears   [x]   Shoulders, Back, and Chest  [x]   Arms, Elbows, and Hands   [x]   Coccyx, Sacrum, and IschIum  [x]   Legs, Feet, and Heels        Does the Patient have Skin Breakdown?  No         Darien Prevention initiated:  No   Wound Care Orders initiated:  No      [facility] nurse consulted for Pressure Injury (Stage 3,4, Unstageable, DTI, NWPT, and Complex wounds), New and Established Ostomies:  No      I agree that 2 Nurses have reviewed patient belongings with the patient/family and documented in the flowsheet upon admission or transfer to the unit.     Belongings  Dental Appliances: Uppers  Vision - Corrective Lenses: None  Hearing Aid: None  Clothing: Jacket/Coat, Shirt, Slippers  Jewelry: None  Electronic Devices: , Cell Phone  Weapons (Notify Protective Services/Security): None  Other Valuables: Other (Comment) (pulse oxy)  Home Medications: None  Valuables Given To: Patient  Provide Name(s) of Who Valuable(s) Were Given To: Joyce       Nurse 1 eSignature: Electronically signed by Javi Bowen RN on 3/8/25 at 4:46 AM EST    **SHARE this note so that the co-signing nurse is able to place an eSignature**    Nurse 2 eSignature: {Esignature:[id number]}  
  Hospital Medicine Progress Note  V 1.6      Date of Admission: 3/7/2025    Hospital Day: 10      Chief Admission Complaint:  SOB    Subjective:  Patient seen at bedside this morning. No fevers, chills, sob, nausea, vomiting, hypoxic events. 3L NC at bedside. Pending discharge    Presenting Admission History:       Joyce Uribe is a/an 64 y.o. female with a significant past medical history of hypertension, COPD, and HFpEF who presents to [facility]'s emergency department with complaint of progressively worsening dyspnea x 1 week. She notes she woke up last Friday with some mild dyspnea, mostly with exertion only, but it has continuously progressed since onset. She had some intermittent coughing throughout the week as well, that's productive of thick white sputum. She reached out to her PCP, who gave her oral steroids Monday, but did not seem to help. She called again yesterday, was started on Augmentin PO. She came here for an evaluation due to feeling like her symptoms are worsening even more. She notes no fever, no diarrhea or vomiting, but very poor oral intake. She also has been increasingly using her albuterol inhaler and nebulizers as well. Her evaluation here included laboratory studies, EKG, and chest x-ray. Chest x-ray was negative for acute findings. EKG showed ST without acute ischemic findings. Laboratory studies were reviewed, pertinent for chloride 95, BUN 28, glucose 168, calcium 10.9, , AST 89, WBC 10.3, and hemoglobin 14.7. D-dimer and Flu/COVID were negative. She was given a DuoNeb, Toradol, and Solu-Medrol in the ED. Hospital team was consulted to admit.     Assessment/Plan:      Current Principal Problem:  COPD with acute exacerbation (HCC)    AECOPD  AHRF  Possible CAP  Chest pain  - Admit to floor  Pneumonia panel: Pseudomonas  Plan:  - Pulmonology consult placed, appreciate recommendations  - Continue DuoNeb q4hWA  - Continue Prednisone 40mg daily  - Continue Mucinex 600 mg 
  Hospital Medicine Progress Note  V 1.6      Date of Admission: 3/7/2025    Hospital Day: 2      Chief Admission Complaint:  SOB    Subjective:  Patient seen at bedside this morning. Patient mentions ongoing dry cough. No fevers, chills, sob, nausea, vomiting, hypoxic events. 3L NC at bedside.    Presenting Admission History:       Joyce Uribe is a/an 64 y.o. female with a significant past medical history of hypertension, COPD, and HFpEF who presents to [facility]'s emergency department with complaint of progressively worsening dyspnea x 1 week. She notes she woke up last Friday with some mild dyspnea, mostly with exertion only, but it has continuously progressed since onset. She had some intermittent coughing throughout the week as well, that's productive of thick white sputum. She reached out to her PCP, who gave her oral steroids Monday, but did not seem to help. She called again yesterday, was started on Augmentin PO. She came here for an evaluation due to feeling like her symptoms are worsening even more. She notes no fever, no diarrhea or vomiting, but very poor oral intake. She also has been increasingly using her albuterol inhaler and nebulizers as well. Her evaluation here included laboratory studies, EKG, and chest x-ray. Chest x-ray was negative for acute findings. EKG showed ST without acute ischemic findings. Laboratory studies were reviewed, pertinent for chloride 95, BUN 28, glucose 168, calcium 10.9, , AST 89, WBC 10.3, and hemoglobin 14.7. D-dimer and Flu/COVID were negative. She was given a DuoNeb, Toradol, and Solu-Medrol in the ED. Hospital team was consulted to admit.     Assessment/Plan:      Current Principal Problem:  COPD with acute exacerbation (HCC)    AECOPD  AHRF  - Admit to floor  Plan:  - Pulmonology consult placed, appreciate recommendations  - Continue DuoNeb q4hWA  - Continue Solu-Medrol IV BID. Transition to PO steroids as tolerated.  - Continue oral 
  Hospital Medicine Progress Note  V 1.6      Date of Admission: 3/7/2025    Hospital Day: 4      Chief Admission Complaint:  SOB    Subjective:  Patient seen at bedside this morning. No fevers, chills, sob, nausea, vomiting, hypoxic events. 3L NC at bedside.    Presenting Admission History:       Joyce Uribe is a/an 64 y.o. female with a significant past medical history of hypertension, COPD, and HFpEF who presents to [facility]'s emergency department with complaint of progressively worsening dyspnea x 1 week. She notes she woke up last Friday with some mild dyspnea, mostly with exertion only, but it has continuously progressed since onset. She had some intermittent coughing throughout the week as well, that's productive of thick white sputum. She reached out to her PCP, who gave her oral steroids Monday, but did not seem to help. She called again yesterday, was started on Augmentin PO. She came here for an evaluation due to feeling like her symptoms are worsening even more. She notes no fever, no diarrhea or vomiting, but very poor oral intake. She also has been increasingly using her albuterol inhaler and nebulizers as well. Her evaluation here included laboratory studies, EKG, and chest x-ray. Chest x-ray was negative for acute findings. EKG showed ST without acute ischemic findings. Laboratory studies were reviewed, pertinent for chloride 95, BUN 28, glucose 168, calcium 10.9, , AST 89, WBC 10.3, and hemoglobin 14.7. D-dimer and Flu/COVID were negative. She was given a DuoNeb, Toradol, and Solu-Medrol in the ED. Hospital team was consulted to admit.     Assessment/Plan:      Current Principal Problem:  COPD with acute exacerbation (HCC)    AECOPD  AHRF  - Admit to floor  Plan:  - Pulmonology consult placed, appreciate recommendations  - Continue DuoNeb q4hWA  - Continue Prednisone 40mg daily  - Continue oral Doxycycline BID  - Continue Mucinex 600 mg BID, roflumilast  - Continue Trelegy (sub 
  Hospital Medicine Progress Note  V 1.6      Date of Admission: 3/7/2025    Hospital Day: 5      Chief Admission Complaint:  SOB    Subjective:  Patient seen at bedside this morning. No fevers, chills, sob, nausea, vomiting, hypoxic events. 3L NC at bedside.    Presenting Admission History:       Joyce Uribe is a/an 64 y.o. female with a significant past medical history of hypertension, COPD, and HFpEF who presents to [facility]'s emergency department with complaint of progressively worsening dyspnea x 1 week. She notes she woke up last Friday with some mild dyspnea, mostly with exertion only, but it has continuously progressed since onset. She had some intermittent coughing throughout the week as well, that's productive of thick white sputum. She reached out to her PCP, who gave her oral steroids Monday, but did not seem to help. She called again yesterday, was started on Augmentin PO. She came here for an evaluation due to feeling like her symptoms are worsening even more. She notes no fever, no diarrhea or vomiting, but very poor oral intake. She also has been increasingly using her albuterol inhaler and nebulizers as well. Her evaluation here included laboratory studies, EKG, and chest x-ray. Chest x-ray was negative for acute findings. EKG showed ST without acute ischemic findings. Laboratory studies were reviewed, pertinent for chloride 95, BUN 28, glucose 168, calcium 10.9, , AST 89, WBC 10.3, and hemoglobin 14.7. D-dimer and Flu/COVID were negative. She was given a DuoNeb, Toradol, and Solu-Medrol in the ED. Hospital team was consulted to admit.     Assessment/Plan:      Current Principal Problem:  COPD with acute exacerbation (HCC)    AECOPD  AHRF  Possible CAP  - Admit to floor  Pneumonia panel: Pseudomonas  Plan:  - Pulmonology consult placed, appreciate recommendations  - Continue DuoNeb q4hWA  - Continue Prednisone 40mg daily  - Continue oral Doxycycline BID  - Continue Mucinex 600 
  Hospital Medicine Progress Note  V 1.6      Date of Admission: 3/7/2025    Hospital Day: 7      Chief Admission Complaint:  SOB    Subjective:  Patient seen at bedside this morning. No fevers, chills, sob, nausea, vomiting, hypoxic events. 3L NC at bedside.    Presenting Admission History:       Joyce Uribe is a/an 64 y.o. female with a significant past medical history of hypertension, COPD, and HFpEF who presents to [facility]'s emergency department with complaint of progressively worsening dyspnea x 1 week. She notes she woke up last Friday with some mild dyspnea, mostly with exertion only, but it has continuously progressed since onset. She had some intermittent coughing throughout the week as well, that's productive of thick white sputum. She reached out to her PCP, who gave her oral steroids Monday, but did not seem to help. She called again yesterday, was started on Augmentin PO. She came here for an evaluation due to feeling like her symptoms are worsening even more. She notes no fever, no diarrhea or vomiting, but very poor oral intake. She also has been increasingly using her albuterol inhaler and nebulizers as well. Her evaluation here included laboratory studies, EKG, and chest x-ray. Chest x-ray was negative for acute findings. EKG showed ST without acute ischemic findings. Laboratory studies were reviewed, pertinent for chloride 95, BUN 28, glucose 168, calcium 10.9, , AST 89, WBC 10.3, and hemoglobin 14.7. D-dimer and Flu/COVID were negative. She was given a DuoNeb, Toradol, and Solu-Medrol in the ED. Hospital team was consulted to admit.     Assessment/Plan:      Current Principal Problem:  COPD with acute exacerbation (HCC)    AECOPD  AHRF  Possible CAP  Chest pain  - Admit to floor  Pneumonia panel: Pseudomonas  Plan:  - Pulmonology consult placed, appreciate recommendations  - Continue DuoNeb q4hWA  - Continue Prednisone 40mg daily  - Continue Mucinex 600 mg BID, 
  Hospital Medicine Progress Note  V 1.6      Date of Admission: 3/7/2025    Hospital Day: 9      Chief Admission Complaint:  SOB    Subjective:  Patient seen at bedside this morning. No fevers, chills, sob, nausea, vomiting, hypoxic events. 3L NC at bedside. Pending discharge    Presenting Admission History:       Joyce Uribe is a/an 64 y.o. female with a significant past medical history of hypertension, COPD, and HFpEF who presents to [facility]'s emergency department with complaint of progressively worsening dyspnea x 1 week. She notes she woke up last Friday with some mild dyspnea, mostly with exertion only, but it has continuously progressed since onset. She had some intermittent coughing throughout the week as well, that's productive of thick white sputum. She reached out to her PCP, who gave her oral steroids Monday, but did not seem to help. She called again yesterday, was started on Augmentin PO. She came here for an evaluation due to feeling like her symptoms are worsening even more. She notes no fever, no diarrhea or vomiting, but very poor oral intake. She also has been increasingly using her albuterol inhaler and nebulizers as well. Her evaluation here included laboratory studies, EKG, and chest x-ray. Chest x-ray was negative for acute findings. EKG showed ST without acute ischemic findings. Laboratory studies were reviewed, pertinent for chloride 95, BUN 28, glucose 168, calcium 10.9, , AST 89, WBC 10.3, and hemoglobin 14.7. D-dimer and Flu/COVID were negative. She was given a DuoNeb, Toradol, and Solu-Medrol in the ED. Hospital team was consulted to admit.     Assessment/Plan:      Current Principal Problem:  COPD with acute exacerbation (HCC)    AECOPD  AHRF  Possible CAP  Chest pain  - Admit to floor  Pneumonia panel: Pseudomonas  Plan:  - Pulmonology consult placed, appreciate recommendations  - Continue DuoNeb q4hWA  - Continue Prednisone 40mg daily  - Continue Mucinex 600 mg 
  [facility] Medicine Progress Note  V 1.6      Date of Admission: 3/7/2025    Hospital Day: 6      Chief Admission Complaint:  SOB    Subjective:  Patient seen at bedside this morning. No fevers, chills, sob, nausea, vomiting, hypoxic events. 3L NC at bedside. Patient mentions chest pain this morning. Patient mentions ongoing for 2 hours.    Presenting Admission History:       Joyce Uribe is a/an 64 y.o. female with a significant past medical history of hypertension, COPD, and HFpEF who presents to [facility]'s emergency department with complaint of progressively worsening dyspnea x 1 week. She notes she woke up last Friday with some mild dyspnea, mostly with exertion only, but it has continuously progressed since onset. She had some intermittent coughing throughout the week as well, that's productive of thick white sputum. She reached out to her PCP, who gave her oral steroids Monday, but did not seem to help. She called again yesterday, was started on Augmentin PO. She came here for an evaluation due to feeling like her symptoms are worsening even more. She notes no fever, no diarrhea or vomiting, but very poor oral intake. She also has been increasingly using her albuterol inhaler and nebulizers as well. Her evaluation here included laboratory studies, EKG, and chest x-ray. Chest x-ray was negative for acute findings. EKG showed ST without acute ischemic findings. Laboratory studies were reviewed, pertinent for chloride 95, BUN 28, glucose 168, calcium 10.9, , AST 89, WBC 10.3, and hemoglobin 14.7. D-dimer and Flu/COVID were negative. She was given a DuoNeb, Toradol, and Solu-Medrol in the ED. Hospital team was consulted to admit.     Assessment/Plan:      Current Principal Problem:  COPD with acute exacerbation (HCC)    AECOPD  AHRF  Possible CAP  Chest pain  - Admit to floor  Pneumonia panel: Pseudomonas  Plan:  - Pulmonology consult placed, appreciate recommendations  - Continue DuoNeb 
  Physician Progress Note      PATIENT:               ADELINE MCMAHON  CSN #:                  [id number]  :                       1960  ADMIT DATE:       3/7/2025 4:28 PM  DISCH DATE:  RESPONDING  PROVIDER #:        Chaitanya Suresh MD          QUERY TEXT:    Pt admitted with shortness of breath.  Noted documentation of pneumonia on   pulmonology notes.  If possible, please document in progress notes and   discharge summary:    The medical record reflects the following:  Risk Factors: AE COPD, acute respiratory failure, CHF  Clinical Indicators: Per Pulmonology notes \"Right lower lobe pneumonia. Doxy   for 5 day course for PNA\".  Treatment: IV Doxycycline, Pulmonology consult, serial labs, supportive care  Options provided:  -- Pneumonia confirmed present on admission  -- Pneumonia ruled out  -- Other - I will add my own diagnosis  -- Disagree - Not applicable / Not valid  -- Disagree - Clinically unable to determine / Unknown  -- Refer to Clinical Documentation Reviewer    PROVIDER RESPONSE TEXT:    Pneumonia confirmed present on admission    Query created by: Amy Pate on 3/11/2025 9:57 AM      Electronically signed by:  Chaitanya Suresh MD 3/11/2025 10:25 AM          
A&O x4. Remains in 3L NC which is patients baseline. Continues with productive cough. Patient noted with HR ranging from 50s-120s. Dr Suresh notified with new orders noted. Pt placed on tele. Safety measures remain in place. Call light in reach. Denies any needs at this time. Electronically signed by ANDRES QUINN RN on 3/9/25 at 5:20 PM EDT    
Admitted to the unit via ED , On 3 litres of oxygen. SOB with exertion. Patient states she is in bed for a while. BP dropped when she stood , stable at rest. Skin is intact. Pt educated on fluid intake restriction , verbalized understanding.Safety measures in place , advised pt to call , call light placed within reach.   
Arrived to place midline after chart review. Pre-procedure and timeout done with SAUL Caldwell. Discussed limitations of placement and allergies. Procedure explained to pt, including risks and benefits. All questions answered. Pt verbalized understanding.      Vessels easily collapsible upon assessment. No difficulty accessing R brachial vein. Blood free flowing and non-pulsatile. Guidewire, introducer, and catheter all easily inserted. Midline has brisk blood return and flushes easily.     OK to use midline.     Patient tolerated sterile procedure well. Bed left in low position with belongings and call light within reach. Educated on line care. Handoff to bedside RN.      Please call with any questions or concerns. The  will direct you to the PICC RN that is on call.      [phone]        
Assessment completed and documented. VSS. A/ox4, very anxious about cardiac testing. Also states that her 40 year old son is having open heart surgery today and that is contributing to her anxiety, medications given PRN per MAR. Patient doesn't think she can go to cardiac testing via wheelchair because she said she cannot breath while sitting up, taken down to CT via bed. Able to stand pivot to BSC with SBA assist. Takes pills whole with water. NPO for testing today. Denies pain. Bed locked and in lowest position. Bedside table and call light within reach. Denies further needs at this time.    
Comprehensive Nutrition Assessment    Type and Reason for Visit:  Initial, LOS    Nutrition Recommendations/Plan:   Diet liberalized to 60g/meal CHO + JUJU with 1800ml FR.  Encourage PO intakes as tolerated.   Ensure once/day.   Monitor pertinent labs, bowel habits, weight, N/V, supplement acceptance, clinical progression.       Malnutrition Assessment:  Malnutrition Status:  At risk for malnutrition (03/14/25 0477)    Context:  Chronic Illness     Findings of the 6 clinical characteristics of malnutrition:  Energy Intake:  Mild decrease in energy intake  Weight Loss:  No weight loss     Body Fat Loss:  No body fat loss     Muscle Mass Loss:  No muscle mass loss Temples (temporalis)  Fluid Accumulation:  No fluid accumulation     Strength:  Not Performed    Nutrition Assessment:    Patient seen due to LOS. Admitted for COPD with acute exacerbation. PMHx of hypertension, COPD, uterine and lung cancer, HFpEF. Currently on 60g/meal CHO, cardiac + 1800ml FR diet. Attempted to see patient x2, pt talking on the phone and states \"I am trying to make an appeal becuase I am not ready to discharge.\" Asked RD to come back another time. Per chart review, pt with poor PO intakes throughout admission and PTA. Consuming 1-75% of meals. Will liberalize diet from cardiac to JUJU to help promote PO intakes. Will send Ensure once/day to better meet nutrient needs. No recent weight changes per EMR. Will continue to monitor.    Nutrition Related Findings:    Lactic acid 2.8. A1C 6.1% (8/24/24) - No documentation of DM, just pre-diabetes. Lasix. LBM 3/14. Wound Type: None       Current Nutrition Intake & Therapies:    Average Meal Intake: 1-25%, 26-50%, 51-75%, %  Average Supplements Intake: None Ordered  ADULT DIET; Regular; 4 carb choices (60 gm/meal); Low Fat/Low Chol/High Fiber/JUJU; 1800 ml    Anthropometric Measures:  Height: 160 cm (5' 2.99\")  Ideal Body Weight (IBW): 115 lbs (52 kg)       Current Body Weight: 55.5 kg (122 
Confirmed discharge order with Dr Suresh. Still awaiting to be seen by cardiology to discuss concerns and test results.  
Handed and reviewed AVS with patient. Applied heart monitor on patient for outpatient recording. Instructions/teachings at bedside with patient. No further questions at this time. PIV REMOVED. Telemetry removed.   
MD aware patient isn't willing to leave at this time due to concerns of weather/safety.    Attempted to reach CM and couldn't get through. Went to voicemail at 4:57pm. No further instruction from MD. MD attempting to call CM.  
Occupational Therapy  Facility/Department: [facility] C3 TELE/MED SURG/ONC  Occupational Therapy Initial Assessment    Name: Joyce Uribe  : 1960  MRN: [id number]  Date of Service: 3/9/2025    Discharge Recommendations:  Subacute/Skilled Nursing Facility  OT Equipment Recommendations  Equipment Needed: No     Therapy discharge recommendations are subject to collaboration from the patient’s interdisciplinary healthcare team, including MD and case management recommendations.    Barriers to Home Discharge:   [x] Steps to access home entry or bed/bath:   [x] Unable to transfer, ambulate, or propel wheelchair household distances without assist   [x] Limited available assist at home upon discharge    [] Patient or family requests d/c to post-acute facility    [] Poor cognition/safety awareness for d/c to home alone    [] Unable to maintain ordered weight bearing status    [] Patient with salient signs of long-standing immobility   [x] Decreased independence with ADLs   [x] Increased risk for falls   [] Other:    If pt is unable to be seen after this session, please let this note serve as discharge summary.  Please see case management note for discharge disposition.  Thank you.    Patient Diagnosis(es): The primary encounter diagnosis was COPD exacerbation (HCC). A diagnosis of Chronic diastolic congestive heart failure (HCC) was also pertinent to this visit.  Past Medical History:  has a past medical history of Anxiety, Asthma, Cancer (HCC), Cancer of lung (HCC), CHF (congestive heart failure) (HCC), COPD (chronic obstructive pulmonary disease) (HCC), Cyclic vomiting syndrome, Cysts of both kidneys, Depression, Fatty liver, Gastritis, MI (myocardial infarction) (HCC), Panic attacks, Pneumonia, Pre-diabetes, and Tricuspid regurgitation.  Past Surgical History:  has a past surgical history that includes Cholecystectomy (); Hysterectomy (); hernia repair; Colonoscopy (); Colonoscopy (); Upper 
Physical Therapy    Pt was set for 3pm D/C now that is cancelled. Attempted to see pt for follow-up. She is at stress test now.  Will con't to follow per POC.  Chanelle Baranrd, PTA#[id number]    
Physical Therapy  Facility/Department: [facility] C3 TELE/MED SURG/ONC  Daily Treatment Note  NAME: Joyce Uribe  : 1960  MRN: [id number]    Date of Service: 3/15/2025    Discharge Recommendations:  Subacute/Skilled Nursing Facility   PT Equipment Recommendations  Equipment Needed: No  Other: defer to next level of care.    Therapy discharge recommendations take into account each patient's current medical complexities and are subject to input/oversight from the patient's healthcare team.   Barriers to Home Discharge:   [x] Steps to access home entry or bed/bath: flight of steps to enter home.   [x] Unable to transfer, ambulate, or propel wheelchair household distances without assist   [x] Unable to perform ADLs without assist   [x] Limited available assist at home upon discharge    [] Patient or family requests d/c to post-acute facility    [] Poor cognition/safety awareness for d/c to home alone    [] Unable to maintain ordered weight bearing status    [] Patient with salient signs of long-standing immobility   [x] Other: pt has a flight of steps to enter home, lives alone, decreased activity tolerance.     Patient Diagnosis(es): The primary encounter diagnosis was COPD exacerbation (HCC). Diagnoses of Chronic diastolic congestive heart failure (HCC), PVC (premature ventricular contraction), PAC (premature atrial contraction), PSVT (paroxysmal supraventricular tachycardia), Shortness of breath, Chest pain, unspecified type, and Dyspnea, unspecified type were also pertinent to this visit.    Assessment  Assessment: pt found in bed, agreeable to PT treatment with encouragement, cleared for treatment by RN.  pt demonstrates SBA for scooting up in bed.  declines trasnfer training and time spent OOB, states she just got back into bed after using BSC with RN.  educated pt on benefits of time spent OOB and transfer training, pt verbalizes understanding, but continues to decline.  pt performs several exercises in 
Pt assessment completed and charted. VSS, pt on 3L o2. Patient is a/o. IV site patent/flushed, saline locked. Medication given per MAR.     Safety Measures in place:   Bed in lowest position and wheels locked.   Call light within reach.   Bedside table within reach.   Non-skid socks in place.   Pt denies any other needs at this time.    Pt calls out appropriately.  Patient in stable condition when RN left room.  
Pt assessment completed and charted. VSS, pt on 3L of o2. Patient is a/o. IV site patent/flushed, saline locked. Medication given per MAR.     Safety Measures in place:   Bed in lowest position and wheels locked.   Call light within reach.   Bedside table within reach.   Non-skid socks in place.   Pt denies any other needs at this time.    Pt calls out appropriately.  Patient in stable condition when RN left room.  
Pt assessment completed and charted. VSS, pt on 3L. Pt is diaphoretic and SOB. Provider aware. Patient is a/o. IV site patent/flushed, saline locked. Medication given per MAR.     Safety Measures in place:   Bed in lowest position and wheels locked.   Call light within reach.   Bedside table within reach.   Non-skid socks in place.   Pt denies any other needs at this time.    Pt calls out appropriately.  Patient in stable condition when RN left room.  
Pt assessment completed and charted. VSS, pt on NC 3L. Patient is a/o. IV site patent/flushed, saline locked. I&Os charted.     Safety Measures in place:   Bed in lowest position and wheels locked.   Call light within reach.   Bedside table within reach.   Non-skid socks in place.   Pt denies any other needs at this time.    Pt calls out appropriately.  Patient in stable condition when RN left room.  
Spoke with cardiology in person. Discussed reason behind re-consult. Stated patient was currently using restroom. Cardiologist states will attempt visitation at later time.  
BID, roflumilast  - Continue Trelegy (sub with Symbicort, tiotropium)  -Maintain oxygen saturation > 88%  Cardiology consulted, appreciate recommendations     HFpEF  - Grossly euvolemic, compensated on exam  - Last echo 5/15/23 in Care Everywhere, LVEF 61%, no DD or RWMA, but priory history of G1DD  Plan:  - Continue GDMT: furosemide 20mg daily, Metoprolol 25mg BID  Cardiology following, appreciate recommendations: Possible additional work up pending     Hyperlipidemia  - Continue statin therapy    Pending discharge    Ongoing threat to life and/or bodily function without ongoing treatment due to:  AHRF    Consults:      IP CONSULT TO HEART FAILURE NURSE/COORDINATOR  IP CONSULT TO PULMONOLOGY  IP CONSULT TO HEART FAILURE NURSE/COORDINATOR  IP CONSULT TO CASE MANAGEMENT  IP CONSULT TO SOCIAL WORK  IP CONSULT TO CARDIOLOGY  IP CONSULT TO CASE MANAGEMENT  IP CONSULT TO SPIRITUAL SERVICES  IP CONSULT TO PALLIATIVE CARE  IP CONSULT TO CARDIOLOGY  IP CONSULT TO HOME CARE NEEDS  IP CONSULT TO SPIRITUAL SERVICES        --------------------------------------------------      Medications:        Infusion Medications    sodium chloride       Scheduled Medications    metoprolol succinate  25 mg Oral BID    levalbuterol  1.25 mg Nebulization 4x Daily RT    predniSONE  40 mg Oral Daily    pantoprazole  40 mg Oral Daily    Roflumilast  250 mcg Oral Daily    arformoterol tartrate  15 mcg Nebulization BID RT    budesonide  0.5 mg Nebulization BID RT    aspirin  81 mg Oral Daily    [Held by provider] atorvastatin  80 mg Oral Nightly    busPIRone  10 mg Oral TID    tiotropium  2 puff Inhalation Daily RT    furosemide  20 mg Oral Daily    guaiFENesin  600 mg Oral BID    sodium chloride flush  5-40 mL IntraVENous 2 times per day    enoxaparin  30 mg SubCUTAneous Daily     PRN Meds: LORazepam, nitroGLYCERIN, sulfur hexafluoride microspheres, loperamide, metoclopramide, diazePAM, sodium chloride flush, sodium chloride, polyethylene 
Trelegy (sub with Symbicort, tiotropium)  -Maintain oxygen saturation > 88%     HFpEF  - Grossly euvolemic, compensated on exam  - Last echo 5/15/23 in Care Everywhere, LVEF 61%, no DD or RWMA, but priory history of G1DD  Plan:  - CHF order set in place  - Continue GDMT: furosemide 20mg daily  - Echo ordered, pending     Hyperlipidemia  - Continue statin therapy    Ongoing threat to life and/or bodily function without ongoing treatment due to:  AHRF    Consults:      IP CONSULT TO HEART FAILURE NURSE/COORDINATOR  IP CONSULT TO PULMONOLOGY  IP CONSULT TO HEART FAILURE NURSE/COORDINATOR  IP CONSULT TO CASE MANAGEMENT  IP CONSULT TO SOCIAL WORK        --------------------------------------------------      Medications:        Infusion Medications    sodium chloride       Scheduled Medications    magnesium sulfate  2,000 mg IntraVENous Once    potassium phosphate IVPB (PERIPHERAL LINE)  30 mmol IntraVENous Once    predniSONE  40 mg Oral Daily    pantoprazole  40 mg Oral Daily    Roflumilast  250 mcg Oral Daily    albuterol  2.5 mg Nebulization Q4H WA RT    acetylcysteine  600 mg Inhalation BID RT    arformoterol tartrate  15 mcg Nebulization BID RT    budesonide  0.5 mg Nebulization BID RT    doxycycline (VIBRAMYCIN) IV  100 mg IntraVENous Q12H    cefTRIAXone (ROCEPHIN) IV  1,000 mg IntraVENous Q24H    aspirin  81 mg Oral Daily    atorvastatin  80 mg Oral Nightly    busPIRone  10 mg Oral TID    tiotropium  2 puff Inhalation Daily RT    furosemide  20 mg Oral Daily    guaiFENesin  600 mg Oral BID    sodium chloride flush  5-40 mL IntraVENous 2 times per day    enoxaparin  30 mg SubCUTAneous Daily     PRN Meds: sulfur hexafluoride microspheres, loperamide, metoclopramide, ipratropium 0.5 mg-albuterol 2.5 mg, diazePAM, sodium chloride flush, sodium chloride, polyethylene glycol, acetaminophen **OR** acetaminophen, prochlorperazine, oxyCODONE-acetaminophen, albuterol sulfate HFA      Physical Exam Performed:      General 
  HCT 45.1 38.0   MCV 91.5 94.2    227     BMP:   Recent Labs     03/07/25  1704 03/08/25  0655    135*   K 4.2 4.3   CL 95* 102   CO2 28 20*   BUN 28* 34*   CREATININE 0.9 0.9     LIVER PROFILE:   Recent Labs     03/07/25  1704 03/08/25  0655   AST 89* 71*   * 109*   BILIDIR  --  <0.1   BILITOT <0.2 <0.2   ALKPHOS 123 83     PT/INR: No results for input(s): \"PROTIME\", \"INR\" in the last 72 hours.       Vitals:    03/09/25 0400   Pulse: 99   Resp: 18   Temp: 98.4 °F (36.9 °C)   SpO2: 94%       Physical Exam:  General appearance: In no apparent distress.  HEENT: Anicteric.  Cardiac: No loud murmur.  Lungs: Decreased air entry bilaterally  Abdomen: Soft.  Back & Extremities: No clubbing.  No signs of acute ischemia.  Neurological: No focal deficit.      ________________________________________________________  Electronically signed by:  Sasha Sawyer MD,FACP    3/9/2025    6:25 AM.     [facility]  [address], [place] [address]   Phone (office): [phone]     
hyperinflation. Scarring unchanged. Focal consolidation.  Pleura: No significant pleural effusion. No evidence of pneumothorax.  Vessels:  No evidence significant aortic dilatation. .   Heart: normal size. No pericardial effusion.  Mediastinum and Janie: No suspicious hilar or mediastinal lymphadenopathy.  Chest Wall and Lower Neck: Visualized thyroid unremarkable. No axillary lymphadenopathy. No chest wall masses     Upper Abdomen: unremarkable.     Bones: Mild compression deformities superior endplate of T11 inferior endplate of T9. There is also wedge compressions of T6 and T5 percent loss of anterior vertebral body height. Findings similar to prior exam     IMPRESSION:     Chronic scarring right lung base.  Paraseptal emphysema.    Assessment/Plan   Patient is a 64-year-old female with significant past medical history of chronic hypoxic respiratory failure presents to [facility] for shortness of breath    Assessment:  AECOPD  Emphysema  Acute on chronic hypoxic and hypercapnic respiratory failure  Right lower lobe pneumonia  History of lung CA s/p resection  Tobacco dependence    Plan:  - cont supplemental O2 therapy with goal saturation 88-92%, she is on 1L  - Cont Prednisone 40mg PO qd daily, will reassess if needing taper  - Xopenex QID with mucomyst, Pulmicort/Brovana nebs   - Cont Mucomyst for today   - Doxy for 5 day course for PNA  - Pulmonary hygiene - PT/OT, OOBTC   - Nicotine patch PRN for smoking cessation    Hugo Joiner MD, [facility]  [phone]      Please note that some or all of this record was generated using voice recognition software. If there are any questions about the content of this document, please contact the author as some errors in transcription may have occurred.    
without using bedrails?: A Little  How much help is needed moving to and from a bed to a chair?: A Little  How much help is needed standing up from a chair using your arms?: A Little  How much help is needed walking in hospital room?: A Lot  How much help is needed climbing 3-5 steps with a railing?: A Lot  AM-PAC Inpatient Mobility Raw Score : 16  AM-PAC Inpatient T-Scale Score : 40.78  Mobility Inpatient CMS 0-100% Score: 54.16  Mobility Inpatient CMS G-Code Modifier : CK         Therapy Time   Individual Concurrent Group Co-treatment   Time In 1145         Time Out 1208         Minutes 23         Timed Code Treatment Minutes: 23 Minutes       Jamari Jin           
Source: Monitor  Respirations: 20  SpO2: 93 %  O2 Device: Nasal cannula  BP: (!) 159/79  MAP (Calculated): 106  BP Location: Left upper arm  BP Method: Automatic  Patient Position: High fowlers  Orthostatic B/P and Pulse?: Yes  Blood Pressure Lyin/79  Blood Pressure Sittin/81  Comment: no pain; RN notified about changes in vitals throughout the session        Gross Assessment  AROM: Within functional limits  Strength: Generally decreased, functional                   Bed Mobility Training  Bed Mobility Training: Yes  Interventions: Verbal cues  Supine to Sit: Stand by assistance (increased time)  Sit to Supine: Stand by assistance (increased time)  Balance  Sitting: Intact  Standing: Impaired  Standing - Static: Constant support;Fair  Standing - Dynamic: Constant support;Fair  Transfer Training  Transfer Training: Yes  Interventions: Safety awareness training;Verbal cues  Sit to Stand: Minimal assistance  Stand to Sit: Minimal assistance                         OutComes Score                                                  AM-PAC - Mobility    AM-PAC Basic Mobility - Inpatient   How much help is needed turning from your back to your side while in a flat bed without using bedrails?: A Little  How much help is needed moving from lying on your back to sitting on the side of a flat bed without using bedrails?: A Little  How much help is needed moving to and from a bed to a chair?: A Little  How much help is needed standing up from a chair using your arms?: A Little  How much help is needed walking in hospital room?: A Lot  How much help is needed climbing 3-5 steps with a railing?: A Lot  AM-PAC Inpatient Mobility Raw Score : 16  AM-PAC Inpatient T-Scale Score : 40.78  Mobility Inpatient CMS 0-100% Score: 54.16  Mobility Inpatient CMS G-Code Modifier : CK         Tinneti Score       Goals  Short Term Goals  Time Frame for Short Term Goals: 3/16 (7 days) unless otherwise stated  Short Term Goal 1: Pt will 
errors in transcription may have occurred.   
software. If there are any questions about the content of this document, please contact the author as some errors in transcription may have occurred.

## 2025-03-17 ENCOUNTER — TELEPHONE (OUTPATIENT)
Dept: PULMONOLOGY | Age: 65
End: 2025-03-17

## 2025-03-17 NOTE — TELEPHONE ENCOUNTER
Request for f/u received from [facility] for COPD. Pt discharged on 3.7.25.  Called pt to schedule, stated she has a NP that comes to her home.  She will not leave her home due to she lives on the 2nd floor.  Refused appt.

## 2025-03-18 ENCOUNTER — FOLLOWUP TELEPHONE ENCOUNTER (OUTPATIENT)
Dept: TELEMETRY | Age: 65
End: 2025-03-18

## 2025-03-18 NOTE — TELEPHONE ENCOUNTER
Care Transitions Initial Follow Up Call    Call within 2 business days of discharge: Yes     Patient: Joyce Uribe Patient : 1960 MRN: [id number]    [unfilled]    RARS: Readmission Risk Score: 17.9       Spoke with: patient     Discharge department/facility: home    Non-face-to-face services provided:  Scheduled appointment with PCP-7 days    Call placed to patient for hospital follow up.  Pt did not wish to speak as she was sleeping.  Denied any needs    Follow Up  Future Appointments   Date Time Provider Department Center   2025  1:30 PM Maryam Juarez DO [facility] JENNY Almendarez RN

## 2025-04-22 LAB — ECHO BSA: 1.57 M2

## 2025-04-23 ENCOUNTER — RESULTS FOLLOW-UP (OUTPATIENT)
Dept: CARDIOLOGY CLINIC | Age: 65
End: 2025-04-23

## 2025-06-19 ENCOUNTER — TELEPHONE (OUTPATIENT)
Dept: CARDIOLOGY CLINIC | Age: 65
End: 2025-06-19

## 2025-06-19 NOTE — TELEPHONE ENCOUNTER
Anita pool NP from pts pcp office stated that pt came into there office for an EKG today due to pt having chest pain and racing HR. EKG detected afib. Aylin is going to be faxing EKG, fax number of [phone] was provided. If any questions Aylin cam be reached at [phone].

## 2025-06-19 NOTE — TELEPHONE ENCOUNTER
[place]ia called back. She said to disregard her pervious message. She said the pt's EKG is normal.

## 2025-06-25 NOTE — PROGRESS NOTES
CARDIAC ELECTROPHYSIOLOGY CONSULT NOTE  Date: 6/26/25  Patient Name: Joyce Uribe  YOB: 1960    Primary Care Physician: Ghada Marr, MACEY - MANOJ    CHIEF COMPLAINT:   Chief Complaint   Patient presents with    3 Month Follow-Up    Other     PVC, PAC     Tachycardia     PSVT       Joyce Uribe was seen today on 6/26/2025 in consultation due to chief complaint of PSVT and frequent PACs-PVCs .    Patient is a 64 y.o. female with PMH of hypertension, COPD, heart failure with preserved ejection fraction who was seen today for PSVT and frequent PACs-PVCs      In 3/2025 patient presented to the hospital with complaints of ongoing dyspnea and cough. She had been started on oral steroids and antibiotics. EKG on presentation showed sinus rhythm with frequent PACs. Patient was noted to have mildly elevated troponin. Patient did report feeling palpitations and chest discomfort. Telemetry showed pSVT/tachycardia. Echo demonstrated an LVEF of 45-50%. Stress test showed no significant perfusion defects. CT Calcium score showed a score of 249.     Patient wore a cardiac event monitor from 3/16/2025 to 4/16/2025 which demonstrated predominately SR with an average HR of 73 () BPM, pSVT (longest episode 24 beats in duration), NSVT (longest episode 6 beats in duration). PAC burden 7.6%, PVC burden 1.07%.     Today, 6/26/2025, patient presents in wheelchair. She reports she has been feeling dizzy today. She reports continued episodes of heart racing since hospital discharge. She feels like this has been occurring about the same as when she was in the hospital. She is on chronic home oxygen therapy and therefore is chronically short of breath. She reports her blood pressure is always running low. Patient denies current edema, chest pain, or syncope.      ALLERGIES:   Allergies   Allergen Reactions    Bactrim [Sulfamethoxazole-Trimethoprim]      N/V    Bupropion      Other reaction(s): Headaches

## 2025-06-26 ENCOUNTER — TELEPHONE (OUTPATIENT)
Dept: CARDIOLOGY CLINIC | Age: 65
End: 2025-06-26

## 2025-06-26 ENCOUNTER — OFFICE VISIT (OUTPATIENT)
Dept: CARDIOLOGY CLINIC | Age: 65
End: 2025-06-26
Payer: COMMERCIAL

## 2025-06-26 VITALS
BODY MASS INDEX: 20.55 KG/M2 | HEART RATE: 73 BPM | WEIGHT: 116 LBS | HEIGHT: 63 IN | SYSTOLIC BLOOD PRESSURE: 98 MMHG | DIASTOLIC BLOOD PRESSURE: 60 MMHG | OXYGEN SATURATION: 92 %

## 2025-06-26 DIAGNOSIS — I95.2 HYPOTENSION DUE TO DRUGS: ICD-10-CM

## 2025-06-26 DIAGNOSIS — I47.10 PSVT (PAROXYSMAL SUPRAVENTRICULAR TACHYCARDIA): Primary | ICD-10-CM

## 2025-06-26 DIAGNOSIS — I50.22 CHRONIC SYSTOLIC (CONGESTIVE) HEART FAILURE (HCC): ICD-10-CM

## 2025-06-26 DIAGNOSIS — I49.1 PAC (PREMATURE ATRIAL CONTRACTION): ICD-10-CM

## 2025-06-26 DIAGNOSIS — I47.29 NSVT (NONSUSTAINED VENTRICULAR TACHYCARDIA) (HCC): ICD-10-CM

## 2025-06-26 DIAGNOSIS — J44.9 COPD, SEVERE (HCC): ICD-10-CM

## 2025-06-26 PROCEDURE — 99214 OFFICE O/P EST MOD 30 MIN: CPT | Performed by: INTERNAL MEDICINE

## 2025-06-26 PROCEDURE — 1123F ACP DISCUSS/DSCN MKR DOCD: CPT | Performed by: INTERNAL MEDICINE

## 2025-06-26 PROCEDURE — 93000 ELECTROCARDIOGRAM COMPLETE: CPT | Performed by: INTERNAL MEDICINE

## 2025-06-26 RX ORDER — TRAMADOL HYDROCHLORIDE 50 MG/1
TABLET ORAL
COMMUNITY
Start: 2025-06-06

## 2025-06-26 NOTE — PATIENT INSTRUCTIONS
PLAN:   -Discussed Dofetilide initiation which requires 3-4 day hospital stay   -patient agreeable and would like to proceed with this  -Stop Metoprolol   -Follow up after medication loading

## 2025-07-07 ENCOUNTER — HOSPITAL ENCOUNTER (INPATIENT)
Age: 65
LOS: 4 days | Discharge: HOME | End: 2025-07-11
Attending: INTERNAL MEDICINE | Admitting: INTERNAL MEDICINE
Payer: COMMERCIAL

## 2025-07-07 PROBLEM — I48.19 ATRIAL FIBRILLATION, PERSISTENT (HCC): Status: ACTIVE | Noted: 2025-07-07

## 2025-07-07 PROBLEM — I47.10 SVT (SUPRAVENTRICULAR TACHYCARDIA): Status: ACTIVE | Noted: 2025-07-07

## 2025-07-07 LAB
ANION GAP SERPL CALCULATED.3IONS-SCNC: 15 MMOL/L (ref 3–16)
BASOPHILS # BLD: 0.1 K/UL (ref 0–0.2)
BASOPHILS NFR BLD: 0.6 %
BUN SERPL-MCNC: 15 MG/DL (ref 7–20)
CALCIUM SERPL-MCNC: 10.2 MG/DL (ref 8.3–10.6)
CHLORIDE SERPL-SCNC: 98 MMOL/L (ref 99–110)
CO2 SERPL-SCNC: 26 MMOL/L (ref 21–32)
CREAT SERPL-MCNC: 0.7 MG/DL (ref 0.6–1.2)
DEPRECATED RDW RBC AUTO: 14.2 % (ref 12.4–15.4)
EKG ATRIAL RATE: 106 BPM
EKG DIAGNOSIS: NORMAL
EKG P AXIS: 78 DEGREES
EKG P-R INTERVAL: 124 MS
EKG Q-T INTERVAL: 348 MS
EKG QRS DURATION: 80 MS
EKG QTC CALCULATION (BAZETT): 462 MS
EKG R AXIS: 78 DEGREES
EKG T AXIS: 73 DEGREES
EKG VENTRICULAR RATE: 106 BPM
EOSINOPHIL # BLD: 0.1 K/UL (ref 0–0.6)
EOSINOPHIL NFR BLD: 0.8 %
GFR SERPLBLD CREATININE-BSD FMLA CKD-EPI: >90 ML/MIN/{1.73_M2}
GLUCOSE SERPL-MCNC: 120 MG/DL (ref 70–99)
HCT VFR BLD AUTO: 40.5 % (ref 36–48)
HGB BLD-MCNC: 13.4 G/DL (ref 12–16)
LYMPHOCYTES # BLD: 1.9 K/UL (ref 1–5.1)
LYMPHOCYTES NFR BLD: 18.7 %
MAGNESIUM SERPL-MCNC: 1.8 MG/DL (ref 1.8–2.4)
MCH RBC QN AUTO: 30 PG (ref 26–34)
MCHC RBC AUTO-ENTMCNC: 33 G/DL (ref 31–36)
MCV RBC AUTO: 91 FL (ref 80–100)
MONOCYTES # BLD: 0.6 K/UL (ref 0–1.3)
MONOCYTES NFR BLD: 5.8 %
NEUTROPHILS # BLD: 7.4 K/UL (ref 1.7–7.7)
NEUTROPHILS NFR BLD: 74.1 %
PLATELET # BLD AUTO: 247 K/UL (ref 135–450)
PMV BLD AUTO: 9.8 FL (ref 5–10.5)
POTASSIUM SERPL-SCNC: 3.5 MMOL/L (ref 3.5–5.1)
RBC # BLD AUTO: 4.45 M/UL (ref 4–5.2)
SODIUM SERPL-SCNC: 139 MMOL/L (ref 136–145)
WBC # BLD AUTO: 9.9 K/UL (ref 4–11)

## 2025-07-07 PROCEDURE — 6370000000 HC RX 637 (ALT 250 FOR IP): Performed by: NURSE PRACTITIONER

## 2025-07-07 PROCEDURE — 83735 ASSAY OF MAGNESIUM: CPT

## 2025-07-07 PROCEDURE — 94761 N-INVAS EAR/PLS OXIMETRY MLT: CPT

## 2025-07-07 PROCEDURE — 2060000000 HC ICU INTERMEDIATE R&B

## 2025-07-07 PROCEDURE — 36415 COLL VENOUS BLD VENIPUNCTURE: CPT

## 2025-07-07 PROCEDURE — 6370000000 HC RX 637 (ALT 250 FOR IP)

## 2025-07-07 PROCEDURE — 2500000003 HC RX 250 WO HCPCS

## 2025-07-07 PROCEDURE — 80048 BASIC METABOLIC PNL TOTAL CA: CPT

## 2025-07-07 PROCEDURE — 2580000003 HC RX 258

## 2025-07-07 PROCEDURE — 93005 ELECTROCARDIOGRAM TRACING: CPT | Performed by: INTERNAL MEDICINE

## 2025-07-07 PROCEDURE — 6360000002 HC RX W HCPCS: Performed by: INTERNAL MEDICINE

## 2025-07-07 PROCEDURE — 93010 ELECTROCARDIOGRAM REPORT: CPT | Performed by: INTERNAL MEDICINE

## 2025-07-07 PROCEDURE — 2700000000 HC OXYGEN THERAPY PER DAY

## 2025-07-07 PROCEDURE — 93005 ELECTROCARDIOGRAM TRACING: CPT

## 2025-07-07 PROCEDURE — 6360000002 HC RX W HCPCS

## 2025-07-07 PROCEDURE — 99223 1ST HOSP IP/OBS HIGH 75: CPT | Performed by: INTERNAL MEDICINE

## 2025-07-07 PROCEDURE — 85025 COMPLETE CBC W/AUTO DIFF WBC: CPT

## 2025-07-07 RX ORDER — POLYETHYLENE GLYCOL 3350 17 G/17G
17 POWDER, FOR SOLUTION ORAL DAILY PRN
Status: DISCONTINUED | OUTPATIENT
Start: 2025-07-07 | End: 2025-07-11 | Stop reason: HOSPADM

## 2025-07-07 RX ORDER — ASPIRIN 81 MG/1
81 TABLET, CHEWABLE ORAL DAILY
Status: DISCONTINUED | OUTPATIENT
Start: 2025-07-08 | End: 2025-07-11 | Stop reason: HOSPADM

## 2025-07-07 RX ORDER — ACETAMINOPHEN 650 MG/1
650 SUPPOSITORY RECTAL EVERY 6 HOURS PRN
Status: DISCONTINUED | OUTPATIENT
Start: 2025-07-07 | End: 2025-07-11 | Stop reason: HOSPADM

## 2025-07-07 RX ORDER — TRAMADOL HYDROCHLORIDE 50 MG/1
50 TABLET ORAL EVERY 6 HOURS
Status: DISCONTINUED | OUTPATIENT
Start: 2025-07-07 | End: 2025-07-11 | Stop reason: HOSPADM

## 2025-07-07 RX ORDER — FUROSEMIDE 20 MG/1
20 TABLET ORAL DAILY
Status: DISCONTINUED | OUTPATIENT
Start: 2025-07-08 | End: 2025-07-11 | Stop reason: HOSPADM

## 2025-07-07 RX ORDER — POTASSIUM CHLORIDE 1500 MG/1
20 TABLET, EXTENDED RELEASE ORAL ONCE
Status: COMPLETED | OUTPATIENT
Start: 2025-07-07 | End: 2025-07-07

## 2025-07-07 RX ORDER — TRAMADOL HYDROCHLORIDE 50 MG/1
50 TABLET ORAL DAILY
Status: DISCONTINUED | OUTPATIENT
Start: 2025-07-07 | End: 2025-07-07

## 2025-07-07 RX ORDER — MAGNESIUM SULFATE IN WATER 40 MG/ML
2000 INJECTION, SOLUTION INTRAVENOUS PRN
Status: DISCONTINUED | OUTPATIENT
Start: 2025-07-07 | End: 2025-07-11 | Stop reason: HOSPADM

## 2025-07-07 RX ORDER — DIPHENHYDRAMINE HCL 25 MG
25 TABLET ORAL EVERY 6 HOURS PRN
COMMUNITY

## 2025-07-07 RX ORDER — PROMETHAZINE HYDROCHLORIDE 25 MG/ML
6.25 INJECTION, SOLUTION INTRAMUSCULAR; INTRAVENOUS EVERY 6 HOURS PRN
Status: DISCONTINUED | OUTPATIENT
Start: 2025-07-07 | End: 2025-07-09

## 2025-07-07 RX ORDER — TRAMADOL HYDROCHLORIDE 50 MG/1
25 TABLET ORAL EVERY 6 HOURS
Status: DISCONTINUED | OUTPATIENT
Start: 2025-07-07 | End: 2025-07-07

## 2025-07-07 RX ORDER — ENOXAPARIN SODIUM 100 MG/ML
40 INJECTION SUBCUTANEOUS DAILY
Status: DISCONTINUED | OUTPATIENT
Start: 2025-07-07 | End: 2025-07-08

## 2025-07-07 RX ORDER — SODIUM CHLORIDE 0.9 % (FLUSH) 0.9 %
5-40 SYRINGE (ML) INJECTION PRN
Status: DISCONTINUED | OUTPATIENT
Start: 2025-07-07 | End: 2025-07-11 | Stop reason: HOSPADM

## 2025-07-07 RX ORDER — ACETAMINOPHEN 325 MG/1
650 TABLET ORAL EVERY 6 HOURS PRN
Status: DISCONTINUED | OUTPATIENT
Start: 2025-07-07 | End: 2025-07-11 | Stop reason: HOSPADM

## 2025-07-07 RX ORDER — DIAZEPAM 5 MG/1
5 TABLET ORAL EVERY 6 HOURS PRN
Status: DISCONTINUED | OUTPATIENT
Start: 2025-07-07 | End: 2025-07-11 | Stop reason: HOSPADM

## 2025-07-07 RX ORDER — SODIUM CHLORIDE 0.9 % (FLUSH) 0.9 %
5-40 SYRINGE (ML) INJECTION EVERY 12 HOURS SCHEDULED
Status: DISCONTINUED | OUTPATIENT
Start: 2025-07-07 | End: 2025-07-11 | Stop reason: HOSPADM

## 2025-07-07 RX ORDER — MAGNESIUM SULFATE 1 G/100ML
1000 INJECTION INTRAVENOUS ONCE
Status: COMPLETED | OUTPATIENT
Start: 2025-07-07 | End: 2025-07-07

## 2025-07-07 RX ORDER — SODIUM CHLORIDE 9 MG/ML
INJECTION, SOLUTION INTRAVENOUS PRN
Status: DISCONTINUED | OUTPATIENT
Start: 2025-07-07 | End: 2025-07-11 | Stop reason: HOSPADM

## 2025-07-07 RX ORDER — DOFETILIDE 0.25 MG/1
250 CAPSULE ORAL EVERY 12 HOURS SCHEDULED
Status: DISCONTINUED | OUTPATIENT
Start: 2025-07-07 | End: 2025-07-08

## 2025-07-07 RX ADMIN — MAGNESIUM SULFATE HEPTAHYDRATE 1000 MG: 1 INJECTION, SOLUTION INTRAVENOUS at 19:56

## 2025-07-07 RX ADMIN — Medication 10 ML: at 19:56

## 2025-07-07 RX ADMIN — POTASSIUM CHLORIDE 20 MEQ: 1500 TABLET, EXTENDED RELEASE ORAL at 15:59

## 2025-07-07 RX ADMIN — Medication 10 ML: at 21:03

## 2025-07-07 RX ADMIN — DIAZEPAM 5 MG: 5 TABLET ORAL at 15:59

## 2025-07-07 RX ADMIN — SODIUM CHLORIDE: 0.9 INJECTION, SOLUTION INTRAVENOUS at 19:53

## 2025-07-07 RX ADMIN — ENOXAPARIN SODIUM 40 MG: 100 INJECTION SUBCUTANEOUS at 13:12

## 2025-07-07 RX ADMIN — DOFETILIDE 250 MCG: 0.25 CAPSULE ORAL at 21:02

## 2025-07-07 RX ADMIN — PROMETHAZINE HYDROCHLORIDE 6.25 MG: 25 INJECTION INTRAMUSCULAR; INTRAVENOUS at 19:03

## 2025-07-07 RX ADMIN — TRAMADOL HYDROCHLORIDE 25 MG: 50 TABLET, COATED ORAL at 16:01

## 2025-07-07 RX ADMIN — TRAMADOL HYDROCHLORIDE 50 MG: 50 TABLET, COATED ORAL at 22:06

## 2025-07-07 ASSESSMENT — PAIN DESCRIPTION - ORIENTATION: ORIENTATION: ANTERIOR

## 2025-07-07 ASSESSMENT — PAIN DESCRIPTION - DESCRIPTORS
DESCRIPTORS: SQUEEZING
DESCRIPTORS: ACHING

## 2025-07-07 ASSESSMENT — PAIN SCALES - GENERAL
PAINLEVEL_OUTOF10: 5
PAINLEVEL_OUTOF10: 6

## 2025-07-07 ASSESSMENT — PAIN DESCRIPTION - LOCATION
LOCATION: CHEST
LOCATION: CHEST

## 2025-07-07 NOTE — PROGRESS NOTES
Arrived to place midline with bedside RN. Pre-procedure and timeout done with RN, discussed limitations of placement and allergies. Vital signs stable. Labs, allergies, medications, and code status reviewed. No contraindications noted.    Procedure explained to pt, including the risk and benefits of the procedure. All questions answered. Pt verbalizes understanding of the procedure and states no more questions.     Pt's basilic, brachial, cephalic are all easily collapsible with no indication for a clot. Vein selected is large enough for catheter (please see image below). Pt tolerated sterile procedure well, with no difficulty accessing basilic vein, when accessed - blood was free flowing and non-pulsatile. Guidewire, introducer, and catheter went in smoothly. ML placement verification with blood return and flushes easily.      Nurses:  OK to use Midline.    Please replace all existing IV tubing with new IV tubing prior to using the ML for current IV infusions.  Please remove any PIVs from ML arm.  All of the above may be sources of infection or an increase chance of a clot.      Post procedure - reorganized pt table, placed pt in lowest position, with call light and educated on line care. Instructed pt/RN not to use arm for at least 30min to avoid bleeding. Reported off to bedside RN.      If you have any questions please call number below and dispatch will direct you to the PICC RN that is on call.    [phone]

## 2025-07-07 NOTE — CARE COORDINATION
Case Management Assessment  Initial Evaluation    Date/Time of Evaluation: 7/7/2025 1:43 PM  Assessment Completed by: Sangita Jorgensen RN    If patient is discharged prior to next notation, then this note serves as note for discharge by case management.    Patient Name: Joyce Uribe                   YOB: 1960  Diagnosis: Atrial fibrillation (HCC) [I48.91]  Atrial fibrillation, persistent (HCC) [I48.19]  SVT (supraventricular tachycardia) [I47.10]                   Date / Time: 7/7/2025 10:42 AM    Patient Admission Status: Inpatient   Readmission Risk (Low < 19, Mod (19-27), High > 27): Readmission Risk Score: 13.9    Current PCP: Ghada Marr APRN - CNP  PCP verified by CM? Yes (Visiting CNP SUpportive every 5 weeks)    Chart Reviewed: Yes      History Provided by: Patient, Medical Record  Patient Orientation: Alert and Oriented, Person, Place, Situation, Self    Patient Cognition: Alert    Hospitalization in the last 30 days (Readmission):  No    If yes, Readmission Assessment in  Navigator will be completed.    Advance Directives:      Code Status: DNR-CC   Patient's Primary Decision Maker is: Legal Next of Kin    Primary Decision Maker: PhilTy  Child - [phone]    Primary Decision Maker: MaximilianoKate  Child - [phone]    Discharge Planning:    Patient lives with: Alone Type of Home: Apartment  Primary Care Giver: Self  Patient Support Systems include: Children, Friends/Neighbors   Current Financial resources: Medicare, Medicaid  Current community resources: ECF/Home Care  Current services prior to admission: Oxygen Therapy, Other (Comment) ([facility] 4 d/week from [facility], housework)            Current DME:              Type of Home Care services:  Nursing Services    ADLS  Prior functional level: Assistance with the following:, Bathing, Housework, Cooking, Shopping  Current functional level: Assistance with the following:, Bathing, Dressing, Toileting, Cooking,  Patient and/or patient representative Joyce and her family were provided with a choice of provider and agrees with the discharge plan. Freedom of choice list with basic dialogue that supports the patient's individualized plan of care/goals and shares the quality data associated with the providers was provided to:     Patient Representative Name:       The Patient and/or Patient Representative Agree with the Discharge Plan?      Sangita Jorgensen RN  Case Management Department

## 2025-07-07 NOTE — PROGRESS NOTES
[facility]     Electrophysiology                                     Progress Note    Admission date:  2025    Reason for follow up visit: PSVT    HPI/CC: Joyce Uribe was admitted on 25 for initiation of Tikosyn for PSVT and frequent PACs and PVCs.  2025 QTc  greater than 500--reduced dose of Tikosyn    Rhythm has been SR    Subjective: Patient lying in bed.  Patient denies chest pain shortness of breath and palpitations.  Informed patient of reduced dose of Tikosyn that was started this morning.  Verbalizes understanding    Vitals:  Blood pressure 108/75, pulse (!) 118, temperature 97.2 °F (36.2 °C), temperature source Oral, resp. rate 14, SpO2 94%, not currently breastfeeding.  Temp  Av.2 °F (36.2 °C)  Min: 97.2 °F (36.2 °C)  Max: 97.2 °F (36.2 °C)  Pulse  Av  Min: 118  Max: 118  BP  Min: 108/75  Max: 108/75  SpO2  Av %  Min: 94 %  Max: 94 %    24 hour I/O  No intake or output data in the 24 hours ending 25 1518  Current Facility-Administered Medications   Medication Dose Route Frequency Provider Last Rate Last Admin    sodium chloride flush 0.9 % injection 5-40 mL  5-40 mL IntraVENous 2 times per day Francoise Albert APRN - CNP        sodium chloride flush 0.9 % injection 5-40 mL  5-40 mL IntraVENous PRN Francoise Albert APRN - CNP        0.9 % sodium chloride infusion   IntraVENous PRN Francoise Albert APRN - CNP        magnesium sulfate 2000 mg in 50 mL IVPB premix  2,000 mg IntraVENous PRN Francoise Albert APRN - CNP        enoxaparin (LOVENOX) injection 40 mg  40 mg SubCUTAneous Daily Francoise Albert APRN - CNP   40 mg at 25 1312    acetaminophen (TYLENOL) tablet 650 mg  650 mg Oral Q6H PRN Francoise Albert APRN - CNP        Or    acetaminophen (TYLENOL) suppository 650 mg  650 mg Rectal Q6H PRN Francoise Albert APRN - CNP        polyethylene glycol (GLYCOLAX) packet 17 g  17 g Oral Daily PRN Kachoris, Francoise, APRN - CNP        traMADol (ULTRAM) tablet

## 2025-07-07 NOTE — PROGRESS NOTES
4 Eyes Skin Assessment     NAME:  Joyce Uribe  YOB: 1960  MEDICAL RECORD NUMBER:  [id number]    The patient is being assessed for  Admission    I agree that at least one RN has performed a thorough Head to Toe Skin Assessment on the patient. ALL assessment sites listed below have been assessed.      Areas assessed by both nurses:    Head, Face, Ears, Shoulders, Back, Chest, Arms, Elbows, Hands, Sacrum. Buttock, Coccyx, Ischium, Legs. Feet and Heels, and Under Medical Devices         Does the Patient have a Wound? No noted wound(s)       [place] Prevention initiated by RN: Yes  Wound Care Orders initiated by RN: No    Pressure Injury (Stage 1,2,3,4, Unstageable, DTI, NWPT, and Complex wounds) if present, place Wound referral order by RN under : No    New Ostomies, if present place, Ostomy referral order under : No     Nurse 1 eSignature: Electronically signed by Compa De La Rosa RN on 7/7/25 at 7:15 PM EDT    **SHARE this note so that the co-signing nurse can place an eSignature**    Nurse 2 eSignature: Electronically signed by Kellie Antoine RN on 7/7/25 at 7:16 PM EDT

## 2025-07-07 NOTE — H&P
[facility]   Cardiology Admission Note              Date:   7/7/2025  Patient name: Joyce Uribe  Date of admission:  7/7/2025 10:42 AM  MRN:   [id number]  YOB: 1960    Primary Care physician: Ghada Marr APRN - CNP    Reason for Admission:  atrial fibrillation    CHIEF COMPLAINT:  Symptomatic paroxysmal atrial fibrillation     History Obtained From:  patient    HISTORY OF PRESENT ILLNESS:    Mrs. Uribe is a pleasant 64 year old female with a medical history significant for paroxysmal supraventricular tachycardia, premature atrial contractions, non-sustained ventricular tachycardia, palpitations, and cardiomyopathy who presents from home for dofetilide induction.    Past Medical History:   has a past medical history of Anxiety, Asthma, Cancer (HCC), Cancer of lung (HCC), CHF (congestive heart failure) (HCC), COPD (chronic obstructive pulmonary disease) (HCC), Cyclic vomiting syndrome, Cysts of both kidneys, Depression, Fatty liver, Gastritis, MI (myocardial infarction) (HCC), Panic attacks, Pneumonia, Pre-diabetes, and Tricuspid regurgitation.    Past Surgical History:   has a past surgical history that includes Cholecystectomy (2009); Hysterectomy (1985); hernia repair; Colonoscopy (2001); Colonoscopy (2013); Upper gastrointestinal endoscopy (2013); Tonsillectomy; Incontinence surgery (Left); Colonoscopy (08/15/2017); Upper gastrointestinal endoscopy (10/03/2017); Endoscopy, colon, diagnostic; Lung cancer surgery; and Ovary removal (2007).     Home Medications:    Prior to Admission medications    Medication Sig Start Date End Date Taking? Authorizing Provider   traMADol (ULTRAM) 50 MG tablet  6/6/25   Yoly Beltre MD   loperamide (IMODIUM) 2 MG capsule Take 1 capsule by mouth as needed for Diarrhea  Patient not taking: Reported on 6/26/2025 3/4/25   Yoly Beltre MD   diazePAM (VALIUM) 5 MG tablet Take 1 tablet by mouth every 6 hours as needed. 1/27/25    [facility]  [phone] [facility]

## 2025-07-08 PROBLEM — I48.91 ATRIAL FIBRILLATION (HCC): Status: ACTIVE | Noted: 2025-07-08

## 2025-07-08 LAB
ANION GAP SERPL CALCULATED.3IONS-SCNC: 11 MMOL/L (ref 3–16)
BUN SERPL-MCNC: 15 MG/DL (ref 7–20)
CALCIUM SERPL-MCNC: 9.7 MG/DL (ref 8.3–10.6)
CHLORIDE SERPL-SCNC: 97 MMOL/L (ref 99–110)
CO2 SERPL-SCNC: 29 MMOL/L (ref 21–32)
CREAT SERPL-MCNC: 0.7 MG/DL (ref 0.6–1.2)
DEPRECATED RDW RBC AUTO: 14.3 % (ref 12.4–15.4)
EKG ATRIAL RATE: 76 BPM
EKG ATRIAL RATE: 88 BPM
EKG DIAGNOSIS: NORMAL
EKG DIAGNOSIS: NORMAL
EKG P AXIS: 55 DEGREES
EKG P AXIS: 61 DEGREES
EKG P-R INTERVAL: 130 MS
EKG P-R INTERVAL: 84 MS
EKG Q-T INTERVAL: 414 MS
EKG Q-T INTERVAL: 450 MS
EKG QRS DURATION: 76 MS
EKG QRS DURATION: 96 MS
EKG QTC CALCULATION (BAZETT): 500 MS
EKG QTC CALCULATION (BAZETT): 506 MS
EKG R AXIS: 10 DEGREES
EKG R AXIS: 61 DEGREES
EKG T AXIS: 56 DEGREES
EKG T AXIS: 92 DEGREES
EKG VENTRICULAR RATE: 76 BPM
EKG VENTRICULAR RATE: 88 BPM
GFR SERPLBLD CREATININE-BSD FMLA CKD-EPI: >90 ML/MIN/{1.73_M2}
GLUCOSE SERPL-MCNC: 84 MG/DL (ref 70–99)
HCT VFR BLD AUTO: 40.8 % (ref 36–48)
HGB BLD-MCNC: 13.4 G/DL (ref 12–16)
MAGNESIUM SERPL-MCNC: 2.39 MG/DL (ref 1.8–2.4)
MCH RBC QN AUTO: 30.3 PG (ref 26–34)
MCHC RBC AUTO-ENTMCNC: 32.8 G/DL (ref 31–36)
MCV RBC AUTO: 92.1 FL (ref 80–100)
PLATELET # BLD AUTO: 246 K/UL (ref 135–450)
PMV BLD AUTO: 10.3 FL (ref 5–10.5)
POTASSIUM SERPL-SCNC: 4 MMOL/L (ref 3.5–5.1)
RBC # BLD AUTO: 4.43 M/UL (ref 4–5.2)
SODIUM SERPL-SCNC: 137 MMOL/L (ref 136–145)
WBC # BLD AUTO: 7.7 K/UL (ref 4–11)

## 2025-07-08 PROCEDURE — 83735 ASSAY OF MAGNESIUM: CPT

## 2025-07-08 PROCEDURE — 93010 ELECTROCARDIOGRAM REPORT: CPT | Performed by: INTERNAL MEDICINE

## 2025-07-08 PROCEDURE — 6370000000 HC RX 637 (ALT 250 FOR IP)

## 2025-07-08 PROCEDURE — 6360000002 HC RX W HCPCS

## 2025-07-08 PROCEDURE — 80048 BASIC METABOLIC PNL TOTAL CA: CPT

## 2025-07-08 PROCEDURE — 99233 SBSQ HOSP IP/OBS HIGH 50: CPT

## 2025-07-08 PROCEDURE — 93005 ELECTROCARDIOGRAM TRACING: CPT

## 2025-07-08 PROCEDURE — 2500000003 HC RX 250 WO HCPCS

## 2025-07-08 PROCEDURE — 6370000000 HC RX 637 (ALT 250 FOR IP): Performed by: NURSE PRACTITIONER

## 2025-07-08 PROCEDURE — 6360000002 HC RX W HCPCS: Performed by: INTERNAL MEDICINE

## 2025-07-08 PROCEDURE — 85027 COMPLETE CBC AUTOMATED: CPT

## 2025-07-08 PROCEDURE — 2060000000 HC ICU INTERMEDIATE R&B

## 2025-07-08 PROCEDURE — 94761 N-INVAS EAR/PLS OXIMETRY MLT: CPT

## 2025-07-08 PROCEDURE — 2700000000 HC OXYGEN THERAPY PER DAY

## 2025-07-08 PROCEDURE — 36415 COLL VENOUS BLD VENIPUNCTURE: CPT

## 2025-07-08 RX ORDER — DOFETILIDE 0.12 MG/1
125 CAPSULE ORAL EVERY 12 HOURS SCHEDULED
Status: DISCONTINUED | OUTPATIENT
Start: 2025-07-08 | End: 2025-07-09

## 2025-07-08 RX ORDER — BUSPIRONE HYDROCHLORIDE 5 MG/1
10 TABLET ORAL 3 TIMES DAILY
Status: DISCONTINUED | OUTPATIENT
Start: 2025-07-08 | End: 2025-07-11 | Stop reason: HOSPADM

## 2025-07-08 RX ORDER — PANTOPRAZOLE SODIUM 40 MG/1
40 TABLET, DELAYED RELEASE ORAL
Status: DISCONTINUED | OUTPATIENT
Start: 2025-07-09 | End: 2025-07-11

## 2025-07-08 RX ORDER — ENOXAPARIN SODIUM 100 MG/ML
30 INJECTION SUBCUTANEOUS DAILY
Status: DISCONTINUED | OUTPATIENT
Start: 2025-07-09 | End: 2025-07-11 | Stop reason: HOSPADM

## 2025-07-08 RX ADMIN — Medication 10 ML: at 08:49

## 2025-07-08 RX ADMIN — BUSPIRONE HYDROCHLORIDE 10 MG: 5 TABLET ORAL at 14:10

## 2025-07-08 RX ADMIN — TRAMADOL HYDROCHLORIDE 50 MG: 50 TABLET, COATED ORAL at 03:56

## 2025-07-08 RX ADMIN — FUROSEMIDE 20 MG: 20 TABLET ORAL at 08:47

## 2025-07-08 RX ADMIN — DOFETILIDE 125 MCG: 0.12 CAPSULE ORAL at 20:24

## 2025-07-08 RX ADMIN — DOFETILIDE 125 MCG: 0.12 CAPSULE ORAL at 09:18

## 2025-07-08 RX ADMIN — TRAMADOL HYDROCHLORIDE 50 MG: 50 TABLET, COATED ORAL at 15:43

## 2025-07-08 RX ADMIN — BUSPIRONE HYDROCHLORIDE 10 MG: 5 TABLET ORAL at 20:24

## 2025-07-08 RX ADMIN — Medication 10 ML: at 20:25

## 2025-07-08 RX ADMIN — ASPIRIN 81 MG: 81 TABLET, CHEWABLE ORAL at 08:47

## 2025-07-08 RX ADMIN — TRAMADOL HYDROCHLORIDE 50 MG: 50 TABLET, COATED ORAL at 08:47

## 2025-07-08 RX ADMIN — ENOXAPARIN SODIUM 40 MG: 100 INJECTION SUBCUTANEOUS at 08:47

## 2025-07-08 RX ADMIN — PROMETHAZINE HYDROCHLORIDE 6.25 MG: 25 INJECTION INTRAMUSCULAR; INTRAVENOUS at 18:28

## 2025-07-08 RX ADMIN — TRAMADOL HYDROCHLORIDE 50 MG: 50 TABLET, COATED ORAL at 20:24

## 2025-07-08 RX ADMIN — BUSPIRONE HYDROCHLORIDE 10 MG: 5 TABLET ORAL at 09:18

## 2025-07-08 RX ADMIN — DIAZEPAM 5 MG: 5 TABLET ORAL at 22:36

## 2025-07-08 ASSESSMENT — PAIN SCALES - GENERAL
PAINLEVEL_OUTOF10: 7
PAINLEVEL_OUTOF10: 0
PAINLEVEL_OUTOF10: 0
PAINLEVEL_OUTOF10: 3
PAINLEVEL_OUTOF10: 6
PAINLEVEL_OUTOF10: 7
PAINLEVEL_OUTOF10: 3
PAINLEVEL_OUTOF10: 7

## 2025-07-08 ASSESSMENT — PAIN DESCRIPTION - LOCATION
LOCATION: BACK

## 2025-07-08 NOTE — PLAN OF CARE
Problem: Chronic Conditions and Co-morbidities  Goal: Patient's chronic conditions and co-morbidity symptoms are monitored and maintained or improved  7/8/2025 0930 by Melissa Swenson RN  Outcome: Progressing  7/7/2025 2232 by Gasper Mckenzie RN  Outcome: Progressing  7/7/2025 2000 by Compa De La Rosa RN  Outcome: Progressing

## 2025-07-08 NOTE — PLAN OF CARE
Problem: Chronic Conditions and Co-morbidities  Goal: Patient's chronic conditions and co-morbidity symptoms are monitored and maintained or improved  7/7/2025 2232 by Gasper Mckenzie RN  Outcome: Progressing  7/7/2025 2000 by Compa De La Rosa RN  Outcome: Progressing     Problem: Discharge Planning  Goal: Discharge to home or other facility with appropriate resources  7/7/2025 2232 by Gasper Mckenzie RN  Outcome: Progressing  7/7/2025 2000 by Compa De La Rosa RN  Outcome: Progressing     Problem: Pain  Goal: Verbalizes/displays adequate comfort level or baseline comfort level  7/7/2025 2232 by Gasper Mckenzie RN  Outcome: Progressing  7/7/2025 2000 by Compa De La Rosa RN  Outcome: Progressing     Problem: Skin/Tissue Integrity  Goal: Skin integrity remains intact  Description: 1.  Monitor for areas of redness and/or skin breakdown  2.  Assess vascular access sites hourly  3.  Every 4-6 hours minimum:  Change oxygen saturation probe site  4.  Every 4-6 hours:  If on nasal continuous positive airway pressure, respiratory therapy assess nares and determine need for appliance change or resting period  7/7/2025 2232 by Gasper Mckenzie RN  Outcome: Progressing  7/7/2025 2000 by Compa De La Rosa RN  Outcome: Progressing     Problem: Safety - Adult  Goal: Free from fall injury  7/7/2025 2232 by Gasper Mckenzie RN  Outcome: Progressing  7/7/2025 2000 by Compa De La Rosa RN  Outcome: Progressing     Problem: ABCDS Injury Assessment  Goal: Absence of physical injury  7/7/2025 2232 by Gasper Mckenzie RN  Outcome: Progressing  7/7/2025 2000 by Compa De La Rosa RN  Outcome: Progressing     Problem: Respiratory - Adult  Goal: Achieves optimal ventilation and oxygenation  Outcome: Progressing     Problem: Musculoskeletal - Adult  Goal: Return mobility to safest level of function  Outcome: Progressing  Goal: Maintain proper alignment of affected body part  Outcome: Progressing  Goal: Return ADL status to a safe level of function  Outcome:

## 2025-07-09 LAB
ANION GAP SERPL CALCULATED.3IONS-SCNC: 10 MMOL/L (ref 3–16)
BUN SERPL-MCNC: 14 MG/DL (ref 7–20)
CALCIUM SERPL-MCNC: 9.6 MG/DL (ref 8.3–10.6)
CHLORIDE SERPL-SCNC: 96 MMOL/L (ref 99–110)
CO2 SERPL-SCNC: 30 MMOL/L (ref 21–32)
CREAT SERPL-MCNC: 0.7 MG/DL (ref 0.6–1.2)
EKG ATRIAL RATE: 91 BPM
EKG ATRIAL RATE: 93 BPM
EKG ATRIAL RATE: 94 BPM
EKG DIAGNOSIS: NORMAL
EKG P AXIS: 63 DEGREES
EKG P AXIS: 81 DEGREES
EKG P AXIS: 81 DEGREES
EKG P-R INTERVAL: 122 MS
EKG P-R INTERVAL: 122 MS
EKG P-R INTERVAL: 88 MS
EKG Q-T INTERVAL: 372 MS
EKG Q-T INTERVAL: 410 MS
EKG Q-T INTERVAL: 412 MS
EKG QRS DURATION: 76 MS
EKG QRS DURATION: 76 MS
EKG QRS DURATION: 96 MS
EKG QTC CALCULATION (BAZETT): 465 MS
EKG QTC CALCULATION (BAZETT): 504 MS
EKG QTC CALCULATION (BAZETT): 512 MS
EKG R AXIS: 17 DEGREES
EKG R AXIS: 71 DEGREES
EKG R AXIS: 72 DEGREES
EKG T AXIS: 102 DEGREES
EKG T AXIS: 58 DEGREES
EKG T AXIS: 66 DEGREES
EKG VENTRICULAR RATE: 91 BPM
EKG VENTRICULAR RATE: 93 BPM
EKG VENTRICULAR RATE: 94 BPM
GFR SERPLBLD CREATININE-BSD FMLA CKD-EPI: >90 ML/MIN/{1.73_M2}
GLUCOSE SERPL-MCNC: 106 MG/DL (ref 70–99)
MAGNESIUM SERPL-MCNC: 1.83 MG/DL (ref 1.8–2.4)
POTASSIUM SERPL-SCNC: 4 MMOL/L (ref 3.5–5.1)
SODIUM SERPL-SCNC: 136 MMOL/L (ref 136–145)

## 2025-07-09 PROCEDURE — 94761 N-INVAS EAR/PLS OXIMETRY MLT: CPT

## 2025-07-09 PROCEDURE — 80048 BASIC METABOLIC PNL TOTAL CA: CPT

## 2025-07-09 PROCEDURE — 83735 ASSAY OF MAGNESIUM: CPT

## 2025-07-09 PROCEDURE — 2060000000 HC ICU INTERMEDIATE R&B

## 2025-07-09 PROCEDURE — 99233 SBSQ HOSP IP/OBS HIGH 50: CPT

## 2025-07-09 PROCEDURE — 6360000002 HC RX W HCPCS

## 2025-07-09 PROCEDURE — 93005 ELECTROCARDIOGRAM TRACING: CPT

## 2025-07-09 PROCEDURE — 6370000000 HC RX 637 (ALT 250 FOR IP)

## 2025-07-09 PROCEDURE — 93010 ELECTROCARDIOGRAM REPORT: CPT | Performed by: INTERNAL MEDICINE

## 2025-07-09 PROCEDURE — 2700000000 HC OXYGEN THERAPY PER DAY

## 2025-07-09 PROCEDURE — 6360000002 HC RX W HCPCS: Performed by: INTERNAL MEDICINE

## 2025-07-09 PROCEDURE — 2500000003 HC RX 250 WO HCPCS

## 2025-07-09 PROCEDURE — 93005 ELECTROCARDIOGRAM TRACING: CPT | Performed by: INTERNAL MEDICINE

## 2025-07-09 PROCEDURE — 6370000000 HC RX 637 (ALT 250 FOR IP): Performed by: NURSE PRACTITIONER

## 2025-07-09 PROCEDURE — 36415 COLL VENOUS BLD VENIPUNCTURE: CPT

## 2025-07-09 RX ORDER — MEXILETINE HYDROCHLORIDE 200 MG/1
200 CAPSULE ORAL EVERY 12 HOURS
Status: DISCONTINUED | OUTPATIENT
Start: 2025-07-09 | End: 2025-07-11 | Stop reason: HOSPADM

## 2025-07-09 RX ORDER — DOFETILIDE 0.12 MG/1
125 CAPSULE ORAL EVERY 12 HOURS SCHEDULED
Status: DISCONTINUED | OUTPATIENT
Start: 2025-07-09 | End: 2025-07-11 | Stop reason: HOSPADM

## 2025-07-09 RX ORDER — PROCHLORPERAZINE EDISYLATE 5 MG/ML
5 INJECTION INTRAMUSCULAR; INTRAVENOUS ONCE
Status: COMPLETED | OUTPATIENT
Start: 2025-07-09 | End: 2025-07-09

## 2025-07-09 RX ADMIN — PANTOPRAZOLE SODIUM 40 MG: 40 TABLET, DELAYED RELEASE ORAL at 05:50

## 2025-07-09 RX ADMIN — MEXILETINE HYDROCHLORIDE 200 MG: 200 CAPSULE ORAL at 23:07

## 2025-07-09 RX ADMIN — DOFETILIDE 125 MCG: 0.12 CAPSULE ORAL at 20:56

## 2025-07-09 RX ADMIN — BUSPIRONE HYDROCHLORIDE 10 MG: 5 TABLET ORAL at 16:10

## 2025-07-09 RX ADMIN — DOFETILIDE 125 MCG: 0.12 CAPSULE ORAL at 12:15

## 2025-07-09 RX ADMIN — TRAMADOL HYDROCHLORIDE 50 MG: 50 TABLET, COATED ORAL at 16:07

## 2025-07-09 RX ADMIN — TRAMADOL HYDROCHLORIDE 50 MG: 50 TABLET, COATED ORAL at 05:06

## 2025-07-09 RX ADMIN — FUROSEMIDE 20 MG: 20 TABLET ORAL at 08:39

## 2025-07-09 RX ADMIN — ENOXAPARIN SODIUM 30 MG: 100 INJECTION SUBCUTANEOUS at 08:39

## 2025-07-09 RX ADMIN — MEXILETINE HYDROCHLORIDE 200 MG: 200 CAPSULE ORAL at 12:14

## 2025-07-09 RX ADMIN — Medication 10 ML: at 20:58

## 2025-07-09 RX ADMIN — ASPIRIN 81 MG: 81 TABLET, CHEWABLE ORAL at 08:39

## 2025-07-09 RX ADMIN — BUSPIRONE HYDROCHLORIDE 10 MG: 5 TABLET ORAL at 08:39

## 2025-07-09 RX ADMIN — BUSPIRONE HYDROCHLORIDE 10 MG: 5 TABLET ORAL at 20:56

## 2025-07-09 RX ADMIN — PROCHLORPERAZINE EDISYLATE 5 MG: 5 INJECTION INTRAMUSCULAR; INTRAVENOUS at 16:10

## 2025-07-09 RX ADMIN — TRAMADOL HYDROCHLORIDE 50 MG: 50 TABLET, COATED ORAL at 20:56

## 2025-07-09 RX ADMIN — Medication 10 ML: at 12:16

## 2025-07-09 RX ADMIN — TRAMADOL HYDROCHLORIDE 50 MG: 50 TABLET, COATED ORAL at 10:12

## 2025-07-09 ASSESSMENT — PAIN DESCRIPTION - ORIENTATION: ORIENTATION: LOWER;ANTERIOR

## 2025-07-09 ASSESSMENT — PAIN DESCRIPTION - DESCRIPTORS
DESCRIPTORS: ACHING

## 2025-07-09 ASSESSMENT — PAIN DESCRIPTION - LOCATION
LOCATION: BACK
LOCATION: BACK;HEAD

## 2025-07-09 ASSESSMENT — PAIN SCALES - GENERAL
PAINLEVEL_OUTOF10: 6
PAINLEVEL_OUTOF10: 5

## 2025-07-09 NOTE — PLAN OF CARE
Problem: Chronic Conditions and Co-morbidities  Goal: Patient's chronic conditions and co-morbidity symptoms are monitored and maintained or improved  Outcome: Progressing     Problem: Discharge Planning  Goal: Discharge to home or other facility with appropriate resources  Outcome: Progressing     Problem: Pain  Goal: Verbalizes/displays adequate comfort level or baseline comfort level  Outcome: Progressing     Problem: Skin/Tissue Integrity  Goal: Skin integrity remains intact  Outcome: Progressing     Problem: Safety - Adult  Goal: Free from fall injury  Outcome: Progressing

## 2025-07-09 NOTE — PLAN OF CARE
Problem: Chronic Conditions and Co-morbidities  Goal: Patient's chronic conditions and co-morbidity symptoms are monitored and maintained or improved  Outcome: Progressing     Problem: Discharge Planning  Goal: Discharge to home or other facility with appropriate resources  Outcome: Progressing     Problem: Pain  Goal: Verbalizes/displays adequate comfort level or baseline comfort level  Outcome: Progressing     Problem: Skin/Tissue Integrity  Goal: Skin integrity remains intact  Description: 1.  Monitor for areas of redness and/or skin breakdown  2.  Assess vascular access sites hourly  3.  Every 4-6 hours minimum:  Change oxygen saturation probe site  4.  Every 4-6 hours:  If on nasal continuous positive airway pressure, respiratory therapy assess nares and determine need for appliance change or resting period  Outcome: Progressing     Problem: Safety - Adult  Goal: Free from fall injury  Outcome: Progressing     Problem: ABCDS Injury Assessment  Goal: Absence of physical injury  Outcome: Progressing     Problem: Respiratory - Adult  Goal: Achieves optimal ventilation and oxygenation  Outcome: Progressing     Problem: Musculoskeletal - Adult  Goal: Return mobility to safest level of function  Outcome: Progressing  Goal: Maintain proper alignment of affected body part  Outcome: Progressing  Goal: Return ADL status to a safe level of function  Outcome: Progressing     Problem: Genitourinary - Adult  Goal: Absence of urinary retention  Outcome: Progressing  Goal: Urinary catheter remains patent  Outcome: Progressing

## 2025-07-09 NOTE — PROGRESS NOTES
Continues to complain of nausea per cardiology due to antiemetics prolonging QTC will continue to monitor.

## 2025-07-09 NOTE — PROGRESS NOTES
[facility]     Electrophysiology                                     Progress Note    Admission date:  2025    Reason for follow up visit: PSVT    HPI/CC: Joyce Uribe was admitted on 25 for initiation of Tikosyn for PSVT and frequent PACs and PVCs.  2025 QTc  greater than 500--reduced dose of Tikosyn 2025 mexiletine added due to prolonging QTc    Rhythm has been SR    Subjective: Patient lying in bed.  O2 on at 3 L per nasal cannula.  Patient denies chest pain shortness of breath and palpitations.  I had a Long discussion with patient regarding Tikosyn and an ECG findings--after discussion with Dr. Juarez this morning and repeat ECG prior to Tikosyn dose will continue with Tikosyn 125 mcg and add mexiletine.  Patient is agreeable      Vitals:  Blood pressure 103/62, pulse 95, temperature 97.7 °F (36.5 °C), temperature source Oral, resp. rate 16, height 1.6 m (5' 2.99\"), weight 48.7 kg (107 lb 5.8 oz), SpO2 (!) 89%, not currently breastfeeding.  Temp  Av.9 °F (36.6 °C)  Min: 97.7 °F (36.5 °C)  Max: 98 °F (36.7 °C)  Pulse  Av.3  Min: 94  Max: 102  BP  Min: 93/63  Max: 113/64  SpO2  Av.4 %  Min: 89 %  Max: 93 %    24 hour I/O    Intake/Output Summary (Last 24 hours) at 2025 1151  Last data filed at 2025 0014  Gross per 24 hour   Intake 660 ml   Output 350 ml   Net 310 ml     Current Facility-Administered Medications   Medication Dose Route Frequency Provider Last Rate Last Admin    dofetilide (TIKOSYN) capsule 125 mcg  125 mcg Oral 2 times per day Francoise Albert APRN - CNP        mexiletine (MEXITIL) capsule 200 mg  200 mg Oral Q12H Francoise Albert APRN - CNP        pantoprazole (PROTONIX) tablet 40 mg  40 mg Oral QAM AC Francoise Albert APRN - CNP   40 mg at 25 0550    busPIRone (BUSPAR) tablet 10 mg  10 mg Oral TID Francoise Albert, MACEY - CNP   10 mg at 25 0839    enoxaparin Sodium (LOVENOX) injection 30 mg  30 mg SubCUTAneous Daily RO Garcia  APRN-CNP  [facility]  [phone]

## 2025-07-10 LAB
ANION GAP SERPL CALCULATED.3IONS-SCNC: 12 MMOL/L (ref 3–16)
BUN SERPL-MCNC: 10 MG/DL (ref 7–20)
CALCIUM SERPL-MCNC: 10 MG/DL (ref 8.3–10.6)
CHLORIDE SERPL-SCNC: 98 MMOL/L (ref 99–110)
CO2 SERPL-SCNC: 29 MMOL/L (ref 21–32)
CREAT SERPL-MCNC: 0.6 MG/DL (ref 0.6–1.2)
EKG ATRIAL RATE: 81 BPM
EKG DIAGNOSIS: NORMAL
EKG P AXIS: 65 DEGREES
EKG P-R INTERVAL: 130 MS
EKG Q-T INTERVAL: 422 MS
EKG QRS DURATION: 78 MS
EKG QTC CALCULATION (BAZETT): 490 MS
EKG R AXIS: 70 DEGREES
EKG T AXIS: 66 DEGREES
EKG VENTRICULAR RATE: 81 BPM
GFR SERPLBLD CREATININE-BSD FMLA CKD-EPI: >90 ML/MIN/{1.73_M2}
GLUCOSE BLD-MCNC: 103 MG/DL (ref 70–99)
GLUCOSE SERPL-MCNC: 112 MG/DL (ref 70–99)
PERFORMED ON: ABNORMAL
POTASSIUM SERPL-SCNC: 3.8 MMOL/L (ref 3.5–5.1)
SODIUM SERPL-SCNC: 139 MMOL/L (ref 136–145)

## 2025-07-10 PROCEDURE — 2060000000 HC ICU INTERMEDIATE R&B

## 2025-07-10 PROCEDURE — 2500000003 HC RX 250 WO HCPCS

## 2025-07-10 PROCEDURE — 6370000000 HC RX 637 (ALT 250 FOR IP)

## 2025-07-10 PROCEDURE — 6370000000 HC RX 637 (ALT 250 FOR IP): Performed by: NURSE PRACTITIONER

## 2025-07-10 PROCEDURE — 93010 ELECTROCARDIOGRAM REPORT: CPT | Performed by: INTERNAL MEDICINE

## 2025-07-10 PROCEDURE — 6360000002 HC RX W HCPCS: Performed by: INTERNAL MEDICINE

## 2025-07-10 PROCEDURE — 2700000000 HC OXYGEN THERAPY PER DAY

## 2025-07-10 PROCEDURE — 99233 SBSQ HOSP IP/OBS HIGH 50: CPT

## 2025-07-10 PROCEDURE — 93005 ELECTROCARDIOGRAM TRACING: CPT | Performed by: INTERNAL MEDICINE

## 2025-07-10 PROCEDURE — 36415 COLL VENOUS BLD VENIPUNCTURE: CPT

## 2025-07-10 PROCEDURE — 80048 BASIC METABOLIC PNL TOTAL CA: CPT

## 2025-07-10 PROCEDURE — 94761 N-INVAS EAR/PLS OXIMETRY MLT: CPT

## 2025-07-10 RX ADMIN — BUSPIRONE HYDROCHLORIDE 10 MG: 5 TABLET ORAL at 14:00

## 2025-07-10 RX ADMIN — Medication 10 ML: at 09:17

## 2025-07-10 RX ADMIN — TRAMADOL HYDROCHLORIDE 50 MG: 50 TABLET, COATED ORAL at 20:33

## 2025-07-10 RX ADMIN — BUSPIRONE HYDROCHLORIDE 10 MG: 5 TABLET ORAL at 09:11

## 2025-07-10 RX ADMIN — PANTOPRAZOLE SODIUM 40 MG: 40 TABLET, DELAYED RELEASE ORAL at 05:03

## 2025-07-10 RX ADMIN — DIAZEPAM 5 MG: 5 TABLET ORAL at 14:08

## 2025-07-10 RX ADMIN — TRAMADOL HYDROCHLORIDE 50 MG: 50 TABLET, COATED ORAL at 15:11

## 2025-07-10 RX ADMIN — MEXILETINE HYDROCHLORIDE 200 MG: 200 CAPSULE ORAL at 09:11

## 2025-07-10 RX ADMIN — TRAMADOL HYDROCHLORIDE 50 MG: 50 TABLET, COATED ORAL at 05:03

## 2025-07-10 RX ADMIN — BUSPIRONE HYDROCHLORIDE 10 MG: 5 TABLET ORAL at 20:33

## 2025-07-10 RX ADMIN — MEXILETINE HYDROCHLORIDE 200 MG: 200 CAPSULE ORAL at 23:13

## 2025-07-10 RX ADMIN — DOFETILIDE 125 MCG: 0.12 CAPSULE ORAL at 09:16

## 2025-07-10 RX ADMIN — Medication 10 ML: at 20:35

## 2025-07-10 RX ADMIN — ENOXAPARIN SODIUM 30 MG: 100 INJECTION SUBCUTANEOUS at 09:11

## 2025-07-10 RX ADMIN — DOFETILIDE 125 MCG: 0.12 CAPSULE ORAL at 20:34

## 2025-07-10 RX ADMIN — FUROSEMIDE 20 MG: 20 TABLET ORAL at 09:11

## 2025-07-10 RX ADMIN — TRAMADOL HYDROCHLORIDE 50 MG: 50 TABLET, COATED ORAL at 09:11

## 2025-07-10 RX ADMIN — ASPIRIN 81 MG: 81 TABLET, CHEWABLE ORAL at 09:11

## 2025-07-10 ASSESSMENT — PAIN SCALES - GENERAL
PAINLEVEL_OUTOF10: 5

## 2025-07-10 ASSESSMENT — PAIN DESCRIPTION - LOCATION: LOCATION: BACK

## 2025-07-10 NOTE — PLAN OF CARE
Problem: Chronic Conditions and Co-morbidities  Goal: Patient's chronic conditions and co-morbidity symptoms are monitored and maintained or improved  7/10/2025 0222 by Lissette Rivera RN  Outcome: Progressing  7/9/2025 1810 by Radha Hart RN  Outcome: Progressing     Problem: Discharge Planning  Goal: Discharge to home or other facility with appropriate resources  7/10/2025 0222 by Lissette Rivera RN  Outcome: Progressing  7/9/2025 1810 by Radha Hart RN  Outcome: Progressing     Problem: Pain  Goal: Verbalizes/displays adequate comfort level or baseline comfort level  7/10/2025 0222 by Lissette Rivera RN  Outcome: Progressing  7/9/2025 1810 by Radha Hart RN  Outcome: Progressing     Problem: Skin/Tissue Integrity  Goal: Skin integrity remains intact  Description: 1.  Monitor for areas of redness and/or skin breakdown  2.  Assess vascular access sites hourly  3.  Every 4-6 hours minimum:  Change oxygen saturation probe site  4.  Every 4-6 hours:  If on nasal continuous positive airway pressure, respiratory therapy assess nares and determine need for appliance change or resting period  7/10/2025 0222 by Lissette Rivera RN  Outcome: Progressing  7/9/2025 1810 by Radha Hart RN  Outcome: Progressing     Problem: Safety - Adult  Goal: Free from fall injury  7/10/2025 0222 by Lissette Rivera RN  Outcome: Progressing  7/9/2025 1810 by Radha Hart RN  Outcome: Progressing     Problem: ABCDS Injury Assessment  Goal: Absence of physical injury  7/10/2025 0222 by Lissette Rivera RN  Outcome: Progressing  7/9/2025 1810 by Radha Hart RN  Outcome: Progressing     Problem: Respiratory - Adult  Goal: Achieves optimal ventilation and oxygenation  7/10/2025 0222 by Lissette Rivera RN  Outcome: Progressing  7/9/2025 1810 by Radha Hart RN  Outcome: Progressing     Problem: Musculoskeletal - Adult  Goal: Return mobility to safest level of function  7/10/2025 0222 by Lissette Rivera RN  Outcome:

## 2025-07-10 NOTE — PROGRESS NOTES
[facility]     Electrophysiology                                     Progress Note    Admission date:  2025    Reason for follow up visit: PSVT    HPI/CC: Joyce Uribe was admitted on 25 for initiation of Tikosyn for PSVT and frequent PACs and PVCs.  2025 QTc  greater than 500--reduced dose of Tikosyn 2025 mexiletine added due to prolonging QTc    Rhythm has been SR    Subjective: Patient lying in bed.  O2 on at 3 L per nasal cannula.  Patient denies chest pain shortness of breath and palpitations.  Patient does have expiratory wheezes throughout. she has rales in her left base.  Will get one-time order respiratory treatment now.  Patient also complains of some nausea I did explain to her I have some hesitation given antiemetics due to her Tikosyn and the interaction between the 2 medications.    Vitals:  Blood pressure 115/75, pulse 78, temperature 97.7 °F (36.5 °C), temperature source Oral, resp. rate 20, height 1.6 m (5' 2.99\"), weight 48.8 kg (107 lb 9.4 oz), SpO2 93%, not currently breastfeeding.  Temp  Av.7 °F (36.5 °C)  Min: 97.5 °F (36.4 °C)  Max: 97.9 °F (36.6 °C)  Pulse  Av.4  Min: 78  Max: 94  BP  Min: 100/69  Max: 115/75  SpO2  Av.4 %  Min: 92 %  Max: 95 %    24 hour I/O    Intake/Output Summary (Last 24 hours) at 7/10/2025 1214  Last data filed at 7/10/2025 0939  Gross per 24 hour   Intake 600 ml   Output 750 ml   Net -150 ml     Current Facility-Administered Medications   Medication Dose Route Frequency Provider Last Rate Last Admin    dofetilide (TIKOSYN) capsule 125 mcg  125 mcg Oral 2 times per day Francoise Albert APRN - CNP   125 mcg at 07/10/25 0916    mexiletine (MEXITIL) capsule 200 mg  200 mg Oral Q12H Francoise Albert APRN - CNP   200 mg at 07/10/25 0911    pantoprazole (PROTONIX) tablet 40 mg  40 mg Oral QAM AC Francoise Albert APRN - CNP   40 mg at 07/10/25 0503    busPIRone (BUSPAR) tablet 10 mg  10 mg Oral TID Francoise Albert, APRN - CNP   10  [facility]  [phone]

## 2025-07-10 NOTE — PLAN OF CARE
Problem: Chronic Conditions and Co-morbidities  Goal: Patient's chronic conditions and co-morbidity symptoms are monitored and maintained or improved  7/10/2025 1526 by Noemi Cantu RN  Outcome: Progressing  7/10/2025 0222 by Lissette Rivera RN  Outcome: Progressing     Problem: Discharge Planning  Goal: Discharge to home or other facility with appropriate resources  7/10/2025 1526 by Noemi Cantu RN  Outcome: Progressing  7/10/2025 0222 by Lissette Rivera RN  Outcome: Progressing     Problem: Pain  Goal: Verbalizes/displays adequate comfort level or baseline comfort level  7/10/2025 1526 by Noemi Cantu RN  Outcome: Progressing  7/10/2025 0222 by Lissette Rivera RN  Outcome: Progressing     Problem: Skin/Tissue Integrity  Goal: Skin integrity remains intact  Description: 1.  Monitor for areas of redness and/or skin breakdown  2.  Assess vascular access sites hourly  3.  Every 4-6 hours minimum:  Change oxygen saturation probe site  4.  Every 4-6 hours:  If on nasal continuous positive airway pressure, respiratory therapy assess nares and determine need for appliance change or resting period  7/10/2025 1526 by Noemi Cantu RN  Outcome: Progressing  7/10/2025 0222 by Lissette Rivera RN  Outcome: Progressing     Problem: Safety - Adult  Goal: Free from fall injury  7/10/2025 1526 by Noemi Cantu RN  Outcome: Progressing  7/10/2025 0222 by Lissette Rivera RN  Outcome: Progressing     Problem: ABCDS Injury Assessment  Goal: Absence of physical injury  7/10/2025 1526 by Noemi Cantu RN  Outcome: Progressing  7/10/2025 0222 by Lissette Rivera RN  Outcome: Progressing     Problem: Respiratory - Adult  Goal: Achieves optimal ventilation and oxygenation  7/10/2025 1526 by Noemi Cantu RN  Outcome: Progressing  7/10/2025 0222 by Lissette Rivera RN  Outcome: Progressing     Problem: Musculoskeletal - Adult  Goal: Return mobility to safest level of function  7/10/2025 1526 by

## 2025-07-10 NOTE — CARE COORDINATION
Cm update- Pt is continuing Tikosyn, Adjusting meds and monitoring electrolytes. Pt is active with [facility] , need order to resume.   Sangita Jorgensen RN

## 2025-07-11 ENCOUNTER — APPOINTMENT (OUTPATIENT)
Dept: GENERAL RADIOLOGY | Age: 65
End: 2025-07-11
Attending: INTERNAL MEDICINE
Payer: COMMERCIAL

## 2025-07-11 VITALS
HEART RATE: 107 BPM | SYSTOLIC BLOOD PRESSURE: 117 MMHG | RESPIRATION RATE: 12 BRPM | OXYGEN SATURATION: 92 % | WEIGHT: 106.4 LBS | TEMPERATURE: 96.3 F | HEIGHT: 63 IN | BODY MASS INDEX: 18.85 KG/M2 | DIASTOLIC BLOOD PRESSURE: 80 MMHG

## 2025-07-11 LAB
ANION GAP SERPL CALCULATED.3IONS-SCNC: 14 MMOL/L (ref 3–16)
BUN SERPL-MCNC: 10 MG/DL (ref 7–20)
CALCIUM SERPL-MCNC: 10.3 MG/DL (ref 8.3–10.6)
CHLORIDE SERPL-SCNC: 97 MMOL/L (ref 99–110)
CO2 SERPL-SCNC: 27 MMOL/L (ref 21–32)
CREAT SERPL-MCNC: 0.6 MG/DL (ref 0.6–1.2)
EKG ATRIAL RATE: 108 BPM
EKG ATRIAL RATE: 73 BPM
EKG ATRIAL RATE: 83 BPM
EKG DIAGNOSIS: NORMAL
EKG P AXIS: 59 DEGREES
EKG P AXIS: 79 DEGREES
EKG P AXIS: 81 DEGREES
EKG P-R INTERVAL: 126 MS
EKG P-R INTERVAL: 126 MS
EKG P-R INTERVAL: 138 MS
EKG Q-T INTERVAL: 354 MS
EKG Q-T INTERVAL: 416 MS
EKG Q-T INTERVAL: 430 MS
EKG QRS DURATION: 74 MS
EKG QRS DURATION: 76 MS
EKG QRS DURATION: 78 MS
EKG QTC CALCULATION (BAZETT): 474 MS
EKG QTC CALCULATION (BAZETT): 474 MS
EKG QTC CALCULATION (BAZETT): 488 MS
EKG R AXIS: 73 DEGREES
EKG R AXIS: 76 DEGREES
EKG R AXIS: 79 DEGREES
EKG T AXIS: 68 DEGREES
EKG T AXIS: 71 DEGREES
EKG T AXIS: 74 DEGREES
EKG VENTRICULAR RATE: 108 BPM
EKG VENTRICULAR RATE: 73 BPM
EKG VENTRICULAR RATE: 83 BPM
GFR SERPLBLD CREATININE-BSD FMLA CKD-EPI: >90 ML/MIN/{1.73_M2}
GLUCOSE SERPL-MCNC: 106 MG/DL (ref 70–99)
POTASSIUM SERPL-SCNC: 3.7 MMOL/L (ref 3.5–5.1)
SODIUM SERPL-SCNC: 138 MMOL/L (ref 136–145)

## 2025-07-11 PROCEDURE — 6360000002 HC RX W HCPCS

## 2025-07-11 PROCEDURE — 6370000000 HC RX 637 (ALT 250 FOR IP)

## 2025-07-11 PROCEDURE — 93005 ELECTROCARDIOGRAM TRACING: CPT | Performed by: INTERNAL MEDICINE

## 2025-07-11 PROCEDURE — 36415 COLL VENOUS BLD VENIPUNCTURE: CPT

## 2025-07-11 PROCEDURE — 80048 BASIC METABOLIC PNL TOTAL CA: CPT

## 2025-07-11 PROCEDURE — 6360000002 HC RX W HCPCS: Performed by: INTERNAL MEDICINE

## 2025-07-11 PROCEDURE — 99233 SBSQ HOSP IP/OBS HIGH 50: CPT

## 2025-07-11 PROCEDURE — 6370000000 HC RX 637 (ALT 250 FOR IP): Performed by: NURSE PRACTITIONER

## 2025-07-11 PROCEDURE — 2500000003 HC RX 250 WO HCPCS

## 2025-07-11 RX ORDER — PROCHLORPERAZINE EDISYLATE 5 MG/ML
5 INJECTION INTRAMUSCULAR; INTRAVENOUS ONCE
Status: DISCONTINUED | OUTPATIENT
Start: 2025-07-11 | End: 2025-07-11

## 2025-07-11 RX ORDER — PANTOPRAZOLE SODIUM 40 MG/1
40 TABLET, DELAYED RELEASE ORAL
Qty: 30 TABLET | Refills: 3 | Status: SHIPPED | OUTPATIENT
Start: 2025-07-11

## 2025-07-11 RX ORDER — MEXILETINE HYDROCHLORIDE 200 MG/1
200 CAPSULE ORAL EVERY 12 HOURS
Qty: 90 CAPSULE | Refills: 3 | Status: SHIPPED | OUTPATIENT
Start: 2025-07-11

## 2025-07-11 RX ORDER — PANTOPRAZOLE SODIUM 40 MG/1
40 TABLET, DELAYED RELEASE ORAL
Status: DISCONTINUED | OUTPATIENT
Start: 2025-07-11 | End: 2025-07-11 | Stop reason: HOSPADM

## 2025-07-11 RX ORDER — DOFETILIDE 0.12 MG/1
125 CAPSULE ORAL EVERY 12 HOURS SCHEDULED
Qty: 60 CAPSULE | Refills: 3 | Status: SHIPPED | OUTPATIENT
Start: 2025-07-11

## 2025-07-11 RX ORDER — PROCHLORPERAZINE EDISYLATE 5 MG/ML
5 INJECTION INTRAMUSCULAR; INTRAVENOUS ONCE
Status: COMPLETED | OUTPATIENT
Start: 2025-07-11 | End: 2025-07-11

## 2025-07-11 RX ADMIN — DOFETILIDE 125 MCG: 0.12 CAPSULE ORAL at 09:20

## 2025-07-11 RX ADMIN — FUROSEMIDE 20 MG: 20 TABLET ORAL at 09:19

## 2025-07-11 RX ADMIN — ASPIRIN 81 MG: 81 TABLET, CHEWABLE ORAL at 09:19

## 2025-07-11 RX ADMIN — MEXILETINE HYDROCHLORIDE 200 MG: 200 CAPSULE ORAL at 10:26

## 2025-07-11 RX ADMIN — TRAMADOL HYDROCHLORIDE 50 MG: 50 TABLET, COATED ORAL at 05:32

## 2025-07-11 RX ADMIN — POLYETHYLENE GLYCOL 3350 17 G: 17 POWDER, FOR SOLUTION ORAL at 10:26

## 2025-07-11 RX ADMIN — Medication 10 ML: at 09:31

## 2025-07-11 RX ADMIN — DIAZEPAM 5 MG: 5 TABLET ORAL at 09:31

## 2025-07-11 RX ADMIN — BUSPIRONE HYDROCHLORIDE 10 MG: 5 TABLET ORAL at 09:20

## 2025-07-11 RX ADMIN — ENOXAPARIN SODIUM 30 MG: 100 INJECTION SUBCUTANEOUS at 09:24

## 2025-07-11 RX ADMIN — PANTOPRAZOLE SODIUM 40 MG: 40 TABLET, DELAYED RELEASE ORAL at 05:32

## 2025-07-11 RX ADMIN — TRAMADOL HYDROCHLORIDE 50 MG: 50 TABLET, COATED ORAL at 10:26

## 2025-07-11 RX ADMIN — PROCHLORPERAZINE EDISYLATE 5 MG: 5 INJECTION INTRAMUSCULAR; INTRAVENOUS at 10:26

## 2025-07-11 ASSESSMENT — PAIN SCALES - GENERAL: PAINLEVEL_OUTOF10: 5

## 2025-07-11 NOTE — DISCHARGE SUMMARY
Patient ID:  Joyce Uribe  [id number]  64 y.o.  1960    Admit date: 7/7/2025    Discharge date and time: 7/11/2025    Admitting Physician: Maryam Juarez DO     Discharge NP: Francoise CHOUDHURY-CNP    Admission Diagnoses: Atrial fibrillation (HCC) [I48.91]  Atrial fibrillation, persistent (HCC) [I48.19]  SVT (supraventricular tachycardia) [I47.10]    Discharge Diagnoses: SAME    Admission Condition: fair    Discharged Condition: good    Hospital Course: Joyce Uribe was admitted on 7/7/2025 for initiation of Tikosyn for SVT, PVCs and PACs.  Patient was noted to have prolonged QT and mexiletine was added.  Rhythm has been SR.  Denies chest pain, shortness of breath and palpitations. Has eaten, ambulated, and voided.     Consults: None    Assessment:   PSVT  Frequent PACs  Frequent PVCs  NSVT  Heart failure with decreased--LVEF 45-50%  COPD--requiring oxygen at baseline  Hypertension  Plan:   Continue Tikosyn 125 mcg twice daily  Continue to monitor for drug toxicity  Continue mexiletine 200 mg twice daily  Continue aspirin 81 mg daily  Continue Lasix 20 mg daily  Respiratory treatment x 1 now for expiratory wheezes  Continue to monitor on telemetry  Daily labs replace electrolytes as needed  Reviewed ECG-- QT~ 430; Qtc 470 from ECG last evening  Please avoid antiemetics is much as possible due to interaction with Tikosyn  Discussed plan with patient and nursing     Post procedure instructions reviewed  Follow up in office 4 months with Dr Juarez      Discharge Exam:  /80   Pulse (!) 107   Temp (!) 96.3 °F (35.7 °C) (Oral)   Resp 12   Ht 1.6 m (5' 2.99\")   Wt 48.3 kg (106 lb 6.4 oz)   SpO2 92%   BMI 18.85 kg/m²     General Appearance:    Alert, cooperative, no distress, appears stated age   Head:    Normocephalic, without obvious abnormality, atraumatic   Eyes:    PERRL, conjunctiva/corneas clear, EOM's intact        Ears:    deferred   Nose:   Nares normal, septum midline, mucosa normal, no drainage    diazePAM 5 MG tablet  Commonly known as: VALIUM     diphenhydrAMINE 25 MG tablet  Commonly known as: BENADRYL     fluticasone-umeclidin-vilant 200-62.5-25 MCG/ACT Aepb inhaler  Commonly known as: TRELEGY ELLIPTA     furosemide 20 MG tablet  Commonly known as: LASIX  Take 1 tablet by mouth daily     guaiFENesin 600 MG extended release tablet  Commonly known as: MUCINEX  Take 1 tablet by mouth 2 times daily     ipratropium 0.5 mg-albuterol 2.5 mg 0.5-2.5 (3) MG/3ML Soln nebulizer solution  Commonly known as: DUONEB  Inhale 3 mLs into the lungs every 4 hours as needed for Shortness of Breath     loperamide 2 MG capsule  Commonly known as: IMODIUM     traMADol 50 MG tablet  Commonly known as: ULTRAM            STOP taking these medications      metoclopramide 10 MG tablet  Commonly known as: REGLAN     ondansetron 8 MG tablet  Commonly known as: ZOFRAN     Roflumilast 250 MCG tablet  Commonly known as: DALIRESP               Where to Get Your Medications        These medications were sent to [facility] - [address] - P [phone] - F [phone]  [address] [place] [id number]      Phone: [phone]   dofetilide 125 MCG capsule  mexiletine 200 MG capsule  pantoprazole 40 MG tablet         Post procedure instructions given  Activity: as tolerated  Diet: cardiac diet    Francoise CHOUDHURY-CNP  [facility]  [phone]     Time spent on discharge of patient: 35 minutes, including plan of care, patient education, and care coordination      EP attending addendum:  IDr. Maryam DO, personally performed the services described in this documentation, and it is both accurate and complete.      Maryam Juarez DO

## 2025-07-11 NOTE — PROGRESS NOTES
[facility]     Electrophysiology                                     Progress Note    Admission date:  2025    Reason for follow up visit: PSVT    HPI/CC: Joyce Uribe was admitted on 25 for initiation of Tikosyn for PSVT and frequent PACs and PVCs.  2025 QTc  greater than 500--reduced dose of Tikosyn 2025 mexiletine added due to prolonging QTc    Rhythm has been SR    Subjective: Patient lying in bed.  O2 on at 3 L per nasal cannula.  Patient denies chest pain shortness of breath and palpitations.  Patient complains of nausea.  She states she has not had a bowel movement and is currently taking some MiraLAX.  Patient has chronic nausea and takes Zofran at home.  I had a long discussion with her every day this week that she should not be taking Zofran while taking Tikosyn as there could be potentially serious side effects.  Patient verbalizes understanding.  I updated her nurse with this information.    Vitals:  Blood pressure 117/80, pulse (!) 107, temperature (!) 96.3 °F (35.7 °C), temperature source Oral, resp. rate 12, height 1.6 m (5' 2.99\"), weight 48.3 kg (106 lb 6.4 oz), SpO2 92%, not currently breastfeeding.  Temp  Av.6 °F (36.4 °C)  Min: 96.3 °F (35.7 °C)  Max: 98 °F (36.7 °C)  Pulse  Av.4  Min: 74  Max: 107  BP  Min: 100/69  Max: 132/78  SpO2  Av.8 %  Min: 92 %  Max: 96 %    24 hour I/O    Intake/Output Summary (Last 24 hours) at 2025 1311  Last data filed at 2025 0711  Gross per 24 hour   Intake 546.68 ml   Output 1050 ml   Net -503.32 ml     Current Facility-Administered Medications   Medication Dose Route Frequency Provider Last Rate Last Admin    pantoprazole (PROTONIX) tablet 40 mg  40 mg Oral BID AC Francoise Albert APRN - CNP        dofetilide (TIKOSYN) capsule 125 mcg  125 mcg Oral 2 times per day Francoise Albert APRN - CNP   125 mcg at 25 0920    mexiletine (MEXITIL) capsule 200 mg  200 mg Oral Q12H Francoise Albert, APRN - CNP   200

## 2025-07-11 NOTE — PROGRESS NOTES
Patient discharged home with son. IV removed, no complications. Provided transport to outpatient pharmacy, medications retrieved and sent with patient. Mexitil medication not covered by insurance, RN provided assistance with this medication and instructed to call cardiology to discuss insurance coverage/make them aware of this medication not being covered, patient will have 30 day supply.    Patient connected to home O2 device, working properly at discharge.     Discharge instructions provided, patient verbalized understanding of medications and follow up appt

## 2025-07-11 NOTE — PLAN OF CARE
Problem: Chronic Conditions and Co-morbidities  Goal: Patient's chronic conditions and co-morbidity symptoms are monitored and maintained or improved  7/11/2025 0139 by Lissette Rivera RN  Outcome: Progressing     Problem: Discharge Planning  Goal: Discharge to home or other facility with appropriate resources  7/11/2025 0139 by Lissette Rivera RN  Outcome: Progressing     Problem: Pain  Goal: Verbalizes/displays adequate comfort level or baseline comfort level  7/11/2025 0139 by Lissette Rivera RN  Outcome: Progressing     Problem: Skin/Tissue Integrity  Goal: Skin integrity remains intact  Description: 1.  Monitor for areas of redness and/or skin breakdown  2.  Assess vascular access sites hourly  3.  Every 4-6 hours minimum:  Change oxygen saturation probe site  4.  Every 4-6 hours:  If on nasal continuous positive airway pressure, respiratory therapy assess nares and determine need for appliance change or resting period  7/11/2025 0139 by Lissette Rivera RN  Outcome: Progressing     Problem: Safety - Adult  Goal: Free from fall injury  7/11/2025 0139 by Lissette Rivera RN  Outcome: Progressing     Problem: ABCDS Injury Assessment  Goal: Absence of physical injury  7/11/2025 0139 by Lissette Rivera RN  Outcome: Progressing     Problem: Respiratory - Adult  Goal: Achieves optimal ventilation and oxygenation  7/11/2025 0139 by Lissette Rivera RN  Outcome: Progressing     Problem: Musculoskeletal - Adult  Goal: Return mobility to safest level of function  7/11/2025 0139 by Lissette Rivera RN  Outcome: Progressing     Problem: Musculoskeletal - Adult  Goal: Maintain proper alignment of affected body part  7/11/2025 0139 by Lissette Rivera RN  Outcome: Progressing     Problem: Musculoskeletal - Adult  Goal: Return ADL status to a safe level of function  7/11/2025 0139 by Lissette Rivera RN  Outcome: Progressing     Problem: Genitourinary - Adult  Goal: Absence of urinary retention  7/11/2025 0139 by Lissette Rivera RN  Outcome:  Progressing     Problem: Genitourinary - Adult  Goal: Urinary catheter remains patent  7/11/2025 0139 by Lissette Rivera RN  Outcome: Progressing

## 2025-07-11 NOTE — CARE COORDINATION
CM update- Pt continues on Tikosyn and monitoring labs and QT intervals.  Pt denies any needs. Will follow.  Sangita Jorgensen RN, CM      1220- Pt spoke to the pt that if she is discharged and doesn't feel like she is ready , she an appeal with Medicare. She says her bowels haven't moved all week. MD ordered abd xray. CM will f/u with pt this afternoon to see if she wants to appeal .

## 2025-07-11 NOTE — CARE COORDINATION
CASE MANAGEMENT DISCHARGE SUMMARY      Discharge to: home  [facility] notified of discharge        IMM given: 7/11/25    New Durable Medical Equipment ordered/agency:     Transportation:    Family/car: yes     Confirmed discharge plan with:     Patient: yes/     Family:  yes/no    Name: Contact number:        RN, name: Gabby    Note: Discharging nurse to complete ECOC, reconcile AVS, and place final copy with patient's discharge packet. RN to ensure that written prescriptions for  Level II medications are sent with patient to the facility as per protocol.      Sangita Jorgensen RN, CM